# Patient Record
Sex: MALE | Race: WHITE | NOT HISPANIC OR LATINO | Employment: OTHER | URBAN - METROPOLITAN AREA
[De-identification: names, ages, dates, MRNs, and addresses within clinical notes are randomized per-mention and may not be internally consistent; named-entity substitution may affect disease eponyms.]

---

## 2017-01-02 ENCOUNTER — TELEPHONE (OUTPATIENT)
Dept: FAMILY MEDICINE | Facility: CLINIC | Age: 72
End: 2017-01-02

## 2017-01-02 ENCOUNTER — OFFICE VISIT (OUTPATIENT)
Dept: FAMILY MEDICINE | Facility: CLINIC | Age: 72
End: 2017-01-02
Payer: COMMERCIAL

## 2017-01-02 VITALS
HEIGHT: 73 IN | TEMPERATURE: 99.4 F | BODY MASS INDEX: 38.38 KG/M2 | DIASTOLIC BLOOD PRESSURE: 74 MMHG | HEART RATE: 54 BPM | WEIGHT: 289.6 LBS | SYSTOLIC BLOOD PRESSURE: 126 MMHG | RESPIRATION RATE: 18 BRPM | OXYGEN SATURATION: 97 %

## 2017-01-02 DIAGNOSIS — Z11.59 NEED FOR HEPATITIS C SCREENING TEST: ICD-10-CM

## 2017-01-02 DIAGNOSIS — D64.9 ANEMIA, UNSPECIFIED TYPE: Primary | ICD-10-CM

## 2017-01-02 DIAGNOSIS — Z23 NEED FOR PROPHYLACTIC VACCINATION AND INOCULATION AGAINST INFLUENZA: ICD-10-CM

## 2017-01-02 DIAGNOSIS — D64.9 ANEMIA, UNSPECIFIED TYPE: ICD-10-CM

## 2017-01-02 DIAGNOSIS — D64.9 ANEMIA, UNSPECIFIED: ICD-10-CM

## 2017-01-02 DIAGNOSIS — N18.30 CKD (CHRONIC KIDNEY DISEASE) STAGE 3, GFR 30-59 ML/MIN (H): Chronic | ICD-10-CM

## 2017-01-02 DIAGNOSIS — Z79.4 TYPE 2 DIABETES MELLITUS WITH DIABETIC NEUROPATHY, WITH LONG-TERM CURRENT USE OF INSULIN (H): Primary | ICD-10-CM

## 2017-01-02 DIAGNOSIS — M1A.39X0 CHRONIC GOUT DUE TO RENAL IMPAIRMENT OF MULTIPLE SITES WITHOUT TOPHUS: Chronic | ICD-10-CM

## 2017-01-02 DIAGNOSIS — R06.00 DYSPNEA, UNSPECIFIED TYPE: ICD-10-CM

## 2017-01-02 DIAGNOSIS — E11.40 TYPE 2 DIABETES MELLITUS WITH DIABETIC NEUROPATHY, WITH LONG-TERM CURRENT USE OF INSULIN (H): Primary | ICD-10-CM

## 2017-01-02 LAB
ERYTHROCYTE [DISTWIDTH] IN BLOOD BY AUTOMATED COUNT: 18.3 % (ref 10–15)
HCT VFR BLD AUTO: 31.7 % (ref 40–53)
HGB BLD-MCNC: 9.2 G/DL (ref 13.3–17.7)
MCH RBC QN AUTO: 24.5 PG (ref 26.5–33)
MCHC RBC AUTO-ENTMCNC: 29 G/DL (ref 31.5–36.5)
MCV RBC AUTO: 84 FL (ref 78–100)
PLATELET # BLD AUTO: 232 10E9/L (ref 150–450)
RBC # BLD AUTO: 3.76 10E12/L (ref 4.4–5.9)
WBC # BLD AUTO: 7.4 10E9/L (ref 4–11)

## 2017-01-02 PROCEDURE — 82247 BILIRUBIN TOTAL: CPT | Mod: 59 | Performed by: INTERNAL MEDICINE

## 2017-01-02 PROCEDURE — 82728 ASSAY OF FERRITIN: CPT | Performed by: INTERNAL MEDICINE

## 2017-01-02 PROCEDURE — 84550 ASSAY OF BLOOD/URIC ACID: CPT | Performed by: INTERNAL MEDICINE

## 2017-01-02 PROCEDURE — 85027 COMPLETE CBC AUTOMATED: CPT | Performed by: INTERNAL MEDICINE

## 2017-01-02 PROCEDURE — 99000 SPECIMEN HANDLING OFFICE-LAB: CPT | Performed by: INTERNAL MEDICINE

## 2017-01-02 PROCEDURE — 36415 COLL VENOUS BLD VENIPUNCTURE: CPT | Performed by: INTERNAL MEDICINE

## 2017-01-02 PROCEDURE — 86803 HEPATITIS C AB TEST: CPT | Mod: 90 | Performed by: INTERNAL MEDICINE

## 2017-01-02 PROCEDURE — 80053 COMPREHEN METABOLIC PANEL: CPT | Performed by: INTERNAL MEDICINE

## 2017-01-02 PROCEDURE — 90471 IMMUNIZATION ADMIN: CPT | Performed by: INTERNAL MEDICINE

## 2017-01-02 PROCEDURE — 83550 IRON BINDING TEST: CPT | Performed by: INTERNAL MEDICINE

## 2017-01-02 PROCEDURE — 83010 ASSAY OF HAPTOGLOBIN QUANT: CPT | Mod: 90 | Performed by: INTERNAL MEDICINE

## 2017-01-02 PROCEDURE — 99214 OFFICE O/P EST MOD 30 MIN: CPT | Mod: 25 | Performed by: INTERNAL MEDICINE

## 2017-01-02 PROCEDURE — 83540 ASSAY OF IRON: CPT | Performed by: INTERNAL MEDICINE

## 2017-01-02 PROCEDURE — 90662 IIV NO PRSV INCREASED AG IM: CPT | Performed by: INTERNAL MEDICINE

## 2017-01-02 PROCEDURE — 82607 VITAMIN B-12: CPT | Mod: 90 | Performed by: INTERNAL MEDICINE

## 2017-01-02 PROCEDURE — 83735 ASSAY OF MAGNESIUM: CPT | Performed by: INTERNAL MEDICINE

## 2017-01-02 PROCEDURE — 85045 AUTOMATED RETICULOCYTE COUNT: CPT | Mod: 90 | Performed by: INTERNAL MEDICINE

## 2017-01-02 PROCEDURE — 99207 C FOOT EXAM  NO CHARGE: CPT | Performed by: INTERNAL MEDICINE

## 2017-01-02 NOTE — Clinical Note
[place] [address] [address] [facility]  [address]  [place]  [address]  Tel: [phone]    January 6, 2017    Jt Nguyen Valentina  [facility]  [address]  [facility] [address]      Jamal,     Here is a copy of your labs for your records showing the drop in your hemoglobin with low iron (ferritin) counts.  I think it is extremely important to schedule the EGD (upper endoscopy) and colonoscopy if you haven't already done so to investigate this further and look for microscopic blood loss.  Your hemoglobin has dropped a lot (by 3 points) in just the past few months and this can definitely account for your dizziness that you sometimes now report. Your hemoglobin is at 9.2 and transfusion is usually suggested at a level about 7.  Your other labs are relatively stable.  Your hepatitis C screening test is normal.  Please continue with the plan of care as we discussed and let me know if you have any questions/concerns. Thanks!     Sincerely,    Sigrid David, DO    Results for orders placed or performed in visit on 01/02/17   Hepatitis C Screen Reflex to HCV RNA Quant and Genotype   Result Value Ref Range    Hepatitis C Antibody  NR     Nonreactive   Assay performance characteristics have not been established for newborns,   infants, and children     Vitamin B12   Result Value Ref Range    Vitamin B12 302 193 - 986 pg/mL   Magnesium   Result Value Ref Range    Magnesium 2.0 1.6 - 2.3 mg/dL   Ferritin   Result Value Ref Range    Ferritin 12 (L) 26 - 388 ng/mL   Iron and iron binding capacity   Result Value Ref Range    Iron 51 35 - 180 ug/dL    Iron Binding Cap 635 (H) 240 - 430 ug/dL    Iron Saturation Index 8 (L) 15 - 46 %   Reticulocyte count   Result Value Ref Range    % Retic 3.7 (H) 0.5 - 2.0 %    Absolute Retic 143.6 (H) 25 - 95 10e9/L   CBC with platelets   Result Value Ref Range    WBC 7.4 4.0 - 11.0 10e9/L    RBC Count 3.76 (L) 4.4 - 5.9 10e12/L    Hemoglobin 9.2 (L) 13.3 - 17.7 g/dL     Hematocrit 31.7 (L) 40.0 - 53.0 %    MCV 84 78 - 100 fl    MCH 24.5 (L) 26.5 - 33.0 pg    MCHC 29.0 (L) 31.5 - 36.5 g/dL    RDW 18.3 (H) 10.0 - 15.0 %    Platelet Count 232 150 - 450 10e9/L   Uric acid   Result Value Ref Range    Uric Acid 7.0 3.5 - 7.2 mg/dL   Comprehensive metabolic panel (BMP + Alb, Alk Phos, ALT, AST, Total. Bili, TP)   Result Value Ref Range    Sodium 135 133 - 144 mmol/L    Potassium 4.7 3.4 - 5.3 mmol/L    Chloride 102 94 - 109 mmol/L    Carbon Dioxide 24 20 - 32 mmol/L    Anion Gap 9 3 - 14 mmol/L    Glucose 162 (H) 70 - 99 mg/dL    Urea Nitrogen 40 (H) 7 - 30 mg/dL    Creatinine 1.61 (H) 0.66 - 1.25 mg/dL    GFR Estimate 43 (L) >60 mL/min/1.7m2    GFR Estimate If Black 52 (L) >60 mL/min/1.7m2    Calcium 9.1 8.5 - 10.1 mg/dL    Bilirubin Total 1.7 (H) 0.2 - 1.3 mg/dL    Albumin 3.8 3.4 - 5.0 g/dL    Protein Total 7.1 6.8 - 8.8 g/dL    Alkaline Phosphatase 52 40 - 150 U/L    ALT 44 0 - 70 U/L    AST 34 0 - 45 U/L

## 2017-01-02 NOTE — MR AVS SNAPSHOT
After Visit Summary   1/2/2017    Jt Montgomery    MRN: [id number]           Patient Information     Date Of Birth          1945        Visit Information        Provider Department      1/2/2017 3:30 PM Sigrid David,  [facility]        Today's Diagnoses     Type 2 diabetes mellitus with diabetic neuropathy, with long-term current use of insulin (H)    -  1     Chronic gout due to renal impairment of multiple sites without tophus         CKD (chronic kidney disease) stage 3, GFR 30-59 ml/min         Anemia, unspecified type         Dyspnea, unspecified type         Need for prophylactic vaccination and inoculation against influenza         Need for hepatitis C screening test           Care Instructions    Labs today.   Consult with Nathaly about vitamins and supplements.   For scheduling, make sure that you are getting your A1C checked every 6 months.   Schedule stress test.  Follow up at least annually.        Follow-ups after your visit        Your next 10 appointments already scheduled     Jan 12, 2017  4:00 PM   TELEMEDICINE with Nathaly Hughes [facility] ([facility])    [address] [place] [address] [phone]           Note: this is not an onsite visit; there is no need to come to the facility.            Jan 26, 2017  2:20 PM   Anticoagulation Visit with [facility]   [facility] [place] ([facility])    [address] [place] [address] [address]  [facility] [id number] [phone]              Who to contact     If you have questions or need follow up information about today's clinic visit or your schedule please contact [facility] directly at [phone].  Normal or non-critical lab and imaging results will be communicated to you by MyChart, letter or phone within 4 business days after the clinic has received the results. If you do not hear from us within  "7 days, please contact the clinic through [facility] or phone. If you have a critical or abnormal lab result, we will notify you by phone as soon as possible.  Submit refill requests through [facility] or call your pharmacy and they will forward the refill request to us. Please allow 3 business days for your refill to be completed.          Additional Information About Your Visit        [facility] Information     [facility] lets you send messages to your doctor, view your test results, renew your prescriptions, schedule appointments and more. To sign up, go to [email or web address] . Click on \"Log in\" on the left side of the screen, which will take you to the Welcome page. Then click on \"Sign up Now\" on the right side of the page.     You will be asked to enter the access code listed below, as well as some personal information. Please follow the directions to create your username and password.     Your access code is: [email or web address]  Expires: 2017 12:24 PM     Your access code will  in 90 days. If you need help or a new code, please call your [place] clinic or [phone].        Your Vitals Were     Pulse Temperature Respirations Height BMI (Body Mass Index) Pulse Oximetry    54 99.4  F (37.4  C) (Oral) 18 6' 1\" (1.854 m) 38.22 kg/m2 97%       Blood Pressure from Last 3 Encounters:   17 126/74   16 127/83   16 122/76    Weight from Last 3 Encounters:   17 289 lb 9.6 oz (131.362 kg)   16 289 lb (131.09 kg)   16 286 lb 8 oz (129.956 kg)              We Performed the Following     ADMIN INFLUENZA[] (For MEDICARE Patients ONLY)      CBC with platelets     Comprehensive metabolic panel (BMP + Alb, Alk Phos, ALT, AST, Total. Bili, TP)     Ferritin     FLU VACCINE, INCREASED ANTIGEN, PRESV FREE, AGE 65+ [57957]     FOOT EXAM     Hepatitis C Screen Reflex to HCV RNA Quant and Genotype     Iron and iron binding capacity     Magnesium     Reticulocyte count     Uric acid     Vitamin " B12          Today's Medication Changes          These changes are accurate as of: 1/2/17  4:37 PM.  If you have any questions, ask your nurse or doctor.               Stop taking these medicines if you haven't already. Please contact your care team if you have questions.     Krill Oil 1000 MG Caps   Stopped by:  Sigrid David,            STATIN NOT PRESCRIBED (INTENTIONAL)   Stopped by:  Sigrid David,                     Primary Care Provider Office Phone # Fax #    Sigrid David [phone]       [facility] [address]   [facility] [address]        Thank you!     Thank you for choosing [facility]  for your care. Our goal is always to provide you with excellent care. Hearing back from our patients is one way we can continue to improve our services. Please take a few minutes to complete the written survey that you may receive in the mail after your visit with us. Thank you!             Your Updated Medication List - Protect others around you: Learn how to safely use, store and throw away your medicines at www.disposemymeds.org.          This list is accurate as of: 1/2/17  4:37 PM.  Always use your most recent med list.                   Brand Name Dispense Instructions for use    alpha-lipoic acid 600 MG Caps      Take 600 mg by mouth 2 times daily       aspirin 81 MG EC tablet     90 tablet    Take 1 tablet by mouth daily.       ATORVASTATIN CALCIUM PO      Take 20 mg by mouth daily       blood glucose monitoring lancets     1 Box    Use to test blood sugar 5 times daily or as directed.       blood glucose monitoring meter device kit     1 kit    Use to test blood sugar 3 times daily or as directed.       blood glucose monitoring test strip    ACCU-CHEK LUIS PLUS    400 each    Use to test blood sugar 5 times daily or as directed.       CoQ10 100 MG Caps      Take 200 mg by mouth daily       digoxin 125 MCG tablet    LANOXIN    90 tablet    Take 1  tablet (125 mcg) by mouth daily       diltiazem 300 MG 24 hr capsule    CARTIA XT    90 capsule    Take 1 capsule (300 mg) by mouth daily       fish oil-omega-3 fatty acids 1000 MG capsule      Take 2 g by mouth every other day       furosemide 20 MG tablet    LASIX    90 tablet    Take 1 tablet (20 mg) by mouth daily       insulin aspart 100 UNIT/ML injection    NovoLOG FLEXPEN    45 mL    Inject 15 Units Subcutaneous 3 times daily (with meals)       insulin glargine 100 UNIT/ML injection    LANTUS SOLOSTAR    3 Month    Inject 55 Units Subcutaneous daily       insulin pen needle 32G X 4 MM    BD GERMÁN U/F    100 each    Use four times daily or as directed.       irbesartan 300 MG tablet    AVAPRO    90 tablet    Take 1 tablet (300 mg) by mouth At Bedtime       metoprolol 100 MG 24 hr tablet    TOPROL-XL    180 tablet    Take 1 tablet (100 mg) by mouth 2 times daily       Multi-vitamin Tabs tablet      Take 4 tablets by mouth daily       order for DME     1 each    Equipment being ordered: True Track Meter from [employer]       predniSONE 20 MG tablet    DELTASONE    10 tablet    Take 1 tablet (20 mg) by mouth daily as needed Gout flare       PROBIOTIC & ACIDOPHILUS EX ST PO      Take 1 capsule by mouth daily       Resveratrol 100 MG Caps      Take 1 capsule by mouth daily       spironolactone 25 MG tablet    ALDACTONE    90 tablet    Take 1 tablet (25 mg) by mouth daily       ULORIC 40 MG Tabs tablet   Generic drug:  febuxostat      Take 40 mg by mouth daily       UNABLE TO FIND      MEDICATION NAME: Super Beet Juice       warfarin 5 MG tablet    COUMADIN    135 tablet    Take 1 tab on Mondays and Thursdays and take 1 1/2 tabs on all other days or as directed by INR clinic

## 2017-01-02 NOTE — NURSING NOTE
"Chief Complaint   Patient presents with     Diabetes       Initial /74 mmHg  Pulse 54  Temp(Src) 99.4  F (37.4  C) (Oral)  Resp 18  Ht 6' 1\" (1.854 m)  Wt 289 lb 9.6 oz (131.362 kg)  BMI 38.22 kg/m2  SpO2 97% Estimated body mass index is 38.22 kg/(m^2) as calculated from the following:    Height as of this encounter: 6' 1\" (1.854 m).    Weight as of this encounter: 289 lb 9.6 oz (131.362 kg).  BP completed using cuff size: lior Goyal CMA January 2, 2017 3:46 PM      "

## 2017-01-02 NOTE — PATIENT INSTRUCTIONS
Labs today.   Consult with Nathaly about vitamins and supplements.   For scheduling, make sure that you are getting your A1C checked every 6 months.   Schedule stress test.  Follow up at least annually.

## 2017-01-02 NOTE — PROGRESS NOTES
SUBJECTIVE:                                                    Jt Montgomery is a 71 year old male who presents to clinic today for the following health issues:    Jamal presents to the clinic for a follow-up appointment.    Diabetes Follow-up    Patient is checking blood sugars: twice daily.          In the morning, in afternoon    Diabetic concerns: None     Symptoms of hypoglycemia (low blood sugar): none     Paresthesias (numbness or burning in feet) or sores: Yes neuropathy     Date of last diabetic eye exam: unsure     Hyperlipidemia Follow-Up      Rate your low fat/cholesterol diet?: good    Taking statin?  Yes, no muscle aches from statin - just recently started per cardiology eval - he had to think about this extensively     Other lipid medications/supplements?:  none     Hypertension Follow-up      Outpatient blood pressures are being checked at home.  Results are 120s/70-80s.    Low Salt Diet: uses himalayan salt       Vascular Disease Follow-up:  Coronary Artery Disease (CAD)      Chest pain or pressure, left side neck or arm pain: No    Shortness of breath/increased sweats/nausea with exertion: Yes with exertion - has discussed with cards - planned future stress test    Pain in calves walking 1-2 blocks: No    Worsened or new symptoms since last visit: Yes dizziness and weakness    Nitroglycerin use: no    Daily aspirin use: Yes     Chronic Kidney Disease Follow-up      Current NSAID use?  No      Amount of exercise or physical activity: 2-3 days/week for an average of 15-30 minutes    Problems taking medications regularly: No    Medication side effects: none    Diet: low salt, low fat/cholesterol and diabetic    Jamal notes that he will have intermittent SOB and dizziness; no specific pattern. He has a stress test scheduled.   Pt is now seeing cardiologist Dr. Gomez and appreciates her expertise. NOTE, recent echo actually showed improved EF to 50-55% with some WMA in setting of known  "CAD.  Last A1C was under 8. Feels that gout is under control with no recent flare ups.  Jamal expresses that he is experiencing neuropathy in his fingers and toes. He notices that he feels it the most when typing and denies being woken by the numbness. Neuropathy in LE does not extend above the ankles so likely the hand paresthesias are r/t some local impingement neuropathy such as CTS.  Jamal notes that sometimes he will have difficulty sleeping at night and will not always feel rested in the morning. Is using his BiPAP nightly.    Jamal would like to consult with somebody about a good supplement/vitamin regimen - suggested he check with Nathaly.   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   Pt is now taking 20 mg atorvastatin daily for the past 3 days after discussion with Dr. Gomez.      Problem list and histories reviewed & adjusted, as indicated.    ROS:  Detailed as above    This document serves as a record of the services and decisions personally performed and made by Sigrid David DO. It was created on his/her behalf by Etelvina Hood, a trained medical scribe. The creation of this document is based the provider's statements to the medical scribe.  Wyatt Hood 3:59 PM, January 2, 2017    OBJECTIVE:                                                    /74 mmHg  Pulse 54  Temp(Src) 99.4  F (37.4  C) (Oral)  Resp 18  Ht 6' 1\" (1.854 m)  Wt 289 lb 9.6 oz (131.362 kg)  BMI 38.22 kg/m2  SpO2 97%  Body mass index is 38.22 kg/(m^2).  GENERAL: healthy, alert and no distress  RESP: Lungs clear to auscultation-no wheezes, rails or rhonchi  CV: irregularly irregular, rate controlled, no carotid bruits  Diabetic foot exam: skin intact, loss of sensation bilaterally with monofilament exam at toe level  SKIN: Chronic bilateral " LE hyperpigmentation d/t mild venous stasis with trace edema  PSYCH: mood and affect pleasant, cheerful     ASSESSMENT/PLAN:                                                      1. Type 2 diabetes mellitus with diabetic neuropathy, with long-term current use of insulin (H)  Appreciate MTM - he has made a lot of nice improvements over the past year  - Vitamin B12  - Magnesium  - Comprehensive metabolic panel (BMP + Alb, Alk Phos, ALT, AST, Total. Bili, TP)  - FOOT EXAM    2. Chronic gout due to renal impairment of multiple sites without tophus  Uloric has been very helpful and is followed by nephrology   Rare use of prednisone  - Uric acid    3. CKD (chronic kidney disease) stage 3, GFR 30-59 ml/min  Follows with nephrology    4. Anemia, unspecified type  He denies blood loss, maybe anemia related to CKD  Will add on additional labs and recheck  - Ferritin  - Iron and iron binding capacity  - Reticulocyte count  - CBC with platelets    5. Dyspnea, unspecified type  Stress test had been advised by Dr. Gomez  Periodic dizziness may be related to low BP, anemia and/or need for stress test vs other    6. Need for prophylactic vaccination and inoculation against influenza  - FLU VACCINE, INCREASED ANTIGEN, PRESV FREE, AGE 65+ [85461]  - ADMIN INFLUENZA[] (For MEDICARE Patients ONLY)     7. Need for hepatitis C screening test  - Hepatitis C Screen Reflex to HCV RNA Quant and Genotype    Patient Instructions   Labs today.   Consult with Nathaly about vitamins and supplements.   For scheduling, make sure that you are getting your A1C checked every 6 months.   Schedule stress test.  Follow up at least annually.    The information in this document, created by the medical scribe for me, accurately reflects the services I personally performed and the decisions made by me. I have reviewed and approved this document for accuracy prior to leaving the patient care area.  Sigrid David, DO  4:00 PM, 01/02/2017    Sigrid Conley  Frankie, DO  [facility]    Injectable Influenza Immunization Documentation    1.  Is the person to be vaccinated sick today?  No    2. Does the person to be vaccinated have an allergy to eggs or to a component of the vaccine?  No    3. Has the person to be vaccinated today ever had a serious reaction to influenza vaccine in the past?  No    4. Has the person to be vaccinated ever had Guillain-[place] syndrome?  No     Form completed by patient. BETHANY Goyal CMA January 2, 2017 3:54 PM

## 2017-01-03 LAB
ALBUMIN SERPL-MCNC: 3.8 G/DL (ref 3.4–5)
ALP SERPL-CCNC: 52 U/L (ref 40–150)
ALT SERPL W P-5'-P-CCNC: 44 U/L (ref 0–70)
ANION GAP SERPL CALCULATED.3IONS-SCNC: 9 MMOL/L (ref 3–14)
AST SERPL W P-5'-P-CCNC: 34 U/L (ref 0–45)
BILIRUB SERPL-MCNC: 1.7 MG/DL (ref 0.2–1.3)
BUN SERPL-MCNC: 40 MG/DL (ref 7–30)
CALCIUM SERPL-MCNC: 9.1 MG/DL (ref 8.5–10.1)
CHLORIDE SERPL-SCNC: 102 MMOL/L (ref 94–109)
CO2 SERPL-SCNC: 24 MMOL/L (ref 20–32)
CREAT SERPL-MCNC: 1.61 MG/DL (ref 0.66–1.25)
FERRITIN SERPL-MCNC: 12 NG/ML (ref 26–388)
GFR SERPL CREATININE-BSD FRML MDRD: 43 ML/MIN/1.7M2
GLUCOSE SERPL-MCNC: 162 MG/DL (ref 70–99)
HCV AB SERPL QL IA: NORMAL
IRON SATN MFR SERPL: 8 % (ref 15–46)
IRON SERPL-MCNC: 51 UG/DL (ref 35–180)
MAGNESIUM SERPL-MCNC: 2 MG/DL (ref 1.6–2.3)
POTASSIUM SERPL-SCNC: 4.7 MMOL/L (ref 3.4–5.3)
PROT SERPL-MCNC: 7.1 G/DL (ref 6.8–8.8)
RETICS # AUTO: 143.6 10E9/L (ref 25–95)
RETICS/RBC NFR AUTO: 3.7 % (ref 0.5–2)
SODIUM SERPL-SCNC: 135 MMOL/L (ref 133–144)
TIBC SERPL-MCNC: 635 UG/DL (ref 240–430)
URATE SERPL-MCNC: 7 MG/DL (ref 3.5–7.2)

## 2017-01-03 NOTE — TELEPHONE ENCOUNTER
Call to patient.  Reviewed provider suggestion for next steps, he denies any blood in his stool or urine.  Patient continues to be hesitant to complete colonoscopy, more so to have both.  Did review with patient that provider feels this IS the next option for looking into his anemia.  Patient verbalizes understanding, is ok with provider placing referral to GI.  Forward to provider.  Elvira Rodas RN

## 2017-01-03 NOTE — TELEPHONE ENCOUNTER
Advised per below, pt will call [facility] INR clinic as he is managed through there for follow up.  Ivette Reddy RN

## 2017-01-03 NOTE — TELEPHONE ENCOUNTER
Triage, please call him about his new onset anemia  He had denied blood loss yesterday  I suggest GI eval (both upper and lower - EGD and colonoscopy). He has declined colonoscopy in the past.

## 2017-01-03 NOTE — TELEPHONE ENCOUNTER
GI referral placed  - he should get call from KAROLINE yanes  Please note, he is on Coumadin so will need to stop that prior to procedure   Please have him call when it gets scheduled so we can figure out bridging

## 2017-01-05 ENCOUNTER — TELEPHONE (OUTPATIENT)
Dept: FAMILY MEDICINE | Facility: CLINIC | Age: 72
End: 2017-01-05

## 2017-01-05 DIAGNOSIS — D64.9 ANEMIA, UNSPECIFIED: Primary | ICD-10-CM

## 2017-01-06 LAB
BILIRUB SERPL-MCNC: 1.6 MG/DL (ref 0.2–1.3)
HAPTOGLOB SERPL-MCNC: 198 MG/DL (ref 35–175)
VIT B12 SERPL-MCNC: 302 PG/ML (ref 193–986)

## 2017-01-06 NOTE — TELEPHONE ENCOUNTER
Left VM for Jamal to hopefully reinforce plan for GI eval  Labs with high retic and bilirubin and low Hgb - could be hemolysis or active blood loss  Will try to add on additional labs. LAB can any of the labs I just ordered in this encounter be added on to Monday's labs (even if not all of them, but possibly some of them)? Thanks!

## 2017-01-12 ENCOUNTER — ALLIED HEALTH/NURSE VISIT (OUTPATIENT)
Dept: PHARMACY | Facility: CLINIC | Age: 72
End: 2017-01-12
Payer: COMMERCIAL

## 2017-01-12 DIAGNOSIS — Z79.4 TYPE 2 DIABETES MELLITUS WITH DIABETIC NEUROPATHY, WITH LONG-TERM CURRENT USE OF INSULIN (H): ICD-10-CM

## 2017-01-12 DIAGNOSIS — E11.40 TYPE 2 DIABETES MELLITUS WITH DIABETIC NEUROPATHY, WITH LONG-TERM CURRENT USE OF INSULIN (H): ICD-10-CM

## 2017-01-12 DIAGNOSIS — D64.9 ANEMIA, UNSPECIFIED TYPE: Primary | ICD-10-CM

## 2017-01-12 PROCEDURE — 99606 MTMS BY PHARM EST 15 MIN: CPT | Performed by: PHARMACIST

## 2017-01-12 NOTE — PROGRESS NOTES
SUBJECTIVE/OBJECTIVE:                                                    Jt Montgomery is a 71 year old male called for a follow-up visit for Medication Therapy Management.  He was referred to me from Dr. David.     Chief Complaint: Follow up from our visit on 12/20/16.  Called to f/up on diabetes management.    Tobacco: History of tobacco dependence - quit 1972   Alcohol: history of alcohol dependence    Medication Adherence: no issues reported    Anemia:  He saw Dr. David on 1/2 and was diagnosed with anemia.  He was asked to have an EGD and a colonoscopy - he hasn't scheduled either yet.  He had some questions about these.      Diabetes:  Pt currently taking Lantus 55 units daily and Novolog 15 units TID with meals (he's been working hard on taking this right before or right after eating, he says it's not perfect but going much better).  Pt is experiencing the following side effects:  none  SMBG: Several times per week.   Ranges (from patient's glucose log):   AM (fasting): 158, 178, 168, 179, 176, 182, 181, 191 (had baked potato the previous evening), 163  Mid-day: 129  Symptoms of low blood sugar? none. Frequency of hypoglycemia? never.  Recent symptoms of high blood sugar? none  Eye exam: due  Foot exam: up to date  Microalbumin is < 30 mg/g. Pt is taking an ACEi/ARB.  Aspirin: Taking 81mg daily and denies side effects  Exercise:  He's been doing a little more exercise, but it's not daily yet (his goal)    Current labs include:  BP Readings from Last 3 Encounters:   01/02/17 126/74   12/05/16 127/83   11/02/16 122/76       A1C      7.6   12/5/2016    CHOL      175   4/29/2016  TRIG      487   4/29/2016  HDL       27   4/29/2016  LDL       99   4/29/2016    Liver Function Studies -   Recent Labs   Lab Test  01/02/17   1639   PROTTOTAL  7.1   ALBUMIN  3.8   BILITOTAL  1.7*   ALKPHOS  52   AST  34   ALT  44       Lab Results   Component Value Date    UCRR 148 04/29/2016    MICROL 19 04/29/2016    UMALCR  12.84 04/29/2016       Last Basic Metabolic Panel:  NA      135   1/2/2017   POTASSIUM      4.7   1/2/2017  CHLORIDE      102   1/2/2017  BUN       40   1/2/2017  CR     1.61   1/2/2017    GFR ESTIMATE   Date Value Ref Range Status   01/02/2017 43* >60 mL/min/1.7m2 Final     Comment:     Non  GFR Calc   08/08/2016 36* >60 mL/min/1.7m2 Final     Comment:     Non  GFR Calc   04/29/2016 49* >60 mL/min/1.7m2 Final     Comment:     Non  GFR Calc       TSH   Date Value Ref Range Status   04/29/2016 2.35 0.40 - 4.00 mU/L Final   ]    There were no vitals taken for this visit. - telephone visit    Most Recent Immunizations   Administered Date(s) Administered     Influenza (High Dose) 3 valent vaccine 01/02/2017     Pneumococcal 23 valent 09/12/2012     ASSESSMENT:                                                    Current medications were reviewed today.      Medication Adherence: no issues identified    Anemia: Needs to schedule EGD and colonoscopy.    Diabetes: Needs Improvement. Patient is meeting A1c goal of < 8%. Self monitoring of blood glucose is not at goal of fasting  mg/dL.  He'd prefer to continue working on diet and exercise, which I agree is reasonable.     PLAN:                                                      1.  Reviewed indication for EGD and colonoscopy and the importance of having these done.  Provided pt with phone # to call to schedule, he agrees to do so.  2.  Jamal set the goal of continuing to work on diet/exercise.    I spent 20 minutes with this patient today.  All changes were made via collaborative practice agreement with Sigrid David. A copy of the visit note was provided to the patient's primary care provider.     Will follow up in 3 weeks, phone visit scheduled.    The patient declined a summary of these recommendations as an after visit summary.    Nathaly Hughes, PharmD, BCACP  Medication Therapy Management Provider  Pager:  [phone]

## 2017-01-12 NOTE — Clinical Note
He hasn't scheduled his EGD and colonoscopy yet, but I think I convinced him to do so.  He has the phone # and agreed to call.  I'm going to call him again in 3 weeks so will double check again then. Nathaly Hughes, PharmD, [facility] Medication Therapy Management Provider Pager: [phone]

## 2017-01-13 DIAGNOSIS — I25.10 CAD (CORONARY ARTERY DISEASE): Primary | ICD-10-CM

## 2017-01-13 RX ORDER — METOPROLOL SUCCINATE 100 MG/1
100 TABLET, EXTENDED RELEASE ORAL
Qty: 180 TABLET | Refills: 3 | Status: SHIPPED | OUTPATIENT
Start: 2017-01-13 | End: 2018-02-09

## 2017-02-02 ENCOUNTER — ANTICOAGULATION THERAPY VISIT (OUTPATIENT)
Dept: CARDIOLOGY | Facility: CLINIC | Age: 72
End: 2017-02-02
Payer: COMMERCIAL

## 2017-02-02 DIAGNOSIS — Z79.01 LONG-TERM (CURRENT) USE OF ANTICOAGULANTS: Primary | ICD-10-CM

## 2017-02-02 DIAGNOSIS — I48.91 ATRIAL FIBRILLATION, UNSPECIFIED TYPE (H): ICD-10-CM

## 2017-02-02 LAB — INR POINT OF CARE: 2.3 (ref 0.86–1.14)

## 2017-02-02 PROCEDURE — 36416 COLLJ CAPILLARY BLOOD SPEC: CPT | Performed by: INTERNAL MEDICINE

## 2017-02-02 PROCEDURE — 85610 PROTHROMBIN TIME: CPT | Mod: QW | Performed by: INTERNAL MEDICINE

## 2017-02-02 NOTE — PROGRESS NOTES
ANTICOAGULATION FOLLOW-UP CLINIC VISIT    Patient Name:  Jt Montgomery  Date:  2/2/2017  Contact Type:  Face to Face    SUBJECTIVE:     Patient Findings     Positives No Problem Findings           OBJECTIVE    INR PROTIME   Date Value Ref Range Status   02/02/2017 2.3* 0.86 - 1.14 Final       ASSESSMENT / PLAN  INR assessment THER    Recheck INR In: 4 WEEKS    INR Location Clinic      Anticoagulation Summary as of 2/2/2017     INR goal 2.0-3.0   Selected INR 2.3 (2/2/2017)   Maintenance plan 5 mg (5 mg x 1) on Mon, Thu; 7.5 mg (5 mg x 1.5) all other days   Full instructions 5 mg on Mon, Thu; 7.5 mg all other days   Weekly total 47.5 mg   No change documented Paola Roberson RN   Plan last modified Paola Roberson RN (10/27/2016)   Next INR check 3/2/2017   Target end date Indefinite    Indications   Long-term (current) use of anticoagulants [Z79.01] [Z79.01]  Atrial fibrillation (AFIB) on Coumadin [I48.91]         Anticoagulation Episode Summary     INR check location     Preferred lab     Send INR reminders to [facility] HEART INR NURSE    Comments       Anticoagulation Care Providers     Provider Role Specialty Phone number    Lottie Gomez Minda,  [facility] [phone]            See the Encounter Report to view Anticoagulation Flowsheet and Dosing Calendar (Go to Encounters tab in chart review, and find the Anticoagulation Therapy Visit)    INR 2.3.  He said he is taking less fish oil recently.  He also just mentioned when he was leaving that on Monday he started a new pill that contains green tea extra.  He said he has always been adding a green tea liquid to water but the pill with the extract is new.  We will continue the same schedule and recheck in 4 weeks was scheduled.  Told patient to call us back with the ingredients and amounts of green tea extract so we can decide if INR should be checked sooner in 2 weeks.  Brian Roberson RN        2/3/2017  9:56 am Pt left a voicemail that the Green tea extract has 50% EGCG (phytochemicals). Per [employer], green tea extract has a moderate interaction with warfarin (lowers INR) so we should recheck INR in 2 weeks. Left message for pt to call back to reschedule his INR appt to mid February. Antoni

## 2017-02-02 NOTE — MR AVS SNAPSHOT
Jt Berrylinh Montgomery   2/2/2017 2:20 PM   Anticoagulation Therapy Visit    Description:  71 year old male   Provider:  [facility] ANTICOAGULATION   Department:  [facility]           INR as of 2/2/2017     Selected INR 2.3 (2/2/2017)      Anticoagulation Summary as of 2/2/2017     INR goal 2.0-3.0   Selected INR 2.3 (2/2/2017)   Full instructions 5 mg on Mon, Thu; 7.5 mg all other days   Next INR check 3/2/2017    Indications   Long-term (current) use of anticoagulants [Z79.01] [Z79.01]  Atrial fibrillation (AFIB) on Coumadin [I48.91]         Your next Anticoagulation Clinic appointment(s)     Mar 02, 2017  2:20 PM   Anticoagulation Visit with  ANTICOAGULATION   [facility] ([email or web address])    [address] [place] [id number] [phone]              Contact Numbers     Anticoagulant (INR) Clinic Number: [phone]          February 2017 Details    Sun Mon Tue Wed Thu Fri Sat        1               2      5 mg   See details      3      7.5 mg         4      7.5 mg           5      7.5 mg         6      5 mg         7      7.5 mg         8      7.5 mg         9      5 mg         10      7.5 mg         11      7.5 mg           12      7.5 mg         13      5 mg         14      7.5 mg         15      7.5 mg         16      5 mg         17      7.5 mg         18      7.5 mg           19      7.5 mg         20      5 mg         21      7.5 mg         22      7.5 mg         23      5 mg         24      7.5 mg         25      7.5 mg           26      7.5 mg         27      5 mg         28      7.5 mg              Date Details   02/02 This INR check               How to take your warfarin dose     To take:  5 mg Take 1 of the 5 mg tablets.    To take:  7.5 mg Take 1.5 of the 5 mg tablets.           March 2017 Details    Sun Mon Tue Wed Thu Fri Sat        1      7.5 mg         2            3               4                 5               6                7               8               9               10               11                 12               13               14               15               16               17               18                 19               20               21               22               23               24               25                 26               27               28               29               30               31                 Date Details   No additional details    Date of next INR:  3/2/2017         How to take your warfarin dose     To take:  5 mg Take 1 of the 5 mg tablets.    To take:  7.5 mg Take 1.5 of the 5 mg tablets.

## 2017-02-09 ENCOUNTER — ALLIED HEALTH/NURSE VISIT (OUTPATIENT)
Dept: PHARMACY | Facility: CLINIC | Age: 72
End: 2017-02-09
Payer: COMMERCIAL

## 2017-02-09 DIAGNOSIS — D64.9 ANEMIA, UNSPECIFIED TYPE: ICD-10-CM

## 2017-02-09 DIAGNOSIS — E11.40 TYPE 2 DIABETES MELLITUS WITH DIABETIC NEUROPATHY, WITH LONG-TERM CURRENT USE OF INSULIN (H): Primary | Chronic | ICD-10-CM

## 2017-02-09 DIAGNOSIS — Z79.4 TYPE 2 DIABETES MELLITUS WITH DIABETIC NEUROPATHY, WITH LONG-TERM CURRENT USE OF INSULIN (H): Primary | Chronic | ICD-10-CM

## 2017-02-09 PROCEDURE — 99606 MTMS BY PHARM EST 15 MIN: CPT | Performed by: PHARMACIST

## 2017-02-09 NOTE — Clinical Note
LINETTE he still hasn't scheduled EGD or colonoscopy, but says he will before we chat again in 2 weeks. Nathaly Hughes, PharmD, [facility] Medication Therapy Management Provider Pager: [phone]

## 2017-02-09 NOTE — PROGRESS NOTES
SUBJECTIVE/OBJECTIVE:                                                    Jt Montgomery is a 71 year old male called for a follow-up visit for Medication Therapy Management.  He was referred to me from Dr. David.     Chief Complaint: Follow up from our visit on 1/12/17.  Called to f/up on diabetes management.    Tobacco: History of tobacco dependence - quit 1972   Alcohol: history of alcohol dependence    Medication Adherence: no issues reported    Diabetes:  Pt currently taking Lantus 55 units daily and Novolog 15 units TID with meals.  Pt is experiencing the following side effects:  none  SMBG: Several times per week.   Ranges (from patient's glucose log):   AM (fasting): 181, 179, 179, 217 (had pizza the night before), 230 (had dessert the night before), 162, 206, 166  Symptoms of low blood sugar? none. Frequency of hypoglycemia? never.  Recent symptoms of high blood sugar? none  Eye exam: due  Foot exam: up to date  Microalbumin is < 30 mg/g. Pt is taking an ACEi/ARB.  Aspirin: Taking 81mg daily and denies side effects  Exercise:  He hasn't exercised at all since we last talked, which is disappointing to him.  He had planned to talk with a friend about exercise, and motivate each other to do some, but never did that.  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    Anemia:  He saw Dr. David on 1/2/17 and was diagnosed with anemia.  He has not yet scheduled his EGD or colonoscopy.     Current labs include:  BP Readings from Last 3 Encounters:   01/02/17 126/74   12/05/16 127/83   11/02/16 122/76       Today's Vitals: There were no vitals taken for this visit. - telephone visit    A1C      7.6   12/5/2016    CHOL      175   4/29/2016  TRIG      487   4/29/2016  HDL       27   4/29/2016  LDL       99   4/29/2016    Liver Function Studies -   Recent Labs   Lab Test  01/02/17   1639   PROTTOTAL  7.1   ALBUMIN  3.8    BILITOTAL  1.7*   ALKPHOS  52   AST  34   ALT  44       Lab Results   Component Value Date    UCRR 148 04/29/2016    MICROL 19 04/29/2016    UMALCR 12.84 04/29/2016       Last Basic Metabolic Panel:  NA      135   1/2/2017   POTASSIUM      4.7   1/2/2017  CHLORIDE      102   1/2/2017  BUN       40   1/2/2017  CR     1.61   1/2/2017    GFR ESTIMATE   Date Value Ref Range Status   01/02/2017 43* >60 mL/min/1.7m2 Final     Comment:     Non  GFR Calc   08/08/2016 36* >60 mL/min/1.7m2 Final     Comment:     Non  GFR Calc   04/29/2016 49* >60 mL/min/1.7m2 Final     Comment:     Non  GFR Calc       TSH   Date Value Ref Range Status   04/29/2016 2.35 0.40 - 4.00 mU/L Final   ]    Most Recent Immunizations   Administered Date(s) Administered     Influenza (High Dose) 3 valent vaccine 01/02/2017     Pneumococcal 23 valent 09/12/2012     ASSESSMENT:                                                    Current medications were reviewed today as discussed above.      Medication Adherence: no issues identified    Diabetes: Needs Improvement. Patient is meeting A1c goal of < 8%. Self monitoring of blood glucose is not at goal of fasting  mg/dL.  He again prefers to focus on diet/exercise, which I agree is reasonable.    Anemia: Needs to schedule colonoscopy and EGD.    PLAN:                                                      1.  He set the goal of using the trampoline 5 minutes each day - in the AM before breakfast with the exception of Thursday, which he'll do in the afternoon.  2.  He will call to schedule EGD and colonoscopy.    I spent 20 minutes with this patient today.  All changes were made via collaborative practice agreement with Sigird David. A copy of the visit note was provided to the patient's primary care provider.     Will follow up in 2 weeks, appt scheduled.    The patient declined a summary of these recommendations as an after visit summary.    Nathaly  Rogelio Hughes, [facility]  Medication Therapy Management Provider  Pager: [phone]

## 2017-02-15 DIAGNOSIS — Z79.4 TYPE 2 DIABETES MELLITUS WITH DIABETIC NEUROPATHY, WITH LONG-TERM CURRENT USE OF INSULIN (H): Chronic | ICD-10-CM

## 2017-02-15 DIAGNOSIS — E11.40 TYPE 2 DIABETES MELLITUS WITH DIABETIC NEUROPATHY, WITH LONG-TERM CURRENT USE OF INSULIN (H): Chronic | ICD-10-CM

## 2017-02-15 RX ORDER — INSULIN ASPART 100 [IU]/ML
INJECTION, SOLUTION INTRAVENOUS; SUBCUTANEOUS
Qty: 45 ML | Refills: 1 | Status: SHIPPED | OUTPATIENT
Start: 2017-02-15 | End: 2017-12-08

## 2017-02-15 NOTE — TELEPHONE ENCOUNTER
Prescription approved per [facility] Refill Protocol.  Sandra Osullivan RN

## 2017-02-15 NOTE — TELEPHONE ENCOUNTER
insulin aspart (NOVOLOG FLEXPEN) 100 UNIT/ML soln 45 mL 1 10/19/2016             Last Written Prescription Date: 10/19/2016  Last Fill Quantity: 45ml, # refills: 1  Last Office Visit with G, P or [facility] prescribing provider:  01/02/2017        BP Readings from Last 3 Encounters:   01/02/17 126/74   12/05/16 127/83   11/02/16 122/76     Lab Results   Component Value Date    MICROL 19 04/29/2016     No results found for: MICROALBUMIN  Creatinine   Date Value Ref Range Status   01/02/2017 1.61 (H) 0.66 - 1.25 mg/dL Final   ]  GFR Estimate   Date Value Ref Range Status   01/02/2017 43 (L) >60 mL/min/1.7m2 Final     Comment:     Non  GFR Calc   08/08/2016 36 (L) >60 mL/min/1.7m2 Final     Comment:     Non  GFR Calc   04/29/2016 49 (L) >60 mL/min/1.7m2 Final     Comment:     Non  GFR Calc     GFR Estimate If Black   Date Value Ref Range Status   01/02/2017 52 (L) >60 mL/min/1.7m2 Final     Comment:      GFR Calc   08/08/2016 43 (L) >60 mL/min/1.7m2 Final     Comment:      GFR Calc   04/29/2016 59 (L) >60 mL/min/1.7m2 Final     Comment:      GFR Calc     Lab Results   Component Value Date    CHOL 175 04/29/2016     Lab Results   Component Value Date    HDL 27 04/29/2016     Lab Results   Component Value Date    LDL  04/29/2016     Cannot estimate LDL when triglyceride exceeds 400 mg/dL    LDL 99 04/29/2016     Lab Results   Component Value Date    TRIG 487 04/29/2016     Lab Results   Component Value Date    CHOLHDLRATIO 4.7 04/16/2013     Lab Results   Component Value Date    AST 34 01/02/2017     Lab Results   Component Value Date    ALT 44 01/02/2017     Lab Results   Component Value Date    A1C 7.6 12/05/2016    A1C 7.5 04/29/2016    A1C 6.8 08/31/2015    A1C 11.6 12/04/2014    A1C 11.6 12/04/2014     Potassium   Date Value Ref Range Status   01/02/2017 4.7 3.4 - 5.3 mmol/L Final

## 2017-02-16 ENCOUNTER — ANTICOAGULATION THERAPY VISIT (OUTPATIENT)
Dept: CARDIOLOGY | Facility: CLINIC | Age: 72
End: 2017-02-16
Payer: COMMERCIAL

## 2017-02-16 DIAGNOSIS — I48.91 ATRIAL FIBRILLATION, UNSPECIFIED TYPE (H): ICD-10-CM

## 2017-02-16 DIAGNOSIS — Z79.01 LONG-TERM (CURRENT) USE OF ANTICOAGULANTS: ICD-10-CM

## 2017-02-16 LAB — INR POINT OF CARE: 3.2 (ref 0.86–1.14)

## 2017-02-16 PROCEDURE — 85610 PROTHROMBIN TIME: CPT | Mod: QW | Performed by: INTERNAL MEDICINE

## 2017-02-16 PROCEDURE — 36416 COLLJ CAPILLARY BLOOD SPEC: CPT | Performed by: INTERNAL MEDICINE

## 2017-02-16 NOTE — PROGRESS NOTES
ANTICOAGULATION FOLLOW-UP CLINIC VISIT    Patient Name:  Jt Montgomery  Date:  2/16/2017  Contact Type:  Face to Face    SUBJECTIVE:     Patient Findings     Positives Change in medications (started taking green tea extract 2x/day about 2 weeks ago, took Tylenol 3x recently for back pain), Change in diet/appetite (still eating a salad most days, switched MVI (he's not sure if the old one had vit K in it))           OBJECTIVE    INR Protime   Date Value Ref Range Status   02/16/2017 3.2 (A) 0.86 - 1.14 Final       ASSESSMENT / PLAN  INR assessment SUPRA    Recheck INR In: 2 WEEKS    INR Location Clinic      Anticoagulation Summary as of 2/16/2017     INR goal 2.0-3.0   Today's INR 3.2!   Maintenance plan 5 mg (5 mg x 1) on Mon, Thu; 7.5 mg (5 mg x 1.5) all other days   Full instructions 2/16: 2.5 mg; Otherwise 5 mg on Mon, Thu; 7.5 mg all other days   Weekly total 47.5 mg   Plan last modified Paola Roberson RN (10/27/2016)   Next INR check 3/2/2017   Target end date Indefinite    Indications   Long-term (current) use of anticoagulants [Z79.01] [Z79.01]  Atrial fibrillation (AFIB) on Coumadin [I48.91]         Anticoagulation Episode Summary     INR check location     Preferred lab     Send INR reminders to [facility] HEART INR NURSE    Comments       Anticoagulation Care Providers     Provider Role Specialty Phone number    Lottie Gomez DO Responsible Cardiology [phone]            See the Encounter Report to view Anticoagulation Flowsheet and Dosing Calendar (Go to Encounters tab in chart review, and find the Anticoagulation Therapy Visit)    INR 3.2. He started taking Green tea extract 2x/day about 2 weeks ago. This should lower the INR. He has taken Tylenol about 3x recently for back pain. He switched brands of MVI recently but doesn't know about the vit K content of either one. He will call back with that information. He is still eating salads most days. No abnormal bleeding or  bruising. He has been under increased stress this week because of a billing letter he received from his condo . He will see her today to discuss this issue. Will down dose today only to 2.5 mg then resume his usual dosing of 5 mg MTh and 7.5 mg all other days with recheck in 2 weeks. Dosage adjustment made based on physician directed care plan.    Marlena Hong RN

## 2017-02-16 NOTE — MR AVS SNAPSHOT
Jt Montgomery   2/16/2017 2:20 PM   Anticoagulation Therapy Visit    Description:  71 year old male   Provider:   ANTICOAGULATION   Department:  [facility]           INR as of 2/16/2017     Today's INR 3.2!      Anticoagulation Summary as of 2/16/2017     INR goal 2.0-3.0   Today's INR 3.2!   Full instructions 2/16: 2.5 mg; Otherwise 5 mg on Mon, Thu; 7.5 mg all other days   Next INR check 3/2/2017    Indications   Long-term (current) use of anticoagulants [Z79.01] [Z79.01]  Atrial fibrillation (AFIB) on Coumadin [I48.91]         Your next Anticoagulation Clinic appointment(s)     Feb 16, 2017  2:20 PM CST   Anticoagulation Visit with  ANTICOAGULATION   [facility] ([facility])    [address] [place] [id number]   [phone]            Mar 02, 2017  2:20 PM CST   Anticoagulation Visit with  ANTICOAGULATION   [facility] ([facility])    [address] [place] [id number]   [phone]              Contact Numbers     Anticoagulant (INR) Clinic Number: [phone]          February 2017 Details    Sun Mon Tue Wed Thu Fri Sat        1               2               3               4                 5               6               7               8               9               10               11                 12               13               14               15               16      2.5 mg   See details      17      7.5 mg         18      7.5 mg           19      7.5 mg         20      5 mg         21      7.5 mg         22      7.5 mg         23      5 mg         24      7.5 mg         25      7.5 mg           26      7.5 mg         27      5 mg         28      7.5 mg              Date Details   02/16 This INR check               How to take your warfarin dose     To take:  2.5 mg Take 0.5 of a 5 mg tablet.    To take:  5 mg Take 1 of the 5 mg  tablets.    To take:  7.5 mg Take 1.5 of the 5 mg tablets.           March 2017 Details    Sun Mon Tue Wed Thu Fri Sat        1      7.5 mg         2            3               4                 5               6               7               8               9               10               11                 12               13               14               15               16               17               18                 19               20               21               22               23               24               25                 26               27               28               29               30               31                 Date Details   No additional details    Date of next INR:  3/2/2017         How to take your warfarin dose     To take:  5 mg Take 1 of the 5 mg tablets.    To take:  7.5 mg Take 1.5 of the 5 mg tablets.

## 2017-02-20 ENCOUNTER — TELEPHONE (OUTPATIENT)
Dept: NURSING | Facility: CLINIC | Age: 72
End: 2017-02-20

## 2017-02-20 ENCOUNTER — TELEPHONE (OUTPATIENT)
Dept: FAMILY MEDICINE | Facility: CLINIC | Age: 72
End: 2017-02-20

## 2017-02-20 DIAGNOSIS — Z79.4 TYPE 2 DIABETES MELLITUS WITH DIABETIC NEUROPATHY, WITH LONG-TERM CURRENT USE OF INSULIN (H): Chronic | ICD-10-CM

## 2017-02-20 DIAGNOSIS — E11.40 TYPE 2 DIABETES MELLITUS WITH DIABETIC NEUROPATHY, WITH LONG-TERM CURRENT USE OF INSULIN (H): Chronic | ICD-10-CM

## 2017-02-20 NOTE — LETTER
[facility]  [address] [facility]  [address]  [place]  [address]  Tel: [phone]    February 27, 2017    Jt Nguyen Valentina  [address] [place] [address]  [address]  [facility] [id number]        Dear Mr. Montgomery,    We have been unable to reach you by phone.  Dr. David ask that we call you to ask if you have scheduled the EGD/Colonoscopy for eval of your iron deficiency anemia yet?    Please call us with what your plans are for scheduling (ie yes, not yet, or not planning to).        Thank you,    Marjorie LOVELL MA on behalf of  Sigrid David, DO

## 2017-02-21 NOTE — TELEPHONE ENCOUNTER
Left message for patient to return call to office. See message below by Dr David.    SHELIA Chavez

## 2017-02-21 NOTE — TELEPHONE ENCOUNTER
Call Type: Triage Call    Presenting Problem: Calling to leave a message to Dr. Sigrid David.  Jt states he needs a refill of Lantus. Request sent to PCP.  Triage Note:  Guideline Title: Medication Questions - Adult  Recommended Disposition: Provide Health Information  Original Inclination: Wanted to speak with a nurse  Override Disposition:  Intended Action: Follow advice given  Physician Contacted: No  Caller has medication question(s) that was answered with available resources ?  YES  Pregnant and has medication questions regarding prescribed and/or nonprescribed  medication(s) not covered by available resources ? NO  Breastfeeding and has medication questions regarding prescribed and/or  nonprescribed medication(s) not covered by available resources ? NO  Requested information on how to safely dispose of  or unused medications ?  NO  Sign(s) or symptom(s) associated with a diagnosed condition or with a new illness  ? NO  Prescription ordered today and not available at pharmacy putting patient at  clinical risk ? NO  Recurrence of a symptom(s) or illness post prescribed medication treatment AND  provider instructed patient to call if symptom(s) returned. ? NO  Unable to obtain prescribed medication related to available resources AND  situation poses immediate clinical risk ? NO  Pharmacy calling to clarify prescription order. ? NO  Requests refill of prescribed medication that does NOT have a valid refill; lack  of medication may cause clinical risk to patient if not available. ? NO  Has questions about prescribed and/or nonprescribed medications not covered by  available resources ? NO  Pharmacy calling with prescription question; answered per department policy. ? NO  Requests refill of prescribed medication without valid refills OR requests refill  of prescribed medication with valid refills but does not have prescription number  (no RX container); lack of medication does not put patient at clinical  risk ? NO  Physician Instructions:  Care Advice:

## 2017-02-21 NOTE — TELEPHONE ENCOUNTER
Jt called the nurse triage line 02/20/2017 @ 2015. He states that he needs a refill of Lantus insulin. He says there was a mixup, his Novolog was refilled which he did not need, and he actually needed the Lantus. Jt states he has enough left for two days. Please send to [facility] in [place]. Thank you.     Karla Bartlett RN FNA    Routed to: Dr. Sigrid David and MARCELLA austin

## 2017-02-24 DIAGNOSIS — I10 HTN (HYPERTENSION): ICD-10-CM

## 2017-02-24 DIAGNOSIS — I48.91 ATRIAL FIBRILLATION (H): ICD-10-CM

## 2017-02-24 RX ORDER — IRBESARTAN 300 MG/1
300 TABLET ORAL AT BEDTIME
Qty: 90 TABLET | Refills: 2 | Status: SHIPPED | OUTPATIENT
Start: 2017-02-24 | End: 2018-02-08

## 2017-02-24 RX ORDER — DIGOXIN 125 MCG
125 TABLET ORAL DAILY
Qty: 90 TABLET | Refills: 2 | Status: SHIPPED | OUTPATIENT
Start: 2017-02-24 | End: 2017-12-01

## 2017-02-24 NOTE — TELEPHONE ENCOUNTER
No ans, left message to call the clinic Monday to ask if had a chance to schedule a certain test yet.   Marjorie LOVELL Ma

## 2017-02-27 ENCOUNTER — TELEPHONE (OUTPATIENT)
Dept: CARDIOLOGY | Facility: CLINIC | Age: 72
End: 2017-02-27

## 2017-02-27 NOTE — TELEPHONE ENCOUNTER
Pt calling with information regarding his MVI (amount of vit K in previous MVI vs the new MVI). He used to take the men's MVI made by [employer] which had 50 mcg Vit K per daily dose but 5 weeks ago he switched to [employer] MVI with 80 mcg Vit K per daily dose. He hasn't consistently taken it this week. He has INR appt on 3/2/17 for recheck of INR to determine if any dosing changes need to be made because of change in MVI. Antoni

## 2017-03-02 ENCOUNTER — ALLIED HEALTH/NURSE VISIT (OUTPATIENT)
Dept: PHARMACY | Facility: CLINIC | Age: 72
End: 2017-03-02
Payer: COMMERCIAL

## 2017-03-02 DIAGNOSIS — Z79.4 TYPE 2 DIABETES MELLITUS WITH DIABETIC NEUROPATHY, WITH LONG-TERM CURRENT USE OF INSULIN (H): Primary | Chronic | ICD-10-CM

## 2017-03-02 DIAGNOSIS — D64.9 ANEMIA, UNSPECIFIED: ICD-10-CM

## 2017-03-02 DIAGNOSIS — E11.40 TYPE 2 DIABETES MELLITUS WITH DIABETIC NEUROPATHY, WITH LONG-TERM CURRENT USE OF INSULIN (H): Primary | Chronic | ICD-10-CM

## 2017-03-02 PROCEDURE — 99607 MTMS BY PHARM ADDL 15 MIN: CPT | Performed by: PHARMACIST

## 2017-03-02 PROCEDURE — 99606 MTMS BY PHARM EST 15 MIN: CPT | Performed by: PHARMACIST

## 2017-03-02 NOTE — MR AVS SNAPSHOT
After Visit Summary   3/2/2017    Jt Montgomery    MRN: [id number]           Patient Information     Date Of Birth          1945        Visit Information        Provider Department      3/2/2017 4:00 PM Nathaly Hughes, [facility] [place]        Today's Diagnoses     Type 2 diabetes mellitus with diabetic neuropathy, with long-term current use of insulin (H)    -  1    Anemia, unspecified           Follow-ups after your visit        Your next 10 appointments already scheduled     Mar 06, 2017 11:20 AM CST   Anticoagulation Visit with [facility] ANTICOAGULATION   [facility] [place] ([facility])    [address]  [facility] [id number] [phone]            Mar 06, 2017 11:30 AM CST   LAB with CS LAB   [facility]    [address] [id number] [phone]           Patient must bring picture ID.  Patient should be prepared to give a urine specimen  Please do not eat 10-12 hours before your appointment if you are coming in fasting for labs on lipids, cholesterol, or glucose (sugar).  Pregnant women should follow their Care Team instructions. Water with medications is okay. Do not drink coffee or other fluids.   If you have concerns about taking  your medications, please ask at office or if scheduling via eyeQ, send a message by clicking on Secure Messaging, Message Your Care Team.              Future tests that were ordered for you today     Open Future Orders        Priority Expected Expires Ordered    Hemoglobin A1c Routine  3/2/2018 3/2/2017            Who to contact     If you have questions or need follow up information about today's clinic visit or your schedule please contact [facility] directly at [phone].  Normal or non-critical lab and imaging results will be communicated to you by MyChart, letter or phone within 4 business days  "after the clinic has received the results. If you do not hear from us within 7 days, please contact the clinic through [employer] or phone. If you have a critical or abnormal lab result, we will notify you by phone as soon as possible.  Submit refill requests through [employer] or call your pharmacy and they will forward the refill request to us. Please allow 3 business days for your refill to be completed.          Additional Information About Your Visit        [employer] Information     [employer] lets you send messages to your doctor, view your test results, renew your prescriptions, schedule appointments and more. To sign up, go to [email or web address] . Click on \"Log in\" on the left side of the screen, which will take you to the Welcome page. Then click on \"Sign up Now\" on the right side of the page.     You will be asked to enter the access code listed below, as well as some personal information. Please follow the directions to create your username and password.     Your access code is: [email or web address]  Expires: 2017  4:30 PM     Your access code will  in 90 days. If you need help or a new code, please call your [place] clinic or [phone].        Care EveryWhere ID     This is your Care EveryWhere ID. This could be used by other organizations to access your [place] medical records  [id number]         Blood Pressure from Last 3 Encounters:   17 126/74   16 127/83   16 122/76    Weight from Last 3 Encounters:   17 289 lb 9.6 oz (131.4 kg)   16 289 lb (131.1 kg)   16 286 lb 8 oz (130 kg)               Primary Care Provider Office Phone # Fax #    Sigrid Conley DO Frankie [phone]       [facility] [address]   [facility] [address]        Thank you!     Thank you for choosing [facility]  for your care. Our goal is always to provide you with excellent care. Hearing back from our patients is one way we can continue to improve " our services. Please take a few minutes to complete the written survey that you may receive in the mail after your visit with us. Thank you!             Your Updated Medication List - Protect others around you: Learn how to safely use, store and throw away your medicines at www.disposemymeds.org.          This list is accurate as of: 3/2/17  4:30 PM.  Always use your most recent med list.                   Brand Name Dispense Instructions for use    alpha-lipoic acid 600 MG Caps      Take 600 mg by mouth 2 times daily       aspirin 81 MG EC tablet     90 tablet    Take 1 tablet by mouth daily.       ATORVASTATIN CALCIUM PO      Take 20 mg by mouth daily       blood glucose monitoring lancets     1 Box    Use to test blood sugar 5 times daily or as directed.       blood glucose monitoring meter device kit     1 kit    Use to test blood sugar 3 times daily or as directed.       blood glucose monitoring test strip    ACCU-CHEK LUIS PLUS    400 each    Use to test blood sugar 5 times daily or as directed.       CoQ10 100 MG Caps      Take 200 mg by mouth daily       digoxin 125 MCG tablet    LANOXIN    90 tablet    Take 1 tablet (125 mcg) by mouth daily       diltiazem 300 MG 24 hr capsule    CARTIA XT    90 capsule    Take 1 capsule (300 mg) by mouth daily       fish oil-omega-3 fatty acids 1000 MG capsule      Take 2 g by mouth every other day       furosemide 20 MG tablet    LASIX    90 tablet    Take 1 tablet (20 mg) by mouth daily       GREEN TEA EXTRACT PO      Take by mouth 2 times daily       insulin glargine 100 UNIT/ML injection    LANTUS SOLOSTAR    30 mL    Inject 55 Units Subcutaneous daily       insulin pen needle 32G X 4 MM    BD GERMÁN U/F    100 each    Use four times daily or as directed.       irbesartan 300 MG tablet    AVAPRO    90 tablet    Take 1 tablet (300 mg) by mouth At Bedtime       metoprolol 100 MG 24 hr tablet    TOPROL-XL    180 tablet    Take 1 tablet (100 mg) by mouth 2 times daily        Multi-vitamin Tabs tablet      Take 4 tablets by mouth daily       NovoLOG FLEXPEN 100 UNIT/ML injection   Generic drug:  insulin aspart     45 mL    ADMINISTER 15 UNITS UNDER THE SKIN THREE TIMES DAILY WITH MEALS       order for DME     1 each    Equipment being ordered: True Track Meter from [employer]       predniSONE 20 MG tablet    DELTASONE    10 tablet    Take 1 tablet (20 mg) by mouth daily as needed Gout flare       PROBIOTIC & ACIDOPHILUS EX ST PO      Take 1 capsule by mouth daily       Resveratrol 100 MG Caps      Take 1 capsule by mouth daily       spironolactone 25 MG tablet    ALDACTONE    90 tablet    Take 1 tablet (25 mg) by mouth daily       ULORIC 40 MG Tabs tablet   Generic drug:  febuxostat      Take 40 mg by mouth daily       UNABLE TO FIND      MEDICATION NAME: Super Beet Juice       warfarin 5 MG tablet    COUMADIN    135 tablet    Take 1 tab on Mondays and Thursdays and take 1 1/2 tabs on all other days or as directed by INR clinic

## 2017-03-02 NOTE — PROGRESS NOTES
"/SUBJECTIVE/OBJECTIVE:                                                    Jt Montgomery is a 71 year old male called for a follow-up visit for Medication Therapy Management.  He was referred to me from Dr. David.     Chief Complaint: Follow up from our visit on 2/9/17.  Called to f/up on diabetes management.    Tobacco: History of tobacco dependence - quit 1972   Alcohol: history of alcohol dependence    Medication Adherence: no issues reported    Diabetes:  Pt currently taking Lantus 55 units daily and Novolog 15 units TID with meals.  Pt is experiencing the following side effects:  none  SMBG: Several times per week.   Ranges (from patient's glucose log):   AM (fasting): 146, 174, 185, 198 (ate out night before), 186, 167, 186, 149, 178  Mid-day: 168  Symptoms of low blood sugar? none. Frequency of hypoglycemia? never.  Recent symptoms of high blood sugar? none  Eye exam: due  Foot exam: up to date  Microalbumin is < 30 mg/g. Pt is taking an ACEi/ARB.  Aspirin: Taking 81mg daily and denies side effects  Exercise:  He exercised about 5 times on the [place] since we last talked, he's pleased he has made some progress with this.  His goal is still to do this daily.    Anemia:  He saw Dr. David on 1/2/17 and was diagnosed with anemia.  He has not yet scheduled his EGD or colonoscopy - says \"I'm really resisting the whole thing and know I shouldn't be.\"      Current labs include:  BP Readings from Last 3 Encounters:   01/02/17 126/74   12/05/16 127/83   11/02/16 122/76     Today's Vitals: There were no vitals taken for this visit. - telephone visit    Lab Results   Component Value Date    A1C 7.6 12/05/2016   .  Lab Results   Component Value Date    CHOL 175 04/29/2016     Lab Results   Component Value Date    TRIG 487 04/29/2016     Lab Results   Component Value Date    HDL 27 04/29/2016     Lab Results   Component Value Date    LDL  04/29/2016     Cannot estimate LDL when triglyceride exceeds 400 mg/dL    " LDL 99 04/29/2016       Liver Function Studies -   Recent Labs   Lab Test  01/02/17   1639   PROTTOTAL  7.1   ALBUMIN  3.8   BILITOTAL  1.7*   ALKPHOS  52   AST  34   ALT  44       Lab Results   Component Value Date    UCRR 148 04/29/2016    MICROL 19 04/29/2016    UMALCR 12.84 04/29/2016       Last Basic Metabolic Panel:  Lab Results   Component Value Date     01/02/2017      Lab Results   Component Value Date    POTASSIUM 4.7 01/02/2017     Lab Results   Component Value Date    CHLORIDE 102 01/02/2017     Lab Results   Component Value Date    BUN 40 01/02/2017    BUN 24.0 06/07/2013     Lab Results   Component Value Date    CR 1.61 01/02/2017     GFR Estimate   Date Value Ref Range Status   01/02/2017 43 (L) >60 mL/min/1.7m2 Final     Comment:     Non  GFR Calc   08/08/2016 36 (L) >60 mL/min/1.7m2 Final     Comment:     Non  GFR Calc   04/29/2016 49 (L) >60 mL/min/1.7m2 Final     Comment:     Non  GFR Calc       TSH   Date Value Ref Range Status   04/29/2016 2.35 0.40 - 4.00 mU/L Final   ]    Most Recent Immunizations   Administered Date(s) Administered     Influenza (High Dose) 3 valent vaccine 01/02/2017     Pneumococcal 23 valent 09/12/2012     ASSESSMENT:                                                    Current medications were reviewed today as discussed above.      Medication Adherence: no issues identified    Diabetes: Needs Improvement. Patient is meeting A1c goal of < 8%. Self monitoring of blood glucose is not at goal of fasting  mg/dL.  Due for A1c re-check.     Anemia:  Again needs to schedule colonoscopy and EGD.  Dr. David ordered further labs to look at anemia - he can have these done at the same time as his A1c.     PLAN:                                                      1.  Order placed for A1c to be checked on Monday, along with anemia labs already ordered by PCP.  Lab appt scheduled.    I spent 20 minutes with this patient today.   All changes were made via collaborative practice agreement with Sigrid David. A copy of the visit note was provided to the patient's primary care provider.     Will follow up pending lab results.    The patient declined a summary of these recommendations as an after visit summary.    Nathaly Hughes, PharmD, [facility]  Medication Therapy Management Provider  Pager: [phone]

## 2017-03-06 ENCOUNTER — ANTICOAGULATION THERAPY VISIT (OUTPATIENT)
Dept: CARDIOLOGY | Facility: CLINIC | Age: 72
End: 2017-03-06
Payer: COMMERCIAL

## 2017-03-06 DIAGNOSIS — Z79.4 TYPE 2 DIABETES MELLITUS WITH DIABETIC NEUROPATHY, WITH LONG-TERM CURRENT USE OF INSULIN (H): Chronic | ICD-10-CM

## 2017-03-06 DIAGNOSIS — D64.9 ANEMIA, UNSPECIFIED: ICD-10-CM

## 2017-03-06 DIAGNOSIS — Z79.01 LONG-TERM (CURRENT) USE OF ANTICOAGULANTS: ICD-10-CM

## 2017-03-06 DIAGNOSIS — E11.40 TYPE 2 DIABETES MELLITUS WITH DIABETIC NEUROPATHY, WITH LONG-TERM CURRENT USE OF INSULIN (H): Chronic | ICD-10-CM

## 2017-03-06 DIAGNOSIS — I48.91 ATRIAL FIBRILLATION, UNSPECIFIED TYPE (H): ICD-10-CM

## 2017-03-06 LAB
ERYTHROCYTE [DISTWIDTH] IN BLOOD BY AUTOMATED COUNT: 18.5 % (ref 10–15)
HBA1C MFR BLD: 7.7 % (ref 4.3–6)
HCT VFR BLD AUTO: 34.2 % (ref 40–53)
HGB BLD-MCNC: 10.1 G/DL (ref 13.3–17.7)
INR POINT OF CARE: 2.6 (ref 0.86–1.14)
LDH SERPL L TO P-CCNC: 161 U/L (ref 85–227)
MCH RBC QN AUTO: 24.1 PG (ref 26.5–33)
MCHC RBC AUTO-ENTMCNC: 29.5 G/DL (ref 31.5–36.5)
MCV RBC AUTO: 82 FL (ref 78–100)
PLATELET # BLD AUTO: 265 10E9/L (ref 150–450)
RBC # BLD AUTO: 4.19 10E12/L (ref 4.4–5.9)
RETICS # AUTO: 108.3 10E9/L (ref 25–95)
RETICS/RBC NFR AUTO: 2.5 % (ref 0.5–2)
WBC # BLD AUTO: 6.5 10E9/L (ref 4–11)

## 2017-03-06 PROCEDURE — 36416 COLLJ CAPILLARY BLOOD SPEC: CPT | Performed by: INTERNAL MEDICINE

## 2017-03-06 PROCEDURE — 85045 AUTOMATED RETICULOCYTE COUNT: CPT | Performed by: INTERNAL MEDICINE

## 2017-03-06 PROCEDURE — 85610 PROTHROMBIN TIME: CPT | Mod: QW | Performed by: INTERNAL MEDICINE

## 2017-03-06 PROCEDURE — 85004 AUTOMATED DIFF WBC COUNT: CPT | Performed by: INTERNAL MEDICINE

## 2017-03-06 PROCEDURE — 83615 LACTATE (LD) (LDH) ENZYME: CPT | Performed by: INTERNAL MEDICINE

## 2017-03-06 PROCEDURE — 85060 BLOOD SMEAR INTERPRETATION: CPT | Performed by: INTERNAL MEDICINE

## 2017-03-06 PROCEDURE — 83036 HEMOGLOBIN GLYCOSYLATED A1C: CPT | Performed by: INTERNAL MEDICINE

## 2017-03-06 PROCEDURE — 85027 COMPLETE CBC AUTOMATED: CPT | Performed by: INTERNAL MEDICINE

## 2017-03-06 NOTE — LETTER
[address]  [address]  [place]  [address]  Tel: [phone]    March 17, 2017    Pal Escudero  [address] [place] [address]  [address]  [facility] [id number]        Dear Mr. Escudero,    Attached is a copy of your lab results as I alluded to on your voice mail.  The hemoglobin is stable but still low.  I do recommend the colonoscopy and upper endoscopy. I understand you are concerned about this so please feel free to reach out to us if we can help clarify things for you.  I'd hate to let time keep passing by if there was something serious such as an ulcer or a tumor causing this anemia.  Your diabetes is nice and stable so I am happy about that.    If you have any further questions or problems, please contact our office.      Sincerely,    Sigrid Smith DO/zabrina     Enclosure: Lab Results    Results for orders placed or performed in visit on 03/06/17   Lactate Dehydrogenase   Result Value Ref Range    Lactate Dehydrogenase 161 85 - 227 U/L   Reticulocyte count   Result Value Ref Range    % Retic 2.5 (H) 0.5 - 2.0 %    Absolute Retic 108.3 (H) 25 - 95 10e9/L   CBC with platelets   Result Value Ref Range    WBC 6.5 4.0 - 11.0 10e9/L    RBC Count 4.19 (L) 4.4 - 5.9 10e12/L    Hemoglobin 10.1 (L) 13.3 - 17.7 g/dL    Hematocrit 34.2 (L) 40.0 - 53.0 %    MCV 82 78 - 100 fl    MCH 24.1 (L) 26.5 - 33.0 pg    MCHC 29.5 (L) 31.5 - 36.5 g/dL    RDW 18.5 (H) 10.0 - 15.0 %    Platelet Count 265 150 - 450 10e9/L   Bld morphology pathology review   Result Value Ref Range    Copath Report       Patient Name: PAL ESCUDERO  MR#: [id number]  Specimen #: [id number]  Collected: 3/6/2017  Received: 3/7/2017  Reported: 3/7/2017 13:06  Ordering Phy(s): SIGRID SMITH    For improved result formatting, select 'View Enhanced Report Format'  under Linked Documents section.    TEST(S):  Peripheral Smear Morphology    FINAL DIAGNOSIS:  Peripheral blood demonstrating hypochromic anemia (see  comment)    COMMENT:  Causes for hypochromic anemias typically include iron deficiency,  hemoglobinopathy, chronic infection, and sideroblastic anemia.  Additional considerations are lead poisoning and severe protein  deficiency.  Clinical correlation is required.    Electronically signed out by:    Fco Ortega M.D.    PERIPHERAL BLOOD DATA:    PERIPHERAL BLOOD DATA  Patient Value (Reference Range >18 year old male)  6.52.......WBC (4.0-11.0 x 10*9/L)  4.19 .......RBC (4.4-5.9 x 10*12/L)  10.1 .......HGB (13.3-17.7 g/dL)  34.2 .......HCT (40.0-53.0 %)  81.6 .......MCV (78-100fL)  24.1 . ......MCH (26.5-33.0 pg)  29.5 .......MCHC (31.5-36.5 g/dL)  18.5 .......RDW (10.0-15.0 %)  265 .......PLT (150-450 x 10*9/L)  2.53 .......RETIC (2.0-6.0%)    PERIPHERAL BLOOD DIFFERENTIAL  (Reference ranges >18 year old)    Percent  67....Neutrophils, segmented and bands (40 - 75)  19.3....Lymphocytes (20 - 48)  10....Monocytes (0 - 12)  2.6....Eosinophils (0 - 6)  1.1....Basophils (0 - 2)    Absolute  4.37....Neutrophils,segmented and bands  (1.6 - 8.3 x 10*9/L)  1.26....Lymphocytes  (0.8 - 5.3 x 10*9/L)  0.65....Monocytes  (0 -1.3 x 10*9/L)  0.17....Eosinophils  (0 - 0.7 x 10*9/L)  0.07....Basophils  (0 - 0.2 x 10*9/L)    PERIPHERAL MORPHOLOGY:    ERYTHROCYTES: The red blood cells are reduced in number and are  normocytic but hypochromic by indices.  Anisopoikilocytosis is mild and  nonspecific.  Target cells are not prominent.  Polychromasia is not  increased.  Rouleaux is evident.    LEUKOCYTES: The white blood cells are normal in number with neutrophils  predominating.  The neutr ophils exhibit mild toxic granulation.  No  atypical or dysplastic forms are observed.    PLATELETS: The platelets appear normal in number and morphology.    CPT Codes:  A: 41700-LCBB    TESTING LAB LOCATION:  [address]  [id number] [phone]    COLLECTION SITE:  Client:  [facility]  Location:  [facility] (S)     Hemoglobin A1c   Result Value Ref Range    Hemoglobin A1C 7.7 (H) 4.3 - 6.0 %   Differential   Result Value Ref Range    % Neutrophils 67.0 %    % Lymphocytes 19.0 %    % Monocytes 10.0 %    % Eosinophils 3.0 %    % Basophils 1.0 %    Diff Method       Manual Differential   See Pathologist's Report   Slide reviewed by Pathologist

## 2017-03-06 NOTE — PROGRESS NOTES
ANTICOAGULATION FOLLOW-UP CLINIC VISIT    Patient Name:  Jt Montgomery  Date:  3/6/2017  Contact Type:  Face to Face    SUBJECTIVE:     Patient Findings     Positives OTC meds (switching MVI over the next week or two to Shaklee MVI (unknown Vit K content))           OBJECTIVE    INR Protime   Date Value Ref Range Status   03/06/2017 2.6 (A) 0.86 - 1.14 Final       ASSESSMENT / PLAN  INR assessment THER    Recheck INR In: 3 WEEKS    INR Location Clinic      Anticoagulation Summary as of 3/6/2017     INR goal 2.0-3.0   Today's INR 2.6   Maintenance plan 5 mg (5 mg x 1) on Mon, Thu; 7.5 mg (5 mg x 1.5) all other days   Full instructions 5 mg on Mon, Thu; 7.5 mg all other days   Weekly total 47.5 mg   No change documented Marlena Hong, RN   Plan last modified Paola Roberson RN (10/27/2016)   Next INR check 3/30/2017   Target end date Indefinite    Indications   Long-term (current) use of anticoagulants [Z79.01] [Z79.01]  Atrial fibrillation (AFIB) on Coumadin [I48.91]         Anticoagulation Episode Summary     INR check location     Preferred lab     Send INR reminders to [facility] HEART INR NURSE    Comments       Anticoagulation Care Providers     Provider Role Specialty Phone number    Patricia Lottie Perla DO [facility] Cardiology [phone]            See the Encounter Report to view Anticoagulation Flowsheet and Dosing Calendar (Go to Encounters tab in chart review, and find the Anticoagulation Therapy Visit)    INR 2.6 He switched MVI over 1 month ago (new one has 80 mcg Vit K compared to 50 mcg in the previous MVI) but will be switching to a Shaklee MVI within the next 1-2 weeks (unknown amount of Vit K in it). He is eating a lot of salad (lighter green lettuce). No abnormal bleeding or bruising. Will continue current dosing of 5 mg MTh and 7.5 mg all other days with recheck in 3 weeks (about 2 weeks after change in MVI). Dosage adjustment made based on physician directed care  plan.    Marlena Hong RN

## 2017-03-06 NOTE — MR AVS SNAPSHOT
Jt Berrylinh Montgomery   3/6/2017 11:20 AM   Anticoagulation Therapy Visit    Description:  71 year old male   Provider:  FREDERICK ANTICOAGULATION   Department:  [facility]           INR as of 3/6/2017     Today's INR 2.6      Anticoagulation Summary as of 3/6/2017     INR goal 2.0-3.0   Today's INR 2.6   Full instructions 5 mg on Mon, Thu; 7.5 mg all other days   Next INR check 3/30/2017    Indications   Long-term (current) use of anticoagulants [Z79.01] [Z79.01]  Atrial fibrillation (AFIB) on Coumadin [I48.91]         Your next Anticoagulation Clinic appointment(s)     Mar 30, 2017  2:20 PM CDT   Anticoagulation Visit with  ANTICOAGULATION   [facility] ([facility])    [address] [place] [id number]   [phone]              Contact Numbers     Anticoagulant (INR) Clinic Number: [phone]          March 2017 Details    Sun Mon Tue Wed Thu Fri Sat        1               2               3               4                 5               6      5 mg   See details      7      7.5 mg         8      7.5 mg         9      5 mg         10      7.5 mg         11      7.5 mg           12      7.5 mg         13      5 mg         14      7.5 mg         15      7.5 mg         16      5 mg         17      7.5 mg         18      7.5 mg           19      7.5 mg         20      5 mg         21      7.5 mg         22      7.5 mg         23      5 mg         24      7.5 mg         25      7.5 mg           26      7.5 mg         27      5 mg         28      7.5 mg         29      7.5 mg         30            31                 Date Details   03/06 This INR check       Date of next INR:  3/30/2017         How to take your warfarin dose     To take:  5 mg Take 1 of the 5 mg tablets.    To take:  7.5 mg Take 1.5 of the 5 mg tablets.

## 2017-03-07 LAB — COPATH REPORT: NORMAL

## 2017-03-08 LAB
BASOPHILS NFR BLD AUTO: 1 %
DIFFERENTIAL METHOD BLD: NORMAL
EOSINOPHIL NFR BLD AUTO: 3 %
LYMPHOCYTES NFR BLD AUTO: 19 %
MONOCYTES NFR BLD AUTO: 10 %
NEUTROPHILS NFR BLD AUTO: 67 %

## 2017-03-13 DIAGNOSIS — Z79.01 LONG TERM CURRENT USE OF ANTICOAGULANT THERAPY: ICD-10-CM

## 2017-03-13 RX ORDER — WARFARIN SODIUM 5 MG/1
TABLET ORAL
Qty: 135 TABLET | Refills: 1 | Status: SHIPPED | OUTPATIENT
Start: 2017-03-13 | End: 2017-10-20

## 2017-03-14 DIAGNOSIS — I48.91 ATRIAL FIBRILLATION (H): ICD-10-CM

## 2017-03-14 RX ORDER — DILTIAZEM HYDROCHLORIDE 300 MG/1
300 CAPSULE, COATED, EXTENDED RELEASE ORAL DAILY
Qty: 90 CAPSULE | Refills: 3 | Status: SHIPPED | OUTPATIENT
Start: 2017-03-14 | End: 2018-04-08

## 2017-03-15 NOTE — TELEPHONE ENCOUNTER
Accu Chek alesha plus strips      Last Written Prescription Date: 05/04/16  Last Fill Quantity: 400,  # refills: 1   Last Office Visit with G, [facility] or [facility] prescribing provider: 01/02/17    Marietta Blanton MA

## 2017-03-16 RX ORDER — BLOOD SUGAR DIAGNOSTIC
STRIP MISCELLANEOUS
Qty: 400 STRIP | Refills: 0 | Status: SHIPPED | OUTPATIENT
Start: 2017-03-16 | End: 2018-03-20

## 2017-03-16 NOTE — TELEPHONE ENCOUNTER
Routing refill request to provider for review/approval because:  Labs out of range:    Lab Results   Component Value Date    WBC 6.5 03/06/2017     Lab Results   Component Value Date    RBC 4.19 03/06/2017     Lab Results   Component Value Date    HGB 10.1 03/06/2017     Lab Results   Component Value Date    HCT 34.2 03/06/2017     No components found for: MCT  Lab Results   Component Value Date    MCV 82 03/06/2017     Lab Results   Component Value Date    MCH 24.1 03/06/2017     Lab Results   Component Value Date    MCHC 29.5 03/06/2017     Lab Results   Component Value Date    RDW 18.5 03/06/2017     Lab Results   Component Value Date     03/06/2017     Please review recent labs, would you like any f/u with patient?  Will refill test strips, please advise on recent labs from 3/6/17 if needed.  Elvira Rodas RN

## 2017-03-16 NOTE — TELEPHONE ENCOUNTER
ACCU-CHEK LUIS PLUS test strip           Last Written Prescription Date: 3/16/2017  Last Fill Quantity: 400, # refills: 0  Last Office Visit with G, P or [facility] prescribing provider:  1/2/2017        BP Readings from Last 3 Encounters:   01/02/17 126/74   12/05/16 127/83   11/02/16 122/76     Lab Results   Component Value Date    MICROL 19 04/29/2016     No results found for: MICROALBUMIN  Creatinine   Date Value Ref Range Status   01/02/2017 1.61 (H) 0.66 - 1.25 mg/dL Final   ]  GFR Estimate   Date Value Ref Range Status   01/02/2017 43 (L) >60 mL/min/1.7m2 Final     Comment:     Non  GFR Calc   08/08/2016 36 (L) >60 mL/min/1.7m2 Final     Comment:     Non  GFR Calc   04/29/2016 49 (L) >60 mL/min/1.7m2 Final     Comment:     Non  GFR Calc     GFR Estimate If Black   Date Value Ref Range Status   01/02/2017 52 (L) >60 mL/min/1.7m2 Final     Comment:      GFR Calc   08/08/2016 43 (L) >60 mL/min/1.7m2 Final     Comment:      GFR Calc   04/29/2016 59 (L) >60 mL/min/1.7m2 Final     Comment:      GFR Calc     Lab Results   Component Value Date    CHOL 175 04/29/2016     Lab Results   Component Value Date    HDL 27 04/29/2016     Lab Results   Component Value Date    LDL  04/29/2016     Cannot estimate LDL when triglyceride exceeds 400 mg/dL    LDL 99 04/29/2016     Lab Results   Component Value Date    TRIG 487 04/29/2016     Lab Results   Component Value Date    CHOLHDLRATIO 4.7 04/16/2013     Lab Results   Component Value Date    AST 34 01/02/2017     Lab Results   Component Value Date    ALT 44 01/02/2017     Lab Results   Component Value Date    A1C 7.7 03/06/2017    A1C 7.6 12/05/2016    A1C 7.5 04/29/2016    A1C 6.8 08/31/2015    A1C 11.6 12/04/2014    A1C 11.6 12/04/2014     Potassium   Date Value Ref Range Status   01/02/2017 4.7 3.4 - 5.3 mmol/L Final

## 2017-03-17 RX ORDER — BLOOD SUGAR DIAGNOSTIC
STRIP MISCELLANEOUS
Qty: 450 STRIP | Refills: 0 | OUTPATIENT
Start: 2017-03-17

## 2017-03-24 RX ORDER — PEN NEEDLE, DIABETIC 32GX 5/32"
NEEDLE, DISPOSABLE MISCELLANEOUS
Qty: 100 EACH | Refills: 3 | Status: SHIPPED | OUTPATIENT
Start: 2017-03-24 | End: 2017-07-07

## 2017-03-24 NOTE — TELEPHONE ENCOUNTER
insulin pen needle (BD GERMÁN U/F) 32G X 4  each 3 3/23/2016           Last Written Prescription Date: 03/23/2016  Last Fill Quantity: 100, # refills: 3  Last Office Visit with G, P or [facility] prescribing provider:  01/02/2017        BP Readings from Last 3 Encounters:   01/02/17 126/74   12/05/16 127/83   11/02/16 122/76     Lab Results   Component Value Date    MICROL 19 04/29/2016     No results found for: MICROALBUMIN  Creatinine   Date Value Ref Range Status   01/02/2017 1.61 (H) 0.66 - 1.25 mg/dL Final   ]  GFR Estimate   Date Value Ref Range Status   01/02/2017 43 (L) >60 mL/min/1.7m2 Final     Comment:     Non  GFR Calc   08/08/2016 36 (L) >60 mL/min/1.7m2 Final     Comment:     Non  GFR Calc   04/29/2016 49 (L) >60 mL/min/1.7m2 Final     Comment:     Non  GFR Calc     GFR Estimate If Black   Date Value Ref Range Status   01/02/2017 52 (L) >60 mL/min/1.7m2 Final     Comment:      GFR Calc   08/08/2016 43 (L) >60 mL/min/1.7m2 Final     Comment:      GFR Calc   04/29/2016 59 (L) >60 mL/min/1.7m2 Final     Comment:      GFR Calc     Lab Results   Component Value Date    CHOL 175 04/29/2016     Lab Results   Component Value Date    HDL 27 04/29/2016     Lab Results   Component Value Date    LDL  04/29/2016     Cannot estimate LDL when triglyceride exceeds 400 mg/dL    LDL 99 04/29/2016     Lab Results   Component Value Date    TRIG 487 04/29/2016     Lab Results   Component Value Date    CHOLHDLRATIO 4.7 04/16/2013     Lab Results   Component Value Date    AST 34 01/02/2017     Lab Results   Component Value Date    ALT 44 01/02/2017     Lab Results   Component Value Date    A1C 7.7 03/06/2017    A1C 7.6 12/05/2016    A1C 7.5 04/29/2016    A1C 6.8 08/31/2015    A1C 11.6 12/04/2014    A1C 11.6 12/04/2014     Potassium   Date Value Ref Range Status   01/02/2017 4.7 3.4 - 5.3 mmol/L Final

## 2017-03-30 ENCOUNTER — ANTICOAGULATION THERAPY VISIT (OUTPATIENT)
Dept: CARDIOLOGY | Facility: CLINIC | Age: 72
End: 2017-03-30
Payer: COMMERCIAL

## 2017-03-30 DIAGNOSIS — I48.91 ATRIAL FIBRILLATION, UNSPECIFIED TYPE (H): ICD-10-CM

## 2017-03-30 DIAGNOSIS — Z79.01 LONG-TERM (CURRENT) USE OF ANTICOAGULANTS: ICD-10-CM

## 2017-03-30 LAB — INR POINT OF CARE: 1.9 (ref 0.86–1.14)

## 2017-03-30 PROCEDURE — 36416 COLLJ CAPILLARY BLOOD SPEC: CPT | Performed by: INTERNAL MEDICINE

## 2017-03-30 PROCEDURE — 85610 PROTHROMBIN TIME: CPT | Mod: QW | Performed by: INTERNAL MEDICINE

## 2017-03-30 NOTE — MR AVS SNAPSHOT
Jt Berrylinh Montgomery   3/30/2017 2:20 PM   Anticoagulation Therapy Visit    Description:  71 year old male   Provider:  Y[facility]Y ANTICOAG[facility]ATION   Department:  [facility]           INR as of 3/30/2017     Today's INR 1.9!      Anticoagulation Summary as of 3/30/2017     INR goal 2.0-3.0   Today's INR 1.9!   Full instructions 5 mg on Mon; 7.5 mg all other days   Next INR check 4/13/2017    Indications   Long-term (current) use of anticoagulants [Z79.01] [Z79.01]  Atrial fibrillation (AFIB) on Coumadin [I48.91]         Your next Anticoagulation Clinic appointment(s)     Apr 13, 2017  2:20 PM CDT   Anticoagulation Visit with  ANTICOAGULATION   [facility] ([facility])    [address] [place] [id number]   [phone]              Contact Numbers     Anticoagulant (INR) Clinic Number: [phone]          March 2017 Details    Sun Mon Tue Wed Thu Fri Sat        1               2               3               4                 5               6               7               8               9               10               11                 12               13               14               15               16               17               18                 19               20               21               22               23               24               25                 26               27               28               29               30      7.5 mg   See details      31      7.5 mg           Date Details   03/30 This INR check               How to take your warfarin dose     To take:  7.5 mg Take 1.5 of the 5 mg tablets.           April 2017 Details    Sun Mon Tue Wed Thu Fri Sat           1      7.5 mg           2      7.5 mg         3      5 mg         4      7.5 mg         5      7.5 mg         6      7.5 mg         7      7.5 mg         8      7.5 mg           9      7.5 mg         10      5 mg         11      7.5 mg          12      7.5 mg         13            14               15                 16               17               18               19               20               21               22                 23               24               25               26               27               28               29                 30                      Date Details   No additional details    Date of next INR:  4/13/2017         How to take your warfarin dose     To take:  5 mg Take 1 of the 5 mg tablets.    To take:  7.5 mg Take 1.5 of the 5 mg tablets.

## 2017-03-30 NOTE — PROGRESS NOTES
ANTICOAGULATION FOLLOW-UP CLINIC VISIT    Patient Name:  Jt Montgomery  Date:  3/30/2017  Contact Type:  Face to Face    SUBJECTIVE:     Patient Findings     Positives Change in diet/appetite (eating more greens), OTC meds (MVI changed to Shaklee Liz meghan )           OBJECTIVE    INR Protime   Date Value Ref Range Status   03/30/2017 1.9 (A) 0.86 - 1.14 Final       ASSESSMENT / PLAN  INR assessment THER    Recheck INR In: 2 WEEKS    INR Location Clinic      Anticoagulation Summary as of 3/30/2017     INR goal 2.0-3.0   Today's INR 1.9!   Maintenance plan 5 mg (5 mg x 1) on Mon; 7.5 mg (5 mg x 1.5) all other days   Full instructions 5 mg on Mon; 7.5 mg all other days   Weekly total 50 mg   Plan last modified Marlena Hong, RN (3/30/2017)   Next INR check 4/13/2017   Target end date Indefinite    Indications   Long-term (current) use of anticoagulants [Z79.01] [Z79.01]  Atrial fibrillation (AFIB) on Coumadin [I48.91]         Anticoagulation Episode Summary     INR check location     Preferred lab     Send INR reminders to [facility] HEART INR NURSE    Comments       Anticoagulation Care Providers     Provider Role Specialty Phone number    Lottie Gomez Minda,  Responsible Cardiology [phone]            See the Encounter Report to view Anticoagulation Flowsheet and Dosing Calendar (Go to Encounters tab in chart review, and find the Anticoagulation Therapy Visit)    INR 1.9 He has been eating more salads (more than once a day) and plans to continue this amount. Several weeks ago, he switched MVI to a Shaklee Liz Meghan that may have more vit K in it (possibly 100 mcg per daily dose). No change in other meds. Will increase dosing by 2.5 mg/week (take 5 mg Mondays and 7.5 mg all other days) with recheck in 2 weeks to see if he needs further dosing adjustment. Dosage adjustment made based on physician directed care plan.    Marlena Hong, RN

## 2017-04-17 ENCOUNTER — ANTICOAGULATION THERAPY VISIT (OUTPATIENT)
Dept: CARDIOLOGY | Facility: CLINIC | Age: 72
End: 2017-04-17
Payer: COMMERCIAL

## 2017-04-17 DIAGNOSIS — Z79.01 LONG-TERM (CURRENT) USE OF ANTICOAGULANTS: ICD-10-CM

## 2017-04-17 DIAGNOSIS — I48.91 ATRIAL FIBRILLATION, UNSPECIFIED TYPE (H): ICD-10-CM

## 2017-04-17 LAB — INR POINT OF CARE: 2.2 (ref 0.86–1.14)

## 2017-04-17 PROCEDURE — 85610 PROTHROMBIN TIME: CPT | Mod: QW | Performed by: INTERNAL MEDICINE

## 2017-04-17 PROCEDURE — 36416 COLLJ CAPILLARY BLOOD SPEC: CPT | Performed by: INTERNAL MEDICINE

## 2017-04-17 NOTE — PROGRESS NOTES
ANTICOAGULATION FOLLOW-UP CLINIC VISIT    Patient Name:  Jt Montgomery  Date:  4/17/2017  Contact Type:  Face to Face    SUBJECTIVE:     Patient Findings     Positives No Problem Findings           OBJECTIVE    INR Protime   Date Value Ref Range Status   04/17/2017 2.2 (A) 0.86 - 1.14 Final       ASSESSMENT / PLAN  INR assessment THER    Recheck INR In: 3 WEEKS    INR Location Clinic      Anticoagulation Summary as of 4/17/2017     INR goal 2.0-3.0   Today's INR 2.2   Maintenance plan 5 mg (5 mg x 1) on Mon; 7.5 mg (5 mg x 1.5) all other days   Full instructions 5 mg on Mon; 7.5 mg all other days   Weekly total 50 mg   No change documented Shey James RN   Plan last modified Marlena Hong RN (3/30/2017)   Next INR check 5/9/2017   Target end date Indefinite    Indications   Long-term (current) use of anticoagulants [Z79.01] [Z79.01]  Atrial fibrillation (AFIB) on Coumadin [I48.91]         Anticoagulation Episode Summary     INR check location     Preferred lab     Send INR reminders to [facility] HEART INR NURSE    Comments       Anticoagulation Care Providers     Provider Role Specialty Phone number    Lottie Gomez  Responsible Cardiology [phone]            See the Encounter Report to view Anticoagulation Flowsheet and Dosing Calendar (Go to Encounters tab in chart review, and find the Anticoagulation Therapy Visit)    INR 2.2 No complications noted. He started a new diet a few weeks ago, consisting of greens (multiple servings/day) as well as beans, onions, mushroom and seeds (reta, pumpkin, etc). Also a new multi-vitamin with more Vit K so his dose was increased. No other changes. Continue same dosing 5mg Mon and 7.5mg ROW, recheck in 3 weeks.    Shey James RN

## 2017-04-17 NOTE — MR AVS SNAPSHOT
Jt Nguyen Valentina   4/17/2017 2:20 PM   Anticoagulation Therapy Visit    Description:  71 year old male   Provider:  YOLY ANTICOAGULATION   Department:  [facility]           INR as of 4/17/2017     Today's INR 2.2      Anticoagulation Summary as of 4/17/2017     INR goal 2.0-3.0   Today's INR 2.2   Full instructions 5 mg on Mon; 7.5 mg all other days   Next INR check 5/9/2017    Indications   Long-term (current) use of anticoagulants [Z79.01] [Z79.01]  Atrial fibrillation (AFIB) on Coumadin [I48.91]         Your next Anticoagulation Clinic appointment(s)     May 09, 2017  2:40 PM CDT   Anticoagulation Visit with  ANTICOAGULATION   [facility] ([facility])    [address] [place] [id number]   [phone]              Contact Numbers     Anticoagulant (INR) Clinic Number: [phone]          April 2017 Details    Sun Mon Tue Wed Thu Fri Sat           1                 2               3               4               5               6               7               8                 9               10               11               12               13               14               15                 16               17      5 mg   See details      18      7.5 mg         19      7.5 mg         20      7.5 mg         21      7.5 mg         22      7.5 mg           23      7.5 mg         24      5 mg         25      7.5 mg         26      7.5 mg         27      7.5 mg         28      7.5 mg         29      7.5 mg           30      7.5 mg                Date Details   04/17 This INR check               How to take your warfarin dose     To take:  5 mg Take 1 of the 5 mg tablets.    To take:  7.5 mg Take 1.5 of the 5 mg tablets.           May 2017 Details    Sun Mon Tue Wed Thu Fri Sat      1      5 mg         2      7.5 mg         3      7.5 mg         4      7.5 mg         5      7.5 mg         6      7.5 mg           7      7.5  mg         8      5 mg         9            10               11               12               13                 14               15               16               17               18               19               20                 21               22               23               24               25               26               27                 28               29               30               31                   Date Details   No additional details    Date of next INR:  5/9/2017         How to take your warfarin dose     To take:  5 mg Take 1 of the 5 mg tablets.    To take:  7.5 mg Take 1.5 of the 5 mg tablets.

## 2017-05-05 DIAGNOSIS — I42.0 DILATED CARDIOMYOPATHY (H): ICD-10-CM

## 2017-05-06 NOTE — TELEPHONE ENCOUNTER
Pending Prescriptions:                       Disp   Refills    furosemide (LASIX) 20 MG tablet [Pharmacy*90 tab*0            Sig: TAKE 1 TABLET(20 MG) BY MOUTH DAILY          Last Written Prescription Date: 5/17/16  Last Fill Quantity: 90, # refills: 0  Last Office Visit with [facility], [facility] or [facility] prescribing provider: 1/2/17 David       Potassium   Date Value Ref Range Status   01/02/2017 4.7 3.4 - 5.3 mmol/L Final     Creatinine   Date Value Ref Range Status   01/02/2017 1.61 (H) 0.66 - 1.25 mg/dL Final     BP Readings from Last 3 Encounters:   01/02/17 126/74   12/05/16 127/83   11/02/16 122/76     RT Kael(R)

## 2017-05-08 RX ORDER — FUROSEMIDE 20 MG
TABLET ORAL
Qty: 90 TABLET | Refills: 0 | Status: SHIPPED | OUTPATIENT
Start: 2017-05-08 | End: 2017-08-11

## 2017-05-08 NOTE — TELEPHONE ENCOUNTER
Per note in epic:    > AUDIE SAN   Mon Dec 5, 2016 10:54 AM (CST)  Taking 1-2x/week as needed, takes at night    Prescription approved per G Refill Protocol.  Lucrecia Montelongo RN, BSN

## 2017-05-11 ENCOUNTER — ANTICOAGULATION THERAPY VISIT (OUTPATIENT)
Dept: CARDIOLOGY | Facility: CLINIC | Age: 72
End: 2017-05-11
Payer: COMMERCIAL

## 2017-05-11 DIAGNOSIS — I48.91 ATRIAL FIBRILLATION, UNSPECIFIED TYPE (H): ICD-10-CM

## 2017-05-11 DIAGNOSIS — Z79.01 LONG-TERM (CURRENT) USE OF ANTICOAGULANTS: ICD-10-CM

## 2017-05-11 LAB — INR POINT OF CARE: 1.9 (ref 0.86–1.14)

## 2017-05-11 PROCEDURE — 85610 PROTHROMBIN TIME: CPT | Mod: QW | Performed by: INTERNAL MEDICINE

## 2017-05-11 PROCEDURE — 36416 COLLJ CAPILLARY BLOOD SPEC: CPT | Performed by: INTERNAL MEDICINE

## 2017-05-11 NOTE — PROGRESS NOTES
ANTICOAGULATION FOLLOW-UP CLINIC VISIT    Patient Name:  Jt Montgomery  Date:  5/11/2017  Contact Type:  Face to Face    SUBJECTIVE:     Patient Findings     Positives No Problem Findings           OBJECTIVE    INR Protime   Date Value Ref Range Status   05/11/2017 1.9 (A) 0.86 - 1.14 Final       ASSESSMENT / PLAN  INR assessment THER    Recheck INR In: 2 WEEKS    INR Location Clinic      Anticoagulation Summary as of 5/11/2017     INR goal 2.0-3.0   Today's INR 1.9!   Maintenance plan 7.5 mg (5 mg x 1.5) every day   Full instructions 7.5 mg every day   Weekly total 52.5 mg   Plan last modified Paola Roberson, RN (5/11/2017)   Next INR check 5/25/2017   Target end date Indefinite    Indications   Long-term (current) use of anticoagulants [Z79.01] [Z79.01]  Atrial fibrillation (AFIB) on Coumadin [I48.91]         Anticoagulation Episode Summary     INR check location     Preferred lab     Send INR reminders to [facility] HEART INR NURSE    Comments       Anticoagulation Care Providers     Provider Role Specialty Phone number    Lottie Gomez DO Minda Responsible Cardiology [phone]            See the Encounter Report to view Anticoagulation Flowsheet and Dosing Calendar (Go to Encounters tab in chart review, and find the Anticoagulation Therapy Visit)    INR 1.9.  Has been eating healthier recently.  No missed doses.  We will increase to 7.5mg/day and recheck in 2 weeks.  His INR has been on the lower side the last few times.  Brian Roberson, MARCELLA

## 2017-05-11 NOTE — MR AVS SNAPSHOT
Jt Montgomery   5/11/2017 2:20 PM   Anticoagulation Therapy Visit    Description:  71 year old male   Provider:  YOLY ANTICOAGULATION   Department:  [facility]           INR as of 5/11/2017     Today's INR 1.9!      Anticoagulation Summary as of 5/11/2017     INR goal 2.0-3.0   Today's INR 1.9!   Full instructions 7.5 mg every day   Next INR check 5/25/2017    Indications   Long-term (current) use of anticoagulants [Z79.01] [Z79.01]  Atrial fibrillation (AFIB) on Coumadin [I48.91]         Your next Anticoagulation Clinic appointment(s)     May 25, 2017  2:20 PM CDT   Anticoagulation Visit with  ANTICOAGULATION   [facility] ([facility])    [address] [place] [id number] [phone]              Contact Numbers     Anticoagulant (INR) Clinic Number: [phone]          May 2017 Details    Sun Mon Tue Wed Thu Fri Sat      1               2               3               4               5               6                 7               8               9               10               11      7.5 mg   See details      12      7.5 mg         13      7.5 mg           14      7.5 mg         15      7.5 mg         16      7.5 mg         17      7.5 mg         18      7.5 mg         19      7.5 mg         20      7.5 mg           21      7.5 mg         22      7.5 mg         23      7.5 mg         24      7.5 mg         25            26               27                 28               29               30               31                   Date Details   05/11 This INR check       Date of next INR:  5/25/2017         How to take your warfarin dose     To take:  7.5 mg Take 1.5 of the 5 mg tablets.

## 2017-05-25 ENCOUNTER — ANTICOAGULATION THERAPY VISIT (OUTPATIENT)
Dept: CARDIOLOGY | Facility: CLINIC | Age: 72
End: 2017-05-25
Payer: COMMERCIAL

## 2017-05-25 DIAGNOSIS — I48.91 ATRIAL FIBRILLATION, UNSPECIFIED TYPE (H): ICD-10-CM

## 2017-05-25 DIAGNOSIS — Z79.01 LONG-TERM (CURRENT) USE OF ANTICOAGULANTS: ICD-10-CM

## 2017-05-25 LAB — INR POINT OF CARE: 3 (ref 0.86–1.14)

## 2017-05-25 PROCEDURE — 36416 COLLJ CAPILLARY BLOOD SPEC: CPT | Performed by: INTERNAL MEDICINE

## 2017-05-25 PROCEDURE — 85610 PROTHROMBIN TIME: CPT | Mod: QW | Performed by: INTERNAL MEDICINE

## 2017-05-25 NOTE — PROGRESS NOTES
ANTICOAGULATION FOLLOW-UP CLINIC VISIT    Patient Name:  Jt Montgomery  Date:  5/25/2017  Contact Type:  Face to Face    SUBJECTIVE:     Patient Findings     Positives Missed doses (he now remembered that he missed one dose 2 days before the last INR)           OBJECTIVE    INR Protime   Date Value Ref Range Status   05/25/2017 3.0 (A) 0.86 - 1.14 Final       ASSESSMENT / PLAN  INR assessment THER    Recheck INR In: 3 WEEKS    INR Location Clinic      Anticoagulation Summary as of 5/25/2017     INR goal 2.0-3.0   Today's INR 3.0   Maintenance plan 5 mg (5 mg x 1) on Mon; 7.5 mg (5 mg x 1.5) all other days   Full instructions 5 mg on Mon; 7.5 mg all other days   Weekly total 50 mg   Plan last modified Marlena Hong RN (5/25/2017)   Next INR check 6/15/2017   Target end date Indefinite    Indications   Long-term (current) use of anticoagulants [Z79.01] [Z79.01]  Atrial fibrillation (AFIB) on Coumadin [I48.91]         Anticoagulation Episode Summary     INR check location     Preferred lab     Send INR reminders to [facility] HEART INR NURSE    Comments       Anticoagulation Care Providers     Provider Role Specialty Phone number    Lottie Gomez MindaDO [facility] Cardiology [phone]            See the Encounter Report to view Anticoagulation Flowsheet and Dosing Calendar (Go to Encounters tab in chart review, and find the Anticoagulation Therapy Visit)    INR 3.0 He found that he had missed a 7.5 mg dose before his last INR appt but he did not call to tell us. This would explain why his INR was slightly low 2 weeks ago. No change in diet. Will resume his previous dosing schedule of 5 mg Mondays and 7.5 mg all other days with recheck in 3 weeks. Dosage adjustment made based on physician directed care plan.    Marlena Hong, MARCELLA

## 2017-05-25 NOTE — MR AVS SNAPSHOT
Jt Berrylinh Montgomery   5/25/2017 2:20 PM   Anticoagulation Therapy Visit    Description:  71 year old male   Provider:  YOLY ANTICOAGULATION   Department:  [facility]           INR as of 5/25/2017     Today's INR 3.0      Anticoagulation Summary as of 5/25/2017     INR goal 2.0-3.0   Today's INR 3.0   Full instructions 5 mg on Mon; 7.5 mg all other days   Next INR check 6/15/2017    Indications   Long-term (current) use of anticoagulants [Z79.01] [Z79.01]  Atrial fibrillation (AFIB) on Coumadin [I48.91]         Your next Anticoagulation Clinic appointment(s)     Noel 15, 2017  2:20 PM CDT   Anticoagulation Visit with  ANTICOAGULATION   [facility] ([facility])    [address] [place] [phone]   [phone]              Contact Numbers     Anticoagulant (INR) Clinic Number: [phone]          May 2017 Details    Sun Mon Tue Wed Thu Fri Sat      1               2               3               4               5               6                 7               8               9               10               11               12               13                 14               15               16               17               18               19               20                 21               22               23               24               25      7.5 mg   See details      26      7.5 mg         27      7.5 mg           28      7.5 mg         29      5 mg         30      7.5 mg         31      7.5 mg             Date Details   05/25 This INR check               How to take your warfarin dose     To take:  5 mg Take 1 of the 5 mg tablets.    To take:  7.5 mg Take 1.5 of the 5 mg tablets.           June 2017 Details    Sun Mon Tue Wed Thu Fri Sat         1      7.5 mg         2      7.5 mg         3      7.5 mg           4      7.5 mg         5      5 mg         6      7.5 mg         7      7.5 mg         8      7.5 mg          9      7.5 mg         10      7.5 mg           11      7.5 mg         12      5 mg         13      7.5 mg         14      7.5 mg         15            16               17                 18               19               20               21               22               23               24                 25               26               27               28               29               30                 Date Details   No additional details    Date of next INR:  6/15/2017         How to take your warfarin dose     To take:  5 mg Take 1 of the 5 mg tablets.    To take:  7.5 mg Take 1.5 of the 5 mg tablets.

## 2017-05-26 DIAGNOSIS — E11.40 TYPE 2 DIABETES MELLITUS WITH DIABETIC NEUROPATHY, WITH LONG-TERM CURRENT USE OF INSULIN (H): Chronic | ICD-10-CM

## 2017-05-26 DIAGNOSIS — Z79.4 TYPE 2 DIABETES MELLITUS WITH DIABETIC NEUROPATHY, WITH LONG-TERM CURRENT USE OF INSULIN (H): Chronic | ICD-10-CM

## 2017-05-30 RX ORDER — INSULIN GLARGINE 100 [IU]/ML
INJECTION, SOLUTION SUBCUTANEOUS
Qty: 30 ML | Refills: 1 | Status: SHIPPED | OUTPATIENT
Start: 2017-05-30 | End: 2018-02-08

## 2017-05-30 NOTE — TELEPHONE ENCOUNTER
insulin glargine (LANTUS SOLOSTAR) 100 UNIT/ML injection 30 mL 1 2/20/2017  No   Sig: Inject 55 Units Subcutaneous daily              Last Written Prescription Date: 02/20/2017  Last Fill Quantity: 30, # refills: 1  Last Office Visit with [facility], [facility] or [facility] prescribing provider:  01/2017        BP Readings from Last 3 Encounters:   01/02/17 126/74   12/05/16 127/83   11/02/16 122/76     Lab Results   Component Value Date    MICROL 19 04/29/2016     Lab Results   Component Value Date    UMALCR 12.84 04/29/2016     Creatinine   Date Value Ref Range Status   01/02/2017 1.61 (H) 0.66 - 1.25 mg/dL Final   ]  GFR Estimate   Date Value Ref Range Status   01/02/2017 43 (L) >60 mL/min/1.7m2 Final     Comment:     Non  GFR Calc   08/08/2016 36 (L) >60 mL/min/1.7m2 Final     Comment:     Non  GFR Calc   04/29/2016 49 (L) >60 mL/min/1.7m2 Final     Comment:     Non  GFR Calc     GFR Estimate If Black   Date Value Ref Range Status   01/02/2017 52 (L) >60 mL/min/1.7m2 Final     Comment:      GFR Calc   08/08/2016 43 (L) >60 mL/min/1.7m2 Final     Comment:      GFR Calc   04/29/2016 59 (L) >60 mL/min/1.7m2 Final     Comment:      GFR Calc     Lab Results   Component Value Date    CHOL 175 04/29/2016     Lab Results   Component Value Date    HDL 27 04/29/2016     Lab Results   Component Value Date    LDL  04/29/2016     Cannot estimate LDL when triglyceride exceeds 400 mg/dL    LDL 99 04/29/2016     Lab Results   Component Value Date    TRIG 487 04/29/2016     Lab Results   Component Value Date    CHOLHDLRATIO 4.7 04/16/2013     Lab Results   Component Value Date    AST 34 01/02/2017     Lab Results   Component Value Date    ALT 44 01/02/2017     Lab Results   Component Value Date    A1C 7.7 03/06/2017    A1C 7.6 12/05/2016    A1C 7.5 04/29/2016    A1C 6.8 08/31/2015    A1C 11.6 12/04/2014    A1C 11.6 12/04/2014     Potassium   Date  Value Ref Range Status   01/02/2017 4.7 3.4 - 5.3 mmol/L Final

## 2017-06-15 ENCOUNTER — ANTICOAGULATION THERAPY VISIT (OUTPATIENT)
Dept: CARDIOLOGY | Facility: CLINIC | Age: 72
End: 2017-06-15
Payer: COMMERCIAL

## 2017-06-15 DIAGNOSIS — I48.91 ATRIAL FIBRILLATION, UNSPECIFIED TYPE (H): ICD-10-CM

## 2017-06-15 DIAGNOSIS — Z79.01 LONG-TERM (CURRENT) USE OF ANTICOAGULANTS: ICD-10-CM

## 2017-06-15 LAB — INR POINT OF CARE: 2.3 (ref 0.86–1.14)

## 2017-06-15 PROCEDURE — 36416 COLLJ CAPILLARY BLOOD SPEC: CPT | Performed by: INTERNAL MEDICINE

## 2017-06-15 PROCEDURE — 85610 PROTHROMBIN TIME: CPT | Mod: QW | Performed by: INTERNAL MEDICINE

## 2017-06-15 PROCEDURE — 99207 ZZC NO CHARGE NURSE ONLY: CPT | Performed by: INTERNAL MEDICINE

## 2017-06-15 NOTE — MR AVS SNAPSHOT
Jt Berrylinh Montgomery   6/15/2017 2:20 PM   Anticoagulation Therapy Visit    Description:  71 year old male   Provider:   ANTICOAGULATION   Department:  [facility]           INR as of 6/15/2017     Today's INR 2.3      Anticoagulation Summary as of 6/15/2017     INR goal 2.0-3.0   Today's INR 2.3   Full instructions 5 mg on Mon; 7.5 mg all other days   Next INR check 7/13/2017    Indications   Long-term (current) use of anticoagulants [Z79.01] [Z79.01]  Atrial fibrillation (AFIB) on Coumadin [I48.91]         Your next Anticoagulation Clinic appointment(s)     Noel 15, 2017  2:20 PM CDT   Anticoagulation Visit with  ANTICOAGULATION   [facility] ([facility])    [address] [place] [address]   [phone]            Jul 13, 2017  2:20 PM CDT   Anticoagulation Visit with  ANTICOAGULATION   [facility] ([facility])    [address] [place] [id number] [phone]              Contact Numbers     Anticoagulant (INR) Clinic Number: [phone]          June 2017 Details    Sun Mon Tue Wed Thu Fri Sat         1               2               3                 4               5               6               7               8               9               10                 11               12               13               14               15      7.5 mg   See details      16      7.5 mg         17      7.5 mg           18      7.5 mg         19      5 mg         20      7.5 mg         21      7.5 mg         22      7.5 mg         23      7.5 mg         24      7.5 mg           25      7.5 mg         26      5 mg         27      7.5 mg         28      7.5 mg         29      7.5 mg         30      7.5 mg           Date Details   06/15 This INR check               How to take your warfarin dose     To take:  5 mg Take 1 of the 5 mg tablets.    To take:  7.5 mg  Take 1.5 of the 5 mg tablets.           July 2017 Details    Sun Mon Tue Wed Thu Fri Sat           1      7.5 mg           2      7.5 mg         3      5 mg         4      7.5 mg         5      7.5 mg         6      7.5 mg         7      7.5 mg         8      7.5 mg           9      7.5 mg         10      5 mg         11      7.5 mg         12      7.5 mg         13            14               15                 16               17               18               19               20               21               22                 23               24               25               26               27               28               29                 30               31                     Date Details   No additional details    Date of next INR:  7/13/2017         How to take your warfarin dose     To take:  5 mg Take 1 of the 5 mg tablets.    To take:  7.5 mg Take 1.5 of the 5 mg tablets.

## 2017-06-15 NOTE — PROGRESS NOTES
ANTICOAGULATION FOLLOW-UP CLINIC VISIT    Patient Name:  Jt Montgomery  Date:  6/15/2017  Contact Type:  Face to Face    SUBJECTIVE:     Patient Findings     Positives No Problem Findings           OBJECTIVE    INR Protime   Date Value Ref Range Status   06/15/2017 2.3 (A) 0.86 - 1.14 Final       ASSESSMENT / PLAN  INR assessment THER    Recheck INR In: 4 WEEKS    INR Location Clinic      Anticoagulation Summary as of 6/15/2017     INR goal 2.0-3.0   Today's INR 2.3   Maintenance plan 5 mg (5 mg x 1) on Mon; 7.5 mg (5 mg x 1.5) all other days   Full instructions 5 mg on Mon; 7.5 mg all other days   Weekly total 50 mg   No change documented Paola Roberson RN   Plan last modified Marlena Hong RN (5/25/2017)   Next INR check 7/13/2017   Target end date Indefinite    Indications   Long-term (current) use of anticoagulants [Z79.01] [Z79.01]  Atrial fibrillation (AFIB) on Coumadin [I48.91]         Anticoagulation Episode Summary     INR check location     Preferred lab     Send INR reminders to [facility] HEART INR NURSE    Comments       Anticoagulation Care Providers     Provider Role Specialty Phone number    Lottie GomezDO Responsible Cardiology [phone]            See the Encounter Report to view Anticoagulation Flowsheet and Dosing Calendar (Go to Encounters tab in chart review, and find the Anticoagulation Therapy Visit)    INR 2.3.  No changes.  Continue same schedule and recheck in 4 weeks.  He is eating more salads.  Brian Roberson, MARCELLA

## 2017-07-07 RX ORDER — PEN NEEDLE, DIABETIC 32GX 5/32"
NEEDLE, DISPOSABLE MISCELLANEOUS
Qty: 100 EACH | Refills: 3 | Status: SHIPPED | OUTPATIENT
Start: 2017-07-07 | End: 2017-12-08

## 2017-07-13 ENCOUNTER — ANTICOAGULATION THERAPY VISIT (OUTPATIENT)
Dept: CARDIOLOGY | Facility: CLINIC | Age: 72
End: 2017-07-13
Payer: COMMERCIAL

## 2017-07-13 DIAGNOSIS — Z79.01 LONG-TERM (CURRENT) USE OF ANTICOAGULANTS: ICD-10-CM

## 2017-07-13 DIAGNOSIS — I48.91 ATRIAL FIBRILLATION, UNSPECIFIED TYPE (H): ICD-10-CM

## 2017-07-13 LAB — INR POINT OF CARE: 2.4 (ref 0.86–1.14)

## 2017-07-13 PROCEDURE — 85610 PROTHROMBIN TIME: CPT | Mod: QW | Performed by: INTERNAL MEDICINE

## 2017-07-13 PROCEDURE — 36416 COLLJ CAPILLARY BLOOD SPEC: CPT | Performed by: INTERNAL MEDICINE

## 2017-07-13 NOTE — MR AVS SNAPSHOT
tJ Nguyen Valentina   7/13/2017 2:20 PM   Anticoagulation Therapy Visit    Description:  71 year old male   Provider:  YOLY ANTICOAGULATION   Department:  [facility]           INR as of 7/13/2017     Today's INR 2.4      Anticoagulation Summary as of 7/13/2017     INR goal 2.0-3.0   Today's INR 2.4   Full instructions 5 mg on Mon; 7.5 mg all other days   Next INR check 8/10/2017    Indications   Long-term (current) use of anticoagulants [Z79.01] [Z79.01]  Atrial fibrillation (AFIB) on Coumadin [I48.91]         Your next Anticoagulation Clinic appointment(s)     Aug 10, 2017  2:40 PM CDT   Anticoagulation Visit with  ANTICOAGULATION   [facility] ([email or web address])    [address] [place] [address]   [phone]              Contact Numbers     Anticoagulant (INR) Clinic Number: [phone]          July 2017 Details    Sun Mon Tue Wed Thu Fri Sat           1                 2               3               4               5               6               7               8                 9               10               11               12               13      7.5 mg   See details      14      7.5 mg         15      7.5 mg           16      7.5 mg         17      5 mg         18      7.5 mg         19      7.5 mg         20      7.5 mg         21      7.5 mg         22      7.5 mg           23      7.5 mg         24      5 mg         25      7.5 mg         26      7.5 mg         27      7.5 mg         28      7.5 mg         29      7.5 mg           30      7.5 mg         31      5 mg               Date Details   07/13 This INR check               How to take your warfarin dose     To take:  5 mg Take 1 of the 5 mg tablets.    To take:  7.5 mg Take 1.5 of the 5 mg tablets.           August 2017 Details    Sun Mon Tue Wed Thu Fri Sat       1      7.5 mg         2      7.5 mg         3      7.5 mg         4      7.5 mg         5       7.5 mg           6      7.5 mg         7      5 mg         8      7.5 mg         9      7.5 mg         10            11               12                 13               14               15               16               17               18               19                 20               21               22               23               24               25               26                 27               28               29               30               31                  Date Details   No additional details    Date of next INR:  8/10/2017         How to take your warfarin dose     To take:  5 mg Take 1 of the 5 mg tablets.    To take:  7.5 mg Take 1.5 of the 5 mg tablets.

## 2017-07-13 NOTE — PROGRESS NOTES
ANTICOAGULATION FOLLOW-UP CLINIC VISIT    Patient Name:  Jt Montgomery  Date:  7/13/2017  Contact Type:  Face to Face    SUBJECTIVE:     Patient Findings     Positives No Problem Findings           OBJECTIVE    INR Protime   Date Value Ref Range Status   07/13/2017 2.4 (A) 0.86 - 1.14 Final       ASSESSMENT / PLAN  INR assessment THER    Recheck INR In: 4 WEEKS    INR Location Clinic      Anticoagulation Summary as of 7/13/2017     INR goal 2.0-3.0   Today's INR 2.4   Maintenance plan 5 mg (5 mg x 1) on Mon; 7.5 mg (5 mg x 1.5) all other days   Full instructions 5 mg on Mon; 7.5 mg all other days   Weekly total 50 mg   No change documented Paola Robersno RN   Plan last modified Marlena Hong RN (5/25/2017)   Next INR check 8/10/2017   Target end date Indefinite    Indications   Long-term (current) use of anticoagulants [Z79.01] [Z79.01]  Atrial fibrillation (AFIB) on Coumadin [I48.91]         Anticoagulation Episode Summary     INR check location     Preferred lab     Send INR reminders to [facility] HEART INR NURSE    Comments       Anticoagulation Care Providers     Provider Role Specialty Phone number    Lottie GomezDO Responsible Cardiology [phone]            See the Encounter Report to view Anticoagulation Flowsheet and Dosing Calendar (Go to Encounters tab in chart review, and find the Anticoagulation Therapy Visit)    INR 2.4.  No changes.  Continue same schedule and recheck in 4 weeks.  Brian Roberson RN

## 2017-08-07 ENCOUNTER — TELEPHONE (OUTPATIENT)
Dept: PHARMACY | Facility: CLINIC | Age: 72
End: 2017-08-07

## 2017-08-07 NOTE — TELEPHONE ENCOUNTER
Called and LVM with Jamal to schedule diabetes follow-up with MTM.    Daphney Iyer, Pharm.D., [facility]  Medication Therapy Management Pharmacist  Page/VM:  [phone]

## 2017-08-11 DIAGNOSIS — I42.0 DILATED CARDIOMYOPATHY (H): ICD-10-CM

## 2017-08-12 NOTE — TELEPHONE ENCOUNTER
Pending Prescriptions:                       Disp   Refills    furosemide (LASIX) 20 MG tablet [Pharmacy*90 tab*0            Sig: TAKE 1 TABLET(20 MG) BY MOUTH DAILY          Last Written Prescription Date: 5/8/17  Last Fill Quantity: 90, # refills: 0  Last Office Visit with [facility], [facility] or [facility] prescribing provider: 1/2/17 David       Potassium   Date Value Ref Range Status   01/02/2017 4.7 3.4 - 5.3 mmol/L Final     Creatinine   Date Value Ref Range Status   01/02/2017 1.61 (H) 0.66 - 1.25 mg/dL Final     BP Readings from Last 3 Encounters:   01/02/17 126/74   12/05/16 127/83   11/02/16 122/76     RT Kael(R)

## 2017-08-14 RX ORDER — FUROSEMIDE 20 MG
TABLET ORAL
Qty: 90 TABLET | Refills: 1 | Status: SHIPPED | OUTPATIENT
Start: 2017-08-14 | End: 2018-10-29

## 2017-08-14 NOTE — TELEPHONE ENCOUNTER
Routing refill request to provider for review/approval because:  Labs out of range:    Creatinine   Date Value Ref Range Status   01/02/2017 1.61 (H) 0.66 - 1.25 mg/dL Final   ]    Please advise refill.  Sharon Horowitz RN

## 2017-09-11 ENCOUNTER — TELEPHONE (OUTPATIENT)
Dept: PHARMACY | Facility: CLINIC | Age: 72
End: 2017-09-11

## 2017-10-04 ENCOUNTER — MYC MEDICAL ADVICE (OUTPATIENT)
Dept: CARDIOLOGY | Facility: CLINIC | Age: 72
End: 2017-10-04

## 2017-10-04 ENCOUNTER — TELEPHONE (OUTPATIENT)
Dept: CARDIOLOGY | Facility: CLINIC | Age: 72
End: 2017-10-04

## 2017-10-04 NOTE — TELEPHONE ENCOUNTER
Pt has not rescheduled the cancelled INR appt. Left another voicemail message for pt to return the call to schedule the overdue INR. Antoni

## 2017-10-18 ENCOUNTER — TELEPHONE (OUTPATIENT)
Dept: CARDIOLOGY | Facility: CLINIC | Age: 72
End: 2017-10-18

## 2017-10-20 ENCOUNTER — ANTICOAGULATION THERAPY VISIT (OUTPATIENT)
Dept: CARDIOLOGY | Facility: CLINIC | Age: 72
End: 2017-10-20

## 2017-10-20 DIAGNOSIS — Z79.01 LONG-TERM (CURRENT) USE OF ANTICOAGULANTS: ICD-10-CM

## 2017-10-20 DIAGNOSIS — I48.91 ATRIAL FIBRILLATION, UNSPECIFIED TYPE (H): ICD-10-CM

## 2017-10-20 DIAGNOSIS — Z79.01 LONG TERM CURRENT USE OF ANTICOAGULANT THERAPY: ICD-10-CM

## 2017-10-20 LAB — INR POINT OF CARE: 2.7 (ref 0.86–1.14)

## 2017-10-20 PROCEDURE — 85610 PROTHROMBIN TIME: CPT | Mod: QW | Performed by: INTERNAL MEDICINE

## 2017-10-20 PROCEDURE — 36416 COLLJ CAPILLARY BLOOD SPEC: CPT | Performed by: INTERNAL MEDICINE

## 2017-10-20 PROCEDURE — 99207 ZZC NO CHARGE NURSE ONLY: CPT | Performed by: INTERNAL MEDICINE

## 2017-10-20 RX ORDER — WARFARIN SODIUM 5 MG/1
TABLET ORAL
Qty: 135 TABLET | Refills: 1 | Status: SHIPPED | OUTPATIENT
Start: 2017-10-20 | End: 2018-05-01

## 2017-10-20 RX ORDER — WARFARIN SODIUM 5 MG/1
TABLET ORAL
Qty: 135 TABLET | Refills: 1 | Status: SHIPPED | OUTPATIENT
Start: 2017-10-20 | End: 2017-10-20
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
## 2017-10-20 NOTE — MR AVS SNAPSHOT
Jt Nguyen Valentina   10/20/2017 11:00 AM   Anticoagulation Therapy Visit    Description:  71 year old male   Provider:  YOLY ANTICOAGULATION   Department:  [facility]           INR as of 10/20/2017     Today's INR 2.7      Anticoagulation Summary as of 10/20/2017     INR goal 2.0-3.0   Today's INR 2.7   Full instructions 5 mg on Mon; 7.5 mg all other days   Next INR check 11/17/2017    Indications   Long-term (current) use of anticoagulants [Z79.01] [Z79.01]  Atrial fibrillation (AFIB) on Coumadin [I48.91]         Your next Anticoagulation Clinic appointment(s)     Nov 17, 2017 11:00 AM CST   Anticoagulation Visit with  ANTICOAGULATION   [facility] ([facility])    [address] [place] [id number]   [phone]              Contact Numbers     Anticoagulant (INR) Clinic Number: [phone]          October 2017 Details    Sun Mon Tue Wed Thu Fri Sat     1               2               3               4               5               6               7                 8               9               10               11               12               13               14                 15               16               17               18               19               20      7.5 mg   See details      21      7.5 mg           22      7.5 mg         23      5 mg         24      7.5 mg         25      7.5 mg         26      7.5 mg         27      7.5 mg         28      7.5 mg           29      7.5 mg         30      5 mg         31      7.5 mg              Date Details   10/20 This INR check               How to take your warfarin dose     To take:  5 mg Take 1 of the 5 mg tablets.    To take:  7.5 mg Take 1.5 of the 5 mg tablets.           November 2017 Details    Sun Mon Tue Wed Thu Fri Sat        1      7.5 mg         2      7.5 mg         3      7.5 mg         4      7.5 mg           5      7.5 mg         6      5 mg         7       7.5 mg         8      7.5 mg         9      7.5 mg         10      7.5 mg         11      7.5 mg           12      7.5 mg         13      5 mg         14      7.5 mg         15      7.5 mg         16      7.5 mg         17            18                 19               20               21               22               23               24               25                 26               27               28               29               30                  Date Details   No additional details    Date of next INR:  11/17/2017         How to take your warfarin dose     To take:  5 mg Take 1 of the 5 mg tablets.    To take:  7.5 mg Take 1.5 of the 5 mg tablets.

## 2017-10-27 ENCOUNTER — TELEPHONE (OUTPATIENT)
Dept: PHARMACY | Facility: CLINIC | Age: 72
End: 2017-10-27

## 2017-10-27 NOTE — TELEPHONE ENCOUNTER
We have been unable to reach this patient for MTM follow-up after several attempts. We will stop reaching out to the patient at this time. Please let us know if we can assist in this patient's care in the future.    Routing to PCP as LINETTE.    Dis-enrolled from SE[employer] program.    Nathaly Hughes PharmD, BCACP  Medication Therapy Management Provider  Pager: [phone]

## 2017-10-29 ENCOUNTER — HEALTH MAINTENANCE LETTER (OUTPATIENT)
Age: 72
End: 2017-10-29

## 2017-12-01 DIAGNOSIS — I48.91 ATRIAL FIBRILLATION (H): ICD-10-CM

## 2017-12-01 RX ORDER — DIGOXIN 125 MCG
125 TABLET ORAL DAILY
Qty: 90 TABLET | Refills: 0 | Status: SHIPPED | OUTPATIENT
Start: 2017-12-01 | End: 2018-03-01

## 2017-12-08 DIAGNOSIS — E11.40 TYPE 2 DIABETES MELLITUS WITH DIABETIC NEUROPATHY, WITH LONG-TERM CURRENT USE OF INSULIN (H): Chronic | ICD-10-CM

## 2017-12-08 DIAGNOSIS — Z79.4 TYPE 2 DIABETES MELLITUS WITH DIABETIC NEUROPATHY, WITH LONG-TERM CURRENT USE OF INSULIN (H): Chronic | ICD-10-CM

## 2017-12-12 RX ORDER — INSULIN ASPART 100 [IU]/ML
INJECTION, SOLUTION INTRAVENOUS; SUBCUTANEOUS
Qty: 15 ML | Refills: 0 | Status: SHIPPED | OUTPATIENT
Start: 2017-12-12 | End: 2018-02-08

## 2017-12-12 RX ORDER — PEN NEEDLE, DIABETIC 32GX 5/32"
NEEDLE, DISPOSABLE MISCELLANEOUS
Qty: 100 EACH | Refills: 0 | Status: SHIPPED | OUTPATIENT
Start: 2017-12-12 | End: 2018-01-24

## 2017-12-12 NOTE — TELEPHONE ENCOUNTER
Patient called back.  He just retired and is in process of switching insurance.  He is not going to have the new insurance until sometime in January.   I offered to schedule for January.  He wants to wait until he is sure he has his insurance.    He will schedule an OV and labs after his insurance goes into effect.    Patient is also requesting this refill ONLY for one box as he will be paying out of pocket.    Patient only has enough left to get through today.    Please call with concerns/questions.    [phone]  OK to leave VM

## 2017-12-12 NOTE — TELEPHONE ENCOUNTER
PCP see below   Rxs pended for 30 day supply with 0 refills   Please approve if appropriate    Franci RIOS RN

## 2017-12-12 NOTE — TELEPHONE ENCOUNTER
Left message for patient to call clinic back - due for OV/labs (last OV 1/2/17)    Franci RIOS, RN      Requested Prescriptions   Pending Prescriptions Disp Refills     NOVOLOG FLEXPEN 100 UNIT/ML soln [Pharmacy Med Name: NOVOLOG FLEXPEN INJ 5X3ML (ORANGE)] 45 mL 0     Sig: ADMINISTER 15 UNITS UNDER THE SKIN THREE TIMES DAILY WITH MEALS    Short Acting Insulin Protocol Failed    12/8/2017  7:22 PM       Failed - Blood pressure less than 140/90 in past 6 months    BP Readings from Last 3 Encounters:   01/02/17 126/74   12/05/16 127/83   11/02/16 122/76          Failed - LDL on file in past 12 months    Recent Labs   Lab Test  04/29/16   0803   LDL  Cannot estimate LDL when triglyceride exceeds 400 mg/dL  99            Failed - Microalbumin on file in past 12 months    Recent Labs   Lab Test  04/29/16   0804   MICROL  19   UMALCR  12.84            Failed - HgbA1C in past 3 or 6 months    Recent Labs   Lab Test  03/06/17   1150   A1C  7.7*            Failed - Recent visit with authorizing provider's specialty in past 6 months    Patient had office visit in the last 6 months or has a visit in the next 30 days with authorizing provider.  See chart review.            Passed - Serum creatinine on file in past 12 months    Recent Labs   Lab Test  01/02/17   1639   CR  1.61*            Passed - Patient is age 18 or older        BD GERMÁN U/F 32G X 4 MM insulin pen needle [Pharmacy Med Name: B-D PEN NDL GERMÁN [id number](5/32) GRN] 100 each 0     Sig: USE FOUR TIMES DAILY OR AS DIRECTED.    Diabetic Supplies Protocol Failed    12/8/2017  7:22 PM       Failed - Patient has had appt within past 6 mos    IV to PO - Antibiotics     None                   Passed - Patient is 18 years of age or older

## 2018-01-03 ENCOUNTER — TRANSFERRED RECORDS (OUTPATIENT)
Dept: HEALTH INFORMATION MANAGEMENT | Facility: CLINIC | Age: 73
End: 2018-01-03

## 2018-01-24 DIAGNOSIS — Z79.4 TYPE 2 DIABETES MELLITUS WITH DIABETIC NEUROPATHY, WITH LONG-TERM CURRENT USE OF INSULIN (H): Chronic | ICD-10-CM

## 2018-01-24 DIAGNOSIS — E11.40 TYPE 2 DIABETES MELLITUS WITH DIABETIC NEUROPATHY, WITH LONG-TERM CURRENT USE OF INSULIN (H): Chronic | ICD-10-CM

## 2018-01-24 NOTE — LETTER
[place] [address] [address] [facility] [address]  Phone: [phone]        January 25, 2018      Jt Nguyen Valentina                                                                                                 [facility] [facility]  [address]  [facility] [address]            Dear Mr. Montgomery,    We are concerned about your health care.  We recently provided you with a medication refill.  Many medications require routine follow-up with your Doctor.      At this time we ask that: You schedule an appointment for your annual physical.    Your prescription: Has been refilled for 1 month so you may have time for the above noted follow-up.      Thank you,      Sigrid David, DO/ cn

## 2018-01-25 RX ORDER — PEN NEEDLE, DIABETIC 32GX 5/32"
NEEDLE, DISPOSABLE MISCELLANEOUS
Qty: 100 EACH | Refills: 0 | Status: SHIPPED | OUTPATIENT
Start: 2018-01-25 | End: 2018-03-03

## 2018-01-25 NOTE — TELEPHONE ENCOUNTER
Requested Prescriptions   Pending Prescriptions Disp Refills     BD GERMÁN U/F 32G X 4 MM insulin pen needle [Pharmacy Med Name: B-D PEN NDL GERMÁN [id number](5/32) [facility]]  Needs office visit  Last Written Prescription Date:  12/12/17  Last Fill Quantity: 100 each,  # refills: 0   Last Office Visit with [facility] or [facility] prescribing provider:  1/2/17 Frankie   Future Office Visit:      100 each 0     Sig: USE FOUR TIMES DAILY OR AS DIRECTED    Diabetic Supplies Protocol Failed    1/24/2018  8:44 PM       Failed - Patient has had appt within past 6 mos    IV to PO - Antibiotics     None                   Passed - Patient is 18 years of age or older

## 2018-01-25 NOTE — TELEPHONE ENCOUNTER
Medication is being filled for 1 time refill only due to:  Patient needs to be seen because it has been more than one year since last visit.   Shazia Ruelas RN

## 2018-01-30 ENCOUNTER — TELEPHONE (OUTPATIENT)
Dept: CARDIOLOGY | Facility: CLINIC | Age: 73
End: 2018-01-30

## 2018-01-30 NOTE — TELEPHONE ENCOUNTER
Left another message for pt that he needs to schedule and keep an appt for an INR (last INR done in our clinic was 10/20/18). Antoni

## 2018-02-01 ENCOUNTER — ANTICOAGULATION THERAPY VISIT (OUTPATIENT)
Dept: CARDIOLOGY | Facility: CLINIC | Age: 73
End: 2018-02-01
Payer: COMMERCIAL

## 2018-02-01 DIAGNOSIS — I48.91 ATRIAL FIBRILLATION, UNSPECIFIED TYPE (H): ICD-10-CM

## 2018-02-01 DIAGNOSIS — Z79.01 LONG-TERM (CURRENT) USE OF ANTICOAGULANTS: ICD-10-CM

## 2018-02-01 LAB — INR POINT OF CARE: 2.3 (ref 0.86–1.14)

## 2018-02-01 PROCEDURE — 36416 COLLJ CAPILLARY BLOOD SPEC: CPT | Performed by: INTERNAL MEDICINE

## 2018-02-01 PROCEDURE — 85610 PROTHROMBIN TIME: CPT | Mod: QW | Performed by: INTERNAL MEDICINE

## 2018-02-01 NOTE — MR AVS SNAPSHOT
Jt Nguyen Valentina   2/1/2018 1:40 PM   Anticoagulation Therapy Visit    Description:  72 year old male   Provider:  YOLY ANTICOAGULATION   Department:  [facility]           INR as of 2/1/2018     Today's INR 2.3      Anticoagulation Summary as of 2/1/2018     INR goal 2.0-3.0   Today's INR 2.3   Full instructions 5 mg on Mon; 7.5 mg all other days   Next INR check 3/1/2018    Indications   Long-term (current) use of anticoagulants [Z79.01] [Z79.01]  Atrial fibrillation (AFIB) on Coumadin [I48.91]         Your next Anticoagulation Clinic appointment(s)     Mar 01, 2018  1:20 PM CST   Anticoagulation Visit with  ANTICOAGULATION   [facility] ([facility])    [address] [place] [address]   [phone]              Contact Numbers     Anticoagulant (INR) Clinic Number: [phone]          February 2018 Details    Sun Mon Tue Wed Thu Fri Sat         1      7.5 mg   See details      2      7.5 mg         3      7.5 mg           4      7.5 mg         5      5 mg         6      7.5 mg         7      7.5 mg         8      7.5 mg         9      7.5 mg         10      7.5 mg           11      7.5 mg         12      5 mg         13      7.5 mg         14      7.5 mg         15      7.5 mg         16      7.5 mg         17      7.5 mg           18      7.5 mg         19      5 mg         20      7.5 mg         21      7.5 mg         22      7.5 mg         23      7.5 mg         24      7.5 mg           25      7.5 mg         26      5 mg         27      7.5 mg         28      7.5 mg             Date Details   02/01 This INR check               How to take your warfarin dose     To take:  5 mg Take 1 of the 5 mg tablets.    To take:  7.5 mg Take 1.5 of the 5 mg tablets.           March 2018 Details    Sun Mon Tue Wed Thu Fri Sat         1            2               3                 4               5               6               7                8               9               10                 11               12               13               14               15               16               17                 18               19               20               21               22               23               24                 25               26               27               28               29               30               31                Date Details   No additional details    Date of next INR:  3/1/2018         How to take your warfarin dose     To take:  7.5 mg Take 1.5 of the 5 mg tablets.

## 2018-02-01 NOTE — MR AVS SNAPSHOT
"              After Visit Summary   2/1/2018    Jt Montgomery    MRN: [id number]           Patient Information     Date Of Birth          1945        Visit Information        Provider Department      2/1/2018 1:40 PM [facility] ANTICO[facility]        Today's Diagnoses     Long-term (current) use of anticoagulants        Atrial fibrillation, unspecified type (H)           Follow-ups after your visit        Your next 10 appointments already scheduled     Mar 01, 2018  1:20 PM CST   Anticoagulation Visit with [facility]ERICK ANTICOAGULATION   [facility] ([facility] [place])    [address] [place] [id number] [phone]              Who to contact     If you have questions or need follow up information about today's clinic visit or your schedule please contact [facility] directly at [phone].  Normal or non-critical lab and imaging results will be communicated to you by [employer], letter or phone within 4 business days after the clinic has received the results. If you do not hear from us within 7 days, please contact the clinic through [employer] or phone. If you have a critical or abnormal lab result, we will notify you by phone as soon as possible.  Submit refill requests through [employer] or call your pharmacy and they will forward the refill request to us. Please allow 3 business days for your refill to be completed.          Additional Information About Your Visit        MyChart Information     [employer] lets you send messages to your doctor, view your test results, renew your prescriptions, schedule appointments and more. To sign up, go to [email or web address] . Click on \"Log in\" on the left side of the screen, which will take you to the Welcome page. Then click on \"Sign up Now\" on the right side of the page.     You will be asked to enter the access code listed below, " as well as some personal information. Please follow the directions to create your username and password.     Your access code is: [email or web address]  Expires: 2018  1:40 PM     Your access code will  in 90 days. If you need help or a new code, please call your [facility] clinic or [phone].        Care EveryWhere ID     This is your Care EveryWhere ID. This could be used by other organizations to access your [facility] medical records  [id number]         Blood Pressure from Last 3 Encounters:   17 126/74   16 127/83   16 122/76    Weight from Last 3 Encounters:   17 131.4 kg (289 lb 9.6 oz)   16 131.1 kg (289 lb)   16 130 kg (286 lb 8 oz)              We Performed the Following     INR point of care        Primary Care Provider Office Phone # Fax #    Sigrid Conley Frankie,  [phone] [address] [address]        Equal Access to Services     [facility]: Hadii aad ku hadasho Soomaali, waaxda luqadaha, qaybta kaalmada adeelizabeth, quynh harris . So [facility] [phone].    ATENCIÓN: Si habla español, tiene a jones disposición servicios gratuitos de asistencia lingüística. Josue al [phone].    We comply with applicable federal civil rights laws and [place] laws. We do not discriminate on the basis of race, color, national origin, age, disability, sex, sexual orientation, or gender identity.            Thank you!     Thank you for choosing [facility]  for your care. Our goal is always to provide you with excellent care. Hearing back from our patients is one way we can continue to improve our services. Please take a few minutes to complete the written survey that you may receive in the mail after your visit with us. Thank you!             Your Updated Medication List - Protect others around you: Learn how to safely use, store and throw away your medicines at  www.disposemymeds.org.          This list is accurate as of 2/1/18  1:40 PM.  Always use your most recent med list.                   Brand Name Dispense Instructions for use Diagnosis    ACCU-CHEK LUIS PLUS test strip   Generic drug:  blood glucose monitoring     400 strip    USE TEST FIVE TIMES DAILY OR AS DIRECTED    Uncontrolled diabetes mellitus with complications (H)       alpha-lipoic acid 600 MG Caps      Take 600 mg by mouth 2 times daily        aspirin 81 MG EC tablet     90 tablet    Take 1 tablet by mouth daily.    Atrial fibrillation with RVR (H), Dilated cardiomyopathy (H)       ATORVASTATIN CALCIUM PO      Take 20 mg by mouth daily        blood glucose monitoring lancets     1 Box    Use to test blood sugar 5 times daily or as directed.    Uncontrolled diabetes mellitus with complications (H)       blood glucose monitoring meter device kit     1 kit    Use to test blood sugar 3 times daily or as directed.    Uncontrolled diabetes mellitus with complications (H)       CoQ10 100 MG Caps      Take 200 mg by mouth daily    Uncontrolled diabetes mellitus with complications (H)       digoxin 125 MCG tablet    LANOXIN    90 tablet    Take 1 tablet (125 mcg) by mouth daily    Atrial fibrillation (H)       diltiazem 300 MG 24 hr capsule    CARTIA XT    90 capsule    Take 1 capsule (300 mg) by mouth daily    Atrial fibrillation (H)       fish oil-omega-3 fatty acids 1000 MG capsule      Take 2 g by mouth every other day        furosemide 20 MG tablet    LASIX    90 tablet    TAKE 1 TABLET(20 MG) BY MOUTH DAILY    Dilated cardiomyopathy (H)       GREEN TEA EXTRACT PO      Take by mouth 2 times daily        * insulin pen needle 32G X 4 MM    BD GERMÁN U/F    100 each    Use four times daily or as directed.    Uncontrolled diabetes mellitus with complications (H)       * BD GERMÁN U/F 32G X 4 MM   Generic drug:  insulin pen needle     100 each    USE FOUR TIMES DAILY OR AS DIRECTED    Type 2 diabetes mellitus with  diabetic neuropathy, with long-term current use of insulin (H)       irbesartan 300 MG tablet    AVAPRO    90 tablet    Take 1 tablet (300 mg) by mouth At Bedtime    HTN (hypertension)       LANTUS SOLOSTAR 100 UNIT/ML injection   Generic drug:  insulin glargine     30 mL    ADMINISTER 55 UNITS UNDER THE SKIN DAILY    Type 2 diabetes mellitus with diabetic neuropathy, with long-term current use of insulin (H)       metoprolol succinate 100 MG 24 hr tablet    TOPROL-XL    180 tablet    Take 1 tablet (100 mg) by mouth 2 times daily    CAD (coronary artery disease)       Multi-vitamin Tabs tablet      Take 4 tablets by mouth daily        NovoLOG FLEXPEN 100 UNIT/ML injection   Generic drug:  insulin aspart     15 mL    ADMINISTER 15 UNITS UNDER THE SKIN THREE TIMES DAILY WITH MEALS    Type 2 diabetes mellitus with diabetic neuropathy, with long-term current use of insulin (H)       order for DME     1 each    Equipment being ordered: True Track Meter from [facility]    Uncontrolled diabetes mellitus with complications (H)       predniSONE 20 MG tablet    DELTASONE    10 tablet    Take 1 tablet (20 mg) by mouth daily as needed Gout flare    Chronic gout due to renal impairment of multiple sites without tophus       PROBIOTIC & ACIDOPHILUS EX ST PO      Take 1 capsule by mouth daily        Resveratrol 100 MG Caps      Take 1 capsule by mouth daily        spironolactone 25 MG tablet    ALDACTONE    90 tablet    Take 1 tablet (25 mg) by mouth daily    Atrial fibrillation (H)       ULORIC 40 MG Tabs tablet   Generic drug:  febuxostat      Take 40 mg by mouth daily        UNABLE TO FIND      MEDICATION NAME: Super Beet Juice        warfarin 5 MG tablet    COUMADIN    135 tablet    Take 1 tab on Mondays and take 1 1/2 tabs on all other days or as directed by INR clinic    Long term current use of anticoagulant therapy       * Notice:  This list has 2 medication(s) that are the same as other medications prescribed for you. Read  the directions carefully, and ask your doctor or other care provider to review them with you.

## 2018-02-01 NOTE — PROGRESS NOTES
ANTICOAGULATION FOLLOW-UP CLINIC VISIT    Patient Name:  Jt Montgomery  Date:  2/1/2018  Contact Type:  Face to Face    SUBJECTIVE:     Patient Findings     Positives Change in diet/appetite (has done veggie cleanse, lost 10 pounds), No Problem Findings           OBJECTIVE    INR Protime   Date Value Ref Range Status   02/01/2018 2.3 (A) 0.86 - 1.14 Final       ASSESSMENT / PLAN  INR assessment THER    Recheck INR In: 4 WEEKS    INR Location Clinic      Anticoagulation Summary as of 2/1/2018     INR goal 2.0-3.0   Today's INR 2.3   Maintenance plan 5 mg (5 mg x 1) on Mon; 7.5 mg (5 mg x 1.5) all other days   Full instructions 5 mg on Mon; 7.5 mg all other days   Weekly total 50 mg   No change documented Helga Luz RN   Plan last modified Marlena Hong RN (5/25/2017)   Next INR check 3/1/2018   Target end date Indefinite    Indications   Long-term (current) use of anticoagulants [Z79.01] [Z79.01]  Atrial fibrillation (AFIB) on Coumadin [I48.91]         Anticoagulation Episode Summary     INR check location     Preferred lab     Send INR reminders to [facility] HEART INR NURSE    Comments       Anticoagulation Care Providers     Provider Role Specialty Phone number    Giuseppevarsha Lottie Perla DO [facility] Cardiology [phone]            See the Encounter Report to view Anticoagulation Flowsheet and Dosing Calendar (Go to Encounters tab in chart review, and find the Anticoagulation Therapy Visit)    Patient had not been seen in INR clinic for 4 months due to insurance concerns. Now has coverage. Encourage to call office for insurance concerns in the future.     Helga Luz, MARCELLA

## 2018-02-09 ENCOUNTER — TELEPHONE (OUTPATIENT)
Dept: EDUCATION SERVICES | Facility: CLINIC | Age: 73
End: 2018-02-09

## 2018-02-09 ENCOUNTER — OFFICE VISIT (OUTPATIENT)
Dept: FAMILY MEDICINE | Facility: CLINIC | Age: 73
End: 2018-02-09
Payer: COMMERCIAL

## 2018-02-09 VITALS
HEIGHT: 73 IN | HEART RATE: 43 BPM | SYSTOLIC BLOOD PRESSURE: 128 MMHG | BODY MASS INDEX: 36.05 KG/M2 | DIASTOLIC BLOOD PRESSURE: 58 MMHG | OXYGEN SATURATION: 96 % | WEIGHT: 272 LBS | TEMPERATURE: 97.6 F

## 2018-02-09 DIAGNOSIS — E78.2 MIXED HYPERLIPIDEMIA DUE TO TYPE 2 DIABETES MELLITUS (H): ICD-10-CM

## 2018-02-09 DIAGNOSIS — Z79.4 TYPE 2 DIABETES MELLITUS WITH DIABETIC NEUROPATHY, WITH LONG-TERM CURRENT USE OF INSULIN (H): Primary | Chronic | ICD-10-CM

## 2018-02-09 DIAGNOSIS — E11.69 MIXED HYPERLIPIDEMIA DUE TO TYPE 2 DIABETES MELLITUS (H): ICD-10-CM

## 2018-02-09 DIAGNOSIS — E11.40 TYPE 2 DIABETES MELLITUS WITH DIABETIC NEUROPATHY, WITH LONG-TERM CURRENT USE OF INSULIN (H): Primary | Chronic | ICD-10-CM

## 2018-02-09 DIAGNOSIS — M1A.9XX0 CHRONIC GOUT WITHOUT TOPHUS, UNSPECIFIED CAUSE, UNSPECIFIED SITE: ICD-10-CM

## 2018-02-09 DIAGNOSIS — I48.91 ATRIAL FIBRILLATION (H): ICD-10-CM

## 2018-02-09 DIAGNOSIS — I25.10 CAD (CORONARY ARTERY DISEASE): ICD-10-CM

## 2018-02-09 DIAGNOSIS — I10 ESSENTIAL HYPERTENSION: Chronic | ICD-10-CM

## 2018-02-09 LAB — HBA1C MFR BLD: 6.3 % (ref 4.3–6)

## 2018-02-09 PROCEDURE — 99214 OFFICE O/P EST MOD 30 MIN: CPT | Performed by: INTERNAL MEDICINE

## 2018-02-09 PROCEDURE — 83036 HEMOGLOBIN GLYCOSYLATED A1C: CPT | Performed by: INTERNAL MEDICINE

## 2018-02-09 PROCEDURE — 36415 COLL VENOUS BLD VENIPUNCTURE: CPT | Performed by: INTERNAL MEDICINE

## 2018-02-09 RX ORDER — METOPROLOL SUCCINATE 100 MG/1
100 TABLET, EXTENDED RELEASE ORAL
Qty: 180 TABLET | Refills: 0 | Status: SHIPPED | OUTPATIENT
Start: 2018-02-09 | End: 2018-05-18

## 2018-02-09 RX ORDER — MULTIPLE VITAMINS W/ MINERALS TAB 9MG-400MCG
4-8 TAB ORAL DAILY
COMMUNITY
End: 2020-01-07

## 2018-02-09 NOTE — PROGRESS NOTES
SUBJECTIVE:   Jt Montgomery is a 72 year old male who presents to clinic today for the following health issues:      Diabetes Follow-up    Patient is checking blood sugars: three times daily.   Blood sugar testing frequency justification: Adjustment of medication(s)    Diabetic concerns: None     Symptoms of hypoglycemia (low blood sugar): none     Paresthesias (numbness or burning in feet) or sores: No    Hyperlipidemia Follow-Up      Rate your low fat/cholesterol diet?: good    Taking statin?  No    Other lipid medications/supplements?:  Fish oil/Omega 3, without side effects    Hypertension Follow-up      Outpatient blood pressures are not being checked.    Low Salt Diet: no added salt    BP Readings from Last 2 Encounters:   01/02/17 126/74   12/05/16 127/83     Hemoglobin A1C (%)   Date Value   03/06/2017 7.7 (H)   12/05/2016 7.6 (H)     LDL Cholesterol Calculated   Date Value   04/29/2016     Cannot estimate LDL when triglyceride exceeds 400 mg/dL mg/dL   04/16/2013 DELETED     LDL Cholesterol Direct (mg/dL)   Date Value   04/29/2016 99     Gout Follow-up      Patient is compliant with his Uloric gout prophylaxis medication.    No recent new gout flare up's.          Current Medications:     Current Outpatient Prescriptions   Medication Sig Dispense Refill     NOVOLOG FLEXPEN 100 UNIT/ML soln ADMINISTER 15 UNITS UNDER THE SKIN THREE TIMES DAILY WITH MEALS 15 mL 0     metoprolol succinate (TOPROL-XL) 100 MG 24 hr tablet Take 1 tablet (100 mg) by mouth 2 times daily 180 tablet 0     insulin glargine (LANTUS SOLOSTAR) 100 UNIT/ML injection ADMINISTER 35 UNITS UNDER THE SKIN DAILY 30 mL 11     multivitamin, therapeutic with minerals (MULTI-VITAMIN) TABS tablet Sh[employer]lee [employer]ion       OVER-THE-COUNTER Super Beet 1 a day       HERBALS Nerve by Plexis 2 a day,   Healthy Feet and Nerves  By [employer]  3 a day, Heart Food Caps  (cayenna pepper, garlic, hawthorn, onion & karla 1-2 a day)        irbesartan (AVAPRO) 300 MG tablet Take 1 tablet (300 mg) by mouth At Bedtime 90 tablet 2     BD GERMÁN U/F 32G X 4 MM insulin pen needle USE FOUR TIMES DAILY OR AS DIRECTED 100 each 0     predniSONE (DELTASONE) 20 MG tablet Take 1 tablet (20 mg) by mouth daily as needed Gout flare 10 tablet 0     digoxin (LANOXIN) 125 MCG tablet Take 1 tablet (125 mcg) by mouth daily 90 tablet 0     warfarin (COUMADIN) 5 MG tablet Take 1 tab on Mondays and take 1 1/2 tabs on all other days or as directed by INR clinic 135 tablet 1     furosemide (LASIX) 20 MG tablet TAKE 1 TABLET(20 MG) BY MOUTH DAILY (Patient taking differently: 1  tab three times a WEEK  prn) 90 tablet 1     ACCU-CHEK LUIS PLUS test strip USE TEST FIVE TIMES DAILY OR AS DIRECTED 400 strip 0     diltiazem (CARTIA XT) 300 MG 24 hr capsule Take 1 capsule (300 mg) by mouth daily 90 capsule 3     Green Tea, Camillia sinensis, (GREEN TEA EXTRACT PO) Take by mouth 2 times daily       spironolactone (ALDACTONE) 25 MG tablet Take 1 tablet (25 mg) by mouth daily 90 tablet 3     fish oil-omega-3 fatty acids 1000 MG capsule Take 2 g by mouth every other day       Resveratrol 100 MG CAPS Take 2 capsules by mouth daily Shaklee Vivix       Probiotic Product (PROBIOTIC & ACIDOPHILUS EX ST PO) Take 1 capsule by mouth daily       UNABLE TO FIND MEDICATION NAME: Super Beet Juice       insulin pen needle (BD GERMÁN U/F) 32G X 4 MM Use four times daily or as directed. 100 each 3     blood glucose monitoring (ACCU-CHEK LUIS PLUS) meter device kit Use to test blood sugar 3 times daily or as directed. 1 kit 0     blood glucose monitoring (SOFTCLIX) lancets Use to test blood sugar 5 times daily or as directed. (Patient taking differently: Use to test blood sugar 2 times daily or as directed.) 1 Box prn     febuxostat (ULORIC) 40 MG TABS Take 40 mg by mouth daily       Coenzyme Q10 (COQ10) 100 MG CAPS Take 200 mg by mouth daily        ORDER FOR DME Equipment being ordered: True Track Meter  from [facility] 1 each 0     aspirin EC 81 MG EC tablet Take 1 tablet by mouth daily. 90 tablet 1     [DISCONTINUED] LANTUS SOLOSTAR 100 UNIT/ML soln ADMINISTER 55 UNITS UNDER THE SKIN DAILY 30 mL 0     [DISCONTINUED] metoprolol (TOPROL-XL) 100 MG 24 hr tablet Take 1 tablet (100 mg) by mouth 2 times daily 180 tablet 3         Allergies:      Allergies   Allergen Reactions     Corn Dextrin      All corn products - gets tired an ornery            Past Medical History:     Past Medical History:   Diagnosis Date     Atrial fibrillation (H)      CAD (coronary artery disease)     MI with RONEL to dRCA April 2011     Central Sleep Apnea with Pat Villanueva breathing 9/14/2010    Also TOMMY with CPAP     CKD (chronic kidney disease) stage 3, GFR 30-59 ml/min      Dilated cardiomyopathy (H)     nonischemic (possibly related to h/o a.fib with RVR) EF 30-40% March 2013     DM2 (diabetes mellitus, type 2) (H)     Goal HgbA1c < 7%     Gout     uric acid level correlates; April 2014 h/o +knee aspirate     Hypertension     Goal <140/90     Pulmonary hypertension     mild per echo 3/2013         Past Surgical History:     Past Surgical History:   Procedure Laterality Date     CORONARY ANGIOGRAPHY ADULT ORDER  2011    distal circ-RONEL     HERNIA REPAIR  11/20/10    incarcinated     SUSPEND HYOID, GENIOGLOSSAL ADVANCEMENT           Family Medical History:     Family History   Problem Relation Age of Onset     Hypertension Mother      Coronary Artery Disease Mother      Coronary Artery Disease Father      Hypertension Father      DIABETES Sister      CEREBROVASCULAR DISEASE Sister          Social History:     Social History     Social History     Marital status:      Spouse name: N/A     Number of children: N/A     Years of education: N/A     Occupational History     Not on file.     Social History Main Topics     Smoking status: Former Smoker     Packs/day: 1.50     Years: 7.00     Types: Cigarettes     Quit date: 1/1/1972      "Smokeless tobacco: Never Used      Comment: quit in 1971      Started at around age 18-19     Alcohol use No      Comment: 11/20/73   recovering     Drug use: No     Sexual activity: Not Currently     Other Topics Concern     Caffeine Concern No     Sleep Concern Yes     sleep apnea, wears bi-pap     Special Diet No     low carb diet     Exercise No     walking     Seat Belt Yes     Social History Narrative           Review of System:     Constitutional: Negative for fever or chills  Skin: Negative for rashes  Ears/Nose/Throat: Negative for nasal congestion, sore throat  Respiratory: No shortness of breath, dyspnea on exertion, cough, or hemoptysis  Cardiovascular: Negative for chest pain  Gastrointestinal: Negative for nausea, vomiting  Genitourinary: Negative for dysuria, hematuria  Musculoskeletal: Negative for myalgias  Neurologic: Negative for headaches  Psychiatric: Negative for depression, anxiety  Hematologic/Lymphatic/Immunologic: Negative  Endocrine: Negative  Behavioral: Negative for tobacco use       Physical Exam:   /58 (Cuff Size: Adult Large)  Pulse (!) 43  Temp 97.6  F (36.4  C) (Tympanic)  Ht 6' 1\" (1.854 m)  Wt 272 lb (123.4 kg)  SpO2 96%  BMI 35.89 kg/m2    GENERAL: alert and no distress  EYES: eyes grossly normal to inspection, and conjunctivae and sclerae normal  HENT: Normocephalic atraumatic. Nose and mouth without ulcers or lesions  NECK: supple  RESP: lungs clear to auscultation   CV: regular rate and rhythm, normal S1 S2  LYMPH: no peripheral edema   ABDOMEN: nondistended  MS: no gross musculoskeletal defects noted  SKIN: no suspicious lesions or rashes  NEURO: Alert & Oriented x 3.   PSYCH: mentation appears normal, affect normal        Diagnostic Test Results:     Diagnostic Test Results:  Results for orders placed or performed in visit on 02/09/18 (from the past 24 hour(s))   Hemoglobin A1c   Result Value Ref Range    Hemoglobin A1C 6.3 (H) 4.3 - 6.0 %       ASSESSMENT/PLAN: "       (E11.40,  Z79.4) Type 2 diabetes mellitus with diabetic neuropathy, with long-term current use of insulin (H)  (primary encounter diagnosis)  Comment: chronic Type 2 Diabetes with recent significant improvement in diabetes control. Hgb A1c has improved also.  Plan: I have ordered Hemoglobin A1c, which has improved from prior baseline. I have reduced the patient's insulin glargine (LANTUS SOLOSTAR) 100 UNIT/ML injection medication dose from 55 to 35 units SQ daily given the improvement in his diabetes control and reduction in insulin dosage requirements. I have also refilled the patient's Novolog meal time insulin medication today.      (I10) Essential hypertension  Comment: chronic HTN, currently well controlled  Plan: Continue current BP medication regimen.      (E66.01,  Z68.35) Class 2 obesity due to excess calories with serious comorbidity and body mass index (BMI) of 35.0 to 35.9 in adult  Comment: Patient is currently on a weight loss program through diet and exercise and has already lost several lbs of weight.  Plan: Continue current diet and exercise program going forward.      (E11.69,  E78.2) Mixed hyperlipidemia due to type 2 diabetes mellitus (H)  Comment: Patient is compliant with his omega 3 fish oil medication  Plan: Continue omega 3 fish oil medication for hyperlipidemia treatment going forward.      (M1A.9XX0) Chronic gout without tophus, unspecified cause, unspecified site  Comment: no recent gout flare up's.  Plan: Continue Uloric medication for gout prophylaxis.        Follow Up Plan:     Patient is instructed to return to Internal Medicine clinic for follow-up visit in 3 months.        Digna Jones MD  Internal Medicine  [facility]

## 2018-02-09 NOTE — LETTER
[place] [address] [facility]  [address]  [place]  [address]  Tel: [phone]    February 14, 2018    Jt Patricklinh Montgomery  [address] [place] [address]  [address]  [facility] [id number]        Dear Mr. Montgomery,    Your Hgb A1c lab has improved.    If you have any further questions or problems, please contact our office.      Sincerely,    Amanuel Jones MD/Jada Crews CMA  Results for orders placed or performed in visit on 02/09/18   Hemoglobin A1c   Result Value Ref Range    Hemoglobin A1C 6.3 (H) 4.3 - 6.0 %               Enclosure: Lab Results

## 2018-02-09 NOTE — TELEPHONE ENCOUNTER
Jamal mentioned that someone arranged for him to have diabetic supplies covered at little or no cost and he signed a paper last year.  He wonders if he needs to do that again.     He said he thinks is might have been Nathaly or an associate.     Cell [phone]    Marjorie LOVELL MA     (Jamal has changed PCP to Dr. Jones after today's appt.  He liked his high energy & his [place] background, and since Jamal sways towards holistic he thinks Dr. Jones might be a good match for him.)

## 2018-02-09 NOTE — MR AVS SNAPSHOT
After Visit Summary   2/9/2018    Jt Montgomery    MRN: [id number]           Patient Information     Date Of Birth          1945        Visit Information        Provider Department      2/9/2018 3:00 PM Digna Jones MD [facility]        Today's Diagnoses     Type 2 diabetes mellitus with diabetic neuropathy, with long-term current use of insulin (H)    -  1    Essential hypertension        Class 2 obesity due to excess calories with serious comorbidity and body mass index (BMI) of 35.0 to 35.9 in adult        Mixed hyperlipidemia due to type 2 diabetes mellitus (H)        Chronic gout without tophus, unspecified cause, unspecified site           Follow-ups after your visit        Your next 10 appointments already scheduled     Mar 01, 2018  1:20 PM CST   Anticoagulation Visit with [facility] ANTICOAGULATION   [facility] ([facility])    [address] [place] [id number] [phone]              Who to contact     If you have questions or need follow up information about today's clinic visit or your schedule please contact [facility] directly at [phone].  Normal or non-critical lab and imaging results will be communicated to you by MyChart, letter or phone within 4 business days after the clinic has received the results. If you do not hear from us within 7 days, please contact the clinic through [employer] or phone. If you have a critical or abnormal lab result, we will notify you by phone as soon as possible.  Submit refill requests through [employer] or call your pharmacy and they will forward the refill request to us. Please allow 3 business days for your refill to be completed.          Additional Information About Your Visit        MyChart Information     [employer] lets you send messages to your doctor, view your test results, renew your prescriptions, schedule appointments and more. To sign up,  "go to [email or web address] . Click on \"Log in\" on the left side of the screen, which will take you to the Welcome page. Then click on \"Sign up Now\" on the right side of the page.     You will be asked to enter the access code listed below, as well as some personal information. Please follow the directions to create your username and password.     Your access code is: [email or web address]  Expires: 2018  1:40 PM     Your access code will  in 90 days. If you need help or a new code, please call your [place] clinic or [phone].        Care EveryWhere ID     This is your Care EveryWhere ID. This could be used by other organizations to access your [facility] medical records  [id number]        Your Vitals Were     Pulse Temperature Height Pulse Oximetry BMI (Body Mass Index)       43 97.6  F (36.4  C) (Tympanic) 6' 1\" (1.854 m) 96% 35.89 kg/m2        Blood Pressure from Last 3 Encounters:   18 128/58   17 126/74   16 127/83    Weight from Last 3 Encounters:   18 272 lb (123.4 kg)   17 289 lb 9.6 oz (131.4 kg)   16 289 lb (131.1 kg)              We Performed the Following     Hemoglobin A1c          Today's Medication Changes          These changes are accurate as of 18  5:10 PM.  If you have any questions, ask your nurse or doctor.               These medicines have changed or have updated prescriptions.        Dose/Directions    blood glucose monitoring lancets   This may have changed:  additional instructions   Used for:  Uncontrolled diabetes mellitus with complications (H)        Use to test blood sugar 5 times daily or as directed.   Quantity:  1 Box   Refills:  prn       furosemide 20 MG tablet   Commonly known as:  LASIX   This may have changed:  See the new instructions.   Used for:  Dilated cardiomyopathy (H)        TAKE 1 TABLET(20 MG) BY MOUTH DAILY   Quantity:  90 tablet   Refills:  1       Multi-vitamin Tabs tablet   This may have changed:  Another medication " with the same name was removed. Continue taking this medication, and follow the directions you see here.   Changed by:  Digna Jones MD        [facility]   Refills:  0         Stop taking these medicines if you haven't already. Please contact your care team if you have questions.     alpha-lipoic acid 600 MG Caps   Stopped by:  Digna Jones MD                Where to get your medicines      These medications were sent to [facility] [address] [place], [place] [address] AT [facility] [place]  [address], [facility] [address]     Phone:  [phone]     metoprolol succinate 100 MG 24 hr tablet                Primary Care Provider Office Phone # Fax #    Digna Jones [phone] [address] [facility] [address]  [facility] [address]        Equal Access to Services     FAZAL [facility] Hospital[facility] AH: Hadii nitin ku hadasho Soomaali, waaxda luqadaha, qaybta kaalmada adeegyada, quynh turnerin hayjonesn pipe harris . So [facility] [phone].    ATENCIÓN: Si habla español, tiene a jones disposición servicios gratuitos de asistencia lingüística. Llame al [phone].    We comply with applicable federal civil rights laws and [place] laws. We do not discriminate on the basis of race, color, national origin, age, disability, sex, sexual orientation, or gender identity.            Thank you!     Thank you for choosing [facility]  for your care. Our goal is always to provide you with excellent care. Hearing back from our patients is one way we can continue to improve our services. Please take a few minutes to complete the written survey that you may receive in the mail after your visit with us. Thank you!             Your Updated Medication List - Protect others around you: Learn how to safely use, store and throw away your medicines at www.disposemymeds.org.          This list is accurate as of 2/9/18  5:10 PM.  Always use your most recent  med list.                   Brand Name Dispense Instructions for use Diagnosis    ACCU-CHEK LUIS PLUS test strip   Generic drug:  blood glucose monitoring     400 strip    USE TEST FIVE TIMES DAILY OR AS DIRECTED    Uncontrolled diabetes mellitus with complications (H)       aspirin 81 MG EC tablet     90 tablet    Take 1 tablet by mouth daily.    Atrial fibrillation with RVR (H), Dilated cardiomyopathy (H)       blood glucose monitoring lancets     1 Box    Use to test blood sugar 5 times daily or as directed.    Uncontrolled diabetes mellitus with complications (H)       blood glucose monitoring meter device kit     1 kit    Use to test blood sugar 3 times daily or as directed.    Uncontrolled diabetes mellitus with complications (H)       CoQ10 100 MG Caps      Take 200 mg by mouth daily    Uncontrolled diabetes mellitus with complications (H)       digoxin 125 MCG tablet    LANOXIN    90 tablet    Take 1 tablet (125 mcg) by mouth daily    Atrial fibrillation (H)       diltiazem 300 MG 24 hr capsule    CARTIA XT    90 capsule    Take 1 capsule (300 mg) by mouth daily    Atrial fibrillation (H)       fish oil-omega-3 fatty acids 1000 MG capsule      Take 2 g by mouth every other day        furosemide 20 MG tablet    LASIX    90 tablet    TAKE 1 TABLET(20 MG) BY MOUTH DAILY    Dilated cardiomyopathy (H)       GREEN TEA EXTRACT PO      Take by mouth 2 times daily        HERBALS      Nerve by Plexis 2 a day,   Healthy Feet and Nerves  By [employer]  3 a day, Heart Food Caps  (cayenna pepper, garlic, hawthorn, onion & karla 1-2 a day)        * insulin pen needle 32G X 4 MM    BD GERMÁN U/F    100 each    Use four times daily or as directed.    Uncontrolled diabetes mellitus with complications (H)       * BD GERMÁN U/F 32G X 4 MM   Generic drug:  insulin pen needle     100 each    USE FOUR TIMES DAILY OR AS DIRECTED    Type 2 diabetes mellitus with diabetic neuropathy, with long-term current use of insulin (H)        irbesartan 300 MG tablet    AVAPRO    90 tablet    Take 1 tablet (300 mg) by mouth At Bedtime    HTN (hypertension)       LANTUS SOLOSTAR 100 UNIT/ML injection   Generic drug:  insulin glargine     30 mL    ADMINISTER 35 UNITS UNDER THE SKIN DAILY    Type 2 diabetes mellitus with diabetic neuropathy, with long-term current use of insulin (H)       metoprolol succinate 100 MG 24 hr tablet    TOPROL-XL    180 tablet    Take 1 tablet (100 mg) by mouth 2 times daily    CAD (coronary artery disease)       Multi-vitamin Tabs tablet      [employer] Extenstion        NovoLOG FLEXPEN 100 UNIT/ML injection   Generic drug:  insulin aspart     15 mL    ADMINISTER 15 UNITS UNDER THE SKIN THREE TIMES DAILY WITH MEALS    Type 2 diabetes mellitus with diabetic neuropathy, with long-term current use of insulin (H)       order for DME     1 each    Equipment being ordered: True Track Meter from [facility]    Uncontrolled diabetes mellitus with complications (H)       OVER-THE-COUNTER      Super Beet 1 a day        predniSONE 20 MG tablet    DELTASONE    10 tablet    Take 1 tablet (20 mg) by mouth daily as needed Gout flare    Chronic gout due to renal impairment of multiple sites without tophus       PROBIOTIC & ACIDOPHILUS EX ST PO      Take 1 capsule by mouth daily        Resveratrol 100 MG Caps      Take 2 capsules by mouth daily Shaklee Vivix        spironolactone 25 MG tablet    ALDACTONE    90 tablet    Take 1 tablet (25 mg) by mouth daily    Atrial fibrillation (H)       ULORIC 40 MG Tabs tablet   Generic drug:  febuxostat      Take 40 mg by mouth daily        UNABLE TO FIND      MEDICATION NAME: Super Beet Juice        warfarin 5 MG tablet    COUMADIN    135 tablet    Take 1 tab on Mondays and take 1 1/2 tabs on all other days or as directed by INR clinic    Long term current use of anticoagulant therapy       * Notice:  This list has 2 medication(s) that are the same as other medications prescribed for you. Read the  directions carefully, and ask your doctor or other care provider to review them with you.

## 2018-02-11 NOTE — TELEPHONE ENCOUNTER
Routing to [facility] coordinators.  He failed to f/up so I had removed him from the copay waivers.  It looks like he changed insurance plans so this will no longer be an option.  But please encourage f/up appt.    Mikaela StroudD, [facility]  Medication Therapy Management Provider  Pager: [phone]

## 2018-02-12 ENCOUNTER — TELEPHONE (OUTPATIENT)
Dept: PHARMACY | Facility: OTHER | Age: 73
End: 2018-02-12

## 2018-02-12 NOTE — TELEPHONE ENCOUNTER
MTM referral from: Follow up apt request    MTM referral outreach attempt #1 on February 12, 2018 at 9:49 AM      Outcome: Left Message    Patti Quinones, MTM Coordinator

## 2018-02-13 RX ORDER — SPIRONOLACTONE 25 MG/1
25 TABLET ORAL DAILY
Qty: 90 TABLET | Refills: 0 | Status: SHIPPED | OUTPATIENT
Start: 2018-02-13 | End: 2018-05-18

## 2018-02-15 NOTE — TELEPHONE ENCOUNTER
MTM referral from: Follow up apt request    MTM referral outreach attempt #2 on February 15, 2018 at 9:56 AM      Outcome: Spoke with patient he is going to call me back sometime this week when he wasn't driving    Patti Quinones, MTM Coordinator

## 2018-02-21 ENCOUNTER — OFFICE VISIT (OUTPATIENT)
Dept: PHARMACY | Facility: CLINIC | Age: 73
End: 2018-02-21
Payer: COMMERCIAL

## 2018-02-21 VITALS — BODY MASS INDEX: 35.89 KG/M2 | DIASTOLIC BLOOD PRESSURE: 74 MMHG | SYSTOLIC BLOOD PRESSURE: 124 MMHG | WEIGHT: 272 LBS

## 2018-02-21 DIAGNOSIS — E78.2 MIXED HYPERLIPIDEMIA DUE TO TYPE 2 DIABETES MELLITUS (H): ICD-10-CM

## 2018-02-21 DIAGNOSIS — E11.69 MIXED HYPERLIPIDEMIA DUE TO TYPE 2 DIABETES MELLITUS (H): ICD-10-CM

## 2018-02-21 DIAGNOSIS — E11.40 TYPE 2 DIABETES MELLITUS WITH DIABETIC NEUROPATHY, WITH LONG-TERM CURRENT USE OF INSULIN (H): Chronic | ICD-10-CM

## 2018-02-21 DIAGNOSIS — Z79.4 TYPE 2 DIABETES MELLITUS WITH DIABETIC NEUROPATHY, WITH LONG-TERM CURRENT USE OF INSULIN (H): Chronic | ICD-10-CM

## 2018-02-21 DIAGNOSIS — M1A.39X0 CHRONIC GOUT DUE TO RENAL IMPAIRMENT OF MULTIPLE SITES WITHOUT TOPHUS: Chronic | ICD-10-CM

## 2018-02-21 DIAGNOSIS — Z23 NEED FOR VACCINATION: ICD-10-CM

## 2018-02-21 DIAGNOSIS — I10 ESSENTIAL HYPERTENSION: ICD-10-CM

## 2018-02-21 DIAGNOSIS — I48.91 ATRIAL FIBRILLATION, UNSPECIFIED TYPE (H): ICD-10-CM

## 2018-02-21 DIAGNOSIS — E63.9 NUTRITIONAL DISORDER: ICD-10-CM

## 2018-02-21 DIAGNOSIS — D64.9 ANEMIA, UNSPECIFIED TYPE: ICD-10-CM
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
## 2018-02-21 DIAGNOSIS — I42.0 DILATED CARDIOMYOPATHY (H): Chronic | ICD-10-CM

## 2018-02-21 DIAGNOSIS — E11.49 OTHER DIABETIC NEUROLOGICAL COMPLICATION ASSOCIATED WITH TYPE 2 DIABETES MELLITUS (H): ICD-10-CM

## 2018-02-21 DIAGNOSIS — Z95.820 S/P ANGIOPLASTY WITH STENT: ICD-10-CM

## 2018-02-21 LAB
DIGOXIN SERPL-MCNC: 0.6 UG/L (ref 0.5–2)
ERYTHROCYTE [DISTWIDTH] IN BLOOD BY AUTOMATED COUNT: 16.4 % (ref 10–15)
HCT VFR BLD AUTO: 42.1 % (ref 40–53)
HGB BLD-MCNC: 13.6 G/DL (ref 13.3–17.7)
MCH RBC QN AUTO: 29.8 PG (ref 26.5–33)
MCHC RBC AUTO-ENTMCNC: 32.3 G/DL (ref 31.5–36.5)
MCV RBC AUTO: 92 FL (ref 78–100)
PLATELET # BLD AUTO: 232 10E9/L (ref 150–450)
RBC # BLD AUTO: 4.56 10E12/L (ref 4.4–5.9)
WBC # BLD AUTO: 5.7 10E9/L (ref 4–11)

## 2018-02-21 PROCEDURE — 99607 MTMS BY PHARM ADDL 15 MIN: CPT | Performed by: PHARMACIST

## 2018-02-21 PROCEDURE — 80053 COMPREHEN METABOLIC PANEL: CPT | Performed by: INTERNAL MEDICINE

## 2018-02-21 PROCEDURE — 85027 COMPLETE CBC AUTOMATED: CPT | Performed by: INTERNAL MEDICINE

## 2018-02-21 PROCEDURE — 80162 ASSAY OF DIGOXIN TOTAL: CPT | Performed by: INTERNAL MEDICINE

## 2018-02-21 PROCEDURE — 82043 UR ALBUMIN QUANTITATIVE: CPT | Performed by: INTERNAL MEDICINE

## 2018-02-21 PROCEDURE — 84550 ASSAY OF BLOOD/URIC ACID: CPT | Performed by: INTERNAL MEDICINE

## 2018-02-21 PROCEDURE — 99605 MTMS BY PHARM NP 15 MIN: CPT | Performed by: PHARMACIST

## 2018-02-21 PROCEDURE — 80061 LIPID PANEL: CPT | Performed by: INTERNAL MEDICINE

## 2018-02-21 PROCEDURE — 36415 COLL VENOUS BLD VENIPUNCTURE: CPT | Performed by: INTERNAL MEDICINE
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
## 2018-02-21 NOTE — PATIENT INSTRUCTIONS
Recommendations from today's MTM visit:                                                    MTM (medication therapy management) is a service provided by a clinical pharmacist designed to help you get the most of out of your medicines.   Today we reviewed what your medicines are for, how to know if they are working, that your medicines are safe and how to make your medicine regimen as easy as possible.     1.  You'll receive notification from [facility] that our visit today is not covered.  You can disregard this - you will not receive a bill for our visit.    2.  It would be best to take Novolog with your meals (immediately before eating), not after.  Taking Novolog significantly after your meals puts you at risk of low blood sugars.    3.  You're due for a bunch of labs today - complete blood count, cholesterol, protein in the urine, electrolytes/kidney function, digoxin, liver function, uric acid    4.  Schedule annual eye exam    5.  It looks like you're due for pneumonia vaccines and tetanus vaccine.  Please talk with your pharmacy about these, your insurance prefers that you get these done at the pharmacy.    Next MTM visit:  Pending lab results    To schedule another MTM appointment, please call the clinic directly or you may call the MTM scheduling line at [phone] or toll-free at [phone].     My Clinical Pharmacist's contact information:                                                      It was a pleasure seeing you today!  Please feel free to contact me with any questions or concerns you have.      Kerri Massey, PharmD  Pharmaceutical Care Resident  Pager: [phone]    Nathaly Hughes PharmD, [facility]  Medication Therapy Management Provider  Pager: [phone]     You may receive a survey about the MTM services you received.  I would appreciate your feedback to help me serve you better in the future. Please fill it out and return it when you can. Your comments will be anonymous.

## 2018-02-21 NOTE — MR AVS SNAPSHOT
After Visit Summary   2/21/2018    Jt Montgomery    MRN: [id number]           Patient Information     Date Of Birth          1945        Visit Information        Provider Department      2/21/2018 11:00 AM Nathaly Hughes, [facility] MTM        Today's Diagnoses     Dilated cardiomyopathy, nonischemic EF 30-40% in March 2013    -  1    Chronic gout due to renal impairment of multiple sites without tophus        CKD (chronic kidney disease) stage 3, GFR 30-59 ml/min        Mixed hyperlipidemia due to type 2 diabetes mellitus (H)        Anemia, unspecified type        Type 2 diabetes mellitus with diabetic neuropathy, with long-term current use of insulin (H)          Care Instructions    Recommendations from today's MTM visit:                                                    MTM (medication therapy management) is a service provided by a clinical pharmacist designed to help you get the most of out of your medicines.   Today we reviewed what your medicines are for, how to know if they are working, that your medicines are safe and how to make your medicine regimen as easy as possible.     1.  You'll receive notification from [facility] that our visit today is not covered.  You can disregard this - you will not receive a bill for our visit.    2.  It would be best to take Novolog with your meals (immediately before eating), not after.  Taking Novolog significantly after your meals puts you at risk of low blood sugars.    3.  You're due for a bunch of labs today - complete blood count, cholesterol, protein in the urine, electrolytes/kidney function, digoxin, liver function, uric acid    4.  Schedule annual eye exam    5.  It looks like you're due for pneumonia vaccines and tetanus vaccine.  Please talk with your pharmacy about these, your insurance prefers that you get these done at the pharmacy.    Next MTM visit:  Pending lab results    To schedule another MTM appointment,  please call the clinic directly or you may call the MT scheduling line at [phone] or toll-free at [phone].     My Clinical Pharmacist's contact information:                                                      It was a pleasure seeing you today!  Please feel free to contact me with any questions or concerns you have.      Kerri Massey, PharmD  Pharmaceutical Care Resident  Pager: [phone]    Nathaly Hughes PharmD, BCACP  Medication Therapy Management Provider  Pager: [phone]     You may receive a survey about the [facility] services you received.  I would appreciate your feedback to help me serve you better in the future. Please fill it out and return it when you can. Your comments will be anonymous.                   Follow-ups after your visit        Your next 10 appointments already scheduled     Feb 21, 2018 11:45 AM CST   LAB with CS LAB   [facility] ([facility]    [address] [id number] [phone]           Please do not eat 10-12 hours before your appointment if you are coming in fasting for labs on lipids, cholesterol, or glucose (sugar). This does not apply to pregnant women. Water, hot tea and black coffee (with nothing added) are okay. Do not drink other fluids, diet soda or chew gum.            Mar 01, 2018  1:20 PM CST   Anticoagulation Visit with [facility]   [facility]    [address]  [place] [id number] [phone]              Future tests that were ordered for you today     Open Future Orders        Priority Expected Expires Ordered    CBC with platelets Routine  2/21/2019 2/21/2018    Lipid panel reflex to direct LDL Non-fasting Routine  2/21/2019 2/21/2018    Albumin Random Urine Quantitative with Creat Ratio Routine  2/21/2019 2/21/2018    Comprehensive metabolic panel Routine  2/21/2019 2/21/2018    Digoxin level Routine  2/21/2019  "2018    Uric acid Routine  2019            Who to contact     If you have questions or need follow up information about today's clinic visit or your schedule please contact [facility] directly at [phone].  Normal or non-critical lab and imaging results will be communicated to you by MyChart, letter or phone within 4 business days after the clinic has received the results. If you do not hear from us within 7 days, please contact the clinic through MyChart or phone. If you have a critical or abnormal lab result, we will notify you by phone as soon as possible.  Submit refill requests through [employer] or call your pharmacy and they will forward the refill request to us. Please allow 3 business days for your refill to be completed.          Additional Information About Your Visit        [facility] Information     [facility] lets you send messages to your doctor, view your test results, renew your prescriptions, schedule appointments and more. To sign up, go to [email or web address] . Click on \"Log in\" on the left side of the screen, which will take you to the Welcome page. Then click on \"Sign up Now\" on the right side of the page.     You will be asked to enter the access code listed below, as well as some personal information. Please follow the directions to create your username and password.     Your access code is: [email or web address]  Expires: 2018  1:40 PM     Your access code will  in 90 days. If you need help or a new code, please call your [facility] clinic or [phone].        Care EveryWhere ID     This is your Care EveryWhere ID. This could be used by other organizations to access your [facility] medical records  [id number]        Your Vitals Were     BMI (Body Mass Index)                   35.89 kg/m2            Blood Pressure from Last 3 Encounters:   18 124/74   18 128/58   17 126/74    Weight from Last 3 Encounters:   18 272 lb (123.4 kg) "   02/09/18 272 lb (123.4 kg)   01/02/17 289 lb 9.6 oz (131.4 kg)                 Today's Medication Changes          These changes are accurate as of 2/21/18 11:38 AM.  If you have any questions, ask your nurse or doctor.               These medicines have changed or have updated prescriptions.        Dose/Directions    blood glucose monitoring lancets   This may have changed:  additional instructions   Used for:  Uncontrolled diabetes mellitus with complications (H)        Use to test blood sugar 5 times daily or as directed.   Quantity:  1 Box   Refills:  prn       furosemide 20 MG tablet   Commonly known as:  LASIX   This may have changed:  See the new instructions.   Used for:  Dilated cardiomyopathy (H)        TAKE 1 TABLET(20 MG) BY MOUTH DAILY   Quantity:  90 tablet   Refills:  1                Primary Care Provider Office Phone # Fax #    Digna Amanuel Jones [phone] [address] [facility] [address]  [place] [address]        Equal Access to Services     [facility] : Hadii nitin arandao Somichael, waaxda luqrubén, qaybta kaalmada adejunioryasukhjinder, quynh harris . So [facility] [phone].    ATENCIÓN: Si habla español, tiene a jones disposición servicios gratuitos de asistencia lingüística. Llame al [phone].    We comply with applicable federal civil rights laws and [place] laws. We do not discriminate on the basis of race, color, national origin, age, disability, sex, sexual orientation, or gender identity.            Thank you!     Thank you for choosing [facility]  for your care. Our goal is always to provide you with excellent care. Hearing back from our patients is one way we can continue to improve our services. Please take a few minutes to complete the written survey that you may receive in the mail after your visit with us. Thank you!             Your Updated Medication List - Protect others around you: Learn how to safely use, store and throw away  your medicines at www.disposemymeds.org.          This list is accurate as of 2/21/18 11:38 AM.  Always use your most recent med list.                   Brand Name Dispense Instructions for use Diagnosis    ACCU-CHEK LUIS PLUS test strip   Generic drug:  blood glucose monitoring     400 strip    USE TEST FIVE TIMES DAILY OR AS DIRECTED    Uncontrolled diabetes mellitus with complications (H)       aspirin 81 MG EC tablet     90 tablet    Take 1 tablet by mouth daily.    Atrial fibrillation with RVR (H), Dilated cardiomyopathy (H)       blood glucose monitoring lancets     1 Box    Use to test blood sugar 5 times daily or as directed.    Uncontrolled diabetes mellitus with complications (H)       blood glucose monitoring meter device kit     1 kit    Use to test blood sugar 3 times daily or as directed.    Uncontrolled diabetes mellitus with complications (H)       CoQ10 100 MG Caps      Take 200 mg by mouth daily    Uncontrolled diabetes mellitus with complications (H)       digoxin 125 MCG tablet    LANOXIN    90 tablet    Take 1 tablet (125 mcg) by mouth daily    Atrial fibrillation (H)       diltiazem 300 MG 24 hr capsule    CARTIA XT    90 capsule    Take 1 capsule (300 mg) by mouth daily    Atrial fibrillation (H)       fish oil-omega-3 fatty acids 1000 MG capsule      Take 2 g by mouth every other day        furosemide 20 MG tablet    LASIX    90 tablet    TAKE 1 TABLET(20 MG) BY MOUTH DAILY    Dilated cardiomyopathy (H)       GREEN TEA EXTRACT PO      Take by mouth 2 times daily Doesn't always take regularly        HERBALS      Nerve by [employer] 2 a day,   Healthy Feet and Nerves  By [employer]  3 a day, Heart Food Caps  (cayenna pepper, garlic, hawthorn, onion & karla 1-2 a day)        * insulin pen needle 32G X 4 MM    BD GERMÁN U/F    100 each    Use four times daily or as directed.    Uncontrolled diabetes mellitus with complications (H)       * BD GERMÁN U/F 32G X 4 MM   Generic drug:  insulin pen needle      100 each    USE FOUR TIMES DAILY OR AS DIRECTED    Type 2 diabetes mellitus with diabetic neuropathy, with long-term current use of insulin (H)       irbesartan 300 MG tablet    AVAPRO    90 tablet    Take 1 tablet (300 mg) by mouth At Bedtime    HTN (hypertension)       LANTUS SOLOSTAR 100 UNIT/ML injection   Generic drug:  insulin glargine     30 mL    ADMINISTER 35 UNITS UNDER THE SKIN DAILY    Type 2 diabetes mellitus with diabetic neuropathy, with long-term current use of insulin (H)       melatonin 1 MG Tabs tablet      Take 0.5 mg by mouth nightly as needed for sleep        metoprolol succinate 100 MG 24 hr tablet    TOPROL-XL    180 tablet    Take 1 tablet (100 mg) by mouth 2 times daily    CAD (coronary artery disease)       Multi-vitamin Tabs tablet      Shaklee & Life Extenstion        NovoLOG FLEXPEN 100 UNIT/ML injection   Generic drug:  insulin aspart     15 mL    ADMINISTER 15 UNITS UNDER THE SKIN THREE TIMES DAILY WITH MEALS    Type 2 diabetes mellitus with diabetic neuropathy, with long-term current use of insulin (H)       order for DME     1 each    Equipment being ordered: True Track Meter from [facility]    Uncontrolled diabetes mellitus with complications (H)       predniSONE 20 MG tablet    DELTASONE    10 tablet    Take 1 tablet (20 mg) by mouth daily as needed Gout flare    Chronic gout due to renal impairment of multiple sites without tophus       PROBIOTIC & ACIDOPHILUS EX ST PO      Take 1 capsule by mouth daily        Resveratrol 100 MG Caps      Take 2 capsules by mouth daily Shaklee Vivix        spironolactone 25 MG tablet    ALDACTONE    90 tablet    Take 1 tablet (25 mg) by mouth daily    Atrial fibrillation (H)       ULORIC 40 MG Tabs tablet   Generic drug:  febuxostat      Take 40 mg by mouth daily        UNABLE TO FIND      MEDICATION NAME: Super Beet Juice        warfarin 5 MG tablet    COUMADIN    135 tablet    Take 1 tab on Mondays and take 1 1/2 tabs on all other days or as  directed by INR clinic    Long term current use of anticoagulant therapy       * Notice:  This list has 2 medication(s) that are the same as other medications prescribed for you. Read the directions carefully, and ask your doctor or other care provider to review them with you.

## 2018-02-22 LAB
ALBUMIN SERPL-MCNC: 3.7 G/DL (ref 3.4–5)
ALP SERPL-CCNC: 54 U/L (ref 40–150)
ALT SERPL W P-5'-P-CCNC: 27 U/L (ref 0–70)
ANION GAP SERPL CALCULATED.3IONS-SCNC: 10 MMOL/L (ref 3–14)
AST SERPL W P-5'-P-CCNC: 24 U/L (ref 0–45)
BILIRUB SERPL-MCNC: 1.4 MG/DL (ref 0.2–1.3)
BUN SERPL-MCNC: 33 MG/DL (ref 7–30)
CALCIUM SERPL-MCNC: 9.2 MG/DL (ref 8.5–10.1)
CHLORIDE SERPL-SCNC: 106 MMOL/L (ref 94–109)
CHOLEST SERPL-MCNC: 172 MG/DL
CO2 SERPL-SCNC: 23 MMOL/L (ref 20–32)
CREAT SERPL-MCNC: 1.29 MG/DL (ref 0.66–1.25)
CREAT UR-MCNC: 77 MG/DL
GFR SERPL CREATININE-BSD FRML MDRD: 55 ML/MIN/1.7M2
GLUCOSE SERPL-MCNC: 99 MG/DL (ref 70–99)
HDLC SERPL-MCNC: 32 MG/DL
LDLC SERPL CALC-MCNC: 75 MG/DL
MICROALBUMIN UR-MCNC: 65 MG/L
MICROALBUMIN/CREAT UR: 84.38 MG/G CR (ref 0–17)
NONHDLC SERPL-MCNC: 140 MG/DL
POTASSIUM SERPL-SCNC: 4.4 MMOL/L (ref 3.4–5.3)
PROT SERPL-MCNC: 7.3 G/DL (ref 6.8–8.8)
SODIUM SERPL-SCNC: 139 MMOL/L (ref 133–144)
TRIGL SERPL-MCNC: 323 MG/DL
URATE SERPL-MCNC: 7.4 MG/DL (ref 3.5–7.2)

## 2018-03-01 ENCOUNTER — ANTICOAGULATION THERAPY VISIT (OUTPATIENT)
Dept: CARDIOLOGY | Facility: CLINIC | Age: 73
End: 2018-03-01
Payer: COMMERCIAL

## 2018-03-01 DIAGNOSIS — Z79.01 LONG-TERM (CURRENT) USE OF ANTICOAGULANTS: ICD-10-CM

## 2018-03-01 DIAGNOSIS — I48.91 ATRIAL FIBRILLATION, UNSPECIFIED TYPE (H): ICD-10-CM

## 2018-03-01 LAB — INR POINT OF CARE: 3.2 (ref 0.86–1.14)

## 2018-03-01 PROCEDURE — 36416 COLLJ CAPILLARY BLOOD SPEC: CPT | Performed by: INTERNAL MEDICINE

## 2018-03-01 PROCEDURE — 85610 PROTHROMBIN TIME: CPT | Mod: QW | Performed by: INTERNAL MEDICINE

## 2018-03-01 RX ORDER — DIGOXIN 125 MCG
125 TABLET ORAL DAILY
Qty: 90 TABLET | Refills: 0 | Status: SHIPPED | OUTPATIENT
Start: 2018-03-01 | End: 2018-05-29

## 2018-03-01 NOTE — PROGRESS NOTES
ANTICOAGULATION FOLLOW-UP CLINIC VISIT    Patient Name:  Jt Montgomery  Date:  3/1/2018  Contact Type:  Face to Face    SUBJECTIVE:     Patient Findings     Positives No Problem Findings           OBJECTIVE    INR Protime   Date Value Ref Range Status   03/01/2018 3.2 (A) 0.86 - 1.14 Final       ASSESSMENT / PLAN  INR assessment SUPRA    Recheck INR In: 2 WEEKS    INR Location Clinic      Anticoagulation Summary as of 3/1/2018     INR goal 2.0-3.0   Today's INR 3.2!   Maintenance plan 5 mg (5 mg x 1) on Mon; 7.5 mg (5 mg x 1.5) all other days   Full instructions 5 mg on Mon; 7.5 mg all other days   Weekly total 50 mg   No change documented Paola Roberson RN   Plan last modified Marlena Hong RN (5/25/2017)   Next INR check 3/15/2018   Target end date Indefinite    Indications   Long-term (current) use of anticoagulants [Z79.01] [Z79.01]  Atrial fibrillation (AFIB) on Coumadin [I48.91]         Anticoagulation Episode Summary     INR check location     Preferred lab     Send INR reminders to [facility] HEART INR NURSE    Comments       Anticoagulation Care Providers     Provider Role Specialty Phone number    Lottie Gomez DO [facility] Cardiology [phone]            See the Encounter Report to view Anticoagulation Flowsheet and Dosing Calendar (Go to Encounters tab in chart review, and find the Anticoagulation Therapy Visit)    INR 3.2.  No bleeding.  He ate broccoli last night.  No other changes.  He will eat another large serving of broccoli today and continue same schedule and recheck  In 2 weeks then see if dose needs to be changed at that time.  Brian Roberson, RN

## 2018-03-01 NOTE — MR AVS SNAPSHOT
Jt Berrylinh Montgomery   3/1/2018 1:20 PM   Anticoagulation Therapy Visit    Description:  72 year old male   Provider:  [facility]   Department:  [facility]           INR as of 3/1/2018     Today's INR 3.2!      Anticoagulation Summary as of 3/1/2018     INR goal 2.0-3.0   Today's INR 3.2!   Full instructions 5 mg on Mon; 7.5 mg all other days   Next INR check 3/15/2018    Indications   Long-term (current) use of anticoagulants [Z79.01] [Z79.01]  Atrial fibrillation (AFIB) on Coumadin [I48.91]         Your next Anticoagulation Clinic appointment(s)     Mar 15, 2018 11:20 AM CDT   Anticoagulation Visit with  ANTICOAGULATION   [facility] ([facility])    [address] [place] [id number]   [phone]              Contact Numbers     Anticoagulant (INR) Clinic Number: [phone]          March 2018 Details    Sun Mon Tue Wed Thu Fri Sat         1      7.5 mg   See details      2      7.5 mg         3      7.5 mg           4      7.5 mg         5      5 mg         6      7.5 mg         7      7.5 mg         8      7.5 mg         9      7.5 mg         10      7.5 mg           11      7.5 mg         12      5 mg         13      7.5 mg         14      7.5 mg         15            16               17                 18               19               20               21               22               23               24                 25               26               27               28               29               30               31                Date Details   03/01 This INR check       Date of next INR:  3/15/2018         How to take your warfarin dose     To take:  5 mg Take 1 of the 5 mg tablets.    To take:  7.5 mg Take 1.5 of the 5 mg tablets.

## 2018-03-02 ENCOUNTER — TELEPHONE (OUTPATIENT)
Dept: SLEEP MEDICINE | Facility: CLINIC | Age: 73
End: 2018-03-02

## 2018-03-02 ENCOUNTER — OFFICE VISIT (OUTPATIENT)
Dept: SLEEP MEDICINE | Facility: CLINIC | Age: 73
End: 2018-03-02
Payer: COMMERCIAL

## 2018-03-02 VITALS
DIASTOLIC BLOOD PRESSURE: 67 MMHG | BODY MASS INDEX: 36.71 KG/M2 | RESPIRATION RATE: 16 BRPM | WEIGHT: 277 LBS | OXYGEN SATURATION: 97 % | HEIGHT: 73 IN | SYSTOLIC BLOOD PRESSURE: 132 MMHG | HEART RATE: 57 BPM

## 2018-03-02 DIAGNOSIS — G47.33 OSA (OBSTRUCTIVE SLEEP APNEA): Chronic | ICD-10-CM

## 2018-03-02 DIAGNOSIS — I27.20 PULMONARY HYPERTENSION (H): ICD-10-CM

## 2018-03-02 DIAGNOSIS — I48.91 ATRIAL FIBRILLATION, UNSPECIFIED TYPE (H): Chronic | ICD-10-CM

## 2018-03-02 DIAGNOSIS — G47.31 CENTRAL SLEEP APNEA: Primary | Chronic | ICD-10-CM

## 2018-03-02 DIAGNOSIS — I42.0 DILATED CARDIOMYOPATHY (H): Chronic | ICD-10-CM

## 2018-03-02 PROCEDURE — 99204 OFFICE O/P NEW MOD 45 MIN: CPT | Performed by: INTERNAL MEDICINE

## 2018-03-02 RX ORDER — ZOLPIDEM TARTRATE 5 MG/1
TABLET ORAL
Qty: 1 TABLET | Refills: 0 | Status: SHIPPED | OUTPATIENT
Start: 2018-03-02 | End: 2018-06-14

## 2018-03-02 NOTE — PROGRESS NOTES
Sleep Consultation:    Date on this visit: 3/2/2018    Primary Physician: Digna Jones Patrick Montgomery is a 72 year old male presenting with complaints of broken PAP device.    History of DM2, morbid obesity, alcohol dependence in remission, HTN, Pulmonary HTN, CAD, atrial fibrillation, and dilated cardiomyopathy with recovered EF (around 50-55% although with 1 - 2+ MR).  8/27/2010 Polysomnography at [facility] - AHI 64.5/hr; RDI 65.6/hr; PHUC 34/hr with Cheyne-Villanueva.    2/27/2011 Polysomnography at [facility] - Titration for Adaptive Servo-Ventilation - Ineffective.    EF taty was around 25% in 2011 but is now around 50 - 55%    Sleep Disordered Breathing -   Adaptive Servo-Ventilation - EPAP 11 - 15, PS 0 - 14, Auto, Pmax 25 with FFM.  7-day   Therapy hours - 13:12 per day  > 4 hours - 7/7  Large Leak - 1%  Residual AHI - 1.7/hr  Periodic Breathing - 4%  90% EPAP - 11.5 cmH2O  Avg PS - 0.7 cmH2O    Allergies:    Allergies   Allergen Reactions     Corn Dextrin      All corn products - gets tired an ornery       Medications:    Current Outpatient Prescriptions   Medication Sig Dispense Refill     digoxin (LANOXIN) 125 MCG tablet Take 1 tablet (125 mcg) by mouth daily 90 tablet 0     melatonin 1 MG TABS tablet Take 0.5 mg by mouth nightly as needed for sleep       spironolactone (ALDACTONE) 25 MG tablet Take 1 tablet (25 mg) by mouth daily 90 tablet 0     NOVOLOG FLEXPEN 100 UNIT/ML soln ADMINISTER 15 UNITS UNDER THE SKIN THREE TIMES DAILY WITH MEALS 15 mL 0     metoprolol succinate (TOPROL-XL) 100 MG 24 hr tablet Take 1 tablet (100 mg) by mouth 2 times daily 180 tablet 0     insulin glargine (LANTUS SOLOSTAR) 100 UNIT/ML injection ADMINISTER 35 UNITS UNDER THE SKIN DAILY 30 mL 11     multivitamin, therapeutic with minerals (MULTI-VITAMIN) TABS tablet Shaklee & Life Extenstion       HERBALS Nerve by Plexis 2 a day,   Healthy Feet and Nerves  By [employer]  3 a day, Heart Food Caps  (cayenna pepper, garlic,  hawthorn, onion & ginger 1-2 a day)       irbesartan (AVAPRO) 300 MG tablet Take 1 tablet (300 mg) by mouth At Bedtime 90 tablet 2     BD GERMÁN U/F 32G X 4 MM insulin pen needle USE FOUR TIMES DAILY OR AS DIRECTED 100 each 0     predniSONE (DELTASONE) 20 MG tablet Take 1 tablet (20 mg) by mouth daily as needed Gout flare 10 tablet 0     warfarin (COUMADIN) 5 MG tablet Take 1 tab on Mondays and take 1 1/2 tabs on all other days or as directed by INR clinic 135 tablet 1     furosemide (LASIX) 20 MG tablet TAKE 1 TABLET(20 MG) BY MOUTH DAILY (Patient taking differently: 1  tab three times a WEEK  prn) 90 tablet 1     ACCU-CHEK LUIS PLUS test strip USE TEST FIVE TIMES DAILY OR AS DIRECTED 400 strip 0     diltiazem (CARTIA XT) 300 MG 24 hr capsule Take 1 capsule (300 mg) by mouth daily 90 capsule 3     Green Tea, Camillia sinensis, (GREEN TEA EXTRACT PO) Take by mouth 2 times daily Doesn't always take regularly       fish oil-omega-3 fatty acids 1000 MG capsule Take 2 g by mouth every other day       Resveratrol 100 MG CAPS Take 2 capsules by mouth daily Shaklee Vivix       Probiotic Product (PROBIOTIC & ACIDOPHILUS EX ST PO) Take 1 capsule by mouth daily       UNABLE TO FIND MEDICATION NAME: Super Beet Juice       insulin pen needle (BD GERMÁN U/F) 32G X 4 MM Use four times daily or as directed. 100 each 3     blood glucose monitoring (ACCU-CHEK LUIS PLUS) meter device kit Use to test blood sugar 3 times daily or as directed. 1 kit 0     blood glucose monitoring (SOFTCLIX) lancets Use to test blood sugar 5 times daily or as directed. (Patient taking differently: Use to test blood sugar 2 times daily or as directed.) 1 Box prn     febuxostat (ULORIC) 40 MG TABS Take 40 mg by mouth daily       Coenzyme Q10 (COQ10) 100 MG CAPS Take 200 mg by mouth daily        ORDER FOR DME Equipment being ordered: True Track Meter from [employer] 1 each 0     aspirin EC 81 MG EC tablet Take 1 tablet by mouth daily. 90 tablet 1       Problem  List:  Patient Active Problem List    Diagnosis Date Noted     Anemia, unspecified type 01/12/2017     Priority: Medium     Long-term (current) use of anticoagulants [Z79.01] 06/14/2016     Priority: Medium     Mixed hyperlipidemia due to type 2 diabetes mellitus (H) 12/07/2015     Priority: Medium     Pulmonary hypertension      Priority: Medium     mild per echo 3/2013       Morbid obesity (H) 10/25/2015     Priority: Medium     CKD (chronic kidney disease) stage 3, GFR 30-59 ml/min      Priority: Medium     Atrial fibrillation (AFIB) on Coumadin 09/21/2014     Priority: Medium     CAD (coronary artery disease)      Priority: Medium     04/15/2011: RONEL to CFX       Gout 08/19/2014     Priority: Medium     Problem list name updated by automated process. Provider to review       Type 2 diabetes mellitus with diabetic neuropathy; goal HgbA1c < 7% 08/19/2014     Priority: Medium     Advanced directives, counseling/discussion 09/10/2012     Priority: Medium     Pt given [employer] forms w explanation.  Pt indicates intent to review and complete at home.  9-10-12.       S/p angioplasty with stent w/ RONEL in distal RCA 05/07/2011     Priority: Medium     Dilated cardiomyopathy, nonischemic EF 30-40% in March 2013      Priority: Medium     TOMMY (obstructive sleep apnea) 09/14/2010     Priority: Medium     Central Sleep Apnea with Pat Villanueva breathing 09/14/2010     Priority: Medium     HTN (hypertension); goal <140/90 08/05/2010     Priority: Medium     Alcohol dependence in remission (H) 08/05/2010     Priority: Medium     (Problem list name updated by automated process. Provider to review and confirm.)          Past Medical/Surgical History:  Past Medical History:   Diagnosis Date     Atrial fibrillation (H)      CAD (coronary artery disease)     MI with RONEL to dRCA April 2011     Central Sleep Apnea with Pat Villanueva breathing 9/14/2010    Also TOMMY with CPAP     CKD (chronic kidney disease) stage 3, GFR  "30-59 ml/min      Dilated cardiomyopathy (H)     nonischemic (possibly related to h/o a.fib with RVR) EF 30-40% March 2013     DM2 (diabetes mellitus, type 2) (H)     Goal HgbA1c < 7%     Gout     uric acid level correlates; April 2014 h/o +knee aspirate     Hypertension     Goal <140/90     Pulmonary hypertension     mild per echo 3/2013     Past Surgical History:   Procedure Laterality Date     CORONARY ANGIOGRAPHY ADULT ORDER  2011    distal circ-RONEL     HERNIA REPAIR  11/20/10    incarcinated     SUSPEND HYOID, GENIOGLOSSAL ADVANCEMENT         Social History:  Social History     Social History     Marital status:      Spouse name: N/A     Number of children: N/A     Years of education: N/A     Occupational History     Not on file.     Social History Main Topics     Smoking status: Former Smoker     Packs/day: 1.50     Years: 7.00     Types: Cigarettes     Quit date: 1/1/1972     Smokeless tobacco: Never Used      Comment: quit in 1971      Started at around age 18-19     Alcohol use No      Comment: 11/20/73   recovering     Drug use: No     Sexual activity: Not Currently     Other Topics Concern     Caffeine Concern No     Sleep Concern Yes     sleep apnea, wears bi-pap     Special Diet No     low carb diet     Exercise No     walking     Seat Belt Yes     Social History Narrative       Family History:  Family History   Problem Relation Age of Onset     Hypertension Mother      Coronary Artery Disease Mother      Coronary Artery Disease Father      Hypertension Father      DIABETES Sister      CEREBROVASCULAR DISEASE Sister        Review of Systems:  A complete review of systems reviewed by me is negative with the exeption of what has been mentioned in the history of present illness.    Physical Examination:  Vitals: /67  Pulse 57  Resp 16  Ht 1.854 m (6' 1\")  Wt 125.6 kg (277 lb)  SpO2 97%  BMI 36.55 kg/m2  BMI= Body mass index is 36.55 kg/(m^2).    Neck Cir (cm): 45 cm    [place] Total " Score 3/2/2018   Total score - [place] 12     Impression/Plan:    ICD-10-CM    1. Central Sleep Apnea with Pat Villanueva breathing G47.31 Comprehensive Sleep Study     zolpidem (AMBIEN) 5 MG tablet   2. Dilated cardiomyopathy, nonischemic EF 30-40% in March 2013 I42.0    3. Atrial fibrillation, unspecified type (H) I48.91    4. TOMMY (obstructive sleep apnea) G47.33    5. Pulmonary hypertension I27.20    PAtient with a history of CSPB with Obstructive Sleep Apnea on Adaptive Servo-Ventilation. Over the last 6 years, heart function has improved and he no-longer has reduced ejection fraction (now around 50 - 55%).  At this time, and based also on Adaptive Servo-Ventilation data download with minimal pressure support, I suspect CSPB has resolved and he now only has Obstructive Sleep Apnea.    If CSPB is gone, he might only need a CPAP (which is what his Adaptive Servo-Ventilation is doing now anyway).   We will set him up for a split sleep study.  Diagnostic portion will be looking for CSPB.  After 2 hours of sleep, as long as he has some sleep apnea, will start on CPAP at 10 cmH2O and titrate up.  Specific instructions to avoid Bilevel or Adaptive Servo-Ventilation during this study.    If still has CSPB will reorder Adaptive Servo-Ventilation at prior settings, otherwise will switch to CPAP.    Literature provided regarding sleep apnea.      He will follow up with me in approximately two weeks after his sleep study has been competed to review the results and discuss plan of care.       Polysomnography reviewed.  Obstructive sleep apnea reviewed.  Complications of untreated sleep apnea were reviewed.    Total time of care was 45 minutes, with > 50% of time spent on counseling and coordination of care.      Edvin Alanis MD, Sleep Physician  Mar 2, 2018     CC: No ref. provider found

## 2018-03-02 NOTE — MR AVS SNAPSHOT
After Visit Summary   3/2/2018    Jt Montgomery    MRN: [id number]           Patient Information     Date Of Birth          1945        Visit Information        Provider Department      3/2/2018 1:30 PM Edvin Alanis MD [facility]        Today's Diagnoses     Central Sleep Apnea with Pat Villanueva breathing    -  1    Dilated cardiomyopathy, nonischemic EF 30-40% in March 2013        Atrial fibrillation, unspecified type (H)        TOMMY (obstructive sleep apnea)        Pulmonary hypertension          Care Instructions      Your BMI is Body mass index is 36.55 kg/(m^2).  Weight management is a personal decision.  If you are interested in exploring weight loss strategies, the following discussion covers the approaches that may be successful. Body mass index (BMI) is one way to tell whether you are at a healthy weight, overweight, or obese. It measures your weight in relation to your height.  A BMI of 18.5 to 24.9 is in the healthy range. A person with a BMI of 25 to 29.9 is considered overweight, and someone with a BMI of 30 or greater is considered obese. More than two-thirds of American adults are considered overweight or obese.  Being overweight or obese increases the risk for further weight gain. Excess weight may lead to heart disease and diabetes.  Creating and following plans for healthy eating and physical activity may help you improve your health.  Weight control is part of healthy lifestyle and includes exercise, emotional health, and healthy eating habits. Careful eating habits lifelong are the mainstay of weight control. Though there are significant health benefits from weight loss, long-term weight loss with diet alone may be very difficult to achieve- studies show long-term success with dietary management in less than 10% of people. Attaining a healthy weight may be especially difficult to achieve in those with severe obesity. In some cases,  medications, devices and surgical management might be considered.  What can you do?  If you are overweight or obese and are interested in methods for weight loss, you should discuss this with your provider.     Consider reducing daily calorie intake by 500 calories.     Keep a food journal.     Avoiding skipping meals, consider cutting portions instead.    Diet combined with exercise helps maintain muscle while optimizing fat loss. Strength training is particularly important for building and maintaining muscle mass. Exercise helps reduce stress, increase energy, and improves fitness. Increasing exercise without diet control, however, may not burn enough calories to loose weight.       Start walking three days a week 10-20 minutes at a time    Work towards walking thirty minutes five days a week     Eventually, increase the speed of your walking for 1-2 minutes at time    In addition, we recommend that you review healthy lifestyles and methods for weight loss available through the National Institutes of Health patient information sites:  http://win.niddk.nih.gov/publications/index.htm    And look into health and wellness programs that may be available through your health insurance provider, employer, local community center, or otilia club.    Weight management plan: Patient was referred to their PCP to discuss a diet and exercise plan.            Follow-ups after your visit        Your next 10 appointments already scheduled     Mar 15, 2018 11:20 AM CDT   Anticoagulation Visit with [facility] ANTICOAGULATION   [facility] ([facility])    [address]  [place] [address] [phone]            Mar 23, 2018  8:00 PM CDT   PSG Split with SLEEP STUDY  2   [facility] [place], Sleep Study ([facility])    [address] [place] [id number] [phone]            Apr 06, 2018  1:00 PM CDT   Return Sleep Patient  "with Edvin Alanis MD   [facility] ([facility])    [address] [address] [phone]              Future tests that were ordered for you today     Open Future Orders        Priority Expected Expires Ordered    Comprehensive Sleep Study Routine  2018 3/2/2018            Who to contact     If you have questions or need follow up information about today's clinic visit or your schedule please contact [facility] directly at [phone].  Normal or non-critical lab and imaging results will be communicated to you by [employer], letter or phone within 4 business days after the clinic has received the results. If you do not hear from us within 7 days, please contact the clinic through [employer] or phone. If you have a critical or abnormal lab result, we will notify you by phone as soon as possible.  Submit refill requests through [employer] or call your pharmacy and they will forward the refill request to us. Please allow 3 business days for your refill to be completed.          Additional Information About Your Visit        [employer] Information     [employer] lets you send messages to your doctor, view your test results, renew your prescriptions, schedule appointments and more. To sign up, go to [email or web address] . Click on \"Log in\" on the left side of the screen, which will take you to the Welcome page. Then click on \"Sign up Now\" on the right side of the page.     You will be asked to enter the access code listed below, as well as some personal information. Please follow the directions to create your username and password.     Your access code is: [email or web address]  Expires: 2018  1:40 PM     Your access code will  in 90 days. If you need help or a new code, please call your [place] clinic or [phone].        Care EveryWhere ID     This is your Care EveryWhere ID. This could be used by other organizations to " "access your [place] medical records  [id number]        Your Vitals Were     Pulse Respirations Height Pulse Oximetry BMI (Body Mass Index)       57 16 1.854 m (6' 1\") 97% 36.55 kg/m2        Blood Pressure from Last 3 Encounters:   03/02/18 132/67   02/21/18 124/74   02/09/18 128/58    Weight from Last 3 Encounters:   03/02/18 125.6 kg (277 lb)   02/21/18 123.4 kg (272 lb)   02/09/18 123.4 kg (272 lb)                 Today's Medication Changes          These changes are accurate as of 3/2/18  2:59 PM.  If you have any questions, ask your nurse or doctor.               Start taking these medicines.        Dose/Directions    zolpidem 5 MG tablet   Commonly known as:  AMBIEN   Used for:  Central sleep apnea        Take tablet by mouth 15 minutes prior to sleep, for Sleep Study   Quantity:  1 tablet   Refills:  0         These medicines have changed or have updated prescriptions.        Dose/Directions    blood glucose monitoring lancets   This may have changed:  additional instructions   Used for:  Uncontrolled diabetes mellitus with complications (H)        Use to test blood sugar 5 times daily or as directed.   Quantity:  1 Box   Refills:  prn       furosemide 20 MG tablet   Commonly known as:  LASIX   This may have changed:  See the new instructions.   Used for:  Dilated cardiomyopathy (H)        TAKE 1 TABLET(20 MG) BY MOUTH DAILY   Quantity:  90 tablet   Refills:  1            Where to get your medicines      Some of these will need a paper prescription and others can be bought over the counter.  Ask your nurse if you have questions.     Bring a paper prescription for each of these medications     zolpidem 5 MG tablet                Primary Care Provider Office Phone # Fax #    Digna Amanuel Jones [phone] [address] [address]        Equal Access to Services     LYUDMILA AMOS AH: Maykel Ordonez, edna macias, nicholas saunders, quynh ferguson " pipe jordannirmalakingsley sandoval'aabobbi ah. So [facility] [phone].    ATENCIÓN: Si habla damien, tiene a jones disposición servicios gratuitos de asistencia lingüística. Josue al [phone].    We comply with applicable federal civil rights laws and [place] laws. We do not discriminate on the basis of race, color, national origin, age, disability, sex, sexual orientation, or gender identity.            Thank you!     Thank you for choosing [facility]  for your care. Our goal is always to provide you with excellent care. Hearing back from our patients is one way we can continue to improve our services. Please take a few minutes to complete the written survey that you may receive in the mail after your visit with us. Thank you!             Your Updated Medication List - Protect others around you: Learn how to safely use, store and throw away your medicines at www.disposemymeds.org.          This list is accurate as of 3/2/18  2:59 PM.  Always use your most recent med list.                   Brand Name Dispense Instructions for use Diagnosis    ACCU-CHEK LUIS PLUS test strip   Generic drug:  blood glucose monitoring     400 strip    USE TEST FIVE TIMES DAILY OR AS DIRECTED    Uncontrolled diabetes mellitus with complications (H)       aspirin 81 MG EC tablet     90 tablet    Take 1 tablet by mouth daily.    Atrial fibrillation with RVR (H), Dilated cardiomyopathy (H)       blood glucose monitoring lancets     1 Box    Use to test blood sugar 5 times daily or as directed.    Uncontrolled diabetes mellitus with complications (H)       blood glucose monitoring meter device kit     1 kit    Use to test blood sugar 3 times daily or as directed.    Uncontrolled diabetes mellitus with complications (H)       CoQ10 100 MG Caps      Take 200 mg by mouth daily    Uncontrolled diabetes mellitus with complications (H)       digoxin 125 MCG tablet    LANOXIN    90 tablet    Take 1 tablet (125 mcg) by mouth daily    Atrial  fibrillation (H)       diltiazem 300 MG 24 hr capsule    CARTIA XT    90 capsule    Take 1 capsule (300 mg) by mouth daily    Atrial fibrillation (H)       fish oil-omega-3 fatty acids 1000 MG capsule      Take 2 g by mouth every other day        furosemide 20 MG tablet    LASIX    90 tablet    TAKE 1 TABLET(20 MG) BY MOUTH DAILY    Dilated cardiomyopathy (H)       GREEN TEA EXTRACT PO      Take by mouth 2 times daily Doesn't always take regularly        HERBALS      Nerve by Plexis 2 a day,   Healthy Feet and Nerves  By [employer]  3 a day, Heart Food Caps  (cayenna pepper, garlic, hawthorn, onion & ginger 1-2 a day)        * insulin pen needle 32G X 4 MM    BD GERMÁN U/F    100 each    Use four times daily or as directed.    Uncontrolled diabetes mellitus with complications (H)       * BD GERMÁN U/F 32G X 4 MM   Generic drug:  insulin pen needle     100 each    USE FOUR TIMES DAILY OR AS DIRECTED    Type 2 diabetes mellitus with diabetic neuropathy, with long-term current use of insulin (H)       irbesartan 300 MG tablet    AVAPRO    90 tablet    Take 1 tablet (300 mg) by mouth At Bedtime    HTN (hypertension)       LANTUS SOLOSTAR 100 UNIT/ML injection   Generic drug:  insulin glargine     30 mL    ADMINISTER 35 UNITS UNDER THE SKIN DAILY    Type 2 diabetes mellitus with diabetic neuropathy, with long-term current use of insulin (H)       melatonin 1 MG Tabs tablet      Take 0.5 mg by mouth nightly as needed for sleep        metoprolol succinate 100 MG 24 hr tablet    TOPROL-XL    180 tablet    Take 1 tablet (100 mg) by mouth 2 times daily    CAD (coronary artery disease)       Multi-vitamin Tabs tablet      Shaklee & Life Extenstion        NovoLOG FLEXPEN 100 UNIT/ML injection   Generic drug:  insulin aspart     15 mL    ADMINISTER 15 UNITS UNDER THE SKIN THREE TIMES DAILY WITH MEALS    Type 2 diabetes mellitus with diabetic neuropathy, with long-term current use of insulin (H)       order for DME     1 each     Equipment being ordered: True Track Meter from [facility]    Uncontrolled diabetes mellitus with complications (H)       predniSONE 20 MG tablet    DELTASONE    10 tablet    Take 1 tablet (20 mg) by mouth daily as needed Gout flare    Chronic gout due to renal impairment of multiple sites without tophus       PROBIOTIC & ACIDOPHILUS EX ST PO      Take 1 capsule by mouth daily        Resveratrol 100 MG Caps      Take 2 capsules by mouth daily Shaklee Vivix        spironolactone 25 MG tablet    ALDACTONE    90 tablet    Take 1 tablet (25 mg) by mouth daily    Atrial fibrillation (H)       ULORIC 40 MG Tabs tablet   Generic drug:  febuxostat      Take 40 mg by mouth daily        UNABLE TO FIND      MEDICATION NAME: Super Beet Juice        warfarin 5 MG tablet    COUMADIN    135 tablet    Take 1 tab on Mondays and take 1 1/2 tabs on all other days or as directed by INR clinic    Long term current use of anticoagulant therapy       zolpidem 5 MG tablet    AMBIEN    1 tablet    Take tablet by mouth 15 minutes prior to sleep, for Sleep Study    Central sleep apnea       * Notice:  This list has 2 medication(s) that are the same as other medications prescribed for you. Read the directions carefully, and ask your doctor or other care provider to review them with you.

## 2018-03-02 NOTE — NURSING NOTE
"Chief Complaint   Patient presents with     Sleep Problem     need new bipap machine       Initial /67  Pulse 57  Resp 16  Ht 1.854 m (6' 1\")  Wt 125.6 kg (277 lb)  SpO2 97%  BMI 36.55 kg/m2 Estimated body mass index is 36.55 kg/(m^2) as calculated from the following:    Height as of this encounter: 1.854 m (6' 1\").    Weight as of this encounter: 125.6 kg (277 lb).  Medication Reconciliation: complete     ESS 12  Neck 45cm  Sigrid Minor        "

## 2018-03-02 NOTE — TELEPHONE ENCOUNTER
I scheduled a sleep study at [facility] for this patient and faxed over the order today. It's scheduled for 03/23/18 PSG SPLIT at [facility].    Liz Prasad

## 2018-03-02 NOTE — PATIENT INSTRUCTIONS

## 2018-03-19 ENCOUNTER — ANTICOAGULATION THERAPY VISIT (OUTPATIENT)
Dept: CARDIOLOGY | Facility: CLINIC | Age: 73
End: 2018-03-19
Payer: COMMERCIAL

## 2018-03-19 DIAGNOSIS — I48.91 ATRIAL FIBRILLATION, UNSPECIFIED TYPE (H): ICD-10-CM

## 2018-03-19 DIAGNOSIS — Z79.01 LONG-TERM (CURRENT) USE OF ANTICOAGULANTS: ICD-10-CM

## 2018-03-19 LAB — INR POINT OF CARE: 3.1 (ref 0.86–1.14)

## 2018-03-19 PROCEDURE — 85610 PROTHROMBIN TIME: CPT | Mod: QW | Performed by: INTERNAL MEDICINE

## 2018-03-19 PROCEDURE — 36416 COLLJ CAPILLARY BLOOD SPEC: CPT | Performed by: INTERNAL MEDICINE

## 2018-03-19 NOTE — MR AVS SNAPSHOT
Jt Berrylinh Montgomery   3/19/2018 11:00 AM   Anticoagulation Therapy Visit    Description:  72 year old male   Provider:  YOLY ANTICOAGULATION   Department:  [facility] H[facility] Cardio C[facility]           INR as of 3/19/2018     Today's INR 3.1!      Anticoagulation Summary as of 3/19/2018     INR goal 2.0-3.0   Today's INR 3.1!   Full instructions 5 mg on Mon, Thu; 7.5 mg all other days   Next INR check 4/2/2018    Indications   Long-term (current) use of anticoagulants [Z79.01] [Z79.01]  Atrial fibrillation (AFIB) on Coumadin [I48.91]         Your next Anticoagulation Clinic appointment(s)     Apr 02, 2018 11:00 AM CDT   Anticoagulation Visit with  ANTICOAGULATION   [facility] ([facility])    [address] [place] [id number]   [phone]              Contact Numbers     Anticoagulant (INR) Clinic Number: [phone]          March 2018 Details    Sun Mon Tue Wed Thu Fri Sat         1               2               3                 4               5               6               7               8               9               10                 11               12               13               14               15               16               17                 18               19      5 mg   See details      20      7.5 mg         21      7.5 mg         22      5 mg         23      7.5 mg         24      7.5 mg           25      7.5 mg         26      5 mg         27      7.5 mg         28      7.5 mg         29      5 mg         30      7.5 mg         31      7.5 mg          Date Details   03/19 This INR check               How to take your warfarin dose     To take:  5 mg Take 1 of the 5 mg tablets.    To take:  7.5 mg Take 1.5 of the 5 mg tablets.           April 2018 Details    Sun Mon Tue Wed Thu Fri Sat     1      7.5 mg         2            3               4               5               6               7                 8               9                10               11               12               13               14                 15               16               17               18               19               20               21                 22               23               24               25               26               27               28                 29               30                     Date Details   No additional details    Date of next INR:  4/2/2018         How to take your warfarin dose     To take:  5 mg Take 1 of the 5 mg tablets.    To take:  7.5 mg Take 1.5 of the 5 mg tablets.

## 2018-03-19 NOTE — PROGRESS NOTES
ANTICOAGULATION FOLLOW-UP CLINIC VISIT    Patient Name:  Jt Montgomery  Date:  3/19/2018  Contact Type:  Face to Face    SUBJECTIVE:     Patient Findings     Positives No Problem Findings           OBJECTIVE    INR Protime   Date Value Ref Range Status   03/19/2018 3.1 (A) 0.86 - 1.14 Final       ASSESSMENT / PLAN  INR assessment THER    Recheck INR In: 2 WEEKS    INR Location Clinic      Anticoagulation Summary as of 3/19/2018     INR goal 2.0-3.0   Today's INR 3.1!   Maintenance plan 5 mg (5 mg x 1) on Mon, Thu; 7.5 mg (5 mg x 1.5) all other days   Full instructions 5 mg on Mon, Thu; 7.5 mg all other days   Weekly total 47.5 mg   Plan last modified Marlena Hong RN (3/19/2018)   Next INR check 4/2/2018   Target end date Indefinite    Indications   Long-term (current) use of anticoagulants [Z79.01] [Z79.01]  Atrial fibrillation (AFIB) on Coumadin [I48.91]         Anticoagulation Episode Summary     INR check location     Preferred lab     Send INR reminders to [facility] HEART INR NURSE    Comments       Anticoagulation Care Providers     Provider Role Specialty Phone number    Lottie GomezDO Responsible Cardiology [phone]            See the Encounter Report to view Anticoagulation Flowsheet and Dosing Calendar (Go to Encounters tab in chart review, and find the Anticoagulation Therapy Visit)    INR 3.1 INR remains slightly elevated. No change in meds. He is eating greens/veggies frequently and consistently. No abnormal bleeding or bruising. Denies infection, diarrhea and no ETOH. Because INR remains slightly elevated and he is eating a lot of veggies now, will decrease dosing by 2.5 mg /wk - take 5 mg MTh and 7.5 mg all other days with recheck in 2 weeks. Dosage adjustment made based on physician directed care plan.    Marlena Hong, MARCELLA

## 2018-03-20 DIAGNOSIS — E11.40 TYPE 2 DIABETES MELLITUS WITH DIABETIC NEUROPATHY, WITH LONG-TERM CURRENT USE OF INSULIN (H): Chronic | ICD-10-CM

## 2018-03-20 DIAGNOSIS — Z79.4 TYPE 2 DIABETES MELLITUS WITH DIABETIC NEUROPATHY, WITH LONG-TERM CURRENT USE OF INSULIN (H): Chronic | ICD-10-CM

## 2018-03-23 ENCOUNTER — THERAPY VISIT (OUTPATIENT)
Dept: SLEEP MEDICINE | Facility: CLINIC | Age: 73
End: 2018-03-23

## 2018-03-23 DIAGNOSIS — G47.33 OSA (OBSTRUCTIVE SLEEP APNEA): ICD-10-CM

## 2018-03-23 DIAGNOSIS — G47.31 CENTRAL SLEEP APNEA: Chronic | ICD-10-CM

## 2018-03-23 PROCEDURE — 95810 POLYSOM 6/> YRS 4/> PARAM: CPT | Performed by: INTERNAL MEDICINE

## 2018-03-23 NOTE — MR AVS SNAPSHOT
After Visit Summary   3/23/2018    Jt Montgomery    MRN: [id number]           Patient Information     Date Of Birth          1945        Visit Information        Provider Department      3/23/2018 8:00 PM SLEEP STUDY RM [facility]        Today's Diagnoses     Central Sleep Apnea with Pat Villanueva breathing        TOMMY (obstructive sleep apnea)          Care Instructions    [facility]    1. Your sleep study will be reviewed by a sleep physician within the next few days.     2. Please follow up in the sleep clinic as scheduled, or, make an appointment with your sleep provider to be seen within two weeks to discuss the results of the sleep study.    3. If you have any questions or problems with your treatment plan, please contact your sleep clinic provider at [phone] to further manage your condition.    4. Please review your attached medication list, and, at your follow-up appointment advise your sleep clinic provider about any changes.    5. Go to [email or web address] for more information about your sleep problems.    JOSE F Rico  March 23, 2018                Follow-ups after your visit        Your next 10 appointments already scheduled     Apr 02, 2018 11:00 AM CDT   Anticoagulation Visit with FREDERICK ANTICOAGULATION   [facility] ([facility])    [address]  [facility] [id number] [phone] OPT 2            Apr 06, 2018  1:00 PM CDT   Return Sleep Patient with Edvin Alanis MD   [facility] ([facility])    [address]  [facility] [id number] [phone]              Who to contact     If you have questions or need follow up information about today's clinic visit or your schedule please contact [facility] directly at [phone].  Normal or non-critical lab and  "imaging results will be communicated to you by MyChart, letter or phone within 4 business days after the clinic has received the results. If you do not hear from us within 7 days, please contact the clinic through Mobile [employer] or phone. If you have a critical or abnormal lab result, we will notify you by phone as soon as possible.  Submit refill requests through Meniga or call your pharmacy and they will forward the refill request to us. Please allow 3 business days for your refill to be completed.          Additional Information About Your Visit        [employer] Information     Meniga lets you send messages to your doctor, view your test results, renew your prescriptions, schedule appointments and more. To sign up, go to [email or web address] . Click on \"Log in\" on the left side of the screen, which will take you to the Welcome page. Then click on \"Sign up Now\" on the right side of the page.     You will be asked to enter the access code listed below, as well as some personal information. Please follow the directions to create your username and password.     Your access code is: [email or web address]  Expires: 2018  2:40 PM     Your access code will  in 90 days. If you need help or a new code, please call your [facility] clinic or [phone].        Care EveryWhere ID     This is your Care EveryWhere ID. This could be used by other organizations to access your [facility] medical records  [id number]         Blood Pressure from Last 3 Encounters:   18 132/67   18 124/74   18 128/58    Weight from Last 3 Encounters:   18 125.6 kg (277 lb)   18 123.4 kg (272 lb)   18 123.4 kg (272 lb)              We Performed the Following     Comprehensive Sleep Study          Today's Medication Changes          These changes are accurate as of 3/23/18 11:59 PM.  If you have any questions, ask your nurse or doctor.               These medicines have changed or have updated prescriptions.        " Dose/Directions    blood glucose monitoring lancets   This may have changed:  additional instructions   Used for:  Uncontrolled diabetes mellitus with complications (H)        Use to test blood sugar 5 times daily or as directed.   Quantity:  1 Box   Refills:  prn       furosemide 20 MG tablet   Commonly known as:  LASIX   This may have changed:  See the new instructions.   Used for:  Dilated cardiomyopathy (H)        TAKE 1 TABLET(20 MG) BY MOUTH DAILY   Quantity:  90 tablet   Refills:  1                Primary Care Provider Office Phone # Fax #    Digna Amanuel Jones [phone] [address]  [place] [address]        Equal Access to Services     [facility]: Hadii aad ku hadasho Soomaali, waaxda luqadaha, qaybta kaalmada adejunioryada, quynh ferguson adejunior harris . So [facility] [phone].    ATENCIÓN: Si habla español, tiene a jones disposición servicios gratuitos de asistencia lingüística. [facility] [phone].    We comply with applicable federal civil rights laws and [place] laws. We do not discriminate on the basis of race, color, national origin, age, disability, sex, sexual orientation, or gender identity.            Thank you!     Thank you for choosing [facility], SLEEP STUDY  for your care. Our goal is always to provide you with excellent care. Hearing back from our patients is one way we can continue to improve our services. Please take a few minutes to complete the written survey that you may receive in the mail after your visit with us. Thank you!             Your Updated Medication List - Protect others around you: Learn how to safely use, store and throw away your medicines at www.disposemymeds.org.          This list is accurate as of 3/23/18 11:59 PM.  Always use your most recent med list.                   Brand Name Dispense Instructions for use Diagnosis    aspirin 81 MG EC tablet     90 tablet    Take 1 tablet by mouth daily.    Atrial fibrillation  with RVR (H), Dilated cardiomyopathy (H)       blood glucose monitoring lancets     1 Box    Use to test blood sugar 5 times daily or as directed.    Uncontrolled diabetes mellitus with complications (H)       blood glucose monitoring meter device kit     1 kit    Use to test blood sugar 3 times daily or as directed.    Uncontrolled diabetes mellitus with complications (H)       blood glucose monitoring test strip    ACCU-CHEK LUIS PLUS    400 strip    Use to test blood sugar 5 times daily or as directed.    Uncontrolled type 2 diabetes mellitus with complication, with long-term current use of insulin (H)       CoQ10 100 MG Caps      Take 200 mg by mouth daily    Uncontrolled diabetes mellitus with complications (H)       digoxin 125 MCG tablet    LANOXIN    90 tablet    Take 1 tablet (125 mcg) by mouth daily    Atrial fibrillation (H)       diltiazem 300 MG 24 hr capsule    CARTIA XT    90 capsule    Take 1 capsule (300 mg) by mouth daily    Atrial fibrillation (H)       fish oil-omega-3 fatty acids 1000 MG capsule      Take 2 g by mouth every other day        furosemide 20 MG tablet    LASIX    90 tablet    TAKE 1 TABLET(20 MG) BY MOUTH DAILY    Dilated cardiomyopathy (H)       GREEN TEA EXTRACT PO      Take by mouth 2 times daily Doesn't always take regularly        HERBALS      Nerve by [employer] 2 a day,   Healthy Feet and Nerves  By [employer]  3 a day, Heart Food Caps  (cayenna pepper, garlic, hawthorn, onion & karla 1-2 a day)        insulin aspart 100 UNIT/ML injection    NovoLOG FLEXPEN    45 mL    Inject 15 Units Subcutaneous 3 times daily (with meals)    Type 2 diabetes mellitus with diabetic neuropathy, with long-term current use of insulin (H)       * insulin pen needle 32G X 4 MM    BD GERMÁN U/F    100 each    Use four times daily or as directed.    Uncontrolled diabetes mellitus with complications (H)       * BD GERMÁN U/F 32G X 4 MM   Generic drug:  insulin pen needle     100 each    USE TO TEST 4 TIMES  DAILY OR AS DIRECTED    Type 2 diabetes mellitus with diabetic neuropathy, with long-term current use of insulin (H)       irbesartan 300 MG tablet    AVAPRO    90 tablet    Take 1 tablet (300 mg) by mouth At Bedtime    HTN (hypertension)       LANTUS SOLOSTAR 100 UNIT/ML injection   Generic drug:  insulin glargine     30 mL    ADMINISTER 35 UNITS UNDER THE SKIN DAILY    Type 2 diabetes mellitus with diabetic neuropathy, with long-term current use of insulin (H)       melatonin 1 MG Tabs tablet      Take 0.5 mg by mouth nightly as needed for sleep        metoprolol succinate 100 MG 24 hr tablet    TOPROL-XL    180 tablet    Take 1 tablet (100 mg) by mouth 2 times daily    CAD (coronary artery disease)       Multi-vitamin Tabs tablet      Shaklee & Life Extenstion        order for DME     1 each    Equipment being ordered: True Track Meter from [facility]    Uncontrolled diabetes mellitus with complications (H)       predniSONE 20 MG tablet    DELTASONE    10 tablet    Take 1 tablet (20 mg) by mouth daily as needed Gout flare    Chronic gout due to renal impairment of multiple sites without tophus       PROBIOTIC & ACIDOPHILUS EX ST PO      Take 1 capsule by mouth daily        Resveratrol 100 MG Caps      Take 2 capsules by mouth daily Shaklee Vivix        spironolactone 25 MG tablet    ALDACTONE    90 tablet    Take 1 tablet (25 mg) by mouth daily    Atrial fibrillation (H)       ULORIC 40 MG Tabs tablet   Generic drug:  febuxostat      Take 40 mg by mouth daily        UNABLE TO FIND      MEDICATION NAME: Super Beet Juice        warfarin 5 MG tablet    COUMADIN    135 tablet    Take 1 tab on Mondays and take 1 1/2 tabs on all other days or as directed by INR clinic    Long term current use of anticoagulant therapy       zolpidem 5 MG tablet    AMBIEN    1 tablet    Take tablet by mouth 15 minutes prior to sleep, for Sleep Study    Central sleep apnea       * Notice:  This list has 2 medication(s) that are the same as  other medications prescribed for you. Read the directions carefully, and ask your doctor or other care provider to review them with you.

## 2018-03-24 NOTE — PATIENT INSTRUCTIONS
[place] [facility]    1. Your sleep study will be reviewed by a sleep physician within the next few days.     2. Please follow up in the sleep clinic as scheduled, or, make an appointment with your sleep provider to be seen within two weeks to discuss the results of the sleep study.    3. If you have any questions or problems with your treatment plan, please contact your sleep clinic provider at [phone] to further manage your condition.    4. Please review your attached medication list, and, at your follow-up appointment advise your sleep clinic provider about any changes.    5. Go to [email or web address] for more information about your sleep problems.    Master Bernardo, DEMARIOGT  March 23, 2018

## 2018-03-27 NOTE — PROCEDURES
" SLEEP STUDY INTERPRETATION  DIAGNOSTIC POLYSOMNOGRAPHY REPORT      Patient: PAL ESCUDERO  YOB: 1945  Study Date: 3/23/2018  MRN: [id number]  Referring Provider: Digna Jones  Ordering Provider: LORENA    Indications for Polysomnography: The patient is a 72 y old Male who is 6' 1\" and weighs 277.0 lbs. His BMI is 36.7, [place] sleepiness scale 12.0 and neck circumference is 45.0 cm. Relevant medical history includes DM2, morbid obesity, alcohol dependence in remission, HTN, Pulmonary HTN, CAD, atrial fibrillation, and dilated cardiomyopathy with recovered EF (around 50-55% although with 1 - 2+ MR). A diagnostic polysomnogram was performed to evaluate for TOMMY and CSA.    Polysomnogram Data: A full night polysomnogram recorded the standard physiologic parameters including EEG, EOG, EMG, ECG, nasal and oral airflow. Respiratory parameters of chest and abdominal movements were recorded with respiratory inductance plethysmography. Oxygen saturation was recorded by pulse oximetry. Hypopnea scoring rule used: 1B 4%.    Sleep Architecture: Increased sleep latency despite Zolpidem 5 mg, increased arousal index, and massively decreased sleep efficiency.  Minimal NREM stage 2 sleep and no REM or NREM stage 3 recorded.  Sleep issues likely due to lack of PAP therapy during testing.  The total recording time of the polysomnogram was 431.0 minutes. The total sleep time was 143.5 minutes. Sleep latency was increased at 52.7 minutes with the use of a sleep aid. REM latency was - minutes. Arousal index was increased at 78.2 arousals per hour. Sleep efficiency was decreased at 33.3%. Wake after sleep onset was 234.0 minutes. The patient spent 90.9% of total sleep time in Stage N1, 9.1% in Stage N2, 0.0% in Stage N3, and 0.0% in REM. Time in REM supine was - minutes.    Respiration: Severe obstructive sleep apnea (with associated oscillatory hypoxia) with mild comorbid central sleep " apnea.    Events ? The polysomnogram revealed a presence of 34 obstructive, 28 central, and 2 mixed apneas resulting in an apnea index of 26.8 events per hour. There were 129 obstructive hypopneas and - central hypopneas resulting in an obstructive hypopnea index of 53.9 and central hypopnea index of - events per hour. The combined apnea/hypopnea index was 80.7 events per hour (central apnea/hypopnea index was 11.7 events per hour). The REM AHI was - events per hour. The supine AHI was 92.2 events per hour. The RERA index was 3.3 events per hour.  The RDI was 84.0 events per hour.    Snoring - was reported as sporadic.    Respiratory rate and pattern - was normal.    Sustained Sleep Associated Hypoventilation - Transcutaneous carbon dioxide monitoring was not used; however significant hypoventilation was not suggested by oximetry.    Sleep Associated Hypoxemia - (Greater than 5 minutes O2 sat at or below 88%) was present. Baseline oxygen saturation was 91.5%. Lowest oxygen saturation was 83.4%. Time spent less than or equal to 88% was 15.0 minutes. Time spent less than or equal to 89% was 31.8 minutes.    Movement Activity: Few PLMs of unlikely clinical significance.    Periodic Limb Activity - There were 25 PLMs during the entire study. The PLM index was 10.5 movements per hour. The PLM Arousal Index was 0.8 per hour.    REM EMG Activity - Excessive muscle activity was not present.    Nocturnal Behavior - Abnormal sleep related behaviors were not noted.    Bruxism - None apparent.    Cardiac Summary: Atrial fibrillation with frequent PVCs.  The average pulse rate was 63.7 bpm. The minimum pulse rate was 28.7 bpm while the maximum pulse rate was 117.5 bpm.  Arrhythmias were noted.    Assessment:     Increased sleep latency despite Zolpidem 5 mg, increased arousal index, and massively decreased sleep efficiency.  Minimal N2 and no REM or N3 recorded.  Likely due to lack of PAP therapy during testing.    Severe  obstructive sleep apnea (with associated oscillatory hypoxia) with mild comorbid central sleep apnea.    Few PLMs of unlikely clinical significance.    Atrial fibrillation with frequent PVCs.    Recommendations:    Recommend repeat polysomnography with full night titration of positive airway pressure therapy for the control of sleep disordered breathing.    Based on known treatment efficacy, could consider ASV with current settings (Adaptive Servo-Ventilation - EPAP 11 - 15, PS 0 - 14, Auto, Pmax 25 with FFM).  Given return of EF to normal range, ASV is an acceptable therapy.    Advice regarding the risks of drowsy driving.    Suggest optimizing sleep schedule and avoiding sleep deprivation.    Weight management (if BMI > 30).    Pharmacologic therapy should be used for management of restless legs syndrome only if present and clinically indicated and not based on the presence of periodic limb movements alone.    Diagnostic Codes:   Obstructive Sleep Apnea G47.33  Primary Central Sleep Apnea G47.31  Sleep Hypoxemia/Hypoventilation G47.36    _____________________________________   Electronically Signed By: Edvin Alanis MD 3/27/2018

## 2018-03-28 LAB — SLPCOMP: NORMAL

## 2018-04-02 ENCOUNTER — ANTICOAGULATION THERAPY VISIT (OUTPATIENT)
Dept: CARDIOLOGY | Facility: CLINIC | Age: 73
End: 2018-04-02
Payer: COMMERCIAL

## 2018-04-02 DIAGNOSIS — I48.91 ATRIAL FIBRILLATION, UNSPECIFIED TYPE (H): ICD-10-CM

## 2018-04-02 DIAGNOSIS — Z79.01 LONG-TERM (CURRENT) USE OF ANTICOAGULANTS: ICD-10-CM

## 2018-04-02 LAB — INR POINT OF CARE: 2.5 (ref 0.86–1.14)

## 2018-04-02 PROCEDURE — 85610 PROTHROMBIN TIME: CPT | Mod: QW | Performed by: INTERNAL MEDICINE

## 2018-04-02 PROCEDURE — 36416 COLLJ CAPILLARY BLOOD SPEC: CPT | Performed by: INTERNAL MEDICINE

## 2018-04-02 NOTE — MR AVS SNAPSHOT
Jt Montgomery   4/2/2018 11:00 AM   Anticoagulation Therapy Visit    Description:  72 year old male   Provider:  FREDERICK ANTICOAGULATION   Department:  [facility] Card[facility]           INR as of 4/2/2018     Today's INR 2.5      Anticoagulation Summary as of 4/2/2018     INR goal 2.0-3.0   Today's INR 2.5   Full instructions 5 mg on Mon, Thu; 7.5 mg all other days   Next INR check 4/23/2018    Indications   Long-term (current) use of anticoagulants [Z79.01] [Z79.01]  Atrial fibrillation (AFIB) on Coumadin [I48.91]         Your next Anticoagulation Clinic appointment(s)     Apr 02, 2018 11:00 AM CDT   Anticoagulation Visit with  ANTICOAGULATION   [facility] ([facility])    [address] [place] [id number]   [phone] OPT 2            Apr 23, 2018 11:00 AM CDT   Anticoagulation Visit with  ANTICOAGULATION   [facility] ([facility])    [address] [place], [place] [id number]  [facility] [id number] [phone] OPT 2              Contact Numbers     Anticoagulant (INR) Clinic Number: [phone]          April 2018 Details    Sun Mon Tue Wed Thu Fri Sat     1               2      5 mg   See details      3      7.5 mg         4      7.5 mg         5      5 mg         6      7.5 mg         7      7.5 mg           8      7.5 mg         9      5 mg         10      7.5 mg         11      7.5 mg         12      5 mg         13      7.5 mg         14      7.5 mg           15      7.5 mg         16      5 mg         17      7.5 mg         18      7.5 mg         19      5 mg         20      7.5 mg         21      7.5 mg           22      7.5 mg         23            24               25               26               27               28                 29               30                     Date Details   04/02 This INR check       Date of next INR:  4/23/2018         How to take your warfarin dose     To  take:  5 mg Take 1 of the 5 mg tablets.    To take:  7.5 mg Take 1.5 of the 5 mg tablets.

## 2018-04-02 NOTE — PROGRESS NOTES
ANTICOAGULATION FOLLOW-UP CLINIC VISIT    Patient Name:  Jt Montgomery  Date:  4/2/2018  Contact Type:  Face to Face    SUBJECTIVE:     Patient Findings     Positives No Problem Findings           OBJECTIVE    INR Protime   Date Value Ref Range Status   04/02/2018 2.5 (A) 0.86 - 1.14 Final       ASSESSMENT / PLAN  INR assessment THER    Recheck INR In: 3 WEEKS    INR Location Clinic      Anticoagulation Summary as of 4/2/2018     INR goal 2.0-3.0   Today's INR 2.5   Maintenance plan 5 mg (5 mg x 1) on Mon, Thu; 7.5 mg (5 mg x 1.5) all other days   Full instructions 5 mg on Mon, Thu; 7.5 mg all other days   Weekly total 47.5 mg   No change documented Marlena Hong, MARCELLA   Plan last modified Marlena Hong RN (3/19/2018)   Next INR check 4/23/2018   Target end date Indefinite    Indications   Long-term (current) use of anticoagulants [Z79.01] [Z79.01]  Atrial fibrillation (AFIB) on Coumadin [I48.91]         Anticoagulation Episode Summary     INR check location     Preferred lab     Send INR reminders to [facility] HEART INR NURSE    Comments       Anticoagulation Care Providers     Provider Role Specialty Phone number    Dangelomik Lottie Perla DO [facility] Cardiology [phone]            See the Encounter Report to view Anticoagulation Flowsheet and Dosing Calendar (Go to Encounters tab in chart review, and find the Anticoagulation Therapy Visit)    INR 2.5 INR in range on decreased dosing schedule. No change in meds. Still eats greens/veggies regularly. No abnormal bleeding or bruising. Will continue current dosing of 5 mg MTh and 7.5 mg all other days with recheck in 3 weeks. Also scheduled an INR appt before OV with Dr Gomez 5/14.     Marlena Hong, MARCELLA

## 2018-04-06 ENCOUNTER — OFFICE VISIT (OUTPATIENT)
Dept: SLEEP MEDICINE | Facility: CLINIC | Age: 73
End: 2018-04-06
Payer: COMMERCIAL

## 2018-04-06 VITALS
WEIGHT: 272 LBS | HEIGHT: 73 IN | BODY MASS INDEX: 36.05 KG/M2 | HEART RATE: 50 BPM | OXYGEN SATURATION: 95 % | RESPIRATION RATE: 18 BRPM | SYSTOLIC BLOOD PRESSURE: 130 MMHG | DIASTOLIC BLOOD PRESSURE: 67 MMHG

## 2018-04-06 DIAGNOSIS — G47.33 OSA (OBSTRUCTIVE SLEEP APNEA): ICD-10-CM

## 2018-04-06 DIAGNOSIS — G47.31 CENTRAL SLEEP APNEA: ICD-10-CM

## 2018-04-06 PROCEDURE — 99213 OFFICE O/P EST LOW 20 MIN: CPT | Performed by: INTERNAL MEDICINE

## 2018-04-06 NOTE — MR AVS SNAPSHOT
After Visit Summary   4/6/2018    Jt Montgomery    MRN: [id number]           Patient Information     Date Of Birth          1945        Visit Information        Provider Department      4/6/2018 1:00 PM Edvin Alanis MD [facility]        Today's Diagnoses     TOMMY (obstructive sleep apnea)        Central sleep apnea          Care Instructions      Your BMI is Body mass index is 35.89 kg/(m^2).  Weight management is a personal decision.  If you are interested in exploring weight loss strategies, the following discussion covers the approaches that may be successful. Body mass index (BMI) is one way to tell whether you are at a healthy weight, overweight, or obese. It measures your weight in relation to your height.  A BMI of 18.5 to 24.9 is in the healthy range. A person with a BMI of 25 to 29.9 is considered overweight, and someone with a BMI of 30 or greater is considered obese. More than two-thirds of American adults are considered overweight or obese.  Being overweight or obese increases the risk for further weight gain. Excess weight may lead to heart disease and diabetes.  Creating and following plans for healthy eating and physical activity may help you improve your health.  Weight control is part of healthy lifestyle and includes exercise, emotional health, and healthy eating habits. Careful eating habits lifelong are the mainstay of weight control. Though there are significant health benefits from weight loss, long-term weight loss with diet alone may be very difficult to achieve- studies show long-term success with dietary management in less than 10% of people. Attaining a healthy weight may be especially difficult to achieve in those with severe obesity. In some cases, medications, devices and surgical management might be considered.  What can you do?  If you are overweight or obese and are interested in methods for weight loss, you should discuss  this with your provider.     Consider reducing daily calorie intake by 500 calories.     Keep a food journal.     Avoiding skipping meals, consider cutting portions instead.    Diet combined with exercise helps maintain muscle while optimizing fat loss. Strength training is particularly important for building and maintaining muscle mass. Exercise helps reduce stress, increase energy, and improves fitness. Increasing exercise without diet control, however, may not burn enough calories to loose weight.       Start walking three days a week 10-20 minutes at a time    Work towards walking thirty minutes five days a week     Eventually, increase the speed of your walking for 1-2 minutes at time    In addition, we recommend that you review healthy lifestyles and methods for weight loss available through the National Institutes of Health patient information sites:  http://win.niddk.nih.gov/publications/index.htm    And look into health and wellness programs that may be available through your health insurance provider, employer, local community center, or otilia club.    Weight management plan: Patient was referred to their PCP to discuss a diet and exercise plan.              Follow-ups after your visit        Your next 10 appointments already scheduled     Apr 23, 2018 11:00 AM CDT   Anticoagulation Visit with FR[facility]ERICK ANTICOAGULATION   [facility] ([facility])    [address] [place] [id number] [phone] OPT 2            May 14, 2018 11:20 AM CDT   Anticoagulation Visit with FREDERICK ANTICOAGULATION   [facility] ([facility])    [address] [id number] [phone] OPT 2            May 14, 2018 11:45 AM CDT   Return Visit with Lottie Gomez,    [facility] ([facility])    [address] [place] [id number]  "  [phone] OPT 2              Who to contact     If you have questions or need follow up information about today's clinic visit or your schedule please contact [facility] directly at [phone].  Normal or non-critical lab and imaging results will be communicated to you by MyChart, letter or phone within 4 business days after the clinic has received the results. If you do not hear from us within 7 days, please contact the clinic through MyChart or phone. If you have a critical or abnormal lab result, we will notify you by phone as soon as possible.  Submit refill requests through AdX[employer] or call your pharmacy and they will forward the refill request to us. Please allow 3 business days for your refill to be completed.          Additional Information About Your Visit        UnveilharGameSkinny Information     AdXpose lets you send messages to your doctor, view your test results, renew your prescriptions, schedule appointments and more. To sign up, go to [email or web address] . Click on \"Log in\" on the left side of the screen, which will take you to the Welcome page. Then click on \"Sign up Now\" on the right side of the page.     You will be asked to enter the access code listed below, as well as some personal information. Please follow the directions to create your username and password.     Your access code is: [email or web address]  Expires: 2018  2:40 PM     Your access code will  in 90 days. If you need help or a new code, please call your [facility] clinic or [phone].        Care EveryWhere ID     This is your Care EveryWhere ID. This could be used by other organizations to access your [facility] medical records  [id number]        Your Vitals Were     Pulse Respirations Height Pulse Oximetry BMI (Body Mass Index)       50 18 1.854 m (6' 1\") 95% 35.89 kg/m2        Blood Pressure from Last 3 Encounters:   18 130/67   18 132/67   18 124/74    Weight from Last 3 Encounters: "   04/06/18 123.4 kg (272 lb)   03/02/18 125.6 kg (277 lb)   02/21/18 123.4 kg (272 lb)              We Performed the Following     Sleep Comprehensive DME          Today's Medication Changes          These changes are accurate as of 4/6/18  1:46 PM.  If you have any questions, ask your nurse or doctor.               These medicines have changed or have updated prescriptions.        Dose/Directions    blood glucose monitoring lancets   This may have changed:  additional instructions   Used for:  Uncontrolled diabetes mellitus with complications (H)        Use to test blood sugar 5 times daily or as directed.   Quantity:  1 Box   Refills:  prn       furosemide 20 MG tablet   Commonly known as:  LASIX   This may have changed:  See the new instructions.   Used for:  Dilated cardiomyopathy (H)        TAKE 1 TABLET(20 MG) BY MOUTH DAILY   Quantity:  90 tablet   Refills:  1                Primary Care Provider Office Phone # Fax #    Digna Amanuel Jones [phone] [address] [facility] [address]  [facility] [address]        Equal Access to Services     FAZAL [facility]ON : Hadii nitin ku hadasho Soomaali, waaxda luqadaha, qaybta kaalmada adeegyada, quynh harris . So [facility] [phone].    ATENCIÓN: Si habla español, tiene a jones disposición servicios gratuitos de asistencia lingüística. Ll[facility] [phone].    We comply with applicable federal civil rights laws and [place] laws. We do not discriminate on the basis of race, color, national origin, age, disability, sex, sexual orientation, or gender identity.            Thank you!     Thank you for choosing [facility]  for your care. Our goal is always to provide you with excellent care. Hearing back from our patients is one way we can continue to improve our services. Please take a few minutes to complete the written survey that you may receive in the mail after your visit with us. Thank you!             Your Updated  Medication List - Protect others around you: Learn how to safely use, store and throw away your medicines at www.disposemymeds.org.          This list is accurate as of 4/6/18  1:46 PM.  Always use your most recent med list.                   Brand Name Dispense Instructions for use Diagnosis    aspirin 81 MG EC tablet     90 tablet    Take 1 tablet by mouth daily.    Atrial fibrillation with RVR (H), Dilated cardiomyopathy (H)       blood glucose monitoring lancets     1 Box    Use to test blood sugar 5 times daily or as directed.    Uncontrolled diabetes mellitus with complications (H)       blood glucose monitoring meter device kit     1 kit    Use to test blood sugar 3 times daily or as directed.    Uncontrolled diabetes mellitus with complications (H)       blood glucose monitoring test strip    ACCU-CHEK LUIS PLUS    400 strip    Use to test blood sugar 5 times daily or as directed.    Uncontrolled type 2 diabetes mellitus with complication, with long-term current use of insulin (H)       CoQ10 100 MG Caps      Take 200 mg by mouth daily    Uncontrolled diabetes mellitus with complications (H)       digoxin 125 MCG tablet    LANOXIN    90 tablet    Take 1 tablet (125 mcg) by mouth daily    Atrial fibrillation (H)       diltiazem 300 MG 24 hr capsule    CARTIA XT    90 capsule    Take 1 capsule (300 mg) by mouth daily    Atrial fibrillation (H)       fish oil-omega-3 fatty acids 1000 MG capsule      Take 2 g by mouth every other day        furosemide 20 MG tablet    LASIX    90 tablet    TAKE 1 TABLET(20 MG) BY MOUTH DAILY    Dilated cardiomyopathy (H)       GREEN TEA EXTRACT PO      Take by mouth 2 times daily Doesn't always take regularly        HERBALS      Nerve by Plexis 2 a day,   Healthy Feet and Nerves  By [employer]  3 a day, Heart Food Caps  (cayenna pepper, garlic, hawthorn, onion & karla 1-2 a day)        insulin aspart 100 UNIT/ML injection    NovoLOG FLEXPEN    45 mL    Inject 15 Units  Subcutaneous 3 times daily (with meals)    Type 2 diabetes mellitus with diabetic neuropathy, with long-term current use of insulin (H)       * insulin pen needle 32G X 4 MM    BD GERMÁN U/F    100 each    Use four times daily or as directed.    Uncontrolled diabetes mellitus with complications (H)       * BD GERMÁN U/F 32G X 4 MM   Generic drug:  insulin pen needle     100 each    USE TO TEST 4 TIMES DAILY OR AS DIRECTED    Type 2 diabetes mellitus with diabetic neuropathy, with long-term current use of insulin (H)       irbesartan 300 MG tablet    AVAPRO    90 tablet    Take 1 tablet (300 mg) by mouth At Bedtime    HTN (hypertension)       LANTUS SOLOSTAR 100 UNIT/ML injection   Generic drug:  insulin glargine     30 mL    ADMINISTER 35 UNITS UNDER THE SKIN DAILY    Type 2 diabetes mellitus with diabetic neuropathy, with long-term current use of insulin (H)       melatonin 1 MG Tabs tablet      Take 0.5 mg by mouth nightly as needed for sleep        metoprolol succinate 100 MG 24 hr tablet    TOPROL-XL    180 tablet    Take 1 tablet (100 mg) by mouth 2 times daily    CAD (coronary artery disease)       Multi-vitamin Tabs tablet      Shaklee & Life Extenstion        order for DME     1 each    Equipment being ordered: True Track Meter from [facility]    Uncontrolled diabetes mellitus with complications (H)       predniSONE 20 MG tablet    DELTASONE    10 tablet    Take 1 tablet (20 mg) by mouth daily as needed Gout flare    Chronic gout due to renal impairment of multiple sites without tophus       PROBIOTIC & ACIDOPHILUS EX ST PO      Take 1 capsule by mouth daily        Resveratrol 100 MG Caps      Take 2 capsules by mouth daily Shaklee Vivix        spironolactone 25 MG tablet    ALDACTONE    90 tablet    Take 1 tablet (25 mg) by mouth daily    Atrial fibrillation (H)       ULORIC 40 MG Tabs tablet   Generic drug:  febuxostat      Take 40 mg by mouth daily        UNABLE TO FIND      MEDICATION NAME: Super Beet Juice         warfarin 5 MG tablet    COUMADIN    135 tablet    Take 1 tab on Mondays and take 1 1/2 tabs on all other days or as directed by INR clinic    Long term current use of anticoagulant therapy       zolpidem 5 MG tablet    AMBIEN    1 tablet    Take tablet by mouth 15 minutes prior to sleep, for Sleep Study    Central sleep apnea       * Notice:  This list has 2 medication(s) that are the same as other medications prescribed for you. Read the directions carefully, and ask your doctor or other care provider to review them with you.

## 2018-04-06 NOTE — PROGRESS NOTES
"    Chief Complaint   Patient presents with     Study Results       Jt Montgomery is a 72 year old male who returns to [facility] for review of sleep testing results. He presented with symptoms suggestive of obstructive sleep apnea.    Estimated body mass index is 35.89 kg/(m^2) as calculated from the following:    Height as of this encounter: 1.854 m (6' 1\").    Weight as of this encounter: 123.4 kg (272 lb).  Total score - [place]: 5 (4/6/2018 12:54 PM)    Polysomnography - Test date 3/23/2018    Sleep latency 143.5 minutes with Zolpidem.  REM not achieved.  Sleep efficiency 33.3%. Total sleep time 143.5 minutes.    Sleep architecture:  Stage 1, 90.9% (5%), stage 2, 9.1% (45-55%), stage 3, 0% (15-20%), stage REM, 0% (20-25%).  AHI was 80.7, with desaturations. RDI 84.  Periodic Limb Movement Index 10.5/hour.       Jt Montgomery reports that he slept Good .     Results were reviewed in detail today with Jt and a copy given to him for his records.    Reviewed by team: Allergies  Meds       Reviewed by provider:          Problem List:  Patient Active Problem List    Diagnosis Date Noted     Anemia, unspecified type 01/12/2017     Priority: Medium     Long-term (current) use of anticoagulants [Z79.01] 06/14/2016     Priority: Medium     Mixed hyperlipidemia due to type 2 diabetes mellitus (H) 12/07/2015     Priority: Medium     Pulmonary hypertension      Priority: Medium     mild per echo 3/2013       Morbid obesity (H) 10/25/2015     Priority: Medium     CKD (chronic kidney disease) stage 3, GFR 30-59 ml/min      Priority: Medium     Atrial fibrillation (AFIB) on Coumadin 09/21/2014     Priority: Medium     CAD (coronary artery disease)      Priority: Medium     04/15/2011: RONEL to CFX       Gout 08/19/2014     Priority: Medium     Problem list name updated by automated process. Provider to review       Type 2 diabetes mellitus with diabetic neuropathy; goal HgbA1c < 7% " "08/19/2014     Priority: Medium     Advanced directives, counseling/discussion 09/10/2012     Priority: Medium     Pt given Honoring Choices forms w explanation.  Pt indicates intent to review and complete at home.  9-10-12.       S/p angioplasty with stent w/ RONEL in distal RCA 05/07/2011     Priority: Medium     Dilated cardiomyopathy, nonischemic EF 30-40% in March 2013      Priority: Medium     TOMMY (obstructive sleep apnea) 09/14/2010     Priority: Medium     Central Sleep Apnea with Cheyne Villanueva breathing 09/14/2010     Priority: Medium     Recovery of EF to 50-55%  3/23/2018 [facility] Sleep Study (277.0 lbs) - AHI 80.7, RDI 84.0, Supine AHI 92.2, REM AHI -, Low O2 83.4%, Time Spent ?88% 15.0 minutes  Testing does not show Cheyne-Villanueva anymore.       HTN (hypertension); goal <140/90 08/05/2010     Priority: Medium     Alcohol dependence in remission (H) 08/05/2010     Priority: Medium     (Problem list name updated by automated process. Provider to review and confirm.)          /67  Pulse 50  Resp 18  Ht 1.854 m (6' 1\")  Wt 123.4 kg (272 lb)  SpO2 95%  BMI 35.89 kg/m2    Impression/Plan:  1. TOMMY (obstructive sleep apnea)  2. Central sleep apnea  Polysomnography showed significant sleep fragmentation and confirmed presence of Obstructive Sleep Apnea.    Discussed switching to CPAP, replacing Adaptive Servo-Ventilation, or considering titration Polysomnography.   Severe Obstructive Sleep Apnea.   Sleep associated hypoxemia was present.     Elected treatment with auto-CPAP at 11-15 cmH2O.    He will follow up with me in about 2 month(s).     Fifteen minutes spent with patient, all of which were spent face-to-face counseling, consulting, coordinating plan of care.      Edvin Alanis MD    CC:  Digna Jones, (only for reference, does not automatically route).  "

## 2018-04-06 NOTE — PATIENT INSTRUCTIONS

## 2018-04-06 NOTE — NURSING NOTE
"Chief Complaint   Patient presents with     Study Results       Initial /67  Pulse 50  Resp 18  Ht 1.854 m (6' 1\")  Wt 123.4 kg (272 lb)  SpO2 95%  BMI 35.89 kg/m2 Estimated body mass index is 35.89 kg/(m^2) as calculated from the following:    Height as of this encounter: 1.854 m (6' 1\").    Weight as of this encounter: 123.4 kg (272 lb).  Medication Reconciliation: complete     ESS 5    [facility] [place]  [facility] - Specialist      "

## 2018-04-08 DIAGNOSIS — I48.91 ATRIAL FIBRILLATION (H): ICD-10-CM

## 2018-04-08 RX ORDER — DILTIAZEM HYDROCHLORIDE 300 MG/1
300 CAPSULE, COATED, EXTENDED RELEASE ORAL DAILY
Qty: 90 CAPSULE | Refills: 0 | Status: SHIPPED | OUTPATIENT
Start: 2018-04-08 | End: 2018-07-02

## 2018-04-23 ENCOUNTER — TELEPHONE (OUTPATIENT)
Dept: SLEEP MEDICINE | Facility: CLINIC | Age: 73
End: 2018-04-23

## 2018-04-23 ENCOUNTER — ANTICOAGULATION THERAPY VISIT (OUTPATIENT)
Dept: CARDIOLOGY | Facility: CLINIC | Age: 73
End: 2018-04-23
Payer: COMMERCIAL

## 2018-04-23 DIAGNOSIS — Z79.01 LONG-TERM (CURRENT) USE OF ANTICOAGULANTS: ICD-10-CM

## 2018-04-23 DIAGNOSIS — I48.91 ATRIAL FIBRILLATION, UNSPECIFIED TYPE (H): ICD-10-CM

## 2018-04-23 DIAGNOSIS — G47.33 OSA (OBSTRUCTIVE SLEEP APNEA): ICD-10-CM

## 2018-04-23 DIAGNOSIS — G47.31 CENTRAL SLEEP APNEA: ICD-10-CM

## 2018-04-23 LAB — INR POINT OF CARE: 2.6 (ref 0.86–1.14)

## 2018-04-23 PROCEDURE — 36416 COLLJ CAPILLARY BLOOD SPEC: CPT | Performed by: INTERNAL MEDICINE

## 2018-04-23 PROCEDURE — 85610 PROTHROMBIN TIME: CPT | Mod: QW | Performed by: INTERNAL MEDICINE

## 2018-04-23 NOTE — TELEPHONE ENCOUNTER
Patient called into [facility] to schedule appointment to get setup on PAP. Scheduled for tomorrow at 3 pm at our [place] [facility] location with Gonzalez. Patient stated that he wanted a simple machine, I suggested Resmed as there are not many menus to scroll through.

## 2018-04-23 NOTE — MR AVS SNAPSHOT
Jt Patrick Montgomery   4/23/2018 11:00 AM   Anticoagulation Therapy Visit    Description:  72 year old male   Provider:  [facility]ATION   Department:  [facility]           INR as of 4/23/2018     Today's INR 2.6      Anticoagulation Summary as of 4/23/2018     INR goal 2.0-3.0   Today's INR 2.6   Full instructions 5 mg on Mon, Thu; 7.5 mg all other days   Next INR check 5/14/2018    Indications   Long-term (current) use of anticoagulants [Z79.01] [Z79.01]  Atrial fibrillation (AFIB) on Coumadin [I48.91]         Your next Anticoagulation Clinic appointment(s)     May 14, 2018 11:20 AM CDT   Anticoagulation Visit with  ANTICOAGULATION   [facility] ([facility])    [address] [place] [phone]   [phone] OPT 2              Contact Numbers     Anticoagulant (INR) Clinic Number: [phone]          April 2018 Details    Sun Mon Tue Wed Thu Fri Sat     1               2               3               4               5               6               7                 8               9               10               11               12               13               14                 15               16               17               18               19               20               21                 22               23      5 mg   See details      24      7.5 mg         25      7.5 mg         26      5 mg         27      7.5 mg         28      7.5 mg           29      7.5 mg         30      5 mg               Date Details   04/23 This INR check               How to take your warfarin dose     To take:  5 mg Take 1 of the 5 mg tablets.    To take:  7.5 mg Take 1.5 of the 5 mg tablets.           May 2018 Details    Sun Mon Tue Wed Thu Fri Sat       1      7.5 mg         2      7.5 mg         3      5 mg         4      7.5 mg         5      7.5 mg           6      7.5 mg         7      5 mg         8      7.5 mg         9       7.5 mg         10      5 mg         11      7.5 mg         12      7.5 mg           13      7.5 mg         14            15               16               17               18               19                 20               21               22               23               24               25               26                 27               28               29               30               31                  Date Details   No additional details    Date of next INR:  5/14/2018         How to take your warfarin dose     To take:  5 mg Take 1 of the 5 mg tablets.    To take:  7.5 mg Take 1.5 of the 5 mg tablets.

## 2018-04-23 NOTE — PROGRESS NOTES
ANTICOAGULATION FOLLOW-UP CLINIC VISIT    Patient Name:  Jt Montgomery  Date:  4/23/2018  Contact Type:  Face to Face    SUBJECTIVE:     Patient Findings     Positives No Problem Findings           OBJECTIVE    INR Protime   Date Value Ref Range Status   04/23/2018 2.6 (A) 0.86 - 1.14 Final       ASSESSMENT / PLAN  INR assessment THER    Recheck INR In: 3 WEEKS    INR Location Clinic      Anticoagulation Summary as of 4/23/2018     INR goal 2.0-3.0   Today's INR 2.6   Maintenance plan 5 mg (5 mg x 1) on Mon, Thu; 7.5 mg (5 mg x 1.5) all other days   Full instructions 5 mg on Mon, Thu; 7.5 mg all other days   Weekly total 47.5 mg   No change documented Marlena Hong RN   Plan last modified Marlena Hong RN (3/19/2018)   Next INR check 5/14/2018   Target end date Indefinite    Indications   Long-term (current) use of anticoagulants [Z79.01] [Z79.01]  Atrial fibrillation (AFIB) on Coumadin [I48.91]         Anticoagulation Episode Summary     INR check location     Preferred lab     Send INR reminders to [facility] HEART INR NURSE    Comments       Anticoagulation Care Providers     Provider Role Specialty Phone number    Dangelomik Lottie Perla DO [facility] Cardiology [phone]            See the Encounter Report to view Anticoagulation Flowsheet and Dosing Calendar (Go to Encounters tab in chart review, and find the Anticoagulation Therapy Visit)    INR 2.6 No change in meds or diet. No abnormal bleeding or bruising. Will continue current dosing of 5 mg MTh and 7.5 mg all other days with recheck in 3 weeks before his OV with Dr Gomez.    Marlena Hong, RN

## 2018-04-26 ENCOUNTER — DOCUMENTATION ONLY (OUTPATIENT)
Dept: SLEEP MEDICINE | Facility: CLINIC | Age: 73
End: 2018-04-26

## 2018-04-26 NOTE — PROGRESS NOTES
Patient was offered choice of [facility] and [facility]  Patient Jt Montgomery was set up at [place] on April 26, 2018. Patient received a Resmed AirSense 10 Auto. Pressures were set at 11-15 cm H2O.   Patient s ramp is 7 cm H2O for Off and FLEX/EPR is 2.  Patient received a [employer] Mask name: Simplus  Full Face mask Size Medium, heated tubing and heated humidifier.  Patient is enrolled in the [facility] Program and does need to meet compliance. Patient has a follow up on 6/14/18 with Dr. Alanis.    Amanuel Camacho

## 2018-04-30 ENCOUNTER — DOCUMENTATION ONLY (OUTPATIENT)
Dept: SLEEP MEDICINE | Facility: CLINIC | Age: 73
End: 2018-04-30

## 2018-04-30 DIAGNOSIS — G47.33 OSA (OBSTRUCTIVE SLEEP APNEA): ICD-10-CM

## 2018-04-30 DIAGNOSIS — G47.31 CENTRAL SLEEP APNEA: ICD-10-CM

## 2018-04-30 NOTE — PROGRESS NOTES
3 DAY STM VISIT    Diagnostic AHI: 80.7       Patient contacted for 3 day STM visit  Data only recheck.  Voicemail box is full.      Device type: Auto-CPAP  PAP settings from order::  CPAP min 11 cm  H20       CPAP max 15 cm  H20         Device settings from machine      Min CPAP 11.0            Max CPAP 15.0            Assessment: one night of usage reporting short usage time.   Action plan: Pt to have f/u 14 day STM visit.

## 2018-05-01 DIAGNOSIS — Z79.01 LONG TERM CURRENT USE OF ANTICOAGULANT THERAPY: ICD-10-CM

## 2018-05-01 DIAGNOSIS — I48.91 ATRIAL FIBRILLATION (H): Primary | ICD-10-CM

## 2018-05-01 RX ORDER — WARFARIN SODIUM 5 MG/1
TABLET ORAL
Qty: 145 TABLET | Refills: 1 | Status: SHIPPED | OUTPATIENT
Start: 2018-05-01 | End: 2018-11-26

## 2018-05-02 DIAGNOSIS — Z79.4 TYPE 2 DIABETES MELLITUS WITH DIABETIC NEUROPATHY, WITH LONG-TERM CURRENT USE OF INSULIN (H): Chronic | ICD-10-CM

## 2018-05-02 DIAGNOSIS — E11.40 TYPE 2 DIABETES MELLITUS WITH DIABETIC NEUROPATHY, WITH LONG-TERM CURRENT USE OF INSULIN (H): Chronic | ICD-10-CM

## 2018-05-02 NOTE — TELEPHONE ENCOUNTER
Per OV 2/9/18 Dr Jones:  (E11.40,  Z79.4) Type 2 diabetes mellitus with diabetic neuropathy, with long-term current use of insulin (H)  (primary encounter diagnosis)  Comment: chronic Type 2 Diabetes with recent significant improvement in diabetes control. Hgb A1c has improved also.  Plan: I have ordered Hemoglobin A1c, which has improved from prior baseline. I have reduced the patient's insulin glargine (LANTUS SOLOSTAR) 100 UNIT/ML injection medication dose from 55 to 35 units SQ daily given the improvement in his diabetes control and reduction in insulin dosage requirements. I have also refilled the patient's Novolog meal time insulin medication today.      Medication dose was changed in chart, however was left historical, not sent to pharmacy.  Patient now needing refill of Lantus Solostar.  Pended.    Pending Prescriptions:                       Disp   Refills    insulin glargine (LANTUS SOLOSTAR) 100 UN*30 mL  11           Sig: ADMINISTER 35 UNITS UNDER THE SKIN DAILY        Last Written Prescription Date:  -  Last Fill Quantity: -,   # refills: -  Last Office Visit: 2-9-18 Karen  Future Office visit:    Next 5 appointments (look out 90 days)     May 14, 2018 11:45 AM CDT   Return Visit with Lottie Gomez DO   [facility] ([facility])    [address] [place] [id number] [phone] OPT 2                   Routing refill request to provider for review/approval because:  Medication is reported/historical        RT Radha (R)

## 2018-05-08 ENCOUNTER — PRE VISIT (OUTPATIENT)
Dept: CARDIOLOGY | Facility: CLINIC | Age: 73
End: 2018-05-08

## 2018-05-08 DIAGNOSIS — R06.02 SOB (SHORTNESS OF BREATH): Primary | ICD-10-CM

## 2018-05-08 DIAGNOSIS — I25.10 CAD (CORONARY ARTERY DISEASE): ICD-10-CM

## 2018-05-08 NOTE — PROGRESS NOTES
Chart prep. Patient was to have scheduled nuc exercise stress test after OV with Dr. Gomez 2016. This was never scheduled. Order is , entered new order and messaged scheduling to contact pt to get setup prior to upcoming OV on 18.

## 2018-05-11 ENCOUNTER — DOCUMENTATION ONLY (OUTPATIENT)
Dept: SLEEP MEDICINE | Facility: CLINIC | Age: 73
End: 2018-05-11

## 2018-05-11 DIAGNOSIS — G47.33 OSA (OBSTRUCTIVE SLEEP APNEA): ICD-10-CM

## 2018-05-11 DIAGNOSIS — G47.31 CENTRAL SLEEP APNEA: ICD-10-CM

## 2018-05-11 NOTE — PROGRESS NOTES
14 DAY STM VISIT    Diagnostic AHI: 80.7     Unable to leave message patient to return call due to voicemail being full    Assessment: Pt not meeting objective benchmarks for AHI    Action plan: pt to have 30 day STM visit.    Device type: Auto-CPAP  PAP settings:  CPAP min 11 cm  H20      CPAP max 15 cm  H20     95th% pressure 14.4 cm   Objective measures: 14 day rolling measures      Compliance  85 %      Leak  11.31 lpm  last  upload      AHI 10.43   last  Upload      Average number of minutes 615     Average hours of usage 10.3      Objective measure goal  Compliance   Goal >70%  Leak   Goal < 24 lpm  AHI  Goal < 5  Usage  Goal >240

## 2018-05-14 ENCOUNTER — OFFICE VISIT (OUTPATIENT)
Dept: CARDIOLOGY | Facility: CLINIC | Age: 73
End: 2018-05-14

## 2018-05-14 ENCOUNTER — ANTICOAGULATION THERAPY VISIT (OUTPATIENT)
Dept: CARDIOLOGY | Facility: CLINIC | Age: 73
End: 2018-05-14
Payer: COMMERCIAL

## 2018-05-14 VITALS
HEART RATE: 72 BPM | HEIGHT: 73 IN | SYSTOLIC BLOOD PRESSURE: 138 MMHG | BODY MASS INDEX: 36.26 KG/M2 | DIASTOLIC BLOOD PRESSURE: 72 MMHG | WEIGHT: 273.6 LBS

## 2018-05-14 DIAGNOSIS — I48.91 ATRIAL FIBRILLATION, UNSPECIFIED TYPE (H): ICD-10-CM

## 2018-05-14 DIAGNOSIS — I48.91 ATRIAL FIBRILLATION, UNSPECIFIED TYPE (H): Chronic | ICD-10-CM

## 2018-05-14 DIAGNOSIS — E78.2 MIXED HYPERLIPIDEMIA DUE TO TYPE 2 DIABETES MELLITUS (H): ICD-10-CM

## 2018-05-14 DIAGNOSIS — I25.10 CORONARY ARTERY DISEASE INVOLVING NATIVE CORONARY ARTERY OF NATIVE HEART WITHOUT ANGINA PECTORIS: Chronic | ICD-10-CM

## 2018-05-14 DIAGNOSIS — I10 ESSENTIAL HYPERTENSION: Chronic | ICD-10-CM

## 2018-05-14 DIAGNOSIS — Z79.01 LONG-TERM (CURRENT) USE OF ANTICOAGULANTS: ICD-10-CM

## 2018-05-14 DIAGNOSIS — I27.20 PULMONARY HYPERTENSION (H): ICD-10-CM

## 2018-05-14 DIAGNOSIS — I42.0 DILATED CARDIOMYOPATHY (H): Primary | Chronic | ICD-10-CM

## 2018-05-14 DIAGNOSIS — E11.69 MIXED HYPERLIPIDEMIA DUE TO TYPE 2 DIABETES MELLITUS (H): ICD-10-CM

## 2018-05-14 LAB — INR POINT OF CARE: 2.4 (ref 0.86–1.14)

## 2018-05-14 PROCEDURE — 85610 PROTHROMBIN TIME: CPT | Mod: QW | Performed by: INTERNAL MEDICINE

## 2018-05-14 PROCEDURE — 36416 COLLJ CAPILLARY BLOOD SPEC: CPT | Performed by: INTERNAL MEDICINE

## 2018-05-14 PROCEDURE — 99214 OFFICE O/P EST MOD 30 MIN: CPT | Performed by: INTERNAL MEDICINE

## 2018-05-14 NOTE — MR AVS SNAPSHOT
After Visit Summary   5/14/2018    Jt Montgomery    MRN: [id number]           Patient Information     Date Of Birth          1945        Visit Information        Provider Department      5/14/2018 11:45 AM Lottie Gomez DO [facility]        Today's Diagnoses     Dilated cardiomyopathy, nonischemic EF 30-40% in March 2013    -  1    Essential hypertension        Coronary artery disease involving native coronary artery of native heart without angina pectoris        Atrial fibrillation, unspecified type (H)        Mixed hyperlipidemia due to type 2 diabetes mellitus (H)        Pulmonary hypertension           Follow-ups after your visit        Your next 10 appointments already scheduled     May 29, 2018 10:20 AM CDT   Anticoagulation Visit with [facility] ANTICOAGULATION   [facility] ([facility])    [address]  [place] [id number] [phone] OPT 2            Jun 14, 2018  3:00 PM CDT   Return Sleep Patient with Edvin Alnais MD   [facility] ([facility])    [address]  [place] [id number] [phone]              Who to contact     If you have questions or need follow up information about today's clinic visit or your schedule please contact [facility] directly at [phone].  Normal or non-critical lab and imaging results will be communicated to you by MyChart, letter or phone within 4 business days after the clinic has received the results. If you do not hear from us within 7 days, please contact the clinic through MyChart or phone. If you have a critical or abnormal lab result, we will notify you by phone as soon as possible.  Submit refill requests through [employer] or call your pharmacy and they will forward the refill request to us. Please allow 3 business  "days for your refill to be completed.          Additional Information About Your Visit        [employer] Information     Cover lets you send messages to your doctor, view your test results, renew your prescriptions, schedule appointments and more. To sign up, go to [email or web address] . Click on \"Log in\" on the left side of the screen, which will take you to the Welcome page. Then click on \"Sign up Now\" on the right side of the page.     You will be asked to enter the access code listed below, as well as some personal information. Please follow the directions to create your username and password.     Your access code is: [email or web address]  Expires: 2018 12:04 PM     Your access code will  in 90 days. If you need help or a new code, please call your [facility] clinic or [phone].        Care EveryWhere ID     This is your Care EveryWhere ID. This could be used by other organizations to access your [facility] medical records  [id number]        Your Vitals Were     Pulse Height BMI (Body Mass Index)             72 1.854 m (6' 1\") 36.1 kg/m2          Blood Pressure from Last 3 Encounters:   18 138/72   18 130/67   18 132/67    Weight from Last 3 Encounters:   18 124.1 kg (273 lb 9.6 oz)   18 123.4 kg (272 lb)   18 125.6 kg (277 lb)              Today, you had the following     No orders found for display         Today's Medication Changes          These changes are accurate as of 18 12:04 PM.  If you have any questions, ask your nurse or doctor.               These medicines have changed or have updated prescriptions.        Dose/Directions    blood glucose monitoring lancets   This may have changed:  additional instructions   Used for:  Uncontrolled diabetes mellitus with complications (H)        Use to test blood sugar 5 times daily or as directed.   Quantity:  1 Box   Refills:  prn       furosemide 20 MG tablet   Commonly known as:  LASIX   This may have changed:  " See the new instructions.   Used for:  Dilated cardiomyopathy (H)        TAKE 1 TABLET(20 MG) BY MOUTH DAILY   Quantity:  90 tablet   Refills:  1                Primary Care Provider Office Phone # Fax #    Digna Amanuel Jones [phone] [address] [facility]  [facility] [address]        Equal Access to Services     [facility] Center[facility] : Hadii aad ku hadasho Soomaali, waaxda luqadaha, qaybta kaalmada adeegyada, waxay idiin hayaan adeeg kharash la'aan . So [facility] [phone].    ATENCIÓN: Si habla español, tiene a jones disposición servicios gratuitos de asistencia lingüística. Llame al [phone].    We comply with applicable federal civil rights laws and [place] laws. We do not discriminate on the basis of race, color, national origin, age, disability, sex, sexual orientation, or gender identity.            Thank you!     Thank you for choosing [facility]  for your care. Our goal is always to provide you with excellent care. Hearing back from our patients is one way we can continue to improve our services. Please take a few minutes to complete the written survey that you may receive in the mail after your visit with us. Thank you!             Your Updated Medication List - Protect others around you: Learn how to safely use, store and throw away your medicines at www.disposemymeds.org.          This list is accurate as of 5/14/18 12:04 PM.  Always use your most recent med list.                   Brand Name Dispense Instructions for use Diagnosis    aspirin 81 MG EC tablet     90 tablet    Take 1 tablet by mouth daily.    Atrial fibrillation with RVR (H), Dilated cardiomyopathy (H)       blood glucose monitoring lancets     1 Box    Use to test blood sugar 5 times daily or as directed.    Uncontrolled diabetes mellitus with complications (H)       blood glucose monitoring meter device kit     1 kit    Use to test blood sugar 3 times daily or as directed.     Uncontrolled diabetes mellitus with complications (H)       blood glucose monitoring test strip    ACCU-CHEK LUIS PLUS    400 strip    Use to test blood sugar 5 times daily or as directed.    Uncontrolled type 2 diabetes mellitus with complication, with long-term current use of insulin (H)       CoQ10 100 MG Caps      Take 200 mg by mouth daily    Uncontrolled diabetes mellitus with complications (H)       digoxin 125 MCG tablet    LANOXIN    90 tablet    Take 1 tablet (125 mcg) by mouth daily    Atrial fibrillation (H)       diltiazem 300 MG 24 hr capsule    CARTIA XT    90 capsule    Take 1 capsule (300 mg) by mouth daily    Atrial fibrillation (H)       fish oil-omega-3 fatty acids 1000 MG capsule      Take 2 g by mouth every other day        furosemide 20 MG tablet    LASIX    90 tablet    TAKE 1 TABLET(20 MG) BY MOUTH DAILY    Dilated cardiomyopathy (H)       GREEN TEA EXTRACT PO      Take by mouth 2 times daily Doesn't always take regularly        HERBALS      Nerve by Plexis 2 a day,   Healthy Feet and Nerves  By [employer]  3 a day, Heart Food Caps  (cayenna pepper, garlic, hawthorn, onion & karla 1-2 a day)        insulin aspart 100 UNIT/ML injection    NovoLOG FLEXPEN    45 mL    Inject 15 Units Subcutaneous 3 times daily (with meals)    Type 2 diabetes mellitus with diabetic neuropathy, with long-term current use of insulin (H)       insulin glargine 100 UNIT/ML injection    LANTUS SOLOSTAR    30 mL    ADMINISTER 35 UNITS UNDER THE SKIN DAILY    Type 2 diabetes mellitus with diabetic neuropathy, with long-term current use of insulin (H)       * insulin pen needle 32G X 4 MM    BD GERMÁN U/F    100 each    Use four times daily or as directed.    Uncontrolled diabetes mellitus with complications (H)       * BD GERMÁN U/F 32G X 4 MM   Generic drug:  insulin pen needle     100 each    USE TO TEST 4 TIMES DAILY OR AS DIRECTED    Type 2 diabetes mellitus with diabetic neuropathy, with long-term current use of  insulin (H)       irbesartan 300 MG tablet    AVAPRO    90 tablet    Take 1 tablet (300 mg) by mouth At Bedtime    HTN (hypertension)       Magnesium 125 MG Caps      Take by mouth daily        melatonin 1 MG Tabs tablet      Take 0.5 mg by mouth nightly as needed for sleep        metoprolol succinate 100 MG 24 hr tablet    TOPROL-XL    180 tablet    Take 1 tablet (100 mg) by mouth 2 times daily    CAD (coronary artery disease)       Multi-vitamin Tabs tablet      Shaklee & Life Extenstion        order for DME     1 each    Equipment being ordered: True Track Meter from [facility]    Uncontrolled diabetes mellitus with complications (H)       predniSONE 20 MG tablet    DELTASONE    10 tablet    Take 1 tablet (20 mg) by mouth daily as needed Gout flare    Chronic gout due to renal impairment of multiple sites without tophus       PROBIOTIC & ACIDOPHILUS EX ST PO      Take 1 capsule by mouth daily        Resveratrol 100 MG Caps      Take 2 capsules by mouth daily Shaklee Vivix        spironolactone 25 MG tablet    ALDACTONE    90 tablet    Take 1 tablet (25 mg) by mouth daily    Atrial fibrillation (H)       ULORIC 40 MG Tabs tablet   Generic drug:  febuxostat      Take 40 mg by mouth daily        UNABLE TO FIND      MEDICATION NAME: Super Beet Juice        warfarin 5 MG tablet    COUMADIN    145 tablet    Take 1 tab on Mondays and Thursdays and take 1 1/2 tabs on all other days or as directed by INR clinic    Long term current use of anticoagulant therapy, Atrial fibrillation (H)       zolpidem 5 MG tablet    AMBIEN    1 tablet    Take tablet by mouth 15 minutes prior to sleep, for Sleep Study    Central sleep apnea       * Notice:  This list has 2 medication(s) that are the same as other medications prescribed for you. Read the directions carefully, and ask your doctor or other care provider to review them with you.

## 2018-05-14 NOTE — PROGRESS NOTES
HPI and Plan:   See dictation    No orders of the defined types were placed in this encounter.      Orders Placed This Encounter   Medications     Magnesium 125 MG CAPS     Sig: Take by mouth daily       There are no discontinued medications.      Encounter Diagnoses   Name Primary?     Dilated cardiomyopathy, nonischemic EF 30-40% in March 2013 Yes     Essential hypertension      Coronary artery disease involving native coronary artery of native heart without angina pectoris      Atrial fibrillation, unspecified type (H)      Mixed hyperlipidemia due to type 2 diabetes mellitus (H)      Pulmonary hypertension        CURRENT MEDICATIONS:  Current Outpatient Prescriptions   Medication Sig Dispense Refill     aspirin EC 81 MG EC tablet Take 1 tablet by mouth daily. 90 tablet 1     BD GERMÁN U/F 32G X 4 MM insulin pen needle USE TO TEST 4 TIMES DAILY OR AS DIRECTED 100 each 3     blood glucose monitoring (ACCU-CHEK LUIS PLUS) meter device kit Use to test blood sugar 3 times daily or as directed. 1 kit 0     blood glucose monitoring (ACCU-CHEK LUIS PLUS) test strip Use to test blood sugar 5 times daily or as directed. 400 strip 1     blood glucose monitoring (SOFTCLIX) lancets Use to test blood sugar 5 times daily or as directed. (Patient taking differently: Use to test blood sugar 2 times daily or as directed.) 1 Box prn     Coenzyme Q10 (COQ10) 100 MG CAPS Take 200 mg by mouth daily        diltiazem (CARTIA XT) 300 MG 24 hr capsule Take 1 capsule (300 mg) by mouth daily 90 capsule 0     febuxostat (ULORIC) 40 MG TABS Take 40 mg by mouth daily       fish oil-omega-3 fatty acids 1000 MG capsule Take 2 g by mouth every other day       furosemide (LASIX) 20 MG tablet TAKE 1 TABLET(20 MG) BY MOUTH DAILY (Patient taking differently: 1  tab three times a WEEK  prn) 90 tablet 1     Green Tea, Camillia sinensis, (GREEN TEA EXTRACT PO) Take by mouth 2 times daily Doesn't always take regularly       HERBALS Nerve by Plexis 2 a  day,   Healthy Feet and Nerves  By [employer]  3 a day, Heart Food Caps  (cayenna pepper, garlic, hawthorn, onion & ginger 1-2 a day)       insulin aspart (NOVOLOG FLEXPEN) 100 UNIT/ML injection Inject 15 Units Subcutaneous 3 times daily (with meals) 45 mL 1     insulin glargine (LANTUS SOLOSTAR) 100 UNIT/ML pen ADMINISTER 35 UNITS UNDER THE SKIN DAILY 30 mL 11     insulin pen needle (BD GERMÁN U/F) 32G X 4 MM Use four times daily or as directed. 100 each 3     irbesartan (AVAPRO) 300 MG tablet Take 1 tablet (300 mg) by mouth At Bedtime 90 tablet 2     Magnesium 125 MG CAPS Take by mouth daily       melatonin 1 MG TABS tablet Take 0.5 mg by mouth nightly as needed for sleep       metoprolol succinate (TOPROL-XL) 100 MG 24 hr tablet Take 1 tablet (100 mg) by mouth 2 times daily 180 tablet 0     multivitamin, therapeutic with minerals (MULTI-VITAMIN) TABS tablet Shaklee & Life Extenstion       ORDER FOR DME Equipment being ordered: True Track Meter from [employer] 1 each 0     Probiotic Product (PROBIOTIC & ACIDOPHILUS EX ST PO) Take 1 capsule by mouth daily       Resveratrol 100 MG CAPS Take 2 capsules by mouth daily Shaklee Vivix       spironolactone (ALDACTONE) 25 MG tablet Take 1 tablet (25 mg) by mouth daily 90 tablet 0     UNABLE TO FIND MEDICATION NAME: Super Beet Juice       warfarin (COUMADIN) 5 MG tablet Take 1 tab on Mondays and Thursdays and take 1 1/2 tabs on all other days or as directed by INR clinic 145 tablet 1     digoxin (LANOXIN) 125 MCG tablet Take 1 tablet (125 mcg) by mouth daily 90 tablet 0     predniSONE (DELTASONE) 20 MG tablet Take 1 tablet (20 mg) by mouth daily as needed Gout flare (Patient not taking: Reported on 5/14/2018) 10 tablet 0     zolpidem (AMBIEN) 5 MG tablet Take tablet by mouth 15 minutes prior to sleep, for Sleep Study (Patient not taking: Reported on 5/14/2018) 1 tablet 0       ALLERGIES     Allergies   Allergen Reactions     Corn Dextrin      All corn products - gets tired an  grace       PAST MEDICAL HISTORY:  Past Medical History:   Diagnosis Date     Atrial fibrillation (H)      CAD (coronary artery disease)     MI with RONEL to dRCA April 2011     Central Sleep Apnea with Pat Villanueva breathing 9/14/2010    Also TOMMY with CPAP     CKD (chronic kidney disease) stage 3, GFR 30-59 ml/min      Dilated cardiomyopathy (H)     nonischemic (possibly related to h/o a.fib with RVR) EF 30-40% March 2013     DM2 (diabetes mellitus, type 2) (H)     Goal HgbA1c < 7%     Gout     uric acid level correlates; April 2014 h/o +knee aspirate     Hypertension     Goal <140/90     Pulmonary hypertension     mild per echo 3/2013       PAST SURGICAL HISTORY:  Past Surgical History:   Procedure Laterality Date     CORONARY ANGIOGRAPHY ADULT ORDER  2011    distal circ-RONEL     HERNIA REPAIR  11/20/10    incarcinated     SUSPEND HYOID, GENIOGLOSSAL ADVANCEMENT         FAMILY HISTORY:  Family History   Problem Relation Age of Onset     Hypertension Mother      Coronary Artery Disease Mother      Coronary Artery Disease Father      Hypertension Father      DIABETES Sister      CEREBROVASCULAR DISEASE Sister        SOCIAL HISTORY:  Social History     Social History     Marital status:      Spouse name: N/A     Number of children: N/A     Years of education: N/A     Social History Main Topics     Smoking status: Former Smoker     Packs/day: 1.50     Years: 7.00     Types: Cigarettes     Quit date: 1/1/1972     Smokeless tobacco: Never Used      Comment: quit in 1971      Started at around age 18-19     Alcohol use No      Comment: 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   recovering     Drug use: No     Sexual activity: Not Currently     Other Topics Concern     Caffeine Concern No     Sleep Concern Yes     sleep apnea, wears bi-pap     Special Diet No     low carb diet     Exercise No     walking     Seat Belt Yes     Social History Narrative       Review of Systems:  Skin:  Negative       Eyes:  Positive for glasses    ENT:  Negative    "   Respiratory:  Positive for dyspnea on exertion;sleep apnea;CPAP     Cardiovascular:    dizziness;Positive for;edema occ  Gastroenterology: Negative      Genitourinary:  Negative      Musculoskeletal:  Positive for joint stiffness    Neurologic:  Positive for numbness or tingling of feet    Psychiatric:  Negative      Heme/Lymph/Imm:  Negative      Endocrine:  Positive for diabetes      Physical Exam:  Vitals: /72  Pulse 72  Ht 1.854 m (6' 1\")  Wt 124.1 kg (273 lb 9.6 oz)  BMI 36.1 kg/m2    Constitutional:  cooperative, alert and oriented, well developed, well nourished, in no acute distress obese      Skin:  warm and dry to the touch, no apparent skin lesions or masses noted          Head:  normocephalic, no masses or lesions        Eyes:  pupils equal and round        Lymph:      ENT:  no pallor or cyanosis, dentition good        Neck:  carotid pulses are full and equal bilaterally JVP 8-10      Respiratory:  clear to auscultation;normal symmetry         Cardiac: normal S1 and S2;no S3 or S4;apical impulse not displaced irregularly irregular rhythm   no presence of murmur          pulses full and equal                                        GI:  abdomen soft;no bruits obese      Extremities and Muscular Skeletal:  no deformities, clubbing, cyanosis, erythema observed   bilateral LE edema;pitting;1+          Neurological:  no gross motor deficits        Psych:  Alert and Oriented x 3          CC  Digna Jones MD  [address]  [place] [address]                  "

## 2018-05-14 NOTE — PROGRESS NOTES
ANTICOAGULATION FOLLOW-UP CLINIC VISIT    Patient Name:  Jt Montgomery  Date:  5/14/2018  Contact Type:  Face to Face    SUBJECTIVE:     Patient Findings     Positives Change in diet/appetite    Comments Started adding greens powder to his daily protein shake.           OBJECTIVE    INR Protime   Date Value Ref Range Status   05/14/2018 2.4 (A) 0.86 - 1.14 Final       ASSESSMENT / PLAN  INR assessment THER    Recheck INR In: 2 WEEKS    INR Location Clinic      Anticoagulation Summary as of 5/14/2018     INR goal 2.0-3.0   Today's INR 2.4   Maintenance plan 5 mg (5 mg x 1) on Mon, Thu; 7.5 mg (5 mg x 1.5) all other days   Full instructions 5 mg on Mon, Thu; 7.5 mg all other days   Weekly total 47.5 mg   No change documented Shey James, RN   Plan last modified Marlena Hong RN (3/19/2018)   Next INR check 5/29/2018   Target end date Indefinite    Indications   Long-term (current) use of anticoagulants [Z79.01] [Z79.01]  Atrial fibrillation (AFIB) on Coumadin [I48.91]         Anticoagulation Episode Summary     INR check location     Preferred lab     Send INR reminders to [facility] HEART INR NURSE    Comments       Anticoagulation Care Providers     Provider Role Specialty Phone number    Lottie Gomez Minda,  [facility] Cardiology [phone]            See the Encounter Report to view Anticoagulation Flowsheet and Dosing Calendar (Go to Encounters tab in chart review, and find the Anticoagulation Therapy Visit)    INR 2.4 No complications noted, no sign/sx bleeding or clots. Denies med changes. States he started adding a greens powder to his daily protein shake, about a week ago. He plans to have it daily but he was out of town this weekend and didn't have it for 2 or 3 days, so he thinks he had the protein shake and greens powder 4x so far. It is called Green Vibrance and although he didn't bring it with I found it online. Ingredients include spirulina, parsley, broccoli plus many others  with known higher Vit K content. Also contains karla which can increase effects of warfarin. INR today is in therapeutic range but he plans to start taking this daily. Will continue same confirmed dose and recheck in two weeks to make sure INR doesn't drop further. If in range at next visit we can extend out to 4 weeks for the next recheck.    Shey James RN

## 2018-05-14 NOTE — PROGRESS NOTES
Service Date: 05/14/2018      REFERRING PHYSICIAN:  Digna Jones MD.      HISTORY OF PRESENT ILLNESS:  Mr. Montgomery is a pleasant 72-year-old gentleman with a history of  nonischemic cardiomyopathy, one vessel coronary disease with previous circumflex stenting, hypertension, chronic kidney disease, obesity and sleep apnea.  He also has persistent atrial fibrillation and is on Coumadin therapy for CVA prophylaxis.  I have not seen him since 11/2016.  He is here he states for a routine followup visit.  In general, he has done well over the past year to a year and a half.  He is experiencing dyspnea on exertion which has gradually gotten worse over time.  He has some mild lower extremity edema, but otherwise he seems to be doing well.  He denies any symptoms of difficulty breathing at night.  Denies any chest pain symptoms, palpitations or lightheadedness.  His EF has improved upon our last assessment.  His echocardiogram in 11/2016 showed improvement and is now borderline normal with an EF around 50-55%.  He has seen his kidney doctor this January.  His kidney function looks stable.  He continues to take his medications and be compliant with them.  He had several questions today in regards to his heart disease.  Also, we have been trying to get him to do ischemic testing and followup since 2015.  He has been very nervous about doing this.  We did talk about the fact that he may not experience typical symptoms given that he is an insulin-dependent diabetic and his symptoms alerting him to ongoing ischemic heart disease may be vague such as dyspnea on exertion which he is currently experiencing.  He seems more amenable to undergoing a stress test at this time.      PHYSICAL EXAMINATION:   VITAL SIGNS:  On exam today, his blood pressure is 138/72, pulse of 72, weight 273 and a body mass index of 36.   NECK:  Carotid upstrokes are slightly diminished.  I do not hear bruits.   CARDIOVASCULAR:  Tones are irregular,  consistent with persistent atrial fibrillation.  I do not hear a gallop or rub.   LUNGS:  Clear posteriorly.   EXTREMITIES:  He has 1-2+ pitting edema with obvious varicose veins noted in his lower extremities.        LABORATORY STUDIES:  I did review his recent blood work done this past February.  His cholesterol looks a little bit better than previous.  We were able to measure his LDL because his triglycerides have come down to 323.  His LDL is now 75.  HDL is up a bit to 32.  Total cholesterol 172.  His electrolytes look within normal range.  His creatinine was last measured at 1.29.      ASSESSMENT AND PLAN:  This is a 72-year-old male with history of nonischemic cardiomyopathy, one vessel coronary disease with stenting to his circumflex remotely, hypertension, chronic kidney disease, obesity and sleep apnea.  He seems to have mild symptoms of dyspnea on exertion and mild peripheral edema.  Otherwise, he is stable from a cardiac perspective.  In talking with him, he has significantly improved upon his diet, but he still needs to work on consistent aerobic exercise.  Because he is experiencing dyspnea on exertion with his underlying history of coronary disease and risk factors, I will once again recommend ischemic testing.  He seems more agreeable to this now.  Hopefully, we will get this scheduled in the near future.  We reviewed his medication list.  He wondered if there is any medications that he needed to discontinue or if he needs to continue with all of them.  The only one that I have real concern about is ongoing use of digoxin with his underlying renal insufficiency.  He likely probably does not need this for heart rate control and I do not know how much it is benefitting him from a heart failure perspective.  I would certainly not be opposed to discontinuing this medication at all, especially if he demonstrates any evidence of worsening renal dysfunction.  We will follow up with the results of his stress  test once complete.        Please feel free to contact me with any questions you have in regards to his care.      cc:   [place] [address]         BRE NARAYAN DO             D: 2018   T: 2018   MT: SERJIO      Name:     PAL ESCUDERO   MRN:      [id number]        Account:      [id number]   :      1945           Service Date: 2018      Document: [id number]

## 2018-05-14 NOTE — MR AVS SNAPSHOT
Jt Nguyen Valentina   5/14/2018 11:20 AM   Anticoagulation Therapy Visit    Description:  72 year old male   Provider:  [facility]   Department:  [facility]           INR as of 5/14/2018     Today's INR 2.4      Anticoagulation Summary as of 5/14/2018     INR goal 2.0-3.0   Today's INR 2.4   Full instructions 5 mg on Mon, Thu; 7.5 mg all other days   Next INR check 5/29/2018    Indications   Long-term (current) use of anticoagulants [Z79.01] [Z79.01]  Atrial fibrillation (AFIB) on Coumadin [I48.91]         Your next Anticoagulation Clinic appointment(s)     May 29, 2018 10:20 AM CDT   Anticoagulation Visit with  ANTICOAGULATION   [facility] ([facility])    [address] [place] [id number]   [phone] OPT 2              Contact Numbers     Anticoagulant (INR) Clinic Number: [id number]          May 2018 Details    Sun Mon Tue Wed Thu Fri Sat       1               2               3               4               5                 6               7               8               9               10               11               12                 13               14      5 mg   See details      15      7.5 mg         16      7.5 mg         17      5 mg         18      7.5 mg         19      7.5 mg           20      7.5 mg         21      5 mg         22      7.5 mg         23      7.5 mg         24      5 mg         25      7.5 mg         26      7.5 mg           27      7.5 mg         28      5 mg         29            30               31                  Date Details   05/14 This INR check       Date of next INR:  5/29/2018         How to take your warfarin dose     To take:  5 mg Take 1 of the 5 mg tablets.    To take:  7.5 mg Take 1.5 of the 5 mg tablets.

## 2018-05-14 NOTE — LETTER
5/14/2018    Digna Jones MD  [address] [facility] [address]  [facility] [address]    RE: Jt Montgomery       Dear Colleague,    I had the pleasure of seeing Jt Montgomery in the [facility].    HPI and Plan:   See dictation    No orders of the defined types were placed in this encounter.      Orders Placed This Encounter   Medications     Magnesium 125 MG CAPS     Sig: Take by mouth daily       There are no discontinued medications.      Encounter Diagnoses   Name Primary?     Dilated cardiomyopathy, nonischemic EF 30-40% in March 2013 Yes     Essential hypertension      Coronary artery disease involving native coronary artery of native heart without angina pectoris      Atrial fibrillation, unspecified type (H)      Mixed hyperlipidemia due to type 2 diabetes mellitus (H)      Pulmonary hypertension        CURRENT MEDICATIONS:  Current Outpatient Prescriptions   Medication Sig Dispense Refill     aspirin EC 81 MG EC tablet Take 1 tablet by mouth daily. 90 tablet 1     BD GERMÁN U/F 32G X 4 MM insulin pen needle USE TO TEST 4 TIMES DAILY OR AS DIRECTED 100 each 3     blood glucose monitoring (ACCU-CHEK LUIS PLUS) meter device kit Use to test blood sugar 3 times daily or as directed. 1 kit 0     blood glucose monitoring (ACCU-CHEK LUIS PLUS) test strip Use to test blood sugar 5 times daily or as directed. 400 strip 1     blood glucose monitoring (SOFTCLIX) lancets Use to test blood sugar 5 times daily or as directed. (Patient taking differently: Use to test blood sugar 2 times daily or as directed.) 1 Box prn     Coenzyme Q10 (COQ10) 100 MG CAPS Take 200 mg by mouth daily        diltiazem (CARTIA XT) 300 MG 24 hr capsule Take 1 capsule (300 mg) by mouth daily 90 capsule 0     febuxostat (ULORIC) 40 MG TABS Take 40 mg by mouth daily       fish oil-omega-3 fatty acids 1000 MG capsule Take 2 g by mouth every other day       furosemide (LASIX) 20 MG tablet TAKE 1 TABLET(20 MG) BY  MOUTH DAILY (Patient taking differently: 1  tab three times a WEEK  prn) 90 tablet 1     Green Tea, Camillia sinensis, (GREEN TEA EXTRACT PO) Take by mouth 2 times daily Doesn't always take regularly       HERBALS Nerve by [employer] 2 a day,   Healthy Feet and Nerves  By [employer]  3 a day, Heart Food Caps  (cayenna pepper, garlic, hawthorn, onion & ginger 1-2 a day)       insulin aspart (NOVOLOG FLEXPEN) 100 UNIT/ML injection Inject 15 Units Subcutaneous 3 times daily (with meals) 45 mL 1     insulin glargine (LANTUS SOLOSTAR) 100 UNIT/ML pen ADMINISTER 35 UNITS UNDER THE SKIN DAILY 30 mL 11     insulin pen needle (BD GERMÁN U/F) 32G X 4 MM Use four times daily or as directed. 100 each 3     irbesartan (AVAPRO) 300 MG tablet Take 1 tablet (300 mg) by mouth At Bedtime 90 tablet 2     Magnesium 125 MG CAPS Take by mouth daily       melatonin 1 MG TABS tablet Take 0.5 mg by mouth nightly as needed for sleep       metoprolol succinate (TOPROL-XL) 100 MG 24 hr tablet Take 1 tablet (100 mg) by mouth 2 times daily 180 tablet 0     multivitamin, therapeutic with minerals (MULTI-VITAMIN) TABS tablet [employer]ion       ORDER FOR DME Equipment being ordered: True Track Meter from [employer] 1 each 0     Probiotic Product (PROBIOTIC & ACIDOPHILUS EX ST PO) Take 1 capsule by mouth daily       Resveratrol 100 MG CAPS Take 2 capsules by mouth daily Shaklee Vivix       spironolactone (ALDACTONE) 25 MG tablet Take 1 tablet (25 mg) by mouth daily 90 tablet 0     UNABLE TO FIND MEDICATION NAME: Super Beet Juice       warfarin (COUMADIN) 5 MG tablet Take 1 tab on Mondays and Thursdays and take 1 1/2 tabs on all other days or as directed by INR clinic 145 tablet 1     digoxin (LANOXIN) 125 MCG tablet Take 1 tablet (125 mcg) by mouth daily 90 tablet 0     predniSONE (DELTASONE) 20 MG tablet Take 1 tablet (20 mg) by mouth daily as needed Gout flare (Patient not taking: Reported on 5/14/2018) 10 tablet 0     zolpidem (AMBIEN) 5 MG  tablet Take tablet by mouth 15 minutes prior to sleep, for Sleep Study (Patient not taking: Reported on 5/14/2018) 1 tablet 0       ALLERGIES     Allergies   Allergen Reactions     Corn Dextrin      All corn products - gets tired an ornery       PAST MEDICAL HISTORY:  Past Medical History:   Diagnosis Date     Atrial fibrillation (H)      CAD (coronary artery disease)     MI with RONEL to dRCA April 2011     Central Sleep Apnea with Pat Villanueva breathing 9/14/2010    Also TOMMY with CPAP     CKD (chronic kidney disease) stage 3, GFR 30-59 ml/min      Dilated cardiomyopathy (H)     nonischemic (possibly related to h/o a.fib with RVR) EF 30-40% March 2013     DM2 (diabetes mellitus, type 2) (H)     Goal HgbA1c < 7%     Gout     uric acid level correlates; April 2014 h/o +knee aspirate     Hypertension     Goal <140/90     Pulmonary hypertension     mild per echo 3/2013       PAST SURGICAL HISTORY:  Past Surgical History:   Procedure Laterality Date     CORONARY ANGIOGRAPHY ADULT ORDER  2011    distal circ-RONEL     HERNIA REPAIR  11/20/10    incarcinated     SUSPEND HYOID, GENIOGLOSSAL ADVANCEMENT         FAMILY HISTORY:  Family History   Problem Relation Age of Onset     Hypertension Mother      Coronary Artery Disease Mother      Coronary Artery Disease Father      Hypertension Father      DIABETES Sister      CEREBROVASCULAR DISEASE Sister        SOCIAL HISTORY:  Social History     Social History     Marital status:      Spouse name: N/A     Number of children: N/A     Years of education: N/A     Social History Main Topics     Smoking status: Former Smoker     Packs/day: 1.50     Years: 7.00     Types: Cigarettes     Quit date: 1/1/1972     Smokeless tobacco: Never Used      Comment: quit in 1971      Started at around age 18-19     Alcohol use No      Comment: 11/20/73   recovering     Drug use: No     Sexual activity: Not Currently     Other Topics Concern     Caffeine Concern No     Sleep Concern Yes      "sleep apnea, wears bi-pap     Special Diet No     low carb diet     Exercise No     walking     Seat Belt Yes     Social History Narrative       Review of Systems:  Skin:  Negative       Eyes:  Positive for glasses    ENT:  Negative      Respiratory:  Positive for dyspnea on exertion;sleep apnea;CPAP     Cardiovascular:    dizziness;Positive for;edema occ  Gastroenterology: Negative      Genitourinary:  Negative      Musculoskeletal:  Positive for joint stiffness    Neurologic:  Positive for numbness or tingling of feet    Psychiatric:  Negative      Heme/Lymph/Imm:  Negative      Endocrine:  Positive for diabetes      Physical Exam:  Vitals: /72  Pulse 72  Ht 1.854 m (6' 1\")  Wt 124.1 kg (273 lb 9.6 oz)  BMI 36.1 kg/m2    Constitutional:  cooperative, alert and oriented, well developed, well nourished, in no acute distress obese      Skin:  warm and dry to the touch, no apparent skin lesions or masses noted          Head:  normocephalic, no masses or lesions        Eyes:  pupils equal and round        Lymph:      ENT:  no pallor or cyanosis, dentition good        Neck:  carotid pulses are full and equal bilaterally JVP 8-10      Respiratory:  clear to auscultation;normal symmetry         Cardiac: normal S1 and S2;no S3 or S4;apical impulse not displaced irregularly irregular rhythm   no presence of murmur          pulses full and equal                                        GI:  abdomen soft;no bruits obese      Extremities and Muscular Skeletal:  no deformities, clubbing, cyanosis, erythema observed   bilateral LE edema;pitting;1+          Neurological:  no gross motor deficits        Psych:  Alert and Oriented x 3          CC  Digna Jones MD  [address] [address]                    Thank you for allowing me to participate in the care of your patient.      Sincerely,     Lottie Gomez,      [facility]    cc:   Digna Jones, " MD  [address]  [place] [address]

## 2018-05-18 DIAGNOSIS — I25.119 CORONARY ARTERY DISEASE INVOLVING NATIVE CORONARY ARTERY OF NATIVE HEART WITH ANGINA PECTORIS (H): ICD-10-CM

## 2018-05-18 DIAGNOSIS — I48.91 ATRIAL FIBRILLATION, UNSPECIFIED TYPE (H): ICD-10-CM

## 2018-05-18 RX ORDER — SPIRONOLACTONE 25 MG/1
25 TABLET ORAL DAILY
Qty: 90 TABLET | Refills: 3 | Status: SHIPPED | OUTPATIENT
Start: 2018-05-18 | End: 2019-05-10

## 2018-05-18 RX ORDER — METOPROLOL SUCCINATE 100 MG/1
100 TABLET, EXTENDED RELEASE ORAL
Qty: 180 TABLET | Refills: 3 | Status: SHIPPED | OUTPATIENT
Start: 2018-05-18 | End: 2019-06-17

## 2018-05-22 ENCOUNTER — HOSPITAL ENCOUNTER (OUTPATIENT)
Dept: CARDIOLOGY | Facility: CLINIC | Age: 73
Discharge: HOME | End: 2018-05-22
Attending: INTERNAL MEDICINE | Admitting: INTERNAL MEDICINE
Payer: COMMERCIAL

## 2018-05-22 VITALS — DIASTOLIC BLOOD PRESSURE: 62 MMHG | SYSTOLIC BLOOD PRESSURE: 128 MMHG | HEART RATE: 86 BPM

## 2018-05-22 DIAGNOSIS — R06.02 SOB (SHORTNESS OF BREATH): ICD-10-CM

## 2018-05-22 DIAGNOSIS — I25.10 CAD (CORONARY ARTERY DISEASE): ICD-10-CM

## 2018-05-22 PROCEDURE — 34300033 ZZH RX 343: Performed by: INTERNAL MEDICINE

## 2018-05-22 PROCEDURE — 78452 HT MUSCLE IMAGE SPECT MULT: CPT

## 2018-05-22 PROCEDURE — 25000128 H RX IP 250 OP 636: Performed by: INTERNAL MEDICINE

## 2018-05-22 PROCEDURE — A9502 TC99M TETROFOSMIN: HCPCS | Performed by: INTERNAL MEDICINE

## 2018-05-22 PROCEDURE — 93016 CV STRESS TEST SUPVJ ONLY: CPT | Performed by: INTERNAL MEDICINE

## 2018-05-22 PROCEDURE — 78452 HT MUSCLE IMAGE SPECT MULT: CPT | Mod: 26 | Performed by: INTERNAL MEDICINE

## 2018-05-22 PROCEDURE — 93018 CV STRESS TEST I&R ONLY: CPT | Performed by: INTERNAL MEDICINE

## 2018-05-22 RX ORDER — REGADENOSON 0.08 MG/ML
0.4 INJECTION, SOLUTION INTRAVENOUS ONCE
Status: COMPLETED | OUTPATIENT
Start: 2018-05-22 | End: 2018-05-22

## 2018-05-22 RX ORDER — ACYCLOVIR 200 MG/1
0-1 CAPSULE ORAL
Status: DISCONTINUED | OUTPATIENT
Start: 2018-05-22 | End: 2018-05-23 | Stop reason: HOSPADM

## 2018-05-22 RX ORDER — ALBUTEROL SULFATE 90 UG/1
2 AEROSOL, METERED RESPIRATORY (INHALATION) EVERY 5 MIN PRN
Status: DISCONTINUED | OUTPATIENT
Start: 2018-05-22 | End: 2018-05-23 | Stop reason: HOSPADM

## 2018-05-22 RX ORDER — AMINOPHYLLINE 25 MG/ML
50-100 INJECTION, SOLUTION INTRAVENOUS
Status: DISCONTINUED | OUTPATIENT
Start: 2018-05-22 | End: 2018-05-23 | Stop reason: HOSPADM

## 2018-05-22 RX ADMIN — TETROFOSMIN 27.5 MCI.: 1.38 INJECTION, POWDER, LYOPHILIZED, FOR SOLUTION INTRAVENOUS at 13:31

## 2018-05-22 RX ADMIN — TETROFOSMIN 9.15 MCI.: 1.38 INJECTION, POWDER, LYOPHILIZED, FOR SOLUTION INTRAVENOUS at 11:18

## 2018-05-22 RX ADMIN — REGADENOSON 0.4 MG: 0.08 INJECTION, SOLUTION INTRAVENOUS at 13:26

## 2018-05-23 ENCOUNTER — TELEPHONE (OUTPATIENT)
Dept: CARDIOLOGY | Facility: CLINIC | Age: 73
End: 2018-05-23

## 2018-05-23 DIAGNOSIS — I42.8 NON-ISCHEMIC CARDIOMYOPATHY (H): Primary | ICD-10-CM

## 2018-05-24 NOTE — TELEPHONE ENCOUNTER
Reviewed lexiscan results with Dr. Gomez and she ordered to have pt follow up in 1 year with an ANJELICA.     2nd attempt to call pt with lexiscan results. Pt's voicemail is full and cannot receive voicemails. Will place order for an ANJELICA follow up next year per Dr. Gomez.

## 2018-05-25 ENCOUNTER — TRANSFERRED RECORDS (OUTPATIENT)
Dept: HEALTH INFORMATION MANAGEMENT | Facility: CLINIC | Age: 73
End: 2018-05-25

## 2018-05-29 ENCOUNTER — DOCUMENTATION ONLY (OUTPATIENT)
Dept: SLEEP MEDICINE | Facility: CLINIC | Age: 73
End: 2018-05-29

## 2018-05-29 DIAGNOSIS — G47.33 OSA (OBSTRUCTIVE SLEEP APNEA): ICD-10-CM

## 2018-05-29 DIAGNOSIS — I48.91 ATRIAL FIBRILLATION (H): ICD-10-CM

## 2018-05-29 DIAGNOSIS — G47.31 CENTRAL SLEEP APNEA: ICD-10-CM

## 2018-05-29 RX ORDER — DIGOXIN 125 MCG
125 TABLET ORAL DAILY
Qty: 90 TABLET | Refills: 3 | Status: SHIPPED | OUTPATIENT
Start: 2018-05-29 | End: 2019-07-25

## 2018-05-29 NOTE — PROGRESS NOTES
30 DAY STM VISIT    Diagnostic AHI: 80.7     Message left for patient to return call     Assessment: Pt meeting objective benchmarks.     Action plan: waiting for patient to return call.  and pt to have 6 month STM visit  Patient has a follow up visit with Dr. Alanis on 6/15/18.   Device type: Auto-CPAP  PAP settings: CPAP min 11 cm  H20     CPAP max 15 cm  H20    95th% pressure 14 cm  H20   Mask type:  Full Face Mask  Objective measures: 14 day rolling measures      Compliance  92 %      Leak  13.2 lpm  last  upload      AHI 4.73   last  upload      Average number of minutes 732      Objective measure goal  Compliance   Goal >70%  Leak   Goal < 24 lpm  AHI  Goal < 5  Usage  Goal >240

## 2018-06-04 ENCOUNTER — ANTICOAGULATION THERAPY VISIT (OUTPATIENT)
Dept: CARDIOLOGY | Facility: CLINIC | Age: 73
End: 2018-06-04
Payer: COMMERCIAL

## 2018-06-04 DIAGNOSIS — Z79.01 LONG-TERM (CURRENT) USE OF ANTICOAGULANTS: ICD-10-CM

## 2018-06-04 DIAGNOSIS — I48.91 ATRIAL FIBRILLATION, UNSPECIFIED TYPE (H): ICD-10-CM

## 2018-06-04 LAB — INR POINT OF CARE: 2.6 (ref 0.86–1.14)

## 2018-06-04 PROCEDURE — 36416 COLLJ CAPILLARY BLOOD SPEC: CPT | Performed by: INTERNAL MEDICINE

## 2018-06-04 PROCEDURE — 85610 PROTHROMBIN TIME: CPT | Mod: QW | Performed by: INTERNAL MEDICINE

## 2018-06-04 NOTE — PROGRESS NOTES
ANTICOAGULATION FOLLOW-UP CLINIC VISIT    Patient Name:  Jt Montgomery  Date:  6/4/2018  Contact Type:  Face to Face    SUBJECTIVE:     Patient Findings     Positives No Problem Findings           OBJECTIVE    INR Protime   Date Value Ref Range Status   06/04/2018 2.6 (A) 0.86 - 1.14 Final       ASSESSMENT / PLAN  INR assessment THER    Recheck INR In: 4 WEEKS    INR Location Clinic      Anticoagulation Summary as of 6/4/2018     INR goal 2.0-3.0   Today's INR 2.6   Warfarin maintenance plan 5 mg (5 mg x 1) on Mon, Thu; 7.5 mg (5 mg x 1.5) all other days   Full warfarin instructions 5 mg on Mon, Thu; 7.5 mg all other days   Weekly warfarin total 47.5 mg   No change documented Marlena Hong RN   Plan last modified Marlena Hong RN (3/19/2018)   Next INR check 6/28/2018   Target end date Indefinite    Indications   Long-term (current) use of anticoagulants [Z79.01] [Z79.01]  Atrial fibrillation (AFIB) on Coumadin [I48.91]         Anticoagulation Episode Summary     INR check location     Preferred lab     Send INR reminders to [facility] INR NURSE    Comments       Anticoagulation Care Providers     Provider Role Specialty Phone number    Lottie Gomez DO Minda [facility] [phone]            See the Encounter Report to view Anticoagulation Flowsheet and Dosing Calendar (Go to Encounters tab in chart review, and find the Anticoagulation Therapy Visit)    INR 2.6 He stopped drinking the green smoothie. Advised that if he restarts it in the future that he should call us because we would want to recheck his INR within 2 weeks of starting the protein shake. No change in meds. No abnormal bleeding or bruising. Will continue current dosing of 5 mg MTh and 7.5 mg all other days with recheck in 4 weeks.     Marlena Hong RN

## 2018-06-04 NOTE — MR AVS SNAPSHOT
Jt Berrylinh Montgomery   6/4/2018 10:20 AM   Anticoagulation Therapy Visit    Description:  72 year old male   Provider:  [facility]   Department:  [facility]           INR as of 6/4/2018     Today's INR 2.6      Anticoagulation Summary as of 6/4/2018     INR goal 2.0-3.0   Today's INR 2.6   Full warfarin instructions 5 mg on Mon, Thu; 7.5 mg all other days   Next INR check 6/28/2018    Indications   Long-term (current) use of anticoagulants [Z79.01] [Z79.01]  Atrial fibrillation (AFIB) on Coumadin [I48.91]         Your next Anticoagulation Clinic appointment(s)     Jun 28, 2018 10:40 AM CDT   Anticoagulation Visit with  ANTICOAGULATION   [facility] ([facility])    [address] [place] [id number]   [phone] OPT 2              Contact Numbers     Anticoagulant (INR) Clinic Number: [phone]          June 2018 Details    Sun Mon Tue Wed Thu Fri Sat          1               2                 3               4      5 mg   See details      5      7.5 mg         6      7.5 mg         7      5 mg         8      7.5 mg         9      7.5 mg           10      7.5 mg         11      5 mg         12      7.5 mg         13      7.5 mg         14      5 mg         15      7.5 mg         16      7.5 mg           17      7.5 mg         18      5 mg         19      7.5 mg         20      7.5 mg         21      5 mg         22      7.5 mg         23      7.5 mg           24      7.5 mg         25      5 mg         26      7.5 mg         27      7.5 mg         28            29               30                Date Details   06/04 This INR check       Date of next INR:  6/28/2018         How to take your warfarin dose     To take:  5 mg Take 1 of the 5 mg tablets.    To take:  7.5 mg Take 1.5 of the 5 mg tablets.

## 2018-06-14 ENCOUNTER — OFFICE VISIT (OUTPATIENT)
Dept: SLEEP MEDICINE | Facility: CLINIC | Age: 73
End: 2018-06-14
Payer: COMMERCIAL

## 2018-06-14 VITALS
RESPIRATION RATE: 16 BRPM | HEART RATE: 50 BPM | DIASTOLIC BLOOD PRESSURE: 64 MMHG | SYSTOLIC BLOOD PRESSURE: 119 MMHG | HEIGHT: 73 IN | OXYGEN SATURATION: 96 % | BODY MASS INDEX: 36.05 KG/M2 | WEIGHT: 272 LBS

## 2018-06-14 DIAGNOSIS — G47.31 CENTRAL SLEEP APNEA: ICD-10-CM

## 2018-06-14 DIAGNOSIS — G47.33 OSA (OBSTRUCTIVE SLEEP APNEA): ICD-10-CM

## 2018-06-14 PROCEDURE — 99213 OFFICE O/P EST LOW 20 MIN: CPT | Performed by: INTERNAL MEDICINE

## 2018-06-14 NOTE — PATIENT INSTRUCTIONS

## 2018-06-14 NOTE — MR AVS SNAPSHOT
After Visit Summary   6/14/2018    Jt Montgomery    MRN: [id number]           Patient Information     Date Of Birth          1945        Visit Information        Provider Department      6/14/2018 3:00 PM Edvin Alanis MD [facility]        Today's Diagnoses     TOMMY (obstructive sleep apnea)        Central sleep apnea          Care Instructions      Your BMI is Body mass index is 35.89 kg/(m^2).  Weight management is a personal decision.  If you are interested in exploring weight loss strategies, the following discussion covers the approaches that may be successful. Body mass index (BMI) is one way to tell whether you are at a healthy weight, overweight, or obese. It measures your weight in relation to your height.  A BMI of 18.5 to 24.9 is in the healthy range. A person with a BMI of 25 to 29.9 is considered overweight, and someone with a BMI of 30 or greater is considered obese. More than two-thirds of American adults are considered overweight or obese.  Being overweight or obese increases the risk for further weight gain. Excess weight may lead to heart disease and diabetes.  Creating and following plans for healthy eating and physical activity may help you improve your health.  Weight control is part of healthy lifestyle and includes exercise, emotional health, and healthy eating habits. Careful eating habits lifelong are the mainstay of weight control. Though there are significant health benefits from weight loss, long-term weight loss with diet alone may be very difficult to achieve- studies show long-term success with dietary management in less than 10% of people. Attaining a healthy weight may be especially difficult to achieve in those with severe obesity. In some cases, medications, devices and surgical management might be considered.  What can you do?  If you are overweight or obese and are interested in methods for weight loss, you should discuss  this with your provider.     Consider reducing daily calorie intake by 500 calories.     Keep a food journal.     Avoiding skipping meals, consider cutting portions instead.    Diet combined with exercise helps maintain muscle while optimizing fat loss. Strength training is particularly important for building and maintaining muscle mass. Exercise helps reduce stress, increase energy, and improves fitness. Increasing exercise without diet control, however, may not burn enough calories to loose weight.       Start walking three days a week 10-20 minutes at a time    Work towards walking thirty minutes five days a week     Eventually, increase the speed of your walking for 1-2 minutes at time    In addition, we recommend that you review healthy lifestyles and methods for weight loss available through the National Institutes of Health patient information sites:  http://win.niddk.nih.gov/publications/index.htm    And look into health and wellness programs that may be available through your health insurance provider, employer, local community center, or otilia club.    Weight management plan: Patient was referred to their PCP to discuss a diet and exercise plan.            Follow-ups after your visit        Follow-up notes from your care team     Return in 1 year (on 6/14/2019) for PAP follow up.      Your next 10 appointments already scheduled     Jun 28, 2018 10:40 AM CDT   Anticoagulation Visit with [facility] ANTICOAGULATION   [facility] ([facility])    [address]  [place] [address] [phone] OPT 2            Jun 13, 2019 11:30 AM CDT   Return Sleep Patient with Edvin Alanis MD   [facility] ([facility])    [address]  [place] [address] [phone]              Who to contact     If you have questions or need follow up information about today's clinic visit or your schedule  "please contact [facility] directly at [phone].  Normal or non-critical lab and imaging results will be communicated to you by MyChart, letter or phone within 4 business days after the clinic has received the results. If you do not hear from us within 7 days, please contact the clinic through MyChart or phone. If you have a critical or abnormal lab result, we will notify you by phone as soon as possible.  Submit refill requests through [email or web address] or call your pharmacy and they will forward the refill request to us. Please allow 3 business days for your refill to be completed.          Additional Information About Your Visit        Tour EngineharTuTanda Information     [email or web address] lets you send messages to your doctor, view your test results, renew your prescriptions, schedule appointments and more. To sign up, go to [email or web address] . Click on \"Log in\" on the left side of the screen, which will take you to the Welcome page. Then click on \"Sign up Now\" on the right side of the page.     You will be asked to enter the access code listed below, as well as some personal information. Please follow the directions to create your username and password.     Your access code is: [email or web address]  Expires: 2018 12:04 PM     Your access code will  in 90 days. If you need help or a new code, please call your [facility] clinic or [phone].        Care EveryWhere ID     This is your Care EveryWhere ID. This could be used by other organizations to access your [facility] medical records  [id number]        Your Vitals Were     Pulse Respirations Height Pulse Oximetry BMI (Body Mass Index)       50 16 1.854 m (6' 1\") 96% 35.89 kg/m2        Blood Pressure from Last 3 Encounters:   18 119/64   18 128/62   18 138/72    Weight from Last 3 Encounters:   18 123.4 kg (272 lb)   18 124.1 kg (273 lb 9.6 oz)   18 123.4 kg (272 lb)              Today, you had the following     No orders found " for display         Today's Medication Changes          These changes are accurate as of 6/14/18  3:37 PM.  If you have any questions, ask your nurse or doctor.               These medicines have changed or have updated prescriptions.        Dose/Directions    blood glucose monitoring lancets   This may have changed:  additional instructions   Used for:  Uncontrolled diabetes mellitus with complications (H)        Use to test blood sugar 5 times daily or as directed.   Quantity:  1 Box   Refills:  prn       furosemide 20 MG tablet   Commonly known as:  LASIX   This may have changed:  See the new instructions.   Used for:  Dilated cardiomyopathy (H)        TAKE 1 TABLET(20 MG) BY MOUTH DAILY   Quantity:  90 tablet   Refills:  1                Primary Care Provider Office Phone # Fax #    Digna Amanuel Jones [phone] [address] [address]        Equal Access to Services     [facility]IL[facility] : Hadii nitin arandao Somichael, waaxda luqadaha, qaybta kaalmada chip, quynh harris . So [facility] [phone].    ATENCIÓN: Si habla español, tiene a jones disposición servicios gratuitos de asistencia lingüística. Josue al [phone].    We comply with applicable federal civil rights laws and [place] laws. We do not discriminate on the basis of race, color, national origin, age, disability, sex, sexual orientation, or gender identity.            Thank you!     Thank you for choosing [facility]  for your care. Our goal is always to provide you with excellent care. Hearing back from our patients is one way we can continue to improve our services. Please take a few minutes to complete the written survey that you may receive in the mail after your visit with us. Thank you!             Your Updated Medication List - Protect others around you: Learn how to safely use, store and throw away your medicines at www.disposemymeds.org.          This list  is accurate as of 6/14/18  3:37 PM.  Always use your most recent med list.                   Brand Name Dispense Instructions for use Diagnosis    aspirin 81 MG EC tablet     90 tablet    Take 1 tablet by mouth daily.    Atrial fibrillation with RVR (H), Dilated cardiomyopathy (H)       blood glucose monitoring lancets     1 Box    Use to test blood sugar 5 times daily or as directed.    Uncontrolled diabetes mellitus with complications (H)       blood glucose monitoring meter device kit     1 kit    Use to test blood sugar 3 times daily or as directed.    Uncontrolled diabetes mellitus with complications (H)       blood glucose monitoring test strip    ACCU-CHEK LUIS PLUS    400 strip    Use to test blood sugar 5 times daily or as directed.    Uncontrolled type 2 diabetes mellitus with complication, with long-term current use of insulin (H)       CoQ10 100 MG Caps      Take 200 mg by mouth daily    Uncontrolled diabetes mellitus with complications (H)       digoxin 125 MCG tablet    LANOXIN    90 tablet    Take 1 tablet (125 mcg) by mouth daily    Atrial fibrillation (H)       diltiazem 300 MG 24 hr capsule    CARTIA XT    90 capsule    Take 1 capsule (300 mg) by mouth daily    Atrial fibrillation (H)       fish oil-omega-3 fatty acids 1000 MG capsule      Take 2 g by mouth every other day        furosemide 20 MG tablet    LASIX    90 tablet    TAKE 1 TABLET(20 MG) BY MOUTH DAILY    Dilated cardiomyopathy (H)       GREEN TEA EXTRACT PO      Take by mouth 2 times daily Doesn't always take regularly        HERBALS      Nerve by Plexis 2 a day,   Healthy Feet and Nerves  By [employer]  3 a day, Heart Food Caps  (cayenna pepper, garlic, hawthorn, onion & karla 1-2 a day)        insulin aspart 100 UNIT/ML injection    NovoLOG FLEXPEN    45 mL    Inject 15 Units Subcutaneous 3 times daily (with meals)    Type 2 diabetes mellitus with diabetic neuropathy, with long-term current use of insulin (H)       insulin glargine  100 UNIT/ML injection    LANTUS SOLOSTAR    30 mL    ADMINISTER 35 UNITS UNDER THE SKIN DAILY    Type 2 diabetes mellitus with diabetic neuropathy, with long-term current use of insulin (H)       * insulin pen needle 32G X 4 MM    BD GERMÁN U/F    100 each    Use four times daily or as directed.    Uncontrolled diabetes mellitus with complications (H)       * BD GERMÁN U/F 32G X 4 MM   Generic drug:  insulin pen needle     100 each    USE TO TEST 4 TIMES DAILY OR AS DIRECTED    Type 2 diabetes mellitus with diabetic neuropathy, with long-term current use of insulin (H)       irbesartan 300 MG tablet    AVAPRO    90 tablet    Take 1 tablet (300 mg) by mouth At Bedtime    HTN (hypertension)       Magnesium 125 MG Caps      Take by mouth daily        melatonin 1 MG Tabs tablet      Take 0.5 mg by mouth nightly as needed for sleep        metoprolol succinate 100 MG 24 hr tablet    TOPROL-XL    180 tablet    Take 1 tablet (100 mg) by mouth 2 times daily    Coronary artery disease involving native coronary artery of native heart with angina pectoris (H)       Multi-vitamin Tabs tablet      Shaklee & Life Extenstion        order for DME     1 each    Equipment being ordered: True Track Meter from [facility]    Uncontrolled diabetes mellitus with complications (H)       predniSONE 20 MG tablet    DELTASONE    10 tablet    Take 1 tablet (20 mg) by mouth daily as needed Gout flare    Chronic gout due to renal impairment of multiple sites without tophus       PROBIOTIC & ACIDOPHILUS EX ST PO      Take 1 capsule by mouth daily        Resveratrol 100 MG Caps      Take 2 capsules by mouth daily Shaklee Vivix        spironolactone 25 MG tablet    ALDACTONE    90 tablet    Take 1 tablet (25 mg) by mouth daily    Atrial fibrillation, unspecified type (H)       ULORIC 40 MG Tabs tablet   Generic drug:  febuxostat      Take 40 mg by mouth daily        UNABLE TO FIND      MEDICATION NAME: Super Beet Juice        warfarin 5 MG tablet     COUMADIN    145 tablet    Take 1 tab on Mondays and Thursdays and take 1 1/2 tabs on all other days or as directed by INR clinic    Long term current use of anticoagulant therapy, Atrial fibrillation (H)       * Notice:  This list has 2 medication(s) that are the same as other medications prescribed for you. Read the directions carefully, and ask your doctor or other care provider to review them with you.

## 2018-06-14 NOTE — NURSING NOTE
"Chief Complaint   Patient presents with     CPAP Follow Up       Initial /64  Pulse 50  Resp 16  Ht 1.854 m (6' 1\")  Wt 123.4 kg (272 lb)  SpO2 96%  BMI 35.89 kg/m2 Estimated body mass index is 35.89 kg/(m^2) as calculated from the following:    Height as of this encounter: 1.854 m (6' 1\").    Weight as of this encounter: 123.4 kg (272 lb).    Medication Reconciliation: complete     ESS 3  Sigrid Minor          "

## 2018-06-14 NOTE — PROGRESS NOTES
Obstructive Sleep Apnea - PAP Follow-Up Visit:    Chief Complaint   Patient presents with     CPAP Follow Up       Jt Montgomery comes in today for follow-up of their severe sleep apnea, managed with CPAP.     Overall, he rates the experience with PAP as 9 (0 poor, 10 great). The mask is comfortable.    The mask is not leaking .  He is not snoring with the mask on. He is not having gasp arousals.  He is not having significant oral/nasal dryness. The pressure is comfortable.     His PAP interface is Full Face Mask.    Bedtime is typically 2330. Usually it takes about 60 min minutes to fall asleep with the mask on. Wake time is typically 0630.  Patient is using PAP therapy 12.8 hours per night. The patient is usually getting 7 hours of sleep per night.    He does feel rested in the morning.    Total score - [place]: 3 (6/14/2018  3:13 PM)    ResMed   Auto-PAP 11.0 - 15.0 cmH2O 30 day usage data:    100% of days with > 4 hours of use. 0/30 days with no use. Average use 768 minutes per day.   95%ile Leak 15.64 L/min.   CPAP 95% pressure 14 cm.   AHI 3.18 events per hour.     Reviewed by team: Allergies       Reviewed by provider:        Problem List:  Patient Active Problem List    Diagnosis Date Noted     Anemia, unspecified type 01/12/2017     Priority: Medium     Long-term (current) use of anticoagulants [Z79.01] 06/14/2016     Priority: Medium     Mixed hyperlipidemia due to type 2 diabetes mellitus (H) 12/07/2015     Priority: Medium     Pulmonary hypertension      Priority: Medium     mild per echo 3/2013       Morbid obesity (H) 10/25/2015     Priority: Medium     CKD (chronic kidney disease) stage 3, GFR 30-59 ml/min      Priority: Medium     Atrial fibrillation (AFIB) on Coumadin 09/21/2014     Priority: Medium     CAD (coronary artery disease)      Priority: Medium     04/15/2011: RONEL to CFX       Gout 08/19/2014     Priority: Medium     Problem list name updated by automated process. Provider to  "review       Type 2 diabetes mellitus with diabetic neuropathy; goal HgbA1c < 7% 08/19/2014     Priority: Medium     Advanced directives, counseling/discussion 09/10/2012     Priority: Medium     Pt given [employer] forms w explanation.  Pt indicates intent to review and complete at home.  9-10-12.       S/p angioplasty with stent w/ RONEL in distal RCA 05/07/2011     Priority: Medium     Dilated cardiomyopathy, nonischemic EF 30-40% in March 2013      Priority: Medium     TOMMY (obstructive sleep apnea) 09/14/2010     Priority: Medium     Central Sleep Apnea with Cheyne Villanueva breathing 09/14/2010     Priority: Medium     Recovery of EF to 50-55%  3/23/2018 [facility] (277.0 lbs) - AHI 80.7, RDI 84.0, Supine AHI 92.2, REM AHI -, Low O2 83.4%, Time Spent ?88% 15.0 minutes  Testing does not show Cheyne-Villanueva anymore.       HTN (hypertension); goal <140/90 08/05/2010     Priority: Medium     Alcohol dependence in remission (H) 08/05/2010     Priority: Medium     (Problem list name updated by automated process. Provider to review and confirm.)            /64  Pulse 50  Resp 16  Ht 1.854 m (6' 1\")  Wt 123.4 kg (272 lb)  SpO2 96%  BMI 35.89 kg/m2    Impression/Plan:  1. TOMMY (obstructive sleep apnea)  2. Central sleep apnea  Severe sleep apnea. Tolerating PAP well. Daytime symptoms are improved.    Jt Montgomery will follow up in about 1 year(s).     Fifteen minutes spent with patient, all of which were spent face-to-face counseling, consulting, coordinating plan of care.      Edvin Alanis MD    CC:  Digna Jones,   "

## 2018-06-28 ENCOUNTER — ANTICOAGULATION THERAPY VISIT (OUTPATIENT)
Dept: CARDIOLOGY | Facility: CLINIC | Age: 73
End: 2018-06-28
Payer: COMMERCIAL

## 2018-06-28 DIAGNOSIS — I48.91 ATRIAL FIBRILLATION, UNSPECIFIED TYPE (H): ICD-10-CM

## 2018-06-28 DIAGNOSIS — Z79.01 LONG-TERM (CURRENT) USE OF ANTICOAGULANTS: ICD-10-CM

## 2018-06-28 LAB — INR POINT OF CARE: 2.8 (ref 0.86–1.14)

## 2018-06-28 PROCEDURE — 85610 PROTHROMBIN TIME: CPT | Mod: QW | Performed by: INTERNAL MEDICINE

## 2018-06-28 PROCEDURE — 36416 COLLJ CAPILLARY BLOOD SPEC: CPT | Performed by: INTERNAL MEDICINE

## 2018-06-28 NOTE — PROGRESS NOTES
ANTICOAGULATION FOLLOW-UP CLINIC VISIT    Patient Name:  Jt Montgomery  Date:  6/28/2018  Contact Type:  Face to Face    SUBJECTIVE:     Patient Findings     Positives No Problem Findings           OBJECTIVE    INR Protime   Date Value Ref Range Status   06/28/2018 2.8 (A) 0.86 - 1.14 Final       ASSESSMENT / PLAN  INR assessment THER    Recheck INR In: 4 WEEKS    INR Location Clinic      Anticoagulation Summary as of 6/28/2018     INR goal 2.0-3.0   Today's INR 2.8   Warfarin maintenance plan 5 mg (5 mg x 1) on Mon, Thu; 7.5 mg (5 mg x 1.5) all other days   Full warfarin instructions 5 mg on Mon, Thu; 7.5 mg all other days   Weekly warfarin total 47.5 mg   No change documented Marlena Hong RN   Plan last modified Marlena Hong RN (3/19/2018)   Next INR check 7/26/2018   Target end date Indefinite    Indications   Long-term (current) use of anticoagulants [Z79.01] [Z79.01]  Atrial fibrillation (AFIB) on Coumadin [I48.91]         Anticoagulation Episode Summary     INR check location     Preferred lab     Send INR reminders to [facility] INR NURSE    Comments       Anticoagulation Care Providers     Provider Role Specialty Phone number    Lottie Gomez DO [facility] [phone]            See the Encounter Report to view Anticoagulation Flowsheet and Dosing Calendar (Go to Encounters tab in chart review, and find the Anticoagulation Therapy Visit)    INR 2.8 No change in meds. Possibly eating fewer greens. No abnormal bleeding or bruising. Will continue current dosing of 5 mg MTh and 7.5 mg all other days with recheck in 4 weeks.    Marlena Hong RN

## 2018-06-28 NOTE — MR AVS SNAPSHOT
Jt Berrylinh Montgomery   6/28/2018 10:40 AM   Anticoagulation Therapy Visit    Description:  72 year old male   Provider:   ANTICOAGULATION   Department:  [facility]           INR as of 6/28/2018     Today's INR 2.8      Anticoagulation Summary as of 6/28/2018     INR goal 2.0-3.0   Today's INR 2.8   Full warfarin instructions 5 mg on Mon, Thu; 7.5 mg all other days   Next INR check 7/26/2018    Indications   Long-term (current) use of anticoagulants [Z79.01] [Z79.01]  Atrial fibrillation (AFIB) on Coumadin [I48.91]         Your next Anticoagulation Clinic appointment(s)     Jun 28, 2018 10:40 AM CDT   Anticoagulation Visit with  ANTICOAGULATION   [facility] ([facility])    [address] [place] [address]   [phone] OPT 2            Jul 26, 2018 10:40 AM CDT   Anticoagulation Visit with  ANTICOAGULATION   [facility] ([facility])    [address] [place] [address]   [phone] OPT 2              Contact Numbers     Anticoagulant (INR) Clinic Number: [id number]          June 2018 Details    Sun Mon Tue Wed Thu Fri Sat          1               2                 3               4               5               6               7               8               9                 10               11               12               13               14               15               16                 17               18               19               20               21               22               23                 24               25               26               27               28      5 mg   See details      29      7.5 mg         30      7.5 mg          Date Details   06/28 This INR check               How to take your warfarin dose     To take:  5 mg Take 1 of the 5 mg tablets.    To take:  7.5 mg Take 1.5 of the 5 mg tablets.           July 2018  Details    Sun Mon Tue Wed Thu Fri Sat     1      7.5 mg         2      5 mg         3      7.5 mg         4      7.5 mg         5      5 mg         6      7.5 mg         7      7.5 mg           8      7.5 mg         9      5 mg         10      7.5 mg         11      7.5 mg         12      5 mg         13      7.5 mg         14      7.5 mg           15      7.5 mg         16      5 mg         17      7.5 mg         18      7.5 mg         19      5 mg         20      7.5 mg         21      7.5 mg           22      7.5 mg         23      5 mg         24      7.5 mg         25      7.5 mg         26            27               28                 29               30               31                    Date Details   No additional details    Date of next INR:  7/26/2018         How to take your warfarin dose     To take:  5 mg Take 1 of the 5 mg tablets.    To take:  7.5 mg Take 1.5 of the 5 mg tablets.

## 2018-07-02 DIAGNOSIS — I48.91 ATRIAL FIBRILLATION (H): ICD-10-CM

## 2018-07-02 RX ORDER — DILTIAZEM HYDROCHLORIDE 300 MG/1
300 CAPSULE, COATED, EXTENDED RELEASE ORAL DAILY
Qty: 90 CAPSULE | Refills: 3 | Status: SHIPPED | OUTPATIENT
Start: 2018-07-02 | End: 2019-07-25

## 2018-07-29 DIAGNOSIS — E11.40 TYPE 2 DIABETES MELLITUS WITH DIABETIC NEUROPATHY, WITH LONG-TERM CURRENT USE OF INSULIN (H): Chronic | ICD-10-CM

## 2018-07-29 DIAGNOSIS — Z79.4 TYPE 2 DIABETES MELLITUS WITH DIABETIC NEUROPATHY, WITH LONG-TERM CURRENT USE OF INSULIN (H): Chronic | ICD-10-CM

## 2018-07-30 NOTE — TELEPHONE ENCOUNTER
"Last Written Prescription Date:  3/5/18  Last Fill Quantity: 100,  # refills: 3   Last office visit: 2/9/2018 with prescribing provider:     Future Office Visit:    Requested Prescriptions   Pending Prescriptions Disp Refills     BD GERMÁN U/F 32G X 4 MM insulin pen needle [Pharmacy Med Name: B-D PEN NDL GERMÁN [id number](5/32) GRN] 100 each 0     Sig: USE TO TEST FOUR TIMES DAILY OR AS DIRECTED    Diabetic Supplies Protocol Passed    7/29/2018  4:06 PM       Passed - Patient is 18 years of age or older       Passed - Recent (6 mo) or future (30 days) visit within the authorizing provider's specialty    Patient had office visit in the last 6 months or has a visit in the next 30 days with authorizing provider.  See \"Patient Info\" tab in inbasket, or \"Choose Columns\" in Meds & Orders section of the refill encounter.              "

## 2018-07-31 RX ORDER — PEN NEEDLE, DIABETIC 32GX 5/32"
NEEDLE, DISPOSABLE MISCELLANEOUS
Qty: 100 EACH | Refills: 0 | Status: SHIPPED | OUTPATIENT
Start: 2018-07-31 | End: 2018-09-06

## 2018-07-31 NOTE — TELEPHONE ENCOUNTER
Medication is being filled for 1 time refill only due to:  Patient needs to be seen because due for diabetes f/u.   Marj MADISON RN

## 2018-08-06 ENCOUNTER — ANTICOAGULATION THERAPY VISIT (OUTPATIENT)
Dept: CARDIOLOGY | Facility: CLINIC | Age: 73
End: 2018-08-06
Payer: COMMERCIAL

## 2018-08-06 ENCOUNTER — TRANSFERRED RECORDS (OUTPATIENT)
Dept: HEALTH INFORMATION MANAGEMENT | Facility: CLINIC | Age: 73
End: 2018-08-06

## 2018-08-06 DIAGNOSIS — Z79.01 LONG-TERM (CURRENT) USE OF ANTICOAGULANTS: ICD-10-CM

## 2018-08-06 DIAGNOSIS — I48.91 ATRIAL FIBRILLATION, UNSPECIFIED TYPE (H): ICD-10-CM

## 2018-08-06 LAB — INR POINT OF CARE: 2.7 (ref 0.86–1.14)

## 2018-08-06 PROCEDURE — 85610 PROTHROMBIN TIME: CPT | Mod: QW | Performed by: INTERNAL MEDICINE

## 2018-08-06 PROCEDURE — 36416 COLLJ CAPILLARY BLOOD SPEC: CPT | Performed by: INTERNAL MEDICINE

## 2018-08-06 NOTE — MR AVS SNAPSHOT
Jt Nguyen Valentina   8/6/2018 10:40 AM   Anticoagulation Therapy Visit    Description:  72 year old male   Provider:  YOLY ANTICOAGULATION   Department:  [facility]           INR as of 8/6/2018     Today's INR 2.7      Anticoagulation Summary as of 8/6/2018     INR goal 2.0-3.0   Today's INR 2.7   Full warfarin instructions 5 mg on Mon, Thu; 7.5 mg all other days   Next INR check 9/6/2018    Indications   Long-term (current) use of anticoagulants [Z79.01] [Z79.01]  Atrial fibrillation (AFIB) on Coumadin [I48.91]         Your next Anticoagulation Clinic appointment(s)     Sep 06, 2018 10:40 AM CDT   Anticoagulation Visit with  ANTICOAGULATION   [facility] ([facility])    [address] [place] [id number]   [phone] OPT 2              Contact Numbers     Anticoagulant (INR) Clinic Number: [phone]          August 2018 Details    Sun Mon Tue Wed Thu Fri Sat        1               2               3               4                 5               6      5 mg   See details      7      7.5 mg         8      7.5 mg         9      5 mg         10      7.5 mg         11      7.5 mg           12      7.5 mg         13      5 mg         14      7.5 mg         15      7.5 mg         16      5 mg         17      7.5 mg         18      7.5 mg           19      7.5 mg         20      5 mg         21      7.5 mg         22      7.5 mg         23      5 mg         24      7.5 mg         25      7.5 mg           26      7.5 mg         27      5 mg         28      7.5 mg         29      7.5 mg         30      5 mg         31      7.5 mg           Date Details   08/06 This INR check               How to take your warfarin dose     To take:  5 mg Take 1 of the 5 mg tablets.    To take:  7.5 mg Take 1.5 of the 5 mg tablets.           September 2018 Details    Sun Mon Tue Wed Thu Fri Sat           1      7.5 mg           2      7.5 mg         3       5 mg         4      7.5 mg         5      7.5 mg         6            7               8                 9               10               11               12               13               14               15                 16               17               18               19               20               21               22                 23               24               25               26               27               28               29                 30                      Date Details   No additional details    Date of next INR:  9/6/2018         How to take your warfarin dose     To take:  5 mg Take 1 of the 5 mg tablets.    To take:  7.5 mg Take 1.5 of the 5 mg tablets.

## 2018-08-06 NOTE — PROGRESS NOTES
ANTICOAGULATION FOLLOW-UP CLINIC VISIT    Patient Name:  Jt Montgomery  Date:  8/6/2018  Contact Type:  Face to Face    SUBJECTIVE:        OBJECTIVE    INR Protime   Date Value Ref Range Status   08/06/2018 2.7 (A) 0.86 - 1.14 Final       ASSESSMENT / PLAN  INR assessment THER    Recheck INR In: 4 WEEKS    INR Location Clinic      Anticoagulation Summary as of 8/6/2018     INR goal 2.0-3.0   Today's INR 2.7   Warfarin maintenance plan 5 mg (5 mg x 1) on Mon, Thu; 7.5 mg (5 mg x 1.5) all other days   Full warfarin instructions 5 mg on Mon, Thu; 7.5 mg all other days   Weekly warfarin total 47.5 mg   No change documented Marlena Hong RN   Plan last modified Marlena Hong RN (3/19/2018)   Next INR check 9/6/2018   Target end date Indefinite    Indications   Long-term (current) use of anticoagulants [Z79.01] [Z79.01]  Atrial fibrillation (AFIB) on Coumadin [I48.91]         Anticoagulation Episode Summary     INR check location     Preferred lab     Send INR reminders to [facility] HEART INR NURSE    Comments       Anticoagulation Care Providers     Provider Role Specialty Phone number    Lottie Gomez DO Minda [facility] [phone]            See the Encounter Report to view Anticoagulation Flowsheet and Dosing Calendar (Go to Encounters tab in chart review, and find the Anticoagulation Therapy Visit)    INR 2.7 No change in diet. Started taking a vitamin supplement called Green Vibrance 2 weeks ago (probiotic and herbal supplement with 39% of vit K requirement per their website). INR still in range with new vitamin supplement. No abnormal bleeding or bruising. Will continue current dosing of 5 mg MTh and 7.5 mg all other days with recheck in 4 weeks.     Marlena Hong, MARCELLA

## 2018-08-13 DIAGNOSIS — M1A.39X0 CHRONIC GOUT DUE TO RENAL IMPAIRMENT OF MULTIPLE SITES WITHOUT TOPHUS: Chronic | ICD-10-CM

## 2018-08-13 NOTE — TELEPHONE ENCOUNTER
Last Written Prescription Date:  1/24/2018  Last Fill Quantity: 10,  # refills: 1   Last office visit: 2/9/2018 with prescribing provider:     Future Office Visit:    Requested Prescriptions   Pending Prescriptions Disp Refills     predniSONE (DELTASONE) 20 MG tablet [Pharmacy Med Name: PREDNISONE 20MG TABLETS] 10 tablet 0     Sig: TAKE 1 TABLET(20 MG) BY MOUTH DAILY AS NEEDED FOR GOUT FLARE    There is no refill protocol information for this order        Micki Clarke MA 2:38 PM August 13, 2018

## 2018-08-15 RX ORDER — PREDNISONE 20 MG/1
TABLET ORAL
Qty: 10 TABLET | Refills: 0 | Status: SHIPPED | OUTPATIENT
Start: 2018-08-15 | End: 2019-02-25

## 2018-08-15 NOTE — TELEPHONE ENCOUNTER
Dr. Jones,  Routing refill request to provider for review/approval because:  Drug not on the [facility] [place] [facility] ref[facility]   Last Written Prescription Date:  1/24/2018  Last Fill Quantity: 10,  # refills: 1   Last office visit: 2/9/2018 with prescribing provider:     Future Office Visit:           Requested Prescriptions   Pending Prescriptions Disp Refills     predniSONE (DELTASONE) 20 MG tablet [Pharmacy Med Name: PREDNISONE 20MG TABLETS] 10 tablet 0       Sig: TAKE 1 TABLET(20 MG) BY MOUTH DAILY AS NEEDED FOR GOUT FLARE     There is no refill protocol information for this order          Micki Clarke MA 2:38 PM August 13, 2018     Thanks, Monica Mercedes RN

## 2018-08-15 NOTE — TELEPHONE ENCOUNTER
Reason for call:  Medication   If this is a refill request, has the caller requested the refill from the pharmacy already? Yes  Will the patient be using a [facility]? No  Name of the pharmacy and phone number for the current request: [facility] [id number] [place] [address] [address] AT [facility]    Name of the medication requested: predisone    Other request: Patient only has 2 pills left. He is also having a flare up and breaking his pills in half. He doesn't want to run out when he's still having a flare up. Please call to confirm once this has been sent to the pharmacy.    Phone number to reach patient:  Home number on file [phone] (home)    Best Time:  Any time    Can we leave a detailed message on this number?  YES     Franci STRINGER  Central Scheduler

## 2018-09-06 ENCOUNTER — OFFICE VISIT (OUTPATIENT)
Dept: FAMILY MEDICINE | Facility: CLINIC | Age: 73
End: 2018-09-06
Payer: COMMERCIAL

## 2018-09-06 VITALS
WEIGHT: 273 LBS | HEART RATE: 41 BPM | OXYGEN SATURATION: 95 % | HEIGHT: 73 IN | BODY MASS INDEX: 36.18 KG/M2 | SYSTOLIC BLOOD PRESSURE: 120 MMHG | TEMPERATURE: 98.4 F | DIASTOLIC BLOOD PRESSURE: 65 MMHG

## 2018-09-06 DIAGNOSIS — Z79.4 TYPE 2 DIABETES MELLITUS WITH DIABETIC NEUROPATHY, WITH LONG-TERM CURRENT USE OF INSULIN (H): Chronic | ICD-10-CM

## 2018-09-06 DIAGNOSIS — D22.39 MELANOCYTIC NEVUS OF FACE, OTHER LOCATION: Primary | ICD-10-CM

## 2018-09-06 DIAGNOSIS — E11.40 TYPE 2 DIABETES MELLITUS WITH DIABETIC NEUROPATHY, WITH LONG-TERM CURRENT USE OF INSULIN (H): Chronic | ICD-10-CM

## 2018-09-06 LAB — HBA1C MFR BLD: 6.1 % (ref 0–5.6)

## 2018-09-06 PROCEDURE — 36415 COLL VENOUS BLD VENIPUNCTURE: CPT | Performed by: INTERNAL MEDICINE

## 2018-09-06 PROCEDURE — 99214 OFFICE O/P EST MOD 30 MIN: CPT | Performed by: INTERNAL MEDICINE

## 2018-09-06 PROCEDURE — 83036 HEMOGLOBIN GLYCOSYLATED A1C: CPT | Performed by: INTERNAL MEDICINE

## 2018-09-06 NOTE — LETTER
[place] [address] [address]  [address]  [place]  [address]  Tel: [phone]    September 7, 2018    Jt Nguyen Valentina  [address] [place] [address]  [address]  [facility] [id number]        Dear Mr. Montgomery,    Hgb a1c lab result has improved. No change in meds     If you have any further questions or problems, please contact our office.      Sincerely,    Amanuel Jones MD/ Jada Crews CMA  Results for orders placed or performed in visit on 09/06/18   HEMOGLOBIN A1C   Result Value Ref Range    Hemoglobin A1C 6.1 (H) 0 - 5.6 %               Enclosure: Lab Results

## 2018-09-06 NOTE — PROGRESS NOTES
SUBJECTIVE:   Jt Montgomery is a 72 year old male who presents to clinic today for the following health issues:      Diabetes Follow-up    Patient is checking blood sugars: twice daily.    Blood sugar testing frequency justification: Uncontrolled diabetes  Results are as follows:         am - 148, 150         bedtime - 146, 139    Diabetic concerns: None     Symptoms of hypoglycemia (low blood sugar): none     Paresthesias (numbness or burning in feet) or sores: Yes      Date of last diabetic eye exam: Unknown    Diabetes Management Resources    Hyperlipidemia Follow-Up      Rate your low fat/cholesterol diet?: good    Taking statin?  Yes, no muscle aches from statin    Other lipid medications/supplements?:  Fish oil/Omega 3-5 g, dose  without side effects    Hypertension Follow-up      Outpatient blood pressures are not being checked.    Low Salt Diet: low salt    BP Readings from Last 2 Encounters:   06/14/18 119/64   05/22/18 128/62     Hemoglobin A1C (%)   Date Value   02/09/2018 6.3 (H)   03/06/2017 7.7 (H)     LDL Cholesterol Calculated (mg/dL)   Date Value   02/21/2018 75   04/29/2016     Cannot estimate LDL when triglyceride exceeds 400 mg/dL     LDL Cholesterol Direct (mg/dL)   Date Value   04/29/2016 99     Current Medications:     Current Outpatient Prescriptions   Medication Sig Dispense Refill     aspirin EC 81 MG EC tablet Take 1 tablet by mouth daily. 90 tablet 1     blood glucose monitoring (ACCU-CHEK LUIS PLUS) meter device kit Use to test blood sugar 3 times daily or as directed. 1 kit 0     blood glucose monitoring (ACCU-CHEK LUIS PLUS) test strip Use to test blood sugar 5 times daily or as directed. 400 strip 1     blood glucose monitoring (SOFTCLIX) lancets Use to test blood sugar 5 times daily or as directed. (Patient taking differently: Use to test blood sugar 2 times daily or as directed.) 1 Box prn     Coenzyme Q10 (COQ10) 100 MG CAPS Take 300 mg by mouth daily        digoxin  (LANOXIN) 125 MCG tablet Take 1 tablet (125 mcg) by mouth daily 90 tablet 3     diltiazem (CARTIA XT) 300 MG 24 hr capsule Take 1 capsule (300 mg) by mouth daily 90 capsule 3     febuxostat (ULORIC) 40 MG TABS Take 40 mg by mouth daily       fish oil-omega-3 fatty acids 1000 MG capsule Take 3-5 g by mouth every other day        furosemide (LASIX) 20 MG tablet TAKE 1 TABLET(20 MG) BY MOUTH DAILY (Patient taking differently: One tablet daily PRN) 90 tablet 1     Green Tea, Camillia sinensis, (GREEN TEA EXTRACT PO) Take by mouth 2 times daily Doesn't always take regularly       HERBALS Nerve by Plexis 2 a day,   Healthy Feet and Nerves  By [employer]  3 a day, Heart Food Caps  (cayenna pepper, garlic, hawthorn, onion & ginger 1-2 a day)       insulin aspart (NOVOLOG FLEXPEN) 100 UNIT/ML injection Inject 15 Units Subcutaneous 3 times daily (with meals) 45 mL 1     insulin glargine (LANTUS SOLOSTAR) 100 UNIT/ML pen ADMINISTER 35 UNITS UNDER THE SKIN DAILY 30 mL 11     insulin pen needle (BD GERMÁN U/F) 32G X 4 MM USE TO TEST FOUR TIMES DAILY OR AS DIRECTED 200 each 11     insulin pen needle (BD GERMÁN U/F) 32G X 4 MM Use four times daily or as directed. 100 each 3     irbesartan (AVAPRO) 300 MG tablet Take 1 tablet (300 mg) by mouth At Bedtime 90 tablet 2     Magnesium 125 MG CAPS Take by mouth daily       MELATONIN PO Take 0.5 mg by mouth nightly as needed       metoprolol succinate (TOPROL-XL) 100 MG 24 hr tablet Take 1 tablet (100 mg) by mouth 2 times daily 180 tablet 3     multivitamin, therapeutic with minerals (MULTI-VITAMIN) TABS tablet [employer]       ORDER FOR DME Equipment being ordered: True Track Meter from [email or web address] 1 each 0     predniSONE (DELTASONE) 20 MG tablet TAKE 1 TABLET(20 MG) BY MOUTH DAILY AS NEEDED FOR GOUT FLARE 10 tablet 0     Probiotic Product (PROBIOTIC & ACIDOPHILUS EX ST PO) Take 1 capsule by mouth daily       Resveratrol 100 MG CAPS Take 2 capsules by mouth daily Shaklee Vivix        spironolactone (ALDACTONE) 25 MG tablet Take 1 tablet (25 mg) by mouth daily 90 tablet 3     UNABLE TO FIND MEDICATION NAME: Super Beet Juice       warfarin (COUMADIN) 5 MG tablet Take 1 tab on Mondays and Thursdays and take 1 1/2 tabs on all other days or as directed by INR clinic 145 tablet 1         Allergies:      Allergies   Allergen Reactions     Corn Dextrin      All corn products - gets tired an ornery            Past Medical History:     Past Medical History:   Diagnosis Date     Atrial fibrillation (H)      CAD (coronary artery disease)     MI with RONEL to dRCA April 2011     Central Sleep Apnea with Pat Villanueva breathing 9/14/2010    Also TOMMY with CPAP     CKD (chronic kidney disease) stage 3, GFR 30-59 ml/min      Dilated cardiomyopathy (H)     nonischemic (possibly related to h/o a.fib with RVR) EF 30-40% March 2013     DM2 (diabetes mellitus, type 2) (H)     Goal HgbA1c < 7%     Gout     uric acid level correlates; April 2014 h/o +knee aspirate     Hypertension     Goal <140/90     Pulmonary hypertension     mild per echo 3/2013         Past Surgical History:     Past Surgical History:   Procedure Laterality Date     CORONARY ANGIOGRAPHY ADULT ORDER  2011    distal circ-RONEL     HERNIA REPAIR  11/20/10    incarcinated     SUSPEND HYOID, GENIOGLOSSAL ADVANCEMENT           Family Medical History:     Family History   Problem Relation Age of Onset     Hypertension Mother      Coronary Artery Disease Mother      Coronary Artery Disease Father      Hypertension Father      Diabetes Sister      Cerebrovascular Disease Sister          Social History:     Social History     Social History     Marital status:      Spouse name: N/A     Number of children: N/A     Years of education: N/A     Occupational History     Not on file.     Social History Main Topics     Smoking status: Former Smoker     Packs/day: 1.50     Years: 7.00     Types: Cigarettes     Quit date: 1/1/1972     Smokeless tobacco:  "Never Used      Comment: quit in 1971      Started at around age 18-19     Alcohol use No      Comment: 11/20/73   recovering     Drug use: No     Sexual activity: Not Currently     Partners: Female     Other Topics Concern     Caffeine Concern No     Sleep Concern Yes     sleep apnea, wears bi-pap     Special Diet No     low carb diet     Exercise No     walking     Seat Belt Yes     Social History Narrative           Review of System:     Constitutional: Negative for fever or chills  Skin: Positive for chronic left face skin lesion  Ears/Nose/Throat: Negative for nasal congestion, sore throat  Respiratory: No shortness of breath, dyspnea on exertion, cough, or hemoptysis  Cardiovascular: Negative for chest pain  Gastrointestinal: Negative for nausea, vomiting  Genitourinary: Negative for dysuria, hematuria  Musculoskeletal: Negative for myalgias  Neurologic: Negative for headaches  Psychiatric: Negative for depression, anxiety  Hematologic/Lymphatic/Immunologic: Negative  Endocrine: Negative for hypoglycemia events  Behavioral: Negative for tobacco use       Physical Exam:   /65 (BP Location: Left arm, Cuff Size: Adult Large)  Pulse (!) 41  Temp 98.4  F (36.9  C) (Oral)  Ht 6' 1\" (1.854 m)  Wt 273 lb (123.8 kg)  SpO2 95%  BMI 36.02 kg/m2    GENERAL: alert and no distress  EYES: eyes grossly normal to inspection, and conjunctivae and sclerae normal  HENT: Normocephalic atraumatic. Nose and mouth without ulcers or lesions  NECK: supple  RESP: lungs clear to auscultation   CV: regular rate and rhythm, normal S1 S2  LYMPH: no peripheral edema   ABDOMEN: nondistended  MS: no gross musculoskeletal defects noted  SKIN: chronic appearing skin lesion of the left face present  NEURO: Alert & Oriented x 3.   PSYCH: mentation appears normal, affect normal        Diagnostic Test Results:     Diagnostic Test Results:  Results for orders placed or performed in visit on 08/06/18   INR point of care   Result Value Ref " Range    INR Protime 2.7 (A) 0.86 - 1.14       ASSESSMENT/PLAN:       (D22.39) Melanocytic nevus of face, other location  (primary encounter diagnosis)  Comment: chronic skin lesion of the left face of unclear etiology  Plan: I have ordered SKIN CARE REFERRAL with the [facility] going forward.           (E11.40,  Z79.4) Type 2 diabetes mellitus with diabetic neuropathy, with long-term current use of insulin (H)  Comment: no recent hypoglycemia events. Patient is due for a repeat Hgb A1c lab and refill of insulin pen needles.  Plan: I have ordered HEMOGLOBIN A1C lab to monitor Type 2 Diabetes control and refill of insulin pen needle (BD GERMÁN U/F) 32G X 4 MM.      Follow Up Plan:     Patient is instructed to return to Internal Medicine clinic for follow-up visit in 6 months.        Digna Jones MD  Internal Medicine  [facility]

## 2018-09-06 NOTE — MR AVS SNAPSHOT
After Visit Summary   9/6/2018    Jt Montgomery    MRN: [id number]           Patient Information     Date Of Birth          1945        Visit Information        Provider Department      9/6/2018 2:00 PM Digna Jones MD [facility]        Today's Diagnoses     Melanocytic nevus of face, other location    -  1    Type 2 diabetes mellitus with diabetic neuropathy, with long-term current use of insulin (H)           Follow-ups after your visit        Additional Services     SKIN CARE REFERRAL       Your provider has referred you to: FMG: [facility] [phone] (Dr. Isai Sun Jr.)   [email or web address]  FMG: [facility] [place] [phone]  [email or web address]     Please be aware that coverage of these services is subject to the terms and limitations of your health insurance plan.  Please check with your insurance prior to the appointment to ensure appropriate coverage for any services considered cosmetic in nature or not medically necessary.    Please bring the following with you to your appointment:    (1) Any X-Rays, CTs or MRIs which have been performed.  Contact the facility where they were done to arrange for  prior to your scheduled appointment.  Any new CT, MRI or other procedures ordered by your specialist must be performed at a [facility] facility or coordinated by your clinic's referral office.  (2) List of current medications  (3) This referral request   (4) Any documents/labs given to you for this referral                  Your next 10 appointments already scheduled     Sep 11, 2018 10:40 AM CDT   Anticoagulation Visit with [facility] ANTICOAGULATION   [facility] ([facility])    [facility] [address]   [phone] OPT 2            Mar 06, 2019  9:20 AM CST   Office  "Visit with Digna Jones MD   [facility] ([facility])    [address] [facility] [address] [phone]           Bring a current list of meds and any records pertaining to this visit. For Physicals, please bring immunization records and any forms needing to be filled out. Please arrive 10 minutes early to complete paperwork.            Jun 13, 2019 11:30 AM CDT   Return Sleep Patient with Edvin Alanis MD   [facility] ([facility])    [address] [address] [phone]              Who to contact     If you have questions or need follow up information about today's clinic visit or your schedule please contact [facility] directly at [phone].  Normal or non-critical lab and imaging results will be communicated to you by MyChart, letter or phone within 4 business days after the clinic has received the results. If you do not hear from us within 7 days, please contact the clinic through MyChart or phone. If you have a critical or abnormal lab result, we will notify you by phone as soon as possible.  Submit refill requests through [employer] or call your pharmacy and they will forward the refill request to us. Please allow 3 business days for your refill to be completed.          Additional Information About Your Visit        Care EveryWhere ID     This is your Care EveryWhere ID. This could be used by other organizations to access your [facility] medical records  [id number]        Your Vitals Were     Pulse Temperature Height Pulse Oximetry BMI (Body Mass Index)       41 98.4  F (36.9  C) (Oral) 6' 1\" (1.854 m) 95% 36.02 kg/m2        Blood Pressure from Last 3 Encounters:   09/06/18 120/65   06/14/18 119/64   05/22/18 128/62    Weight from Last 3 Encounters:   09/06/18 273 lb (123.8 kg)   06/14/18 272 lb (123.4 kg)   05/14/18 273 lb 9.6 oz (124.1 kg)              We Performed the " Following     HEMOGLOBIN A1C     SKIN CARE REFERRAL          Today's Medication Changes          These changes are accurate as of 9/6/18  2:48 PM.  If you have any questions, ask your nurse or doctor.               These medicines have changed or have updated prescriptions.        Dose/Directions    blood glucose monitoring lancets   This may have changed:  additional instructions   Used for:  Uncontrolled diabetes mellitus with complications (H)        Use to test blood sugar 5 times daily or as directed.   Quantity:  1 Box   Refills:  prn       furosemide 20 MG tablet   Commonly known as:  LASIX   This may have changed:  See the new instructions.   Used for:  Dilated cardiomyopathy (H)        TAKE 1 TABLET(20 MG) BY MOUTH DAILY   Quantity:  90 tablet   Refills:  1         Stop taking these medicines if you haven't already. Please contact your care team if you have questions.     melatonin 1 MG Tabs tablet   Stopped by:  Digna Jones MD                Where to get your medicines      These medications were sent to [facility] [address] [place] [address] AT [facility] & [address], [facility] [address]     Phone:  [phone]     insulin pen needle 32G X 4 MM                Primary Care Provider Office Phone # Fax #    Digna Jones [phone] [address] [facility] [address]  [place] [address]        Equal Access to Services     FAZAL [facility] Center[facility]ON AH: Hadii aad ku hadasho Soomaali, waaxda luqadaha, qaybta kaalmada adeegyada, waxay renee haywes harris . So [facility] [phone].    ATENCIÓN: Si habla español, tiene a jones disposición servicios gratuitos de asistencia lingüística. Llame al [phone].    We comply with applicable federal civil rights laws and [place] laws. We do not discriminate on the basis of race, color, national origin, age, disability, sex, sexual orientation, or gender identity.            Thank you!      Thank you for choosing [facility]  for your care. Our goal is always to provide you with excellent care. Hearing back from our patients is one way we can continue to improve our services. Please take a few minutes to complete the written survey that you may receive in the mail after your visit with us. Thank you!             Your Updated Medication List - Protect others around you: Learn how to safely use, store and throw away your medicines at www.disposemymeds.org.          This list is accurate as of 9/6/18  2:48 PM.  Always use your most recent med list.                   Brand Name Dispense Instructions for use Diagnosis    aspirin 81 MG EC tablet     90 tablet    Take 1 tablet by mouth daily.    Atrial fibrillation with RVR (H), Dilated cardiomyopathy (H)       blood glucose monitoring lancets     1 Box    Use to test blood sugar 5 times daily or as directed.    Uncontrolled diabetes mellitus with complications (H)       blood glucose monitoring meter device kit     1 kit    Use to test blood sugar 3 times daily or as directed.    Uncontrolled diabetes mellitus with complications (H)       blood glucose monitoring test strip    ACCU-CHEK LUIS PLUS    400 strip    Use to test blood sugar 5 times daily or as directed.    Uncontrolled type 2 diabetes mellitus with complication, with long-term current use of insulin (H)       CoQ10 100 MG Caps      Take 300 mg by mouth daily    Uncontrolled diabetes mellitus with complications (H)       digoxin 125 MCG tablet    LANOXIN    90 tablet    Take 1 tablet (125 mcg) by mouth daily    Atrial fibrillation (H)       diltiazem 300 MG 24 hr capsule    CARTIA XT    90 capsule    Take 1 capsule (300 mg) by mouth daily    Atrial fibrillation (H)       fish oil-omega-3 fatty acids 1000 MG capsule      Take 3-5 g by mouth every other day        furosemide 20 MG tablet    LASIX    90 tablet    TAKE 1 TABLET(20 MG) BY MOUTH DAILY    Dilated cardiomyopathy (H)        GREEN TEA EXTRACT PO      Take by mouth 2 times daily Doesn't always take regularly        HERBALS      Nerve by Plexis 2 a day,   Healthy Feet and Nerves  By [employer]  3 a day, Heart Food Caps  (cayenna pepper, garlic, hawthorn, onion & ginger 1-2 a day)        insulin aspart 100 UNIT/ML injection    NovoLOG FLEXPEN    45 mL    Inject 15 Units Subcutaneous 3 times daily (with meals)    Type 2 diabetes mellitus with diabetic neuropathy, with long-term current use of insulin (H)       insulin glargine 100 UNIT/ML injection    LANTUS SOLOSTAR    30 mL    ADMINISTER 35 UNITS UNDER THE SKIN DAILY    Type 2 diabetes mellitus with diabetic neuropathy, with long-term current use of insulin (H)       * insulin pen needle 32G X 4 MM    BD GERMÁN U/F    100 each    Use four times daily or as directed.    Uncontrolled diabetes mellitus with complications (H)       * insulin pen needle 32G X 4 MM    BD GERMÁN U/F    200 each    USE TO TEST FOUR TIMES DAILY OR AS DIRECTED    Type 2 diabetes mellitus with diabetic neuropathy, with long-term current use of insulin (H)       irbesartan 300 MG tablet    AVAPRO    90 tablet    Take 1 tablet (300 mg) by mouth At Bedtime    HTN (hypertension)       Magnesium 125 MG Caps      Take by mouth daily        MELATONIN PO      Take 0.5 mg by mouth nightly as needed        metoprolol succinate 100 MG 24 hr tablet    TOPROL-XL    180 tablet    Take 1 tablet (100 mg) by mouth 2 times daily    Coronary artery disease involving native coronary artery of native heart with angina pectoris (H)       Multi-vitamin Tabs tablet      Shaklee & Life Extenstion        order for DME     1 each    Equipment being ordered: True Track Meter from [facility]    Uncontrolled diabetes mellitus with complications (H)       predniSONE 20 MG tablet    DELTASONE    10 tablet    TAKE 1 TABLET(20 MG) BY MOUTH DAILY AS NEEDED FOR GOUT FLARE    Chronic gout due to renal impairment of multiple sites without tophus        PROBIOTIC & ACIDOPHILUS EX ST PO      Take 1 capsule by mouth daily        Resveratrol 100 MG Caps      Take 2 capsules by mouth daily Shatomasa Segal        spironolactone 25 MG tablet    ALDACTONE    90 tablet    Take 1 tablet (25 mg) by mouth daily    Atrial fibrillation, unspecified type (H)       ULORIC 40 MG Tabs tablet   Generic drug:  febuxostat      Take 40 mg by mouth daily        UNABLE TO FIND      MEDICATION NAME: Super Beet Juice        warfarin 5 MG tablet    COUMADIN    145 tablet    Take 1 tab on Mondays and Thursdays and take 1 1/2 tabs on all other days or as directed by INR clinic    Long term current use of anticoagulant therapy, Atrial fibrillation (H)       * Notice:  This list has 2 medication(s) that are the same as other medications prescribed for you. Read the directions carefully, and ask your doctor or other care provider to review them with you.

## 2018-09-07 NOTE — TELEPHONE ENCOUNTER
"Duplicate   Rx sent yesterday  Marj MADISON RN    Requested Prescriptions   Pending Prescriptions Disp Refills     insulin pen needle (BD GERMÁN U/F) 32G X 4  each 0     Sig: Use  pen needles daily or as directed.    Diabetic Supplies Protocol Passed    9/6/2018  2:31 PM       Passed - Patient is 18 years of age or older       Passed - Recent (6 mo) or future (30 days) visit within the authorizing provider's specialty    Patient had office visit in the last 6 months or has a visit in the next 30 days with authorizing provider.  See \"Patient Info\" tab in inbasket, or \"Choose Columns\" in Meds & Orders section of the refill encounter.                "

## 2018-09-11 ENCOUNTER — ANTICOAGULATION THERAPY VISIT (OUTPATIENT)
Dept: CARDIOLOGY | Facility: CLINIC | Age: 73
End: 2018-09-11
Payer: COMMERCIAL

## 2018-09-11 DIAGNOSIS — Z79.01 LONG-TERM (CURRENT) USE OF ANTICOAGULANTS: ICD-10-CM

## 2018-09-11 DIAGNOSIS — I48.91 ATRIAL FIBRILLATION, UNSPECIFIED TYPE (H): ICD-10-CM

## 2018-09-11 LAB — INR POINT OF CARE: 2.8 (ref 0.86–1.14)

## 2018-09-11 PROCEDURE — 36416 COLLJ CAPILLARY BLOOD SPEC: CPT | Performed by: INTERNAL MEDICINE

## 2018-09-11 PROCEDURE — 85610 PROTHROMBIN TIME: CPT | Mod: QW | Performed by: INTERNAL MEDICINE

## 2018-09-11 NOTE — PROGRESS NOTES
ANTICOAGULATION FOLLOW-UP CLINIC VISIT    Patient Name:  Jt Montgomery  Date:  9/11/2018  Contact Type:  Face to Face    SUBJECTIVE:     Patient Findings     Positives OTC meds (stopped taking the green supplement 1 1/2 weeks ago and started taking fish oil 5 gm daily)           OBJECTIVE    INR Protime   Date Value Ref Range Status   09/11/2018 2.8 (A) 0.86 - 1.14 Final       ASSESSMENT / PLAN  INR assessment THER    Recheck INR In: 2 WEEKS    INR Location Clinic      Anticoagulation Summary as of 9/11/2018     INR goal 2.0-3.0   Today's INR 2.8   Warfarin maintenance plan 5 mg (5 mg x 1) on Mon, Thu; 7.5 mg (5 mg x 1.5) all other days   Full warfarin instructions 5 mg on Mon, Thu; 7.5 mg all other days   Weekly warfarin total 47.5 mg   No change documented Marlena Hong, RN   Plan last modified Marlena Hong, RN (3/19/2018)   Next INR check 9/25/2018   Target end date Indefinite    Indications   Long-term (current) use of anticoagulants [Z79.01] [Z79.01]  Atrial fibrillation (AFIB) on Coumadin [I48.91]         Anticoagulation Episode Summary     INR check location     Preferred lab     Send INR reminders to [facility] HEART INR NURSE    Comments       Anticoagulation Care Providers     Provider Role Specialty Phone number    Patricia Lottie Perla DO [facility] Cardiology [phone]            See the Encounter Report to view Anticoagulation Flowsheet and Dosing Calendar (Go to Encounters tab in chart review, and find the Anticoagulation Therapy Visit)    INR 2.8 He stopped taking the green supplement 1 1/2 weeks ago per advice from his chiropractor. He had also increased his dose of fish oil to 5 gm daily within the last month. Advised that high dose of fish oil along with warfarin would increase his bleeding risk. He denies abnormal bleeding or bruising. No change in diet. Will continue current dosing of 5 mg MTh and 7.5 mg all other days with recheck in 2 weeks because of increase dose of fish  oil.     Marlena Hong RN

## 2018-09-11 NOTE — MR AVS SNAPSHOT
Jt Berrylinh Montgomery   9/11/2018 10:40 AM   Anticoagulation Therapy Visit    Description:  72 year old male   Provider:  YOLY ANTICOAGULATION   Department:  [facility]           INR as of 9/11/2018     Today's INR 2.8      Anticoagulation Summary as of 9/11/2018     INR goal 2.0-3.0   Today's INR 2.8   Full warfarin instructions 5 mg on Mon, Thu; 7.5 mg all other days   Next INR check 9/25/2018    Indications   Long-term (current) use of anticoagulants [Z79.01] [Z79.01]  Atrial fibrillation (AFIB) on Coumadin [I48.91]         Your next Anticoagulation Clinic appointment(s)     Sep 25, 2018 10:40 AM CDT   Anticoagulation Visit with  ANTICOAGULATION   [facility] ([facility])    [address] [place] [address]   [phone] OPT 2              Contact Numbers     Anticoagulant (INR) Clinic Number: [phone]          September 2018 Details    Sun Mon Tue Wed Thu Fri Sat           1                 2               3               4               5               6               7               8                 9               10               11      7.5 mg   See details      12      7.5 mg         13      5 mg         14      7.5 mg         15      7.5 mg           16      7.5 mg         17      5 mg         18      7.5 mg         19      7.5 mg         20      5 mg         21      7.5 mg         22      7.5 mg           23      7.5 mg         24      5 mg         25            26               27               28               29                 30                      Date Details   09/11 This INR check       Date of next INR:  9/25/2018         How to take your warfarin dose     To take:  5 mg Take 1 of the 5 mg tablets.    To take:  7.5 mg Take 1.5 of the 5 mg tablets.

## 2018-09-28 ENCOUNTER — ANTICOAGULATION THERAPY VISIT (OUTPATIENT)
Dept: CARDIOLOGY | Facility: CLINIC | Age: 73
End: 2018-09-28
Payer: COMMERCIAL

## 2018-09-28 DIAGNOSIS — I48.91 ATRIAL FIBRILLATION, UNSPECIFIED TYPE (H): ICD-10-CM

## 2018-09-28 DIAGNOSIS — Z79.01 LONG-TERM (CURRENT) USE OF ANTICOAGULANTS: ICD-10-CM

## 2018-09-28 LAB — INR POINT OF CARE: 3.1 (ref 0.86–1.14)

## 2018-09-28 PROCEDURE — 85610 PROTHROMBIN TIME: CPT | Mod: QW | Performed by: INTERNAL MEDICINE

## 2018-09-28 PROCEDURE — 36416 COLLJ CAPILLARY BLOOD SPEC: CPT | Performed by: INTERNAL MEDICINE

## 2018-09-28 PROCEDURE — 99207 ZZC NO CHARGE NURSE ONLY: CPT | Performed by: INTERNAL MEDICINE

## 2018-09-28 NOTE — PROGRESS NOTES
ANTICOAGULATION FOLLOW-UP CLINIC VISIT    Patient Name:  Jt Montgomery  Date:  9/28/2018  Contact Type:  Face to Face    SUBJECTIVE:     Patient Findings     Positives Herbal/Supplements (Started supplements a couple of days ago)           OBJECTIVE    INR Protime   Date Value Ref Range Status   09/28/2018 3.1 (A) 0.86 - 1.14 Final       ASSESSMENT / PLAN  INR assessment SUPRA    Recheck INR In: 1 WEEK    INR Location Clinic      Anticoagulation Summary as of 9/28/2018     INR goal 2.0-3.0   Today's INR 3.1!   Warfarin maintenance plan 5 mg (5 mg x 1) on Mon, Thu; 7.5 mg (5 mg x 1.5) all other days   Full warfarin instructions 9/28: 5 mg; Otherwise 5 mg on Mon, Thu; 7.5 mg all other days   Weekly warfarin total 47.5 mg   Plan last modified Marlena Hong RN (3/19/2018)   Next INR check 10/5/2018   Target end date Indefinite    Indications   Long-term (current) use of anticoagulants [Z79.01] [Z79.01]  Atrial fibrillation (AFIB) on Coumadin [I48.91]         Anticoagulation Episode Summary     INR check location     Preferred lab     Send INR reminders to [facility] HEART INR NURSE    Comments       Anticoagulation Care Providers     Provider Role Specialty Phone number    Lottie Gomez Minda,  [facility] [phone]            See the Encounter Report to view Anticoagulation Flowsheet and Dosing Calendar (Go to Encounters tab in chart review, and find the Anticoagulation Therapy Visit)    INR 3.1 Decreased today's dose to 5 mg then resume usual dosing. Pt states he started seeing a Kinesiologist and was started on 5-6 supplements. Continues Fish oil. Pt did not bring them in. Denies unusual bleeding or bruising. Eating greens daily per usual. Next appt in 1 week-pt will bring in the supplements to review contents and determine interaction with warfarin.  Dosage adjustment made based on physician directed care plan.    Helga Luz, RN

## 2018-09-28 NOTE — MR AVS SNAPSHOT
Jt Berrylinh Montgomery   9/28/2018 11:00 AM   Anticoagulation Therapy Visit    Description:  72 year old male   Provider:  YOLY ANTICOAGULATION   Department:  [facility]           INR as of 9/28/2018     Today's INR 3.1!      Anticoagulation Summary as of 9/28/2018     INR goal 2.0-3.0   Today's INR 3.1!   Full warfarin instructions 9/28: 5 mg; Otherwise 5 mg on Mon, Thu; 7.5 mg all other days   Next INR check 10/5/2018    Indications   Long-term (current) use of anticoagulants [Z79.01] [Z79.01]  Atrial fibrillation (AFIB) on Coumadin [I48.91]         Your next Anticoagulation Clinic appointment(s)     Oct 05, 2018  9:40 AM CDT   Anticoagulation Visit with  ANTICOAGULATION   [facility] ([facility])    [address] [place] [address]   [phone] OPT 2              Contact Numbers     Anticoagulant (INR) Clinic Number: [phone]          September 2018 Details    Sun Mon Tue Wed Thu Fri Sat           1                 2               3               4               5               6               7               8                 9               10               11               12               13               14               15                 16               17               18               19               20               21               22                 23               24               25               26               27               28      5 mg   See details      29      7.5 mg           30      7.5 mg                Date Details   09/28 This INR check               How to take your warfarin dose     To take:  5 mg Take 1 of the 5 mg tablets.    To take:  7.5 mg Take 1.5 of the 5 mg tablets.           October 2018 Details    Sun Mon Tue Wed Thu Fri Sat      1      5 mg         2      7.5 mg         3      7.5 mg         4      5 mg         5            6                 7               8               9                10               11               12               13                 14               15               16               17               18               19               20                 21               22               23               24               25               26               27                 28               29               30               31                   Date Details   No additional details    Date of next INR:  10/5/2018         How to take your warfarin dose     To take:  5 mg Take 1 of the 5 mg tablets.    To take:  7.5 mg Take 1.5 of the 5 mg tablets.

## 2018-10-05 ENCOUNTER — ANTICOAGULATION THERAPY VISIT (OUTPATIENT)
Dept: CARDIOLOGY | Facility: CLINIC | Age: 73
End: 2018-10-05
Payer: COMMERCIAL

## 2018-10-05 DIAGNOSIS — I48.91 ATRIAL FIBRILLATION, UNSPECIFIED TYPE (H): ICD-10-CM

## 2018-10-05 DIAGNOSIS — Z79.01 LONG TERM CURRENT USE OF ANTICOAGULANTS WITH INR GOAL OF 2.0-3.0: ICD-10-CM

## 2018-10-05 LAB — INR POINT OF CARE: 3.5 (ref 0.86–1.14)

## 2018-10-05 PROCEDURE — 36416 COLLJ CAPILLARY BLOOD SPEC: CPT | Performed by: INTERNAL MEDICINE

## 2018-10-05 PROCEDURE — 99207 ZZC NO CHARGE NURSE ONLY: CPT | Performed by: INTERNAL MEDICINE

## 2018-10-05 PROCEDURE — 85610 PROTHROMBIN TIME: CPT | Mod: QW | Performed by: INTERNAL MEDICINE

## 2018-10-05 NOTE — MR AVS SNAPSHOT
Jt Berrylinh Montgomery   10/5/2018 9:40 AM   Anticoagulation Therapy Visit    Description:  72 year old male   Provider:  YOLY ANTICOAGULATION   Department:  [facility]           INR as of 10/5/2018     Today's INR 3.5!      Anticoagulation Summary as of 10/5/2018     INR goal 2.0-3.0   Today's INR 3.5!   Full warfarin instructions 7.5 mg on Sun, Tue, Thu; 5 mg all other days   Next INR check 10/18/2018    Indications   Atrial fibrillation (AFIB) on Coumadin [I48.91]  Long term current use of anticoagulants with INR goal of 2.0-3.0 [Z79.01]         Your next Anticoagulation Clinic appointment(s)     Oct 18, 2018 11:20 AM CDT   Anticoagulation Visit with  ANTICOAGULATION   [facility] ([facility])    [address] [place] [address]   [phone] OPT 2              Contact Numbers     Anticoagulant (INR) Clinic Number: [phone]          October 2018 Details    Sun Mon Tue Wed Thu Fri Sat      1               2               3               4               5      5 mg   See details      6      5 mg           7      7.5 mg         8      5 mg         9      7.5 mg         10      5 mg         11      7.5 mg         12      5 mg         13      5 mg           14      7.5 mg         15      5 mg         16      7.5 mg         17      5 mg         18            19               20                 21               22               23               24               25               26               27                 28               29               30               31                   Date Details   10/05 This INR check       Date of next INR:  10/18/2018         How to take your warfarin dose     To take:  5 mg Take 1 of the 5 mg tablets.    To take:  7.5 mg Take 1.5 of the 5 mg tablets.

## 2018-10-05 NOTE — PROGRESS NOTES
ANTICOAGULATION FOLLOW-UP CLINIC VISIT    Patient Name:  Jt Montgomery  Date:  10/5/2018  Contact Type:  Face to Face    SUBJECTIVE:     Patient Findings     Positives OTC meds (has been taking less OTC vitamin products (stopped Shaklee products and now taking Vit B, Vti D, pyridoxal phosphate, pancreatic enzymes))           OBJECTIVE    INR Protime   Date Value Ref Range Status   10/05/2018 3.5 (A) 0.86 - 1.14 Final       ASSESSMENT / PLAN  INR assessment SUPRA    Recheck INR In: 2 WEEKS    INR Location Clinic      Anticoagulation Summary as of 10/5/2018     INR goal 2.0-3.0   Today's INR 3.5!   Warfarin maintenance plan 7.5 mg (5 mg x 1.5) on Sun, Tue, Thu; 5 mg (5 mg x 1) all other days   Full warfarin instructions 7.5 mg on Sun, Tue, Thu; 5 mg all other days   Weekly warfarin total 42.5 mg   Plan last modified Marlena Hong RN (10/5/2018)   Next INR check 10/18/2018   Target end date Indefinite    Indications   Atrial fibrillation (AFIB) on Coumadin [I48.91]  Long term current use of anticoagulants with INR goal of 2.0-3.0 [Z79.01]         Anticoagulation Episode Summary     INR check location     Preferred lab     Send INR reminders to [facility] HEART INR NURSE    Comments       Anticoagulation Care Providers     Provider Role Specialty Phone number    Lottie GomezDO [facility] Cardiology [phone]            See the Encounter Report to view Anticoagulation Flowsheet and Dosing Calendar (Go to Encounters tab in chart review, and find the Anticoagulation Therapy Visit)    INR 3.5 INR darrel even with decrease in dosing last week. He is still taking large doses of fish oil, vit B, Vit D, pancreatic enzymes, pyridoxal phosphate but stopped some other vitamin supplements that he did not bring in (shaklee supplements). No change in diet. Will decrease dosing further to 7.5 mg SuTuTh and 5 mg all other days with recheck in 2 weeks. He will be seeing a new kinesthiologist next week and will  bring in any new meds to that appt. Dosage adjustment made based on physician directed care plan.    Marlena Hong RN

## 2018-10-18 ENCOUNTER — ANTICOAGULATION THERAPY VISIT (OUTPATIENT)
Dept: CARDIOLOGY | Facility: CLINIC | Age: 73
End: 2018-10-18
Payer: COMMERCIAL

## 2018-10-18 DIAGNOSIS — I48.91 ATRIAL FIBRILLATION, UNSPECIFIED TYPE (H): ICD-10-CM

## 2018-10-18 DIAGNOSIS — Z79.01 LONG TERM CURRENT USE OF ANTICOAGULANTS WITH INR GOAL OF 2.0-3.0: ICD-10-CM

## 2018-10-18 LAB — INR POINT OF CARE: 2.4 (ref 0.86–1.14)

## 2018-10-18 PROCEDURE — 36416 COLLJ CAPILLARY BLOOD SPEC: CPT | Performed by: INTERNAL MEDICINE

## 2018-10-18 PROCEDURE — 99207 ZZC NO CHARGE NURSE ONLY: CPT | Performed by: INTERNAL MEDICINE

## 2018-10-18 PROCEDURE — 85610 PROTHROMBIN TIME: CPT | Mod: QW | Performed by: INTERNAL MEDICINE

## 2018-10-18 NOTE — MR AVS SNAPSHOT
Jt Nguyen Valentina   10/18/2018 11:20 AM   Anticoagulation Therapy Visit    Description:  72 year old male   Provider:  YOLY ANTICOAGULATION   Department:  [facility]           INR as of 10/18/2018     Today's INR 2.4      Anticoagulation Summary as of 10/18/2018     INR goal 2.0-3.0   Today's INR 2.4   Full warfarin instructions 7.5 mg on Sun, Tue, Thu; 5 mg all other days   Next INR check 11/1/2018    Indications   Atrial fibrillation (AFIB) on Coumadin [I48.91]  Long term current use of anticoagulants with INR goal of 2.0-3.0 [Z79.01]         Your next Anticoagulation Clinic appointment(s)     Nov 01, 2018 11:20 AM CDT   Anticoagulation Visit with  ANTICOAGULATION   [facility] ([facility])    [address] [place] [address]   [phone] OPT 2              Contact Numbers     Anticoagulant (INR) Clinic Number: [phone]          October 2018 Details    Sun Mon Tue Wed Thu Fri Sat      1               2               3               4               5               6                 7               8               9               10               11               12               13                 14               15               16               17               18      7.5 mg   See details      19      5 mg         20      5 mg           21      7.5 mg         22      5 mg         23      7.5 mg         24      5 mg         25      7.5 mg         26      5 mg         27      5 mg           28      7.5 mg         29      5 mg         30      7.5 mg         31      5 mg             Date Details   10/18 This INR check               How to take your warfarin dose     To take:  5 mg Take 1 of the 5 mg tablets.    To take:  7.5 mg Take 1.5 of the 5 mg tablets.           November 2018 Details    Sun Mon Tue Wed Thu Fri Sat         1            2               3                 4               5               6               7                8               9               10                 11               12               13               14               15               16               17                 18               19               20               21               22               23               24                 25               26               27               28               29               30                 Date Details   No additional details    Date of next INR:  11/1/2018         How to take your warfarin dose     To take:  7.5 mg Take 1.5 of the 5 mg tablets.

## 2018-10-23 ENCOUNTER — TRANSFERRED RECORDS (OUTPATIENT)
Dept: HEALTH INFORMATION MANAGEMENT | Facility: CLINIC | Age: 73
End: 2018-10-23

## 2018-10-24 ENCOUNTER — DOCUMENTATION ONLY (OUTPATIENT)
Dept: SLEEP MEDICINE | Facility: CLINIC | Age: 73
End: 2018-10-24

## 2018-10-24 DIAGNOSIS — G47.33 OSA (OBSTRUCTIVE SLEEP APNEA): ICD-10-CM

## 2018-10-24 DIAGNOSIS — G47.31 CENTRAL SLEEP APNEA: ICD-10-CM

## 2018-10-24 NOTE — PROGRESS NOTES
6 month [facility] Recheck Visit     Diagnostic AHI: 80.7  PSG    Data only recheck     Assessment: Pt meeting objective benchmarks.     Action plan: pt to follow up per provider request (1-2 yrs)    Device type: Auto-CPAP  PAP settings: CPAP min 11.0 cm  H20     CPAP max 15.0 cm  H20    95th% pressure 14.4 cm  H20   Objective measures: 14 day rolling measures      Compliance  92 %      Leak  11.6 lpm  last  upload      AHI 6.18   last  upload      Average number of minutes 752      Objective measure goal  Compliance   Goal >70%  Leak   Goal < 24 lpm  AHI  Goal < 5  Usage  Goal >240

## 2018-10-29 DIAGNOSIS — I42.0 DILATED CARDIOMYOPATHY (H): ICD-10-CM

## 2018-10-30 NOTE — TELEPHONE ENCOUNTER
Routing refill request to provider for review/approval because:  Labs out of range:  MIREYA RegaladoN, RN  [employer] Triage

## 2018-10-30 NOTE — TELEPHONE ENCOUNTER
"Requested Prescriptions   Pending Prescriptions Disp Refills     furosemide (LASIX) 20 MG tablet [Pharmacy Med Name: FUROSEMIDE 20MG TABLETS]  Last Written Prescription Date:  08/14/2017  Last Fill Quantity: 90,  # refills: 1   Last office visit: 9/6/2018 with prescribing provider:  MARKO   Future Office Visit:     90 tablet 0     Sig: TAKE 1 TABLET(20 MG) BY MOUTH DAILY    Diuretics (Including Combos) Protocol Failed    10/29/2018 10:39 PM       Failed - Normal serum creatinine on file in past 12 months    Recent Labs   Lab Test  02/21/18   1202   CR  1.29*             Passed - Blood pressure under 140/90 in past 12 months    BP Readings from Last 3 Encounters:   09/06/18 120/65   06/14/18 119/64   05/22/18 128/62                Passed - Recent (12 mo) or future (30 days) visit within the authorizing provider's specialty    Patient had office visit in the last 12 months or has a visit in the next 30 days with authorizing provider or within the authorizing provider's specialty.  See \"Patient Info\" tab in inbasket, or \"Choose Columns\" in Meds & Orders section of the refill encounter.             Passed - Patient is age 18 or older       Passed - Normal serum potassium on file in past 12 months    Recent Labs   Lab Test  02/21/18   1202   POTASSIUM  4.4                   Passed - Normal serum sodium on file in past 12 months    Recent Labs   Lab Test  02/21/18   1202   NA  139                "

## 2018-10-31 ENCOUNTER — TELEPHONE (OUTPATIENT)
Dept: PHARMACY | Facility: CLINIC | Age: 73
End: 2018-10-31

## 2018-10-31 RX ORDER — FUROSEMIDE 20 MG
TABLET ORAL
Qty: 90 TABLET | Refills: 1 | Status: SHIPPED | OUTPATIENT
Start: 2018-10-31 | End: 2018-11-28

## 2018-10-31 NOTE — TELEPHONE ENCOUNTER
This patient is due for MTM follow-up. Called the patient and LM for him to call the clinic back to schedule an appointment with the pharmacist.    Genevieve Wen, PharmD  Medication Therapy Management Resident, [facility]  Pager: [phone]

## 2018-11-05 DIAGNOSIS — I10 HTN (HYPERTENSION): ICD-10-CM

## 2018-11-05 RX ORDER — IRBESARTAN 300 MG/1
300 TABLET ORAL AT BEDTIME
Qty: 90 TABLET | Refills: 2 | Status: SHIPPED | OUTPATIENT
Start: 2018-11-05 | End: 2019-08-06

## 2018-11-10 ENCOUNTER — NURSE TRIAGE (OUTPATIENT)
Dept: NURSING | Facility: CLINIC | Age: 73
End: 2018-11-10

## 2018-11-15 ENCOUNTER — ANTICOAGULATION THERAPY VISIT (OUTPATIENT)
Dept: CARDIOLOGY | Facility: CLINIC | Age: 73
End: 2018-11-15
Payer: COMMERCIAL

## 2018-11-15 DIAGNOSIS — Z79.01 LONG TERM CURRENT USE OF ANTICOAGULANTS WITH INR GOAL OF 2.0-3.0: ICD-10-CM

## 2018-11-15 DIAGNOSIS — I48.91 ATRIAL FIBRILLATION, UNSPECIFIED TYPE (H): ICD-10-CM

## 2018-11-15 LAB — INR POINT OF CARE: 2.1 (ref 0.86–1.14)

## 2018-11-15 PROCEDURE — 85610 PROTHROMBIN TIME: CPT | Mod: QW | Performed by: INTERNAL MEDICINE

## 2018-11-15 PROCEDURE — 36416 COLLJ CAPILLARY BLOOD SPEC: CPT | Performed by: INTERNAL MEDICINE

## 2018-11-15 NOTE — PROGRESS NOTES
ANTICOAGULATION FOLLOW-UP CLINIC VISIT    Patient Name:  Jt Montgomery  Date:  11/15/2018  Contact Type:  Face to Face    SUBJECTIVE:     Patient Findings     Positives No Problem Findings           OBJECTIVE    INR Protime   Date Value Ref Range Status   11/15/2018 2.1 (A) 0.86 - 1.14 Final       ASSESSMENT / PLAN  INR assessment THER    Recheck INR In: 2 WEEKS    INR Location Clinic      Anticoagulation Summary as of 11/15/2018     INR goal 2.0-3.0   Today's INR 2.1   Warfarin maintenance plan 7.5 mg (5 mg x 1.5) on Sun, Tue, Thu; 5 mg (5 mg x 1) all other days   Full warfarin instructions 7.5 mg on Sun, Tue, Thu; 5 mg all other days   Weekly warfarin total 42.5 mg   No change documented Lola Mendieta RN   Plan last modified Marlena Hong RN (10/5/2018)   Next INR check 11/28/2018   Target end date Indefinite    Indications   Atrial fibrillation (AFIB) on Coumadin [I48.91]  Long term current use of anticoagulants with INR goal of 2.0-3.0 [Z79.01]         Anticoagulation Episode Summary     INR check location     Preferred lab     Send INR reminders to [facility] HEART INR NURSE    Comments       Anticoagulation Care Providers     Provider Role Specialty Phone number    Patricia Lottie Perla,  [facility] Cardiology [phone]            See the Encounter Report to view Anticoagulation Flowsheet and Dosing Calendar (Go to Encounters tab in chart review, and find the Anticoagulation Therapy Visit)    INR 2.1 today. States has decreased of his BID dose of fish oil to 1-1.5 GM's daily from 5 GM's-one week ago. Also added Taurine 500 mg BID (no interaction with Warfarin per Micromedex). Eats greens daily. Denies other medication or dietary changes. Is considering trying a vegan diet. Denies recent illness. Will continue with current dosage of 7.5 mg T,Th and Sun, and 5 mg remaining days of the week,  for one more week and will then review at that time if changes in maintenance dose need to be made.  Next INR scheduled in 2 weeks.    Lola Mendieta RN

## 2018-11-15 NOTE — MR AVS SNAPSHOT
Jt Nguyen Valentina   11/15/2018 1:00 PM   Anticoagulation Therapy Visit    Description:  73 year old male   Provider:  FREDERICK ANTICOAGULATION   Department:  [facility]           INR as of 11/15/2018     Today's INR 2.1      Anticoagulation Summary as of 11/15/2018     INR goal 2.0-3.0   Today's INR 2.1   Full warfarin instructions 7.5 mg on Sun, Tue, Thu; 5 mg all other days   Next INR check 11/28/2018    Indications   Atrial fibrillation (AFIB) on Coumadin [I48.91]  Long term current use of anticoagulants with INR goal of 2.0-3.0 [Z79.01]         Your next Anticoagulation Clinic appointment(s)     Nov 28, 2018 10:20 AM CST   Anticoagulation Visit with  ANTICOAGULATION   [facility] ([facility])    [address] [place] [address]   [phone] OPT 2              Contact Numbers     Anticoagulant (INR) Clinic Number: [phone]          November 2018 Details    Sun Mon Tue Wed Thu Fri Sat         1               2               3                 4               5               6               7               8               9               10                 11               12               13               14               15      7.5 mg   See details      16      5 mg         17      5 mg           18      7.5 mg         19      5 mg         20      7.5 mg         21      5 mg         22      7.5 mg         23      5 mg         24      5 mg           25      7.5 mg         26      5 mg         27      7.5 mg         28            29               30                 Date Details   11/15 This INR check       Date of next INR:  11/28/2018         How to take your warfarin dose     To take:  5 mg Take 1 of the 5 mg tablets.    To take:  7.5 mg Take 1.5 of the 5 mg tablets.

## 2018-11-20 ENCOUNTER — TELEPHONE (OUTPATIENT)
Dept: FAMILY MEDICINE | Facility: CLINIC | Age: 73
End: 2018-11-20

## 2018-11-20 DIAGNOSIS — R60.0 EDEMA OF BOTH LEGS: Primary | ICD-10-CM

## 2018-11-26 DIAGNOSIS — Z79.01 LONG TERM CURRENT USE OF ANTICOAGULANT THERAPY: ICD-10-CM

## 2018-11-26 DIAGNOSIS — I48.91 ATRIAL FIBRILLATION, UNSPECIFIED TYPE (H): ICD-10-CM

## 2018-11-26 RX ORDER — WARFARIN SODIUM 5 MG/1
TABLET ORAL
Qty: 110 TABLET | Refills: 0 | Status: SHIPPED | OUTPATIENT
Start: 2018-11-26 | End: 2019-03-20

## 2018-11-28 ENCOUNTER — ANTICOAGULATION THERAPY VISIT (OUTPATIENT)
Dept: CARDIOLOGY | Facility: CLINIC | Age: 73
End: 2018-11-28
Payer: COMMERCIAL

## 2018-11-28 ENCOUNTER — OFFICE VISIT (OUTPATIENT)
Dept: PHARMACY | Facility: CLINIC | Age: 73
End: 2018-11-28
Payer: COMMERCIAL

## 2018-11-28 VITALS — BODY MASS INDEX: 36.81 KG/M2 | DIASTOLIC BLOOD PRESSURE: 68 MMHG | WEIGHT: 279 LBS | SYSTOLIC BLOOD PRESSURE: 122 MMHG

## 2018-11-28 DIAGNOSIS — E55.9 VITAMIN D DEFICIENCY: ICD-10-CM

## 2018-11-28 DIAGNOSIS — E78.2 MIXED HYPERLIPIDEMIA DUE TO TYPE 2 DIABETES MELLITUS (H): ICD-10-CM

## 2018-11-28 DIAGNOSIS — Z79.4 TYPE 2 DIABETES MELLITUS WITH DIABETIC NEUROPATHY, WITH LONG-TERM CURRENT USE OF INSULIN (H): Primary | Chronic | ICD-10-CM

## 2018-11-28 DIAGNOSIS — Z23 ENCOUNTER FOR IMMUNIZATION: ICD-10-CM

## 2018-11-28 DIAGNOSIS — E11.40 TYPE 2 DIABETES MELLITUS WITH DIABETIC NEUROPATHY, WITH LONG-TERM CURRENT USE OF INSULIN (H): Primary | Chronic | ICD-10-CM

## 2018-11-28 DIAGNOSIS — E11.69 MIXED HYPERLIPIDEMIA DUE TO TYPE 2 DIABETES MELLITUS (H): ICD-10-CM

## 2018-11-28 DIAGNOSIS — I10 ESSENTIAL HYPERTENSION: ICD-10-CM

## 2018-11-28 DIAGNOSIS — N18.30 CKD (CHRONIC KIDNEY DISEASE) STAGE 3, GFR 30-59 ML/MIN (H): Chronic | ICD-10-CM

## 2018-11-28 DIAGNOSIS — I48.91 ATRIAL FIBRILLATION, UNSPECIFIED TYPE (H): ICD-10-CM

## 2018-11-28 DIAGNOSIS — Z79.01 LONG TERM CURRENT USE OF ANTICOAGULANTS WITH INR GOAL OF 2.0-3.0: ICD-10-CM

## 2018-11-28 DIAGNOSIS — M1A.39X0 CHRONIC GOUT DUE TO RENAL IMPAIRMENT OF MULTIPLE SITES WITHOUT TOPHUS: ICD-10-CM

## 2018-11-28 DIAGNOSIS — E11.49 OTHER DIABETIC NEUROLOGICAL COMPLICATION ASSOCIATED WITH TYPE 2 DIABETES MELLITUS (H): ICD-10-CM

## 2018-11-28 DIAGNOSIS — I42.0 DILATED CARDIOMYOPATHY (H): ICD-10-CM

## 2018-11-28 LAB — INR POINT OF CARE: 2.7 (ref 0.86–1.14)

## 2018-11-28 PROCEDURE — 80061 LIPID PANEL: CPT | Performed by: INTERNAL MEDICINE

## 2018-11-28 PROCEDURE — 80048 BASIC METABOLIC PNL TOTAL CA: CPT | Performed by: INTERNAL MEDICINE

## 2018-11-28 PROCEDURE — 83721 ASSAY OF BLOOD LIPOPROTEIN: CPT | Mod: 59 | Performed by: INTERNAL MEDICINE

## 2018-11-28 PROCEDURE — 99606 MTMS BY PHARM EST 15 MIN: CPT | Performed by: PHARMACIST

## 2018-11-28 PROCEDURE — 36416 COLLJ CAPILLARY BLOOD SPEC: CPT | Performed by: INTERNAL MEDICINE

## 2018-11-28 PROCEDURE — 85610 PROTHROMBIN TIME: CPT | Mod: QW | Performed by: INTERNAL MEDICINE

## 2018-11-28 PROCEDURE — 99607 MTMS BY PHARM ADDL 15 MIN: CPT | Performed by: PHARMACIST

## 2018-11-28 PROCEDURE — 82306 VITAMIN D 25 HYDROXY: CPT | Performed by: INTERNAL MEDICINE

## 2018-11-28 RX ORDER — TAURINE 500 MG
1 CAPSULE ORAL PRN
COMMUNITY
End: 2020-01-09

## 2018-11-28 RX ORDER — FLASH GLUCOSE SCANNING READER
1 EACH MISCELLANEOUS ONCE
Qty: 1 DEVICE | Refills: 0 | Status: SHIPPED | OUTPATIENT
Start: 2018-11-28 | End: 2019-03-06

## 2018-11-28 RX ORDER — FLASH GLUCOSE SENSOR
1 KIT MISCELLANEOUS
Qty: 2 EACH | Refills: 11 | Status: SHIPPED | OUTPATIENT
Start: 2018-11-28 | End: 2019-12-27

## 2018-11-28 RX ORDER — FUROSEMIDE 20 MG
20 TABLET ORAL DAILY PRN
Qty: 90 TABLET | Refills: 1 | COMMUNITY
Start: 2018-11-28 | End: 2018-12-07

## 2018-11-28 RX ORDER — PERPHENAZINE 16 MG
1 TABLET ORAL 2 TIMES DAILY
COMMUNITY
End: 2019-10-08

## 2018-11-28 RX ORDER — CHOLECALCIFEROL (VITAMIN D3) 50 MCG
2 TABLET ORAL DAILY
COMMUNITY
End: 2020-08-17

## 2018-11-28 NOTE — MR AVS SNAPSHOT
After Visit Summary   11/28/2018    Jt Montgomery    MRN: [id number]           Patient Information     Date Of Birth          1945        Visit Information        Provider Department      11/28/2018 11:00 AM Nathaly Hughes, [facility] MTM        Today's Diagnoses     Vitamin D deficiency    -  1    Dilated cardiomyopathy, nonischemic EF 25%        Mixed hyperlipidemia due to type 2 diabetes mellitus (H)        CKD (chronic kidney disease) stage 3, GFR 30-59 ml/min (H)        Type 2 diabetes mellitus with diabetic neuropathy, with long-term current use of insulin (H)          Care Instructions    Recommendations from today's MTM visit:                                                        1. Try walking for at least 30 minutes every day.    2. Try injecting your Novolog insulin right before your first bite of a meal.    3. Look into https://www.Sharethroughre.us/support/overview.html. Sent this into your pharmacy.    4. Go to your pharmacy to get your tetanus and pneumonia shot.     5. Vitamin D level, lipid level, and metabolic panel (kidney function and electrolytes) today. Will give you a call on these results.    Next MTM visit: Wednesday, February 27th at 1:30pm    To schedule another MTM appointment, please call the clinic directly or you may call the MTM scheduling line at [phone] or toll-free at [phone].     My Clinical Pharmacist's contact information:                                                      It was a pleasure talking with you today!  Please feel free to contact me with any questions or concerns you have.      Genevieve Wen PharmD  Medication Therapy Management Resident, [facility]  Pager: [phone]    Nathaly Hughes PharmD, [facility]  Medication Therapy Management Provider  Pager: [phone]     You may receive a survey about the MTM services you received.  I would appreciate your feedback to help me serve you  better in the future. Please fill it out and return it when you can. Your comments will be anonymous.            Follow-ups after your visit        Your next 10 appointments already scheduled     Nov 28, 2018  1:00 PM CST   LAB with CS LAB   [facility] ([facility])    [address] [id number] [phone]           Please do not eat 10-12 hours before your appointment if you are coming in fasting for labs on lipids, cholesterol, or glucose (sugar). This does not apply to pregnant women. Water, hot tea and black coffee (with nothing added) are okay. Do not drink other fluids, diet soda or chew gum.            Dec 19, 2018 11:20 AM CST   Anticoagulation Visit with FREDERICK ANTICOAGULATION   [facility] ([facility])    [address]  [place] [address]   [phone] OPT 2            Feb 27, 2019  1:30 PM CST   Office Visit with Nathaly Hughes RPH   [facility] ([facility])    [address]  [place] [address] [phone]           Bring a current list of meds and any records pertaining to this visit. For Physicals, please bring immunization records and any forms needing to be filled out. Please arrive 10 minutes early to complete paperwork.            Mar 06, 2019  9:20 AM CST   Office Visit with Digna Jones MD   [facility] ([facility])    [address] [id number] [phone]           Bring a current list of meds and any records pertaining to this visit. For Physicals, please bring immunization records and any forms needing to be filled out. Please arrive 10 minutes early to complete paperwork.            Jun 13, 2019 11:30 AM CDT   Return Sleep Patient with Edvin Alanis MD   [facility] ([facility])    [address]  [place]  [id number] [phone]              Future tests that were ordered for you today     Open Future Orders        Priority Expected Expires Ordered    Vitamin D Deficiency Routine  11/28/2019 11/28/2018    Lipid panel reflex to direct LDL Non-fasting Routine  11/28/2019 11/28/2018    Basic metabolic panel Routine  11/28/2019 11/28/2018            Who to contact     If you have questions or need follow up information about today's clinic visit or your schedule please contact [facility] directly at [phone].  Normal or non-critical lab and imaging results will be communicated to you by MyChart, letter or phone within 4 business days after the clinic has received the results. If you do not hear from us within 7 days, please contact the clinic through MyChart or phone. If you have a critical or abnormal lab result, we will notify you by phone as soon as possible.  Submit refill requests through [employer] or call your pharmacy and they will forward the refill request to us. Please allow 3 business days for your refill to be completed.          Additional Information About Your Visit        Care EveryWhere ID     This is your Care EveryWhere ID. This could be used by other organizations to access your [place] medical records  [id number]        Your Vitals Were     BMI (Body Mass Index)                   36.81 kg/m2            Blood Pressure from Last 3 Encounters:   11/28/18 122/68   09/06/18 120/65   06/14/18 119/64    Weight from Last 3 Encounters:   11/28/18 279 lb (126.6 kg)   09/06/18 273 lb (123.8 kg)   06/14/18 272 lb (123.4 kg)                 Today's Medication Changes          These changes are accurate as of 11/28/18 12:08 PM.  If you have any questions, ask your nurse or doctor.               Start taking these medicines.        Dose/Directions    CarbonFlow RINKU 14 DAY READER [place]   Used for:  Type 2 diabetes mellitus with diabetic neuropathy, with long-term current use of insulin (H)    Started by:  Nathaly Hughes RPH        Dose:  1 Device   1 Device once for 1 dose   Quantity:  1 Device   Refills:  0       FREESTYLE RINKU 14 DAY [facility]ENSOR [facility]   Used for:  Type 2 diabetes mellitus with diabetic neuropathy, with long-term current use of insulin (H)   Started by:  Nathaly Hughes RPH        Dose:  1 Device   1 Device every 14 days   Quantity:  2 each   Refills:  11         These medicines have changed or have updated prescriptions.        Dose/Directions    blood glucose monitoring lancets   This may have changed:  additional instructions   Used for:  Uncontrolled diabetes mellitus with complications (H)        Use to test blood sugar 5 times daily or as directed.   Quantity:  1 Box   Refills:  prn       furosemide 20 MG tablet   Commonly known as:  LASIX   This may have changed:  See the new instructions.   Used for:  Dilated cardiomyopathy (H)   Changed by:  Nathaly Hughes RPH        Dose:  20 mg   Take 1 tablet (20 mg) by mouth daily as needed   Quantity:  90 tablet   Refills:  1            Where to get your medicines      These medications were sent to [facility] [address] [place] [address] S AT [facility] [address]  [address], [place] [address]     Phone:  [phone]     FREESTYLE RINKU 14 DAY READER Idalia    FREESTYLE RINKU 14 DAY SENSOR [facility]                Primary Care Provider Office Phone # Fax #    Digna Amanuel Jones [phone] [address]  [place] [address]        Equal Access to Services     LYUDMILA AMOS AH: Hadii aad ku hadasho Sohaydenali, waaxda luqadaha, qaybta kaalmada adeegyada, quynh devine. So [facility] [phone].    ATENCIÓN: Si habla español, tiene a jones disposición servicios gratuitos de asistencia lingüística. Llame al [phone].    We comply with applicable federal civil rights laws and [place] laws. We do not discriminate on the basis of race, color,  national origin, age, disability, sex, sexual orientation, or gender identity.            Thank you!     Thank you for choosing [facility]  for your care. Our goal is always to provide you with excellent care. Hearing back from our patients is one way we can continue to improve our services. Please take a few minutes to complete the written survey that you may receive in the mail after your visit with us. Thank you!             Your Updated Medication List - Protect others around you: Learn how to safely use, store and throw away your medicines at www.disposemymeds.org.          This list is accurate as of 11/28/18 12:08 PM.  Always use your most recent med list.                   Brand Name Dispense Instructions for use Diagnosis    alpha-lipoic acid 600 MG Caps      Take 1 capsule by mouth 2 times daily        aspirin 81 MG EC tablet    ASA    90 tablet    Take 1 tablet by mouth daily.    Atrial fibrillation with RVR (H), Dilated cardiomyopathy (H)       blood glucose monitoring lancets     1 Box    Use to test blood sugar 5 times daily or as directed.    Uncontrolled diabetes mellitus with complications (H)       blood glucose monitoring meter device kit     1 kit    Use to test blood sugar 3 times daily or as directed.    Uncontrolled diabetes mellitus with complications (H)       blood glucose monitoring test strip    ACCU-CHEK LUIS PLUS    400 strip    Use to test blood sugar 5 times daily or as directed.    Uncontrolled type 2 diabetes mellitus with complication, with long-term current use of insulin (H)       CoQ10 100 MG Caps      Take 300 mg by mouth daily    Uncontrolled diabetes mellitus with complications (H)       digoxin 125 MCG tablet    LANOXIN    90 tablet    Take 1 tablet (125 mcg) by mouth daily    Atrial fibrillation (H)       diltiazem 300 MG 24 hr capsule    CARTIA XT    90 capsule    Take 1 capsule (300 mg) by mouth daily    Atrial fibrillation (H)       fish oil-omega-3  fatty acids 1000 MG capsule      Take 3-5 g by mouth daily        FREESTYLE RINKU 14 DAY READER Idalia     1 Device    1 Device once for 1 dose    Type 2 diabetes mellitus with diabetic neuropathy, with long-term current use of insulin (H)       FREESTYLE RINKU 14 DAY SENSOR Misc     2 each    1 Device every 14 days    Type 2 diabetes mellitus with diabetic neuropathy, with long-term current use of insulin (H)       furosemide 20 MG tablet    LASIX    90 tablet    Take 1 tablet (20 mg) by mouth daily as needed    Dilated cardiomyopathy (H)       GREEN TEA EXTRACT PO      Take by mouth 2 times daily Doesn't always take regularly        HERBALS      Nerve by Plexis 2 a day,   Healthy Feet and Nerves  By [employer]  3 a day, Heart Food Caps  (cayenna pepper, garlic, hawthorn, onion & ginger 1-2 a day)        insulin aspart 100 UNIT/ML pen    NovoLOG FLEXPEN    45 mL    Inject 15 Units Subcutaneous 3 times daily (with meals)    Type 2 diabetes mellitus with diabetic neuropathy, with long-term current use of insulin (H)       insulin glargine 100 UNIT/ML pen    LANTUS SOLOSTAR    30 mL    ADMINISTER 35 UNITS UNDER THE SKIN DAILY    Type 2 diabetes mellitus with diabetic neuropathy, with long-term current use of insulin (H)       * insulin pen needle 32G X 4 MM miscellaneous    BD GERMÁN U/F    100 each    Use four times daily or as directed.    Uncontrolled diabetes mellitus with complications (H)       * insulin pen needle 32G X 4 MM miscellaneous    BD GERMÁN U/F    200 each    USE TO TEST FOUR TIMES DAILY OR AS DIRECTED    Type 2 diabetes mellitus with diabetic neuropathy, with long-term current use of insulin (H)       irbesartan 300 MG tablet    AVAPRO    90 tablet    Take 1 tablet (300 mg) by mouth At Bedtime    HTN (hypertension)       Magnesium 125 MG Caps      Take by mouth daily        MELATONIN PO      Take 0.5 mg by mouth nightly as needed        metoprolol succinate 100 MG 24 hr tablet    TOPROL-XL    180 tablet     Take 1 tablet (100 mg) by mouth 2 times daily    Coronary artery disease involving native coronary artery of native heart with angina pectoris (H)       Multi-vitamin tablet      Shaklee [employer] Life Exten[employer]ion        order for DME     1 each    Equipment being ordered: Compression stockings    Edema of both legs       predniSONE 20 MG tablet    DELTASONE    10 tablet    TAKE 1 TABLET(20 MG) BY MOUTH DAILY AS NEEDED FOR GOUT FLARE    Chronic gout due to renal impairment of multiple sites without tophus       PROBIOTIC & ACIDOPHILUS EX ST PO      Take 1 capsule by mouth daily        Resveratrol 100 MG Caps      Take 2 capsules by mouth daily Shaklee Vivix        spironolactone 25 MG tablet    ALDACTONE    90 tablet    Take 1 tablet (25 mg) by mouth daily    Atrial fibrillation, unspecified type (H)       Taurine 500 MG Caps      Take 1 capsule by mouth 2 times daily        ULORIC 40 MG Tabs tablet   Generic drug:  febuxostat      Take 40 mg by mouth daily        UNABLE TO FIND      MEDICATION NAME: Super Beet Juice        vitamin D3 2000 units tablet    CHOLECALCIFEROL     Take 1-2 tablets by mouth daily        warfarin 5 MG tablet    COUMADIN    110 tablet    7.5mg Sunday, Tuesday, Thursday and 5mg the rest of the week per INR clinic    Long term current use of anticoagulant therapy, Atrial fibrillation, unspecified type (H)       * Notice:  This list has 2 medication(s) that are the same as other medications prescribed for you. Read the directions carefully, and ask your doctor or other care provider to review them with you.

## 2018-11-28 NOTE — MR AVS SNAPSHOT
Jt Nguyen Valentina   11/28/2018 10:20 AM   Anticoagulation Therapy Visit    Description:  73 year old male   Provider:  FREDERICK ANTICOAGULATION   Department:  [facility]           INR as of 11/28/2018     Today's INR 2.7      Anticoagulation Summary as of 11/28/2018     INR goal 2.0-3.0   Today's INR 2.7   Full warfarin instructions 7.5 mg on Sun, Tue, Thu; 5 mg all other days   Next INR check 12/19/2018    Indications   Atrial fibrillation (AFIB) on Coumadin [I48.91]  Long term current use of anticoagulants with INR goal of 2.0-3.0 [Z79.01]         Your next Anticoagulation Clinic appointment(s)     Dec 19, 2018 11:20 AM CST   Anticoagulation Visit with  ANTICOAGULATION   [facility] ([facility])    [address] [place] [id number]   [phone] OPT 2              Contact Numbers     Anticoagulant (INR) Clinic Number: [phone]          November 2018 Details    Sun Mon Tue Wed Thu Fri Sat         1               2               3                 4               5               6               7               8               9               10                 11               12               13               14               15               16               17                 18               19               20               21               22               23               24                 25               26               27               28      5 mg   See details      29      7.5 mg         30      5 mg           Date Details   11/28 This INR check               How to take your warfarin dose     To take:  5 mg Take 1 of the 5 mg tablets.    To take:  7.5 mg Take 1.5 of the 5 mg tablets.           December 2018 Details    Sun Mon Tue Wed Thu Fri Sat           1      5 mg           2      7.5 mg         3      5 mg         4      7.5 mg         5      5 mg         6      7.5 mg         7      5 mg         8      5 mg            9      7.5 mg         10      5 mg         11      7.5 mg         12      5 mg         13      7.5 mg         14      5 mg         15      5 mg           16      7.5 mg         17      5 mg         18      7.5 mg         19            20               21               22                 23               24               25               26               27               28               29                 30               31                     Date Details   No additional details    Date of next INR:  12/19/2018         How to take your warfarin dose     To take:  5 mg Take 1 of the 5 mg tablets.    To take:  7.5 mg Take 1.5 of the 5 mg tablets.

## 2018-11-28 NOTE — PROGRESS NOTES
"SUBJECTIVE/OBJECTIVE:                Jt Montgomery is a 73 year old male coming in for a follow-up visit for Medication Therapy Management.  He was referred to me from Dr. David, now established care with Dr. Jones.     Chief Complaint: Follow up from our visit on .    Tobacco: History of tobacco dependence - quit   Alcohol: history of alcohol dependence - sober since     Medication Adherence/Access:  Misses lunchtime Novolog dose some days, especially if he is eating out    Diabetes:  Pt currently taking Lantus 35 units daily and Novolog 15 units TID with meals (oftentimes takes after meals). Pt also started taking taurine 500mg BID (which he states is an amino acid and is helping his sugars). Pt is not experiencing side effects.  He's not on metformin due to renal dysfunction. Pt notes he knows he needs to starting walking more to help lower his sugars and being more strict about his diet.  SMB-2 times daily.   Ranges (patient reported):   AM (fasting): 116, 125, 143, 145, 149, 139, 145  HS: 196 (had pizza), 199 (had spaghetti), 183 (hoagie and pizza), 143, 175, 153, 157  Symptoms of low blood sugar? none. Frequency of hypoglycemia? Never, lowest number he's had this month is 95  Recent symptoms of high blood sugar? none  Eye exam: up to date  Foot exam: due - he doesn't want one right now  Microalbumin  > 30 mg/g. Pt is taking an ACEi/ARB.  Aspirin: Taking 81mg daily and denies side effects  Diet/Exercise: He's been trying to still do a low carb diet, but is difficult for him. He knows it affects his blood sugars and feels like he's \"sinned\" whenever he eats carbs. He's looked into Ketosis diet but this is too radical for him and also looked into [employer] [place] diet (heavy vegetable based)- pt is trying to do more [employer] [place] diet with more vegetables and less meat but difficult for him. Pt has fallen off of exercising and walking. He feels he needs to walk 30 minutes every day.  Lab Results " "  Component Value Date    A1C 6.1 09/06/2018    A1C 6.3 02/09/2018    A1C 7.7 03/06/2017    A1C 7.6 12/05/2016    A1C 7.5 04/29/2016     Hypertension/CAD/A. Fib/HFrEF: Current medications include ASA 81mg daily, digoxin 125mcg daily, diltiazem ER 300mg daily, furosemide 20mg daily PRN (he takes a few times a week), irbesartan 300mg daily, metoprolol ER 100mg BID, spironolactone 25mg daily and warfarin as directed.  Patient does not self-monitor BP.  Patient reports no current medication side effects.  He does weigh himself regularly and denies edema.  He wears compression stockings.  BP Readings from Last 3 Encounters:   11/28/18 122/68   09/06/18 120/65   06/14/18 119/64     Hyperlipidemia: Current therapy includes fish oil 3-5g daily which he has started taking more regularly within the last few months and increased the dose - he remains hesitant about statin therapy. He wonders if he can get an updated lipid panel to see if the fish oil has helped lower his triglyceride levels.  The 10-year ASCVD risk score (Fortino RICH Jr, et al., 2013) is: 40.8%    Values used to calculate the score:      Age: 73 years      Sex: Male      Is Non- : No      Diabetic: Yes      Tobacco smoker: No      Systolic Blood Pressure: 120 mmHg      Is BP treated: Yes      HDL Cholesterol: 32 mg/dL      Total Cholesterol: 172 mg/dL    Gout: Currently taking Uloric 40mg daily. Pt reports no current pain concerns. Pt is experiencing the following medication side effects: none.  He also has prednisone available to use if needed for gout flares, which he hasn't needed for a couple of months. He reports that foods like beef and turkey causes flares so he avoids these foods. Gout flares for him are \"very incidental\" and he is satisfied with his current regimen. He does not want to increase the dose of Uloric.  Calculated CrCl with adjusted BW and most recent SCr 1.29 on 2/21/18 - 71 mL/min    Uric Acid   Date Value Ref Range " "Status   02/21/2018 7.4 (H) 3.5 - 7.2 mg/dL Final     Neuropathy: Pt currently taking alpha-lipoic acid 600mg BID which he thinks is somewhat helpful. He doesn't want to take any additional medications for neuropathy at this time.  His massage therapist focuses on his feet, which seems to be helpful.  He feels sx are manageable at this time but has been more noticeable last few months. He is looking into trying some sort of service/treatment program for neuropathy with chiropractic services that he read about but couldn't remember the name.     Supplements:  Jamal does a lot of reading about wellness and likes to look into many different types of therapies and supplements. He is taking a number of different supplements which weren't all discussed d/t time but is taking taurine as discussed above for diabetes, alpha-lipoic acid 600mg BID for neuropathy, CoQ10 300mg daily which he states \"helps a lot\" and fish oil listed above. He feels better taking his supplements and prefers to continue. Pt also started taking vitamin D 1676-3595 units daily and wonders if his vitamin D level is okay. He denies side effects of therapy.  Lab Results   Component Value Date    VITDT 38 10/01/2014     Immunizations: Pt does not believe that the flu vaccine works and never obtains it. He does not think he ever got his pneumonia or tetanus shots at the pharmacy as discussed at last OV visit.  Most Recent Immunizations   Administered Date(s) Administered     Influenza (High Dose) 3 valent vaccine 01/02/2017     Pneumococcal 23 valent 09/12/2012     Today's Vitals: /68  Wt 279 lb (126.6 kg)  BMI 36.81 kg/m2    ASSESSMENT:              Current medications were reviewed today as discussed above.      Medication Adherence: no issues identified    Diabetes: Needs Improvement. While A1c is at goal of <7%, blood glucose levels have been slightly elevated. Pt would benefit from increased exercise and limiting carb intake. Discussed " Freestyle Umair device with pt which he may benefit from to help him better understand what his BG are throughout the day for him to make his dietary changes.    Pt would also benefit from education on Novolog dose timing with meals as he is increasing his risk of hypoglycemia currently by injecting after meals.    Hypertension/CAD/A. Fib/HFrEF: Pt due for 6 month BMP.    Hyperlipidemia: Needs Improvement. Pt denies statin therapy but would benefit from repeat lipid level with new fish oil therapy.    Gout: Although uric acid level is >6, pt is satisfied with current level of symptoms and does not want any medication changes.    Neuropathy: Stable    Supplements: Needs improvement, pt would benefit from vitamin D lab.    Immunizations: Pt is due for Prevar and tetanus vaccines.     PLAN:                  1. Pt to try increasing walking to at least 30 minutes daily.  2. Pt to inject Novolog insulin immediately before first bite of a meal.  3. Educated pt on Freestyle Umair and ordered to pharmacy.  4. Pt to get tetanus and Prevar vaccines at pharmacy.  5. Vitamin D, lipids, and BMP labs today.      Addendum 11/29:  1. Freestyle Umair $0 copay for pt at pharmacy.  2. Called pt and informed him of labs: vit D low at 34 and high triglycerides but non-fasting lipids. Pt will increase vitamin D to 4000 units daily now. Pt also reports he got an infection from his CPAP machine and will be going to urgent care this weekend.    I spent 60 minutes with this patient today. All changes were made via verbal approval with Digna Jones. A copy of the visit note was provided to the patient's primary care provider.     Will follow up in 3 months, appt scheduled.    The patient was given a summary of these recommendations as an after visit summary.    Genevieve Wen, PharmD  Medication Therapy Management Resident, [facility]  Pager: [phone]    The patient was seen in clinic by Dr. Wen.  I reviewed  the history and assessment and the plan was jointly made.  Nathaly Hughes, MikaelaD, [facility]  Medication Therapy Management Provider  Pager: [phone]

## 2018-11-28 NOTE — PATIENT INSTRUCTIONS
Recommendations from today's MTM visit:                                                        1. Try walking for at least 30 minutes every day.    2. Try injecting your Novolog insulin right before your first bite of a meal.    3. Look into https://www.freestylelibre.us/support/overview.html. Sent this into your pharmacy.    4. Go to your pharmacy to get your tetanus and pneumonia shot.     5. Vitamin D level, lipid level, and metabolic panel (kidney function and electrolytes) today. Will give you a call on these results.    Next MTM visit: Wednesday, February 27th at 1:30pm    To schedule another MTM appointment, please call the clinic directly or you may call the MTM scheduling line at [phone] or toll-free at [phone].     My Clinical Pharmacist's contact information:                                                      It was a pleasure talking with you today!  Please feel free to contact me with any questions or concerns you have.      Genevieve Wen PharmD  Medication Therapy Management Resident, [facility]  Pager: [phone]    Nathaly Hughes PharmD, [facility]  Medication Therapy Management Provider  Pager: [phone]     You may receive a survey about the MTM services you received.  I would appreciate your feedback to help me serve you better in the future. Please fill it out and return it when you can. Your comments will be anonymous.

## 2018-11-28 NOTE — PROGRESS NOTES
ANTICOAGULATION FOLLOW-UP CLINIC VISIT    Patient Name:  Jt Montgomery  Date:  11/28/2018  Contact Type:  Face to Face    SUBJECTIVE:     Patient Findings     Positives Change in diet/appetite (has eaten less salads recently due to lack of availablilty of Edgardo lettuce), OTC meds (still takes fish oil 3 Gm daily)           OBJECTIVE    INR Protime   Date Value Ref Range Status   11/28/2018 2.7 (A) 0.86 - 1.14 Final       ASSESSMENT / PLAN  INR assessment THER    Recheck INR In: 3 WEEKS    INR Location Clinic      Anticoagulation Summary as of 11/28/2018     INR goal 2.0-3.0   Today's INR 2.7   Warfarin maintenance plan 7.5 mg (5 mg x 1.5) on Sun, Tue, Thu; 5 mg (5 mg x 1) all other days   Full warfarin instructions 7.5 mg on Sun, Tue, Thu; 5 mg all other days   Weekly warfarin total 42.5 mg   No change documented Marlena Hong RN   Plan last modified Marlena Hong RN (10/5/2018)   Next INR check 12/19/2018   Target end date Indefinite    Indications   Atrial fibrillation (AFIB) on Coumadin [I48.91]  Long term current use of anticoagulants with INR goal of 2.0-3.0 [Z79.01]         Anticoagulation Episode Summary     INR check location     Preferred lab     Send INR reminders to [facility] HEART INR NURSE    Comments       Anticoagulation Care Providers     Provider Role Specialty Phone number    Lottie Gomez MindaDO Responsible Cardiology [phone]            See the Encounter Report to view Anticoagulation Flowsheet and Dosing Calendar (Go to Encounters tab in chart review, and find the Anticoagulation Therapy Visit)    INR 2.7 He has eaten less salads recently due to lack of availablilty of Edgardo lettuce. Still taking 3 gm of Fish oil daily. No change in other meds. No abnormal bleeding or bruising. Will continue current dosing of 7.5 mg SuTuTh and 5 mg all other days with recheck in 3 weeks.     Marlena Hong, MARCELLA

## 2018-11-29 LAB
ANION GAP SERPL CALCULATED.3IONS-SCNC: 11 MMOL/L (ref 3–14)
BUN SERPL-MCNC: 27 MG/DL (ref 7–30)
CALCIUM SERPL-MCNC: 8.9 MG/DL (ref 8.5–10.1)
CHLORIDE SERPL-SCNC: 104 MMOL/L (ref 94–109)
CHOLEST SERPL-MCNC: 161 MG/DL
CO2 SERPL-SCNC: 22 MMOL/L (ref 20–32)
CREAT SERPL-MCNC: 1.17 MG/DL (ref 0.66–1.25)
DEPRECATED CALCIDIOL+CALCIFEROL SERPL-MC: 34 UG/L (ref 20–75)
GFR SERPL CREATININE-BSD FRML MDRD: 61 ML/MIN/1.7M2
GLUCOSE SERPL-MCNC: 161 MG/DL (ref 70–99)
HDLC SERPL-MCNC: 26 MG/DL
LDLC SERPL CALC-MCNC: ABNORMAL MG/DL
LDLC SERPL DIRECT ASSAY-MCNC: 76 MG/DL
NONHDLC SERPL-MCNC: 135 MG/DL
POTASSIUM SERPL-SCNC: 4.6 MMOL/L (ref 3.4–5.3)
SODIUM SERPL-SCNC: 137 MMOL/L (ref 133–144)
TRIGL SERPL-MCNC: 645 MG/DL

## 2018-12-01 ENCOUNTER — HOSPITAL ENCOUNTER (EMERGENCY)
Facility: CLINIC | Age: 73
Discharge: HOME | DRG: 603 | End: 2018-12-01
Attending: EMERGENCY MEDICINE | Admitting: EMERGENCY MEDICINE
Payer: COMMERCIAL

## 2018-12-01 ENCOUNTER — OFFICE VISIT (OUTPATIENT)
Dept: URGENT CARE | Facility: URGENT CARE | Age: 73
End: 2018-12-01
Payer: COMMERCIAL

## 2018-12-01 VITALS
SYSTOLIC BLOOD PRESSURE: 132 MMHG | RESPIRATION RATE: 16 BRPM | DIASTOLIC BLOOD PRESSURE: 66 MMHG | HEART RATE: 67 BPM | OXYGEN SATURATION: 95 % | HEIGHT: 73 IN | TEMPERATURE: 99.4 F | BODY MASS INDEX: 36.84 KG/M2 | WEIGHT: 278 LBS

## 2018-12-01 VITALS
HEART RATE: 60 BPM | OXYGEN SATURATION: 94 % | TEMPERATURE: 100.7 F | DIASTOLIC BLOOD PRESSURE: 65 MMHG | SYSTOLIC BLOOD PRESSURE: 114 MMHG

## 2018-12-01 DIAGNOSIS — L03.211 FACIAL CELLULITIS: ICD-10-CM

## 2018-12-01 DIAGNOSIS — L03.213 CELLULITIS OF PERIORBITAL REGION OF BOTH EYES: ICD-10-CM

## 2018-12-01 DIAGNOSIS — H57.89 EYE SWELLING, BILATERAL: Primary | ICD-10-CM

## 2018-12-01 LAB
ANION GAP SERPL CALCULATED.3IONS-SCNC: 8 MMOL/L (ref 3–14)
BASOPHILS # BLD AUTO: 0 10E9/L (ref 0–0.2)
BASOPHILS NFR BLD AUTO: 0.2 %
BUN SERPL-MCNC: 45 MG/DL (ref 7–30)
CALCIUM SERPL-MCNC: 8.3 MG/DL (ref 8.5–10.1)
CHLORIDE SERPL-SCNC: 98 MMOL/L (ref 94–109)
CO2 SERPL-SCNC: 25 MMOL/L (ref 20–32)
CREAT SERPL-MCNC: 1.52 MG/DL (ref 0.66–1.25)
DIFFERENTIAL METHOD BLD: ABNORMAL
EOSINOPHIL # BLD AUTO: 0 10E9/L (ref 0–0.7)
EOSINOPHIL NFR BLD AUTO: 0.1 %
ERYTHROCYTE [DISTWIDTH] IN BLOOD BY AUTOMATED COUNT: 13.1 % (ref 10–15)
GFR SERPL CREATININE-BSD FRML MDRD: 45 ML/MIN/1.7M2
GLUCOSE SERPL-MCNC: 161 MG/DL (ref 70–99)
HCT VFR BLD AUTO: 42 % (ref 40–53)
HGB BLD-MCNC: 14.6 G/DL (ref 13.3–17.7)
IMM GRANULOCYTES # BLD: 0.1 10E9/L (ref 0–0.4)
IMM GRANULOCYTES NFR BLD: 0.3 %
INR PPP: 1.47 (ref 0.86–1.14)
LACTATE BLD-SCNC: 1.7 MMOL/L (ref 0.7–2)
LYMPHOCYTES # BLD AUTO: 1.2 10E9/L (ref 0.8–5.3)
LYMPHOCYTES NFR BLD AUTO: 6.6 %
MCH RBC QN AUTO: 35.1 PG (ref 26.5–33)
MCHC RBC AUTO-ENTMCNC: 34.8 G/DL (ref 31.5–36.5)
MCV RBC AUTO: 101 FL (ref 78–100)
MONOCYTES # BLD AUTO: 1 10E9/L (ref 0–1.3)
MONOCYTES NFR BLD AUTO: 5.5 %
NEUTROPHILS # BLD AUTO: 15.7 10E9/L (ref 1.6–8.3)
NEUTROPHILS NFR BLD AUTO: 87.3 %
NRBC # BLD AUTO: 0 10*3/UL
NRBC BLD AUTO-RTO: 0 /100
PLATELET # BLD AUTO: 155 10E9/L (ref 150–450)
POTASSIUM SERPL-SCNC: 4 MMOL/L (ref 3.4–5.3)
RBC # BLD AUTO: 4.16 10E12/L (ref 4.4–5.9)
SODIUM SERPL-SCNC: 131 MMOL/L (ref 133–144)
WBC # BLD AUTO: 18 10E9/L (ref 4–11)

## 2018-12-01 PROCEDURE — 80048 BASIC METABOLIC PNL TOTAL CA: CPT | Performed by: EMERGENCY MEDICINE

## 2018-12-01 PROCEDURE — 25000128 H RX IP 250 OP 636: Performed by: EMERGENCY MEDICINE

## 2018-12-01 PROCEDURE — 96365 THER/PROPH/DIAG IV INF INIT: CPT

## 2018-12-01 PROCEDURE — 83605 ASSAY OF LACTIC ACID: CPT | Performed by: EMERGENCY MEDICINE

## 2018-12-01 PROCEDURE — 99284 EMERGENCY DEPT VISIT MOD MDM: CPT | Mod: 25

## 2018-12-01 PROCEDURE — 85025 COMPLETE CBC W/AUTO DIFF WBC: CPT | Performed by: EMERGENCY MEDICINE

## 2018-12-01 PROCEDURE — 85610 PROTHROMBIN TIME: CPT | Performed by: EMERGENCY MEDICINE

## 2018-12-01 PROCEDURE — 87040 BLOOD CULTURE FOR BACTERIA: CPT | Performed by: EMERGENCY MEDICINE

## 2018-12-01 PROCEDURE — 99214 OFFICE O/P EST MOD 30 MIN: CPT | Performed by: PHYSICIAN ASSISTANT

## 2018-12-01 RX ORDER — CIPROFLOXACIN HYDROCHLORIDE 3.5 MG/ML
SOLUTION/ DROPS TOPICAL
Qty: 1 BOTTLE | Refills: 0 | Status: SHIPPED | OUTPATIENT
Start: 2018-12-01 | End: 2019-02-27

## 2018-12-01 RX ORDER — DIPHENHYDRAMINE HYDROCHLORIDE 50 MG/ML
25 INJECTION INTRAMUSCULAR; INTRAVENOUS ONCE
Status: DISCONTINUED | OUTPATIENT
Start: 2018-12-01 | End: 2018-12-01

## 2018-12-01 RX ORDER — PREDNISONE 20 MG/1
40 TABLET ORAL ONCE
Status: DISCONTINUED | OUTPATIENT
Start: 2018-12-01 | End: 2018-12-01

## 2018-12-01 RX ORDER — CEFAZOLIN SODIUM 1 G/3ML
1 INJECTION, POWDER, FOR SOLUTION INTRAMUSCULAR; INTRAVENOUS ONCE
Status: COMPLETED | OUTPATIENT
Start: 2018-12-01 | End: 2018-12-01

## 2018-12-01 RX ADMIN — CEFAZOLIN SODIUM 1 G: 1 INJECTION, POWDER, FOR SOLUTION INTRAMUSCULAR; INTRAVENOUS at 09:26

## 2018-12-01 ASSESSMENT — ENCOUNTER SYMPTOMS
FACIAL SWELLING: 1
COLOR CHANGE: 1

## 2018-12-01 NOTE — ED AVS SNAPSHOT
Emergency Department    [address] [facility] [address]    Phone:  [phone]    Fax:  [phone]                                       Jt Montgomery   MRN: [id number]    Department:   Emergency Department   Date of Visit:  12/1/2018           After Visit Summary Signature Page     I have received my discharge instructions, and my questions have been answered. I have discussed any challenges I see with this plan with the nurse or doctor.    ..........................................................................................................................................  Patient/Patient Representative Signature      ..........................................................................................................................................  Patient Representative Print Name and Relationship to Patient    ..................................................               ................................................  Date                                   Time    ..........................................................................................................................................  Reviewed by Signature/Title    ...................................................              ..............................................  Date                                               Time          22EPIC Rev 08/18

## 2018-12-01 NOTE — ED AVS SNAPSHOT
Emergency Department    [address] [facility] [address]    Phone:  [phone]    Fax:  [phone]                                       Jt Montgomery   MRN: [id number]    Department:   Emergency Department   Date of Visit:  12/1/2018           Patient Information     Date Of Birth          1945        Your diagnoses for this visit were:     Facial cellulitis     Cellulitis of periorbital region of both eyes        You were seen by Emely Weston MD.      Follow-up Information     Follow up with Digna Jones MD.    Specialty:  Internal Medicine    Contact information:    [address] [facility]  [facility] [address]  [phone]          Follow up with  Emergency Department.    Specialty:  EMERGENCY MEDICINE    Why:  If symptoms worsen.  Return tomorrow    Contact information:    [address] [facility] [id number] [phone]        Discharge Instructions         Cellulitis  Cellulitis is an infection of the deep layers of skin. A break in the skin, such as a cut or scratch, can let bacteria under the skin. If the bacteria get to deep layers of the skin, it can be serious. If not treated, cellulitis can get into the bloodstream and lymph nodes. The infection can then spread throughout the body. This causes serious illness.  Cellulitis causes the affected skin to become red, swollen, warm, and sore. The reddened areas have a visible border. An open sore may leak fluid (pus). You may have a fever, chills, and pain.  Cellulitis is treated with antibiotics taken for 7 to 10 days. An open sore may be cleaned and covered with cool wet gauze. Symptoms should get better 1 to 2 days after treatment is started. Make sure to take all the antibiotics for the full number of days until they are gone. Keep taking the medicine even if your symptoms go away.  Home care  Follow these tips:    Limit the use of the part of your body with cellulitis.     If the infection  is on your leg, keep your leg raised while sitting. This will help to reduce swelling.    Take all of the antibiotic medicine exactly as directed until it is gone. Do not miss any doses, especially during the first 7 days. Don t stop taking the medicine when your symptoms get better.    Keep the affected area clean and dry.    Wash your hands with soap and warm water before and after touching your skin. Anyone else who touches your skin should also wash his or her hands. Don't share towels.  Follow-up care  Follow up with your healthcare provider, or as advised. If your infection does not go away on the first antibiotic, your healthcare provider will prescribe a different one.  When to seek medical advice  Call your healthcare provider right away if any of these occur:    Red areas that spread    Swelling or pain that gets worse    Fluid leaking from the skin (pus)    Fever higher of 100.4  F (38.0  C) or higher after 2 days on antibiotics  Date Last Reviewed: 9/1/2016 2000-2018 [employer]. [address] [place] [address]. All rights reserved. This information is not intended as a substitute for professional medical care. Always follow your healthcare professional's instructions.          Periorbital Cellulitis  Periorbital cellulitis is an infection of the tissues around the eye. It is most often caused by an infected scratch or insect bite. Sometimes a sinus infection can cause this problem.  Home care  The following are general care guidelines:  1. Take your antibiotic medicine exactly as directed, until it is finished.  2. You may use over-the-counter medicine as directed based on age and weight to help with pain and fever, unless another pain medicine was given. If you have liver disease or ever had a stomach ulcer, talk with your healthcare provider before using these medicines. Do not use ibuprofen in children under 6 months of age. Aspirin should never be used in anyone under 18  years of age who is ill with a fever. It may cause severe illness or death.  Follow-up care  Follow up with your healthcare provider, or as advised.  When to seek medical advice  Call your healthcare provider right away if any of these occur:    Increasing swelling or pain around the eye    Increasing redness    Changes in vision    Fever of 100.4 (38  C) oral or 101.5 (38.6  C) rectal for more than 2 days on antibiotics  Date Last Reviewed: 6/1/2016 2000-2018 [employer]. [address] [place] [address]. All rights reserved. This information is not intended as a substitute for professional medical care. Always follow your healthcare professional's instructions.          Your next 10 appointments already scheduled     Dec 19, 2018 11:20 AM CST   Anticoagulation Visit with [facility]   [facility] ([facility])    [address] [place] [address] [address]  [facility] [address]   [phone] OPT 2            Feb 27, 2019  1:30 PM CST   [facility]    [address] [place] [address] [address]  [facility] [id number] [phone]           Bring a current list of meds and any records pertaining to this visit. For Physicals, please bring immunization records and any forms needing to be filled out. Please arrive 10 minutes early to complete paperwork.            Mar 06, 2019  9:20 AM CST   Office Visit with Digna Jones MD   [facility] ([facility])    [address] [place] [id number] [phone]           Bring a current list of meds and any records pertaining to this visit. For Physicals, please bring immunization records and any forms needing to be filled out. Please arrive 10 minutes early to complete paperwork.            Jun 13, 2019 11:30 AM CDT   Return Sleep Patient with Edvin Alanis MD   [facility] ([facility][1])    [address] [address] [phone]              24 Hour Appointment Hotline       To make an appointment at any [facility] clinic, call [phone] ([phone]). If you don't have a family doctor or clinic, we will help you find one. [facility] clinics are conveniently located to serve the needs of you and your family.             Review of your medicines      START taking        Dose / Directions Last dose taken    amoxicillin-clavulanate 875-125 MG tablet   Commonly known as:  AUGMENTIN   Dose:  1 tablet   Quantity:  14 tablet        Take 1 tablet by mouth 2 times daily for 7 days   Refills:  0        ciprofloxacin 0.3 % ophthalmic solution   Commonly known as:  CILOXAN   Quantity:  1 Bottle        Instill 1-2 drops in the affected eye(s) every 2 hours while awake for 2 days then 1-2 drops every 4 hours while awake for the next 5 days.   Refills:  0          CONTINUE these medicines which may have CHANGED, or have new prescriptions. If we are uncertain of the size of tablets/capsules you have at home, strength may be listed as something that might have changed.        Dose / Directions Last dose taken    blood glucose monitoring lancets   What changed:  additional instructions   Quantity:  1 Box        Use to test blood sugar 5 times daily or as directed.   Refills:  prn          Our records show that you are taking the medicines listed below. If these are incorrect, please call your family doctor or clinic.        Dose / Directions Last dose taken    alpha-lipoic acid 600 MG Caps   Dose:  1 capsule        Take 1 capsule by mouth 2 times daily   Refills:  0        aspirin 81 MG EC tablet   Commonly known as:  ASA   Dose:  81 mg   Quantity:  90 tablet        Take 1 tablet by mouth daily.   Refills:  1        blood glucose monitoring meter device kit   Quantity:  1 kit        Use to test blood sugar 3 times daily or as directed.    Refills:  0        blood glucose monitoring test strip   Commonly known as:  ACCU-CHEK LUIS PLUS   Quantity:  400 strip        Use to test blood sugar 5 times daily or as directed.   Refills:  1        CoQ10 100 MG Caps   Dose:  300 mg        Take 300 mg by mouth daily   Refills:  0        digoxin 125 MCG tablet   Commonly known as:  LANOXIN   Dose:  125 mcg   Quantity:  90 tablet        Take 1 tablet (125 mcg) by mouth daily   Refills:  3        diltiazem ER COATED BEADS 300 MG 24 hr capsule   Commonly known as:  CARTIA XT   Dose:  300 mg   Quantity:  90 capsule        Take 1 capsule (300 mg) by mouth daily   Refills:  3        fish oil-omega-3 fatty acids 1000 MG capsule   Dose:  3-5 g        Take 3-5 g by mouth daily   Refills:  0        FREESTYLE RINKU 14 DAY SENSOR Misc   Dose:  1 Device   Quantity:  2 each        1 Device every 14 days   Refills:  11        furosemide 20 MG tablet   Commonly known as:  LASIX   Dose:  20 mg   Quantity:  90 tablet        Take 1 tablet (20 mg) by mouth daily as needed   Refills:  1        GREEN TEA EXTRACT PO        Take by mouth 2 times daily Doesn't always take regularly   Refills:  0        HERBALS        Nerve by [employer] 2 a day,   Healthy Feet and Nerves  By [employer]  3 a day, Heart Food Caps  (cayenna pepper, garlic, hawthorn, onion & ginger 1-2 a day)   Refills:  0        insulin aspart 100 UNIT/ML pen   Commonly known as:  NovoLOG FLEXPEN   Dose:  15 Units   Quantity:  45 mL        Inject 15 Units Subcutaneous 3 times daily (with meals)   Refills:  1        insulin glargine 100 UNIT/ML pen   Commonly known as:  LANTUS SOLOSTAR   Quantity:  30 mL        ADMINISTER 35 UNITS UNDER THE SKIN DAILY   Refills:  11        * insulin pen needle 32G X 4 MM miscellaneous   Commonly known as:  BD GERMÁN U/F   Quantity:  100 each        Use four times daily or as directed.   Refills:  3        * insulin pen needle 32G X 4 MM miscellaneous   Commonly known as:  BD GERMÁN U/F   Quantity:   200 each        USE TO TEST FOUR TIMES DAILY OR AS DIRECTED   Refills:  11        irbesartan 300 MG tablet   Commonly known as:  AVAPRO   Dose:  300 mg   Quantity:  90 tablet        Take 1 tablet (300 mg) by mouth At Bedtime   Refills:  2        Magnesium 125 MG Caps        Take by mouth daily   Refills:  0        MELATONIN PO   Dose:  0.5 mg        Take 0.5 mg by mouth nightly as needed   Refills:  0        metoprolol succinate  MG 24 hr tablet   Commonly known as:  TOPROL-XL   Dose:  100 mg   Quantity:  180 tablet        Take 1 tablet (100 mg) by mouth 2 times daily   Refills:  3        Multi-vitamin tablet        [employer]   Refills:  0        order for DME   Quantity:  1 each        Equipment being ordered: Compression stockings   Refills:  3        predniSONE 20 MG tablet   Commonly known as:  DELTASONE   Quantity:  10 tablet        TAKE 1 TABLET(20 MG) BY MOUTH DAILY AS NEEDED FOR GOUT FLARE   Refills:  0        PROBIOTIC & ACIDOPHILUS EX ST PO   Dose:  1 capsule        Take 1 capsule by mouth daily   Refills:  0        Resveratrol 100 MG Caps   Dose:  2 capsule        Take 2 capsules by mouth daily Sh[employer]lee [employer]ix   Refills:  0        spironolactone 25 MG tablet   Commonly known as:  ALDACTONE   Dose:  25 mg   Quantity:  90 tablet        Take 1 tablet (25 mg) by mouth daily   Refills:  3        Taurine 500 MG Caps   Dose:  1 capsule        Take 1 capsule by mouth 2 times daily   Refills:  0        ULORIC 40 MG Tabs tablet   Dose:  40 mg   Generic drug:  febuxostat        Take 40 mg by mouth daily   Refills:  0        UNABLE TO FIND        MEDICATION NAME: Super Beet Juice   Refills:  0        vitamin D3 2000 units tablet   Commonly known as:  CHOLECALCIFEROL   Dose:  1-2 tablet        Take 1-2 tablets by mouth daily   Refills:  0        warfarin 5 MG tablet   Commonly known as:  COUMADIN   Quantity:  110 tablet        7.5mg Sunday, Tuesday, Thursday and 5mg the rest of the week per INR  clinic   Refills:  0        * Notice:  This list has 2 medication(s) that are the same as other medications prescribed for you. Read the directions carefully, and ask your doctor or other care provider to review them with you.            Prescriptions were sent or printed at these locations (2 Prescriptions)                   Other Prescriptions                Printed at Department/Unit printer (2 of 2)         amoxicillin-clavulanate (AUGMENTIN) 875-125 MG tablet               ciprofloxacin (CILOXAN) 0.3 % ophthalmic solution                Procedures and tests performed during your visit     Procedure/Test Number of Times Performed    Basic metabolic panel 1    Blood culture 2    CBC with platelets differential 1    INR 1    Lactic acid whole blood 1      Orders Needing Specimen Collection     None      Pending Results     Date and Time Order Name Status Description    12/1/2018 0858 Blood culture In process     12/1/2018 0858 Blood culture In process             Pending Culture Results     Date and Time Order Name Status Description    12/1/2018 0858 Blood culture In process     12/1/2018 0858 Blood culture In process             Pending Results Instructions     If you had any lab results that were not finalized at the time of your Discharge, you can call the ED Lab Result RN at [phone]. You will be contacted by this team for any positive Lab results or changes in treatment. The nurses are available 7 days a week from 10A to 6:30P.  You can leave a message 24 hours per day and they will return your call.        Test Results From Your Hospital Stay        12/1/2018  9:15 AM      Component Results     Component Value Ref Range & Units Status    WBC 18.0 (H) 4.0 - 11.0 10e9/L Final    RBC Count 4.16 (L) 4.4 - 5.9 10e12/L Final    Hemoglobin 14.6 13.3 - 17.7 g/dL Final    Hematocrit 42.0 40.0 - 53.0 % Final     (H) 78 - 100 fl Final    MCH 35.1 (H) 26.5 - 33.0 pg Final    MCHC 34.8 31.5 - 36.5 g/dL Final     RDW 13.1 10.0 - 15.0 % Final    Platelet Count 155 150 - 450 10e9/L Final    Diff Method Automated Method  Final    % Neutrophils 87.3 % Final    % Lymphocytes 6.6 % Final    % Monocytes 5.5 % Final    % Eosinophils 0.1 % Final    % Basophils 0.2 % Final    % Immature Granulocytes 0.3 % Final    Nucleated RBCs 0 0 /100 Final    Absolute Neutrophil 15.7 (H) 1.6 - 8.3 10e9/L Final    Absolute Lymphocytes 1.2 0.8 - 5.3 10e9/L Final    Absolute Monocytes 1.0 0.0 - 1.3 10e9/L Final    Absolute Eosinophils 0.0 0.0 - 0.7 10e9/L Final    Absolute Basophils 0.0 0.0 - 0.2 10e9/L Final    Abs Immature Granulocytes 0.1 0 - 0.4 10e9/L Final    Absolute Nucleated RBC 0.0  Final         12/1/2018  9:30 AM      Component Results     Component Value Ref Range & Units Status    INR 1.47 (H) 0.86 - 1.14 Final         12/1/2018  9:30 AM      Component Results     Component Value Ref Range & Units Status    Sodium 131 (L) 133 - 144 mmol/L Final    Potassium 4.0 3.4 - 5.3 mmol/L Final    Chloride 98 94 - 109 mmol/L Final    Carbon Dioxide 25 20 - 32 mmol/L Final    Anion Gap 8 3 - 14 mmol/L Final    Glucose 161 (H) 70 - 99 mg/dL Final    Urea Nitrogen 45 (H) 7 - 30 mg/dL Final    Creatinine 1.52 (H) 0.66 - 1.25 mg/dL Final    GFR Estimate 45 (L) >60 mL/min/1.7m2 Final    Non  GFR Calc    GFR Estimate If Black 55 (L) >60 mL/min/1.7m2 Final    African American GFR Calc    Calcium 8.3 (L) 8.5 - 10.1 mg/dL Final         12/1/2018  9:17 AM      Component Results     Component Value Ref Range & Units Status    Lactic Acid 1.7 0.7 - 2.0 mmol/L Final         12/1/2018  9:13 AM         12/1/2018  9:31 AM                Clinical Quality Measure: Blood Pressure Screening     Your blood pressure was checked while you were in the emergency department today. The last reading we obtained was  BP: 132/66 . Please read the guidelines below about what these numbers mean and what you should do about them.  If your systolic blood pressure  (the top number) is less than 120 and your diastolic blood pressure (the bottom number) is less than 80, then your blood pressure is normal. There is nothing more that you need to do about it.  If your systolic blood pressure (the top number) is 120-139 or your diastolic blood pressure (the bottom number) is 80-89, your blood pressure may be higher than it should be. You should have your blood pressure rechecked within a year by a primary care provider.  If your systolic blood pressure (the top number) is 140 or greater or your diastolic blood pressure (the bottom number) is 90 or greater, you may have high blood pressure. High blood pressure is treatable, but if left untreated over time it can put you at risk for heart attack, stroke, or kidney failure. You should have your blood pressure rechecked by a primary care provider within the next 4 weeks.  If your provider in the emergency department today gave you specific instructions to follow-up with your doctor or provider even sooner than that, you should follow that instruction and not wait for up to 4 weeks for your follow-up visit.        Thank you for choosing [facility]       Thank you for choosing [facility] for your care. Our goal is always to provide you with excellent care. Hearing back from our patients is one way we can continue to improve our services. Please take a few minutes to complete the written survey that you may receive in the mail after you visit with us. Thank you!        Care EveryWhere ID     This is your Care EveryWhere ID. This could be used by other organizations to access your [facility] medical records  [id number]        Equal Access to Services     LYUDMILA AMOS : Hadii nitin Ordonez, waaxda luqadaha, qaybta kaalmada chip, quynh harris . So [facility] [phone].    ATENCIÓN: Si habla español, tiene a jnoes disposición servicios gratuitos de asistencia lingüística. Llame al [phone].    We comply  with applicable federal civil rights laws and [place] laws. We do not discriminate on the basis of race, color, national origin, age, disability, sex, sexual orientation, or gender identity.            After Visit Summary       This is your record. Keep this with you and show to your community pharmacist(s) and doctor(s) at your next visit.

## 2018-12-01 NOTE — MR AVS SNAPSHOT
After Visit Summary   12/1/2018    Jt Montgomery    MRN: [id number]           Patient Information     Date Of Birth          1945        Visit Information        Provider Department      12/1/2018 8:05 AM Sigrid Lazo PA-C [facility]        Today's Diagnoses     Eye swelling, bilateral    -  1       Follow-ups after your visit        Your next 10 appointments already scheduled     Dec 19, 2018 11:20 AM CST   Anticoagulation Visit with FREDERICK ANTICOAGULATION   [facility] ([facility])    [address]  [place] [address]   [phone] OPT 2            Feb 27, 2019  1:30 PM CST   Office Visit with Nathaly [facility] ([facility])    [address]  [place] [id number] [phone]           Bring a current list of meds and any records pertaining to this visit. For Physicals, please bring immunization records and any forms needing to be filled out. Please arrive 10 minutes early to complete paperwork.            Mar 06, 2019  9:20 AM CST   Office Visit with Digna Jones MD   [facility] ([facility])    [address] [id number] [phone]           Bring a current list of meds and any records pertaining to this visit. For Physicals, please bring immunization records and any forms needing to be filled out. Please arrive 10 minutes early to complete paperwork.            Jun 13, 2019 11:30 AM CDT   Return Sleep Patient with Edvin Alanis MD   [facility] ([facility])    [address]  [place] [id number] [phone]              Who to contact     If you have questions or need follow up information about today's clinic visit or your schedule please contact [facility] directly at  [phone].  Normal or non-critical lab and imaging results will be communicated to you by MyChart, letter or phone within 4 business days after the clinic has received the results. If you do not hear from us within 7 days, please contact the clinic through MyChart or phone. If you have a critical or abnormal lab result, we will notify you by phone as soon as possible.  Submit refill requests through [email or web address] or call your pharmacy and they will forward the refill request to us. Please allow 3 business days for your refill to be completed.          Additional Information About Your Visit        Care EveryWhere ID     This is your Care EveryWhere ID. This could be used by other organizations to access your [place] medical records  [id number]        Your Vitals Were     Pulse Temperature Pulse Oximetry             60 100.7  F (38.2  C) (Oral) 94%          Blood Pressure from Last 3 Encounters:   12/01/18 132/66   12/01/18 114/65   11/28/18 122/68    Weight from Last 3 Encounters:   12/01/18 278 lb (126.1 kg)   11/28/18 279 lb (126.6 kg)   09/06/18 273 lb (123.8 kg)              Today, you had the following     No orders found for display         Today's Medication Changes          These changes are accurate as of 12/1/18 10:06 AM.  If you have any questions, ask your nurse or doctor.               These medicines have changed or have updated prescriptions.        Dose/Directions    blood glucose monitoring lancets   This may have changed:  additional instructions   Used for:  Uncontrolled diabetes mellitus with complications (H)        Use to test blood sugar 5 times daily or as directed.   Quantity:  1 Box   Refills:  prn                Primary Care Provider Office Phone # Fax #    Digna Amanuel Jones [phone]       [address] [facility] [address]  [place] [address]        Equal Access to Services     LYUDMILA AMOS AH: edna Doran, quynh barrios  phyllis juanjunior nikkikim harris ah. So [facility] [place] [phone].    ATENCIÓN: Si rickyla damien, tiene a jones disposición servicios gratuitos de asistencia lingüística. Josue al [phone].    We comply with applicable federal civil rights laws and [place] laws. We do not discriminate on the basis of race, color, national origin, age, disability, sex, sexual orientation, or gender identity.            Thank you!     Thank you for choosing [facility]  for your care. Our goal is always to provide you with excellent care. Hearing back from our patients is one way we can continue to improve our services. Please take a few minutes to complete the written survey that you may receive in the mail after your visit with us. Thank you!             Your Updated Medication List - Protect others around you: Learn how to safely use, store and throw away your medicines at www.disposemymeds.org.          This list is accurate as of 12/1/18 10:06 AM.  Always use your most recent med list.                   Brand Name Dispense Instructions for use Diagnosis    alpha-lipoic acid 600 MG Caps      Take 1 capsule by mouth 2 times daily        amoxicillin-clavulanate 875-125 MG tablet    AUGMENTIN    14 tablet    Take 1 tablet by mouth 2 times daily for 7 days        aspirin 81 MG EC tablet    ASA    90 tablet    Take 1 tablet by mouth daily.    Atrial fibrillation with RVR (H), Dilated cardiomyopathy (H)       blood glucose monitoring lancets     1 Box    Use to test blood sugar 5 times daily or as directed.    Uncontrolled diabetes mellitus with complications (H)       blood glucose monitoring meter device kit     1 kit    Use to test blood sugar 3 times daily or as directed.    Uncontrolled diabetes mellitus with complications (H)       blood glucose monitoring test strip    ACCU-CHEK LUIS PLUS    400 strip    Use to test blood sugar 5 times daily or as directed.    Uncontrolled type 2 diabetes mellitus with complication,  with long-term current use of insulin (H)       ciprofloxacin 0.3 % ophthalmic solution    CILOXAN    1 Bottle    Instill 1-2 drops in the affected eye(s) every 2 hours while awake for 2 days then 1-2 drops every 4 hours while awake for the next 5 days.        CoQ10 100 MG Caps      Take 300 mg by mouth daily    Uncontrolled diabetes mellitus with complications (H)       digoxin 125 MCG tablet    LANOXIN    90 tablet    Take 1 tablet (125 mcg) by mouth daily    Atrial fibrillation (H)       diltiazem ER COATED BEADS 300 MG 24 hr capsule    CARTIA XT    90 capsule    Take 1 capsule (300 mg) by mouth daily    Atrial fibrillation (H)       fish oil-omega-3 fatty acids 1000 MG capsule      Take 3-5 g by mouth daily        FREESTYLE RINKU 14 DAY SENSOR Misc     2 each    1 Device every 14 days    Type 2 diabetes mellitus with diabetic neuropathy, with long-term current use of insulin (H)       furosemide 20 MG tablet    LASIX    90 tablet    Take 1 tablet (20 mg) by mouth daily as needed    Dilated cardiomyopathy (H)       GREEN TEA EXTRACT PO      Take by mouth 2 times daily Doesn't always take regularly        HERBALS      Nerve by [employer] 2 a day,   Healthy Feet and Nerves  By [employer]  3 a day, Heart Food Caps  (cayenna pepper, garlic, hawthorn, onion & ginger 1-2 a day)        insulin aspart 100 UNIT/ML pen    NovoLOG FLEXPEN    45 mL    Inject 15 Units Subcutaneous 3 times daily (with meals)    Type 2 diabetes mellitus with diabetic neuropathy, with long-term current use of insulin (H)       insulin glargine 100 UNIT/ML pen    LANTUS SOLOSTAR    30 mL    ADMINISTER 35 UNITS UNDER THE SKIN DAILY    Type 2 diabetes mellitus with diabetic neuropathy, with long-term current use of insulin (H)       * insulin pen needle 32G X 4 MM miscellaneous    BD GERMÁN U/F    100 each    Use four times daily or as directed.    Uncontrolled diabetes mellitus with complications (H)       * insulin pen needle 32G X 4 MM miscellaneous     BD GERMÁN U/F    200 each    USE TO TEST FOUR TIMES DAILY OR AS DIRECTED    Type 2 diabetes mellitus with diabetic neuropathy, with long-term current use of insulin (H)       irbesartan 300 MG tablet    AVAPRO    90 tablet    Take 1 tablet (300 mg) by mouth At Bedtime    HTN (hypertension)       Magnesium 125 MG Caps      Take by mouth daily        MELATONIN PO      Take 0.5 mg by mouth nightly as needed        metoprolol succinate  MG 24 hr tablet    TOPROL-XL    180 tablet    Take 1 tablet (100 mg) by mouth 2 times daily    Coronary artery disease involving native coronary artery of native heart with angina pectoris (H)       Multi-vitamin tablet      Shaklee & Life Extenstion        order for DME     1 each    Equipment being ordered: Compression stockings    Edema of both legs       predniSONE 20 MG tablet    DELTASONE    10 tablet    TAKE 1 TABLET(20 MG) BY MOUTH DAILY AS NEEDED FOR GOUT FLARE    Chronic gout due to renal impairment of multiple sites without tophus       PROBIOTIC & ACIDOPHILUS EX ST PO      Take 1 capsule by mouth daily        Resveratrol 100 MG Caps      Take 2 capsules by mouth daily Shaklee Vivix        spironolactone 25 MG tablet    ALDACTONE    90 tablet    Take 1 tablet (25 mg) by mouth daily    Atrial fibrillation, unspecified type (H)       Taurine 500 MG Caps      Take 1 capsule by mouth 2 times daily        ULORIC 40 MG Tabs tablet   Generic drug:  febuxostat      Take 40 mg by mouth daily        UNABLE TO FIND      MEDICATION NAME: Super Beet Juice        vitamin D3 2000 units tablet    CHOLECALCIFEROL     Take 1-2 tablets by mouth daily        warfarin 5 MG tablet    COUMADIN    110 tablet    7.5mg Sunday, Tuesday, Thursday and 5mg the rest of the week per INR clinic    Long term current use of anticoagulant therapy, Atrial fibrillation, unspecified type (H)       * Notice:  This list has 2 medication(s) that are the same as other medications prescribed for you. Read the  directions carefully, and ask your doctor or other care provider to review them with you.

## 2018-12-01 NOTE — ED PROVIDER NOTES
History     Chief Complaint:  Facial swelling    HPI   Jt Montgomery is a 73 year old male who presents with a history of gout, CHF, Diabetes, cardiomyopathy, among others who presents for facial swelling which he has had since Wednesday night. He suspects that he put water in his CPAP reservoir which he has never done before. He reports that he rinsed it but did not scrub it. This is the only thing he suspects could have caused this. He denies a headache currently. He reports that he feels some swelling in his eyes and cheeks as well. The patient reports that he has since changed the mask for his CPAP.  The patient reports that he took his gout medication when these symptoms presented but he does not know whether this helped. The patient is an insulin dependant diabetic.     Allergies:  Corn dextrin     Medications:    Alpha-Lipoic Acid 600 Mg Caps  Aspirin Ec 81 Mg   Coq10  Lanoxin  Cartia Xt  Uloric  Lasix  Novolog Flexpen  Lantus Solostar  Insulin Pen Needle  Avapro  Melatonin Po  Toprol-Xl  Deltasone  Resveratrol 100 Mg Caps  Aldactone  Taurine 500 Mg Caps  Vitamin D3   Warfarin      Past Medical History:    Atrial fibrillation (H)   CAD (coronary artery disease)   Central Sleep Apnea with Pat Villanueva breathing   CKD (chronic kidney disease) stage 3, GFR 30-59 ml/min   Dilated cardiomyopathy (H)   DM2 (diabetes mellitus, type 2) (H)   Gout   Hypertension   Pulmonary hypertension      Past Surgical History:    Suspend hyoid, genioglossal  S/p angioplasty with stent w/ RONEL in distal RCA   Incarcerated hernia    Family History:    hypertension  CAD  Diabetes    Social History:  The patient  reports that he quit smoking about 46 years ago. His smoking use included Cigarettes. He has a 10.50 pack-year smoking history. He has never used smokeless tobacco. He reports that he does not drink alcohol or use illicit drugs.   Marital Status:   [4]     Review of Systems   HENT: Positive for facial  "swelling.    Skin: Positive for color change.   All other systems reviewed and are negative.  10 point review of systems performed and is negative except as above and in HPI.   Physical Exam     Vital signs  Patient Vitals for the past 24 hrs:   BP Temp Temp src Pulse Resp SpO2 Height Weight   12/01/18 0901 - 99.4  F (37.4  C) Oral - - - - -   12/01/18 0849 132/66 99.4  F (37.4  C) Oral 67 16 95 % 1.854 m (6' 1\") 126.1 kg (278 lb)                Physical Exam  General: Patient is alert and normal appearing.  HEENT: Head atraumatic.  Bilateral left greater than right periorbital erythema and swelling with thick discharge noted from left eye and erythema and swelling to proximal cheeks.   Eyes: pupils equal and reactive. Conjunctiva clear.  Extraocular movements intact and no pain with extraocular movements.  Goopy discharge noted at left thigh but no conjunctival injection.   Nares: patent   Oropharynx: no lesions, uvula midline, no palatal draping, normal voice, no trismus  Neck: Supple without lymphadenopathy, no meningismus  Chest: Heart regular rate and rhythm.   Lungs: Equal clear to auscultation with no wheeze or rales  Abdomen: Soft, non tender, nondistended, normal bowel sounds  Back: No costovertebral angle tenderness, no midline C, T or L spine tenderness  Neuro: Grossly nonfocal, normal speech, strength equal bilaterally, CN 2-12 intact  Extremities: No deformities, equal radial and DP pulses. No clubbing, cyanosis.  No edema  Skin: Warm and dry with no rash. See picture for facial swelling and erythema      Emergency Department Course   Laboratory:  CBC: WBC 18.0, RBC 4.16, , MCH 35.1, Absolute Neutrophil 15.7, otherwise within normal limits   INR 1.47  BMP: , Glucose 161, BUN 45, Creatinine 1.52, GFR Estimate 45, Ca 8.3, otherwise within normal limits   Lactate 1.7    Blood Culture: pending x 2     Interventions:  0926: Ancef 1g vial    Emergency Department Course:  Past medical records, " nursing notes, and vitals reviewed.  0848: I performed an exam of the patient and obtained history, as documented above.       The patient went to Urgent Care where he had a fever of 101.     I rechecked the patient. He informs me that he   Impression & Plan             Medical Decision Making:  Patient presents the emergency department with 4 days of increasing swelling and erythema around his eyes bilaterally left greater than right.  His left eye is nearly swollen shut.  On opening it he has normal vision he feels.  He denies pain with extraocular movements.  He was noted to have a fever of 101 at urgent care and is 99 oral here in the emergency department.  There is some goopy discharge from his left eye.  Lactic acid is within normal limits.  Blood cultures are pending at this time.  He was concerned that this may have been somehow related to his CPAP, however I feel that it is likely not related to that and simply a periorbital cellulitis that has spread to be facial cellulitis.  He was given a dose of Ancef here in the emergency department and discharged with Augmentin as well as moxifloxacin ophthalmic drops.  (The pharmacy called his they were unable to fill the Cipro ophthalmic).  Patient appears hemodynamically stable at this time.  I have given him strict return precautions the emergency department and asked him if he is failing to improve or any worsening between now and tomorrow that he come back for reevaluation.  At this time I do not feel that this is consistent with orbital cellulitis but if he has any progression of symptoms would likely need CT scan.  Certainly if he develops signs or symptoms to suggest worsening concern would be for development of sepsis as well.  Patient was agreeable with this plan and all his questions and concerns were addressed.  Trial of outpatient antibiotics is warranted, however I asked him to have a low threshold to return if worsening or fails to improve.  There is  no airway involvement or oropharynx involvement.  Do not feel this is likely consistent with allergic reaction and no steroids were started given his history of diabetes.  Patient was agreeable with the plan and all questions and concerns addressed.    Diagnosis:    ICD-10-CM    1. Facial cellulitis L03.211    2. Cellulitis of periorbital region of both eyes L03.213        Disposition:  discharged to home    Discharge Medications:  Discharge Medication List as of 12/1/2018 10:20 AM      START taking these medications    Details   amoxicillin-clavulanate (AUGMENTIN) 875-125 MG tablet Take 1 tablet by mouth 2 times daily for 7 days, Disp-14 tablet, R-0, Local Print      ciprofloxacin (CILOXAN) 0.3 % ophthalmic solution Instill 1-2 drops in the affected eye(s) every 2 hours while awake for 2 days then 1-2 drops every 4 hours while awake for the next 5 days., Disp-1 Bottle, R-0, Local Print           Yuan MOONEY am serving as a scribe at 8:48 AM on 12/1/2018 to document services personally performed by Emely Weston MD based on my observations and the provider's statements to me.     EMERGENCY DEPARTMENT       Emely Weston MD  12/01/18 0505

## 2018-12-01 NOTE — DISCHARGE INSTRUCTIONS
Cellulitis  Cellulitis is an infection of the deep layers of skin. A break in the skin, such as a cut or scratch, can let bacteria under the skin. If the bacteria get to deep layers of the skin, it can be serious. If not treated, cellulitis can get into the bloodstream and lymph nodes. The infection can then spread throughout the body. This causes serious illness.  Cellulitis causes the affected skin to become red, swollen, warm, and sore. The reddened areas have a visible border. An open sore may leak fluid (pus). You may have a fever, chills, and pain.  Cellulitis is treated with antibiotics taken for 7 to 10 days. An open sore may be cleaned and covered with cool wet gauze. Symptoms should get better 1 to 2 days after treatment is started. Make sure to take all the antibiotics for the full number of days until they are gone. Keep taking the medicine even if your symptoms go away.  Home care  Follow these tips:    Limit the use of the part of your body with cellulitis.     If the infection is on your leg, keep your leg raised while sitting. This will help to reduce swelling.    Take all of the antibiotic medicine exactly as directed until it is gone. Do not miss any doses, especially during the first 7 days. Don t stop taking the medicine when your symptoms get better.    Keep the affected area clean and dry.    Wash your hands with soap and warm water before and after touching your skin. Anyone else who touches your skin should also wash his or her hands. Don't share towels.  Follow-up care  Follow up with your healthcare provider, or as advised. If your infection does not go away on the first antibiotic, your healthcare provider will prescribe a different one.  When to seek medical advice  Call your healthcare provider right away if any of these occur:    Red areas that spread    Swelling or pain that gets worse    Fluid leaking from the skin (pus)    Fever higher of 100.4  F (38.0  C) or higher after 2 days  on antibiotics  Date Last Reviewed: 9/1/2016 2000-2018 [employer]. [address] [place] [address]. All rights reserved. This information is not intended as a substitute for professional medical care. Always follow your healthcare professional's instructions.          Periorbital Cellulitis  Periorbital cellulitis is an infection of the tissues around the eye. It is most often caused by an infected scratch or insect bite. Sometimes a sinus infection can cause this problem.  Home care  The following are general care guidelines:  1. Take your antibiotic medicine exactly as directed, until it is finished.  2. You may use over-the-counter medicine as directed based on age and weight to help with pain and fever, unless another pain medicine was given. If you have liver disease or ever had a stomach ulcer, talk with your healthcare provider before using these medicines. Do not use ibuprofen in children under 6 months of age. Aspirin should never be used in anyone under 18 years of age who is ill with a fever. It may cause severe illness or death.  Follow-up care  Follow up with your healthcare provider, or as advised.  When to seek medical advice  Call your healthcare provider right away if any of these occur:    Increasing swelling or pain around the eye    Increasing redness    Changes in vision    Fever of 100.4 (38  C) oral or 101.5 (38.6  C) rectal for more than 2 days on antibiotics  Date Last Reviewed: 6/1/2016 2000-2018 [employer]. [address] [place] [address]. All rights reserved. This information is not intended as a substitute for professional medical care. Always follow your healthcare professional's instructions.

## 2018-12-01 NOTE — PROGRESS NOTES
CHIEF COMPLAINT:   Chief Complaint   Patient presents with     Urgent Care     Eye Problem      left eye swollen shut,right eye swelling and red. head feels pressure and pain.       HPI: Jt Montgomery is a 73 year old male with a history of CHF, DM, cardiomyopathy who presents to clinic today for evaluation of eye issue. On Wednesday, he went to his appointment and shortly after that he began to feel irritation in his eye. His symptoms worsened until today when his left eye is swollen shut and his right eye is now swelling as well. He feels pressure and pain on the top of his head. He admits to chills. He has discomfort with moving his eyes and discharge has been draining from his eyes. He denies CP, SOB, syncope or weakness.     Past Medical History:   Diagnosis Date     Atrial fibrillation (H)      CAD (coronary artery disease)     MI with RONEL to dRCA April 2011     Central Sleep Apnea with Pat Villanueva breathing 9/14/2010    Also TOMMY with CPAP     CKD (chronic kidney disease) stage 3, GFR 30-59 ml/min (H)      Dilated cardiomyopathy (H)     nonischemic (possibly related to h/o a.fib with RVR) EF 30-40% March 2013     DM2 (diabetes mellitus, type 2) (H)     Goal HgbA1c < 7%     Gout     uric acid level correlates; April 2014 h/o +knee aspirate     Hypertension     Goal <140/90     Pulmonary hypertension (H)     mild per echo 3/2013     Past Surgical History:   Procedure Laterality Date     CORONARY ANGIOGRAPHY ADULT ORDER  2011    distal circ-RONEL     HERNIA REPAIR  11/20/10    incarcinated     SUSPEND HYOID, GENIOGLOSSAL ADVANCEMENT       Social History   Substance Use Topics     Smoking status: Former Smoker     Packs/day: 1.50     Years: 7.00     Types: Cigarettes     Quit date: 1/1/1972     Smokeless tobacco: Never Used      Comment: quit in 1971      Started at around age 18-19     Alcohol use No      Comment: 11/20/73   recovering     Current Outpatient Prescriptions   Medication     alpha-lipoic  acid 600 MG CAPS     aspirin EC 81 MG EC tablet     blood glucose monitoring (ACCU-CHEK LUIS PLUS) meter device kit     blood glucose monitoring (ACCU-CHEK LUIS PLUS) test strip     blood glucose monitoring (SOFTCLIX) lancets     Coenzyme Q10 (COQ10) 100 MG CAPS     Continuous Blood Gluc Sensor (FREESTYLE RINKU 14 DAY SENSOR) MISC     digoxin (LANOXIN) 125 MCG tablet     diltiazem (CARTIA XT) 300 MG 24 hr capsule     febuxostat (ULORIC) 40 MG TABS     fish oil-omega-3 fatty acids 1000 MG capsule     furosemide (LASIX) 20 MG tablet     Green Tea, Camillia sinensis, (GREEN TEA EXTRACT PO)     HERBALS     insulin aspart (NOVOLOG FLEXPEN) 100 UNIT/ML injection     insulin glargine (LANTUS SOLOSTAR) 100 UNIT/ML pen     insulin pen needle (BD GERMÁN U/F) 32G X 4 MM     insulin pen needle (BD GERMÁN U/F) 32G X 4 MM     irbesartan (AVAPRO) 300 MG tablet     Magnesium 125 MG CAPS     MELATONIN PO     metoprolol succinate (TOPROL-XL) 100 MG 24 hr tablet     multivitamin, therapeutic with minerals (MULTI-VITAMIN) TABS tablet     order for DME     predniSONE (DELTASONE) 20 MG tablet     Probiotic Product (PROBIOTIC & ACIDOPHILUS EX ST PO)     Resveratrol 100 MG CAPS     spironolactone (ALDACTONE) 25 MG tablet     Taurine 500 MG CAPS     UNABLE TO FIND     vitamin D3 (CHOLECALCIFEROL) 2000 units tablet     warfarin (COUMADIN) 5 MG tablet     No current facility-administered medications for this visit.      Allergies   Allergen Reactions     Corn Dextrin      All corn products - gets tired an ornery       10 point ROS of systems including Constitutional, Eyes, Respiratory, Cardiovascular, Gastroenterology, Genitourinary, Integumentary, Muscularskeletal, Psychiatric were all negative except for pertinent positives noted in my HPI.        Exam:  /65  Pulse 60  Temp 100.7  F (38.2  C) (Oral)  SpO2 94%  Physical Exam   Constitutional: He is well-developed, well-nourished, and in no distress. No distress.   HENT:   Right Ear:  Tympanic membrane and ear canal normal.   Left Ear: Tympanic membrane and ear canal normal.   Nose:       Mouth/Throat: Uvula is midline.   TTP over frontal sinuses   Eyes: Left eye exhibits discharge. Left conjunctiva is injected.   Significant upper and lower lid edema   L>R   Skin: He is not diaphoretic.         Results for orders placed or performed in visit on 11/28/18   Vitamin D Deficiency   Result Value Ref Range    Vitamin D Deficiency screening 34 20 - 75 ug/L   Lipid panel reflex to direct LDL Non-fasting   Result Value Ref Range    Cholesterol 161 <200 mg/dL    Triglycerides 645 (H) <150 mg/dL    HDL Cholesterol 26 (L) >39 mg/dL    LDL Cholesterol Calculated  <100 mg/dL     Cannot estimate LDL when triglyceride exceeds 400 mg/dL    Non HDL Cholesterol 135 (H) <130 mg/dL   Basic metabolic panel   Result Value Ref Range    Sodium 137 133 - 144 mmol/L    Potassium 4.6 3.4 - 5.3 mmol/L    Chloride 104 94 - 109 mmol/L    Carbon Dioxide 22 20 - 32 mmol/L    Anion Gap 11 3 - 14 mmol/L    Glucose 161 (H) 70 - 99 mg/dL    Urea Nitrogen 27 7 - 30 mg/dL    Creatinine 1.17 0.66 - 1.25 mg/dL    GFR Estimate 61 >60 mL/min/1.7m2    GFR Estimate If Black 74 >60 mL/min/1.7m2    Calcium 8.9 8.5 - 10.1 mg/dL   LDL cholesterol direct   Result Value Ref Range    LDL Cholesterol Direct 76 <100 mg/dL         ASSESSMENT/PLAN:  1. Eye swelling, bilateral  73 year old male with a 3 day history of worsening eye swelling. He is febrile on exam and significant swelling is appreciated. I am escorting him over to the ED for further evaluation and treatment. Differential diagnosis includes preseptal cellulitis, orbital cellulitis, facial cellulitis and other eye pathology.     Sigrid Lazo PA-C

## 2018-12-02 ENCOUNTER — HOSPITAL ENCOUNTER (INPATIENT)
Facility: CLINIC | Age: 73
LOS: 2 days | Discharge: LEFT BEFORE DISCHARGE | DRG: 603 | End: 2018-12-04
Attending: EMERGENCY MEDICINE | Admitting: INTERNAL MEDICINE
Payer: COMMERCIAL

## 2018-12-02 DIAGNOSIS — L03.211 FACIAL CELLULITIS: ICD-10-CM

## 2018-12-02 LAB
ALBUMIN SERPL-MCNC: 2.9 G/DL (ref 3.4–5)
ALP SERPL-CCNC: 71 U/L (ref 40–150)
ALT SERPL W P-5'-P-CCNC: 40 U/L (ref 0–70)
ANION GAP SERPL CALCULATED.3IONS-SCNC: 9 MMOL/L (ref 3–14)
AST SERPL W P-5'-P-CCNC: 40 U/L (ref 0–45)
BASOPHILS # BLD AUTO: 0 10E9/L (ref 0–0.2)
BASOPHILS NFR BLD AUTO: 0.1 %
BILIRUB SERPL-MCNC: 2.9 MG/DL (ref 0.2–1.3)
BUN SERPL-MCNC: 59 MG/DL (ref 7–30)
CALCIUM SERPL-MCNC: 8.3 MG/DL (ref 8.5–10.1)
CHLORIDE SERPL-SCNC: 102 MMOL/L (ref 94–109)
CO2 SERPL-SCNC: 24 MMOL/L (ref 20–32)
CREAT SERPL-MCNC: 1.65 MG/DL (ref 0.66–1.25)
DIFFERENTIAL METHOD BLD: ABNORMAL
EOSINOPHIL # BLD AUTO: 0.1 10E9/L (ref 0–0.7)
EOSINOPHIL NFR BLD AUTO: 0.3 %
ERYTHROCYTE [DISTWIDTH] IN BLOOD BY AUTOMATED COUNT: 13.1 % (ref 10–15)
GFR SERPL CREATININE-BSD FRML MDRD: 41 ML/MIN/1.7M2
GLUCOSE BLDC GLUCOMTR-MCNC: 176 MG/DL (ref 70–99)
GLUCOSE BLDC GLUCOMTR-MCNC: 223 MG/DL (ref 70–99)
GLUCOSE SERPL-MCNC: 169 MG/DL (ref 70–99)
HCT VFR BLD AUTO: 40.9 % (ref 40–53)
HGB BLD-MCNC: 14.4 G/DL (ref 13.3–17.7)
IMM GRANULOCYTES # BLD: 0 10E9/L (ref 0–0.4)
IMM GRANULOCYTES NFR BLD: 0.3 %
INR PPP: 1.6 (ref 0.86–1.14)
LACTATE BLD-SCNC: 1.9 MMOL/L (ref 0.7–2)
LACTATE BLD-SCNC: 2.1 MMOL/L (ref 0.7–2)
LYMPHOCYTES # BLD AUTO: 0.6 10E9/L (ref 0.8–5.3)
LYMPHOCYTES NFR BLD AUTO: 4.3 %
MCH RBC QN AUTO: 35.2 PG (ref 26.5–33)
MCHC RBC AUTO-ENTMCNC: 35.2 G/DL (ref 31.5–36.5)
MCV RBC AUTO: 100 FL (ref 78–100)
MONOCYTES # BLD AUTO: 1.1 10E9/L (ref 0–1.3)
MONOCYTES NFR BLD AUTO: 7.1 %
NEUTROPHILS # BLD AUTO: 13.1 10E9/L (ref 1.6–8.3)
NEUTROPHILS NFR BLD AUTO: 87.9 %
NRBC # BLD AUTO: 0 10*3/UL
NRBC BLD AUTO-RTO: 0 /100
PLATELET # BLD AUTO: 175 10E9/L (ref 150–450)
POTASSIUM SERPL-SCNC: 4.1 MMOL/L (ref 3.4–5.3)
PROT SERPL-MCNC: 7.1 G/DL (ref 6.8–8.8)
RBC # BLD AUTO: 4.09 10E12/L (ref 4.4–5.9)
SODIUM SERPL-SCNC: 135 MMOL/L (ref 133–144)
WBC # BLD AUTO: 14.9 10E9/L (ref 4–11)

## 2018-12-02 PROCEDURE — 36415 COLL VENOUS BLD VENIPUNCTURE: CPT | Performed by: INTERNAL MEDICINE

## 2018-12-02 PROCEDURE — 99223 1ST HOSP IP/OBS HIGH 75: CPT | Mod: AI | Performed by: INTERNAL MEDICINE

## 2018-12-02 PROCEDURE — 25000132 ZZH RX MED GY IP 250 OP 250 PS 637

## 2018-12-02 PROCEDURE — 96361 HYDRATE IV INFUSION ADD-ON: CPT

## 2018-12-02 PROCEDURE — 96365 THER/PROPH/DIAG IV INF INIT: CPT

## 2018-12-02 PROCEDURE — 83605 ASSAY OF LACTIC ACID: CPT | Performed by: EMERGENCY MEDICINE

## 2018-12-02 PROCEDURE — 85610 PROTHROMBIN TIME: CPT | Performed by: EMERGENCY MEDICINE

## 2018-12-02 PROCEDURE — 85025 COMPLETE CBC W/AUTO DIFF WBC: CPT | Performed by: EMERGENCY MEDICINE

## 2018-12-02 PROCEDURE — 25000131 ZZH RX MED GY IP 250 OP 636 PS 637: Performed by: INTERNAL MEDICINE

## 2018-12-02 PROCEDURE — 40000275 ZZH STATISTIC RCP TIME EA 10 MIN

## 2018-12-02 PROCEDURE — 25000125 ZZHC RX 250: Performed by: INTERNAL MEDICINE

## 2018-12-02 PROCEDURE — 12000000 ZZH R&B MED SURG/OB

## 2018-12-02 PROCEDURE — 25000132 ZZH RX MED GY IP 250 OP 250 PS 637: Performed by: INTERNAL MEDICINE

## 2018-12-02 PROCEDURE — 99285 EMERGENCY DEPT VISIT HI MDM: CPT | Mod: 25

## 2018-12-02 PROCEDURE — 25000128 H RX IP 250 OP 636: Performed by: EMERGENCY MEDICINE

## 2018-12-02 PROCEDURE — 80053 COMPREHEN METABOLIC PANEL: CPT | Performed by: EMERGENCY MEDICINE

## 2018-12-02 PROCEDURE — 83605 ASSAY OF LACTIC ACID: CPT | Performed by: INTERNAL MEDICINE

## 2018-12-02 PROCEDURE — 00000146 ZZHCL STATISTIC GLUCOSE BY METER IP

## 2018-12-02 RX ORDER — DIGOXIN 125 MCG
125 TABLET ORAL DAILY
Status: DISCONTINUED | OUTPATIENT
Start: 2018-12-03 | End: 2018-12-04 | Stop reason: HOSPADM

## 2018-12-02 RX ORDER — SPIRONOLACTONE 25 MG/1
25 TABLET ORAL EVERY EVENING
Status: DISCONTINUED | OUTPATIENT
Start: 2018-12-02 | End: 2018-12-04 | Stop reason: HOSPADM

## 2018-12-02 RX ORDER — NICOTINE POLACRILEX 4 MG
15-30 LOZENGE BUCCAL
Status: DISCONTINUED | OUTPATIENT
Start: 2018-12-02 | End: 2018-12-04 | Stop reason: HOSPADM

## 2018-12-02 RX ORDER — PROCHLORPERAZINE 25 MG
12.5 SUPPOSITORY, RECTAL RECTAL EVERY 12 HOURS PRN
Status: DISCONTINUED | OUTPATIENT
Start: 2018-12-02 | End: 2018-12-04 | Stop reason: HOSPADM

## 2018-12-02 RX ORDER — ONDANSETRON 4 MG/1
4 TABLET, ORALLY DISINTEGRATING ORAL EVERY 6 HOURS PRN
Status: DISCONTINUED | OUTPATIENT
Start: 2018-12-02 | End: 2018-12-04 | Stop reason: HOSPADM

## 2018-12-02 RX ORDER — CLINDAMYCIN PHOSPHATE 900 MG/50ML
900 INJECTION, SOLUTION INTRAVENOUS EVERY 8 HOURS
Status: DISCONTINUED | OUTPATIENT
Start: 2018-12-02 | End: 2018-12-04 | Stop reason: HOSPADM

## 2018-12-02 RX ORDER — SODIUM CHLORIDE 9 MG/ML
1000 INJECTION, SOLUTION INTRAVENOUS CONTINUOUS
Status: DISCONTINUED | OUTPATIENT
Start: 2018-12-02 | End: 2018-12-02

## 2018-12-02 RX ORDER — IRBESARTAN 150 MG/1
300 TABLET ORAL AT BEDTIME
Status: DISCONTINUED | OUTPATIENT
Start: 2018-12-02 | End: 2018-12-04 | Stop reason: HOSPADM

## 2018-12-02 RX ORDER — CEFAZOLIN SODIUM 1 G/3ML
1 INJECTION, POWDER, FOR SOLUTION INTRAMUSCULAR; INTRAVENOUS EVERY 8 HOURS
Status: DISCONTINUED | OUTPATIENT
Start: 2018-12-03 | End: 2018-12-04 | Stop reason: HOSPADM

## 2018-12-02 RX ORDER — DILTIAZEM HYDROCHLORIDE 300 MG/1
300 CAPSULE, COATED, EXTENDED RELEASE ORAL DAILY
Status: DISCONTINUED | OUTPATIENT
Start: 2018-12-02 | End: 2018-12-02

## 2018-12-02 RX ORDER — ASPIRIN 81 MG/1
81 TABLET ORAL DAILY
Status: DISCONTINUED | OUTPATIENT
Start: 2018-12-03 | End: 2018-12-04 | Stop reason: HOSPADM

## 2018-12-02 RX ORDER — DEXTROSE MONOHYDRATE 25 G/50ML
25-50 INJECTION, SOLUTION INTRAVENOUS
Status: DISCONTINUED | OUTPATIENT
Start: 2018-12-02 | End: 2018-12-04 | Stop reason: HOSPADM

## 2018-12-02 RX ORDER — AMOXICILLIN 250 MG
2 CAPSULE ORAL 2 TIMES DAILY PRN
Status: DISCONTINUED | OUTPATIENT
Start: 2018-12-02 | End: 2018-12-04 | Stop reason: HOSPADM

## 2018-12-02 RX ORDER — FEBUXOSTAT 40 MG/1
40 TABLET, FILM COATED ORAL DAILY
Status: DISCONTINUED | OUTPATIENT
Start: 2018-12-03 | End: 2018-12-04 | Stop reason: HOSPADM

## 2018-12-02 RX ORDER — POLYETHYLENE GLYCOL 3350 17 G/17G
17 POWDER, FOR SOLUTION ORAL DAILY PRN
Status: DISCONTINUED | OUTPATIENT
Start: 2018-12-02 | End: 2018-12-04 | Stop reason: HOSPADM

## 2018-12-02 RX ORDER — ACETAMINOPHEN 325 MG/1
650 TABLET ORAL EVERY 4 HOURS PRN
Status: DISCONTINUED | OUTPATIENT
Start: 2018-12-02 | End: 2018-12-04 | Stop reason: HOSPADM

## 2018-12-02 RX ORDER — METOPROLOL SUCCINATE 100 MG/1
100 TABLET, EXTENDED RELEASE ORAL
Status: DISCONTINUED | OUTPATIENT
Start: 2018-12-02 | End: 2018-12-04 | Stop reason: HOSPADM

## 2018-12-02 RX ORDER — LIDOCAINE 40 MG/G
CREAM TOPICAL
Status: DISCONTINUED | OUTPATIENT
Start: 2018-12-02 | End: 2018-12-04 | Stop reason: HOSPADM

## 2018-12-02 RX ORDER — ONDANSETRON 2 MG/ML
4 INJECTION INTRAMUSCULAR; INTRAVENOUS EVERY 6 HOURS PRN
Status: DISCONTINUED | OUTPATIENT
Start: 2018-12-02 | End: 2018-12-04 | Stop reason: HOSPADM

## 2018-12-02 RX ORDER — NALOXONE HYDROCHLORIDE 0.4 MG/ML
.1-.4 INJECTION, SOLUTION INTRAMUSCULAR; INTRAVENOUS; SUBCUTANEOUS
Status: DISCONTINUED | OUTPATIENT
Start: 2018-12-02 | End: 2018-12-04 | Stop reason: HOSPADM

## 2018-12-02 RX ORDER — CEFAZOLIN SODIUM 1 G/3ML
1 INJECTION, POWDER, FOR SOLUTION INTRAMUSCULAR; INTRAVENOUS ONCE
Status: COMPLETED | OUTPATIENT
Start: 2018-12-02 | End: 2018-12-02

## 2018-12-02 RX ORDER — AMOXICILLIN 250 MG
1 CAPSULE ORAL 2 TIMES DAILY PRN
Status: DISCONTINUED | OUTPATIENT
Start: 2018-12-02 | End: 2018-12-04 | Stop reason: HOSPADM

## 2018-12-02 RX ORDER — WARFARIN SODIUM 7.5 MG/1
7.5 TABLET ORAL ONCE
Status: COMPLETED | OUTPATIENT
Start: 2018-12-02 | End: 2018-12-02

## 2018-12-02 RX ORDER — PROCHLORPERAZINE MALEATE 5 MG
5 TABLET ORAL EVERY 6 HOURS PRN
Status: DISCONTINUED | OUTPATIENT
Start: 2018-12-02 | End: 2018-12-04 | Stop reason: HOSPADM

## 2018-12-02 RX ADMIN — CEFAZOLIN SODIUM 1 G: 1 INJECTION, POWDER, FOR SOLUTION INTRAMUSCULAR; INTRAVENOUS at 17:23

## 2018-12-02 RX ADMIN — DILTIAZEM HYDROCHLORIDE 300 MG: 180 CAPSULE, EXTENDED RELEASE ORAL at 21:34

## 2018-12-02 RX ADMIN — INSULIN GLARGINE 35 UNITS: 100 INJECTION, SOLUTION SUBCUTANEOUS at 22:07

## 2018-12-02 RX ADMIN — CLINDAMYCIN PHOSPHATE 900 MG: 900 INJECTION, SOLUTION INTRAVENOUS at 21:34

## 2018-12-02 RX ADMIN — SPIRONOLACTONE 25 MG: 25 TABLET ORAL at 21:32

## 2018-12-02 RX ADMIN — METOPROLOL SUCCINATE 100 MG: 100 TABLET, EXTENDED RELEASE ORAL at 21:34

## 2018-12-02 RX ADMIN — WARFARIN SODIUM 7.5 MG: 7.5 TABLET ORAL at 21:32

## 2018-12-02 RX ADMIN — Medication 0.5 MG: at 22:09

## 2018-12-02 RX ADMIN — SODIUM CHLORIDE 1000 ML: 9 INJECTION, SOLUTION INTRAVENOUS at 17:23

## 2018-12-02 RX ADMIN — IRBESARTAN 300 MG: 150 TABLET ORAL at 21:32

## 2018-12-02 ASSESSMENT — ACTIVITIES OF DAILY LIVING (ADL)
FALL_HISTORY_WITHIN_LAST_SIX_MONTHS: NO
TOILETING: 0-->INDEPENDENT
RETIRED_COMMUNICATION: 0-->UNDERSTANDS/COMMUNICATES WITHOUT DIFFICULTY
TRANSFERRING: 0-->INDEPENDENT
DRESS: 0-->INDEPENDENT
SWALLOWING: 0-->SWALLOWS FOODS/LIQUIDS WITHOUT DIFFICULTY
COGNITION: 0 - NO COGNITION ISSUES REPORTED
AMBULATION: 0-->INDEPENDENT
BATHING: 0-->INDEPENDENT
RETIRED_EATING: 0-->INDEPENDENT
ADLS_ACUITY_SCORE: 9

## 2018-12-02 ASSESSMENT — ENCOUNTER SYMPTOMS
MYALGIAS: 1
ARTHRALGIAS: 0
NECK PAIN: 0
FACIAL SWELLING: 1
COLOR CHANGE: 1
HEADACHES: 0

## 2018-12-02 NOTE — ED NOTES
"[facility]  ED Nurse Handoff Report    ED Chief complaint: Facial Swelling (Pt. upper facial edema including bilateral eyes and ears. Pt. states no difficulty breathing. Abx started yesterday due to edema, but is worse today)      ED Diagnosis:   Final diagnoses:   None       Code Status: Hospital MD at bedside    Allergies:   Allergies   Allergen Reactions     Corn Dextrin      All corn products - gets tired an ornery       Activity level - Baseline/Home:  Independent    Activity Level - Current:   Independent     Needed?: No    Isolation: No  Infection: Not Applicable  Bariatric?: No    Vital Signs:   Vitals:    12/02/18 1643 12/02/18 1700 12/02/18 1707 12/02/18 1717   BP: 117/63 109/66     Resp: 23 17 12    Temp:    99.6  F (37.6  C)   TempSrc:    Temporal   SpO2: 97% 93% 95%    Weight:       Height:           Cardiac Rhythm: ,        Pain level: 0-10 Pain Scale: 5    Is this patient confused?: No   Does this patient have a guardian?  No         If yes, is there guardianship documents in the Epic \"Code/ACP\" activity?  N/A         Guardian Notified?  N/A  [place] - Suicide Severity Rating Scale Completed?  Yes  If yes, what color did the patient score?  White    Patient Report: Initial Complaint: Facial cellulitis   Focused Assessment: Pt presents with significant facial cellulitis - pt reports symptoms started on Tuesday with some swelling under L eyelid and fever/chills. Since then, swelling and redness has worsened significantly. Pt seen in ED yesterday, given IV antibiotics and oral and discharged. Today, worsening which prompted ED visit. Large amount of redness and edema present to bilateral periorbital area, worse on L. Facial redness and swelling present to bilateral ears and neck. No oral or airway involvement. Pt temp 99.6 here. Pt given 1 liter NS and iv ancef today in ED. VSS  Tests Performed: Blood work  Abnormal Results: WBC 14, lactic 2.1  Treatments provided: 1 liter " NS, 1g ancef    Family Comments: none present    OBS brochure/video discussed/provided to patient/family: N/A              Name of person given brochure if not patient: n/a              Relationship to patient: n/a    ED Medications:   Medications   0.9% sodium chloride BOLUS (1,000 mLs Intravenous New Bag 12/2/18 1723)     Followed by   sodium chloride 0.9% infusion (not administered)   ceFAZolin (ANCEF) 1 g vial to attach to  ml bag for ADULT or 50 ml bag for PEDS (1 g Intravenous New Bag 12/2/18 1723)       Drips infusing?:  Yes    For the majority of the shift this patient was Green.   Interventions performed were meds. Repo, updates.    Severe Sepsis OR Septic Shock Diagnosis Present: No    To be done/followed up on inpatient unit:  inpt orders    ED NURSE PHONE NUMBER: *[phone]

## 2018-12-02 NOTE — ED PROVIDER NOTES
History     Chief Complaint:  Facial Swelling    HPI   Jt Montgomery is a 73 year old male with a history of diabetes mellitus and ED visit yesterday for facial cellulltis who returns with facial swelling. The patient was seen yesterday in urgent care with the same complaint where he was shown to have a temperature of 101F;He was referred to the ED with  HPI, exam, and medical decision making below. He was treated with Ancef, discharged with Augmentin  and was told to return if his symptoms did not get better.   Today, the patient is experiencing upper facial edema including bilateral eyes and ears similar to what he was experiencing yesterday. He states that he does not have any shortness of breath or chills, although states that his head is sore. Specifically, he has pain while using his CPAP machine that he uses every night when he sleeps. Of note, he has been using the reservoir on his CPAP recently which is different than normal. He does not think that this could be a causation. The patient denies experiencing any other pain across his body or any other symptoms here in the emergency department.   ED visit yesterday:   HPI - 12/1/2018   Jt Montgomery is a 73 year old male who presents with a history of gout, CHF, Diabetes, cardiomyopathy, among others who presents for facial swelling which he has had since Wednesday night. He suspects that he put water in his CPAP reservoir which he has never done before. He reports that he rinsed it but did not scrub it. This is the only thing he suspects could have caused this. He denies a headache currently. He reports that he feels some swelling in his eyes and cheeks as well. The patient reports that he has since changed the mask for his CPAP.  The patient reports that he took his gout medication when these symptoms presented but he does not know whether this helped. The patient is an insulin dependant diabetic.     Exam -12/1/2018  Physical  Exam  General: Patient is alert and normal appearing.  HEENT: Head atraumatic.  Bilateral left greater than right periorbital erythema and swelling with thick discharge noted from left eye and erythema and swelling to proximal cheeks.   Eyes: pupils equal and reactive. Conjunctiva clear.  Extraocular movements intact and no pain with extraocular movements.  Goopy discharge noted at left thigh but no conjunctival injection.   Nares: patent   Oropharynx: no lesions, uvula midline, no palatal draping, normal voice, no trismus  Neck: Supple without lymphadenopathy, no meningismus  Chest: Heart regular rate and rhythm.   Lungs: Equal clear to auscultation with no wheeze or rales  Abdomen: Soft, non tender, nondistended, normal bowel sounds  Back: No costovertebral angle tenderness, no midline C, T or L spine tenderness  Neuro: Grossly nonfocal, normal speech, strength equal bilaterally, CN 2-12 intact  Extremities: No deformities, equal radial and DP pulses. No clubbing, cyanosis.  No edema  Skin: Warm and dry with no rash. See picture for facial swelling and erythema      Medical Decision Makin2018  Patient presents the emergency department with 4 days of increasing swelling and erythema around his eyes bilaterally left greater than right.  His left eye is nearly swollen shut.  On opening it he has normal vision he feels.  He denies pain with extraocular movements.  He was noted to have a fever of 101 at urgent care and is 99 oral here in the emergency department.  There is some goopy discharge from his left eye.  Lactic acid is within normal limits.  Blood cultures are pending at this time.  He was concerned that this may have been somehow related to his CPAP, however I feel that it is likely not related to that and simply a periorbital cellulitis that has spread to be facial cellulitis.  He was given a dose of Ancef here in the emergency department and discharged with Augmentin as well as moxifloxacin  ophthalmic drops.  (The pharmacy called his they were unable to fill the Cipro ophthalmic).  Patient appears hemodynamically stable at this time.  I have given him strict return precautions the emergency department and asked him if he is failing to improve or any worsening between now and tomorrow that he come back for reevaluation.  At this time I do not feel that this is consistent with orbital cellulitis but if he has any progression of symptoms would likely need CT scan.  Certainly if he develops signs or symptoms to suggest worsening concern would be for development of sepsis as well.  Patient was agreeable with this plan and all his questions and concerns were addressed.  Trial of outpatient antibiotics is warranted, however I asked him to have a low threshold to return if worsening or fails to improve.  There is no airway involvement or oropharynx involvement.  Do not feel this is likely consistent with allergic reaction and no steroids were started given his history of diabetes.  Patient was agreeable with the plan and all questions and concerns addressed.     Allergies:  No Known Drug Allergies     Medications:    Alpha-lipoic acid   Augmentin   Aspirin  Coenzyme Q10  Digoxin  Diltiazem  Uloric   Lasix  Insulin aspart  insuling glargine  Avapro  Magnesium   melatonin   Metoprolol succinate  Deltasone  Resveratrol  Spironolactone   Taurine   Warfarin     Past Medical History:    Atrial fibrillation   Coronary artery disease  central sleep apnea with nereyda collins breathing   Chronic kidney disease stage three  Dilated cardiomyopathy   Diabetes mellitus  Gout   Hypertension   Pulmonary hypertension   Anemia     Past Surgical History:    Coronary angiography adult order  Hernia repair   Suspend hyoid, genioglossal advancement     Family History:    Hypertension   Coronary artery disease  Diabetes  Cerebrovascular disease    Social History:  The patient was alone in the emergency department.  Smoking Status:  "former smoker 1.50 PPD cigarettes   Smokeless Tobacco: Never used   Alcohol Use: No   Marital Status:  Divorce  He lives alone other than his cat, Vernon.      Review of Systems   HENT: Positive for facial swelling.    Musculoskeletal: Positive for myalgias. Negative for arthralgias and neck pain.        Head pain     Skin: Positive for color change and rash.   Neurological: Negative for headaches.   All other systems reviewed and are negative.    Physical Exam   First Vitals:  Patient Vitals for the past 24 hrs:   BP Temp Temp src Heart Rate Resp SpO2 Height Weight   12/02/18 1922 129/77 98.3  F (36.8  C) Oral 71 18 96 % 1.854 m (6' 1\") 125 kg (275 lb 9.2 oz)   12/02/18 1800 121/56 - - 84 - 94 % - -   12/02/18 1745 - - - 92 10 94 % - -   12/02/18 1730 119/56 - - 94 - 95 % - -   12/02/18 1717 - 99.6  F (37.6  C) Temporal - - - - -   12/02/18 1707 - - - 90 12 95 % - -   12/02/18 1700 109/66 - - 89 17 93 % - -   12/02/18 1643 117/63 - - 94 23 97 % - -   12/02/18 1608 124/58 97.6  F (36.4  C) Oral (!) 48 16 96 % 1.854 m (6' 1\") 126.6 kg (279 lb)       Physical Exam  Constitutional:  Oriented to person, place, and time. Obese. Face is warm and red. See picture below.      Appears well-developed and well-nourished.   HENT:    No intraoral swelling.   Head:    Normocephalic and atraumatic.   Right Ear:   Tympanic membrane and external ear normal. What appear to be small pustules present behind right ear.  Bilateral erythema to ears. Erythema extends behind ears to back of head.   Left Ear:   Tympanic membrane and external ear normal.   Mouth/Throat:   Oropharynx is clear and moist.      Mucous membranes are normal.   Eyes:    Conjunctivae white with intact EOM. No pain.      Pupils are equal, round, and reactive to light.   Neck:    Normal range of motion. Neck supple.   Cardiovascular:  Irregular heart rhythm. S1 normal and S2 normal.      No gallop and no friction rub. No murmur heard.  Pulmonary/Chest:  Breath sounds " normal. No respiratory distress.      No wheezes. No rhonchi. No rales.   Abdominal:   Soft. No hepatosplenomegaly. No tenderness.      No rebound and no CVA tenderness.   Musculoskeletal:  Normal range of motion.   Neurological:   Alert and oriented to person, place, and time. Normal strength.      GCS eye subscore is 4. GCS verbal subscore is 5.      GCS motor subscore is 6.   Skin:    Skin is warm and dry.   Psychiatric:   Normal mood and affect.      Speech is normal and behavior is normal.      Judgment and thought content normal.      Cognition and memory are normal.         Emergency Department Course   Laboratory:  Laboratory findings were communicated with the patient who voiced understanding of the findings.    CBC: WBC 14.9 (H), HGB 14.4,      CMP: Glucose 169 (H), BUN 59 (H), Creatinine 1.65 (H), GFR estimate 41 (L), Calcium 8.3 (L), Bilirubin total 2.9 (H), Albumin 2.9 (L) o/wWNL    Lactic Acid: 2.1 (H)    Interventions:  1723: NS 1L IV Bolus   .1802: Ancef 1g IV Infusion      Emergency Department Course:  Nursing notes and vitals reviewed.  1710: I performed an exam of the patient as documented above.     1720: The tech rechecked temperature which was 99.6F    1823: Patient rechecked and updated. The patient was communicated with concerning his laboratory findings and will be admitted to the hospital    Findings and plan explained to the Patient who consents to admission. Discussed the patient with Dr. Bermudez, who will admit the patient to a medical bed for further monitoring, evaluation, and treatment.    I personally reviewed the laboratory results with the Patient and answered all related questions prior to admission.  Impression & Plan    CMS Diagnoses: The Lactic acid level is elevated due to facial cellulitis, at this time there is no sign of severe sepsis or septic shock.    Medical Decision Making:  The patient is a 73-year-old male with diabetes seen yesterday by Dr. Weston for facial  swelling treated with Ancef and discharged home on Augmentin who returns for similar symptoms.  He thinks he actually may feel a little better but is concerned due to the persistent swelling and redness.  He still has significant erythema of his face and swelling around his eyes; see picture above.  His lactate was slightly elevated.  His white count is better but still elevated.  I am concerned in light of his diabetes and persistent symptoms that he should be admitted for IV antibiotics.  He appears nontoxic and systemically feels well so I did not do blood cultures.  The patient will be admitted by Dr. Bermudez.  It is unclear why he got this facial cellulitis other than he does use a CPAP and there may been some incursion of bacteria with that.  He will be admitted inpatient.    Diagnosis:    ICD-10-CM    1. Facial cellulitis L03.211    2. Diabetes    Disposition:  Admitted to medical bed.     Cezar Art  12/2/2018    EMERGENCY DEPARTMENT  Scribe Disclosure:  I, Cezar Art, am serving as a scribe at 5:18 PM on 12/2/2018 to document services personally performed by Argelia Mendez MD based on my observations and the provider's statements to me.        Argelia Mendez MD  12/03/18 0019

## 2018-12-02 NOTE — ED NOTES
Bed: ED27  Expected date: 12/2/18  Expected time:   Means of arrival:   Comments:  Triage facial edema

## 2018-12-03 ENCOUNTER — TELEPHONE (OUTPATIENT)
Dept: SLEEP MEDICINE | Facility: CLINIC | Age: 73
End: 2018-12-03

## 2018-12-03 LAB
ANION GAP SERPL CALCULATED.3IONS-SCNC: 6 MMOL/L (ref 3–14)
BUN SERPL-MCNC: 47 MG/DL (ref 7–30)
C DIFF TOX B STL QL: NEGATIVE
CALCIUM SERPL-MCNC: 8.2 MG/DL (ref 8.5–10.1)
CHLORIDE SERPL-SCNC: 108 MMOL/L (ref 94–109)
CO2 SERPL-SCNC: 23 MMOL/L (ref 20–32)
CREAT SERPL-MCNC: 1.21 MG/DL (ref 0.66–1.25)
ERYTHROCYTE [DISTWIDTH] IN BLOOD BY AUTOMATED COUNT: 13.1 % (ref 10–15)
GFR SERPL CREATININE-BSD FRML MDRD: 59 ML/MIN/1.7M2
GLUCOSE BLDC GLUCOMTR-MCNC: 130 MG/DL (ref 70–99)
GLUCOSE BLDC GLUCOMTR-MCNC: 134 MG/DL (ref 70–99)
GLUCOSE BLDC GLUCOMTR-MCNC: 144 MG/DL (ref 70–99)
GLUCOSE BLDC GLUCOMTR-MCNC: 145 MG/DL (ref 70–99)
GLUCOSE BLDC GLUCOMTR-MCNC: 153 MG/DL (ref 70–99)
GLUCOSE SERPL-MCNC: 165 MG/DL (ref 70–99)
HCT VFR BLD AUTO: 38.7 % (ref 40–53)
HGB BLD-MCNC: 13.5 G/DL (ref 13.3–17.7)
INR PPP: 1.67 (ref 0.86–1.14)
MCH RBC QN AUTO: 34.9 PG (ref 26.5–33)
MCHC RBC AUTO-ENTMCNC: 34.9 G/DL (ref 31.5–36.5)
MCV RBC AUTO: 100 FL (ref 78–100)
PLATELET # BLD AUTO: 175 10E9/L (ref 150–450)
POTASSIUM SERPL-SCNC: 4.2 MMOL/L (ref 3.4–5.3)
RBC # BLD AUTO: 3.87 10E12/L (ref 4.4–5.9)
SODIUM SERPL-SCNC: 137 MMOL/L (ref 133–144)
SPECIMEN SOURCE: NORMAL
WBC # BLD AUTO: 11.9 10E9/L (ref 4–11)

## 2018-12-03 PROCEDURE — 36415 COLL VENOUS BLD VENIPUNCTURE: CPT | Performed by: INTERNAL MEDICINE

## 2018-12-03 PROCEDURE — 85610 PROTHROMBIN TIME: CPT | Performed by: INTERNAL MEDICINE

## 2018-12-03 PROCEDURE — 00000146 ZZHCL STATISTIC GLUCOSE BY METER IP

## 2018-12-03 PROCEDURE — 25000132 ZZH RX MED GY IP 250 OP 250 PS 637: Performed by: INTERNAL MEDICINE

## 2018-12-03 PROCEDURE — 99233 SBSQ HOSP IP/OBS HIGH 50: CPT | Performed by: INTERNAL MEDICINE

## 2018-12-03 PROCEDURE — 25000131 ZZH RX MED GY IP 250 OP 636 PS 637: Performed by: INTERNAL MEDICINE

## 2018-12-03 PROCEDURE — 85027 COMPLETE CBC AUTOMATED: CPT | Performed by: INTERNAL MEDICINE

## 2018-12-03 PROCEDURE — 87493 C DIFF AMPLIFIED PROBE: CPT | Performed by: INTERNAL MEDICINE

## 2018-12-03 PROCEDURE — 80048 BASIC METABOLIC PNL TOTAL CA: CPT | Performed by: INTERNAL MEDICINE

## 2018-12-03 PROCEDURE — 12000000 ZZH R&B MED SURG/OB

## 2018-12-03 PROCEDURE — 25000125 ZZHC RX 250: Performed by: INTERNAL MEDICINE

## 2018-12-03 PROCEDURE — 25000128 H RX IP 250 OP 636: Performed by: INTERNAL MEDICINE

## 2018-12-03 RX ORDER — WARFARIN SODIUM 10 MG/1
10 TABLET ORAL
Status: COMPLETED | OUTPATIENT
Start: 2018-12-03 | End: 2018-12-03

## 2018-12-03 RX ADMIN — CEFAZOLIN SODIUM 1 G: 1 INJECTION, POWDER, FOR SOLUTION INTRAMUSCULAR; INTRAVENOUS at 01:06

## 2018-12-03 RX ADMIN — WARFARIN SODIUM 10 MG: 10 TABLET ORAL at 19:46

## 2018-12-03 RX ADMIN — INSULIN GLARGINE 35 UNITS: 100 INJECTION, SOLUTION SUBCUTANEOUS at 21:38

## 2018-12-03 RX ADMIN — IRBESARTAN 300 MG: 150 TABLET ORAL at 21:38

## 2018-12-03 RX ADMIN — CLINDAMYCIN PHOSPHATE 900 MG: 900 INJECTION, SOLUTION INTRAVENOUS at 13:26

## 2018-12-03 RX ADMIN — DIGOXIN 125 MCG: 125 TABLET ORAL at 07:52

## 2018-12-03 RX ADMIN — CLINDAMYCIN PHOSPHATE 900 MG: 900 INJECTION, SOLUTION INTRAVENOUS at 21:42

## 2018-12-03 RX ADMIN — SPIRONOLACTONE 25 MG: 25 TABLET ORAL at 19:47

## 2018-12-03 RX ADMIN — METOPROLOL SUCCINATE 100 MG: 100 TABLET, EXTENDED RELEASE ORAL at 07:48

## 2018-12-03 RX ADMIN — ASPIRIN 81 MG: 81 TABLET, COATED ORAL at 07:52

## 2018-12-03 RX ADMIN — FEBUXOSTAT 40 MG: 40 TABLET ORAL at 07:52

## 2018-12-03 RX ADMIN — DILTIAZEM HYDROCHLORIDE 300 MG: 180 CAPSULE, EXTENDED RELEASE ORAL at 19:46

## 2018-12-03 RX ADMIN — CEFAZOLIN SODIUM 1 G: 1 INJECTION, POWDER, FOR SOLUTION INTRAMUSCULAR; INTRAVENOUS at 17:42

## 2018-12-03 RX ADMIN — ACETAMINOPHEN 650 MG: 325 TABLET, FILM COATED ORAL at 21:38

## 2018-12-03 RX ADMIN — METOPROLOL SUCCINATE 100 MG: 100 TABLET, EXTENDED RELEASE ORAL at 17:42

## 2018-12-03 RX ADMIN — INSULIN ASPART 1 UNITS: 100 INJECTION, SOLUTION INTRAVENOUS; SUBCUTANEOUS at 19:47

## 2018-12-03 RX ADMIN — CEFAZOLIN SODIUM 1 G: 1 INJECTION, POWDER, FOR SOLUTION INTRAMUSCULAR; INTRAVENOUS at 09:07

## 2018-12-03 RX ADMIN — CLINDAMYCIN PHOSPHATE 900 MG: 900 INJECTION, SOLUTION INTRAVENOUS at 04:52

## 2018-12-03 ASSESSMENT — ACTIVITIES OF DAILY LIVING (ADL)
ADLS_ACUITY_SCORE: 9

## 2018-12-03 NOTE — PROGRESS NOTES
RECEIVING UNIT ED HANDOFF REVIEW    ED Nurse Handoff Report was reviewed by: Toney Martinez on December 2, 2018 at 7:06 PM

## 2018-12-03 NOTE — TELEPHONE ENCOUNTER
PT MONTY AT [facility]. SPOKE WITH PT. HE IS IN THE HOSIPTAL WITH AN INFECTION. HE WAS CURIOUS IF HE MAY HAVE CONTAMINATED HIS WATER CHAMBER FROM CLEAINING IT. HE STATED HE USED DISH DETERGENT. I WENT OVER THE CARE INSTRUCTIONS FOR ALL OF THE CPAP SUPPLIES. I TOLD HIM IT SEEMS UNLIKELY THAT HIS CURRENT ISSUE IS FROM HIS CPAP. BUT IT IS HARD TO SAY. I SUGGESTED HE PICKS UP A NEW WATER CHAMBER AND TUBING, AS HE IS ELIGIBLE THROUGH INSURANCE. WE DISCUSSED [employer] AND HE IS AWARE OF HOW IT WORKS AND THE COST.

## 2018-12-03 NOTE — H&P
[facility]    History and Physical  Hospitalist       Date of Admission:  12/2/2018    Assessment & Plan   Jt Montgomery is a 73 year old male with  past medical history of hypertension, diabetes type 2, atrial fibrillation, obstructive sleep apnea/central sleep apnea on CPAP who presented due to worsening swelling of his face. Being admitted for management of facial cellulitis did not yet improve with oral antibiotics.     Facial cellulitis:   His erythema involves his forehead, cheeks, ears and goes around his neck in line with where his CPAP strap goes. He has periorbital edema, but no eye pain, conjuctival injection or visual disturbance. Contact dermatitis is also a possibility however felt less likely given his fever, leukocytosis and the fact he has been using the same machine/strap for the last 3 months or so.   - will place him on IV Cefazolin and Clindamycin  - follow blood culture results  - monitor WBC and fever   - recheck lactic acid (2.1 in the ED,) he is s/p IVF.   - elevate head of the bed to help with swelling    CKD-III  Cr ranged from 1.3-1.8 over the last couple years. Cr today is at 1.65 which seems to be around his baseline  - will monitor  - avoid nephrotoxin agents    Chronic atrial fibrillation, rate controlled  - Continue with prior to admission digoxin, diltiazem daily  - resume warfarin per pharmacy dosing  - INR 1.47 on 12/1, recheck today    History of hypertension  History of nonischemic cardiomyopathy, most recent EF 50-55% (11/2016)  History of coronary artery disease status post Stent to distal RCA in 2011  - Resume prior to admission aspirin 81 mg daily, Metoprolol  mg times daily, Aldactone 25 mg daily, Avapro 300 mg at bedtime  - Takes Lasix on p.r.n. Basis.  Hold off on Lasix at this time as he appears euvolemic  - Check weight daily, strict I&O's    Type II DM  - A1c 6.1% in 9/2018. Uses Lantus 35 untis at bedtime and Aspart 15 units tid with  meals  - resume with PTA Lantus at 35 units at bedtime.   - will use Aspart 1 units/carb unit tid w/meals and sliding scale insulin   - adjust insulin based on BG levels. Monitor for hypoglycemia    History of gout  - continue PTA Uloric    TOMMY  Central sleep apnea  - given his cellulitis, recommend holding off for a day or two until his cellulitis improves  - monitor overnight    DVT Prophylaxis: Warfarin  Code Status: Full Code  Expected discharge: 2 - 3 days, recommended to prior living arrangement once cellulitis improves.    Ethan Bermudez MD    Primary Care Physician   Digna Jones    Chief Complaint   Facial swelling    History is obtained from the patient, Discussing with the ED MD,and chart review    History of Present Illness   Jt Montgomery is a 73 year old male who presents with Facial swelling.  Has past medical history of hypertension, diabetes type 2, atrial fibrillation, obstructive sleep apnea/central sleep apnea and uses CPAP. He was seen at urgent care yesterday for facial swelling.  He was noted to be febrile to 101, pulse result was sent to the ED for further evaluation. He was diagnosed with facial cellulitis in the ED and was discharged home with Augmentin.  Patient now returns due to ongoing facial swelling and redness.    Patient reports redness and swelling started about four days ago on Thursday. He reports Prior to onset of his symptoms, he cleaned his CPAP reservoir and put water in it on Wednesday. After that he noted, his eyes started to swell and redness on his face.  He endorses that his face being sensitive. He then decided to change the strap/mask for his CPAP since then and has not noticed a difference. He endorses chills. States that he has not had fever since yesterday.  He denies eye pain or visual disturbance. Patient otherwise denies chest pain, shortness of breath, nausea, vomiting or abdominal pain or urinary symptoms.     In the ED, he is evaluated by   Yusuf.  Laboratory showed cr of 1.65.  Lactic acid of 2.1 and WBC of 14.9.  He was given IV cefazolin and admission requested for IV antibiotics. (there is image of the face scanned in the ED MD note.)    Past Medical History    I have reviewed this patient's medical history and updated it with pertinent information if needed.     Atrial fibrillation on chronic anticoagulation with warfarin  CAD s/p RONEL to dRCA in   CKD-III  Type II DM  Hx of non-ischemic CM, recovered EF to 50-55% last echo in 2016  HTN  Gout  TOMMY  Central sleep apnea    Past Surgical History   I have reviewed this patient's surgical history and updated it with pertinent information if needed.  Past Surgical History:   Procedure Laterality Date     CORONARY ANGIOGRAPHY ADULT ORDER      distal circ-RONEL     HERNIA REPAIR  11/20/10    incarcinated     SUSPEND HYOID, GENIOGLOSSAL ADVANCEMENT         Prior to Admission Medications   Prior to Admission Medications   Prescriptions Last Dose Informant Patient Reported? Taking?   Coenzyme Q10 (COQ10) 100 MG CAPS   Yes No   Sig: Take 300 mg by mouth daily    Continuous Blood Gluc Sensor (FREESTYLE RINKU 14 DAY SENSOR) MISC   No No   Si Device every 14 days   Green Tea, Camillia sinensis, (GREEN TEA EXTRACT PO)   Yes No   Sig: Take by mouth 2 times daily Doesn't always take regularly   HERBALS   Yes No   Sig: Nerve by Plexis 2 a day,   Healthy Feet and Nerves  By [employer]  3 a day, Heart Food Caps  (cayenna pepper, garlic, hawthorn, onion & ginger 1-2 a day)   MELATONIN PO   Yes No   Sig: Take 0.5 mg by mouth nightly as needed   Magnesium 125 MG CAPS   Yes No   Sig: Take by mouth daily   Probiotic Product (PROBIOTIC & ACIDOPHILUS EX ST PO)   Yes No   Sig: Take 1 capsule by mouth daily   Resveratrol 100 MG CAPS   Yes No   Sig: Take 2 capsules by mouth daily Shaklee Vivix   Taurine 500 MG CAPS   Yes No   Sig: Take 1 capsule by mouth 2 times daily   UNABLE TO FIND   Yes No   Sig:  MEDICATION NAME: Super Beet Juice   alpha-lipoic acid 600 MG CAPS   Yes No   Sig: Take 1 capsule by mouth 2 times daily   amoxicillin-clavulanate (AUGMENTIN) 875-125 MG tablet   No No   Sig: Take 1 tablet by mouth 2 times daily for 7 days   aspirin EC 81 MG EC tablet  Self No No   Sig: Take 1 tablet by mouth daily.   blood glucose monitoring (ACCU-CHEK LUIS PLUS) meter device kit   No No   Sig: Use to test blood sugar 3 times daily or as directed.   blood glucose monitoring (ACCU-CHEK LUIS PLUS) test strip   No No   Sig: Use to test blood sugar 5 times daily or as directed.   blood glucose monitoring (SOFTCLIX) lancets   No No   Sig: Use to test blood sugar 5 times daily or as directed.   Patient taking differently: Use to test blood sugar 2 times daily or as directed.   ciprofloxacin (CILOXAN) 0.3 % ophthalmic solution   No No   Sig: Instill 1-2 drops in the affected eye(s) every 2 hours while awake for 2 days then 1-2 drops every 4 hours while awake for the next 5 days.   digoxin (LANOXIN) 125 MCG tablet   No No   Sig: Take 1 tablet (125 mcg) by mouth daily   diltiazem (CARTIA XT) 300 MG 24 hr capsule   No No   Sig: Take 1 capsule (300 mg) by mouth daily   febuxostat (ULORIC) 40 MG TABS   Yes No   Sig: Take 40 mg by mouth daily   fish oil-omega-3 fatty acids 1000 MG capsule   Yes No   Sig: Take 3-5 g by mouth daily   furosemide (LASIX) 20 MG tablet   Yes No   Sig: Take 1 tablet (20 mg) by mouth daily as needed   insulin aspart (NOVOLOG FLEXPEN) 100 UNIT/ML injection   No No   Sig: Inject 15 Units Subcutaneous 3 times daily (with meals)   insulin glargine (LANTUS SOLOSTAR) 100 UNIT/ML pen   No No   Sig: ADMINISTER 35 UNITS UNDER THE SKIN DAILY   insulin pen needle (BD GERMÁN U/F) 32G X 4 MM   No No   Sig: Use four times daily or as directed.   insulin pen needle (BD GERMÁN U/F) 32G X 4 MM   No No   Sig: USE TO TEST FOUR TIMES DAILY OR AS DIRECTED   irbesartan (AVAPRO) 300 MG tablet   No No   Sig: Take 1 tablet (300  mg) by mouth At Bedtime   metoprolol succinate (TOPROL-XL) 100 MG 24 hr tablet   No No   Sig: Take 1 tablet (100 mg) by mouth 2 times daily   multivitamin, therapeutic with minerals (MULTI-VITAMIN) TABS tablet   Yes No   Sig: [employer]   order for DME   No No   Sig: Equipment being ordered: Compression stockings   predniSONE (DELTASONE) 20 MG tablet   No No   Sig: TAKE 1 TABLET(20 MG) BY MOUTH DAILY AS NEEDED FOR GOUT FLARE   spironolactone (ALDACTONE) 25 MG tablet   No No   Sig: Take 1 tablet (25 mg) by mouth daily   vitamin D3 (CHOLECALCIFEROL) 2000 units tablet   Yes No   Sig: Take 1-2 tablets by mouth daily   warfarin (COUMADIN) 5 MG tablet   No No   Si.5mg , Tuesday, Thursday and 5mg the rest of the week per INR clinic      Facility-Administered Medications: None     Allergies   Allergies   Allergen Reactions     Corn Dextrin      All corn products - gets tired an ornery       Social History   I have reviewed this patient's social history and updated it with pertinent information if needed. Jt Nguyen Valentina  reports that he quit smoking about 46 years ago. His smoking use included Cigarettes. He has a 10.50 pack-year smoking history. He has never used smokeless tobacco. He reports that he does not drink alcohol or use illicit drugs.    Family History   I have reviewed this patient's family history and updated it with pertinent information if needed.   Family History   Problem Relation Age of Onset     Hypertension Mother      Coronary Artery Disease Mother      Coronary Artery Disease Father      Hypertension Father      Diabetes Sister      Cerebrovascular Disease Sister        Review of Systems   The 10 point Review of Systems is negative other than noted in the HPI    Physical Exam   Temp: 99.6  F (37.6  C) Temp src: Temporal BP: 109/66   Heart Rate: 90 Resp: 12 SpO2: 95 % O2 Device: None (Room air)    Vital Signs with Ranges  Temp:  [97.6  F (36.4  C)-99.6  F (37.6  C)] 99.6   F (37.6  C)  Heart Rate:  [48-94] 90  Resp:  [12-23] 12  BP: (109-124)/(58-66) 109/66  SpO2:  [93 %-97 %] 95 %  279 lbs 0 oz    Constitutional: alert, awake and no apparent distress  HEENT: oral mucosa is moist, no pharyngeal erythema or exudates  Face: erythema on forehead and cheeks, bilateral periorbital edema, but fully able to open eyes. Erythema and edema of bilateral earlobes, erythema extends to the hairline in the back of the neck which appears in the line where his CPAP strap goes  (Refer to the picture imported from the ED M.D. Note.  Eyes: EOMI, no conjunctival injection or discharge  Respiratory: Clear to auscultation bilaterally, no wheezing  Cardiovascular: irregularly irregular, no murmurs  GI:soft and nontender, nondistended  Skin: Facial exam is above.  Warm and dry otherwise  Musculoskeletal: Normal muscle tone, no lower extremity edema  Neurologic: Alert and oriented. Strength appears symmetrical bilateral upper and lower extremities  Psychiatric: mood and affect are normal    Data   Data reviewed today:  I personally reviewed no images or EKG's today.    Recent Labs  Lab 12/02/18  1637 12/01/18  0905 11/28/18  1226 11/28/18   WBC 14.9* 18.0*  --   --    HGB 14.4 14.6  --   --     101*  --   --     155  --   --    INR  --  1.47*  --  2.7*    131* 137  --    POTASSIUM 4.1 4.0 4.6  --    CHLORIDE 102 98 104  --    CO2 24 25 22  --    BUN 59* 45* 27  --    CR 1.65* 1.52* 1.17  --    ANIONGAP 9 8 11  --    HARSHA 8.3* 8.3* 8.9  --    * 161* 161*  --    ALBUMIN 2.9*  --   --   --    PROTTOTAL 7.1  --   --   --    BILITOTAL 2.9*  --   --   --    ALKPHOS 71  --   --   --    ALT 40  --   --   --    AST 40  --   --   --        No results found for this or any previous visit (from the past 24 hour(s)).

## 2018-12-03 NOTE — PHARMACY-ANTICOAGULATION SERVICE
Clinical Pharmacy - Warfarin Dosing Consult     Pharmacy has been consulted to manage this patient s warfarin therapy.  Indication: Atrial Fibrillation  Therapy Goal: INR 2-3  Warfarin Prior to Admission: Yes  Warfarin PTA Regimen: 7.5mg Tu/Th/Sun; 5mg all other days  Significant drug interactions: aspirin 81mg    INR   Date Value Ref Range Status   12/02/2018 1.60 (H) 0.86 - 1.14 Final   12/01/2018 1.47 (H) 0.86 - 1.14 Final       Recommend warfarin 7.5 mg today.  Pharmacy will monitor Jt Montgomery daily and order warfarin doses to achieve specified goal.      Please contact pharmacy as soon as possible if the warfarin needs to be held for a procedure or if the warfarin goals change.

## 2018-12-03 NOTE — PLAN OF CARE
Problem: Patient Care Overview  Goal: Plan of Care/Patient Progress Review  Outcome: No Change  Admission    Patient arrives to room 636-2 via cart from ED.  Care plan note: Pt is A&Ox4, up independently, VSS on RA afebrile, denies pain. Swelling and redness to periorbital/cheeks/ears from cellulitis. IV abx initiated. . INR 1.60. WBC 14.9. Discharge pending progress.     Inpatient nursing criteria listed below were met:    PCD's Documented: Yes  Skin issues/needs documented :Yes  Isolation education started/completed NA  Patient allergies verified with patient: Yes  Verified completion of [place] Risk Assessment Tool:  Yes  Verified completion of Guardianship screening tool: Yes  Fall Prevention: Care plan updated, Education given and documented NA  Care Plan initiated: Yes  Home medications documented in belongings flowsheet: NA  Patient belongings documented in belongings flowsheet: Yes  Reminder note (belongings/ medications) placed in discharge instructions:NA  Admission profile/ required documentation complete: Yes

## 2018-12-03 NOTE — PROGRESS NOTES
[facility]    Hospitalist Progress Note    Date of Service (when I saw the patient): 12/03/2018    Assessment & Plan   Jt Montgomery is a 73 year old male with  past medical history of hypertension, diabetes type 2, atrial fibrillation, obstructive sleep apnea/central sleep apnea on CPAP who presented due to worsening swelling of his face. Being admitted for management of facial cellulitis did not yet improve with oral antibiotics.      Facial cellulitis:   Erythema involves his forehead, cheeks, ears and goes around his neck in line with where his CPAP strap goes. He has periorbital edema, but no eye pain, conjuctival injection or visual disturbance. Contact dermatitis is also a possibility; however, felt less likely given his fever, leukocytosis and the fact he has been using the same machine/strap for the last 3 months or so. Given IV abx x 1 in urgent care and started on augmentin. Failed to improve and admitted 12/2  - On IV Cefazolin, Clindamycin since admission  - blood cx NGTD  - fever/ WBC improving 12/3  - rechecked lactate improved after resuscitation  - elevate head of the bed to help with swelling  - cpap as tolerated  - appears more allergic but improving with IV abx and with fever/ WBC that are improving arguing for infection. Likely one more evening with IV abx then change to oral 12/4, ? discharge with return if worsens    CKD-III  Cr ranged from 1.3-1.8 over the last couple years. Cr admission is at 1.65 which seems to be around his baseline  - will monitor  - avoid nephrotoxin agents  - improved creatinine at 1.2 on 12/3     Chronic atrial fibrillation, rate controlled  - Continue with prior to admission digoxin, diltiazem daily  - resume warfarin per pharmacy dosing  - INR 1.67 on 12/3     History of hypertension  History of nonischemic cardiomyopathy, most recent EF 50-55% (11/2016)  History of coronary artery disease status post Stent to distal RCA in 2011  - Resumed  prior to admission aspirin 81 mg daily, Metoprolol  mg times daily, Aldactone 25 mg daily, Avapro 300 mg at bedtime; renal function stable/ improved  - Takes Lasix on p.r.n. Basis.  Hold off on Lasix at this time as he appears euvolemic  - weight daily, strict I&O's     Type II DM  - A1c 6.1% in 9/2018. Uses Lantus 35 untis at bedtime and Aspart 15 units tid with meals  - resume with PTA Lantus at 35 units at bedtime.   - will use Aspart 1 units/carb unit tid w/meals and sliding scale insulin   - adjust insulin based on BG levels. Monitor for hypoglycemia     History of gout  - continue PTA Uloric     TOMMY  Central sleep apnea  - given his cellulitis, recommend holding off for a day or two until his cellulitis improves  - monitor for now    # Pain Assessment:  Current Pain Score 12/3/2018   Patient currently in pain? denies   Pain score (0-10) -   Pain location -   Pain descriptors -   Jt hyde pain level was assessed and he currently denies pain.      FEN (fluids, electrolytes and nutrition): regular diet  Discussed with nursing.  DVT Prophylaxis: Ambulate every shift  Code Status: Full Code    Disposition: Expected discharge in 1-2 days pending progress of rash    Vikash Jeff MD  [phone] (P)  Text Page    Interval History   Doing well. States improving. No vision or hearing changes. No throat swelling. Denies cp/sob.    -Data reviewed today: I reviewed all new labs and imaging results over the last 24 hours. I personally reviewed no images or EKG's today.    Physical Exam   Temp: 98  F (36.7  C) Temp src: Oral BP: 134/76   Heart Rate: 69 Resp: 20 SpO2: 97 % O2 Device: None (Room air)    Vitals:    12/02/18 1608 12/02/18 1922   Weight: 126.6 kg (279 lb) 125 kg (275 lb 9.2 oz)     Vital Signs with Ranges  Temp:  [97.6  F (36.4  C)-99.6  F (37.6  C)] 98  F (36.7  C)  Heart Rate:  [48-94] 69  Resp:  [10-23] 20  BP: (109-134)/(56-77) 134/76  SpO2:  [93 %-97 %] 97 %  I/O last 3 completed shifts:  In:  640 [P.O.:540; I.V.:100]  Out: -     Constitutional: Alert, oriented, no acute distress  Respiratory: Lungs clear to auscultation bilaterally, no wheezes, no crackles  Cardiovascular: Regular rate and rhythm, no murmurs  GI: Soft, non-tender, non-disteneded, good bowel sounds  Skin/Integumen: No erythema, cyanosis or edema  Other:      Medications     - MEDICATION INSTRUCTIONS -       Warfarin Therapy Reminder         aspirin  81 mg Oral Daily     ceFAZolin  1 g Intravenous Q8H     clindamycin  900 mg Intravenous Q8H     digoxin  125 mcg Oral Daily     diltiazem ER  300 mg Oral QPM     febuxostat  40 mg Oral Daily     influenza Vac Split High-Dose  0.5 mL Intramuscular Prior to discharge     insulin aspart  1-7 Units Subcutaneous TID AC     insulin aspart  1-5 Units Subcutaneous At Bedtime     insulin aspart   Subcutaneous TID w/meals     insulin glargine  35 Units Subcutaneous At Bedtime     irbesartan  300 mg Oral At Bedtime     melatonin  0.5 mg Oral At Bedtime     metoprolol succinate ER  100 mg Oral BID     sodium chloride (PF)  3 mL Intracatheter Q8H     spironolactone  25 mg Oral QPM     warfarin  10 mg Oral ONCE at 18:00       Data     Recent Labs  Lab 12/03/18  0918 12/02/18  1637 12/01/18  0905   WBC 11.9* 14.9* 18.0*   HGB 13.5 14.4 14.6    100 101*    175 155   INR 1.67* 1.60* 1.47*    135 131*   POTASSIUM 4.2 4.1 4.0   CHLORIDE 108 102 98   CO2 23 24 25   BUN 47* 59* 45*   CR 1.21 1.65* 1.52*   ANIONGAP 6 9 8   HARSHA 8.2* 8.3* 8.3*   * 169* 161*   ALBUMIN  --  2.9*  --    PROTTOTAL  --  7.1  --    BILITOTAL  --  2.9*  --    ALKPHOS  --  71  --    ALT  --  40  --    AST  --  40  --        No results found for this or any previous visit (from the past 24 hour(s)).

## 2018-12-03 NOTE — PLAN OF CARE
Problem: Patient Care Overview  Goal: Plan of Care/Patient Progress Review  Outcome: Improving  Patient is alert and oriented x4, VSS on RA sating in upper 90's. Afebrile. Pt. Refused Bipap. Pt. Facial swelling and redness. On Abx. . Aleena pain. Continue to monitor.

## 2018-12-03 NOTE — PHARMACY-ADMISSION MEDICATION HISTORY
Admission medication history interview status for the 12/2/2018  admission is complete. See EPIC admission navigator for prior to admission medications     Medication history source reliability:Good    Actions taken by pharmacist (provider contacted, etc): Interviewed patient     Additional medication history information not noted on PTA med list :None    Medication reconciliation/reorder completed by provider prior to medication history? No    Time spent in this activity: 25 minutes    Prior to Admission medications    Medication Sig Last Dose Taking? Auth Provider   alpha-lipoic acid 600 MG CAPS Take 1 capsule by mouth 2 times daily 12/1/2018 at PM Yes Reported, Patient   amoxicillin-clavulanate (AUGMENTIN) 875-125 MG tablet Take 1 tablet by mouth 2 times daily for 7 days 12/2/2018 at AM Yes Emely Weston MD   aspirin EC 81 MG EC tablet Take 1 tablet by mouth daily. 12/2/2018 at AM Yes Ravi Saldana DO   ciprofloxacin (CILOXAN) 0.3 % ophthalmic solution Instill 1-2 drops in the affected eye(s) every 2 hours while awake for 2 days then 1-2 drops every 4 hours while awake for the next 5 days. 12/2/2018 at AM Yes Emely Weston MD   Coenzyme Q10 (COQ10) 100 MG CAPS Take 300 mg by mouth daily  12/2/2018 at AM Yes Reported, Patient   digoxin (LANOXIN) 125 MCG tablet Take 1 tablet (125 mcg) by mouth daily 12/2/2018 at AM Yes Lottie Gomez DO   diltiazem (CARTIA XT) 300 MG 24 hr capsule Take 1 capsule (300 mg) by mouth daily 12/1/2018 at PM Yes Lottie Gomez DO   febuxostat (ULORIC) 40 MG TABS Take 40 mg by mouth daily 12/2/2018 at AM Yes Reported, Patient   fish oil-omega-3 fatty acids 1000 MG capsule Take 3-5 g by mouth daily 12/1/2018 at PM Yes Reported, Patient   HERBALS Nerve by Plexis 2 a day,   Healthy Feet and Nerves  By [employer]  3 a day, Heart Food Caps  (cayenna pepper, garlic, hawthorn, onion & karla 1-2 a day) 12/1/2018 at PM Yes Reported, Patient   insulin  aspart (NOVOLOG FLEXPEN) 100 UNIT/ML injection Inject 15 Units Subcutaneous 3 times daily (with meals) 12/2/2018 at AM Yes Digna Jones MD   insulin glargine (LANTUS SOLOSTAR) 100 UNIT/ML pen ADMINISTER 35 UNITS UNDER THE SKIN DAILY 12/1/2018 at PM Yes Digna Jones MD   irbesartan (AVAPRO) 300 MG tablet Take 1 tablet (300 mg) by mouth At Bedtime 12/1/2018 at PM Yes Lottie Gomez DO   Magnesium 125 MG CAPS Take by mouth daily 12/1/2018 at PM Yes Reported, Patient   MELATONIN PO Take 0.5 mg by mouth At Bedtime  12/1/2018 at PM Yes Reported, Patient   metoprolol succinate (TOPROL-XL) 100 MG 24 hr tablet Take 1 tablet (100 mg) by mouth 2 times daily 12/2/2018 at AM Yes Lottie Gomez DO   multivitamin, therapeutic with minerals (MULTI-VITAMIN) TABS tablet Shaklee [employer] Life Extenstion 12/1/2018 at PM Yes Reported, Patient   predniSONE (DELTASONE) 20 MG tablet TAKE 1 TABLET(20 MG) BY MOUTH DAILY AS NEEDED FOR GOUT FLARE 12/2/2018 at 1400 Yes Digna Jones MD   Probiotic Product (PROBIOTIC & ACIDOPHILUS EX ST PO) Take 1 capsule by mouth daily 12/1/2018 at PM Yes Reported, Patient   Resveratrol 100 MG CAPS Take 2 capsules by mouth daily Shaklee Vivix 12/2/2018 at AM Yes Reported, Patient   spironolactone (ALDACTONE) 25 MG tablet Take 1 tablet (25 mg) by mouth daily 12/1/2018 at PM Yes Lottie Gomez DO   Taurine 500 MG CAPS Take 1 capsule by mouth 2 times daily 12/1/2018 at PM Yes Reported, Patient   UNABLE TO FIND MEDICATION NAME: Super Beet Juice Past Week at Unknown time Yes Reported, Patient   vitamin D3 (CHOLECALCIFEROL) 2000 units tablet Take 1-2 tablets by mouth daily 12/1/2018 at PM Yes Reported, Patient   warfarin (COUMADIN) 5 MG tablet 7.5mg Sunday, Tuesday, Thursday and 5mg the rest of the week per INR clinic 12/1/2018 at PM Yes Dankle, Lottie Minda, DO   blood glucose monitoring (ACCU-CHEK LUIS PLUS) meter device kit Use to test blood sugar 3 times  daily or as directed.   Sigrid David,    blood glucose monitoring (ACCU-CHEK LUIS PLUS) test strip Use to test blood sugar 5 times daily or as directed.   Digna Jones MD   blood glucose monitoring (SOFTCLIX) lancets Use to test blood sugar 5 times daily or as directed.  Patient taking differently: Use to test blood sugar 2 times daily or as directed.   Sigrid David,    Continuous Blood Gluc Sensor (FREESTYLE RINKU 14 DAY SENSOR) MISC 1 Device every 14 days   Digna Jones MD   furosemide (LASIX) 20 MG tablet Take 1 tablet (20 mg) by mouth daily as needed prn  Digna Jones MD   Green Tea, Camillia sinensis, (GREEN TEA EXTRACT PO) Take by mouth 2 times daily Doesn't always take regularly prn  Reported, Patient   insulin pen needle (BD GERMÁN U/F) 32G X 4 MM USE TO TEST FOUR TIMES DAILY OR AS DIRECTED   Digna Jones MD   insulin pen needle (BD GERMÁN U/F) 32G X 4 MM Use four times daily or as directed.   Sigrid David,    order for DME Equipment being ordered: Compression stockings   Digna Jones MD

## 2018-12-03 NOTE — PLAN OF CARE
Problem: Patient Care Overview  Goal: Plan of Care/Patient Progress Review  Outcome: No Change  Redness and flaky skin to scalp, face and ears.  Swelling around eyes and ears.  Denies pain. Up ad yogesh.  Continues on IV antibiotics.  BG's 130, 134.  Reports some loose stools, he states from not adhering to his diet, requests imodium.

## 2018-12-04 ENCOUNTER — PATIENT OUTREACH (OUTPATIENT)
Dept: CARE COORDINATION | Facility: CLINIC | Age: 73
End: 2018-12-04

## 2018-12-04 VITALS
OXYGEN SATURATION: 97 % | WEIGHT: 275.57 LBS | BODY MASS INDEX: 36.52 KG/M2 | DIASTOLIC BLOOD PRESSURE: 80 MMHG | HEIGHT: 73 IN | SYSTOLIC BLOOD PRESSURE: 132 MMHG | TEMPERATURE: 97.9 F | RESPIRATION RATE: 16 BRPM

## 2018-12-04 DIAGNOSIS — E11.40 TYPE 2 DIABETES MELLITUS WITH DIABETIC NEUROPATHY, WITH LONG-TERM CURRENT USE OF INSULIN (H): Chronic | ICD-10-CM

## 2018-12-04 DIAGNOSIS — Z79.4 TYPE 2 DIABETES MELLITUS WITH DIABETIC NEUROPATHY, WITH LONG-TERM CURRENT USE OF INSULIN (H): Chronic | ICD-10-CM

## 2018-12-04 LAB — GLUCOSE BLDC GLUCOMTR-MCNC: 130 MG/DL (ref 70–99)

## 2018-12-04 PROCEDURE — 25000132 ZZH RX MED GY IP 250 OP 250 PS 637: Performed by: INTERNAL MEDICINE

## 2018-12-04 PROCEDURE — 99238 HOSP IP/OBS DSCHRG MGMT 30/<: CPT | Performed by: INTERNAL MEDICINE

## 2018-12-04 PROCEDURE — 25000128 H RX IP 250 OP 636: Performed by: INTERNAL MEDICINE

## 2018-12-04 PROCEDURE — 00000146 ZZHCL STATISTIC GLUCOSE BY METER IP

## 2018-12-04 RX ADMIN — CEFAZOLIN SODIUM 1 G: 1 INJECTION, POWDER, FOR SOLUTION INTRAMUSCULAR; INTRAVENOUS at 00:49

## 2018-12-04 RX ADMIN — Medication 0.5 MG: at 00:42

## 2018-12-04 ASSESSMENT — ACTIVITIES OF DAILY LIVING (ADL)
ADLS_ACUITY_SCORE: 9
ADLS_ACUITY_SCORE: 9

## 2018-12-04 NOTE — PLAN OF CARE
Problem: Patient Care Overview  Goal: Plan of Care/Patient Progress Review  Pt A&Ox4. VSS on RA. Independent in room. Redness and flaky skin to scalp, face, and ears. Eucerin cream applied, with some improvement in flaky appearance. Swelling in bilateral periorbital areas. C/o pain in lower back; received PRN tylenol. Denies nausea, sob. IV antibiotics. C/o diarrhea; stool tested negative for c.diff. , 144. Tolerating mod carb diet. CPAP at [facility]. Plans to take scheduled melatonin right before going to bed. Nursing will continue to monitor.

## 2018-12-04 NOTE — PROGRESS NOTES
A&O x4, anxious. VSS. On BiPAP until about 0300. Ind in room. Redness, flaky scalp, face, and ears. Bilateral periorbital swelling decreased, per pt. IV Cleocin and Ancef. . Eucerin cream applied. Denied pain, SOB. Anxious about process of discharge. Plan to discharge on PO abx.  Pt left AMA. MD Moise aware. AMA papers signed. Pt has all belongings, including from security. Pt did not receive last dose of IV abx.

## 2018-12-05 ENCOUNTER — OFFICE VISIT (OUTPATIENT)
Dept: FAMILY MEDICINE | Facility: CLINIC | Age: 73
End: 2018-12-05
Payer: COMMERCIAL

## 2018-12-05 VITALS
DIASTOLIC BLOOD PRESSURE: 82 MMHG | BODY MASS INDEX: 35.65 KG/M2 | SYSTOLIC BLOOD PRESSURE: 144 MMHG | HEART RATE: 94 BPM | HEIGHT: 73 IN | WEIGHT: 269 LBS | OXYGEN SATURATION: 97 % | TEMPERATURE: 98 F

## 2018-12-05 DIAGNOSIS — L03.211 FACIAL CELLULITIS: Primary | ICD-10-CM

## 2018-12-05 DIAGNOSIS — S00.81XS: ICD-10-CM
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
## 2018-12-05 DIAGNOSIS — I48.91 ATRIAL FIBRILLATION, UNSPECIFIED TYPE (H): Chronic | ICD-10-CM

## 2018-12-05 DIAGNOSIS — Z79.4 TYPE 2 DIABETES MELLITUS WITH DIABETIC NEUROPATHY, WITH LONG-TERM CURRENT USE OF INSULIN (H): Chronic | ICD-10-CM

## 2018-12-05 DIAGNOSIS — I10 ESSENTIAL HYPERTENSION: Chronic | ICD-10-CM

## 2018-12-05 DIAGNOSIS — E11.40 TYPE 2 DIABETES MELLITUS WITH DIABETIC NEUROPATHY, WITH LONG-TERM CURRENT USE OF INSULIN (H): Chronic | ICD-10-CM

## 2018-12-05 PROCEDURE — 99495 TRANSJ CARE MGMT MOD F2F 14D: CPT | Performed by: INTERNAL MEDICINE

## 2018-12-05 ASSESSMENT — ACTIVITIES OF DAILY LIVING (ADL): DEPENDENT_IADLS:: INDEPENDENT

## 2018-12-05 NOTE — PROGRESS NOTES
SUBJECTIVE:   Jt Montgomery is a 73 year old male who presents to clinic today for the following health issues:          Hospital Follow-up Visit:    Hospital/Nursing Home/IP Rehab Facility: [facility]  Date of Admission: 12/02/2018  Date of Discharge: 12/04/2018  Reason(s) for Admission: Facial Cellulitis            Problems taking medications regularly:  None       Medication changes since discharge: None       Problems adhering to non-medication therapy:  None    Summary of hospitalization:  [facility] discharge summary reviewed  Diagnostic Tests/Treatments reviewed.  Follow up needed: none  Other Healthcare Providers Involved in Patient s Care:         None  Update since discharge: improved.     Post Discharge Medication Reconciliation: discharge medications reconciled and changed, per note/orders (see AVS).  Plan of care communicated with patient     Coding guidelines for this visit:  Type of Medical   Decision Making Face-to-Face Visit       within 7 Days of discharge Face-to-Face Visit        within 14 days of discharge   Moderate Complexity [id number]   High Complexity 10997 09814            HPI:   Patient Jt Montgomery is a very pleasant 73 year old male with history of Type 2 Diabetes, hypertension, hyperlipidemia, chronic atrial fibrillation on warfarin therapy who presents to Internal Medicine clinic today for post hospital discharge follow up of recent hospitalization for facial cellulitis possibly due to a cat scratch, cellulitis symptoms have already significantly improved from IV antibiotics during the hospitalization. He is currently on outpatient oral antibiotics with Augmentin 2xDay. He denies any chest pain, headaches, fever or chills symptoms at this time. Regarding the patient's Type 2 Diabetes, the patient denies any recent hypoglycemia events. He is compliant with his diabetes medications including insulin therapy at this time. Regarding the  patient's hypertension, the patient is compliant with his BP medication regimen. Regarding his chronic atrial fibrillation, his heart rate is well controlled and he is compliant with his warfarin long term anticoagulation therapy.           Current Medications:     Current Outpatient Prescriptions   Medication Sig Dispense Refill     alpha-lipoic acid 600 MG CAPS Take 1 capsule by mouth 2 times daily       amoxicillin-clavulanate (AUGMENTIN) 875-125 MG tablet Take 1 tablet by mouth 2 times daily for 7 days 14 tablet 0     aspirin EC 81 MG EC tablet Take 1 tablet by mouth daily. 90 tablet 1     blood glucose monitoring (ACCU-CHEK LUIS PLUS) meter device kit Use to test blood sugar 3 times daily or as directed. 1 kit 0     blood glucose monitoring (ACCU-CHEK LUIS PLUS) test strip Use to test blood sugar 5 times daily or as directed. 400 strip 1     blood glucose monitoring (SOFTCLIX) lancets Use to test blood sugar 5 times daily or as directed. (Patient taking differently: Use to test blood sugar 2 times daily or as directed.) 1 Box prn     ciprofloxacin (CILOXAN) 0.3 % ophthalmic solution Instill 1-2 drops in the affected eye(s) every 2 hours while awake for 2 days then 1-2 drops every 4 hours while awake for the next 5 days. 1 Bottle 0     Coenzyme Q10 (COQ10) 100 MG CAPS Take 300 mg by mouth daily        Continuous Blood Gluc Sensor (FREESTYLE RINKU 14 DAY SENSOR) MISC 1 Device every 14 days 2 each 11     digoxin (LANOXIN) 125 MCG tablet Take 1 tablet (125 mcg) by mouth daily 90 tablet 3     diltiazem (CARTIA XT) 300 MG 24 hr capsule Take 1 capsule (300 mg) by mouth daily 90 capsule 3     febuxostat (ULORIC) 40 MG TABS Take 40 mg by mouth daily       fish oil-omega-3 fatty acids 1000 MG capsule Take 3-5 g by mouth daily       furosemide (LASIX) 20 MG tablet Take 1 tablet (20 mg) by mouth daily as needed 90 tablet 1     Green Tea, Camillia sinensis, (GREEN TEA EXTRACT PO) Take by mouth 2 times daily Doesn't always  take regularly       HERBALS Nerve by Plexis 2 a day,   Healthy Feet and Nerves  By [employer]  3 a day, Heart Food Caps  (cayenna pepper, garlic, hawthorn, onion & ginger 1-2 a day)       insulin aspart (NOVOLOG FLEXPEN) 100 UNIT/ML injection Inject 15 Units Subcutaneous 3 times daily (with meals) 45 mL 1     insulin glargine (LANTUS SOLOSTAR) 100 UNIT/ML pen ADMINISTER 35 UNITS UNDER THE SKIN DAILY 30 mL 11     insulin pen needle (BD GERMÁN U/F) 32G X 4 MM USE TO TEST FOUR TIMES DAILY OR AS DIRECTED 200 each 11     insulin pen needle (BD GERMÁN U/F) 32G X 4 MM Use four times daily or as directed. 100 each 3     irbesartan (AVAPRO) 300 MG tablet Take 1 tablet (300 mg) by mouth At Bedtime 90 tablet 2     Magnesium 125 MG CAPS Take by mouth daily       MELATONIN PO Take 0.5 mg by mouth At Bedtime        metoprolol succinate (TOPROL-XL) 100 MG 24 hr tablet Take 1 tablet (100 mg) by mouth 2 times daily 180 tablet 3     multivitamin, therapeutic with minerals (MULTI-VITAMIN) TABS tablet Shaklee & Life Extenstion       order for DME Equipment being ordered: Compression stockings 1 each 3     predniSONE (DELTASONE) 20 MG tablet TAKE 1 TABLET(20 MG) BY MOUTH DAILY AS NEEDED FOR GOUT FLARE 10 tablet 0     Probiotic Product (PROBIOTIC & ACIDOPHILUS EX ST PO) Take 1 capsule by mouth daily       Resveratrol 100 MG CAPS Take 2 capsules by mouth daily Shaklee Vivix       spironolactone (ALDACTONE) 25 MG tablet Take 1 tablet (25 mg) by mouth daily 90 tablet 3     Taurine 500 MG CAPS Take 1 capsule by mouth 2 times daily       UNABLE TO FIND MEDICATION NAME: Super Beet Juice       vitamin D3 (CHOLECALCIFEROL) 2000 units tablet Take 1-2 tablets by mouth daily       warfarin (COUMADIN) 5 MG tablet 7.5mg Sunday, Tuesday, Thursday and 5mg the rest of the week per INR clinic 110 tablet 0         Allergies:      Allergies   Allergen Reactions     Corn Dextrin      All corn products - gets tired an ornery            Past Medical History:      Past Medical History:   Diagnosis Date     Atrial fibrillation (H)      CAD (coronary artery disease)     MI with RONEL to dRCA April 2011     Central Sleep Apnea with Pat Villanueva breathing 9/14/2010    Also TOMMY with CPAP     CKD (chronic kidney disease) stage 3, GFR 30-59 ml/min (H)      Dilated cardiomyopathy (H)     nonischemic (possibly related to h/o a.fib with RVR) EF 30-40% March 2013     DM2 (diabetes mellitus, type 2) (H)     Goal HgbA1c < 7%     Gout     uric acid level correlates; April 2014 h/o +knee aspirate     Hypertension     Goal <140/90     Pulmonary hypertension (H)     mild per echo 3/2013         Past Surgical History:     Past Surgical History:   Procedure Laterality Date     CORONARY ANGIOGRAPHY ADULT ORDER  2011    distal circ-RONEL     HERNIA REPAIR  11/20/10    incarcinated     SUSPEND HYOID, GENIOGLOSSAL ADVANCEMENT           Family Medical History:     Family History   Problem Relation Age of Onset     Hypertension Mother      Coronary Artery Disease Mother      Coronary Artery Disease Father      Hypertension Father      Diabetes Sister      Cerebrovascular Disease Sister          Social History:     Social History     Social History     Marital status:      Spouse name: N/A     Number of children: N/A     Years of education: N/A     Occupational History     Not on file.     Social History Main Topics     Smoking status: Former Smoker     Packs/day: 1.50     Years: 7.00     Types: Cigarettes     Quit date: 1/1/1972     Smokeless tobacco: Never Used      Comment: quit in 1971      Started at around age 18-19     Alcohol use No      Comment: 11/20/73   recovering     Drug use: No     Sexual activity: Not Currently     Partners: Female     Other Topics Concern     Caffeine Concern No     Sleep Concern Yes     sleep apnea, wears bi-pap     Special Diet No     low carb diet     Exercise No     walking     Seat Belt Yes     Social History Narrative           Review of System:  "    Constitutional: Negative for fever or chills  Skin: Positive for facial cellulitis  Ears/Nose/Throat: Negative for nasal congestion, sore throat  Respiratory: No shortness of breath, dyspnea on exertion, cough, or hemoptysis  Cardiovascular: Negative for chest pain  Gastrointestinal: Negative for nausea, vomiting  Genitourinary: Negative for dysuria, hematuria  Musculoskeletal: Negative for myalgias  Neurologic: Negative for headaches  Psychiatric: Negative for depression, anxiety  Hematologic/Lymphatic/Immunologic: Negative  Endocrine: Negative  Behavioral: Negative for tobacco use       Physical Exam:   /82 (BP Location: Left arm, Cuff Size: Adult Large)  Pulse 94  Temp 98  F (36.7  C) (Oral)  Ht 6' 1\" (1.854 m)  Wt 269 lb (122 kg)  SpO2 97%  BMI 35.49 kg/m2    GENERAL: alert and no distress  EYES: eyes grossly normal to inspection, and conjunctivae and sclerae normal  HENT: Normocephalic atraumatic. Nose and mouth without ulcers or lesions  NECK: supple  RESP: lungs clear to auscultation   CV: irregularly irregular  LYMPH: no peripheral edema   ABDOMEN: nondistended  MS: no gross musculoskeletal defects noted  SKIN: mild residual diffuse facial cellulitis symptoms present  NEURO: Alert & Oriented x 3.   PSYCH: mentation appears normal, affect normal        Diagnostic Test Results:     Diagnostic Test Results:  Results for orders placed or performed during the hospital encounter of 12/02/18   CBC with platelets differential   Result Value Ref Range    WBC 14.9 (H) 4.0 - 11.0 10e9/L    RBC Count 4.09 (L) 4.4 - 5.9 10e12/L    Hemoglobin 14.4 13.3 - 17.7 g/dL    Hematocrit 40.9 40.0 - 53.0 %     78 - 100 fl    MCH 35.2 (H) 26.5 - 33.0 pg    MCHC 35.2 31.5 - 36.5 g/dL    RDW 13.1 10.0 - 15.0 %    Platelet Count 175 150 - 450 10e9/L    Diff Method Automated Method     % Neutrophils 87.9 %    % Lymphocytes 4.3 %    % Monocytes 7.1 %    % Eosinophils 0.3 %    % Basophils 0.1 %    % Immature " Granulocytes 0.3 %    Nucleated RBCs 0 0 /100    Absolute Neutrophil 13.1 (H) 1.6 - 8.3 10e9/L    Absolute Lymphocytes 0.6 (L) 0.8 - 5.3 10e9/L    Absolute Monocytes 1.1 0.0 - 1.3 10e9/L    Absolute Eosinophils 0.1 0.0 - 0.7 10e9/L    Absolute Basophils 0.0 0.0 - 0.2 10e9/L    Abs Immature Granulocytes 0.0 0 - 0.4 10e9/L    Absolute Nucleated RBC 0.0    Comprehensive metabolic panel   Result Value Ref Range    Sodium 135 133 - 144 mmol/L    Potassium 4.1 3.4 - 5.3 mmol/L    Chloride 102 94 - 109 mmol/L    Carbon Dioxide 24 20 - 32 mmol/L    Anion Gap 9 3 - 14 mmol/L    Glucose 169 (H) 70 - 99 mg/dL    Urea Nitrogen 59 (H) 7 - 30 mg/dL    Creatinine 1.65 (H) 0.66 - 1.25 mg/dL    GFR Estimate 41 (L) >60 mL/min/1.7m2    GFR Estimate If Black 50 (L) >60 mL/min/1.7m2    Calcium 8.3 (L) 8.5 - 10.1 mg/dL    Bilirubin Total 2.9 (H) 0.2 - 1.3 mg/dL    Albumin 2.9 (L) 3.4 - 5.0 g/dL    Protein Total 7.1 6.8 - 8.8 g/dL    Alkaline Phosphatase 71 40 - 150 U/L    ALT 40 0 - 70 U/L    AST 40 0 - 45 U/L   Lactic acid whole blood   Result Value Ref Range    Lactic Acid 2.1 (H) 0.7 - 2.0 mmol/L   Lactic acid whole blood   Result Value Ref Range    Lactic Acid 1.9 0.7 - 2.0 mmol/L   INR   Result Value Ref Range    INR 1.60 (H) 0.86 - 1.14   Glucose by meter   Result Value Ref Range    Glucose 176 (H) 70 - 99 mg/dL   Glucose by meter   Result Value Ref Range    Glucose 223 (H) 70 - 99 mg/dL   INR   Result Value Ref Range    INR 1.67 (H) 0.86 - 1.14   Basic metabolic panel   Result Value Ref Range    Sodium 137 133 - 144 mmol/L    Potassium 4.2 3.4 - 5.3 mmol/L    Chloride 108 94 - 109 mmol/L    Carbon Dioxide 23 20 - 32 mmol/L    Anion Gap 6 3 - 14 mmol/L    Glucose 165 (H) 70 - 99 mg/dL    Urea Nitrogen 47 (H) 7 - 30 mg/dL    Creatinine 1.21 0.66 - 1.25 mg/dL    GFR Estimate 59 (L) >60 mL/min/1.7m2    GFR Estimate If Black 71 >60 mL/min/1.7m2    Calcium 8.2 (L) 8.5 - 10.1 mg/dL   CBC with platelets   Result Value Ref Range    WBC  11.9 (H) 4.0 - 11.0 10e9/L    RBC Count 3.87 (L) 4.4 - 5.9 10e12/L    Hemoglobin 13.5 13.3 - 17.7 g/dL    Hematocrit 38.7 (L) 40.0 - 53.0 %     78 - 100 fl    MCH 34.9 (H) 26.5 - 33.0 pg    MCHC 34.9 31.5 - 36.5 g/dL    RDW 13.1 10.0 - 15.0 %    Platelet Count 175 150 - 450 10e9/L   Glucose by meter   Result Value Ref Range    Glucose 153 (H) 70 - 99 mg/dL   Glucose by meter   Result Value Ref Range    Glucose 130 (H) 70 - 99 mg/dL   Glucose by meter   Result Value Ref Range    Glucose 134 (H) 70 - 99 mg/dL   Glucose by meter   Result Value Ref Range    Glucose 145 (H) 70 - 99 mg/dL   Glucose by meter   Result Value Ref Range    Glucose 144 (H) 70 - 99 mg/dL   Glucose by meter   Result Value Ref Range    Glucose 130 (H) 70 - 99 mg/dL   Clostridium difficile toxin B PCR   Result Value Ref Range    Specimen Description Feces     C Diff Toxin B PCR Negative NEG^Negative       ASSESSMENT/PLAN:     (S00.81XS,  W55.03XS) Cat scratch of face, sequela  (L03.211) Facial cellulitis  (primary encounter diagnosis)  Comment: post hospital discharge follow up of recent hospitalization for facial cellulitis possibly due to a cat scratch, cellulitis symptoms have already significantly improved from IV antibiotics during the hospitalization. He is currently on outpatient oral antibiotics with Augmentin 2xDay.  Plan: I have refilled and extended his current amoxicillin-clavulanate (AUGMENTIN) 875-125 MG tablet 2xDay for 1 more week.      (E11.40,  Z79.4) Type 2 diabetes mellitus with diabetic neuropathy, with long-term current use of insulin (H)  Comment: no recent hypoglycemia events. Patient is compliant with his diabetes medication regimen including insulin  Plan: Continue current diabetes medication regimen including insulin going forward.      (I10) Essential hypertension  Comment: BP is well controlled  Plan: Continue current BP medication regimen going forward      (I48.91) Atrial fibrillation, unspecified type  (H)  Comment: heart rate is well controlled  Plan: Continue warfarin and routine outpatient anticoagulation clinic follow up of INR monitoring going forward        Follow Up Plan:     Patient is instructed to return to Internal Medicine clinic for follow-up visit in 1 week.        Digna Jones MD  Internal Medicine  [facility]

## 2018-12-05 NOTE — TELEPHONE ENCOUNTER
"Prescription approved per [facility] Refill Protocol.  Marj MADISON, RN    Requested Prescriptions   Pending Prescriptions Disp Refills     insulin aspart (NOVOLOG FLEXPEN) 100 UNIT/ML pen 45 mL 1     Sig: Inject 15 Units Subcutaneous 3 times daily (with meals)    Short Acting Insulin Protocol Passed    12/4/2018  3:57 PM       Passed - Blood pressure less than 140/90 in past 6 months    BP Readings from Last 3 Encounters:   12/05/18 144/82   12/04/18 132/80   12/01/18 132/66                Passed - LDL on file in past 12 months    Recent Labs   Lab Test  11/28/18   1226   LDL  Cannot estimate LDL when triglyceride exceeds 400 mg/dL  76            Passed - Microalbumin on file in past 12 months    Recent Labs   Lab Test  02/21/18   1202   MICROL  65   UMALCR  84.38*            Passed - Serum creatinine on file in past 12 months    Recent Labs   Lab Test  12/03/18   0918   CR  1.21            Passed - HgbA1C in past 3 or 6 months    If HgbA1C is 8 or greater, it needs to be on file within the past 3 months.  If less than 8, must be on file within the past 6 months.     Recent Labs   Lab Test  09/06/18   1450   A1C  6.1*            Passed - Patient is age 18 or older       Passed - Recent (6 mo) or future (30 days) visit within the authorizing provider's specialty    Patient had office visit in the last 6 months or has a visit in the next 30 days with authorizing provider or within the authorizing provider's specialty.  See \"Patient Info\" tab in inbasket, or \"Choose Columns\" in Meds & Orders section of the refill encounter.            Next 5 appointments (look out 90 days)     Dec 10, 2018 11:00 AM CST   Office Visit with Digna Jones MD   [facility] ([facility]    [address] [place] [id number]   [phone]            Feb 27, 2019  1:30 PM CST   Office Visit with Nathaly Hughes [facility] ([facility])    [address] [place] [address] " [address]  [place] [id number] [phone]

## 2018-12-05 NOTE — MR AVS SNAPSHOT
After Visit Summary   12/5/2018    Jt Montgomery    MRN: [id number]           Patient Information     Date Of Birth          1945        Visit Information        Provider Department      12/5/2018 11:20 AM Digna Jones MD [facility]        Today's Diagnoses     Facial cellulitis    -  1    Type 2 diabetes mellitus with diabetic neuropathy, with long-term current use of insulin (H)        Essential hypertension        Atrial fibrillation, unspecified type (H)           Follow-ups after your visit        Follow-up notes from your care team     Return in about 1 week (around 12/12/2018).      Your next 10 appointments already scheduled     Dec 10, 2018 11:00 AM CST   Office Visit with Digna Jones MD   [facility] ([facility]    [address] [id number] [phone]           Bring a current list of meds and any records pertaining to this visit. For Physicals, please bring immunization records and any forms needing to be filled out. Please arrive 10 minutes early to complete paperwork.            Dec 19, 2018 11:20 AM CST   Anticoagulation Visit with [facility]   [facility]    [address]  [place] [address] [phone] OPT 2            Feb 27, 2019  1:30 PM CST   Office Visit with Nathaly Hughes [facility] ([facility])    [address]  [place] [id number] [phone]           Bring a current list of meds and any records pertaining to this visit. For Physicals, please bring immunization records and any forms needing to be filled out. Please arrive 10 minutes early to complete paperwork.            Mar 06, 2019  9:20 AM CST   Office Visit with Digna Jones MD   [facility] ([facility])    [address] [address]  "  [phone]           Bring a current list of meds and any records pertaining to this visit. For Physicals, please bring immunization records and any forms needing to be filled out. Please arrive 10 minutes early to complete paperwork.            Jun 13, 2019 11:30 AM CDT   Return Sleep Patient with Edvin Alanis MD   [facility] ([facility])    [address] [place] [address] [phone]              Who to contact     If you have questions or need follow up information about today's clinic visit or your schedule please contact [facility] directly at [phone].  Normal or non-critical lab and imaging results will be communicated to you by MyChart, letter or phone within 4 business days after the clinic has received the results. If you do not hear from us within 7 days, please contact the clinic through MyChart or phone. If you have a critical or abnormal lab result, we will notify you by phone as soon as possible.  Submit refill requests through [employer] or call your pharmacy and they will forward the refill request to us. Please allow 3 business days for your refill to be completed.          Additional Information About Your Visit        Care EveryWhere ID     This is your Care EveryWhere ID. This could be used by other organizations to access your [place] medical records  [id number]        Your Vitals Were     Pulse Temperature Height Pulse Oximetry BMI (Body Mass Index)       94 98  F (36.7  C) (Oral) 6' 1\" (1.854 m) 97% 35.49 kg/m2        Blood Pressure from Last 3 Encounters:   12/05/18 144/82   12/04/18 132/80   12/01/18 132/66    Weight from Last 3 Encounters:   12/05/18 269 lb (122 kg)   12/02/18 275 lb 9.2 oz (125 kg)   12/01/18 278 lb (126.1 kg)              Today, you had the following     No orders found for display         Today's Medication Changes          These changes are accurate as of 12/5/18 11:40 " AM.  If you have any questions, ask your nurse or doctor.               These medicines have changed or have updated prescriptions.        Dose/Directions    blood glucose monitoring lancets   This may have changed:  additional instructions   Used for:  Uncontrolled diabetes mellitus with complications (H)        Use to test blood sugar 5 times daily or as directed.   Quantity:  1 Box   Refills:  prn            Where to get your medicines      Some of these will need a paper prescription and others can be bought over the counter.  Ask your nurse if you have questions.     Bring a paper prescription for each of these medications     amoxicillin-clavulanate 875-125 MG tablet                Primary Care Provider Office Phone # Fax #    Digna Amanuel Jones [phone] [address]  [facility] [address]        Equal Access to Services     FAZAL AMOS : Hadii nitin arandao Sohaydenali, waaxda luqadaha, qaybta kaalmada adeegyada, quynh harris . So [facility] [phone].    ATENCIÓN: Si habla español, tiene a jones disposición servicios gratuitos de asistencia lingüística. [facility] [phone].    We comply with applicable federal civil rights laws and [place] laws. We do not discriminate on the basis of race, color, national origin, age, disability, sex, sexual orientation, or gender identity.            Thank you!     Thank you for choosing [facility]  for your care. Our goal is always to provide you with excellent care. Hearing back from our patients is one way we can continue to improve our services. Please take a few minutes to complete the written survey that you may receive in the mail after your visit with us. Thank you!             Your Updated Medication List - Protect others around you: Learn how to safely use, store and throw away your medicines at www.disposemymeds.org.          This list is accurate as of 12/5/18 11:40 AM.  Always use your most  recent med list.                   Brand Name Dispense Instructions for use Diagnosis    alpha-lipoic acid 600 MG Caps      Take 1 capsule by mouth 2 times daily        amoxicillin-clavulanate 875-125 MG tablet    AUGMENTIN    14 tablet    Take 1 tablet by mouth 2 times daily    Facial cellulitis       aspirin 81 MG EC tablet    ASA    90 tablet    Take 1 tablet by mouth daily.    Atrial fibrillation with RVR (H), Dilated cardiomyopathy (H)       blood glucose monitoring lancets     1 Box    Use to test blood sugar 5 times daily or as directed.    Uncontrolled diabetes mellitus with complications (H)       blood glucose monitoring meter device kit     1 kit    Use to test blood sugar 3 times daily or as directed.    Uncontrolled diabetes mellitus with complications (H)       blood glucose monitoring test strip    ACCU-CHEK LUIS PLUS    400 strip    Use to test blood sugar 5 times daily or as directed.    Uncontrolled type 2 diabetes mellitus with complication, with long-term current use of insulin (H)       ciprofloxacin 0.3 % ophthalmic solution    CILOXAN    1 Bottle    Instill 1-2 drops in the affected eye(s) every 2 hours while awake for 2 days then 1-2 drops every 4 hours while awake for the next 5 days.        CoQ10 100 MG Caps      Take 300 mg by mouth daily    Uncontrolled diabetes mellitus with complications (H)       digoxin 125 MCG tablet    LANOXIN    90 tablet    Take 1 tablet (125 mcg) by mouth daily    Atrial fibrillation (H)       diltiazem ER COATED BEADS 300 MG 24 hr capsule    CARTIA XT    90 capsule    Take 1 capsule (300 mg) by mouth daily    Atrial fibrillation (H)       fish oil-omega-3 fatty acids 1000 MG capsule      Take 3-5 g by mouth daily        FREESTYLE RINKU 14 DAY SENSOR Misc     2 each    1 Device every 14 days    Type 2 diabetes mellitus with diabetic neuropathy, with long-term current use of insulin (H)       furosemide 20 MG tablet    LASIX    90 tablet    Take 1 tablet (20 mg)  by mouth daily as needed    Dilated cardiomyopathy (H)       GREEN TEA EXTRACT PO      Take by mouth 2 times daily Doesn't always take regularly        HERBALS      Nerve by [employer] 2 a day,   Healthy Feet and Nerves  By [employer]  3 a day, Heart Food Caps  (cayenna pepper, garlic, hawthorn, onion & ginger 1-2 a day)        insulin aspart 100 UNIT/ML pen    NovoLOG FLEXPEN    45 mL    Inject 15 Units Subcutaneous 3 times daily (with meals)    Type 2 diabetes mellitus with diabetic neuropathy, with long-term current use of insulin (H)       insulin glargine 100 UNIT/ML pen    LANTUS SOLOSTAR    30 mL    ADMINISTER 35 UNITS UNDER THE SKIN DAILY    Type 2 diabetes mellitus with diabetic neuropathy, with long-term current use of insulin (H)       * insulin pen needle 32G X 4 MM miscellaneous    BD GERMÁN U/F    100 each    Use four times daily or as directed.    Uncontrolled diabetes mellitus with complications (H)       * insulin pen needle 32G X 4 MM miscellaneous    BD GERMÁN U/F    200 each    USE TO TEST FOUR TIMES DAILY OR AS DIRECTED    Type 2 diabetes mellitus with diabetic neuropathy, with long-term current use of insulin (H)       irbesartan 300 MG tablet    AVAPRO    90 tablet    Take 1 tablet (300 mg) by mouth At Bedtime    HTN (hypertension)       Magnesium 125 MG Caps      Take by mouth daily        MELATONIN PO      Take 0.5 mg by mouth At Bedtime        metoprolol succinate  MG 24 hr tablet    TOPROL-XL    180 tablet    Take 1 tablet (100 mg) by mouth 2 times daily    Coronary artery disease involving native coronary artery of native heart with angina pectoris (H)       Multi-vitamin tablet      Shaklee [employer] Life Extenstion        order for DME     1 each    Equipment being ordered: Compression stockings    Edema of both legs       predniSONE 20 MG tablet    DELTASONE    10 tablet    TAKE 1 TABLET(20 MG) BY MOUTH DAILY AS NEEDED FOR GOUT FLARE    Chronic gout due to 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       PROBIOTIC & ACIDOPHILUS EX ST PO      Take 1 capsule by mouth daily        Resveratrol 100 MG Caps      Take 2 capsules by mouth daily Shaklee Vivix        spironolactone 25 MG tablet    ALDACTONE    90 tablet    Take 1 tablet (25 mg) by mouth daily    Atrial fibrillation, unspecified type (H)       Taurine 500 MG Caps      Take 1 capsule by mouth 2 times daily        ULORIC 40 MG Tabs tablet   Generic drug:  febuxostat      Take 40 mg by mouth daily        UNABLE TO FIND      MEDICATION NAME: Super Beet Juice        vitamin D3 2000 units tablet    CHOLECALCIFEROL     Take 1-2 tablets by mouth daily        warfarin 5 MG tablet    COUMADIN    110 tablet    7.5mg Sunday, Tuesday, Thursday and 5mg the rest of the week per INR clinic    Long term current use of anticoagulant therapy, Atrial fibrillation, unspecified type (H)       * Notice:  This list has 2 medication(s) that are the same as other medications prescribed for you. Read the directions carefully, and ask your doctor or other care provider to review them with you.
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
## 2018-12-07 ENCOUNTER — OFFICE VISIT (OUTPATIENT)
Dept: FAMILY MEDICINE | Facility: CLINIC | Age: 73
End: 2018-12-07
Payer: COMMERCIAL

## 2018-12-07 VITALS
SYSTOLIC BLOOD PRESSURE: 118 MMHG | TEMPERATURE: 98.3 F | WEIGHT: 271 LBS | HEART RATE: 57 BPM | HEIGHT: 73 IN | DIASTOLIC BLOOD PRESSURE: 66 MMHG | BODY MASS INDEX: 35.92 KG/M2 | OXYGEN SATURATION: 95 %

## 2018-12-07 DIAGNOSIS — I42.0 DILATED CARDIOMYOPATHY (H): ICD-10-CM

## 2018-12-07 DIAGNOSIS — S00.81XS: ICD-10-CM

## 2018-12-07 DIAGNOSIS — L03.211 CELLULITIS OF FACE: Primary | ICD-10-CM
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
## 2018-12-07 LAB
BACTERIA SPEC CULT: NO GROWTH
BACTERIA SPEC CULT: NO GROWTH
Lab: NORMAL
Lab: NORMAL
SPECIMEN SOURCE: NORMAL
SPECIMEN SOURCE: NORMAL

## 2018-12-07 PROCEDURE — 99214 OFFICE O/P EST MOD 30 MIN: CPT | Performed by: INTERNAL MEDICINE

## 2018-12-07 RX ORDER — FUROSEMIDE 20 MG
20 TABLET ORAL DAILY PRN
Qty: 90 TABLET | Refills: 3 | Status: SHIPPED | OUTPATIENT
Start: 2018-12-07 | End: 2019-12-23

## 2018-12-07 NOTE — PROGRESS NOTES
SUBJECTIVE:   Jt Montgomery is a 73 year old male who presents to clinic today for the following health issues:    Follow up on multiple concerns including CHF, recent facial cellulitis due to cat scratch      HPI:   Patient Jt Montgomery is a very pleasant 73 year old male with history of CHF who presents to Internal Medicine clinic today for follow up on multiple concerns including CHF, recent facial cellulitis due to cat scratch. Regarding the patient's CHF, the patient complains of some mild edema symptoms. He has not been taking his lasix diuretic medications for some time. He also reports continuing improvement in his recent facial cellulitis symptoms due to cat scratch. He has been compliant with his Augmentin antibiotics therapy since last clinic visit. He denies any fever or chills at this time.      Current Medications:     Current Outpatient Prescriptions   Medication Sig Dispense Refill     alpha-lipoic acid 600 MG CAPS Take 1 capsule by mouth 2 times daily       amoxicillin-clavulanate (AUGMENTIN) 875-125 MG tablet Take 1 tablet by mouth 2 times daily 14 tablet 0     aspirin EC 81 MG EC tablet Take 1 tablet by mouth daily. 90 tablet 1     blood glucose monitoring (ACCU-CHEK LUIS PLUS) meter device kit Use to test blood sugar 3 times daily or as directed. 1 kit 0     blood glucose monitoring (ACCU-CHEK LUIS PLUS) test strip Use to test blood sugar 5 times daily or as directed. 400 strip 1     blood glucose monitoring (SOFTCLIX) lancets Use to test blood sugar 5 times daily or as directed. (Patient taking differently: Use to test blood sugar 2 times daily or as directed.) 1 Box prn     ciprofloxacin (CILOXAN) 0.3 % ophthalmic solution Instill 1-2 drops in the affected eye(s) every 2 hours while awake for 2 days then 1-2 drops every 4 hours while awake for the next 5 days. 1 Bottle 0     Coenzyme Q10 (COQ10) 100 MG CAPS Take 300 mg by mouth daily        Continuous Blood Gluc Sensor  (FREESTYLE RINKU 14 DAY SENSOR) MIS 1 Device every 14 days 2 each 11     digoxin (LANOXIN) 125 MCG tablet Take 1 tablet (125 mcg) by mouth daily 90 tablet 3     diltiazem (CARTIA XT) 300 MG 24 hr capsule Take 1 capsule (300 mg) by mouth daily 90 capsule 3     febuxostat (ULORIC) 40 MG TABS Take 40 mg by mouth daily       fish oil-omega-3 fatty acids 1000 MG capsule Take 3-5 g by mouth daily       furosemide (LASIX) 20 MG tablet Take 1 tablet (20 mg) by mouth daily as needed (edema) 90 tablet 3     Green Tea, Camillia sinensis, (GREEN TEA EXTRACT PO) Take by mouth 2 times daily Doesn't always take regularly       HERBALS Nerve by [employer] 2 a day,   Healthy Feet and Nerves  By [employer]  3 a day, Heart Food Caps  (cayenna pepper, garlic, hawthorn, onion & ginger 1-2 a day)       insulin aspart (NOVOLOG FLEXPEN) 100 UNIT/ML pen Inject 15 Units Subcutaneous 3 times daily (with meals) 45 mL 0     insulin glargine (LANTUS SOLOSTAR) 100 UNIT/ML pen ADMINISTER 35 UNITS UNDER THE SKIN DAILY 30 mL 11     insulin pen needle (BD GERMÁN U/F) 32G X 4 MM USE TO TEST FOUR TIMES DAILY OR AS DIRECTED 200 each 11     insulin pen needle (BD GERMÁN U/F) 32G X 4 MM Use four times daily or as directed. 100 each 3     irbesartan (AVAPRO) 300 MG tablet Take 1 tablet (300 mg) by mouth At Bedtime 90 tablet 2     Magnesium 125 MG CAPS Take by mouth daily       MELATONIN PO Take 0.5 mg by mouth At Bedtime        metoprolol succinate (TOPROL-XL) 100 MG 24 hr tablet Take 1 tablet (100 mg) by mouth 2 times daily 180 tablet 3     multivitamin, therapeutic with minerals (MULTI-VITAMIN) TABS tablet [employer]       order for DME Equipment being ordered: Compression stockings 1 each 3     predniSONE (DELTASONE) 20 MG tablet TAKE 1 TABLET(20 MG) BY MOUTH DAILY AS NEEDED FOR GOUT FLARE 10 tablet 0     Probiotic Product (PROBIOTIC & ACIDOPHILUS EX ST PO) Take 1 capsule by mouth daily       Resveratrol 100 MG CAPS Take 2 capsules by mouth  daily Sh[employer] Vivix       spironolactone (ALDACTONE) 25 MG tablet Take 1 tablet (25 mg) by mouth daily 90 tablet 3     Taurine 500 MG CAPS Take 1 capsule by mouth 2 times daily       UNABLE TO FIND MEDICATION NAME: Super Beet Juice       vitamin D3 (CHOLECALCIFEROL) 2000 units tablet Take 1-2 tablets by mouth daily       warfarin (COUMADIN) 5 MG tablet 7.5mg Sunday, Tuesday, Thursday and 5mg the rest of the week per INR clinic 110 tablet 0     [DISCONTINUED] furosemide (LASIX) 20 MG tablet Take 1 tablet (20 mg) by mouth daily as needed 90 tablet 1         Allergies:      Allergies   Allergen Reactions     Corn Dextrin      All corn products - gets tired an ornery            Past Medical History:     Past Medical History:   Diagnosis Date     Atrial fibrillation (H)      CAD (coronary artery disease)     MI with RONEL to dRCA April 2011     Central Sleep Apnea with Pat Villanueva breathing 9/14/2010    Also TOMMY with CPAP     CKD (chronic kidney disease) stage 3, GFR 30-59 ml/min (H)      Dilated cardiomyopathy (H)     nonischemic (possibly related to h/o a.fib with RVR) EF 30-40% March 2013     DM2 (diabetes mellitus, type 2) (H)     Goal HgbA1c < 7%     Gout     uric acid level correlates; April 2014 h/o +knee aspirate     Hypertension     Goal <140/90     Pulmonary hypertension (H)     mild per echo 3/2013         Past Surgical History:     Past Surgical History:   Procedure Laterality Date     CORONARY ANGIOGRAPHY ADULT ORDER  2011    distal circ-RONEL     HERNIA REPAIR  11/20/10    incarcinated     SUSPEND HYOID, GENIOGLOSSAL ADVANCEMENT           Family Medical History:     Family History   Problem Relation Age of Onset     Hypertension Mother      Coronary Artery Disease Mother      Coronary Artery Disease Father      Hypertension Father      Diabetes Sister      Cerebrovascular Disease Sister          Social History:     Social History     Social History     Marital status:      Spouse name: N/A      "Number of children: N/A     Years of education: N/A     Occupational History     Not on file.     Social History Main Topics     Smoking status: Former Smoker     Packs/day: 1.50     Years: 7.00     Types: Cigarettes     Quit date: 1/1/1972     Smokeless tobacco: Never Used      Comment: quit in 1971      Started at around age 18-19     Alcohol use No      Comment: 11/20/73   recovering     Drug use: No     Sexual activity: Not Currently     Partners: Female     Other Topics Concern     Caffeine Concern No     Sleep Concern Yes     sleep apnea, wears bi-pap     Special Diet No     low carb diet     Exercise No     walking     Seat Belt Yes     Social History Narrative           Review of System:     Constitutional: Negative for fever or chills  Skin: positive for recent facial cellulitis symptoms  Ears/Nose/Throat: Negative for nasal congestion, sore throat  Respiratory: No shortness of breath, dyspnea on exertion, cough, or hemoptysis  Cardiovascular: Negative for chest pain, positive for CHF  Gastrointestinal: Negative for nausea, vomiting  Genitourinary: Negative for dysuria, hematuria  Musculoskeletal: Negative for myalgias  Neurologic: Negative for headaches  Psychiatric: Negative for depression, anxiety  Hematologic/Lymphatic/Immunologic: Positive for edema  Endocrine: Negative  Behavioral: Negative for tobacco use       Physical Exam:   /66 (BP Location: Left arm, Patient Position: Sitting, Cuff Size: Adult Large)  Pulse 57  Temp 98.3  F (36.8  C) (Oral)  Ht 6' 1\" (1.854 m)  Wt 271 lb (122.9 kg)  SpO2 95%  BMI 35.75 kg/m2    GENERAL: alert and no distress  EYES: eyes grossly normal to inspection, and conjunctivae and sclerae normal  HENT: Normocephalic atraumatic. Nose and mouth without ulcers or lesions  NECK: supple  RESP: lungs clear to auscultation   CV: regular rate and rhythm, normal S1 S2  LYMPH: 1+ peripheral edema present  ABDOMEN: nondistended  MS: no gross musculoskeletal defects " noted  SKIN: recent facial cellulitis symptoms continue to improve since last clinic visit  NEURO: Alert & Oriented x 3.   PSYCH: mentation appears normal, affect normal        Diagnostic Test Results:     Diagnostic Test Results:  Results for orders placed or performed during the hospital encounter of 12/02/18   CBC with platelets differential   Result Value Ref Range    WBC 14.9 (H) 4.0 - 11.0 10e9/L    RBC Count 4.09 (L) 4.4 - 5.9 10e12/L    Hemoglobin 14.4 13.3 - 17.7 g/dL    Hematocrit 40.9 40.0 - 53.0 %     78 - 100 fl    MCH 35.2 (H) 26.5 - 33.0 pg    MCHC 35.2 31.5 - 36.5 g/dL    RDW 13.1 10.0 - 15.0 %    Platelet Count 175 150 - 450 10e9/L    Diff Method Automated Method     % Neutrophils 87.9 %    % Lymphocytes 4.3 %    % Monocytes 7.1 %    % Eosinophils 0.3 %    % Basophils 0.1 %    % Immature Granulocytes 0.3 %    Nucleated RBCs 0 0 /100    Absolute Neutrophil 13.1 (H) 1.6 - 8.3 10e9/L    Absolute Lymphocytes 0.6 (L) 0.8 - 5.3 10e9/L    Absolute Monocytes 1.1 0.0 - 1.3 10e9/L    Absolute Eosinophils 0.1 0.0 - 0.7 10e9/L    Absolute Basophils 0.0 0.0 - 0.2 10e9/L    Abs Immature Granulocytes 0.0 0 - 0.4 10e9/L    Absolute Nucleated RBC 0.0    Comprehensive metabolic panel   Result Value Ref Range    Sodium 135 133 - 144 mmol/L    Potassium 4.1 3.4 - 5.3 mmol/L    Chloride 102 94 - 109 mmol/L    Carbon Dioxide 24 20 - 32 mmol/L    Anion Gap 9 3 - 14 mmol/L    Glucose 169 (H) 70 - 99 mg/dL    Urea Nitrogen 59 (H) 7 - 30 mg/dL    Creatinine 1.65 (H) 0.66 - 1.25 mg/dL    GFR Estimate 41 (L) >60 mL/min/1.7m2    GFR Estimate If Black 50 (L) >60 mL/min/1.7m2    Calcium 8.3 (L) 8.5 - 10.1 mg/dL    Bilirubin Total 2.9 (H) 0.2 - 1.3 mg/dL    Albumin 2.9 (L) 3.4 - 5.0 g/dL    Protein Total 7.1 6.8 - 8.8 g/dL    Alkaline Phosphatase 71 40 - 150 U/L    ALT 40 0 - 70 U/L    AST 40 0 - 45 U/L   Lactic acid whole blood   Result Value Ref Range    Lactic Acid 2.1 (H) 0.7 - 2.0 mmol/L   Lactic acid whole blood    Result Value Ref Range    Lactic Acid 1.9 0.7 - 2.0 mmol/L   INR   Result Value Ref Range    INR 1.60 (H) 0.86 - 1.14   Glucose by meter   Result Value Ref Range    Glucose 176 (H) 70 - 99 mg/dL   Glucose by meter   Result Value Ref Range    Glucose 223 (H) 70 - 99 mg/dL   INR   Result Value Ref Range    INR 1.67 (H) 0.86 - 1.14   Basic metabolic panel   Result Value Ref Range    Sodium 137 133 - 144 mmol/L    Potassium 4.2 3.4 - 5.3 mmol/L    Chloride 108 94 - 109 mmol/L    Carbon Dioxide 23 20 - 32 mmol/L    Anion Gap 6 3 - 14 mmol/L    Glucose 165 (H) 70 - 99 mg/dL    Urea Nitrogen 47 (H) 7 - 30 mg/dL    Creatinine 1.21 0.66 - 1.25 mg/dL    GFR Estimate 59 (L) >60 mL/min/1.7m2    GFR Estimate If Black 71 >60 mL/min/1.7m2    Calcium 8.2 (L) 8.5 - 10.1 mg/dL   CBC with platelets   Result Value Ref Range    WBC 11.9 (H) 4.0 - 11.0 10e9/L    RBC Count 3.87 (L) 4.4 - 5.9 10e12/L    Hemoglobin 13.5 13.3 - 17.7 g/dL    Hematocrit 38.7 (L) 40.0 - 53.0 %     78 - 100 fl    MCH 34.9 (H) 26.5 - 33.0 pg    MCHC 34.9 31.5 - 36.5 g/dL    RDW 13.1 10.0 - 15.0 %    Platelet Count 175 150 - 450 10e9/L   Glucose by meter   Result Value Ref Range    Glucose 153 (H) 70 - 99 mg/dL   Glucose by meter   Result Value Ref Range    Glucose 130 (H) 70 - 99 mg/dL   Glucose by meter   Result Value Ref Range    Glucose 134 (H) 70 - 99 mg/dL   Glucose by meter   Result Value Ref Range    Glucose 145 (H) 70 - 99 mg/dL   Glucose by meter   Result Value Ref Range    Glucose 144 (H) 70 - 99 mg/dL   Glucose by meter   Result Value Ref Range    Glucose 130 (H) 70 - 99 mg/dL   Clostridium difficile toxin B PCR   Result Value Ref Range    Specimen Description Feces     C Diff Toxin B PCR Negative NEG^Negative       ASSESSMENT/PLAN:     (S00.81XS,  W55.03XS) Cat scratch of face, sequela  (L03.211) Cellulitis of face  (primary encounter diagnosis)  Comment: recent facial cellulitis symptoms due to cat scratch have continued to improve  Plan:  Continue current Augmentin antibiotics therapy going forward.    (I42.0) Dilated cardiomyopathy, nonischemic EF 25%  Comment: symptoms not well controlled, he has some mild edema symptoms. He has not been taking his lasix diuretic medication recently.  Plan: I have restarted the patient on his previous furosemide (LASIX) 20 MG tablet once daily diuretic medication today.          Follow Up Plan:     Patient is instructed to return to Internal Medicine clinic for follow-up visit in 1 week.        Digna Jones MD  Internal Medicine  [facility]

## 2018-12-07 NOTE — PROGRESS NOTES
SUBJECTIVE:   Jt Montgomery is a 73 year old male who presents to clinic today for the following health issues:      Chief Complaint   Patient presents with     Follow Up For     Cellulitis of face          HPI:   Patient Jt Montgomery is a very pleasant 73 year old male with history of CHF who presents to Internal Medicine clinic today for follow up on recent facial cellulitis due to cat scratch. He reports continuing improvement in his recent facial cellulitis symptoms due to cat scratch. He has been compliant with his Augmentin antibiotics therapy since last clinic visit. He denies any fever or chills at this time.      Current Medications:     Current Outpatient Medications   Medication Sig Dispense Refill     alpha-lipoic acid 600 MG CAPS Take 1 capsule by mouth 2 times daily       amoxicillin-clavulanate (AUGMENTIN) 875-125 MG tablet Take 1 tablet by mouth 2 times daily 14 tablet 0     aspirin EC 81 MG EC tablet Take 1 tablet by mouth daily. 90 tablet 1     blood glucose monitoring (ACCU-CHEK LUIS PLUS) meter device kit Use to test blood sugar 3 times daily or as directed. 1 kit 0     blood glucose monitoring (ACCU-CHEK LUIS PLUS) test strip Use to test blood sugar 5 times daily or as directed. 400 strip 1     blood glucose monitoring (SOFTCLIX) lancets Use to test blood sugar 5 times daily or as directed. (Patient taking differently: Use to test blood sugar 2 times daily or as directed.) 1 Box prn     ciprofloxacin (CILOXAN) 0.3 % ophthalmic solution Instill 1-2 drops in the affected eye(s) every 2 hours while awake for 2 days then 1-2 drops every 4 hours while awake for the next 5 days. 1 Bottle 0     Coenzyme Q10 (COQ10) 100 MG CAPS Take 300 mg by mouth daily        Continuous Blood Gluc Sensor (FREESTYLE RINKU 14 DAY SENSOR) MISC 1 Device every 14 days 2 each 11     digoxin (LANOXIN) 125 MCG tablet Take 1 tablet (125 mcg) by mouth daily 90 tablet 3     diltiazem (CARTIA XT) 300 MG 24  hr capsule Take 1 capsule (300 mg) by mouth daily 90 capsule 3     febuxostat (ULORIC) 40 MG TABS Take 40 mg by mouth daily       fish oil-omega-3 fatty acids 1000 MG capsule Take 3-5 g by mouth daily       furosemide (LASIX) 20 MG tablet Take 1 tablet (20 mg) by mouth daily as needed (edema) 90 tablet 3     Green Tea, Camillia sinensis, (GREEN TEA EXTRACT PO) Take by mouth 2 times daily Doesn't always take regularly       HERBALS Nerve by Plexis 2 a day,   Healthy Feet and Nerves  By [employer]  3 a day, Heart Food Caps  (cayenna pepper, garlic, hawthorn, onion & ginger 1-2 a day)       insulin aspart (NOVOLOG FLEXPEN) 100 UNIT/ML pen Inject 15 Units Subcutaneous 3 times daily (with meals) 45 mL 0     insulin glargine (LANTUS SOLOSTAR) 100 UNIT/ML pen ADMINISTER 35 UNITS UNDER THE SKIN DAILY 30 mL 11     insulin pen needle (BD GERMÁN U/F) 32G X 4 MM USE TO TEST FOUR TIMES DAILY OR AS DIRECTED 200 each 11     insulin pen needle (BD GERMÁN U/F) 32G X 4 MM Use four times daily or as directed. 100 each 3     irbesartan (AVAPRO) 300 MG tablet Take 1 tablet (300 mg) by mouth At Bedtime 90 tablet 2     Magnesium 125 MG CAPS Take by mouth daily       MELATONIN PO Take 0.5 mg by mouth At Bedtime        metoprolol succinate (TOPROL-XL) 100 MG 24 hr tablet Take 1 tablet (100 mg) by mouth 2 times daily 180 tablet 3     multivitamin, therapeutic with minerals (MULTI-VITAMIN) TABS tablet Shaklee & Life Extenstion       order for DME Equipment being ordered: Compression stockings 1 each 3     predniSONE (DELTASONE) 20 MG tablet TAKE 1 TABLET(20 MG) BY MOUTH DAILY AS NEEDED FOR GOUT FLARE 10 tablet 0     Probiotic Product (PROBIOTIC & ACIDOPHILUS EX ST PO) Take 1 capsule by mouth daily       Resveratrol 100 MG CAPS Take 2 capsules by mouth daily Shaklee Vivix       spironolactone (ALDACTONE) 25 MG tablet Take 1 tablet (25 mg) by mouth daily 90 tablet 3     Taurine 500 MG CAPS Take 1 capsule by mouth 2 times daily       UNABLE TO FIND  MEDICATION NAME: Super Beet Juice       vitamin D3 (CHOLECALCIFEROL) 2000 units tablet Take 1-2 tablets by mouth daily       warfarin (COUMADIN) 5 MG tablet 7.5mg Sunday, Tuesday, Thursday and 5mg the rest of the week per INR clinic 110 tablet 0         Allergies:      Allergies   Allergen Reactions     Corn Dextrin      All corn products - gets tired an ornery            Past Medical History:     Past Medical History:   Diagnosis Date     Atrial fibrillation (H)      CAD (coronary artery disease)     MI with RONEL to dRCA April 2011     Central Sleep Apnea with Pat Villanueva breathing 9/14/2010    Also TOMMY with CPAP     CKD (chronic kidney disease) stage 3, GFR 30-59 ml/min (H)      Dilated cardiomyopathy (H)     nonischemic (possibly related to h/o a.fib with RVR) EF 30-40% March 2013     DM2 (diabetes mellitus, type 2) (H)     Goal HgbA1c < 7%     Gout     uric acid level correlates; April 2014 h/o +knee aspirate     Hypertension     Goal <140/90     Pulmonary hypertension (H)     mild per echo 3/2013         Past Surgical History:     Past Surgical History:   Procedure Laterality Date     CORONARY ANGIOGRAPHY ADULT ORDER  2011    distal circ-RONEL     HERNIA REPAIR  11/20/10    incarcinated     SUSPEND HYOID, GENIOGLOSSAL ADVANCEMENT           Family Medical History:     Family History   Problem Relation Age of Onset     Hypertension Mother      Coronary Artery Disease Mother      Coronary Artery Disease Father      Hypertension Father      Diabetes Sister      Cerebrovascular Disease Sister          Social History:     Social History     Socioeconomic History     Marital status:      Spouse name: Not on file     Number of children: Not on file     Years of education: Not on file     Highest education level: Not on file   Social Needs     Financial resource strain: Not on file     Food insecurity - worry: Not on file     Food insecurity - inability: Not on file     Transportation needs - medical: Not on  file     Transportation needs - non-medical: Not on file   Occupational History     Not on file   Tobacco Use     Smoking status: Former Smoker     Packs/day: 1.50     Years: 7.00     Pack years: 10.50     Types: Cigarettes     Last attempt to quit: 1972     Years since quittin.9     Smokeless tobacco: Never Used     Tobacco comment: quit in       Started at around age 18-19   Substance and Sexual Activity     Alcohol use: No     Alcohol/week: 0.0 oz     Comment: 74   recovering     Drug use: No     Sexual activity: Not Currently     Partners: Female   Other Topics Concern      Service Not Asked     Blood Transfusions Not Asked     Caffeine Concern No     Occupational Exposure Not Asked     Hobby Hazards Not Asked     Sleep Concern Yes     Comment: sleep apnea, wears bi-pap     Stress Concern Not Asked     Weight Concern Not Asked     Special Diet No     Comment: low carb diet     Back Care Not Asked     Exercise No     Comment: walking     Bike Helmet Not Asked     Seat Belt Yes     Self-Exams Not Asked     Parent/sibling w/ CABG, MI or angioplasty before 65F 55M? Not Asked   Social History Narrative     Not on file           Review of System:     Constitutional: Negative for fever or chills  Skin: positive for recent facial cellulitis symptoms  Ears/Nose/Throat: Negative for nasal congestion, sore throat  Respiratory: No shortness of breath, dyspnea on exertion, cough, or hemoptysis  Cardiovascular: Negative for chest pain, positive for CHF  Gastrointestinal: Negative for nausea, vomiting  Genitourinary: Negative for dysuria, hematuria  Musculoskeletal: Negative for myalgias  Neurologic: Negative for headaches  Psychiatric: Negative for depression, anxiety  Hematologic/Lymphatic/Immunologic: Positive for edema  Endocrine: Negative  Behavioral: Negative for tobacco use       Physical Exam:   /64 (BP Location: Left arm, Cuff Size: Adult Large)   Pulse 60   Temp 96.8  F (36  C)  "(Oral)   Ht 1.854 m (6' 1\")   Wt 122.9 kg (271 lb)   SpO2 95%   BMI 35.75 kg/m      GENERAL: alert and no distress  EYES: eyes grossly normal to inspection, and conjunctivae and sclerae normal  HENT: Normocephalic atraumatic. Nose and mouth without ulcers or lesions  NECK: supple  RESP: lungs clear to auscultation   CV: regular rate and rhythm, normal S1 S2  LYMPH: chronic peripheral edema present, improved since last clinic visit  ABDOMEN: nondistended  MS: no gross musculoskeletal defects noted  SKIN: recent facial cellulitis symptoms continue to improve since last clinic visit  NEURO: Alert & Oriented x 3.   PSYCH: mentation appears normal, affect normal        Diagnostic Test Results:     Diagnostic Test Results:  Results for orders placed or performed during the hospital encounter of 12/02/18   CBC with platelets differential   Result Value Ref Range    WBC 14.9 (H) 4.0 - 11.0 10e9/L    RBC Count 4.09 (L) 4.4 - 5.9 10e12/L    Hemoglobin 14.4 13.3 - 17.7 g/dL    Hematocrit 40.9 40.0 - 53.0 %     78 - 100 fl    MCH 35.2 (H) 26.5 - 33.0 pg    MCHC 35.2 31.5 - 36.5 g/dL    RDW 13.1 10.0 - 15.0 %    Platelet Count 175 150 - 450 10e9/L    Diff Method Automated Method     % Neutrophils 87.9 %    % Lymphocytes 4.3 %    % Monocytes 7.1 %    % Eosinophils 0.3 %    % Basophils 0.1 %    % Immature Granulocytes 0.3 %    Nucleated RBCs 0 0 /100    Absolute Neutrophil 13.1 (H) 1.6 - 8.3 10e9/L    Absolute Lymphocytes 0.6 (L) 0.8 - 5.3 10e9/L    Absolute Monocytes 1.1 0.0 - 1.3 10e9/L    Absolute Eosinophils 0.1 0.0 - 0.7 10e9/L    Absolute Basophils 0.0 0.0 - 0.2 10e9/L    Abs Immature Granulocytes 0.0 0 - 0.4 10e9/L    Absolute Nucleated RBC 0.0    Comprehensive metabolic panel   Result Value Ref Range    Sodium 135 133 - 144 mmol/L    Potassium 4.1 3.4 - 5.3 mmol/L    Chloride 102 94 - 109 mmol/L    Carbon Dioxide 24 20 - 32 mmol/L    Anion Gap 9 3 - 14 mmol/L    Glucose 169 (H) 70 - 99 mg/dL    Urea Nitrogen 59 " (H) 7 - 30 mg/dL    Creatinine 1.65 (H) 0.66 - 1.25 mg/dL    GFR Estimate 41 (L) >60 mL/min/1.7m2    GFR Estimate If Black 50 (L) >60 mL/min/1.7m2    Calcium 8.3 (L) 8.5 - 10.1 mg/dL    Bilirubin Total 2.9 (H) 0.2 - 1.3 mg/dL    Albumin 2.9 (L) 3.4 - 5.0 g/dL    Protein Total 7.1 6.8 - 8.8 g/dL    Alkaline Phosphatase 71 40 - 150 U/L    ALT 40 0 - 70 U/L    AST 40 0 - 45 U/L   Lactic acid whole blood   Result Value Ref Range    Lactic Acid 2.1 (H) 0.7 - 2.0 mmol/L   Lactic acid whole blood   Result Value Ref Range    Lactic Acid 1.9 0.7 - 2.0 mmol/L   INR   Result Value Ref Range    INR 1.60 (H) 0.86 - 1.14   Glucose by meter   Result Value Ref Range    Glucose 176 (H) 70 - 99 mg/dL   Glucose by meter   Result Value Ref Range    Glucose 223 (H) 70 - 99 mg/dL   INR   Result Value Ref Range    INR 1.67 (H) 0.86 - 1.14   Basic metabolic panel   Result Value Ref Range    Sodium 137 133 - 144 mmol/L    Potassium 4.2 3.4 - 5.3 mmol/L    Chloride 108 94 - 109 mmol/L    Carbon Dioxide 23 20 - 32 mmol/L    Anion Gap 6 3 - 14 mmol/L    Glucose 165 (H) 70 - 99 mg/dL    Urea Nitrogen 47 (H) 7 - 30 mg/dL    Creatinine 1.21 0.66 - 1.25 mg/dL    GFR Estimate 59 (L) >60 mL/min/1.7m2    GFR Estimate If Black 71 >60 mL/min/1.7m2    Calcium 8.2 (L) 8.5 - 10.1 mg/dL   CBC with platelets   Result Value Ref Range    WBC 11.9 (H) 4.0 - 11.0 10e9/L    RBC Count 3.87 (L) 4.4 - 5.9 10e12/L    Hemoglobin 13.5 13.3 - 17.7 g/dL    Hematocrit 38.7 (L) 40.0 - 53.0 %     78 - 100 fl    MCH 34.9 (H) 26.5 - 33.0 pg    MCHC 34.9 31.5 - 36.5 g/dL    RDW 13.1 10.0 - 15.0 %    Platelet Count 175 150 - 450 10e9/L   Glucose by meter   Result Value Ref Range    Glucose 153 (H) 70 - 99 mg/dL   Glucose by meter   Result Value Ref Range    Glucose 130 (H) 70 - 99 mg/dL   Glucose by meter   Result Value Ref Range    Glucose 134 (H) 70 - 99 mg/dL   Glucose by meter   Result Value Ref Range    Glucose 145 (H) 70 - 99 mg/dL   Glucose by meter   Result  Value Ref Range    Glucose 144 (H) 70 - 99 mg/dL   Glucose by meter   Result Value Ref Range    Glucose 130 (H) 70 - 99 mg/dL   Clostridium difficile toxin B PCR   Result Value Ref Range    Specimen Description Feces     C Diff Toxin B PCR Negative NEG^Negative       ASSESSMENT/PLAN:     (S00.81XS,  W55.03XS) Cat scratch of face, sequela  (L03.211) Cellulitis of face  (primary encounter diagnosis)  Comment: recent facial cellulitis symptoms due to cat scratch have continued to improve since last clinic visit last week  Plan: Continue current Augmentin antibiotics therapy going forward.      Follow Up Plan:     Patient is instructed to return to Internal Medicine clinic for follow-up visit in 1 week.        Digna Jones MD  Internal Medicine  [facility]

## 2018-12-07 NOTE — MR AVS SNAPSHOT
After Visit Summary   12/7/2018    Jt Montgomery    MRN: [id number]           Patient Information     Date Of Birth          1945        Visit Information        Provider Department      12/7/2018 2:20 PM Digna Jones MD [facility]        Today's Diagnoses     Cellulitis of face    -  1    Dilated cardiomyopathy, nonischemic EF 25%        Cat scratch of face, sequela           Follow-ups after your visit        Follow-up notes from your care team     Return in about 1 week (around 12/14/2018).      Your next 10 appointments already scheduled     Dec 10, 2018 11:00 AM CST   [facility] Visit with Digna Jones MD   [facility]    [address] [id number] [phone]           Bring a current list of meds and any records pertaining to this visit. For Physicals, please bring immunization records and any forms needing to be filled out. Please arrive 10 minutes early to complete paperwork.            Dec 19, 2018 11:20 AM CST   Anticoagulation Visit with [facility]UL[facility]   [facility] ([facility])    [address]  [place] [facility] [place] [address]   [phone] OPT 2            Feb 27, 2019  1:30 PM CST   Office Visit with [facility] ([facility])    [address]  [place] [id number] [phone]           Bring a current list of meds and any records pertaining to this visit. For Physicals, please bring immunization records and any forms needing to be filled out. Please arrive 10 minutes early to complete paperwork.            Mar 06, 2019  9:20 AM CST   Office Visit with Digna Jones MD   [facility]    [address] [id number] [phone]           Bring a current list of meds and any records pertaining to this visit. For  "Physicals, please bring immunization records and any forms needing to be filled out. Please arrive 10 minutes early to complete paperwork.            Jun 13, 2019 11:30 AM CDT   Return Sleep Patient with Edvin Alanis MD   [facility] ([facility])    [address] [id number] [phone]              Who to contact     If you have questions or need follow up information about today's clinic visit or your schedule please contact [facility] directly at [phone].  Normal or non-critical lab and imaging results will be communicated to you by MyChart, letter or phone within 4 business days after the clinic has received the results. If you do not hear from us within 7 days, please contact the clinic through MyChart or phone. If you have a critical or abnormal lab result, we will notify you by phone as soon as possible.  Submit refill requests through [employer] or call your pharmacy and they will forward the refill request to us. Please allow 3 business days for your refill to be completed.          Additional Information About Your Visit        Care EveryWhere ID     This is your Care EveryWhere ID. This could be used by other organizations to access your [place] medical records  [id number]        Your Vitals Were     Pulse Temperature Height Pulse Oximetry BMI (Body Mass Index)       57 98.3  F (36.8  C) (Oral) 6' 1\" (1.854 m) 95% 35.75 kg/m2        Blood Pressure from Last 3 Encounters:   12/07/18 118/66   12/05/18 144/82   12/04/18 132/80    Weight from Last 3 Encounters:   12/07/18 271 lb (122.9 kg)   12/05/18 269 lb (122 kg)   12/02/18 275 lb 9.2 oz (125 kg)              Today, you had the following     No orders found for display         Today's Medication Changes          These changes are accurate as of 12/7/18  3:28 PM.  If you have any questions, ask your nurse or doctor.               These medicines have changed " or have updated prescriptions.        Dose/Directions    blood glucose monitoring lancets   This may have changed:  additional instructions   Used for:  Uncontrolled diabetes mellitus with complications (H)        Use to test blood sugar 5 times daily or as directed.   Quantity:  1 Box   Refills:  prn       furosemide 20 MG tablet   Commonly known as:  LASIX   This may have changed:  reasons to take this   Used for:  Dilated cardiomyopathy (H)   Changed by:  Digna Jones MD        Dose:  20 mg   Take 1 tablet (20 mg) by mouth daily as needed (edema)   Quantity:  90 tablet   Refills:  3            Where to get your medicines      Some of these will need a paper prescription and others can be bought over the counter.  Ask your nurse if you have questions.     Bring a paper prescription for each of these medications     furosemide 20 MG tablet                Primary Care Provider Office Phone # Fax #    Digna Jones [phone] [address]  [place] [address]        Equal Access to Services     [facility] : Hadii nitin guerra hadasho Somichael, waaxda luqadaha, qaybta kaalmada adeegyada, quynh harris . So [facility] [place] [phone].    ATENCIÓN: Si habla español, tiene a jones disposición servicios gratuitos de asistencia lingüística. Llame al [phone].    We comply with applicable federal civil rights laws and [place] laws. We do not discriminate on the basis of race, color, national origin, age, disability, sex, sexual orientation, or gender identity.            Thank you!     Thank you for choosing [facility]  for your care. Our goal is always to provide you with excellent care. Hearing back from our patients is one way we can continue to improve our services. Please take a few minutes to complete the written survey that you may receive in the mail after your visit with us. Thank you!             Your Updated Medication List - Protect others  around you: Learn how to safely use, store and throw away your medicines at www.disposemymeds.org.          This list is accurate as of 12/7/18  3:28 PM.  Always use your most recent med list.                   Brand Name Dispense Instructions for use Diagnosis    alpha-lipoic acid 600 MG Caps      Take 1 capsule by mouth 2 times daily        amoxicillin-clavulanate 875-125 MG tablet    AUGMENTIN    14 tablet    Take 1 tablet by mouth 2 times daily    Facial cellulitis       aspirin 81 MG EC tablet    ASA    90 tablet    Take 1 tablet by mouth daily.    Atrial fibrillation with RVR (H), Dilated cardiomyopathy (H)       blood glucose monitoring lancets     1 Box    Use to test blood sugar 5 times daily or as directed.    Uncontrolled diabetes mellitus with complications (H)       blood glucose monitoring meter device kit     1 kit    Use to test blood sugar 3 times daily or as directed.    Uncontrolled diabetes mellitus with complications (H)       blood glucose monitoring test strip    ACCU-CHEK LUIS PLUS    400 strip    Use to test blood sugar 5 times daily or as directed.    Uncontrolled type 2 diabetes mellitus with complication, with long-term current use of insulin (H)       ciprofloxacin 0.3 % ophthalmic solution    CILOXAN    1 Bottle    Instill 1-2 drops in the affected eye(s) every 2 hours while awake for 2 days then 1-2 drops every 4 hours while awake for the next 5 days.        CoQ10 100 MG Caps      Take 300 mg by mouth daily    Uncontrolled diabetes mellitus with complications (H)       digoxin 125 MCG tablet    LANOXIN    90 tablet    Take 1 tablet (125 mcg) by mouth daily    Atrial fibrillation (H)       diltiazem ER COATED BEADS 300 MG 24 hr capsule    CARTIA XT    90 capsule    Take 1 capsule (300 mg) by mouth daily    Atrial fibrillation (H)       fish oil-omega-3 fatty acids 1000 MG capsule      Take 3-5 g by mouth daily        FREESTYLE RINKU 14 DAY SENSOR Misc     2 each    1 Device every 14  days    Type 2 diabetes mellitus with diabetic neuropathy, with long-term current use of insulin (H)       furosemide 20 MG tablet    LASIX    90 tablet    Take 1 tablet (20 mg) by mouth daily as needed (edema)    Dilated cardiomyopathy (H)       GREEN TEA EXTRACT PO      Take by mouth 2 times daily Doesn't always take regularly        HERBALS      Nerve by Plexis 2 a day,   Healthy Feet and Nerves  By [employer]  3 a day, Heart Food Caps  (cayenna pepper, garlic, hawthorn, onion & ginger 1-2 a day)        insulin aspart 100 UNIT/ML pen    NovoLOG FLEXPEN    45 mL    Inject 15 Units Subcutaneous 3 times daily (with meals)    Type 2 diabetes mellitus with diabetic neuropathy, with long-term current use of insulin (H)       insulin glargine 100 UNIT/ML pen    LANTUS SOLOSTAR    30 mL    ADMINISTER 35 UNITS UNDER THE SKIN DAILY    Type 2 diabetes mellitus with diabetic neuropathy, with long-term current use of insulin (H)       * insulin pen needle 32G X 4 MM miscellaneous    BD GERÁMN U/F    100 each    Use four times daily or as directed.    Uncontrolled diabetes mellitus with complications (H)       * insulin pen needle 32G X 4 MM miscellaneous    BD GERMÁN U/F    200 each    USE TO TEST FOUR TIMES DAILY OR AS DIRECTED    Type 2 diabetes mellitus with diabetic neuropathy, with long-term current use of insulin (H)       irbesartan 300 MG tablet    AVAPRO    90 tablet    Take 1 tablet (300 mg) by mouth At Bedtime    HTN (hypertension)       Magnesium 125 MG Caps      Take by mouth daily        MELATONIN PO      Take 0.5 mg by mouth At Bedtime        metoprolol succinate  MG 24 hr tablet    TOPROL-XL    180 tablet    Take 1 tablet (100 mg) by mouth 2 times daily    Coronary artery disease involving native coronary artery of native heart with angina pectoris (H)       Multi-vitamin tablet      Shaklee & Life Extenstion        order for DME     1 each    Equipment being ordered: Compression stockings    Edema of both  legs       predniSONE 20 MG tablet    DELTASONE    10 tablet    TAKE 1 TABLET(20 MG) BY MOUTH DAILY AS NEEDED FOR GOUT FLARE    Chronic gout due to renal impairment of multiple sites without tophus       PROBIOTIC & ACIDOPHILUS EX ST PO      Take 1 capsule by mouth daily        Resveratrol 100 MG Caps      Take 2 capsules by mouth daily Shaklee Vivix        spironolactone 25 MG tablet    ALDACTONE    90 tablet    Take 1 tablet (25 mg) by mouth daily    Atrial fibrillation, unspecified type (H)       Taurine 500 MG Caps      Take 1 capsule by mouth 2 times daily        ULORIC 40 MG Tabs tablet   Generic drug:  febuxostat      Take 40 mg by mouth daily        UNABLE TO FIND      MEDICATION NAME: Super Beet Juice        vitamin D3 2000 units tablet    CHOLECALCIFEROL     Take 1-2 tablets by mouth daily        warfarin 5 MG tablet    COUMADIN    110 tablet    7.5mg Sunday, Tuesday, Thursday and 5mg the rest of the week per INR clinic    Long term current use of anticoagulant therapy, Atrial fibrillation, unspecified type (H)       * Notice:  This list has 2 medication(s) that are the same as other medications prescribed for you. Read the directions carefully, and ask your doctor or other care provider to review them with you.

## 2018-12-10 ENCOUNTER — OFFICE VISIT (OUTPATIENT)
Dept: FAMILY MEDICINE | Facility: CLINIC | Age: 73
End: 2018-12-10
Payer: COMMERCIAL

## 2018-12-10 VITALS
HEIGHT: 73 IN | OXYGEN SATURATION: 95 % | DIASTOLIC BLOOD PRESSURE: 64 MMHG | SYSTOLIC BLOOD PRESSURE: 120 MMHG | TEMPERATURE: 96.8 F | HEART RATE: 60 BPM | BODY MASS INDEX: 35.92 KG/M2 | WEIGHT: 271 LBS
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
## 2018-12-10 DIAGNOSIS — S00.81XD CAT SCRATCH OF FACE, SUBSEQUENT ENCOUNTER: ICD-10-CM

## 2018-12-10 DIAGNOSIS — L03.211 CELLULITIS OF FACE: Primary | ICD-10-CM

## 2018-12-10 PROCEDURE — 99213 OFFICE O/P EST LOW 20 MIN: CPT | Performed by: INTERNAL MEDICINE

## 2018-12-10 ASSESSMENT — MIFFLIN-ST. JEOR: SCORE: 2033.13

## 2018-12-18 NOTE — DISCHARGE SUMMARY
[facility]    Discharge Summary  Hospitalist    Date of Admission:  12/2/2018  Date of Discharge:  12/4/2018  5:25 AM  Discharging Provider: Vikash Jeff MD  Date of Service (when I saw the patient): 12/3/2018    Discharge Diagnoses   Facial cellulitis  CKD baseline creatinine 1.3-1.8  Atrial fibrillation  History of hypertension  History of nonischemic cardiomyopathy, most recent EF 50-55% (11/2016)  History of coronary artery disease status post Stent to distal RCA in 2011  Type II DM  History of gout  TOMMY  Central sleep apnea      History of Present Illness   Jt Montgomery is a 73 year old male with  past medical history of hypertension, diabetes type 2, atrial fibrillation, obstructive sleep apnea/central sleep apnea on CPAP who presented due to worsening swelling of his face. Being admitted for management of facial cellulitis did not yet improve with oral antibiotics.       Hospital Course   Jt Montgomery was admitted on 12/2/2018.  The following problems were addressed during his hospitalization:    Hospitalized 2/2 facial cellulitis. Was responding to IV antibiotics. Pt left the hospital without antibiotics AMA. Please see note below with respect to his final day prior to leaving AMA    Facial cellulitis:   Erythema involves his forehead, cheeks, ears and goes around his neck in line with where his CPAP strap goes. He has periorbital edema, but no eye pain, conjuctival injection or visual disturbance. Contact dermatitis is also a possibility; however, felt less likely given his fever, leukocytosis and the fact he has been using the same machine/strap for the last 3 months or so. Given IV abx x 1 in urgent care and started on augmentin. Failed to improve and admitted 12/2  - On IV Cefazolin, Clindamycin since admission  - blood cx NGTD  - fever/ WBC improving 12/3  - rechecked lactate improved after resuscitation  - elevate head of the bed to help with swelling  - cpap  as tolerated  - appears more allergic but improving with IV abx and with fever/ WBC that are improving arguing for infection. Likely one more evening with IV abx then change to oral 12/4, ? discharge with return if worsens     CKD-III  Cr ranged from 1.3-1.8 over the last couple years. Cr admission is at 1.65 which seems to be around his baseline  - will monitor  - avoid nephrotoxin agents  - improved creatinine at 1.2 on 12/3     Chronic atrial fibrillation, rate controlled  - Continue with prior to admission digoxin, diltiazem daily  - resume warfarin per pharmacy dosing  - INR 1.67 on 12/3     History of hypertension  History of nonischemic cardiomyopathy, most recent EF 50-55% (11/2016)  History of coronary artery disease status post Stent to distal RCA in 2011  - Resumed prior to admission aspirin 81 mg daily, Metoprolol  mg times daily, Aldactone 25 mg daily, Avapro 300 mg at bedtime; renal function stable/ improved  - Takes Lasix on p.r.n. Basis.  Hold off on Lasix at this time as he appears euvolemic  - weight daily, strict I&O's     Type II DM  - A1c 6.1% in 9/2018. Uses Lantus 35 untis at bedtime and Aspart 15 units tid with meals  - resume with PTA Lantus at 35 units at bedtime.   - will use Aspart 1 units/carb unit tid w/meals and sliding scale insulin   - adjust insulin based on BG levels. Monitor for hypoglycemia     History of gout  - continue PTA Uloric     TOMMY  Central sleep apnea  - given his cellulitis, recommend holding off for a day or two until his cellulitis improves  - monitor for now     # Discharge Pain Plan:   - Patient currently has NO PAIN and is not being prescribed pain medications on discharge.    Vikash Jeff M.D.  Hospitalist  Pager [phone]    Significant Results and Procedures   None    Pending Results   None    Code Status   Full Code       Primary Care Physician   Digna Jones    Physical Exam                      Vitals:    12/02/18 1608 12/02/18 1922   Weight:  126.6 kg (279 lb) 125 kg (275 lb 9.2 oz)     Vital Signs with Ranges     No intake/output data recorded.    Exam not performed as left hospital AMA    Discharge Disposition   Left against medical advice    Consultations This Hospital Stay   PHARMACY TO DOSE WARFARIN    Time Spent on this Encounter   I, Vikash Jeff, personally saw the patient today and spent less than or equal to 30 minutes discharging this patient.    Discharge Orders   No discharge procedures on file.  Discharge Medications   Discharge Medication List as of 12/4/2018  6:56 AM      CONTINUE these medications which have NOT CHANGED    Details   alpha-lipoic acid 600 MG CAPS Take 1 capsule by mouth 2 times daily, Historical      aspirin EC 81 MG EC tablet Take 1 tablet by mouth daily., Disp-90 tablet, R-1, E-Prescribe      blood glucose monitoring (ACCU-CHEK LUIS PLUS) meter device kit Use to test blood sugar 3 times daily or as directed.Disp-1 kit, [id number]      blood glucose monitoring (ACCU-CHEK LUIS PLUS) test strip Use to test blood sugar 5 times daily or as directed., Disp-400 strip, R-1, E-Prescribe      blood glucose monitoring (SOFTCLIX) lancets Use to test blood sugar 5 times daily or as directed., Disp-1 Box, R-prn, E-Prescribe      ciprofloxacin (CILOXAN) 0.3 % ophthalmic solution Instill 1-2 drops in the affected eye(s) every 2 hours while awake for 2 days then 1-2 drops every 4 hours while awake for the next 5 days., Disp-1 Bottle, R-0, Local Print      Coenzyme Q10 (COQ10) 100 MG CAPS Take 300 mg by mouth daily , Historical      Continuous Blood Gluc Sensor (FREESTYLE RINKU 14 DAY SENSOR) MISC 1 Device every 14 days, Disp-2 each, R-11, E-Prescribe      digoxin (LANOXIN) 125 MCG tablet Take 1 tablet (125 mcg) by mouth daily, Disp-90 tablet, R-3, E-Prescribe      diltiazem (CARTIA XT) 300 MG 24 hr capsule Take 1 capsule (300 mg) by mouth daily, Disp-90 capsule, R-3, E-Prescribe      febuxostat (ULORIC) 40 MG TABS Take 40 mg  by mouth daily, Historical      fish oil-omega-3 fatty acids 1000 MG capsule Take 3-5 g by mouth daily, Historical      Green Tea, Camillia sinensis, (GREEN TEA EXTRACT PO) Take by mouth 2 times daily Doesn't always take regularly, Historical      HERBALS Nerve by Plexis 2 a day,   Healthy Feet and Nerves  By [employer]  3 a day, Heart Food Caps  (cayenna pepper, garlic, hawthorn, onion & ginger 1-2 a day), Historical      insulin glargine (LANTUS SOLOSTAR) 100 UNIT/ML pen ADMINISTER 35 UNITS UNDER THE SKIN DAILY, Disp-30 mL, R-11, E-PrescribeDose changed 2/9/18      !! insulin pen needle (BD GERMÁN U/F) 32G X 4 MM USE TO TEST FOUR TIMES DAILY OR AS DIRECTEDDisp-200 each, [id number]      !! insulin pen needle (BD GERMÁN U/F) 32G X 4 MM Use four times daily or as directed.Disp-100 each, [id number]      irbesartan (AVAPRO) 300 MG tablet Take 1 tablet (300 mg) by mouth At Bedtime, Disp-90 tablet, R-2, E-Prescribe      Magnesium 125 MG CAPS Take by mouth daily, Historical      MELATONIN PO Take 0.5 mg by mouth At Bedtime , Historical      metoprolol succinate (TOPROL-XL) 100 MG 24 hr tablet Take 1 tablet (100 mg) by mouth 2 times daily, Disp-180 tablet, R-3, E-Prescribe      multivitamin, therapeutic with minerals (MULTI-VITAMIN) TABS tablet [facility], Historical      order for DME Equipment being ordered: Compression stockingsDisp-1 each, R-3, Local Print      predniSONE (DELTASONE) 20 MG tablet TAKE 1 TABLET(20 MG) BY MOUTH DAILY AS NEEDED FOR GOUT FLARE, Disp-10 tablet, R-0, E-Prescribe      Probiotic Product (PROBIOTIC & ACIDOPHILUS EX ST PO) Take 1 capsule by mouth daily, Historical      Resveratrol 100 MG CAPS Take 2 capsules by mouth daily Nancy Segal, Historical      spironolactone (ALDACTONE) 25 MG tablet Take 1 tablet (25 mg) by mouth daily, Disp-90 tablet, R-3, E-Prescribe      Taurine 500 MG CAPS Take 1 capsule by mouth 2 times daily, Historical      UNABLE TO FIND MEDICATION NAME:  Super Beet Juice, Historical      vitamin D3 (CHOLECALCIFEROL) 2000 units tablet Take 1-2 tablets by mouth daily, Historical      warfarin (COUMADIN) 5 MG tablet 7.5mg Sunday, Tuesday, Thursday and 5mg the rest of the week per INR clinic, Disp-110 tablet, R-0, E-Prescribe      amoxicillin-clavulanate (AUGMENTIN) 875-125 MG tablet Take 1 tablet by mouth 2 times daily for 7 days, Disp-14 tablet, R-0, Local Print      furosemide (LASIX) 20 MG tablet Take 1 tablet (20 mg) by mouth daily as needed, Disp-90 tablet, R-1, Historical      insulin aspart (NOVOLOG FLEXPEN) 100 UNIT/ML injection Inject 15 Units Subcutaneous 3 times daily (with meals), Disp-45 mL, R-1, E-Prescribe       !! - Potential duplicate medications found. Please discuss with provider.        Allergies   Allergies   Allergen Reactions     Corn Dextrin      All corn products - gets tired an ornery     Data   Most Recent 3 CBC's:  Recent Labs   Lab Test 12/03/18  0918 12/02/18  1637 12/01/18  0905   WBC 11.9* 14.9* 18.0*   HGB 13.5 14.4 14.6    100 101*    175 155      Most Recent 3 BMP's:  Recent Labs   Lab Test 12/03/18  0918 12/02/18  1637 12/01/18  0905    135 131*   POTASSIUM 4.2 4.1 4.0   CHLORIDE 108 102 98   CO2 23 24 25   BUN 47* 59* 45*   CR 1.21 1.65* 1.52*   ANIONGAP 6 9 8   HARSHA 8.2* 8.3* 8.3*   * 169* 161*     Most Recent 2 LFT's:  Recent Labs   Lab Test 12/02/18  1637 02/21/18  1202   AST 40 24   ALT 40 27   ALKPHOS 71 54   BILITOTAL 2.9* 1.4*     Most Recent INR's and Anticoagulation Dosing History:  Anticoagulation Dose History     Recent Dosing and Labs Latest Ref Rng & Units 10/5/2018 10/18/2018 11/15/2018 11/28/2018 12/1/2018 12/2/2018 12/3/2018    Warfarin 7.5 mg - - - - - - 7.5 mg -    Warfarin 10 mg - - - - - - - 10 mg    INR 0.86 - 1.14 - - - - 1.47(H) 1.60(H) 1.67(H)    INR 0.86 - 1.14 3.5(A) 2.4(A) 2.1(A) 2.7(A) - - -        Most Recent 3 Troponin's:  Recent Labs   Lab Test 08/08/16  1159 09/10/12  4850  09/10/12  0625   TROPI <0.015  The 99th percentile for upper reference range is 0.045 ug/L.  Troponin values in   the range of 0.045 - 0.120 ug/L may be associated with risks of adverse   clinical events.   0.037* 0.043*     Most Recent Cholesterol Panel:  Recent Labs   Lab Test 11/28/18  1226   CHOL 161   LDL Cannot estimate LDL when triglyceride exceeds 400 mg/dL  76   HDL 26*   TRIG 645*     Most Recent 6 Bacteria Isolates From Any Culture (See EPIC Reports for Culture Details):  Recent Labs   Lab Test 12/01/18  0926 12/01/18  0905 04/13/14  1200 04/12/14  2120 04/12/14  2115 04/12/14  1527   CULT No growth No growth No growth  Duplicate request No growth No growth No growth     Most Recent TSH, T4 and A1c Labs:  Recent Labs   Lab Test 09/06/18  1450  04/29/16  0803   TSH  --   --  2.35   A1C 6.1*   < > 7.5*    < > = values in this interval not displayed.     Results for orders placed or performed during the hospital encounter of 05/22/18   NM MPI w Lexiscan    Narrative    GATED MYOCARDIAL PERFUSION SCINTIGRAPHY WITH INTRAVENOUS PHARMACOLOGIC  VASODILATATION LEXISCAN -ONE DAY STUDY     5/22/2018 1:33 PM  PAL HERBERT KOTA  72 years  Male  1945.    Indication/Clinical History: CAD, shortness of breath    Impression  1.  Myocardial perfusion imaging using single isotope technique  demonstrated fixed basal inferior wall defect most likely consistent  with attenuation artifact. No ischemia appreciated in present study.   2. Gated images not performed.  3. No prior study for comparison.    Procedure  Pharmacologic stress testing was performed with Lexiscan at a rate of  0.08 mg/ml rapid bolus injection, for 15 seconds, 0.4 mg/5ml  intravenously. Initially exercise stress test was attempted but  switched to Lexiscan after 3 minutes as target heart rate could not be  achieved. Low-level exercise was performed along with the vasodilator  infusion.  The heart rate was 86 at baseline and darrel to 85 beats  per  minute during the Lexiscan infusion. The rest blood pressure was  128/62 mmHg and was 148/70 mm Hg during Lexiscan infusion. The patient  experienced shortness of breath  during the test.    Myocardial perfusion imaging was performed at rest, approximately 45  minutes after the injection intravenously of 9.15 mCi of Tc-99m  Myoview. At peak pharmacologic effect, 10-20 seconds after Lexiscan,   the patient was injected intravenously with 27.5 mCi of  Tc-99m  Myoview. The post-stress tomographic imaging was performed  approximately 60 minutes after stress.    EKG Findings  The resting EKG demonstrated atrial fibrillation with PVCs,  nonspecific ST-T changes. The stress EKG demonstrated worsening of  resting ST-T changes however stress EKG nondiagnostic due to resting  ST-T abnormalities.    Tomographic Findings  Overall, the study quality is adequate . On both stress and rest  images there is a mild intensity basal inferior and inferolateral wall  photopenic defect that improves with prone imaging. There is  essentially diffuse homogenous tracer uptake in rest of the myocardium  at rest and stress. Gated images not performed.    VERENICE EARLY MD

## 2018-12-20 ENCOUNTER — ANTICOAGULATION THERAPY VISIT (OUTPATIENT)
Dept: CARDIOLOGY | Facility: CLINIC | Age: 73
End: 2018-12-20
Payer: COMMERCIAL

## 2018-12-20 ENCOUNTER — OFFICE VISIT (OUTPATIENT)
Dept: FAMILY MEDICINE | Facility: CLINIC | Age: 73
End: 2018-12-20
Payer: COMMERCIAL

## 2018-12-20 VITALS
SYSTOLIC BLOOD PRESSURE: 113 MMHG | TEMPERATURE: 98.3 F | HEIGHT: 73 IN | OXYGEN SATURATION: 96 % | HEART RATE: 89 BPM | DIASTOLIC BLOOD PRESSURE: 67 MMHG | BODY MASS INDEX: 35.65 KG/M2 | WEIGHT: 269 LBS

## 2018-12-20 DIAGNOSIS — Z79.01 LONG TERM CURRENT USE OF ANTICOAGULANTS WITH INR GOAL OF 2.0-3.0: ICD-10-CM

## 2018-12-20 DIAGNOSIS — S00.81XS: ICD-10-CM

## 2018-12-20 DIAGNOSIS — I48.91 ATRIAL FIBRILLATION, UNSPECIFIED TYPE (H): ICD-10-CM
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
## 2018-12-20 DIAGNOSIS — L03.211 CELLULITIS, FACE: Primary | ICD-10-CM

## 2018-12-20 LAB — INR POINT OF CARE: 4.4 (ref 0.86–1.14)

## 2018-12-20 PROCEDURE — 99213 OFFICE O/P EST LOW 20 MIN: CPT | Performed by: INTERNAL MEDICINE

## 2018-12-20 PROCEDURE — 36416 COLLJ CAPILLARY BLOOD SPEC: CPT | Performed by: INTERNAL MEDICINE

## 2018-12-20 PROCEDURE — 85610 PROTHROMBIN TIME: CPT | Mod: QW | Performed by: INTERNAL MEDICINE

## 2018-12-20 PROCEDURE — 99207 ZZC NO CHARGE NURSE ONLY: CPT | Performed by: INTERNAL MEDICINE

## 2018-12-20 ASSESSMENT — MIFFLIN-ST. JEOR: SCORE: 2024.06
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
## 2018-12-20 NOTE — PROGRESS NOTES
SUBJECTIVE:   Jt Montgomery is a 73 year old male who presents to clinic today for the following health issues:    Follow Up Cellulitis of the face due to cat scratch.      Feeling much better. Patient reports that his recent facial cellulitis symptoms due to cat scratch have resolved with Augmentin antibiotics therapy since last clinic visit      HPI:   Patient Jt Montgomery is a very pleasant 73 year old male with history of CHF who presents to Internal Medicine clinic today for follow up on recent facial cellulitis due to cat scratch. He reports continuing improvement in his recent facial cellulitis symptoms due to cat scratch. He has been compliant with his Augmentin antibiotics therapy since last clinic visit. He denies any fever or chills at this time.      Current Medications:     Current Outpatient Medications   Medication Sig Dispense Refill     alpha-lipoic acid 600 MG CAPS Take 1 capsule by mouth 2 times daily       aspirin EC 81 MG EC tablet Take 1 tablet by mouth daily. 90 tablet 1     blood glucose monitoring (ACCU-CHEK LUIS PLUS) meter device kit Use to test blood sugar 3 times daily or as directed. 1 kit 0     blood glucose monitoring (ACCU-CHEK LUIS PLUS) test strip Use to test blood sugar 5 times daily or as directed. 400 strip 1     blood glucose monitoring (SOFTCLIX) lancets Use to test blood sugar 5 times daily or as directed. (Patient taking differently: Use to test blood sugar 2 times daily or as directed.) 1 Box prn     Coenzyme Q10 (COQ10) 100 MG CAPS Take 300 mg by mouth daily        Continuous Blood Gluc Sensor (FREESTYLE RINKU 14 DAY SENSOR) MISC 1 Device every 14 days 2 each 11     digoxin (LANOXIN) 125 MCG tablet Take 1 tablet (125 mcg) by mouth daily 90 tablet 3     diltiazem (CARTIA XT) 300 MG 24 hr capsule Take 1 capsule (300 mg) by mouth daily 90 capsule 3     febuxostat (ULORIC) 40 MG TABS Take 40 mg by mouth daily       fish oil-omega-3 fatty acids 1000 MG  capsule Take 2-3 g by mouth daily        furosemide (LASIX) 20 MG tablet Take 1 tablet (20 mg) by mouth daily as needed (edema) 90 tablet 3     Green Tea, Camillia sinensis, (GREEN TEA EXTRACT PO) Take by mouth 2 times daily Doesn't always take regularly       HERBALS Nerve by Plexis 2 a day,   Healthy Feet and Nerves  By [employer]  3 a day, Heart Food Caps  (cayenna pepper, garlic, hawthorn, onion & ginger 1-2 a day)       insulin aspart (NOVOLOG FLEXPEN) 100 UNIT/ML pen Inject 15 Units Subcutaneous 3 times daily (with meals) 45 mL 0     insulin glargine (LANTUS SOLOSTAR) 100 UNIT/ML pen ADMINISTER 35 UNITS UNDER THE SKIN DAILY 30 mL 11     insulin pen needle (BD GERMÁN U/F) 32G X 4 MM USE TO TEST FOUR TIMES DAILY OR AS DIRECTED 200 each 11     insulin pen needle (BD GERMÁN U/F) 32G X 4 MM Use four times daily or as directed. 100 each 3     irbesartan (AVAPRO) 300 MG tablet Take 1 tablet (300 mg) by mouth At Bedtime 90 tablet 2     Magnesium 125 MG CAPS Take by mouth daily       MELATONIN PO Take 0.5 mg by mouth At Bedtime        metoprolol succinate (TOPROL-XL) 100 MG 24 hr tablet Take 1 tablet (100 mg) by mouth 2 times daily 180 tablet 3     multivitamin, therapeutic with minerals (MULTI-VITAMIN) TABS tablet [employer]       order for DME Equipment being ordered: Compression stockings 1 each 3     Probiotic Product (PROBIOTIC & ACIDOPHILUS EX ST PO) Take 1 capsule by mouth daily       Resveratrol 100 MG CAPS Take 2 capsules by mouth daily Shaklee Vivix       spironolactone (ALDACTONE) 25 MG tablet Take 1 tablet (25 mg) by mouth daily 90 tablet 3     Taurine 500 MG CAPS Take 1 capsule by mouth 2 times daily       UNABLE TO FIND MEDICATION NAME: Super Beet Juice       vitamin D3 (CHOLECALCIFEROL) 2000 units tablet Take 1-2 tablets by mouth daily       warfarin (COUMADIN) 5 MG tablet 7.5mg Sunday, Tuesday, Thursday and 5mg the rest of the week per INR clinic 110 tablet 0     amoxicillin-clavulanate  (AUGMENTIN) 875-125 MG tablet Take 1 tablet by mouth 2 times daily (Patient not taking: Reported on 12/20/2018) 14 tablet 0     ciprofloxacin (CILOXAN) 0.3 % ophthalmic solution Instill 1-2 drops in the affected eye(s) every 2 hours while awake for 2 days then 1-2 drops every 4 hours while awake for the next 5 days. (Patient not taking: Reported on 12/20/2018) 1 Bottle 0     predniSONE (DELTASONE) 20 MG tablet TAKE 1 TABLET(20 MG) BY MOUTH DAILY AS NEEDED FOR GOUT FLARE (Patient not taking: Reported on 12/20/2018) 10 tablet 0         Allergies:      Allergies   Allergen Reactions     Corn Dextrin      All corn products - gets tired an ornery            Past Medical History:     Past Medical History:   Diagnosis Date     Atrial fibrillation (H)      CAD (coronary artery disease)     MI with RONEL to dRCA April 2011     Central Sleep Apnea with Pat Villanueva breathing 9/14/2010    Also TOMMY with CPAP     CKD (chronic kidney disease) stage 3, GFR 30-59 ml/min (H)      Dilated cardiomyopathy (H)     nonischemic (possibly related to h/o a.fib with RVR) EF 30-40% March 2013     DM2 (diabetes mellitus, type 2) (H)     Goal HgbA1c < 7%     Gout     uric acid level correlates; April 2014 h/o +knee aspirate     Hypertension     Goal <140/90     Pulmonary hypertension (H)     mild per echo 3/2013         Past Surgical History:     Past Surgical History:   Procedure Laterality Date     CORONARY ANGIOGRAPHY ADULT ORDER  2011    distal circ-RONEL     HERNIA REPAIR  11/20/10    incarcinated     SUSPEND HYOID, GENIOGLOSSAL ADVANCEMENT           Family Medical History:     Family History   Problem Relation Age of Onset     Hypertension Mother      Coronary Artery Disease Mother      Coronary Artery Disease Father      Hypertension Father      Diabetes Sister      Cerebrovascular Disease Sister          Social History:     Social History     Socioeconomic History     Marital status:      Spouse name: Not on file     Number of  children: Not on file     Years of education: Not on file     Highest education level: Not on file   Social Needs     Financial resource strain: Not on file     Food insecurity - worry: Not on file     Food insecurity - inability: Not on file     Transportation needs - medical: Not on file     Transportation needs - non-medical: Not on file   Occupational History     Not on file   Tobacco Use     Smoking status: Former Smoker     Packs/day: 1.50     Years: 7.00     Pack years: 10.50     Types: Cigarettes     Last attempt to quit: 1972     Years since quittin.0     Smokeless tobacco: Never Used     Tobacco comment: quit in       Started at around age 18-19   Substance and Sexual Activity     Alcohol use: No     Alcohol/week: 0.0 oz     Comment: 74   recovering     Drug use: No     Sexual activity: Not Currently     Partners: Female   Other Topics Concern      Service Not Asked     Blood Transfusions Not Asked     Caffeine Concern No     Occupational Exposure Not Asked     Hobby Hazards Not Asked     Sleep Concern Yes     Comment: sleep apnea, wears bi-pap     Stress Concern Not Asked     Weight Concern Not Asked     Special Diet No     Comment: low carb diet     Back Care Not Asked     Exercise No     Comment: walking     Bike Helmet Not Asked     Seat Belt Yes     Self-Exams Not Asked     Parent/sibling w/ CABG, MI or angioplasty before 65F 55M? Not Asked   Social History Narrative     Not on file           Review of System:     Constitutional: Negative for fever or chills  Skin: positive for resolution of recent facial cellulitis symptoms  Ears/Nose/Throat: Negative for nasal congestion, sore throat  Respiratory: No shortness of breath, dyspnea on exertion, cough, or hemoptysis  Cardiovascular: Negative for chest pain, positive for CHF  Gastrointestinal: Negative for nausea, vomiting  Genitourinary: Negative for dysuria, hematuria  Musculoskeletal: Negative for myalgias  Neurologic:  "Negative for headaches  Psychiatric: Negative for depression, anxiety  Hematologic/Lymphatic/Immunologic: Positive for edema  Endocrine: Negative  Behavioral: Negative for tobacco use       Physical Exam:   /67 (BP Location: Left arm, Cuff Size: Adult Large)   Pulse 89   Temp 98.3  F (36.8  C) (Tympanic)   Ht 1.854 m (6' 1\")   Wt 122 kg (269 lb)   SpO2 96%   BMI 35.49 kg/m      GENERAL: alert and no distress  EYES: eyes grossly normal to inspection, and conjunctivae and sclerae normal  HENT: Normocephalic atraumatic. Nose and mouth without ulcers or lesions  NECK: supple  RESP: lungs clear to auscultation   CV: regular rate and rhythm, normal S1 S2  LYMPH: chronic peripheral edema present, improved since last clinic visit  ABDOMEN: nondistended  MS: no gross musculoskeletal defects noted  SKIN: recent facial cellulitis symptoms due to cat scratch have resolved with Augmentin antibiotics therapy since last clinic visit  NEURO: Alert & Oriented x 3.   PSYCH: mentation appears normal, affect normal        Diagnostic Test Results:     Diagnostic Test Results:  Results for orders placed or performed during the hospital encounter of 12/02/18   CBC with platelets differential   Result Value Ref Range    WBC 14.9 (H) 4.0 - 11.0 10e9/L    RBC Count 4.09 (L) 4.4 - 5.9 10e12/L    Hemoglobin 14.4 13.3 - 17.7 g/dL    Hematocrit 40.9 40.0 - 53.0 %     78 - 100 fl    MCH 35.2 (H) 26.5 - 33.0 pg    MCHC 35.2 31.5 - 36.5 g/dL    RDW 13.1 10.0 - 15.0 %    Platelet Count 175 150 - 450 10e9/L    Diff Method Automated Method     % Neutrophils 87.9 %    % Lymphocytes 4.3 %    % Monocytes 7.1 %    % Eosinophils 0.3 %    % Basophils 0.1 %    % Immature Granulocytes 0.3 %    Nucleated RBCs 0 0 /100    Absolute Neutrophil 13.1 (H) 1.6 - 8.3 10e9/L    Absolute Lymphocytes 0.6 (L) 0.8 - 5.3 10e9/L    Absolute Monocytes 1.1 0.0 - 1.3 10e9/L    Absolute Eosinophils 0.1 0.0 - 0.7 10e9/L    Absolute Basophils 0.0 0.0 - 0.2 " 10e9/L    Abs Immature Granulocytes 0.0 0 - 0.4 10e9/L    Absolute Nucleated RBC 0.0    Comprehensive metabolic panel   Result Value Ref Range    Sodium 135 133 - 144 mmol/L    Potassium 4.1 3.4 - 5.3 mmol/L    Chloride 102 94 - 109 mmol/L    Carbon Dioxide 24 20 - 32 mmol/L    Anion Gap 9 3 - 14 mmol/L    Glucose 169 (H) 70 - 99 mg/dL    Urea Nitrogen 59 (H) 7 - 30 mg/dL    Creatinine 1.65 (H) 0.66 - 1.25 mg/dL    GFR Estimate 41 (L) >60 mL/min/1.7m2    GFR Estimate If Black 50 (L) >60 mL/min/1.7m2    Calcium 8.3 (L) 8.5 - 10.1 mg/dL    Bilirubin Total 2.9 (H) 0.2 - 1.3 mg/dL    Albumin 2.9 (L) 3.4 - 5.0 g/dL    Protein Total 7.1 6.8 - 8.8 g/dL    Alkaline Phosphatase 71 40 - 150 U/L    ALT 40 0 - 70 U/L    AST 40 0 - 45 U/L   Lactic acid whole blood   Result Value Ref Range    Lactic Acid 2.1 (H) 0.7 - 2.0 mmol/L   Lactic acid whole blood   Result Value Ref Range    Lactic Acid 1.9 0.7 - 2.0 mmol/L   INR   Result Value Ref Range    INR 1.60 (H) 0.86 - 1.14   Glucose by meter   Result Value Ref Range    Glucose 176 (H) 70 - 99 mg/dL   Glucose by meter   Result Value Ref Range    Glucose 223 (H) 70 - 99 mg/dL   INR   Result Value Ref Range    INR 1.67 (H) 0.86 - 1.14   Basic metabolic panel   Result Value Ref Range    Sodium 137 133 - 144 mmol/L    Potassium 4.2 3.4 - 5.3 mmol/L    Chloride 108 94 - 109 mmol/L    Carbon Dioxide 23 20 - 32 mmol/L    Anion Gap 6 3 - 14 mmol/L    Glucose 165 (H) 70 - 99 mg/dL    Urea Nitrogen 47 (H) 7 - 30 mg/dL    Creatinine 1.21 0.66 - 1.25 mg/dL    GFR Estimate 59 (L) >60 mL/min/1.7m2    GFR Estimate If Black 71 >60 mL/min/1.7m2    Calcium 8.2 (L) 8.5 - 10.1 mg/dL   CBC with platelets   Result Value Ref Range    WBC 11.9 (H) 4.0 - 11.0 10e9/L    RBC Count 3.87 (L) 4.4 - 5.9 10e12/L    Hemoglobin 13.5 13.3 - 17.7 g/dL    Hematocrit 38.7 (L) 40.0 - 53.0 %     78 - 100 fl    MCH 34.9 (H) 26.5 - 33.0 pg    MCHC 34.9 31.5 - 36.5 g/dL    RDW 13.1 10.0 - 15.0 %    Platelet Count 175  150 - 450 10e9/L   Glucose by meter   Result Value Ref Range    Glucose 153 (H) 70 - 99 mg/dL   Glucose by meter   Result Value Ref Range    Glucose 130 (H) 70 - 99 mg/dL   Glucose by meter   Result Value Ref Range    Glucose 134 (H) 70 - 99 mg/dL   Glucose by meter   Result Value Ref Range    Glucose 145 (H) 70 - 99 mg/dL   Glucose by meter   Result Value Ref Range    Glucose 144 (H) 70 - 99 mg/dL   Glucose by meter   Result Value Ref Range    Glucose 130 (H) 70 - 99 mg/dL   Clostridium difficile toxin B PCR   Result Value Ref Range    Specimen Description Feces     C Diff Toxin B PCR Negative NEG^Negative       ASSESSMENT/PLAN:     (S00.81XS,  W55.03XS) Cat scratch of face, sequela  (L03.211) Cellulitis of face  (primary encounter diagnosis)  Comment: recent facial cellulitis symptoms due to cat scratch have resolved with Augmentin antibiotics therapy since last clinic visit  Plan: No further antibiotics therapy is necessary at this time.      Follow Up Plan:     Patient is instructed to return to Internal Medicine clinic for follow-up visit in 3 months.        Digna Jones MD  Internal Medicine  [facility]

## 2018-12-26 ENCOUNTER — ANTICOAGULATION THERAPY VISIT (OUTPATIENT)
Dept: CARDIOLOGY | Facility: CLINIC | Age: 73
End: 2018-12-26
Payer: COMMERCIAL

## 2018-12-26 DIAGNOSIS — I48.91 ATRIAL FIBRILLATION, UNSPECIFIED TYPE (H): ICD-10-CM

## 2018-12-26 DIAGNOSIS — Z79.01 LONG TERM CURRENT USE OF ANTICOAGULANTS WITH INR GOAL OF 2.0-3.0: ICD-10-CM

## 2018-12-26 LAB — INR POINT OF CARE: 2.6 (ref 0.86–1.14)

## 2018-12-26 PROCEDURE — 36416 COLLJ CAPILLARY BLOOD SPEC: CPT | Performed by: INTERNAL MEDICINE

## 2018-12-26 PROCEDURE — 85610 PROTHROMBIN TIME: CPT | Mod: QW | Performed by: INTERNAL MEDICINE
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
## 2019-01-11 ENCOUNTER — ANTICOAGULATION THERAPY VISIT (OUTPATIENT)
Dept: CARDIOLOGY | Facility: CLINIC | Age: 74
End: 2019-01-11
Payer: COMMERCIAL

## 2019-01-11 DIAGNOSIS — I48.91 ATRIAL FIBRILLATION, UNSPECIFIED TYPE (H): ICD-10-CM

## 2019-01-11 DIAGNOSIS — Z79.01 LONG TERM CURRENT USE OF ANTICOAGULANTS WITH INR GOAL OF 2.0-3.0: ICD-10-CM

## 2019-01-11 LAB — INR POINT OF CARE: 2 (ref 0.86–1.14)

## 2019-01-11 PROCEDURE — 85610 PROTHROMBIN TIME: CPT | Mod: QW | Performed by: INTERNAL MEDICINE

## 2019-01-11 PROCEDURE — 36416 COLLJ CAPILLARY BLOOD SPEC: CPT | Performed by: INTERNAL MEDICINE

## 2019-01-11 NOTE — PROGRESS NOTES
ANTICOAGULATION FOLLOW-UP CLINIC VISIT    Patient Name:  Jt Montgomery  Date:  2019  Contact Type:  Face to Face    SUBJECTIVE:     Patient Findings     Positives:   Change in diet/appetite (Pt is changing diet to mainly vegan-avoiding most meat and dairy)           OBJECTIVE    INR Protime   Date Value Ref Range Status   2019 2.0 (A) 0.86 - 1.14 Final       ASSESSMENT / PLAN  INR assessment THER    Recheck INR In: 2 WEEKS    INR Location Clinic      Anticoagulation Summary  As of 2019    INR goal:   2.0-3.0   TTR:   84.3 % (2.5 y)   INR used for dosin.0 (2019)   Warfarin maintenance plan:   7.5 mg (5 mg x 1.5) every Sun, Tue, Thu; 5 mg (5 mg x 1) all other days   Full warfarin instructions:   7.5 mg every Sun, Tue, Thu; 5 mg all other days   Weekly warfarin total:   42.5 mg   Plan last modified:   Lola Mendieta RN (2019)   Next INR check:   2019   Target end date:   Indefinite    Indications    Atrial fibrillation (AFIB) on Coumadin [I48.91]  Long term current use of anticoagulants with INR goal of 2.0-3.0 [Z79.01]             Anticoagulation Episode Summary     INR check location:       Preferred lab:       Send INR reminders to:   [facility] HEART INR NURSE    Comments:         Anticoagulation Care Providers     Provider Role Specialty Phone number    Lottie GomezDO [facility] Cardiology [phone]            See the Encounter Report to view Anticoagulation Flowsheet and Dosing Calendar (Go to Encounters tab in chart review, and find the Anticoagulation Therapy Visit)    INR 2.0 today, which is low normal range. Pt has started a mainly vegan diet for one week, and plans to continue. Adjusted Warfarin dose accordingly as below. Denies any other illness, abnormal bleeding and bruising. Denies medication changes. States eats greens daily. Instructed to avoid spinach and kale today.  Increased weekly maintenance dose by 6.3% to 7.5 mg S, T, Th and 5 mg  remaining days of the week. INR recheck in 2 weeks. Pt verbalized understanding of all instructions. Dosage adjustment made based on physician directed care plan.    Lola Mendieta RN

## 2019-01-17 ENCOUNTER — TELEPHONE (OUTPATIENT)
Dept: SLEEP MEDICINE | Facility: CLINIC | Age: 74
End: 2019-01-17

## 2019-01-17 NOTE — TELEPHONE ENCOUNTER
Left message for patient to contact sleep clinic to sleep ctr to mariely appt. Dr. MADISON is at  sleep center [facility].

## 2019-01-25 ENCOUNTER — ANTICOAGULATION THERAPY VISIT (OUTPATIENT)
Dept: CARDIOLOGY | Facility: CLINIC | Age: 74
End: 2019-01-25
Payer: COMMERCIAL

## 2019-01-25 DIAGNOSIS — I48.91 ATRIAL FIBRILLATION, UNSPECIFIED TYPE (H): ICD-10-CM

## 2019-01-25 DIAGNOSIS — Z79.01 LONG TERM CURRENT USE OF ANTICOAGULANTS WITH INR GOAL OF 2.0-3.0: ICD-10-CM

## 2019-01-25 LAB — INR POINT OF CARE: 2.2 (ref 0.86–1.14)

## 2019-01-25 PROCEDURE — 36416 COLLJ CAPILLARY BLOOD SPEC: CPT | Performed by: INTERNAL MEDICINE

## 2019-01-25 PROCEDURE — 85610 PROTHROMBIN TIME: CPT | Mod: QW | Performed by: INTERNAL MEDICINE

## 2019-01-25 NOTE — PROGRESS NOTES
ANTICOAGULATION FOLLOW-UP CLINIC VISIT    Patient Name:  Jt Montgomery  Date:  2019  Contact Type:  Face to Face    SUBJECTIVE:     Patient Findings     Positives:   No Problem Findings           OBJECTIVE    INR Protime   Date Value Ref Range Status   2019 2.2 (A) 0.86 - 1.14 Final       ASSESSMENT / PLAN  INR assessment THER    Recheck INR In: 3 WEEKS    INR Location Clinic      Anticoagulation Summary  As of 2019    INR goal:   2.0-3.0   TTR:   84.5 % (2.5 y)   INR used for dosin.2 (2019)   Warfarin maintenance plan:   7.5 mg (5 mg x 1.5) every Sun, Tue, Thu; 5 mg (5 mg x 1) all other days   Full warfarin instructions:   7.5 mg every Sun, Tue, Thu; 5 mg all other days   Weekly warfarin total:   42.5 mg   No change documented:   Lola Mendieta RN   Plan last modified:   Lola Mendieta RN (2019)   Next INR check:   2/15/2019   Target end date:   Indefinite    Indications    Atrial fibrillation (AFIB) on Coumadin [I48.91]  Long term current use of anticoagulants with INR goal of 2.0-3.0 [Z79.01]             Anticoagulation Episode Summary     INR check location:       Preferred lab:       Send INR reminders to:   [facility] HEART INR NURSE    Comments:         Anticoagulation Care Providers     Provider Role Specialty Phone number    Lottie GomezDO [facility] Cardiology [phone]            See the Encounter Report to view Anticoagulation Flowsheet and Dosing Calendar (Go to Encounters tab in chart review, and find the Anticoagulation Therapy Visit)    INR 2.2 today. No recent  dietary changes (continues with mainly vegan diet) or medication changes. Denies abnormal bleeding or bruising. Some chest congestion today and is taking more PRN Tylenol daily PRN for comfort. Did increase weekly maintenance dose last INR check for persistent low normal readings. Will continue current dose of 7.5 mg Sun,T, Th and 5 mg all other days of the week. Next INR recheck in 3  weeks. Pt verbalized understanding.    Lola Mendieta RN

## 2019-01-31 ENCOUNTER — TELEPHONE (OUTPATIENT)
Dept: FAMILY MEDICINE | Facility: CLINIC | Age: 74
End: 2019-01-31

## 2019-01-31 NOTE — TELEPHONE ENCOUNTER
Reason for call:  Patient reporting a symptom    Symptom or request:  Throat is not sore but has hoarse voice.  Sneezing and coughing.  No chills or aches.  No fever. No headache. He thinks he just has a cold but has questions regarding the flu, patient was prompted by a friend to call the clinic.     Duration (how long have symptoms been present): 4 days    Have you been treated for this before? No    Additional comments:     Phone Number patient can be reached at:  Home number on file [phone] (home)    Best Time:  any    Can we leave a detailed message on this number:  YES    Call taken on 1/31/2019 at 3:08 PM by Nguyen Rome

## 2019-01-31 NOTE — TELEPHONE ENCOUNTER
For 4 days  Hoarse voice  Dry cough at first, now mild clear phlegm   Sometimes sneezes  Eyes are kind of itchy  Denies chills or aches, fever, or headache    Pt Gargling salt water  Up Vit D3 and Vit C  Advised DayQuil/ Mucinex and to go to urgent care if pt develops fever over the weekend    Allen CORTEZ RN

## 2019-02-25 DIAGNOSIS — M1A.39X0 CHRONIC GOUT DUE TO RENAL IMPAIRMENT OF MULTIPLE SITES WITHOUT TOPHUS: Chronic | ICD-10-CM

## 2019-02-25 NOTE — TELEPHONE ENCOUNTER
Reason for Call:  Medication or medication refill:    Do you use a [facility]?  Name of the pharmacy and phone number for the current request:    [facility] [address] [place] [address] [address] AT [facility]      Name of the medication requested: Prednisone    Other request: none    Can we leave a detailed message on this number? YES    Phone number patient can be reached at: Home number on file [phone] (home)    Best Time: anytime    Call taken on 2/25/2019 at 10:14 AM by Yury Wynne

## 2019-02-26 NOTE — TELEPHONE ENCOUNTER
predniSONE (DELTASONE) 20 MG tablet    Last Written Prescription Date:  08/15/2018  Last Fill Quantity: 10,  # refills: 0   Last office visit: 12/20/2018 with prescribing provider:  Karen   Future Office Visit:   Next 5 appointments (look out 90 days)    Feb 27, 2019  1:30 PM CST  Office Visit with [facility] [address] [place] [id number] [phone]   Mar 06, 2019  9:20 AM CST  [facility] [address] [place] [id number] [phone]         Requested Prescriptions   Pending Prescriptions Disp Refills     predniSONE (DELTASONE) 20 MG tablet 10 tablet 0     Sig: TAKE 1 TABLET(20 MG) BY MOUTH DAILY AS NEEDED FOR GOUT FLARE.    There is no refill protocol information for this order

## 2019-02-27 ENCOUNTER — OFFICE VISIT (OUTPATIENT)
Dept: PHARMACY | Facility: CLINIC | Age: 74
End: 2019-02-27
Payer: COMMERCIAL

## 2019-02-27 VITALS — DIASTOLIC BLOOD PRESSURE: 60 MMHG | BODY MASS INDEX: 36.2 KG/M2 | SYSTOLIC BLOOD PRESSURE: 120 MMHG | WEIGHT: 274.4 LBS

## 2019-02-27 DIAGNOSIS — G47.00 INSOMNIA, UNSPECIFIED TYPE: ICD-10-CM

## 2019-02-27 DIAGNOSIS — G62.9 NEUROPATHY: ICD-10-CM

## 2019-02-27 DIAGNOSIS — E11.40 TYPE 2 DIABETES MELLITUS WITH DIABETIC NEUROPATHY, WITH LONG-TERM CURRENT USE OF INSULIN (H): Primary | ICD-10-CM

## 2019-02-27 DIAGNOSIS — I42.0 DILATED CARDIOMYOPATHY (H): ICD-10-CM

## 2019-02-27 DIAGNOSIS — I25.10 CORONARY ARTERY DISEASE INVOLVING NATIVE CORONARY ARTERY OF NATIVE HEART WITHOUT ANGINA PECTORIS: ICD-10-CM

## 2019-02-27 DIAGNOSIS — M1A.39X0 CHRONIC GOUT DUE TO RENAL IMPAIRMENT OF MULTIPLE SITES WITHOUT TOPHUS: ICD-10-CM

## 2019-02-27 DIAGNOSIS — Z71.89 ENCOUNTER FOR HERB AND VITAMIN SUPPLEMENT MANAGEMENT: ICD-10-CM

## 2019-02-27 DIAGNOSIS — Z79.4 TYPE 2 DIABETES MELLITUS WITH DIABETIC NEUROPATHY, WITH LONG-TERM CURRENT USE OF INSULIN (H): Primary | ICD-10-CM

## 2019-02-27 DIAGNOSIS — I48.91 ATRIAL FIBRILLATION, UNSPECIFIED TYPE (H): ICD-10-CM

## 2019-02-27 DIAGNOSIS — I10 ESSENTIAL HYPERTENSION: ICD-10-CM

## 2019-02-27 DIAGNOSIS — E78.2 MIXED HYPERLIPIDEMIA DUE TO TYPE 2 DIABETES MELLITUS (H): ICD-10-CM

## 2019-02-27 DIAGNOSIS — E11.69 MIXED HYPERLIPIDEMIA DUE TO TYPE 2 DIABETES MELLITUS (H): ICD-10-CM

## 2019-02-27 PROCEDURE — 99207 ZZC NO CHARGE LOS: CPT | Performed by: PHARMACIST

## 2019-02-27 RX ORDER — PREDNISONE 20 MG/1
TABLET ORAL
Qty: 10 TABLET | Refills: 0 | Status: SHIPPED | OUTPATIENT
Start: 2019-02-27 | End: 2019-03-06

## 2019-02-27 NOTE — PATIENT INSTRUCTIONS
Recommendations from today's MTM visit                                                      1. Remember to take vitamin D 4000 units daily.    2. Videos to watch: [email or web address].us/support/overview.html    3. Remember to scan at least 4 times a day, first thing when you wake up and last thing you do before bed.    4. Look up: Januvia    To schedule another MTM appointment, please call the clinic directly or you may call the MTM scheduling line at [phone] or toll-free at [phone].     My MTM Pharmacist's contact information:                                                      It was a pleasure seeing you today!  Please feel free to contact me with any questions or concerns you have.     Genevieve Wen, PharmD  Medication Therapy Management Resident, [facility]  Pager: [phone]
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
## 2019-02-27 NOTE — TELEPHONE ENCOUNTER
"Spoke with patient   Requesting Prednisone refill for his systemic gout   Can \"pop up\" at any time   Right wrist, above right hip, knees   Took 1.5 pills recently from left over Rx - starting to feel better   His Condo flooded and \"I lost my supply\"   Usually takes 10mg (1/2 tablet) or if really painful will take another 1/2 or full tablet if severe - the last Rx lasted a while   On Uloric too 40mg daily   Hardly ever eats beef - that causes a flare-up. But recently did eat some in [facility]     Routing refill request to provider for review/approval because:  Drug not on the [facility] refill protocol     Franci RIOS RN    " [facility], Emergency Dept  [address]  [place] [address] [phone]  In 1 day  If symptoms worsen      OUTPATIENT MEDICATIONS:  New Prescriptions    No medications on file       Parent was given return precautions and verbalizes understanding. Parent will return with patient for new or worsening symptoms.     FINAL IMPRESSION  1. Generalized abdominal pain

## 2019-03-06 ENCOUNTER — OFFICE VISIT (OUTPATIENT)
Dept: FAMILY MEDICINE | Facility: CLINIC | Age: 74
End: 2019-03-06
Payer: COMMERCIAL

## 2019-03-06 ENCOUNTER — ANTICOAGULATION THERAPY VISIT (OUTPATIENT)
Dept: CARDIOLOGY | Facility: CLINIC | Age: 74
End: 2019-03-06
Payer: COMMERCIAL

## 2019-03-06 VITALS
DIASTOLIC BLOOD PRESSURE: 85 MMHG | SYSTOLIC BLOOD PRESSURE: 138 MMHG | BODY MASS INDEX: 35.78 KG/M2 | OXYGEN SATURATION: 96 % | WEIGHT: 270 LBS | HEART RATE: 68 BPM | HEIGHT: 73 IN | TEMPERATURE: 98.7 F

## 2019-03-06 DIAGNOSIS — I48.91 ATRIAL FIBRILLATION, UNSPECIFIED TYPE (H): ICD-10-CM

## 2019-03-06 DIAGNOSIS — E78.5 HYPERLIPIDEMIA LDL GOAL <100: ICD-10-CM

## 2019-03-06 DIAGNOSIS — Z79.4 TYPE 2 DIABETES MELLITUS WITH DIABETIC NEUROPATHY, WITH LONG-TERM CURRENT USE OF INSULIN (H): Primary | Chronic | ICD-10-CM

## 2019-03-06 DIAGNOSIS — E11.40 TYPE 2 DIABETES MELLITUS WITH DIABETIC NEUROPATHY, WITH LONG-TERM CURRENT USE OF INSULIN (H): Primary | Chronic | ICD-10-CM

## 2019-03-06 DIAGNOSIS — E66.01 MORBID OBESITY (H): ICD-10-CM

## 2019-03-06 DIAGNOSIS — Z79.01 LONG TERM CURRENT USE OF ANTICOAGULANTS WITH INR GOAL OF 2.0-3.0: ICD-10-CM

## 2019-03-06 DIAGNOSIS — I10 ESSENTIAL HYPERTENSION: Chronic | ICD-10-CM

## 2019-03-06 LAB
CHOLEST SERPL-MCNC: 162 MG/DL
HBA1C MFR BLD: 6.5 % (ref 0–5.6)
HDLC SERPL-MCNC: 26 MG/DL
INR POINT OF CARE: 2 (ref 0.86–1.14)
LDLC SERPL CALC-MCNC: ABNORMAL MG/DL
LDLC SERPL DIRECT ASSAY-MCNC: 86 MG/DL
NONHDLC SERPL-MCNC: 136 MG/DL
TRIGL SERPL-MCNC: 523 MG/DL

## 2019-03-06 PROCEDURE — 85610 PROTHROMBIN TIME: CPT | Mod: QW | Performed by: INTERNAL MEDICINE

## 2019-03-06 PROCEDURE — 80061 LIPID PANEL: CPT | Performed by: INTERNAL MEDICINE

## 2019-03-06 PROCEDURE — 83036 HEMOGLOBIN GLYCOSYLATED A1C: CPT | Performed by: INTERNAL MEDICINE

## 2019-03-06 PROCEDURE — 99207 ZZC NO CHARGE NURSE ONLY: CPT | Performed by: INTERNAL MEDICINE

## 2019-03-06 PROCEDURE — 83721 ASSAY OF BLOOD LIPOPROTEIN: CPT | Mod: 59 | Performed by: INTERNAL MEDICINE

## 2019-03-06 PROCEDURE — 99214 OFFICE O/P EST MOD 30 MIN: CPT | Performed by: INTERNAL MEDICINE

## 2019-03-06 PROCEDURE — 36416 COLLJ CAPILLARY BLOOD SPEC: CPT | Performed by: INTERNAL MEDICINE

## 2019-03-06 ASSESSMENT — MIFFLIN-ST. JEOR: SCORE: 2028.59

## 2019-03-06 NOTE — PROGRESS NOTES
ANTICOAGULATION FOLLOW-UP CLINIC VISIT    Patient Name:  Jt Montgomery  Date:  3/6/2019  Contact Type:  Face to Face    SUBJECTIVE:     Patient Findings     Positives:   No Problem Findings           OBJECTIVE    INR Protime   Date Value Ref Range Status   2019 2.0 (A) 0.86 - 1.14 Final       ASSESSMENT / PLAN  INR assessment THER    Recheck INR In: 4 WEEKS    INR Location Clinic      Anticoagulation Summary  As of 3/6/2019    INR goal:   2.0-3.0   TTR:   85.2 % (2.6 y)   INR used for dosin.0 (3/6/2019)   Warfarin maintenance plan:   7.5 mg (5 mg x 1.5) every Sun, Tue, Thu; 5 mg (5 mg x 1) all other days   Full warfarin instructions:   7.5 mg every Sun, Tue, Thu; 5 mg all other days   Weekly warfarin total:   42.5 mg   No change documented:   Marlena Hong, RN   Plan last modified:   Lola Mendieta RN (2019)   Next INR check:   4/3/2019   Target end date:   Indefinite    Indications    Atrial fibrillation (AFIB) on Coumadin [I48.91]  Long term current use of anticoagulants with INR goal of 2.0-3.0 [Z79.01]             Anticoagulation Episode Summary     INR check location:       Preferred lab:       Send INR reminders to:   [facility] HEART INR NURSE    Comments:         Anticoagulation Care Providers     Provider Role Specialty Phone number    Lottie GomezDO [facility] [phone]            See the Encounter Report to view Anticoagulation Flowsheet and Dosing Calendar (Go to Encounters tab in chart review, and find the Anticoagulation Therapy Visit)    INR 2.0 Still eating daily veggies (probably more broccoli recently). No change in meds yet but he states that his insulin might be changing soon depending on his Hgb A1c. No abnormal bleeding or bruising. Will decrease intake of broccoli by about 1/2 a serving a week. Continue current dosing of 7.5 mg SuTuTh and 5 mg all other days with recheck in 4 weeks.    Marlena Hong, MARCELLA

## 2019-03-06 NOTE — PROGRESS NOTES
SUBJECTIVE:   Jt Montgomery is a 73 year old male who presents to clinic today for the following health issues:      Diabetes Follow-up    Patient is checking blood sugars: four times daily.    Blood sugar testing frequency justification: Uncontrolled diabetes  Results are as follows:         am - 160         lunchtime - 160         suppertime - 200         bedtime - 160    Diabetic concerns: None     Symptoms of hypoglycemia (low blood sugar): none     Paresthesias (numbness or burning in feet) or sores: Yes      Date of last diabetic eye exam: 10/23/2018    BP Readings from Last 2 Encounters:   02/27/19 120/60   12/20/18 113/67     Hemoglobin A1C (%)   Date Value   09/06/2018 6.1 (H)   02/09/2018 6.3 (H)     LDL Cholesterol Calculated (mg/dL)   Date Value   11/28/2018     Cannot estimate LDL when triglyceride exceeds 400 mg/dL   02/21/2018 75     LDL Cholesterol Direct (mg/dL)   Date Value   11/28/2018 76       Diabetes Management Resources        Current Medications:     Current Outpatient Medications   Medication Sig Dispense Refill     alpha-lipoic acid 600 MG CAPS Take 1 capsule by mouth 2 times daily       aspirin EC 81 MG EC tablet Take 1 tablet by mouth daily. 90 tablet 1     blood glucose monitoring (ACCU-CHEK LUIS PLUS) meter device kit Use to test blood sugar 3 times daily or as directed. 1 kit 0     blood glucose monitoring (ACCU-CHEK LUIS PLUS) test strip Use to test blood sugar 5 times daily or as directed. 400 strip 1     blood glucose monitoring (SOFTCLIX) lancets Use to test blood sugar 5 times daily or as directed. (Patient taking differently: Use to test blood sugar 2 times daily or as directed.) 1 Box prn     Coenzyme Q10 (COQ10) 100 MG CAPS Take 200 mg by mouth daily        Continuous Blood Gluc Sensor (FREESTYLE RINKU 14 DAY SENSOR) MISC 1 Device every 14 days 2 each 11     digoxin (LANOXIN) 125 MCG tablet Take 1 tablet (125 mcg) by mouth daily 90 tablet 3     diltiazem (CARTIA  XT) 300 MG 24 hr capsule Take 1 capsule (300 mg) by mouth daily 90 capsule 3     febuxostat (ULORIC) 40 MG TABS Take 40 mg by mouth daily       fish oil-omega-3 fatty acids 1000 MG capsule Take 2-3 g by mouth daily        furosemide (LASIX) 20 MG tablet Take 1 tablet (20 mg) by mouth daily as needed (edema) 90 tablet 3     Green Tea, Camillia sinensis, (GREEN TEA EXTRACT PO) Take by mouth 2 times daily Doesn't always take regularly       HERBALS Nerve by Plexis 2 a day,   Healthy Feet and Nerves  By [employer]  3 a day, Heart Food Caps  (cayenna pepper, garlic, hawthorn, onion & ginger 1-2 a day), Super Red Drink Powder daily, garlic daily       insulin aspart (NOVOLOG FLEXPEN) 100 UNIT/ML pen Inject 15 Units Subcutaneous 3 times daily (with meals) 45 mL 0     insulin glargine (LANTUS SOLOSTAR PEN) 100 UNIT/ML pen ADMINISTER 35 UNITS UNDER THE SKIN DAILY 30 mL 11     insulin pen needle (BD GERMÁN U/F) 32G X 4 MM USE TO TEST FOUR TIMES DAILY OR AS DIRECTED 200 each 11     irbesartan (AVAPRO) 300 MG tablet Take 1 tablet (300 mg) by mouth At Bedtime 90 tablet 2     Magnesium 125 MG CAPS Take by mouth daily       MELATONIN PO Take 0.5 mg by mouth At Bedtime        metoprolol succinate (TOPROL-XL) 100 MG 24 hr tablet Take 1 tablet (100 mg) by mouth 2 times daily 180 tablet 3     multivitamin, therapeutic with minerals (MULTI-VITAMIN) TABS tablet Shaklee [employer]ion       order for DME Equipment being ordered: Compression stockings 1 each 3     Probiotic Product (PROBIOTIC & ACIDOPHILUS EX ST PO) Take 1 capsule by mouth daily       Resveratrol 100 MG CAPS Take 2 capsules by mouth daily Shaklee Vivix       spironolactone (ALDACTONE) 25 MG tablet Take 1 tablet (25 mg) by mouth daily 90 tablet 3     Taurine 500 MG CAPS Take 1 capsule by mouth 2 times daily       UNABLE TO FIND MEDICATION NAME: Super Beet Juice       vitamin D3 (CHOLECALCIFEROL) 2000 units tablet Take 2 tablets by mouth daily        warfarin (COUMADIN) 5  MG tablet 7.5mg Sunday, Tuesday, Thursday and 5mg the rest of the week per INR clinic 110 tablet 0         Allergies:      Allergies   Allergen Reactions     Corn Dextrin      All corn products - gets tired an ornery            Past Medical History:     Past Medical History:   Diagnosis Date     Atrial fibrillation (H)      CAD (coronary artery disease)     MI with RONEL to dRCA April 2011     Central Sleep Apnea with Pat Villanueva breathing 9/14/2010    Also TOMMY with CPAP     CKD (chronic kidney disease) stage 3, GFR 30-59 ml/min (H)      Dilated cardiomyopathy (H)     nonischemic (possibly related to h/o a.fib with RVR) EF 30-40% March 2013     DM2 (diabetes mellitus, type 2) (H)     Goal HgbA1c < 7%     Gout     uric acid level correlates; April 2014 h/o +knee aspirate     Hypertension     Goal <140/90     Pulmonary hypertension (H)     mild per echo 3/2013         Past Surgical History:     Past Surgical History:   Procedure Laterality Date     CORONARY ANGIOGRAPHY ADULT ORDER  2011    distal circ-RONEL     HERNIA REPAIR  11/20/10    incarcinated     SUSPEND HYOID, GENIOGLOSSAL ADVANCEMENT           Family Medical History:     Family History   Problem Relation Age of Onset     Hypertension Mother      Coronary Artery Disease Mother      Coronary Artery Disease Father      Hypertension Father      Diabetes Sister      Cerebrovascular Disease Sister          Social History:     Social History     Socioeconomic History     Marital status:      Spouse name: Not on file     Number of children: Not on file     Years of education: Not on file     Highest education level: Not on file   Occupational History     Not on file   Social Needs     Financial resource strain: Not on file     Food insecurity:     Worry: Not on file     Inability: Not on file     Transportation needs:     Medical: Not on file     Non-medical: Not on file   Tobacco Use     Smoking status: Former Smoker     Packs/day: 1.50     Years: 7.00      Pack years: 10.50     Types: Cigarettes     Last attempt to quit: 1972     Years since quittin.2     Smokeless tobacco: Never Used     Tobacco comment: quit in       Started at around age 18-19   Substance and Sexual Activity     Alcohol use: No     Alcohol/week: 0.0 oz     Comment: 74   recovering     Drug use: No     Sexual activity: Not Currently     Partners: Female   Lifestyle     Physical activity:     Days per week: Not on file     Minutes per session: Not on file     Stress: Not on file   Relationships     Social connections:     Talks on phone: Not on file     Gets together: Not on file     Attends Confucianism service: Not on file     Active member of club or organization: Not on file     Attends meetings of clubs or organizations: Not on file     Relationship status: Not on file     Intimate partner violence:     Fear of current or ex partner: Not on file     Emotionally abused: Not on file     Physically abused: Not on file     Forced sexual activity: Not on file   Other Topics Concern      Service Not Asked     Blood Transfusions Not Asked     Caffeine Concern No     Occupational Exposure Not Asked     Hobby Hazards Not Asked     Sleep Concern Yes     Comment: sleep apnea, wears bi-pap     Stress Concern Not Asked     Weight Concern Not Asked     Special Diet No     Comment: low carb diet     Back Care Not Asked     Exercise No     Comment: walking     Bike Helmet Not Asked     Seat Belt Yes     Self-Exams Not Asked     Parent/sibling w/ CABG, MI or angioplasty before 65F 55M? Not Asked   Social History Narrative     Not on file           Review of System:     Constitutional: Negative for fever or chills, positive for chronic morbid obesity  Skin: Negative for rashes  Ears/Nose/Throat: Negative for nasal congestion, sore throat  Respiratory: No shortness of breath, dyspnea on exertion, cough, or hemoptysis  Cardiovascular: Negative for chest pain  Gastrointestinal: Negative for  "nausea, vomiting  Genitourinary: Negative for dysuria, hematuria  Musculoskeletal: Negative for myalgias  Neurologic: Negative for headaches  Psychiatric: Negative for depression, anxiety  Hematologic/Lymphatic/Immunologic: Negative  Endocrine: Negative for hypoglycemia events  Behavioral: Negative for tobacco use       Physical Exam:   /85 (BP Location: Right arm, Patient Position: Sitting, Cuff Size: Adult Large)   Pulse 68   Temp 98.7  F (37.1  C) (Oral)   Ht 1.854 m (6' 1\")   Wt 122.5 kg (270 lb)   SpO2 96%   BMI 35.62 kg/m      GENERAL: alert and no distress  EYES: eyes grossly normal to inspection, and conjunctivae and sclerae normal  HENT: Normocephalic atraumatic. Nose and mouth without ulcers or lesions  NECK: supple  RESP: lungs clear to auscultation   CV: regular rate and rhythm, normal S1 S2  LYMPH: no peripheral edema   ABDOMEN: obese  MS: no gross musculoskeletal defects noted  SKIN: no suspicious lesions or rashes  NEURO: Alert & Oriented x 3.   PSYCH: mentation appears normal, affect normal        Diagnostic Test Results:     Diagnostic Test Results:  Results for orders placed or performed in visit on 03/06/19 (from the past 24 hour(s))   Hemoglobin A1c   Result Value Ref Range    Hemoglobin A1C 6.5 (H) 0 - 5.6 %       ASSESSMENT/PLAN:       (E11.40,  Z79.4) Type 2 diabetes mellitus with diabetic neuropathy, with long-term current use of insulin (H)  (primary encounter diagnosis)  Comment: no recent hypoglycemia events  Plan: Hemoglobin A1c, insulin glargine (LANTUS         SOLOSTAR PEN) 100 UNIT/ML pen      (E78.5) Hyperlipidemia LDL goal <100  Comment: patient is due for a repeat Lipid panel lab  Plan: Lipid panel reflex to direct LDL Fasting      (E66.01) Morbid obesity (H)  Comment: chronic morbid obesity  Plan: I have advised the patient to start a new weight loss program through diet and exercise going forward.    (I10) Essential hypertension  Comment: BP is well controlled  Plan: " continue current BP medication regimen.        Follow Up Plan:     Patient is instructed to return to Internal Medicine clinic for follow-up visit in 3 months.        Digna Jones MD  Internal Medicine  [facility]

## 2019-03-07 ENCOUNTER — TELEPHONE (OUTPATIENT)
Dept: FAMILY MEDICINE | Facility: CLINIC | Age: 74
End: 2019-03-07

## 2019-03-08 NOTE — TELEPHONE ENCOUNTER
Reason for Call:  Request for results:    Name of test or procedure: A1C, cholesterol     Date of test of procedure: 3/6/19    Location of the test or procedure: [facility]    OK to leave the result message on voice mail or with a family member? YES    Phone number Patient can be reached at:  Home number on file [phone] (home)    Additional comments:     Call taken on 3/7/2019 at 6:42 PM by Breana Austin

## 2019-03-14 DIAGNOSIS — E11.40 TYPE 2 DIABETES MELLITUS WITH DIABETIC NEUROPATHY, WITH LONG-TERM CURRENT USE OF INSULIN (H): Chronic | ICD-10-CM

## 2019-03-14 DIAGNOSIS — Z79.4 TYPE 2 DIABETES MELLITUS WITH DIABETIC NEUROPATHY, WITH LONG-TERM CURRENT USE OF INSULIN (H): Chronic | ICD-10-CM

## 2019-03-14 NOTE — TELEPHONE ENCOUNTER
"Last Written Prescription Date:  12/05/18  Last Fill Quantity: 45 mL,  # refills: 0   Last office visit: 3/6/2019 with prescribing provider:  Karen   Future Office Visit:      Requested Prescriptions   Pending Prescriptions Disp Refills     insulin aspart (NOVOLOG FLEXPEN) 100 UNIT/ML pen 45 mL 0     Sig: Inject 15 Units Subcutaneous 3 times daily (with meals)    Short Acting Insulin Protocol Passed - 3/14/2019  3:43 PM       Passed - Blood pressure less than 140/90 in past 6 months    BP Readings from Last 3 Encounters:   03/06/19 138/85   02/27/19 120/60   12/20/18 113/67                Passed - LDL on file in past 12 months    Recent Labs   Lab Test 03/06/19  0951   LDL Cannot estimate LDL when triglyceride exceeds 400 mg/dL  86            Passed - Microalbumin on file in past 12 months    Recent Labs   Lab Test 02/21/18  1202   MICROL 65   UMALCR 84.38*            Passed - Serum creatinine on file in past 12 months    Recent Labs   Lab Test 12/03/18  0918   CR 1.21            Passed - HgbA1C in past 3 or 6 months    If HgbA1C is 8 or greater, it needs to be on file within the past 3 months.  If less than 8, must be on file within the past 6 months.     Recent Labs   Lab Test 03/06/19  0951   A1C 6.5*            Passed - Medication is active on med list       Passed - Patient is age 18 or older       Passed - Recent (6 mo) or future (30 days) visit within the authorizing provider's specialty    Patient had office visit in the last 6 months or has a visit in the next 30 days with authorizing provider or within the authorizing provider's specialty.  See \"Patient Info\" tab in inbasket, or \"Choose Columns\" in Meds & Orders section of the refill encounter.              "

## 2019-03-20 DIAGNOSIS — I48.91 ATRIAL FIBRILLATION, UNSPECIFIED TYPE (H): ICD-10-CM

## 2019-03-20 DIAGNOSIS — Z79.01 LONG TERM CURRENT USE OF ANTICOAGULANT THERAPY: ICD-10-CM

## 2019-03-20 RX ORDER — WARFARIN SODIUM 5 MG/1
TABLET ORAL
Qty: 110 TABLET | Refills: 0 | Status: SHIPPED | OUTPATIENT
Start: 2019-03-20 | End: 2019-06-20

## 2019-04-03 ENCOUNTER — ANTICOAGULATION THERAPY VISIT (OUTPATIENT)
Dept: CARDIOLOGY | Facility: CLINIC | Age: 74
End: 2019-04-03
Payer: COMMERCIAL

## 2019-04-03 DIAGNOSIS — I48.91 ATRIAL FIBRILLATION, UNSPECIFIED TYPE (H): ICD-10-CM

## 2019-04-03 DIAGNOSIS — Z79.01 LONG TERM CURRENT USE OF ANTICOAGULANTS WITH INR GOAL OF 2.0-3.0: ICD-10-CM

## 2019-04-03 LAB — INR POINT OF CARE: 1.9 (ref 0.86–1.14)

## 2019-04-03 PROCEDURE — 36416 COLLJ CAPILLARY BLOOD SPEC: CPT | Performed by: INTERNAL MEDICINE

## 2019-04-03 PROCEDURE — 85610 PROTHROMBIN TIME: CPT | Mod: QW | Performed by: INTERNAL MEDICINE

## 2019-04-03 NOTE — PROGRESS NOTES
ANTICOAGULATION FOLLOW-UP CLINIC VISIT    Patient Name:  Jt Montgomery  Date:  4/3/2019  Contact Type:  Face to Face    SUBJECTIVE:     Patient Findings     Positives:   Missed doses (missed a dose earlier this week)           OBJECTIVE    INR Protime   Date Value Ref Range Status   2019 1.9 (A) 0.86 - 1.14 Final       ASSESSMENT / PLAN  INR assessment THER    Recheck INR In: 3 WEEKS    INR Location Clinic      Anticoagulation Summary  As of 4/3/2019    INR goal:   2.0-3.0   TTR:   82.8 % (2.7 y)   INR used for dosin.9! (4/3/2019)   Warfarin maintenance plan:   7.5 mg (5 mg x 1.5) every Sun, Tue, Thu; 5 mg (5 mg x 1) all other days   Full warfarin instructions:   4/3: 10 mg; Otherwise 7.5 mg every Sun, Tue, Thu; 5 mg all other days   Weekly warfarin total:   42.5 mg   Plan last modified:   Lola Mendieta RN (2019)   Next INR check:   2019   Target end date:   Indefinite    Indications    Atrial fibrillation (AFIB) on Coumadin [I48.91]  Long term current use of anticoagulants with INR goal of 2.0-3.0 [Z79.01]             Anticoagulation Episode Summary     INR check location:       Preferred lab:       Send INR reminders to:   [facility] HEART INR NURSE    Comments:         Anticoagulation Care Providers     Provider Role Specialty Phone number    Lottie Gomez MindaDO [facility] Cardiology [phone]            See the Encounter Report to view Anticoagulation Flowsheet and Dosing Calendar (Go to Encounters tab in chart review, and find the Anticoagulation Therapy Visit)    INR 1.9 He missed a dose earlier this week and did not make up the missed dose. Reviewed with him that if he misses a dose, he should take the missed dose in the morning then take his usual dose in the evening. Eating broccoli only once a week. Taking Turmeric. Will boost today's dose to 10 mg then resume usual dosing of 7.5 mg SuTuTh and 5 mg all other days with recheck in 3 weeks. Dosage adjustment made  based on physician directed care plan.    Marlena Hong RN

## 2019-04-24 ENCOUNTER — ANTICOAGULATION THERAPY VISIT (OUTPATIENT)
Dept: CARDIOLOGY | Facility: CLINIC | Age: 74
End: 2019-04-24
Payer: COMMERCIAL

## 2019-04-24 DIAGNOSIS — I48.91 ATRIAL FIBRILLATION, UNSPECIFIED TYPE (H): ICD-10-CM

## 2019-04-24 DIAGNOSIS — Z79.01 LONG TERM CURRENT USE OF ANTICOAGULANTS WITH INR GOAL OF 2.0-3.0: ICD-10-CM

## 2019-04-24 LAB — INR POINT OF CARE: 1.9 (ref 0.86–1.14)

## 2019-04-24 PROCEDURE — 36416 COLLJ CAPILLARY BLOOD SPEC: CPT | Performed by: INTERNAL MEDICINE

## 2019-04-24 PROCEDURE — 85610 PROTHROMBIN TIME: CPT | Mod: QW | Performed by: INTERNAL MEDICINE

## 2019-04-24 NOTE — PROGRESS NOTES
ANTICOAGULATION FOLLOW-UP CLINIC VISIT    Patient Name:  Jt Montgomery  Date:  2019  Contact Type:  Face to Face    SUBJECTIVE:     Patient Findings            OBJECTIVE    INR Protime   Date Value Ref Range Status   2019 1.9 (A) 0.86 - 1.14 Final       ASSESSMENT / PLAN  INR assessment SUB    Recheck INR In: 2 WEEKS    INR Location Clinic      Anticoagulation Summary  As of 2019    INR goal:   2.0-3.0   TTR:   81.1 % (2.8 y)   INR used for dosin.9! (2019)   Warfarin maintenance plan:   5 mg (5 mg x 1) every Mon, Wed, Fri; 7.5 mg (5 mg x 1.5) all other days   Full warfarin instructions:   5 mg every Mon, Wed, Fri; 7.5 mg all other days   Weekly warfarin total:   45 mg   Plan last modified:   Lola Mendieta RN (2019)   Next INR check:   2019   Target end date:   Indefinite    Indications    Atrial fibrillation (AFIB) on Coumadin [I48.91]  Long term current use of anticoagulants with INR goal of 2.0-3.0 [Z79.01]             Anticoagulation Episode Summary     INR check location:       Preferred lab:       Send INR reminders to:   [facility] HEART INR NURSE    Comments:         Anticoagulation Care Providers     Provider Role Specialty Phone number    Lottie Gomez Minda,  [facility] Cardiology [phone]            See the Encounter Report to view Anticoagulation Flowsheet and Dosing Calendar (Go to Encounters tab in chart review, and find the Anticoagulation Therapy Visit)    INR 1.9 today and has consistently been 1.9-2.0 since 19. Denies recent illness, medication changes or dietary changes. Eats a serving of greens daily, but has decreased number of salads eaten weekly. Denies abnormal bleeding or bruising. Will increase weekly maintenance dose by 5.9%. Pt will take 7.5 mg T, TH, Sat and Sun and 5 mg all other days of the week. No greens tonight. Recheck INR in 2 weeks. Verbalized understanding.Dosage adjustment made based on physician directed care  plan.    Lola Mendieta RN

## 2019-05-10 ENCOUNTER — ANTICOAGULATION THERAPY VISIT (OUTPATIENT)
Dept: CARDIOLOGY | Facility: CLINIC | Age: 74
End: 2019-05-10
Payer: COMMERCIAL

## 2019-05-10 DIAGNOSIS — I48.91 ATRIAL FIBRILLATION, UNSPECIFIED TYPE (H): ICD-10-CM

## 2019-05-10 DIAGNOSIS — Z79.01 LONG TERM CURRENT USE OF ANTICOAGULANTS WITH INR GOAL OF 2.0-3.0: ICD-10-CM

## 2019-05-10 LAB — INR POINT OF CARE: 3.2 (ref 0.86–1.14)

## 2019-05-10 PROCEDURE — 36416 COLLJ CAPILLARY BLOOD SPEC: CPT | Performed by: INTERNAL MEDICINE

## 2019-05-10 PROCEDURE — 85610 PROTHROMBIN TIME: CPT | Mod: QW | Performed by: INTERNAL MEDICINE

## 2019-05-10 RX ORDER — SPIRONOLACTONE 25 MG/1
25 TABLET ORAL DAILY
Qty: 90 TABLET | Refills: 0 | Status: SHIPPED | OUTPATIENT
Start: 2019-05-10 | End: 2019-08-06

## 2019-05-10 NOTE — PROGRESS NOTES
ANTICOAGULATION FOLLOW-UP CLINIC VISIT    Patient Name:  Jt Montgomery  Date:  5/10/2019  Contact Type:  Face to Face    SUBJECTIVE:  Patient Findings     Positives:   Change in diet/appetite (eating less broccoli )             OBJECTIVE    INR Protime   Date Value Ref Range Status   05/10/2019 3.2 (A) 0.86 - 1.14 Final       ASSESSMENT / PLAN  INR assessment SUPRA    Recheck INR In: 2 WEEKS    INR Location Clinic      Anticoagulation Summary  As of 5/10/2019    INR goal:   2.0-3.0   TTR:   81.0 % (2.8 y)   INR used for dosing:   3.2! (5/10/2019)   Warfarin maintenance plan:   7.5 mg (5 mg x 1.5) every Sun, Tue, Thu; 5 mg (5 mg x 1) all other days   Full warfarin instructions:   7.5 mg every Sun, Tue, Thu; 5 mg all other days   Weekly warfarin total:   42.5 mg   Plan last modified:   Marlena Hong RN (5/10/2019)   Next INR check:   5/23/2019   Target end date:   Indefinite    Indications    Atrial fibrillation (AFIB) on Coumadin [I48.91]  Long term current use of anticoagulants with INR goal of 2.0-3.0 [Z79.01]             Anticoagulation Episode Summary     INR check location:       Preferred lab:       Send INR reminders to:   [facility] HEART INR NURSE    Comments:         Anticoagulation Care Providers     Provider Role Specialty Phone number    Lottie GomezDO [facility] Cardiology [phone]            See the Encounter Report to view Anticoagulation Flowsheet and Dosing Calendar (Go to Encounters tab in chart review, and find the Anticoagulation Therapy Visit)    INR 3.2 INR now elevated with increase in dosing. He has replaced some broccoli intake with stir fries made with pea pods and other green veggies. No change in other meds. No abnormal bleeding or bruising. Will decrease dosing back to 7.5 mg SuTuTh and 5 mg all other days with recheck in 2 weeks. Dosage adjustment made based on physician directed care plan.    Marlena Hong RN

## 2019-05-17 NOTE — TELEPHONE ENCOUNTER
Addended by: MARIANGEL BRANDON on: 5/17/2019 02:26 PM     Modules accepted: Orders     Spoke with patient and explained to him why he no longer has the copay waivers and how that was due to a change in insurance.  He did say that he was still interested in Pharm D he just could not make an apt right now due to the fact that he was driving.  I gave him our MTM line # to call back when he could and he said he would be doing that.  Patti Quinones, MTM Coordinator

## 2019-05-23 ENCOUNTER — ANTICOAGULATION THERAPY VISIT (OUTPATIENT)
Dept: CARDIOLOGY | Facility: CLINIC | Age: 74
End: 2019-05-23
Payer: COMMERCIAL

## 2019-05-23 DIAGNOSIS — Z79.01 LONG TERM CURRENT USE OF ANTICOAGULANTS WITH INR GOAL OF 2.0-3.0: ICD-10-CM

## 2019-05-23 DIAGNOSIS — I48.91 ATRIAL FIBRILLATION, UNSPECIFIED TYPE (H): ICD-10-CM

## 2019-05-23 LAB — INR POINT OF CARE: 2.1 (ref 0.86–1.14)

## 2019-05-23 PROCEDURE — 85610 PROTHROMBIN TIME: CPT | Mod: QW | Performed by: INTERNAL MEDICINE

## 2019-05-23 PROCEDURE — 36416 COLLJ CAPILLARY BLOOD SPEC: CPT | Performed by: INTERNAL MEDICINE

## 2019-05-23 NOTE — PROGRESS NOTES
ANTICOAGULATION FOLLOW-UP CLINIC VISIT    Patient Name:  Jt Montgomery  Date:  2019  Contact Type:  Face to Face    SUBJECTIVE:  Patient Findings         Clinical Outcomes     Negatives:   Major bleeding event, Thromboembolic event, Anticoagulation-related hospital admission, Anticoagulation-related ED visit, Anticoagulation-related fatality           OBJECTIVE    INR Protime   Date Value Ref Range Status   2019 2.1 (A) 0.86 - 1.14 Final       ASSESSMENT / PLAN  INR assessment THER    Recheck INR In: 3 WEEKS    INR Location Clinic      Anticoagulation Summary  As of 2019    INR goal:   2.0-3.0   TTR:   81.0 % (2.9 y)   INR used for dosin.1 (2019)   Warfarin maintenance plan:   7.5 mg (5 mg x 1.5) every Sun, Tue, Thu; 5 mg (5 mg x 1) all other days   Full warfarin instructions:   7.5 mg every Sun, Tue, Thu; 5 mg all other days   Weekly warfarin total:   42.5 mg   No change documented:   Paola Roberson RN   Plan last modified:   Marlena Hong RN (5/10/2019)   Next INR check:   2019   Target end date:   Indefinite    Indications    Atrial fibrillation (AFIB) on Coumadin [I48.91]  Long term current use of anticoagulants with INR goal of 2.0-3.0 [Z79.01]             Anticoagulation Episode Summary     INR check location:       Preferred lab:       Send INR reminders to:   [facility] HEART INR NURSE    Comments:         Anticoagulation Care Providers     Provider Role Specialty Phone number    Lottie Gomez DO [facility] Cardiology [phone]            See the Encounter Report to view Anticoagulation Flowsheet and Dosing Calendar (Go to Encounters tab in chart review, and find the Anticoagulation Therapy Visit)    INR 2.1.  He is back to 3 days of 7.5mg.  His INR runs on the lower side with 3 days of 7.5mg but went above 3 with 4 days of 7.5mg.  He said he is eating less broccoli so we will keep the lower schedule and recheck in 3 weeks.  Brian  RN    Paola Roberson, RN

## 2019-06-07 ENCOUNTER — HOSPITAL ENCOUNTER (EMERGENCY)
Facility: CLINIC | Age: 74
Discharge: HOME | End: 2019-06-07
Attending: EMERGENCY MEDICINE | Admitting: EMERGENCY MEDICINE
Payer: COMMERCIAL

## 2019-06-07 ENCOUNTER — OFFICE VISIT (OUTPATIENT)
Dept: FAMILY MEDICINE | Facility: CLINIC | Age: 74
End: 2019-06-07
Payer: COMMERCIAL

## 2019-06-07 VITALS
SYSTOLIC BLOOD PRESSURE: 137 MMHG | WEIGHT: 270 LBS | TEMPERATURE: 98.3 F | RESPIRATION RATE: 22 BRPM | DIASTOLIC BLOOD PRESSURE: 81 MMHG | BODY MASS INDEX: 35.78 KG/M2 | OXYGEN SATURATION: 95 % | HEART RATE: 88 BPM | HEIGHT: 73 IN

## 2019-06-07 VITALS
OXYGEN SATURATION: 100 % | BODY MASS INDEX: 35.78 KG/M2 | TEMPERATURE: 97.4 F | SYSTOLIC BLOOD PRESSURE: 146 MMHG | HEART RATE: 94 BPM | WEIGHT: 270 LBS | DIASTOLIC BLOOD PRESSURE: 78 MMHG | HEIGHT: 73 IN

## 2019-06-07 DIAGNOSIS — J02.9 SORE THROAT: ICD-10-CM

## 2019-06-07 DIAGNOSIS — K13.79 ACUTE PAIN OF MOUTH: ICD-10-CM

## 2019-06-07 DIAGNOSIS — J36 PERITONSILLAR ABSCESS: ICD-10-CM

## 2019-06-07 DIAGNOSIS — K04.7 DENTAL ABSCESS: ICD-10-CM

## 2019-06-07 DIAGNOSIS — K04.7 TOOTH INFECTION: Primary | ICD-10-CM

## 2019-06-07 LAB
ANION GAP SERPL CALCULATED.3IONS-SCNC: 7 MMOL/L (ref 3–14)
BASOPHILS # BLD AUTO: 0 10E9/L (ref 0–0.2)
BASOPHILS NFR BLD AUTO: 0.2 %
BUN SERPL-MCNC: 32 MG/DL (ref 7–30)
CALCIUM SERPL-MCNC: 9.1 MG/DL (ref 8.5–10.1)
CHLORIDE SERPL-SCNC: 98 MMOL/L (ref 94–109)
CO2 SERPL-SCNC: 27 MMOL/L (ref 20–32)
CREAT SERPL-MCNC: 1.17 MG/DL (ref 0.66–1.25)
DEPRECATED S PYO AG THROAT QL EIA: NORMAL
DIFFERENTIAL METHOD BLD: ABNORMAL
EOSINOPHIL # BLD AUTO: 0 10E9/L (ref 0–0.7)
EOSINOPHIL NFR BLD AUTO: 0.1 %
ERYTHROCYTE [DISTWIDTH] IN BLOOD BY AUTOMATED COUNT: 13.1 % (ref 10–15)
GFR SERPL CREATININE-BSD FRML MDRD: 62 ML/MIN/{1.73_M2}
GLUCOSE SERPL-MCNC: 187 MG/DL (ref 70–99)
HCT VFR BLD AUTO: 44 % (ref 40–53)
HGB BLD-MCNC: 15.5 G/DL (ref 13.3–17.7)
IMM GRANULOCYTES # BLD: 0 10E9/L (ref 0–0.4)
IMM GRANULOCYTES NFR BLD: 0.1 %
INR PPP: 1.87 (ref 0.86–1.14)
LYMPHOCYTES # BLD AUTO: 1.1 10E9/L (ref 0.8–5.3)
LYMPHOCYTES NFR BLD AUTO: 7.3 %
MCH RBC QN AUTO: 35.1 PG (ref 26.5–33)
MCHC RBC AUTO-ENTMCNC: 35.2 G/DL (ref 31.5–36.5)
MCV RBC AUTO: 100 FL (ref 78–100)
MONOCYTES # BLD AUTO: 1.9 10E9/L (ref 0–1.3)
MONOCYTES NFR BLD AUTO: 12.1 %
NEUTROPHILS # BLD AUTO: 12.3 10E9/L (ref 1.6–8.3)
NEUTROPHILS NFR BLD AUTO: 80.2 %
NRBC # BLD AUTO: 0 10*3/UL
NRBC BLD AUTO-RTO: 0 /100
PLATELET # BLD AUTO: 198 10E9/L (ref 150–450)
POTASSIUM SERPL-SCNC: 4.8 MMOL/L (ref 3.4–5.3)
RBC # BLD AUTO: 4.41 10E12/L (ref 4.4–5.9)
SODIUM SERPL-SCNC: 132 MMOL/L (ref 133–144)
SPECIMEN SOURCE: NORMAL
WBC # BLD AUTO: 15.3 10E9/L (ref 4–11)

## 2019-06-07 PROCEDURE — 99214 OFFICE O/P EST MOD 30 MIN: CPT | Performed by: INTERNAL MEDICINE

## 2019-06-07 PROCEDURE — 80048 BASIC METABOLIC PNL TOTAL CA: CPT | Performed by: EMERGENCY MEDICINE

## 2019-06-07 PROCEDURE — 25000125 ZZHC RX 250: Performed by: EMERGENCY MEDICINE

## 2019-06-07 PROCEDURE — 96365 THER/PROPH/DIAG IV INF INIT: CPT

## 2019-06-07 PROCEDURE — 42700 I&D ABSCESS PERITONSILLAR: CPT

## 2019-06-07 PROCEDURE — 25000128 H RX IP 250 OP 636: Performed by: EMERGENCY MEDICINE

## 2019-06-07 PROCEDURE — 96375 TX/PRO/DX INJ NEW DRUG ADDON: CPT

## 2019-06-07 PROCEDURE — 85025 COMPLETE CBC W/AUTO DIFF WBC: CPT | Performed by: EMERGENCY MEDICINE

## 2019-06-07 PROCEDURE — 87081 CULTURE SCREEN ONLY: CPT | Performed by: INTERNAL MEDICINE

## 2019-06-07 PROCEDURE — 96361 HYDRATE IV INFUSION ADD-ON: CPT

## 2019-06-07 PROCEDURE — 99285 EMERGENCY DEPT VISIT HI MDM: CPT | Mod: 25

## 2019-06-07 PROCEDURE — 87880 STREP A ASSAY W/OPTIC: CPT | Performed by: INTERNAL MEDICINE

## 2019-06-07 PROCEDURE — 85610 PROTHROMBIN TIME: CPT | Performed by: EMERGENCY MEDICINE

## 2019-06-07 RX ORDER — DEXAMETHASONE SODIUM PHOSPHATE 10 MG/ML
10 INJECTION, SOLUTION INTRAMUSCULAR; INTRAVENOUS ONCE
Status: COMPLETED | OUTPATIENT
Start: 2019-06-07 | End: 2019-06-07

## 2019-06-07 RX ORDER — CLINDAMYCIN HCL 300 MG
300 CAPSULE ORAL 4 TIMES DAILY
Qty: 40 CAPSULE | Refills: 0 | Status: SHIPPED | OUTPATIENT
Start: 2019-06-07 | End: 2019-06-07

## 2019-06-07 RX ORDER — LIDOCAINE 40 MG/G
CREAM TOPICAL
Status: DISCONTINUED | OUTPATIENT
Start: 2019-06-07 | End: 2019-06-07 | Stop reason: HOSPADM

## 2019-06-07 RX ORDER — CLINDAMYCIN PHOSPHATE 900 MG/50ML
900 INJECTION, SOLUTION INTRAVENOUS ONCE
Status: COMPLETED | OUTPATIENT
Start: 2019-06-07 | End: 2019-06-07

## 2019-06-07 RX ORDER — CLINDAMYCIN HCL 300 MG
300 CAPSULE ORAL 4 TIMES DAILY
Qty: 40 CAPSULE | Refills: 0 | Status: SHIPPED | OUTPATIENT
Start: 2019-06-07 | End: 2019-07-02

## 2019-06-07 RX ORDER — FENTANYL CITRATE 50 UG/ML
0.5 INJECTION, SOLUTION INTRAMUSCULAR; INTRAVENOUS ONCE
Status: COMPLETED | OUTPATIENT
Start: 2019-06-07 | End: 2019-06-07

## 2019-06-07 RX ADMIN — DEXAMETHASONE SODIUM PHOSPHATE 10 MG: 10 INJECTION, SOLUTION INTRAMUSCULAR; INTRAVENOUS at 16:24

## 2019-06-07 RX ADMIN — SODIUM CHLORIDE 1000 ML: 9 INJECTION, SOLUTION INTRAVENOUS at 16:41

## 2019-06-07 RX ADMIN — CLINDAMYCIN IN 5 PERCENT DEXTROSE 900 MG: 18 INJECTION, SOLUTION INTRAVENOUS at 16:24

## 2019-06-07 RX ADMIN — FENTANYL CITRATE 61.5 MCG: 50 INJECTION, SOLUTION INTRAMUSCULAR; INTRAVENOUS at 16:58

## 2019-06-07 ASSESSMENT — MIFFLIN-ST. JEOR
SCORE: 2028.59
SCORE: 2028.59

## 2019-06-07 ASSESSMENT — ENCOUNTER SYMPTOMS
TROUBLE SWALLOWING: 1
COUGH: 0
FEVER: 0
VOICE CHANGE: 1
SHORTNESS OF BREATH: 0

## 2019-06-07 NOTE — ED NOTES
Patient has diffuse swelling to back of left side of throat with voice change. Pt reports more labored breathing and difficulty swallowing. Pt afebrile

## 2019-06-07 NOTE — ED TRIAGE NOTES
Pt states that he was sent here from [facility]. Reports swelling and pain in right upper teeth. Concerns for worsening abcess.

## 2019-06-07 NOTE — ED PROVIDER NOTES
History     Chief Complaint:  Oral Swelling    HPI   Jt Montgomery is a 73 year old male with a history of atrial fibrillation, on coumadin, who presents with oral swelling. The patient states he developed symptoms of oral swelling, upper left-sided dental pain, a hoarse voice, and excess saliva production three days ago. His symptoms have gotten progressively worse over the past three days which prompted him to seek evaluation at the [facility] today. He was started on Augmentin and was sent here for further evaluation of his symptoms. He feels that the swelling on the left side of his throat is causing difficulty swallowing his saliva. He otherwise denies any cough, difficulty breathing, or fever.   The patient tried taking tylenol, gargling with hydrogen peroxide, non-alcoholic mouth wash and apple cider vinegar, with little to no relief of his symptoms.     Allergies:  No known drug allergies    Medications:    Augmentin - 6/7/19-6/14/19  Aspirin 81 mg  Digoxin  Diltiazem  Furosemide  Novolog  Lantus  Irbesartan  Metoprolol  Spironolactone  Warfarin    Past Medical History:    Atrial fibrillation  Coronary artery disease  Sleep apnea with Pat Villanueva breathing  Chronic kidney disease  Dilated cardiomyopathy  Alcohol abuse  Hypertension  Gout  Type II diabetes mellitus    Past Surgical History:    Incarcerated hernia repair  Suspend hyoid, genioglossal advancement    Family History:    Hypertension  Coronary artery disease  Diabetes  Cerebrovascular disease    Social History:  Smoking status: Former smoker, quit 1972  Alcohol use: Previous alcohol abuse  Drug use: No  PCP: Digna Jones  Marital Status:  [4]     Review of Systems   Constitutional: Negative for fever.   HENT: Positive for dental problem, trouble swallowing and voice change.    Respiratory: Negative for cough and shortness of breath.    All other systems reviewed and are negative.      Physical Exam  "    Patient Vitals for the past 24 hrs:   BP Temp Temp src Pulse Heart Rate Resp SpO2 Height Weight   06/07/19 1918 -- -- -- -- 93 22 -- -- --   06/07/19 1833 -- -- -- -- 90 22 95 % -- --   06/07/19 1803 137/81 -- -- 88 90 16 95 % -- --   06/07/19 1708 (!) 141/101 -- -- 96 97 11 90 % -- --   06/07/19 1700 -- -- -- -- 81 21 94 % -- --   06/07/19 1600 -- -- -- -- -- -- 95 % -- --   06/07/19 1438 143/71 98.3  F (36.8  C) Oral 95 -- 16 96 % 1.854 m (6' 1\") 122.5 kg (270 lb)     Physical Exam    Gen: Pleasant, appears stated age.    Eye:   Pupils are equal, round, and reactive.     Sclera non-injected.    ENT:  Moist mucus membranes.  Normal tongue.  Oropharynx erythematous.  No exudate.  Normal uvula, in midline.  Swelling and full to left peritonsillar area, uvula pushed to the right.  Hoarse voice.   No stridor.  No drooling.    Cardiac:     Regularly irregular rate and rhythm   No murmurs, gallops, or rubs.    Pulmonary:     Clear to auscultation bilaterally.    No wheezes, rales, or rhonchi.    Abdomen:     Normal active bowel sounds.     Abdomen is soft and non-distended, without focal tenderness.    Musculoskeletal:     Normal movement of all extremities without evidence for deficit.    Extremities:    No edema.    Skin:   Warm and dry.    Neurologic:    Non-focal exam without asymmetric weakness or numbness.    Normal tone       Psychiatric:    Normal affect with appropriate interaction with examiner.    Emergency Department Course   Laboratory:  CBC: WBC: 15.3 (H), HGB: 15.5, PLT: 198  BMP: Glucose 187 (H),  (L), BUN 32 (H), o/w WNL (Creatinine: 1.17)  INR 1.87 (H)    Procedures:    Incision and Drainage     LOCATIONS:  Superior pole, left peritonsillar area     ANESTHESIA:  Local block using Lidocaine 1% with epinephrine, total of 3 mLs     PROCEDURE:  I attempted aspiration, but purulence was too thick.  The area was incised with # 11 Blade ([place]) with a Single Straight incision.  Area opened " slightly with hemostat.  Approximately 5-7cc of purulent drainage was expressed.  Wound treatment included Expression of Material.  Packing consisted of No Packing.  Appropriate dressing was applied to cover the area.    PATIENT STATUS:        Patient tolerated the procedure well. There were no complications.    Interventions:  1624 Decadron 10 mg IV   Cleocin 900 mg IV  1641 NS 1,000 mLs IV  1658 Fentanyl 61.5 mcg IV    Emergency Department Course:  Nursing notes and vitals reviewed. (2623) I performed an exam of the patient as documented above.     IV inserted. Medicine administered as documented above. Blood drawn. This was sent to the lab for further testing, results above.      1851 I rechecked the patient and discussed the results of his workup thus far.     I performed an incision and drainage as noted above.     Findings and plan explained to the Patient. Patient discharged home with instructions regarding supportive care, medications, and reasons to return. The importance of close follow-up was reviewed. The patient was prescribed Cleocin.    I personally reviewed the laboratory results with the Patient and answered all related questions prior to discharge.     Impression & Plan    Medical Decision Making:  This is a 73 year old male with history of atrial fibrillation, who presents with several days of sore throat, hoarse voice, and obvious swelling in the peritonsillar area. The patient was able to maintain his airway in the ED. An abscess was drained as above. He does have an elevated white blood cell count, but otherwise does not appear to be septic at this time. He is feeling significantly improved following drainage of the peritonsillar abscess.  I do not think he needs additional imaging at this time given improvement following drainage. There is no significant bleeding from the area. He will continue clindamycin for the next ten days. Otherwise, he will return to the ED sooner for any increased  swelling, difficulty swallowing, fevers or chills, or for any other concerns.         Diagnosis:    ICD-10-CM    1. Peritonsillar abscess J36        Disposition: discharged to home    Discharge Medications:     Medication List      Started    clindamycin 300 MG capsule  Commonly known as:  CLEOCIN  300 mg, Oral, 4 TIMES DAILY          Scribe Disclosure:  IKiley, am serving as a scribe on 6/7/2019 at 1555 PM to personally document services performed by Pili Turner MD * based on my observations and the provider's statements to me.     Scribe Disclosure:  IReza, am serving as a scribe on 6/7/2019 at 7:46 PM to personally document services performed by Pili Turner MD based on my observations and the provider's statements to me.       6/7/2019    EMERGENCY DEPARTMENT     Pili Turner MD  06/10/19 0937

## 2019-06-07 NOTE — PROGRESS NOTES
Chief Complaint:     Tooth infection, sore throat and mouth pain    Dental symptoms    Duration: 3 days    Description  Tooth pain and infection symptoms with mouth pain, sore throat, ear pain left for the past 3 days    Severity: severe, worsening    Accompanying signs and symptoms: None    History (predisposing factors):  none    Precipitating or alleviating factors: None    Therapies tried and outcome:  OTC meds      Current Medications:     Current Outpatient Medications   Medication Sig Dispense Refill     alpha-lipoic acid 600 MG CAPS Take 1 capsule by mouth 2 times daily       amoxicillin-clavulanate (AUGMENTIN) 875-125 MG tablet Take 1 tablet by mouth 2 times daily for 7 days 14 tablet 1     aspirin EC 81 MG EC tablet Take 1 tablet by mouth daily. 90 tablet 1     blood glucose monitoring (ACCU-CHEK LUIS PLUS) meter device kit Use to test blood sugar 3 times daily or as directed. 1 kit 0     blood glucose monitoring (ACCU-CHEK LUIS PLUS) test strip Use to test blood sugar 5 times daily or as directed. 400 strip 1     blood glucose monitoring (SOFTCLIX) lancets Use to test blood sugar 5 times daily or as directed. (Patient taking differently: Use to test blood sugar 2 times daily or as directed.) 1 Box prn     Coenzyme Q10 (COQ10) 100 MG CAPS Take 200 mg by mouth daily        Continuous Blood Gluc Sensor (FREESTYLE RINKU 14 DAY SENSOR) MISC 1 Device every 14 days 2 each 11     digoxin (LANOXIN) 125 MCG tablet Take 1 tablet (125 mcg) by mouth daily 90 tablet 3     diltiazem (CARTIA XT) 300 MG 24 hr capsule Take 1 capsule (300 mg) by mouth daily 90 capsule 3     febuxostat (ULORIC) 40 MG TABS Take 40 mg by mouth daily       fish oil-omega-3 fatty acids 1000 MG capsule Take 2-3 g by mouth daily        furosemide (LASIX) 20 MG tablet Take 1 tablet (20 mg) by mouth daily as needed (edema) 90 tablet 3     Green Tea, Camillia sinensis, (GREEN TEA EXTRACT PO) Take by mouth 2 times daily Doesn't always take  regularly       HERBALS Nerve by Plexis 2 a day,   Healthy Feet and Nerves  By [employer]  3 a day, Heart Food Caps  (cayenna pepper, garlic, hawthorn, onion & karla 1-2 a day), Super Red Drink Powder daily, garlic daily       insulin aspart (NOVOLOG FLEXPEN) 100 UNIT/ML pen Inject 15 Units Subcutaneous 3 times daily (with meals) 45 mL 1     insulin glargine (LANTUS SOLOSTAR PEN) 100 UNIT/ML pen ADMINISTER 35 UNITS UNDER THE SKIN DAILY 30 mL 11     insulin pen needle (BD GERMÁN U/F) 32G X 4 MM USE TO TEST FOUR TIMES DAILY OR AS DIRECTED 200 each 11     irbesartan (AVAPRO) 300 MG tablet Take 1 tablet (300 mg) by mouth At Bedtime 90 tablet 2     Magnesium 125 MG CAPS Take by mouth daily       MELATONIN PO Take 0.5 mg by mouth At Bedtime        metoprolol succinate (TOPROL-XL) 100 MG 24 hr tablet Take 1 tablet (100 mg) by mouth 2 times daily 180 tablet 3     multivitamin, therapeutic with minerals (MULTI-VITAMIN) TABS tablet [employer]lee [employer]stion       order for DME Equipment being ordered: Compression stockings 1 each 3     Probiotic Product (PROBIOTIC & ACIDOPHILUS EX ST PO) Take 1 capsule by mouth daily       spironolactone (ALDACTONE) 25 MG tablet Take 1 tablet (25 mg) by mouth daily 90 tablet 0     Taurine 500 MG CAPS Take 1 capsule by mouth 2 times daily       UNABLE TO FIND MEDICATION NAME: Super Beet Juice       vitamin D3 (CHOLECALCIFEROL) 2000 units tablet Take 2 tablets by mouth daily        warfarin (COUMADIN) 5 MG tablet Take 1 1/2 tabs on Sundays, Tuesdays and Thursdays and take 1 tab on all other days or as directed by INR clinic 110 tablet 0         Allergies:      Allergies   Allergen Reactions     Corn Dextrin      All corn products - gets tired an ornery            Past Medical History:     Past Medical History:   Diagnosis Date     Atrial fibrillation (H)      CAD (coronary artery disease)     MI with RONEL to dRCA April 2011     Central Sleep Apnea with Pat Villanueva breathing 9/14/2010    Also  TOMMY with CPAP     CKD (chronic kidney disease) stage 3, GFR 30-59 ml/min (H)      Dilated cardiomyopathy (H)     nonischemic (possibly related to h/o a.fib with RVR) EF 30-40% 2013     DM2 (diabetes mellitus, type 2) (H)     Goal HgbA1c < 7%     Gout     uric acid level correlates; 2014 h/o +knee aspirate     Hypertension     Goal <140/90     Pulmonary hypertension (H)     mild per echo 3/2013         Past Surgical History:     Past Surgical History:   Procedure Laterality Date     CORONARY ANGIOGRAPHY ADULT ORDER      distal circ-RONEL     HERNIA REPAIR  11/20/10    incarcinated     SUSPEND HYOID, GENIOGLOSSAL ADVANCEMENT           Family Medical History:     Family History   Problem Relation Age of Onset     Hypertension Mother      Coronary Artery Disease Mother      Coronary Artery Disease Father      Hypertension Father      Diabetes Sister      Cerebrovascular Disease Sister          Social History:     Social History     Socioeconomic History     Marital status:      Spouse name: Not on file     Number of children: Not on file     Years of education: Not on file     Highest education level: Not on file   Occupational History     Not on file   Social Needs     Financial resource strain: Not on file     Food insecurity:     Worry: Not on file     Inability: Not on file     Transportation needs:     Medical: Not on file     Non-medical: Not on file   Tobacco Use     Smoking status: Former Smoker     Packs/day: 1.50     Years: 7.00     Pack years: 10.50     Types: Cigarettes     Last attempt to quit: 1972     Years since quittin.4     Smokeless tobacco: Never Used     Tobacco comment: quit in       Started at around age 18-19   Substance and Sexual Activity     Alcohol use: No     Alcohol/week: 0.0 oz     Comment: 74   recovering     Drug use: No     Sexual activity: Not Currently     Partners: Female   Lifestyle     Physical activity:     Days per week: Not on file      Minutes per session: Not on file     Stress: Not on file   Relationships     Social connections:     Talks on phone: Not on file     Gets together: Not on file     Attends [employer] service: Not on file     Active member of club or organization: Not on file     Attends meetings of clubs or organizations: Not on file     Relationship status: Not on file     Intimate partner violence:     Fear of current or ex partner: Not on file     Emotionally abused: Not on file     Physically abused: Not on file     Forced sexual activity: Not on file   Other Topics Concern      Service Not Asked     Blood Transfusions Not Asked     Caffeine Concern No     Occupational Exposure Not Asked     Hobby Hazards Not Asked     Sleep Concern Yes     Comment: sleep apnea, wears bi-pap     Stress Concern Not Asked     Weight Concern Not Asked     Special Diet No     Comment: low carb diet     Back Care Not Asked     Exercise No     Comment: walking     Bike Helmet Not Asked     Seat Belt Yes     Self-Exams Not Asked     Parent/sibling w/ CABG, MI or angioplasty before 65F 55M? Not Asked   Social History Narrative     Not on file           Review of System:     Constitutional: Negative for fever or chills  Skin: Negative for rashes  Ears/Nose/Throat: positive for acute tooth pain, dental infection, mouth pain and sore throat of the left upper mouth/teeth area   Respiratory: No shortness of breath, dyspnea on exertion, cough, or hemoptysis  Cardiovascular: Negative for chest pain  Gastrointestinal: Negative for nausea, vomiting  Genitourinary: Negative for dysuria, hematuria  Musculoskeletal: Negative for myalgias  Neurologic: Negative for headaches  Psychiatric: Negative for depression, anxiety  Hematologic/Lymphatic/Immunologic: Negative  Endocrine: Negative for recent hypoglycemia events  Behavioral: Negative for tobacco use       Physical Exam:   /78 (BP Location: Right arm, Cuff Size: Adult Large)   Pulse 94   Temp 97.4  " F (36.3  C) (Oral)   Ht 1.854 m (6' 1\")   Wt 122.5 kg (270 lb)   SpO2 100%   BMI 35.62 kg/m      GENERAL: alert and no distress  EYES: eyes grossly normal to inspection, and conjunctivae and sclerae normal  HENT: Normocephalic atraumatic. Left upper tooth and gum line area infection, erythema and swelling symptoms present concerning for possible acute dental abscess  NECK: supple  RESP: lungs clear to auscultation   CV: regular rate and rhythm, normal S1 S2  LYMPH: no peripheral edema   ABDOMEN: nondistended  MS: no gross musculoskeletal defects noted  SKIN: no suspicious lesions or rashes  NEURO: Alert & Oriented x 3.   PSYCH: mentation appears normal, affect normal        Diagnostic Test Results:     Diagnostic Test Results:  Results for orders placed or performed in visit on 06/07/19   Strep, Rapid Screen   Result Value Ref Range    Specimen Description Throat     Rapid Strep A Screen       NEGATIVE: No Group A streptococcal antigen detected by immunoassay, await culture report.       ASSESSMENT/PLAN:     (K04.7) Dental abscess  (K04.7) Tooth infection  (primary encounter diagnosis)  (J02.9) Sore throat   Comment: tooth pain and sore throat due to tooth infection concerning for acute dental abscess  Plan: Strep, Rapid Screen, amoxicillin-clavulanate         (AUGMENTIN) 875-125 MG tablet, DENTAL REFERRAL      Follow Up Plan:     Patient is instructed to go to the [facility] ER for further ealuvation today if he is not able to get any same day urgent care with a dental clinic for his current tooth infection symptoms. Patient endorses understanding of and agreement to the above recommendations. He reports that he has to go home first to feed his cat before he can go to the [facility] ER later today.      Digna Jones MD  Internal Medicine  [facility]    "

## 2019-06-07 NOTE — ED AVS SNAPSHOT
Emergency Department  [address] [facility] [address]  Phone:  [phone]  Fax:  [phone]                                    Jt Montgomery   MRN: [id number]    Department:   Emergency Department   Date of Visit:  6/7/2019           After Visit Summary Signature Page    I have received my discharge instructions, and my questions have been answered. I have discussed any challenges I see with this plan with the nurse or doctor.    ..........................................................................................................................................  Patient/Patient Representative Signature      ..........................................................................................................................................  Patient Representative Print Name and Relationship to Patient    ..................................................               ................................................  Date                                   Time    ..........................................................................................................................................  Reviewed by Signature/Title    ...................................................              ..............................................  Date                                               Time          22EPIC Rev 08/18

## 2019-06-08 LAB
BACTERIA SPEC CULT: NORMAL
SPECIMEN SOURCE: NORMAL

## 2019-06-13 ENCOUNTER — ANTICOAGULATION THERAPY VISIT (OUTPATIENT)
Dept: CARDIOLOGY | Facility: CLINIC | Age: 74
End: 2019-06-13
Payer: COMMERCIAL

## 2019-06-13 ENCOUNTER — TELEPHONE (OUTPATIENT)
Dept: FAMILY MEDICINE | Facility: CLINIC | Age: 74
End: 2019-06-13

## 2019-06-13 DIAGNOSIS — I48.91 ATRIAL FIBRILLATION, UNSPECIFIED TYPE (H): ICD-10-CM

## 2019-06-13 DIAGNOSIS — Z79.01 LONG TERM CURRENT USE OF ANTICOAGULANTS WITH INR GOAL OF 2.0-3.0: ICD-10-CM

## 2019-06-13 LAB — INR POINT OF CARE: 2.2 (ref 0.86–1.14)

## 2019-06-13 PROCEDURE — 85610 PROTHROMBIN TIME: CPT | Mod: QW | Performed by: INTERNAL MEDICINE

## 2019-06-13 PROCEDURE — 99207 ZZC NO CHARGE NURSE ONLY: CPT | Performed by: INTERNAL MEDICINE

## 2019-06-13 PROCEDURE — 36416 COLLJ CAPILLARY BLOOD SPEC: CPT | Performed by: INTERNAL MEDICINE

## 2019-06-13 NOTE — TELEPHONE ENCOUNTER
Reason for Call:  Other FYI    Detailed comments: patient stopped into clinic to thank Dr. Jones for all of his help last Friday!  Patient is doing much better now!  He had a great experience at the ER and here in clinic!      Call taken on 6/13/2019 at 11:49 AM by Rina Golden  .

## 2019-06-13 NOTE — PROGRESS NOTES
ANTICOAGULATION FOLLOW-UP CLINIC VISIT    Patient Name:  Jt Montgomery  Date:  2019  Contact Type:  Face to Face    SUBJECTIVE:  Patient Findings     Positives:   Emergency department visit (seen in ER  for peritonsillar abscess), Change in medications (taking Clindamycin for peritonsillar abscess)             OBJECTIVE    INR Protime   Date Value Ref Range Status   2019 2.2 (A) 0.86 - 1.14 Final       ASSESSMENT / PLAN  INR assessment THER    Recheck INR In: 4 WEEKS    INR Location Clinic      Anticoagulation Summary  As of 2019    INR goal:   2.0-3.0   TTR:   80.4 % (2.9 y)   INR used for dosin.2 (2019)   Warfarin maintenance plan:   7.5 mg (5 mg x 1.5) every Sun, Tue, Thu; 5 mg (5 mg x 1) all other days   Full warfarin instructions:   7.5 mg every Sun, Tue, Thu; 5 mg all other days   Weekly warfarin total:   42.5 mg   No change documented:   Marlena Hong RN   Plan last modified:   Marlena Hong RN (5/10/2019)   Next INR check:   2019   Target end date:   Indefinite    Indications    Atrial fibrillation (AFIB) on Coumadin [I48.91]  Long term current use of anticoagulants with INR goal of 2.0-3.0 [Z79.01]             Anticoagulation Episode Summary     INR check location:       Preferred lab:       Send INR reminders to:   [facility] HEART INR NURSE    Comments:         Anticoagulation Care Providers     Provider Role Specialty Phone number    Lottie Gomez DO [facility] Cardiology [phone]            See the Encounter Report to view Anticoagulation Flowsheet and Dosing Calendar (Go to Encounters tab in chart review, and find the Anticoagulation Therapy Visit)    INR 2.2 He had a sore throat last week so was seen by PMD then sent to the ER for a peritonsillar abscess which was drained then he was started on a 10 day course of Clindamycin. He has felt better since the day after the ER visit. He wasn't able to eat his usual diet last week but no change  in diet this week. No abnormal bleeding or bruising. Will continue current dosing of 7.5 mg SuTuTh and 5 mg all other days with recheck in 4 weeks.    Marlena Hong RN

## 2019-06-17 DIAGNOSIS — I25.119 CORONARY ARTERY DISEASE INVOLVING NATIVE CORONARY ARTERY OF NATIVE HEART WITH ANGINA PECTORIS (H): ICD-10-CM

## 2019-06-17 RX ORDER — METOPROLOL SUCCINATE 100 MG/1
100 TABLET, EXTENDED RELEASE ORAL
Qty: 180 TABLET | Refills: 0 | Status: SHIPPED | OUTPATIENT
Start: 2019-06-17 | End: 2019-08-06

## 2019-06-20 DIAGNOSIS — Z79.01 LONG TERM CURRENT USE OF ANTICOAGULANT THERAPY: ICD-10-CM

## 2019-06-20 DIAGNOSIS — I48.91 ATRIAL FIBRILLATION, UNSPECIFIED TYPE (H): ICD-10-CM

## 2019-06-20 RX ORDER — WARFARIN SODIUM 5 MG/1
TABLET ORAL
Qty: 110 TABLET | Refills: 0 | Status: SHIPPED | OUTPATIENT
Start: 2019-06-20 | End: 2019-09-16

## 2019-07-02 ENCOUNTER — OFFICE VISIT (OUTPATIENT)
Dept: SLEEP MEDICINE | Facility: CLINIC | Age: 74
End: 2019-07-02
Payer: COMMERCIAL

## 2019-07-02 VITALS
BODY MASS INDEX: 36.84 KG/M2 | HEART RATE: 85 BPM | WEIGHT: 278 LBS | SYSTOLIC BLOOD PRESSURE: 139 MMHG | OXYGEN SATURATION: 95 % | DIASTOLIC BLOOD PRESSURE: 88 MMHG | HEIGHT: 73 IN

## 2019-07-02 DIAGNOSIS — G47.31 CENTRAL SLEEP APNEA: ICD-10-CM

## 2019-07-02 DIAGNOSIS — G47.33 OSA (OBSTRUCTIVE SLEEP APNEA): ICD-10-CM

## 2019-07-02 PROCEDURE — 99213 OFFICE O/P EST LOW 20 MIN: CPT | Performed by: INTERNAL MEDICINE

## 2019-07-02 ASSESSMENT — MIFFLIN-ST. JEOR: SCORE: 2064.88

## 2019-07-02 NOTE — NURSING NOTE
"Chief Complaint   Patient presents with     CPAP Follow Up       Initial /88   Pulse 85   Ht 1.854 m (6' 1\")   Wt 126.1 kg (278 lb)   SpO2 95%   BMI 36.68 kg/m   Estimated body mass index is 36.68 kg/m  as calculated from the following:    Height as of this encounter: 1.854 m (6' 1\").    Weight as of this encounter: 126.1 kg (278 lb).    Medication Reconciliation: complete      "

## 2019-07-02 NOTE — PROGRESS NOTES
Obstructive Sleep Apnea - PAP Follow-Up Visit:    Chief Complaint   Patient presents with     CPAP Follow Up       Jt Montgomery comes in today for follow-up of their severe mixed sleep apnea, managed with CPAP.     History of DM2, morbid obesity, alcohol dependence in remission, HTN, Pulmonary HTN, CAD, atrial fibrillation, and dilated cardiomyopathy with recovered EF (around 50-55% although with 1 - 2+ MR).  8/27/2010 Polysomnography at [facility] - AHI 64.5/hr; RDI 65.6/hr; PHUC 34/hr with Cheyne-Villanueva.    2/27/2011 Polysomnography at [facility] - Titration for Adaptive Servo-Ventilation - Ineffective.  3/23/2018 PSG at  - Repeat diagnostic - AHI 80.7, CAHI 11.7. didn't sleep well    Overall, he rates the experience with PAP as 10 (0 poor, 10 great). The mask is comfortable.    The mask is not leaking .  He is not snoring with the mask on. He is not having gasp arousals.  He is not having significant oral/nasal dryness. The pressure is comfortable. His PAP interface is Full Face Mask.    Bedtime is typically 2330. Usually it takes about 30 min minutes to fall asleep with the mask on. Wake time is typically 0730.  Patient is using PAP therapy 8 hours per night. The patient is usually getting 8 hours of sleep per night.  He does feel rested in the morning.    Total score - Aubrey: 3 (7/2/2019 12:00 PM)  LUDWIN Total Score: 1    ResMed - Auto-PAP 11.0 - 15.0 cmH2O 30 day usage data:    100% of days with > 4 hours of use. 0/30 days with no use.   Average use 730 minutes per day.   95%ile Leak 11.3 L/min.   CPAP 95% pressure 14.3 cm.   AHI 8.54 events per hour (40% CA, 50% H, 10% OA).    Past medical/surgical history, family history, social history, medications and allergies were reviewed.      Problem List:  Patient Active Problem List    Diagnosis Date Noted     Facial cellulitis 12/02/2018     Priority: Medium     Long term current use of anticoagulants with INR goal of 2.0-3.0 10/05/2018     Priority: Medium      "Anemia, unspecified type 01/12/2017     Priority: Medium     Long-term (current) use of anticoagulants [Z79.01] 06/14/2016     Priority: Medium     Mixed hyperlipidemia due to type 2 diabetes mellitus (H) 12/07/2015     Priority: Medium     Pulmonary hypertension (H)      Priority: Medium     mild per echo 3/2013       Morbid obesity (H) 10/25/2015     Priority: Medium     CKD (chronic kidney disease) stage 3, GFR 30-59 ml/min (H)      Priority: Medium     Atrial fibrillation (AFIB) on Coumadin 09/21/2014     Priority: Medium     CAD (coronary artery disease)      Priority: Medium     04/15/2011: RONEL to CFX       Gout 08/19/2014     Priority: Medium     Problem list name updated by automated process. Provider to review       Type 2 diabetes mellitus with diabetic neuropathy; goal HgbA1c < 7% 08/19/2014     Priority: Medium     S/p angioplasty with stent w/ RONEL in distal RCA 05/07/2011     Priority: Medium     Dilated cardiomyopathy, nonischemic EF 30-40% in March 2013      Priority: Medium     TOMMY (obstructive sleep apnea) 09/14/2010     Priority: Medium     Central Sleep Apnea with Cheyne Villanueva breathing 09/14/2010     Priority: Medium     Recovery of EF to 50-55%  3/23/2018 [place] [facility] Sleep Study (277.0 lbs) - AHI 80.7, RDI 84.0, Supine AHI 92.2, REM AHI -, Low O2 83.4%, Time Spent ?88% 15.0 minutes  Testing does not show Cheyne-Villanueva anymore.       HTN (hypertension); goal <140/90 08/05/2010     Priority: Medium     Alcohol dependence in remission (H) 08/05/2010     Priority: Medium     (Problem list name updated by automated process. Provider to review and confirm.)        /88   Pulse 85   Ht 1.854 m (6' 1\")   Wt 126.1 kg (278 lb)   SpO2 95%   BMI 36.68 kg/m      Impression/Plan:  1. TOMMY (obstructive sleep apnea)  2. Central sleep apnea  Severe mixed Sleep apnea. Tolerating PAP well. Daytime symptoms are improved.     May consider changing to fixed pressure of 13 cmH2O or consider Adaptive " Servo-Ventilation evaluation (CPAP titration and consider switching to Adaptive Servo-Ventilation if centrals persist) if residual AHI climbs above 10/hr.   For now will leave alone.    Jt Montgomery will follow up in about 1 year(s).     Fifteen minutes spent with patient, all of which were spent face-to-face counseling, consulting, coordinating plan of care.      CC:  Digna Jones,

## 2019-07-02 NOTE — PATIENT INSTRUCTIONS
Your BMI is Body mass index is 36.68 kg/m .  Weight management is a personal decision.  If you are interested in exploring weight loss strategies, the following discussion covers the approaches that may be successful. Body mass index (BMI) is one way to tell whether you are at a healthy weight, overweight, or obese. It measures your weight in relation to your height.  A BMI of 18.5 to 24.9 is in the healthy range. A person with a BMI of 25 to 29.9 is considered overweight, and someone with a BMI of 30 or greater is considered obese. More than two-thirds of American adults are considered overweight or obese.  Being overweight or obese increases the risk for further weight gain. Excess weight may lead to heart disease and diabetes.  Creating and following plans for healthy eating and physical activity may help you improve your health.  Weight control is part of healthy lifestyle and includes exercise, emotional health, and healthy eating habits. Careful eating habits lifelong are the mainstay of weight control. Though there are significant health benefits from weight loss, long-term weight loss with diet alone may be very difficult to achieve- studies show long-term success with dietary management in less than 10% of people. Attaining a healthy weight may be especially difficult to achieve in those with severe obesity. In some cases, medications, devices and surgical management might be considered.  What can you do?  If you are overweight or obese and are interested in methods for weight loss, you should discuss this with your provider.     Consider reducing daily calorie intake by 500 calories.     Keep a food journal.     Avoiding skipping meals, consider cutting portions instead.    Diet combined with exercise helps maintain muscle while optimizing fat loss. Strength training is particularly important for building and maintaining muscle mass. Exercise helps reduce stress, increase energy, and improves fitness.  Increasing exercise without diet control, however, may not burn enough calories to loose weight.       Start walking three days a week 10-20 minutes at a time    Work towards walking thirty minutes five days a week     Eventually, increase the speed of your walking for 1-2 minutes at time    In addition, we recommend that you review healthy lifestyles and methods for weight loss available through the National Institutes of Health patient information sites:  http://win.niddk.nih.gov/publications/index.htm    And look into health and wellness programs that may be available through your health insurance provider, employer, local community center, or otilia club.    Weight management plan: Patient was referred to their PCP to discuss a diet and exercise plan.

## 2019-07-08 ENCOUNTER — TELEPHONE (OUTPATIENT)
Dept: PHARMACY | Facility: CLINIC | Age: 74
End: 2019-07-08

## 2019-07-08 DIAGNOSIS — M1A.39X0 CHRONIC GOUT DUE TO RENAL IMPAIRMENT OF MULTIPLE SITES WITHOUT TOPHUS: Chronic | ICD-10-CM

## 2019-07-08 NOTE — TELEPHONE ENCOUNTER
MTM referral from: F/up outreach    MTM referral outreach attempt #1 on July 8, 2019 at 1:56 PM      Outcome: Spoke with patient - f/up appt scheduled 7/16 @ 11am    Nathaly Hughes PharmD, [facility]  Medication Therapy Management Provider  Pager: [phone]

## 2019-07-09 NOTE — TELEPHONE ENCOUNTER
Pending Prescriptions:                       Disp   Refills    predniSONE (DELTASONE) 20 MG tablet [Phar*10 tab*0            Sig: TAKE 1 TABLET(20 MG) BY MOUTH DAILY AS NEEDED           GOUT FLARE    Last Written Prescription Date:  Patient reported  Last Fill Quantity: na,   # refills: na  Last Office Visit: 06/07/2019  Future Office visit:    Next 5 appointments (look out 90 days)    Jul 16, 2019 11:00 AM CDT  Office Visit with Nathaly Hughes RPH  [facility] ([address] [id number] [phone]           Routing refill request to provider for review/approval because:  Drug not on the [facility], P or  Health refill protocol or controlled substance

## 2019-07-10 RX ORDER — PREDNISONE 20 MG/1
TABLET ORAL
Qty: 10 TABLET | Refills: 0 | OUTPATIENT
Start: 2019-07-10

## 2019-07-10 NOTE — TELEPHONE ENCOUNTER
"Left message on answering machine for patient to call back.    Request of the \"gout flare up\".      Will triage then send request to PCP or advise OV.     Lucrecia LLANOS RN,BSN    "

## 2019-07-10 NOTE — TELEPHONE ENCOUNTER
"Patient returned call.     Reports intermittent \"gout flare ups\"---destiny after eating \"beef\".      Current symptoms---right wrist.   Onset yesterday.     Patient states taking Rx Uloric 40mg daily.   Also has Prednisone 20mg--usually takes 1/2 tab once with new flare up---and helps improve/resolve symptoms quickly.  \"Prescription last me a long time\".     Last Rx 2/2019.  #20 tabs    Routing refill request to provider for review/approval because:  Drug not on the [facility] refill protocol     Pended.   Lucrecia LLANOS RN,BSN          " no yes

## 2019-07-11 RX ORDER — PREDNISONE 20 MG/1
TABLET ORAL
Qty: 10 TABLET | Refills: 0 | Status: SHIPPED | OUTPATIENT
Start: 2019-07-11 | End: 2020-07-01

## 2019-07-16 ENCOUNTER — OFFICE VISIT (OUTPATIENT)
Dept: PHARMACY | Facility: CLINIC | Age: 74
End: 2019-07-16
Payer: COMMERCIAL

## 2019-07-16 ENCOUNTER — ANTICOAGULATION THERAPY VISIT (OUTPATIENT)
Dept: CARDIOLOGY | Facility: CLINIC | Age: 74
End: 2019-07-16
Payer: COMMERCIAL

## 2019-07-16 VITALS
SYSTOLIC BLOOD PRESSURE: 134 MMHG | DIASTOLIC BLOOD PRESSURE: 78 MMHG | BODY MASS INDEX: 37.07 KG/M2 | HEART RATE: 64 BPM | WEIGHT: 281 LBS

## 2019-07-16 DIAGNOSIS — Z78.9 TAKES DIETARY SUPPLEMENTS: ICD-10-CM

## 2019-07-16 DIAGNOSIS — Z79.01 LONG TERM CURRENT USE OF ANTICOAGULANTS WITH INR GOAL OF 2.0-3.0: ICD-10-CM

## 2019-07-16 DIAGNOSIS — M1A.39X0 CHRONIC GOUT DUE TO RENAL IMPAIRMENT OF MULTIPLE SITES WITHOUT TOPHUS: ICD-10-CM

## 2019-07-16 DIAGNOSIS — I10 ESSENTIAL HYPERTENSION: Primary | ICD-10-CM

## 2019-07-16 DIAGNOSIS — Z79.4 TYPE 2 DIABETES MELLITUS WITH DIABETIC NEUROPATHY, WITH LONG-TERM CURRENT USE OF INSULIN (H): Chronic | ICD-10-CM

## 2019-07-16 DIAGNOSIS — I25.10 CORONARY ARTERY DISEASE INVOLVING NATIVE CORONARY ARTERY OF NATIVE HEART WITHOUT ANGINA PECTORIS: ICD-10-CM

## 2019-07-16 DIAGNOSIS — E11.40 TYPE 2 DIABETES MELLITUS WITH DIABETIC NEUROPATHY, WITH LONG-TERM CURRENT USE OF INSULIN (H): Chronic | ICD-10-CM

## 2019-07-16 DIAGNOSIS — I42.0 DILATED CARDIOMYOPATHY (H): ICD-10-CM

## 2019-07-16 DIAGNOSIS — G47.00 INSOMNIA, UNSPECIFIED TYPE: ICD-10-CM

## 2019-07-16 DIAGNOSIS — I48.91 ATRIAL FIBRILLATION, UNSPECIFIED TYPE (H): ICD-10-CM

## 2019-07-16 DIAGNOSIS — E11.69 MIXED HYPERLIPIDEMIA DUE TO TYPE 2 DIABETES MELLITUS (H): ICD-10-CM

## 2019-07-16 DIAGNOSIS — E78.2 MIXED HYPERLIPIDEMIA DUE TO TYPE 2 DIABETES MELLITUS (H): ICD-10-CM

## 2019-07-16 DIAGNOSIS — G62.9 NEUROPATHY: ICD-10-CM

## 2019-07-16 LAB — INR POINT OF CARE: 2.9 (ref 0.86–1.14)

## 2019-07-16 PROCEDURE — 85610 PROTHROMBIN TIME: CPT | Mod: QW | Performed by: INTERNAL MEDICINE

## 2019-07-16 PROCEDURE — 99207 ZZC NO CHARGE LOS: CPT | Performed by: PHARMACIST

## 2019-07-16 PROCEDURE — 99207 ZZC NO CHARGE LOS: CPT | Performed by: INTERNAL MEDICINE

## 2019-07-16 PROCEDURE — 36416 COLLJ CAPILLARY BLOOD SPEC: CPT | Performed by: INTERNAL MEDICINE

## 2019-07-16 NOTE — PROGRESS NOTES
ANTICOAGULATION FOLLOW-UP CLINIC VISIT    Patient Name:  Jt Montgomery  Date:  2019  Contact Type:  Face to Face    SUBJECTIVE:  Patient Findings     Positives:   Change in diet/appetite (eating less salad and broccoli this month)        Clinical Outcomes     Negatives:   Major bleeding event, Thromboembolic event, Anticoagulation-related hospital admission, Anticoagulation-related ED visit, Anticoagulation-related fatality           OBJECTIVE    INR Protime   Date Value Ref Range Status   2019 2.9 (A) 0.86 - 1.14 Final       ASSESSMENT / PLAN  INR assessment THER    Recheck INR In: 4 WEEKS    INR Location Clinic      Anticoagulation Summary  As of 2019    INR goal:   2.0-3.0   TTR:   80.9 % (3 y)   INR used for dosin.9 (2019)   Warfarin maintenance plan:   7.5 mg (5 mg x 1.5) every Sun, Tue, Thu; 5 mg (5 mg x 1) all other days   Full warfarin instructions:   7.5 mg every Sun, Tue, Thu; 5 mg all other days   Weekly warfarin total:   42.5 mg   No change documented:   Marlena Hong RN   Plan last modified:   Marlena Hong RN (5/10/2019)   Next INR check:   2019   Target end date:   Indefinite    Indications    Atrial fibrillation (AFIB) on Coumadin [I48.91]  Long term current use of anticoagulants with INR goal of 2.0-3.0 [Z79.01]             Anticoagulation Episode Summary     INR check location:       Preferred lab:       Send INR reminders to:   [facility] HEART INR NURSE    Comments:         Anticoagulation Care Providers     Provider Role Specialty Phone number    Patricia Lottie Perla DO [facility] [phone]            See the Encounter Report to view Anticoagulation Flowsheet and Dosing Calendar (Go to Encounters tab in chart review, and find the Anticoagulation Therapy Visit)    INR 2.9 He has been eating less greens and less broccoli this month but is still in range. No change in meds. No abnormal bleeding or bruising. Will continue current dosing  of 7.5 mg TuThSa and 5 mg all other days with recheck in 4 weeks.    Marlena Hong RN

## 2019-07-16 NOTE — PROGRESS NOTES
"SUBJECTIVE/OBJECTIVE:                Jt Montgomery is a 73 year old male coming in for a follow-up visit for Medication Therapy Management.  He was referred to me from Dr. David, he has now established care with Dr. Jones.     Chief Complaint: Follow up from his visit with Genevieve Wen, PharmD on 2/27/19.  He's here to f/up on diabetes management.    Tobacco: History of tobacco dependence - quit 1972  Alcohol: history of alcohol dependence - sober since 1973     Social history:  He's still planning to move to [employer] or [employer], he's thinking it might not be until spring before he does so.  Is thinking about transferring health care to [facility].  Hasn't made any final decisions yet.    Medication Adherence/Access: no issues reported    Diabetes:  Currently taking Novolog 15 units TID with meals.  He has not been taking Lantus for unclear amount of time - saying \"I decided that on my own.\"  Pt also takes taurine 500mg BID (which he states is an amino acid and is helping his sugars). Pt is not experiencing side effects.   SMBG: He's using the Freestyle Umair and \"loves it.\"  He feels he's keeping a much closer eye on his BG and is happy about this        Symptoms of low blood sugar? none. Frequency of hypoglycemia? Never  Recent symptoms of high blood sugar? none  Eye exam: up to date  Foot exam: due   Microalbumin  > 30 mg/g. Pt is taking an ACEi/ARB.  Aspirin: Taking 81mg daily and denies side effects  Lab Results   Component Value Date    A1C 6.5 03/06/2019    A1C 6.1 09/06/2018    A1C 6.3 02/09/2018    A1C 7.7 03/06/2017    A1C 7.6 12/05/2016     Hypertension/CAD/A. Fib/HFrEF: Currently taking digoxin 125mcg daily, diltiazem ER 300mg daily, furosemide 20mg daily PRN (rarely needs it, can't remember the last time he had to take it), irbesartan 300mg daily, metoprolol succinate ER 100mg BID, spironolactone 25mg daily and warfarin as directed. Patient does not self-monitor BP. Patient reports no current " medication side effects.  He denies edema and wears compression stockings occasionally (more so in the winter months).   BP Readings from Last 3 Encounters:   07/02/19 139/88   06/07/19 137/81   06/07/19 146/78      INR Protime   Date Value Ref Range Status   06/13/2019 2.2 (A) 0.86 - 1.14 Final       Hyperlipidemia: Currently taking fish oil 2-3g daily (Super Omega 3 capsule and Udo's Oil 3:6:9 Blend daily) which he has started taking more regularly within the last few months and increased the dose - he remains hesitant about statin therapy. He is skeptical on the effectiveness and safety of statins and rather uses supplements at this time. Pt is not experiencing any medication side effects.  The 10-year ASCVD risk score (Fortino VILLANUEVA Jr., et al., 2013) is: 51.6%    Values used to calculate the score:      Age: 73 years      Sex: Male      Is Non- : No      Diabetic: Yes      Tobacco smoker: No      Systolic Blood Pressure: 139 mmHg      Is BP treated: Yes      HDL Cholesterol: 26 mg/dL      Total Cholesterol: 162 mg/dL     LDL Cholesterol Direct   Date Value Ref Range Status   03/06/2019 86 <100 mg/dL Final     Comment:     Desirable:       <100 mg/dl      Gout: Currently taking Uloric 40mg daily. Pt reports no current pain concerns. Pt is not experiencing any medication side effects. He also has prednisone available to use if needed for gout flares (feels pain/ache in his arm or back), which he hasn't needed for a couple of months. He does not want to increase the dose of Uloric.  Estimated Creatinine Clearance: 79.8 mL/min (based on SCr of 1.17 mg/dL).   Uric Acid   Date Value Ref Range Status   02/21/2018 7.4 (H) 3.5 - 7.2 mg/dL Final      Neuropathy: Currently taking alpha-lipoic acid 600mg BID which he thinks is somewhat helpful. He doesn't want to take any additional medications for neuropathy at this time. Pt is not experiencing any medication side effects.  He's been doing acupuncture,  "and feels this has been quite helpful for his neuropathy (and insomnia, see below).    Insomnia:  Current medications include: melatonin 0.5mg HS. Pt states this works very well for his sleep and reports no side effects.      Supplements:  Jamal does a lot of reading about wellness and likes to look into many different types of therapies and supplements. He is taking a number of different supplements including taurine as discussed above for diabetes, alpha-lipoic acid 600mg BID for neuropathy, CoQ10 100mg daily, fish oil daily, magnesium 250mg daily, vitamin D3 4000 units daily, super beet juice, probiotic daily, multivitamins daily, green tea, heart food caps daily, and \"healthy feet and nerves\" daily.  He feels better taking his supplements and prefers to continue.   Vitamin D Deficiency Screening Results:  Lab Results   Component Value Date    VITDT 34 11/28/2018    VITDT 38 10/01/2014      Today's Vitals: /78   Pulse 64   Wt 281 lb (127.5 kg)   BMI 37.07 kg/m      ASSESSMENT:              Current medications were reviewed today as discussed above.      Medication Adherence: fair-good, unclear why he stopped taking Lantus    Diabetes: Needs improvement. Pt's most recent A1c is within goal of <7%, however it sounds like he's stopped Lantus since that time and BG are running high overall.  Would benefit from resuming Lantus, but at a lower dose than he was previously taking.  Will need to work to get basal/bolus insulin closer to 50/50 ratio in future visits.     Hypertension/CAD/A. Fib/HFrEF: Stable. Pt is meeting BP goal of < 140/90mmHg.     Hyperlipidemia: Needs improvement. Pt is indicated to be on a high-intensity statin per 2018 ACC/AHA guidelines for lipid management. Pt refuses medication at this time and wants to work on lifestyle/diet changes and use supplements instead.    Gout: Stable. Pt's last uric acid level was not below goal at <6mg/dL, but pt is satisfied with current therapy and does not " want to make any changes to his medications at this time.     Neuropathy: Stable.     Insomnia:  Stable.    Supplements: Stable. It is questionable how much benefit he is getting from his supplements, but he would like to continue taking them.      PLAN:                  1.  Resume Lantus once a day, but start with 15 units vs the 35 units you were previously taking.    I spent 40 minutes with this patient today. All changes were made via collaborative practice agreement with Dr. Jones. A copy of the visit note was provided to the patient's primary care provider.     Will follow up in 2 weeks, appt scheduled.    The patient was given a summary of these recommendations as an after visit summary.    Nathaly Hughes, PharmD, [facility]  Medication Therapy Management Provider  Pager: [phone]

## 2019-07-16 NOTE — PATIENT INSTRUCTIONS
Recommendations from today's MTM visit:                                                    MTM (medication therapy management) is a service provided by a clinical pharmacist designed to help you get the most of out of your medicines.   Today we reviewed what your medicines are for, how to know if they are working, that your medicines are safe and how to make your medicine regimen as easy as possible.     1.  Resume Lantus once a day, but start with 15 units vs the 35 units you were previously taking.    Next MTM visit: Tuesday, July 30th at 11am    To schedule another MTM appointment, please call the clinic directly or you may call the MTM scheduling line at [phone] or toll-free at [phone].     My Clinical Pharmacist's contact information:                                                      It was a pleasure talking with you today!  Please feel free to contact me with any questions or concerns you have.      Nathaly Hughes, Rogelio, [facility]  Medication Therapy Management Provider  Pager: [phone]     You may receive a survey about the MTM services you received by email and/or US Mail.  I would appreciate your feedback to help me serve you better in the future. Your comments will be anonymous.

## 2019-07-25 DIAGNOSIS — I48.91 ATRIAL FIBRILLATION (H): ICD-10-CM

## 2019-07-25 RX ORDER — DILTIAZEM HYDROCHLORIDE 300 MG/1
300 CAPSULE, COATED, EXTENDED RELEASE ORAL DAILY
Qty: 30 CAPSULE | Refills: 0 | Status: SHIPPED | OUTPATIENT
Start: 2019-07-25 | End: 2019-08-06

## 2019-07-25 RX ORDER — DIGOXIN 125 MCG
125 TABLET ORAL DAILY
Qty: 30 TABLET | Refills: 0 | Status: SHIPPED | OUTPATIENT
Start: 2019-07-25 | End: 2019-08-06

## 2019-07-25 NOTE — TELEPHONE ENCOUNTER
Received VM from pt stating he made an appointment for his annual follow up. Noted pt is scheduled with YOJANA Robins on 8/6/19. Pt stated he is all out of diltiazem and digoxin, requested refills. Called back, no answer. LVM stating will send 30 day supply of diltiazem and digoxin to his preferred pharmacy, left Team 1 call back number if any questions.

## 2019-07-30 ENCOUNTER — OFFICE VISIT (OUTPATIENT)
Dept: PHARMACY | Facility: CLINIC | Age: 74
End: 2019-07-30
Payer: COMMERCIAL

## 2019-07-30 VITALS — HEART RATE: 56 BPM | SYSTOLIC BLOOD PRESSURE: 143 MMHG | DIASTOLIC BLOOD PRESSURE: 84 MMHG

## 2019-07-30 DIAGNOSIS — E11.40 TYPE 2 DIABETES MELLITUS WITH DIABETIC NEUROPATHY, WITH LONG-TERM CURRENT USE OF INSULIN (H): Chronic | ICD-10-CM

## 2019-07-30 DIAGNOSIS — Z79.4 TYPE 2 DIABETES MELLITUS WITH DIABETIC NEUROPATHY, WITH LONG-TERM CURRENT USE OF INSULIN (H): Chronic | ICD-10-CM

## 2019-07-30 PROCEDURE — 99207 ZZC NO CHARGE LOS: CPT | Performed by: PHARMACIST

## 2019-07-30 NOTE — PROGRESS NOTES
"SUBJECTIVE/OBJECTIVE:                Jt Montgomery is a 73 year old male coming in for a follow-up visit for Medication Therapy Management.  He was referred to me from Dr. David, he has now established care with Dr. Jones    Chief Complaint: Follow up from our visit on 7/16/19.  He's here to f/up on diabetes management.    Tobacco: History of tobacco dependence - quit 1972  Alcohol: history of alcohol dependence - sober since 1973    Medication Adherence/Access: no issues reported    Diabetes:  Currently taking Novolog 15 units TID with meals and Lantus 12 units daily (resumed following last MTM visit, but had prescribed 15 units).  Pt also takes taurine 500mg BID (which he states is an amino acid and is helping his sugars). Pt is not experiencing side effects.   SMBG: per Freestyle Umair report:        Symptoms of low blood sugar? none. Frequency of hypoglycemia? Never  Recent symptoms of high blood sugar? none  Eye exam: up to date  Foot exam: due   Microalbumin  > 30 mg/g. Pt is taking an ACEi/ARB.  Aspirin: Taking 81mg daily and denies side effects  Diet:  He's been reading books by Dr. Art Gonzales to reverse chronic disease - the principles are \"eat well, move more, stress less, and love more.\"  He's enjoying this and feels he's learning a lot.  Lab Results   Component Value Date    A1C 6.5 03/06/2019    A1C 6.1 09/06/2018    A1C 6.3 02/09/2018    A1C 7.7 03/06/2017    A1C 7.6 12/05/2016     Other medications/conditions were not discussed today due to time constraints.    ASSESSMENT:              Current medications were reviewed today as discussed above.      Medication Adherence: good, no issues identified    Diabetes: Needs Improvement. Patient is meeting A1c goal of < 8%. Self monitoring of blood glucose is not at goal of fasting  mg/dL and post prandial < 180 mg/dL.  Will benefit from increasing Lantus dose slightly.  Of note, he's taking quite a bit more bolus vs basal insulin, but BG are " very steady throughout the day so will not adjust this at this time.    PLAN:                  1.  Increase Lantus to 18 units daily.  Continue taking 15 units of Novolog with meals.    I spent 45 minutes with this patient today. All changes were made via collaborative practice agreement with Dr. Jones. A copy of the visit note was provided to the patient's primary care provider.     Will follow up in 6 weeks, appt scheduled.    The patient was given a summary of these recommendations as an after visit summary.    Nathaly Hughes, PharmD, [facility]  Medication Therapy Management Provider  Pager: [phone]

## 2019-07-30 NOTE — PATIENT INSTRUCTIONS
Recommendations from today's MTM visit:                                                    MTM (medication therapy management) is a service provided by a clinical pharmacist designed to help you get the most of out of your medicines.   Today we reviewed what your medicines are for, how to know if they are working, that your medicines are safe and how to make your medicine regimen as easy as possible.     1.  Increase Lantus to 18 units daily.  Continue taking 15 units of Novolog with meals.    Next MTM visit: Tuesday, September 17th at 11am    To schedule another MTM appointment, please call the clinic directly or you may call the MTM scheduling line at [phone] or toll-free at [phone].     My Clinical Pharmacist's contact information:                                                      It was a pleasure talking with you today!  Please feel free to contact me with any questions or concerns you have.      Nathaly Hughes, Rogelio, [facility]  Medication Therapy Management Provider  Pager: [phone]     You may receive a survey about the MTM services you received by email and/or US Mail.  I would appreciate your feedback to help me serve you better in the future. Your comments will be anonymous.

## 2019-08-06 ENCOUNTER — OFFICE VISIT (OUTPATIENT)
Dept: CARDIOLOGY | Facility: CLINIC | Age: 74
End: 2019-08-06
Payer: COMMERCIAL

## 2019-08-06 VITALS
SYSTOLIC BLOOD PRESSURE: 122 MMHG | DIASTOLIC BLOOD PRESSURE: 70 MMHG | BODY MASS INDEX: 37.91 KG/M2 | HEART RATE: 48 BPM | HEIGHT: 73 IN | WEIGHT: 286 LBS

## 2019-08-06 DIAGNOSIS — I10 ESSENTIAL HYPERTENSION: ICD-10-CM

## 2019-08-06 DIAGNOSIS — I48.91 ATRIAL FIBRILLATION, UNSPECIFIED TYPE (H): ICD-10-CM

## 2019-08-06 DIAGNOSIS — I25.119 CORONARY ARTERY DISEASE INVOLVING NATIVE CORONARY ARTERY OF NATIVE HEART WITH ANGINA PECTORIS (H): Primary | ICD-10-CM

## 2019-08-06 PROCEDURE — 99214 OFFICE O/P EST MOD 30 MIN: CPT | Performed by: PHYSICIAN ASSISTANT

## 2019-08-06 PROCEDURE — 93000 ELECTROCARDIOGRAM COMPLETE: CPT | Performed by: PHYSICIAN ASSISTANT

## 2019-08-06 RX ORDER — DIGOXIN 125 MCG
125 TABLET ORAL DAILY
Qty: 90 TABLET | Refills: 3 | Status: SHIPPED | OUTPATIENT
Start: 2019-08-06 | End: 2020-08-26

## 2019-08-06 RX ORDER — SPIRONOLACTONE 25 MG/1
25 TABLET ORAL DAILY
Qty: 90 TABLET | Refills: 3 | Status: ON HOLD | OUTPATIENT
Start: 2019-08-06 | End: 2019-12-21

## 2019-08-06 RX ORDER — DILTIAZEM HYDROCHLORIDE 300 MG/1
300 CAPSULE, COATED, EXTENDED RELEASE ORAL DAILY
Qty: 90 CAPSULE | Refills: 3 | Status: SHIPPED | OUTPATIENT
Start: 2019-08-06 | End: 2020-09-11

## 2019-08-06 RX ORDER — METOPROLOL SUCCINATE 100 MG/1
100 TABLET, EXTENDED RELEASE ORAL
Qty: 180 TABLET | Refills: 3 | Status: SHIPPED | OUTPATIENT
Start: 2019-08-06 | End: 2019-10-16

## 2019-08-06 RX ORDER — IRBESARTAN 300 MG/1
300 TABLET ORAL AT BEDTIME
Qty: 90 TABLET | Refills: 3 | Status: SHIPPED | OUTPATIENT
Start: 2019-08-06 | End: 2019-10-28

## 2019-08-06 ASSESSMENT — MIFFLIN-ST. JEOR: SCORE: 2101.17

## 2019-08-06 NOTE — PATIENT INSTRUCTIONS
Visit Summary:    Today we discussed:   Start working more on your diet and exercise as we discussed.   You can use your lasix only as needed as we talked about.   No medication changes today, I have refilled your heart medications for 1 year.     Follow up:   With Dr. Gomez in 1 year.

## 2019-08-06 NOTE — PROGRESS NOTES
"  Cardiology Progress Note    Date of Service: 08/06/2019      Reason for visit: Annual follow up nonischemic cardiomyopathy, hypertension.     Primary cardiologist: Dr. Minda Gomez    HPI:  Mr. Montgomery is a pleasant 73-year-old gentleman with a PMhx including sleep apnea, CKD, hypertension, DM2, TOMMY on CPAP, coronary artery disease (single vessel dz s/p circumflex stenting), and nonischemic cardiomyopathy. He also has persistent atrial fibrillation and is on warfarin. He was seen last by Dr. Gomez in May of 2018 at which time he was having some ongoing MADRID and mild lower extremity edema. She recommended ischemic testing with a stress test. This was performed shortly after that visit, which showed a fixed basal inferior defect felt most likely consistent with attenuation artifact. There was no ischemia noted. A 1 year follow up was recommended.     He is back today for his planned annual follow up. He tells me that he has been feeling relatively well. He says overall his breathing is good, and he denies any exertional chest, arm, or jaw discomfort. He is asymptomatic in regard to his afib and has no palpitations or dizziness. He has mild chronic/intermittent lower extremity edema and takes lasix only PRN, but admits he takes it only rarely mostly because of excess urination. He has known TOMMY and is compliant with his CPAP. BP is under good control today at 122/70, and initially his HR per our rooming staff was low at 48, though follow up ECG showed an afib rate of mid 70s (along with some PVCs), which is similar to what he gets routinely with his Apple Watch.     Today he comes with multiple questions, though mostly related to herbal supplements. He tells me he does a lot of reading about \"wellness\" and tries to use \"natural\" methods when possible. We discussed that his weight has gone up since his last visit, and he acknowledges that his eating habits have gotten worse lately and he is eating too much butter, " bj etc. He also admits he isn't very active, and needs to start walking more which he tells me he is going to put a good effort toward in the near future.       ASSESSMENT/PLAN:    1. Nonischemic cardiomyopathy.   --Hx impaired EF 30-40%, but recovered to low normal 50-55% via Echo in 2016. He has trace pitting edema on exam, but no overt signs of heart failure. I encouraged him to continue to use the lasix PRN, in the setting of his CKD. I also encouraged him to use compression stockings routinely.    --Continue Toprol XL, losartan, and spironolactone without change. BP today is under good control.     2. Coronary artery disease.   --Hx of single vessel CAD, s/p circumflex stenting remotely. Last ischemic workup in May 2018 with a Lexiscan that did not suggest ischemia, but showed a fixed basal inferior defect.    --Continue ASA 81mg daily.    --He declines statin therapy as he is skeptical on the effectiveness and safety. He tells me he prefers to stay on fish oil and plans to add some omega 3/flax to his diet. His last LDL (direct) was 86 in March 2019. Notably, he also has a very low HDL at 26, and TG are poorly controlled at 523. He has been working with his DM team at improved blood sugar control.    --As above, we had an extensive discussion about the need to be more active with walking, etc, and make some further positive dietary changes.     3. Persistent atrial fibrillation.    --EKG today shows rate controlled afib and he is asymptomatic. Continue digoxin and diltiazem without change as overall he is feeling well.    --He is on anticoagulation with warfarin, follows at [facility] clinic.    --He remains compliant with his CPAP for known TOMMY.       Follow up plan: With Dr. Minda Gomez in 1 year, or sooner if needed.       Orders this Visit:  Orders Placed This Encounter   Procedures     Follow-Up with Cardiologist     EKG 12-lead complete w/read - Clinics     Orders Placed This Encounter   Medications      digoxin (LANOXIN) 125 MCG tablet     Sig: Take 1 tablet (125 mcg) by mouth daily     Dispense:  90 tablet     Refill:  3     Do not fill until pt calls     diltiazem ER COATED BEADS (CARTIA XT) 300 MG 24 hr capsule     Sig: Take 1 capsule (300 mg) by mouth daily     Dispense:  90 capsule     Refill:  3     Do not fill until pt calls     irbesartan (AVAPRO) 300 MG tablet     Sig: Take 1 tablet (300 mg) by mouth At Bedtime     Dispense:  90 tablet     Refill:  3     Do not fill until pt calls     metoprolol succinate ER (TOPROL-XL) 100 MG 24 hr tablet     Sig: Take 1 tablet (100 mg) by mouth 2 times daily     Dispense:  180 tablet     Refill:  3     Do not fill until pt calls     spironolactone (ALDACTONE) 25 MG tablet     Sig: Take 1 tablet (25 mg) by mouth daily     Dispense:  90 tablet     Refill:  3     Do not fill until pt calls     Medications Discontinued During This Encounter   Medication Reason     digoxin (LANOXIN) 125 MCG tablet Reorder     diltiazem ER COATED BEADS (CARTIA XT) 300 MG 24 hr capsule Reorder     irbesartan (AVAPRO) 300 MG tablet Reorder     metoprolol succinate ER (TOPROL-XL) 100 MG 24 hr tablet Reorder     spironolactone (ALDACTONE) 25 MG tablet Reorder           CURRENT MEDICATIONS:  Current Outpatient Medications   Medication Sig Dispense Refill     alpha-lipoic acid 600 MG CAPS Take 1 capsule by mouth 2 times daily       Amino Acids (L-CARNITINE PO) Take 2,000 mg by mouth daily       aspirin EC 81 MG EC tablet Take 1 tablet by mouth daily. 90 tablet 1     Coenzyme Q10 (COQ10) 100 MG CAPS Take 200 mg by mouth daily        Continuous Blood Gluc Sensor (FREESTYLE RINKU 14 DAY SENSOR) MISC 1 Device every 14 days 2 each 11     digoxin (LANOXIN) 125 MCG tablet Take 1 tablet (125 mcg) by mouth daily 90 tablet 3     diltiazem ER COATED BEADS (CARTIA XT) 300 MG 24 hr capsule Take 1 capsule (300 mg) by mouth daily 90 capsule 3     febuxostat (ULORIC) 40 MG TABS Take 40 mg by mouth daily        fish oil-omega-3 fatty acids 1000 MG capsule Take 2 g by mouth daily        furosemide (LASIX) 20 MG tablet Take 1 tablet (20 mg) by mouth daily as needed (edema) 90 tablet 3     Green Tea, Camillia sinensis, (GREEN TEA EXTRACT PO) Take by mouth 2 times daily Doesn't always take regularly       HERBALS Nerve by [employer]lex[employer] 2 a day,   Healthy Feet and Nerves  By [employer]  3 a day, Heart Food Caps  (cayenna pepper, garlic, hawthorn, onion & ginger 1-2 a day), Super Red Drink Powder daily, garlic daily       insulin aspart (NOVOLOG FLEXPEN) 100 UNIT/ML pen Inject 15 Units Subcutaneous 3 times daily (with meals) 15 mL      insulin glargine (LANTUS SOLOSTAR) 100 UNIT/ML pen ADMINISTER 18 UNITS UNDER THE SKIN DAILY 30 mL 11     insulin pen needle (BD GERMÁN U/F) 32G X 4 MM USE TO TEST FOUR TIMES DAILY OR AS DIRECTED 200 each 11     irbesartan (AVAPRO) 300 MG tablet Take 1 tablet (300 mg) by mouth At Bedtime 90 tablet 3     magnesium oxide 200 MG TABS Take 2 tablets by mouth daily       MELATONIN PO Take 0.5 mg by mouth At Bedtime        metoprolol succinate ER (TOPROL-XL) 100 MG 24 hr tablet Take 1 tablet (100 mg) by mouth 2 times daily 180 tablet 3     multivitamin, therapeutic with minerals (MULTI-VITAMIN) TABS tablet Take 4-8 tablets by mouth daily Life Extenstion       order for DME Equipment being ordered: Compression stockings 1 each 3     predniSONE (DELTASONE) 20 MG tablet TAKE 1 TABLET(20 MG) BY MOUTH DAILY AS NEEDED FOR GOUT FLARE 10 tablet 0     Probiotic Product (PROBIOTIC & ACIDOPHILUS EX ST PO) Take 1 capsule by mouth daily       spironolactone (ALDACTONE) 25 MG tablet Take 1 tablet (25 mg) by mouth daily 90 tablet 3     Taurine 500 MG CAPS Take 1 capsule by mouth as needed        UNABLE TO FIND MEDICATION NAME: Super Beet Juice       vitamin D3 (CHOLECALCIFEROL) 2000 units tablet Take 2 tablets by mouth daily        warfarin (COUMADIN) 5 MG tablet Take 1 1/2 tabs on Sundays, Tuesdays and Thursdays and take 1 tab  on all other days or as directed by INR clinic 110 tablet 0       ALLERGIES     Allergies   Allergen Reactions     Corn Dextrin      All corn products - gets tired an ornery       PAST MEDICAL HISTORY:  Past Medical History:   Diagnosis Date     Atrial fibrillation (H)      CAD (coronary artery disease)     MI with RONEL to dRCA 2011     Central Sleep Apnea with Pat Villanueva breathing 2010    Also TOMMY with CPAP     CKD (chronic kidney disease) stage 3, GFR 30-59 ml/min (H)      Dilated cardiomyopathy (H)     nonischemic (possibly related to h/o a.fib with RVR) EF 30-40% 2013     DM2 (diabetes mellitus, type 2) (H)     Goal HgbA1c < 7%     Gout     uric acid level correlates; 2014 h/o +knee aspirate     Hypertension     Goal <140/90     Pulmonary hypertension (H)     mild per echo 3/2013       PAST SURGICAL HISTORY:  Past Surgical History:   Procedure Laterality Date     CORONARY ANGIOGRAPHY ADULT ORDER      distal circ-RONEL     HERNIA REPAIR  11/20/10    incarcinated     SUSPEND HYOID, GENIOGLOSSAL ADVANCEMENT         FAMILY HISTORY:  Family History   Problem Relation Age of Onset     Hypertension Mother      Coronary Artery Disease Mother      Coronary Artery Disease Father      Hypertension Father      Diabetes Sister      Cerebrovascular Disease Sister        SOCIAL HISTORY:  Social History     Socioeconomic History     Marital status:      Spouse name: None     Number of children: None     Years of education: None     Highest education level: None   Occupational History     None   Social Needs     Financial resource strain: None     Food insecurity:     Worry: None     Inability: None     Transportation needs:     Medical: None     Non-medical: None   Tobacco Use     Smoking status: Former Smoker     Packs/day: 1.50     Years: 7.00     Pack years: 10.50     Types: Cigarettes     Last attempt to quit: 1972     Years since quittin.6     Smokeless tobacco: Never Used      Tobacco comment: quit in 1971      Started at around age 18-19   Substance and Sexual Activity     Alcohol use: No     Alcohol/week: 0.0 oz     Comment: 11/20/73   recovering     Drug use: No     Sexual activity: Not Currently     Partners: Female   Lifestyle     Physical activity:     Days per week: None     Minutes per session: None     Stress: None   Relationships     Social connections:     Talks on phone: None     Gets together: None     Attends Restorationist service: None     Active member of club or organization: None     Attends meetings of clubs or organizations: None     Relationship status: None     Intimate partner violence:     Fear of current or ex partner: None     Emotionally abused: None     Physically abused: None     Forced sexual activity: None   Other Topics Concern      Service Not Asked     Blood Transfusions Not Asked     Caffeine Concern No     Occupational Exposure Not Asked     Hobby Hazards Not Asked     Sleep Concern Yes     Comment: sleep apnea, wears bi-pap     Stress Concern Not Asked     Weight Concern Not Asked     Special Diet No     Comment: low carb diet     Back Care Not Asked     Exercise No     Comment: walking     Bike Helmet Not Asked     Seat Belt Yes     Self-Exams Not Asked     Parent/sibling w/ CABG, MI or angioplasty before 65F 55M? Not Asked   Social History Narrative     None       Review of Systems:  Cardiovascular: negative for chest pain, palpitations, orthopnea, pos mild LE edema  Constitutional: negative for chills, sweats, fevers   Resp: Negative for dyspnea at rest, dyspnea on exertion, cough, known chronic lung disease  HEENT: Negative for new visual changes, frequent headaches  Gastrointestinal: negative for abdominal pain, diarrhea, nausea, vomiting  Hematologic/lymphatic: pos for current systemic anticoagulation, neg hx of blood clots  Musculoskeletal: negative for new back pain, joint pain  Neurological: negative for focal weakness, LOC, seizures,  "syncope/presyncope.       Physical Exam:  Vitals: /70   Pulse (!) 48   Ht 1.854 m (6' 1\")   Wt 129.7 kg (286 lb)   BMI 37.73 kg/m     Wt Readings from Last 4 Encounters:   08/06/19 129.7 kg (286 lb)   07/30/19 (P) 128.4 kg (283 lb)   07/16/19 127.5 kg (281 lb)   07/02/19 126.1 kg (278 lb)       GEN:  In general, this is an overweight  male in no acute distress on room air.  Patient ambulatory, unaccompanied today.   HEENT:  Pupils equal, round. Sclerae nonicteric.   NECK: Supple, no masses appreciated. Trachea midline. Thick neck. No obvious JVD though has a beard.   C/V:  Irregularly irregular rhythm. No murmur, rub or gallop.   RESP: Respirations are unlabored. No use of accessory muscles. Clear to auscultation bilaterally without wheezing, rales, or rhonchi.  GI: Abdomen soft, nontender, large abdomen.   EXTREM: Trace pitting PT edema with some skin color changes. No cyanosis or clubbing.  NEURO: Alert and oriented, cooperative. Gait not formally assessed. No obvious focal deficits.   PSYCH: Normal affect.      Recent Lab Results:  LIPID RESULTS:  Lab Results   Component Value Date    CHOL 162 03/06/2019    HDL 26 (L) 03/06/2019    LDL  03/06/2019     Cannot estimate LDL when triglyceride exceeds 400 mg/dL    LDL 86 03/06/2019    TRIG 523 (H) 03/06/2019    CHOLHDLRATIO 4.7 (H) 04/16/2013       BMP RESULTS:  Lab Results   Component Value Date     (L) 06/07/2019    POTASSIUM 4.8 06/07/2019    CHLORIDE 98 06/07/2019    CO2 27 06/07/2019    ANIONGAP 7 06/07/2019     (H) 06/07/2019    BUN 32 (H) 06/07/2019    CR 1.17 06/07/2019    GFRESTIMATED 62 06/07/2019    GFRESTBLACK 71 06/07/2019    HARSHA 9.1 06/07/2019        A1C RESULTS:  Lab Results   Component Value Date    A1C 6.5 (H) 03/06/2019       INR RESULTS:  Lab Results   Component Value Date    INR 2.9 (A) 07/16/2019    INR 2.2 (A) 06/13/2019    INR 1.87 (H) 06/07/2019    INR 1.67 (H) 12/03/2018         Additional pertinent " testing:  Lexiscan 5/22/18  Impression  1.  Myocardial perfusion imaging using single isotope technique  demonstrated fixed basal inferior wall defect most likely consistent  with attenuation artifact. No ischemia appreciated in present study.   2. Gated images not performed.  3. No prior study for comparison        Danette Paulino PA-C  [facility]  Pager [phone]

## 2019-08-06 NOTE — LETTER
8/6/2019    Digna Jones MD  [address]  [facility] [address]    RE: Jt Montgomery       Dear Colleague,    I had the pleasure of seeing Jt Montgomery in the [facility].      Cardiology Progress Note    Date of Service: 08/06/2019      Reason for visit: Annual follow up nonischemic cardiomyopathy, hypertension.     Primary cardiologist: Dr. Minda Gomez    HPI:  Mr. Montgomery is a pleasant 73-year-old gentleman with a PMhx including sleep apnea, CKD, hypertension, DM2, TOMMY on CPAP, coronary artery disease (single vessel dz s/p circumflex stenting), and nonischemic cardiomyopathy. He also has persistent atrial fibrillation and is on warfarin. He was seen last by Dr. Gomez in May of 2018 at which time he was having some ongoing MADRID and mild lower extremity edema. She recommended ischemic testing with a stress test. This was performed shortly after that visit, which showed a fixed basal inferior defect felt most likely consistent with attenuation artifact. There was no ischemia noted. A 1 year follow up was recommended.     He is back today for his planned annual follow up. He tells me that he has been feeling relatively well. He says overall his breathing is good, and he denies any exertional chest, arm, or jaw discomfort. He is asymptomatic in regard to his afib and has no palpitations or dizziness. He has mild chronic/intermittent lower extremity edema and takes lasix only PRN, but admits he takes it only rarely mostly because of excess urination. He has known TOMMY and is compliant with his CPAP. BP is under good control today at 122/70, and initially his HR per our rooming staff was low at 48, though follow up ECG showed an afib rate of mid 70s (along with some PVCs), which is similar to what he gets routinely with his Apple Watch.     Today he comes with multiple questions, though mostly related to herbal supplements. He tells me he does a lot of reading  "about \"wellness\" and tries to use \"natural\" methods when possible. We discussed that his weight has gone up since his last visit, and he acknowledges that his eating habits have gotten worse lately and he is eating too much butter, mayonnaise etc. He also admits he isn't very active, and needs to start walking more which he tells me he is going to put a good effort toward in the near future.       ASSESSMENT/PLAN:    1. Nonischemic cardiomyopathy.   --Hx impaired EF 30-40%, but recovered to low normal 50-55% via Echo in 2016. He has trace pitting edema on exam, but no overt signs of heart failure. I encouraged him to continue to use the lasix PRN, in the setting of his CKD. I also encouraged him to use compression stockings routinely.    --Continue Toprol XL, losartan, and spironolactone without change. BP today is under good control.     2. Coronary artery disease.   --Hx of single vessel CAD, s/p circumflex stenting remotely. Last ischemic workup in May 2018 with a Lexiscan that did not suggest ischemia, but showed a fixed basal inferior defect.    --Continue ASA 81mg daily.    --He declines statin therapy as he is skeptical on the effectiveness and safety. He tells me he prefers to stay on fish oil and plans to add some omega 3/flax to his diet. His last LDL (direct) was 86 in March 2019. Notably, he also has a very low HDL at 26, and TG are poorly controlled at 523. He has been working with his DM team at improved blood sugar control.    --As above, we had an extensive discussion about the need to be more active with walking, etc, and make some further positive dietary changes.     3. Persistent atrial fibrillation.    --EKG today shows rate controlled afib and he is asymptomatic. Continue digoxin and diltiazem without change as overall he is feeling well.    --He is on anticoagulation with warfarin, follows at INR clinic.    --He remains compliant with his CPAP for known TOMMY.       Follow up plan: With Dr." Minda Gomez in 1 year, or sooner if needed.       Orders this Visit:  Orders Placed This Encounter   Procedures     Follow-Up with Cardiologist     EKG 12-lead complete w/read - Clinics     Orders Placed This Encounter   Medications     digoxin (LANOXIN) 125 MCG tablet     Sig: Take 1 tablet (125 mcg) by mouth daily     Dispense:  90 tablet     Refill:  3     Do not fill until pt calls     diltiazem ER COATED BEADS (CARTIA XT) 300 MG 24 hr capsule     Sig: Take 1 capsule (300 mg) by mouth daily     Dispense:  90 capsule     Refill:  3     Do not fill until pt calls     irbesartan (AVAPRO) 300 MG tablet     Sig: Take 1 tablet (300 mg) by mouth At Bedtime     Dispense:  90 tablet     Refill:  3     Do not fill until pt calls     metoprolol succinate ER (TOPROL-XL) 100 MG 24 hr tablet     Sig: Take 1 tablet (100 mg) by mouth 2 times daily     Dispense:  180 tablet     Refill:  3     Do not fill until pt calls     spironolactone (ALDACTONE) 25 MG tablet     Sig: Take 1 tablet (25 mg) by mouth daily     Dispense:  90 tablet     Refill:  3     Do not fill until pt calls     Medications Discontinued During This Encounter   Medication Reason     digoxin (LANOXIN) 125 MCG tablet Reorder     diltiazem ER COATED BEADS (CARTIA XT) 300 MG 24 hr capsule Reorder     irbesartan (AVAPRO) 300 MG tablet Reorder     metoprolol succinate ER (TOPROL-XL) 100 MG 24 hr tablet Reorder     spironolactone (ALDACTONE) 25 MG tablet Reorder           CURRENT MEDICATIONS:  Current Outpatient Medications   Medication Sig Dispense Refill     alpha-lipoic acid 600 MG CAPS Take 1 capsule by mouth 2 times daily       Amino Acids (L-CARNITINE PO) Take 2,000 mg by mouth daily       aspirin EC 81 MG EC tablet Take 1 tablet by mouth daily. 90 tablet 1     Coenzyme Q10 (COQ10) 100 MG CAPS Take 200 mg by mouth daily        Continuous Blood Gluc Sensor (FREESTYLE RINKU 14 DAY SENSOR) MISC 1 Device every 14 days 2 each 11     digoxin (LANOXIN) 125 MCG  tablet Take 1 tablet (125 mcg) by mouth daily 90 tablet 3     diltiazem ER COATED BEADS (CARTIA XT) 300 MG 24 hr capsule Take 1 capsule (300 mg) by mouth daily 90 capsule 3     febuxostat (ULORIC) 40 MG TABS Take 40 mg by mouth daily       fish oil-omega-3 fatty acids 1000 MG capsule Take 2 g by mouth daily        furosemide (LASIX) 20 MG tablet Take 1 tablet (20 mg) by mouth daily as needed (edema) 90 tablet 3     Green Tea, Camillia sinensis, (GREEN TEA EXTRACT PO) Take by mouth 2 times daily Doesn't always take regularly       HERBALS Nerve by Plexis 2 a day,   Healthy Feet and Nerves  By [employer]  3 a day, Heart Food Caps  (cayenna pepper, garlic, hawthorn, onion & ginger 1-2 a day), Super Red Drink Powder daily, garlic daily       insulin aspart (NOVOLOG FLEXPEN) 100 UNIT/ML pen Inject 15 Units Subcutaneous 3 times daily (with meals) 15 mL      insulin glargine (LANTUS SOLOSTAR) 100 UNIT/ML pen ADMINISTER 18 UNITS UNDER THE SKIN DAILY 30 mL 11     insulin pen needle (BD GERMÁN U/F) 32G X 4 MM USE TO TEST FOUR TIMES DAILY OR AS DIRECTED 200 each 11     irbesartan (AVAPRO) 300 MG tablet Take 1 tablet (300 mg) by mouth At Bedtime 90 tablet 3     magnesium oxide 200 MG TABS Take 2 tablets by mouth daily       MELATONIN PO Take 0.5 mg by mouth At Bedtime        metoprolol succinate ER (TOPROL-XL) 100 MG 24 hr tablet Take 1 tablet (100 mg) by mouth 2 times daily 180 tablet 3     multivitamin, therapeutic with minerals (MULTI-VITAMIN) TABS tablet Take 4-8 tablets by mouth daily Life Extenstion       order for DME Equipment being ordered: Compression stockings 1 each 3     predniSONE (DELTASONE) 20 MG tablet TAKE 1 TABLET(20 MG) BY MOUTH DAILY AS NEEDED FOR GOUT FLARE 10 tablet 0     Probiotic Product (PROBIOTIC & ACIDOPHILUS EX ST PO) Take 1 capsule by mouth daily       spironolactone (ALDACTONE) 25 MG tablet Take 1 tablet (25 mg) by mouth daily 90 tablet 3     Taurine 500 MG CAPS Take 1 capsule by mouth as needed         UNABLE TO FIND MEDICATION NAME: Super Beet Juice       vitamin D3 (CHOLECALCIFEROL) 2000 units tablet Take 2 tablets by mouth daily        warfarin (COUMADIN) 5 MG tablet Take 1 1/2 tabs on Sundays, Tuesdays and Thursdays and take 1 tab on all other days or as directed by INR clinic 110 tablet 0       ALLERGIES     Allergies   Allergen Reactions     Corn Dextrin      All corn products - gets tired an ornery       PAST MEDICAL HISTORY:  Past Medical History:   Diagnosis Date     Atrial fibrillation (H)      CAD (coronary artery disease)     MI with RONEL to dRCA April 2011     Central Sleep Apnea with Pat Villanueva breathing 9/14/2010    Also TOMMY with CPAP     CKD (chronic kidney disease) stage 3, GFR 30-59 ml/min (H)      Dilated cardiomyopathy (H)     nonischemic (possibly related to h/o a.fib with RVR) EF 30-40% March 2013     DM2 (diabetes mellitus, type 2) (H)     Goal HgbA1c < 7%     Gout     uric acid level correlates; April 2014 h/o +knee aspirate     Hypertension     Goal <140/90     Pulmonary hypertension (H)     mild per echo 3/2013       PAST SURGICAL HISTORY:  Past Surgical History:   Procedure Laterality Date     CORONARY ANGIOGRAPHY ADULT ORDER  2011    distal circ-RONEL     HERNIA REPAIR  11/20/10    incarcinated     SUSPEND HYOID, GENIOGLOSSAL ADVANCEMENT         FAMILY HISTORY:  Family History   Problem Relation Age of Onset     Hypertension Mother      Coronary Artery Disease Mother      Coronary Artery Disease Father      Hypertension Father      Diabetes Sister      Cerebrovascular Disease Sister        SOCIAL HISTORY:  Social History     Socioeconomic History     Marital status:      Spouse name: None     Number of children: None     Years of education: None     Highest education level: None   Occupational History     None   Social Needs     Financial resource strain: None     Food insecurity:     Worry: None     Inability: None     Transportation needs:     Medical: None      Non-medical: None   Tobacco Use     Smoking status: Former Smoker     Packs/day: 1.50     Years: 7.00     Pack years: 10.50     Types: Cigarettes     Last attempt to quit: 1972     Years since quittin.6     Smokeless tobacco: Never Used     Tobacco comment: quit in       Started at around age 18-19   Substance and Sexual Activity     Alcohol use: No     Alcohol/week: 0.0 oz     Comment: 74   recovering     Drug use: No     Sexual activity: Not Currently     Partners: Female   Lifestyle     Physical activity:     Days per week: None     Minutes per session: None     Stress: None   Relationships     Social connections:     Talks on phone: None     Gets together: None     Attends [employer] service: None     Active member of club or organization: None     Attends meetings of clubs or organizations: None     Relationship status: None     Intimate partner violence:     Fear of current or ex partner: None     Emotionally abused: None     Physically abused: None     Forced sexual activity: None   Other Topics Concern      Service Not Asked     Blood Transfusions Not Asked     Caffeine Concern No     Occupational Exposure Not Asked     Hobby Hazards Not Asked     Sleep Concern Yes     Comment: sleep apnea, wears bi-pap     Stress Concern Not Asked     Weight Concern Not Asked     Special Diet No     Comment: low carb diet     Back Care Not Asked     Exercise No     Comment: walking     Bike Helmet Not Asked     Seat Belt Yes     Self-Exams Not Asked     Parent/sibling w/ CABG, MI or angioplasty before 65F 55M? Not Asked   Social History Narrative     None       Review of Systems:  Cardiovascular: negative for chest pain, palpitations, orthopnea, pos mild LE edema  Constitutional: negative for chills, sweats, fevers   Resp: Negative for dyspnea at rest, dyspnea on exertion, cough, known chronic lung disease  HEENT: Negative for new visual changes, frequent headaches  Gastrointestinal: negative for  "abdominal pain, diarrhea, nausea, vomiting  Hematologic/lymphatic: pos for current systemic anticoagulation, neg hx of blood clots  Musculoskeletal: negative for new back pain, joint pain  Neurological: negative for focal weakness, LOC, seizures, syncope/presyncope.       Physical Exam:  Vitals: /70   Pulse (!) 48   Ht 1.854 m (6' 1\")   Wt 129.7 kg (286 lb)   BMI 37.73 kg/m      Wt Readings from Last 4 Encounters:   08/06/19 129.7 kg (286 lb)   07/30/19 (P) 128.4 kg (283 lb)   07/16/19 127.5 kg (281 lb)   07/02/19 126.1 kg (278 lb)       GEN:  In general, this is an overweight  male in no acute distress on room air.  Patient ambulatory, unaccompanied today.   HEENT:  Pupils equal, round. Sclerae nonicteric.   NECK: Supple, no masses appreciated. Trachea midline. Thick neck. No obvious JVD though has a beard.   C/V:  Irregularly irregular rhythm. No murmur, rub or gallop.   RESP: Respirations are unlabored. No use of accessory muscles. Clear to auscultation bilaterally without wheezing, rales, or rhonchi.  GI: Abdomen soft, nontender, large abdomen.   EXTREM: Trace pitting PT edema with some skin color changes. No cyanosis or clubbing.  NEURO: Alert and oriented, cooperative. Gait not formally assessed. No obvious focal deficits.   PSYCH: Normal affect.      Recent Lab Results:  LIPID RESULTS:  Lab Results   Component Value Date    CHOL 162 03/06/2019    HDL 26 (L) 03/06/2019    LDL  03/06/2019     Cannot estimate LDL when triglyceride exceeds 400 mg/dL    LDL 86 03/06/2019    TRIG 523 (H) 03/06/2019    CHOLHDLRATIO 4.7 (H) 04/16/2013       BMP RESULTS:  Lab Results   Component Value Date     (L) 06/07/2019    POTASSIUM 4.8 06/07/2019    CHLORIDE 98 06/07/2019    CO2 27 06/07/2019    ANIONGAP 7 06/07/2019     (H) 06/07/2019    BUN 32 (H) 06/07/2019    CR 1.17 06/07/2019    GFRESTIMATED 62 06/07/2019    GFRESTBLACK 71 06/07/2019    HARSHA 9.1 06/07/2019        A1C RESULTS:  Lab Results "   Component Value Date    A1C 6.5 (H) 03/06/2019       INR RESULTS:  Lab Results   Component Value Date    INR 2.9 (A) 07/16/2019    INR 2.2 (A) 06/13/2019    INR 1.87 (H) 06/07/2019    INR 1.67 (H) 12/03/2018         Additional pertinent testing:  Lexiscan 5/22/18  Impression  1.  Myocardial perfusion imaging using single isotope technique  demonstrated fixed basal inferior wall defect most likely consistent  with attenuation artifact. No ischemia appreciated in present study.   2. Gated images not performed.  3. No prior study for comparison        Danette Paulino PA-C  [facility]  Pager [phone]      Thank you for allowing me to participate in the care of your patient.      Sincerely,     YOJANA Robins     [facility]    cc:   Digna Jones MD  [address]  [place] [address]

## 2019-08-06 NOTE — LETTER
8/6/2019    Digna Jones MD  [address]  [facility] [address]    RE: Jt Montgomery       Dear Colleague,    I had the pleasure of seeing Jt Montgomery in the [facility].      Cardiology Progress Note    Date of Service: 08/06/2019      Reason for visit: Annual follow up nonischemic cardiomyopathy, hypertension.     Primary cardiologist: Dr. Minda Gomez    HPI:  Mr. Montgomery is a pleasant 73-year-old gentleman with a PMhx including sleep apnea, CKD, hypertension, DM2, TOMMY on CPAP, coronary artery disease (single vessel dz s/p circumflex stenting), and nonischemic cardiomyopathy. He also has persistent atrial fibrillation and is on warfarin. He was seen last by Dr. Gomez in May of 2018 at which time he was having some ongoing MADRID and mild lower extremity edema. She recommended ischemic testing with a stress test. This was performed shortly after that visit, which showed a fixed basal inferior defect felt most likely consistent with attenuation artifact. There was no ischemia noted. A 1 year follow up was recommended.     He is back today for his planned annual follow up. He tells me that he has been feeling relatively well. He says overall his breathing is good, and he denies any exertional chest, arm, or jaw discomfort. He is asymptomatic in regard to his afib and has no palpitations or dizziness. He has mild chronic/intermittent lower extremity edema and takes lasix only PRN, but admits he takes it only rarely mostly because of excess urination. He has known TOMMY and is compliant with his CPAP. BP is under good control today at 122/70, and initially his HR per our rooming staff was low at 48, though follow up ECG showed an afib rate of mid 70s (along with some PVCs), which is similar to what he gets routinely with his Apple Watch.     Today he comes with multiple questions, though mostly related to herbal supplements. He tells me he does a lot of reading  "about \"wellness\" and tries to use \"natural\" methods when possible. We discussed that his weight has gone up since his last visit, and he acknowledges that his eating habits have gotten worse lately and he is eating too much butter, mayonnaise etc. He also admits he isn't very active, and needs to start walking more which he tells me he is going to put a good effort toward in the near future.       ASSESSMENT/PLAN:    1. Nonischemic cardiomyopathy.   --Hx impaired EF 30-40%, but recovered to low normal 50-55% via Echo in 2016. He has trace pitting edema on exam, but no overt signs of heart failure. I encouraged him to continue to use the lasix PRN, in the setting of his CKD. I also encouraged him to use compression stockings routinely.    --Continue Toprol XL, losartan, and spironolactone without change. BP today is under good control.     2. Coronary artery disease.   --Hx of single vessel CAD, s/p circumflex stenting remotely. Last ischemic workup in May 2018 with a Lexiscan that did not suggest ischemia, but showed a fixed basal inferior defect.    --Continue ASA 81mg daily.    --He declines statin therapy as he is skeptical on the effectiveness and safety. He tells me he prefers to stay on fish oil and plans to add some omega 3/flax to his diet. His last LDL (direct) was 86 in March 2019. Notably, he also has a very low HDL at 26, and TG are poorly controlled at 523. He has been working with his DM team at improved blood sugar control.    --As above, we had an extensive discussion about the need to be more active with walking, etc, and make some further positive dietary changes.     3. Persistent atrial fibrillation.    --EKG today shows rate controlled afib and he is asymptomatic. Continue digoxin and diltiazem without change as overall he is feeling well.    --He is on anticoagulation with warfarin, follows at INR clinic.    --He remains compliant with his CPAP for known TOMMY.       Follow up plan: With Dr." Minda Gomez in 1 year, or sooner if needed.       Orders this Visit:  Orders Placed This Encounter   Procedures     Follow-Up with Cardiologist     EKG 12-lead complete w/read - Clinics     Orders Placed This Encounter   Medications     digoxin (LANOXIN) 125 MCG tablet     Sig: Take 1 tablet (125 mcg) by mouth daily     Dispense:  90 tablet     Refill:  3     Do not fill until pt calls     diltiazem ER COATED BEADS (CARTIA XT) 300 MG 24 hr capsule     Sig: Take 1 capsule (300 mg) by mouth daily     Dispense:  90 capsule     Refill:  3     Do not fill until pt calls     irbesartan (AVAPRO) 300 MG tablet     Sig: Take 1 tablet (300 mg) by mouth At Bedtime     Dispense:  90 tablet     Refill:  3     Do not fill until pt calls     metoprolol succinate ER (TOPROL-XL) 100 MG 24 hr tablet     Sig: Take 1 tablet (100 mg) by mouth 2 times daily     Dispense:  180 tablet     Refill:  3     Do not fill until pt calls     spironolactone (ALDACTONE) 25 MG tablet     Sig: Take 1 tablet (25 mg) by mouth daily     Dispense:  90 tablet     Refill:  3     Do not fill until pt calls     Medications Discontinued During This Encounter   Medication Reason     digoxin (LANOXIN) 125 MCG tablet Reorder     diltiazem ER COATED BEADS (CARTIA XT) 300 MG 24 hr capsule Reorder     irbesartan (AVAPRO) 300 MG tablet Reorder     metoprolol succinate ER (TOPROL-XL) 100 MG 24 hr tablet Reorder     spironolactone (ALDACTONE) 25 MG tablet Reorder           CURRENT MEDICATIONS:  Current Outpatient Medications   Medication Sig Dispense Refill     alpha-lipoic acid 600 MG CAPS Take 1 capsule by mouth 2 times daily       Amino Acids (L-CARNITINE PO) Take 2,000 mg by mouth daily       aspirin EC 81 MG EC tablet Take 1 tablet by mouth daily. 90 tablet 1     Coenzyme Q10 (COQ10) 100 MG CAPS Take 200 mg by mouth daily        Continuous Blood Gluc Sensor (FREESTYLE RINKU 14 DAY SENSOR) MISC 1 Device every 14 days 2 each 11     digoxin (LANOXIN) 125 MCG  tablet Take 1 tablet (125 mcg) by mouth daily 90 tablet 3     diltiazem ER COATED BEADS (CARTIA XT) 300 MG 24 hr capsule Take 1 capsule (300 mg) by mouth daily 90 capsule 3     febuxostat (ULORIC) 40 MG TABS Take 40 mg by mouth daily       fish oil-omega-3 fatty acids 1000 MG capsule Take 2 g by mouth daily        furosemide (LASIX) 20 MG tablet Take 1 tablet (20 mg) by mouth daily as needed (edema) 90 tablet 3     Green Tea, Camillia sinensis, (GREEN TEA EXTRACT PO) Take by mouth 2 times daily Doesn't always take regularly       HERBALS Nerve by Plexis 2 a day,   Healthy Feet and Nerves  By [employer]  3 a day, Heart Food Caps  (cayenna pepper, garlic, hawthorn, onion & ginger 1-2 a day), Super Red Drink Powder daily, garlic daily       insulin aspart (NOVOLOG FLEXPEN) 100 UNIT/ML pen Inject 15 Units Subcutaneous 3 times daily (with meals) 15 mL      insulin glargine (LANTUS SOLOSTAR) 100 UNIT/ML pen ADMINISTER 18 UNITS UNDER THE SKIN DAILY 30 mL 11     insulin pen needle (BD GERMÁN U/F) 32G X 4 MM USE TO TEST FOUR TIMES DAILY OR AS DIRECTED 200 each 11     irbesartan (AVAPRO) 300 MG tablet Take 1 tablet (300 mg) by mouth At Bedtime 90 tablet 3     magnesium oxide 200 MG TABS Take 2 tablets by mouth daily       MELATONIN PO Take 0.5 mg by mouth At Bedtime        metoprolol succinate ER (TOPROL-XL) 100 MG 24 hr tablet Take 1 tablet (100 mg) by mouth 2 times daily 180 tablet 3     multivitamin, therapeutic with minerals (MULTI-VITAMIN) TABS tablet Take 4-8 tablets by mouth daily Life Extenstion       order for DME Equipment being ordered: Compression stockings 1 each 3     predniSONE (DELTASONE) 20 MG tablet TAKE 1 TABLET(20 MG) BY MOUTH DAILY AS NEEDED FOR GOUT FLARE 10 tablet 0     Probiotic Product (PROBIOTIC & ACIDOPHILUS EX ST PO) Take 1 capsule by mouth daily       spironolactone (ALDACTONE) 25 MG tablet Take 1 tablet (25 mg) by mouth daily 90 tablet 3     Taurine 500 MG CAPS Take 1 capsule by mouth as needed         UNABLE TO FIND MEDICATION NAME: Super Beet Juice       vitamin D3 (CHOLECALCIFEROL) 2000 units tablet Take 2 tablets by mouth daily        warfarin (COUMADIN) 5 MG tablet Take 1 1/2 tabs on Sundays, Tuesdays and Thursdays and take 1 tab on all other days or as directed by INR clinic 110 tablet 0       ALLERGIES     Allergies   Allergen Reactions     Corn Dextrin      All corn products - gets tired an ornery       PAST MEDICAL HISTORY:  Past Medical History:   Diagnosis Date     Atrial fibrillation (H)      CAD (coronary artery disease)     MI with RONEL to dRCA April 2011     Central Sleep Apnea with Pat Villanueva breathing 9/14/2010    Also TOMMY with CPAP     CKD (chronic kidney disease) stage 3, GFR 30-59 ml/min (H)      Dilated cardiomyopathy (H)     nonischemic (possibly related to h/o a.fib with RVR) EF 30-40% March 2013     DM2 (diabetes mellitus, type 2) (H)     Goal HgbA1c < 7%     Gout     uric acid level correlates; April 2014 h/o +knee aspirate     Hypertension     Goal <140/90     Pulmonary hypertension (H)     mild per echo 3/2013       PAST SURGICAL HISTORY:  Past Surgical History:   Procedure Laterality Date     CORONARY ANGIOGRAPHY ADULT ORDER  2011    distal circ-RONEL     HERNIA REPAIR  11/20/10    incarcinated     SUSPEND HYOID, GENIOGLOSSAL ADVANCEMENT         FAMILY HISTORY:  Family History   Problem Relation Age of Onset     Hypertension Mother      Coronary Artery Disease Mother      Coronary Artery Disease Father      Hypertension Father      Diabetes Sister      Cerebrovascular Disease Sister        SOCIAL HISTORY:  Social History     Socioeconomic History     Marital status:      Spouse name: None     Number of children: None     Years of education: None     Highest education level: None   Occupational History     None   Social Needs     Financial resource strain: None     Food insecurity:     Worry: None     Inability: None     Transportation needs:     Medical: None      Non-medical: None   Tobacco Use     Smoking status: Former Smoker     Packs/day: 1.50     Years: 7.00     Pack years: 10.50     Types: Cigarettes     Last attempt to quit: 1972     Years since quittin.6     Smokeless tobacco: Never Used     Tobacco comment: quit in       Started at around age 18-19   Substance and Sexual Activity     Alcohol use: No     Alcohol/week: 0.0 oz     Comment: 74   recovering     Drug use: No     Sexual activity: Not Currently     Partners: Female   Lifestyle     Physical activity:     Days per week: None     Minutes per session: None     Stress: None   Relationships     Social connections:     Talks on phone: None     Gets together: None     Attends Catholic service: None     Active member of club or organization: None     Attends meetings of clubs or organizations: None     Relationship status: None     Intimate partner violence:     Fear of current or ex partner: None     Emotionally abused: None     Physically abused: None     Forced sexual activity: None   Other Topics Concern      Service Not Asked     Blood Transfusions Not Asked     Caffeine Concern No     Occupational Exposure Not Asked     Hobby Hazards Not Asked     Sleep Concern Yes     Comment: sleep apnea, wears bi-pap     Stress Concern Not Asked     Weight Concern Not Asked     Special Diet No     Comment: low carb diet     Back Care Not Asked     Exercise No     Comment: walking     Bike Helmet Not Asked     Seat Belt Yes     Self-Exams Not Asked     Parent/sibling w/ CABG, MI or angioplasty before 65F 55M? Not Asked   Social History Narrative     None       Review of Systems:  Cardiovascular: negative for chest pain, palpitations, orthopnea, pos mild LE edema  Constitutional: negative for chills, sweats, fevers   Resp: Negative for dyspnea at rest, dyspnea on exertion, cough, known chronic lung disease  HEENT: Negative for new visual changes, frequent headaches  Gastrointestinal: negative for  "abdominal pain, diarrhea, nausea, vomiting  Hematologic/lymphatic: pos for current systemic anticoagulation, neg hx of blood clots  Musculoskeletal: negative for new back pain, joint pain  Neurological: negative for focal weakness, LOC, seizures, syncope/presyncope.       Physical Exam:  Vitals: /70   Pulse (!) 48   Ht 1.854 m (6' 1\")   Wt 129.7 kg (286 lb)   BMI 37.73 kg/m      Wt Readings from Last 4 Encounters:   08/06/19 129.7 kg (286 lb)   07/30/19 (P) 128.4 kg (283 lb)   07/16/19 127.5 kg (281 lb)   07/02/19 126.1 kg (278 lb)       GEN:  In general, this is an overweight  male in no acute distress on room air.  Patient ambulatory, unaccompanied today.   HEENT:  Pupils equal, round. Sclerae nonicteric.   NECK: Supple, no masses appreciated. Trachea midline. Thick neck. No obvious JVD though has a beard.   C/V:  Irregularly irregular rhythm. No murmur, rub or gallop.   RESP: Respirations are unlabored. No use of accessory muscles. Clear to auscultation bilaterally without wheezing, rales, or rhonchi.  GI: Abdomen soft, nontender, large abdomen.   EXTREM: Trace pitting PT edema with some skin color changes. No cyanosis or clubbing.  NEURO: Alert and oriented, cooperative. Gait not formally assessed. No obvious focal deficits.   PSYCH: Normal affect.      Recent Lab Results:  LIPID RESULTS:  Lab Results   Component Value Date    CHOL 162 03/06/2019    HDL 26 (L) 03/06/2019    LDL  03/06/2019     Cannot estimate LDL when triglyceride exceeds 400 mg/dL    LDL 86 03/06/2019    TRIG 523 (H) 03/06/2019    CHOLHDLRATIO 4.7 (H) 04/16/2013       BMP RESULTS:  Lab Results   Component Value Date     (L) 06/07/2019    POTASSIUM 4.8 06/07/2019    CHLORIDE 98 06/07/2019    CO2 27 06/07/2019    ANIONGAP 7 06/07/2019     (H) 06/07/2019    BUN 32 (H) 06/07/2019    CR 1.17 06/07/2019    GFRESTIMATED 62 06/07/2019    GFRESTBLACK 71 06/07/2019    HARSHA 9.1 06/07/2019        A1C RESULTS:  Lab Results "   Component Value Date    A1C 6.5 (H) 03/06/2019       INR RESULTS:  Lab Results   Component Value Date    INR 2.9 (A) 07/16/2019    INR 2.2 (A) 06/13/2019    INR 1.87 (H) 06/07/2019    INR 1.67 (H) 12/03/2018         Additional pertinent testing:  Lexiscan 5/22/18  Impression  1.  Myocardial perfusion imaging using single isotope technique  demonstrated fixed basal inferior wall defect most likely consistent  with attenuation artifact. No ischemia appreciated in present study.   2. Gated images not performed.  3. No prior study for comparison        Danette Paulino PA-C  [facility]  Pager [phone]      Thank you for allowing me to participate in the care of your patient.    Sincerely,     YOJANA Robins     [facility]

## 2019-08-13 ENCOUNTER — ANTICOAGULATION THERAPY VISIT (OUTPATIENT)
Dept: CARDIOLOGY | Facility: CLINIC | Age: 74
End: 2019-08-13
Payer: COMMERCIAL

## 2019-08-13 DIAGNOSIS — I48.91 ATRIAL FIBRILLATION, UNSPECIFIED TYPE (H): ICD-10-CM

## 2019-08-13 DIAGNOSIS — Z79.01 LONG TERM CURRENT USE OF ANTICOAGULANTS WITH INR GOAL OF 2.0-3.0: ICD-10-CM

## 2019-08-13 LAB — INR POINT OF CARE: 2.3 (ref 0.86–1.14)

## 2019-08-13 PROCEDURE — 99207 ZZC NO CHARGE NURSE ONLY: CPT | Performed by: INTERNAL MEDICINE

## 2019-08-13 PROCEDURE — 36416 COLLJ CAPILLARY BLOOD SPEC: CPT | Performed by: INTERNAL MEDICINE

## 2019-08-13 PROCEDURE — 85610 PROTHROMBIN TIME: CPT | Mod: QW | Performed by: INTERNAL MEDICINE

## 2019-08-13 NOTE — PROGRESS NOTES
ANTICOAGULATION FOLLOW-UP CLINIC VISIT    Patient Name:  Jt Montgomery  Date:  2019  Contact Type:  Face to Face    SUBJECTIVE:  Patient Findings     Comments:   The patient was assessed for diet, medication, and activity level changes, missed or extra doses, bruising or bleeding, with no problem findings.        Clinical Outcomes     Negatives:   Major bleeding event, Thromboembolic event, Anticoagulation-related hospital admission, Anticoagulation-related ED visit, Anticoagulation-related fatality    Comments:   The patient was assessed for diet, medication, and activity level changes, missed or extra doses, bruising or bleeding, with no problem findings.           OBJECTIVE    INR Protime   Date Value Ref Range Status   2019 2.3 (A) 0.86 - 1.14 Final       ASSESSMENT / PLAN  INR assessment THER    Recheck INR In: 4 WEEKS    INR Location Clinic      Anticoagulation Summary  As of 2019    INR goal:   2.0-3.0   TTR:   81.4 % (3.1 y)   INR used for dosin.3 (2019)   Warfarin maintenance plan:   7.5 mg (5 mg x 1.5) every Sun, Tue, Thu; 5 mg (5 mg x 1) all other days   Full warfarin instructions:   7.5 mg every Sun, Tue, Thu; 5 mg all other days   Weekly warfarin total:   42.5 mg   No change documented:   Marlena Hong RN   Plan last modified:   Marlena Hong RN (5/10/2019)   Next INR check:   9/10/2019   Target end date:   Indefinite    Indications    Atrial fibrillation (AFIB) on Coumadin [I48.91]  Long term current use of anticoagulants with INR goal of 2.0-3.0 [Z79.01]             Anticoagulation Episode Summary     INR check location:       Preferred lab:       Send INR reminders to:   [facility] HEART INR NURSE    Comments:         Anticoagulation Care Providers     Provider Role Specialty Phone number    Patricia Lottie Perla DO [facility] Card[facility] [phone]            See the Encounter Report to view Anticoagulation Flowsheet and Dosing Calendar (Go to Encounters  tab in chart review, and find the Anticoagulation Therapy Visit)    INR 2.3 No change in meds or diet. Denies abnormal bleeding or bruising. Will continue current dosing of 7.5 mg SuTuTh and 5 mg all other days with recheck in 4 weeks.    Marlena Hong RN

## 2019-09-10 ENCOUNTER — ANTICOAGULATION THERAPY VISIT (OUTPATIENT)
Dept: CARDIOLOGY | Facility: CLINIC | Age: 74
End: 2019-09-10
Payer: COMMERCIAL

## 2019-09-10 DIAGNOSIS — Z79.01 LONG TERM CURRENT USE OF ANTICOAGULANTS WITH INR GOAL OF 2.0-3.0: ICD-10-CM

## 2019-09-10 DIAGNOSIS — I48.91 ATRIAL FIBRILLATION, UNSPECIFIED TYPE (H): ICD-10-CM

## 2019-09-10 LAB — INR POINT OF CARE: 2.3 (ref 0.86–1.14)

## 2019-09-10 PROCEDURE — 85610 PROTHROMBIN TIME: CPT | Mod: QW | Performed by: INTERNAL MEDICINE

## 2019-09-10 PROCEDURE — 36416 COLLJ CAPILLARY BLOOD SPEC: CPT | Performed by: INTERNAL MEDICINE

## 2019-09-10 NOTE — PROGRESS NOTES
ANTICOAGULATION FOLLOW-UP CLINIC VISIT    Patient Name:  Jt Montgomery  Date:  9/10/2019  Contact Type:  Face to Face    SUBJECTIVE:  Patient Findings     Positives:   Change in diet/appetite (Eating vegan diet after the last 3 weeks)        Clinical Outcomes     Negatives:   Major bleeding event, Thromboembolic event, Anticoagulation-related hospital admission, Anticoagulation-related ED visit, Anticoagulation-related fatality           OBJECTIVE    INR Protime   Date Value Ref Range Status   09/10/2019 2.3 (A) 0.86 - 1.14 Final       ASSESSMENT / PLAN  INR assessment THER    Recheck INR In: 2 WEEKS    INR Location Clinic      Anticoagulation Summary  As of 9/10/2019    INR goal:   2.0-3.0   TTR:   81.9 % (3.2 y)   INR used for dosin.3 (9/10/2019)   Warfarin maintenance plan:   7.5 mg (5 mg x 1.5) every Sun, Tue, Thu; 5 mg (5 mg x 1) all other days   Full warfarin instructions:   7.5 mg every Sun, Tue, Thu; 5 mg all other days   Weekly warfarin total:   42.5 mg   No change documented:   Helga Luz RN   Plan last modified:   Marlena Hong RN (5/10/2019)   Next INR check:   2019   Target end date:   Indefinite    Indications    Atrial fibrillation (AFIB) on Coumadin [I48.91]  Long term current use of anticoagulants with INR goal of 2.0-3.0 [Z79.01]             Anticoagulation Episode Summary     INR check location:       Preferred lab:       Send INR reminders to:   [facility] HEART INR NURSE    Comments:         Anticoagulation Care Providers     Provider Role Specialty Phone number    Patricia Lottie Perla DO [facility] Cardiology [phone]            See the Encounter Report to view Anticoagulation Flowsheet and Dosing Calendar (Go to Encounters tab in chart review, and find the Anticoagulation Therapy Visit)    INR 2.3. No changes to dosing. Pt has been eating a Vegan diet for 3 weeks. Provided a food/vitamin K handout for review to help with vegetable choices/serving sizes.  Denies unusual bleeding or bruising. Next appt in 2 weeks to determine if continued Vegan diet will require changes in warfarin dosing. Dosage adjustment made based on physician directed care plan.    Helga Luz RN

## 2019-09-16 DIAGNOSIS — I48.91 ATRIAL FIBRILLATION, UNSPECIFIED TYPE (H): ICD-10-CM

## 2019-09-16 DIAGNOSIS — Z79.01 LONG TERM CURRENT USE OF ANTICOAGULANT THERAPY: ICD-10-CM

## 2019-09-16 RX ORDER — WARFARIN SODIUM 5 MG/1
TABLET ORAL
Qty: 110 TABLET | Refills: 1 | Status: SHIPPED | OUTPATIENT
Start: 2019-09-16 | End: 2020-04-28

## 2019-09-27 ENCOUNTER — ANTICOAGULATION THERAPY VISIT (OUTPATIENT)
Dept: CARDIOLOGY | Facility: CLINIC | Age: 74
End: 2019-09-27
Payer: COMMERCIAL

## 2019-09-27 DIAGNOSIS — Z79.01 LONG TERM CURRENT USE OF ANTICOAGULANTS WITH INR GOAL OF 2.0-3.0: ICD-10-CM

## 2019-09-27 DIAGNOSIS — I48.91 ATRIAL FIBRILLATION, UNSPECIFIED TYPE (H): ICD-10-CM

## 2019-09-27 LAB — INR POINT OF CARE: 2.3 (ref 0.86–1.14)

## 2019-09-27 PROCEDURE — 85610 PROTHROMBIN TIME: CPT | Mod: QW | Performed by: INTERNAL MEDICINE

## 2019-09-27 PROCEDURE — 36416 COLLJ CAPILLARY BLOOD SPEC: CPT | Performed by: INTERNAL MEDICINE

## 2019-09-27 PROCEDURE — 99207 ZZC NO CHARGE NURSE ONLY: CPT | Performed by: INTERNAL MEDICINE

## 2019-09-27 NOTE — PROGRESS NOTES
ANTICOAGULATION FOLLOW-UP CLINIC VISIT    Patient Name:  Jt Montgomery  Date:  2019  Contact Type:  Face to Face    SUBJECTIVE:  Patient Findings         Clinical Outcomes     Negatives:   Major bleeding event, Thromboembolic event, Anticoagulation-related hospital admission, Anticoagulation-related ED visit, Anticoagulation-related fatality           OBJECTIVE    INR Protime   Date Value Ref Range Status   2019 2.3 (A) 0.86 - 1.14 Final       ASSESSMENT / PLAN  INR assessment THER    Recheck INR In: 4 WEEKS    INR Location Clinic      Anticoagulation Summary  As of 2019    INR goal:   2.0-3.0   TTR:   82.1 % (3.2 y)   INR used for dosin.3 (2019)   Warfarin maintenance plan:   7.5 mg (5 mg x 1.5) every Sun, Tue, Thu; 5 mg (5 mg x 1) all other days   Full warfarin instructions:   7.5 mg every Sun, Tue, Thu; 5 mg all other days   Weekly warfarin total:   42.5 mg   No change documented:   Marlena Hong RN   Plan last modified:   Marlena Hong RN (5/10/2019)   Next INR check:   10/24/2019   Target end date:   Indefinite    Indications    Atrial fibrillation (AFIB) on Coumadin [I48.91]  Long term current use of anticoagulants with INR goal of 2.0-3.0 [Z79.01]             Anticoagulation Episode Summary     INR check location:       Preferred lab:       Send INR reminders to:   [facility] HEART INR NURSE    Comments:         Anticoagulation Care Providers     Provider Role Specialty Phone number    Lottie Gomez MindaDO [facility] Cardiology [phone]            See the Encounter Report to view Anticoagulation Flowsheet and Dosing Calendar (Go to Encounters tab in chart review, and find the Anticoagulation Therapy Visit)    INR 2.3 Continues to eat a mostly vegan diet. No change in meds. Denies abnormal bleeding or bruising. Will continue current dosing of 7.5 mg SuTuTh and 5 mg all other days with recheck in 4 weeks.    Marlena Hong RN

## 2019-10-04 DIAGNOSIS — Z79.4 TYPE 2 DIABETES MELLITUS WITH DIABETIC NEUROPATHY, WITH LONG-TERM CURRENT USE OF INSULIN (H): Chronic | ICD-10-CM

## 2019-10-04 DIAGNOSIS — E11.40 TYPE 2 DIABETES MELLITUS WITH DIABETIC NEUROPATHY, WITH LONG-TERM CURRENT USE OF INSULIN (H): Chronic | ICD-10-CM

## 2019-10-05 ENCOUNTER — OFFICE VISIT (OUTPATIENT)
Dept: URGENT CARE | Facility: URGENT CARE | Age: 74
End: 2019-10-05
Payer: COMMERCIAL

## 2019-10-05 VITALS
WEIGHT: 286 LBS | HEART RATE: 117 BPM | OXYGEN SATURATION: 100 % | DIASTOLIC BLOOD PRESSURE: 59 MMHG | SYSTOLIC BLOOD PRESSURE: 106 MMHG | TEMPERATURE: 98.3 F | BODY MASS INDEX: 37.73 KG/M2

## 2019-10-05 DIAGNOSIS — N39.0 URINARY TRACT INFECTION WITHOUT HEMATURIA, SITE UNSPECIFIED: Primary | ICD-10-CM

## 2019-10-05 DIAGNOSIS — Z79.4 TYPE 2 DIABETES MELLITUS WITH DIABETIC NEUROPATHY, WITH LONG-TERM CURRENT USE OF INSULIN (H): Chronic | ICD-10-CM

## 2019-10-05 DIAGNOSIS — E11.40 TYPE 2 DIABETES MELLITUS WITH DIABETIC NEUROPATHY, WITH LONG-TERM CURRENT USE OF INSULIN (H): Chronic | ICD-10-CM

## 2019-10-05 LAB
ALBUMIN UR-MCNC: 30 MG/DL
APPEARANCE UR: CLEAR
BACTERIA #/AREA URNS HPF: ABNORMAL /HPF
BILIRUB UR QL STRIP: NEGATIVE
COLOR UR AUTO: YELLOW
GLUCOSE UR STRIP-MCNC: NEGATIVE MG/DL
HGB UR QL STRIP: ABNORMAL
KETONES UR STRIP-MCNC: NEGATIVE MG/DL
LEUKOCYTE ESTERASE UR QL STRIP: ABNORMAL
NITRATE UR QL: POSITIVE
NON-SQ EPI CELLS #/AREA URNS LPF: ABNORMAL /LPF
PH UR STRIP: 5.5 PH (ref 5–7)
RBC #/AREA URNS AUTO: ABNORMAL /HPF
SOURCE: ABNORMAL
SP GR UR STRIP: 1.02 (ref 1–1.03)
UROBILINOGEN UR STRIP-ACNC: 0.2 EU/DL (ref 0.2–1)
WBC #/AREA URNS AUTO: ABNORMAL /HPF

## 2019-10-05 PROCEDURE — 81001 URINALYSIS AUTO W/SCOPE: CPT | Performed by: FAMILY MEDICINE

## 2019-10-05 PROCEDURE — 99213 OFFICE O/P EST LOW 20 MIN: CPT | Performed by: FAMILY MEDICINE

## 2019-10-05 PROCEDURE — 87086 URINE CULTURE/COLONY COUNT: CPT | Performed by: FAMILY MEDICINE

## 2019-10-05 PROCEDURE — 87088 URINE BACTERIA CULTURE: CPT | Performed by: FAMILY MEDICINE

## 2019-10-05 PROCEDURE — 87186 SC STD MICRODIL/AGAR DIL: CPT | Performed by: FAMILY MEDICINE

## 2019-10-05 RX ORDER — SULFAMETHOXAZOLE/TRIMETHOPRIM 800-160 MG
1 TABLET ORAL 2 TIMES DAILY
Qty: 14 TABLET | Refills: 0 | Status: SHIPPED | OUTPATIENT
Start: 2019-10-05 | End: 2019-10-14

## 2019-10-05 RX ORDER — INSULIN ASPART 100 [IU]/ML
INJECTION, SOLUTION INTRAVENOUS; SUBCUTANEOUS
Qty: 45 ML | Refills: 0 | Status: CANCELLED | OUTPATIENT
Start: 2019-10-05

## 2019-10-05 NOTE — TELEPHONE ENCOUNTER
"Requested Prescriptions   Pending Prescriptions Disp Refills     insulin pen needle (BD GERMÁN U/F) 32G X 4 MM miscellaneous [Pharmacy Med Name: B-D PEN NDL GERMÁN [id number](5/32) [facility]]  Last Written Prescription Date:  9/6/2018  Last Fill Quantity: 200 each,  # refills: 11   Last Office Visit: 6/7/2019 Karen  Future Office Visit:    Next 5 appointments (look out 90 days)    Oct 08, 2019 10:30 AM CDT  Office Visit with Nathaly [facility] ([facility]) [address] [place] [id number]  [phone]          200 each 0     Sig: USING FOUR TIMES DAILY OR AS DIRECTED       Diabetic Supplies Protocol Passed - 10/4/2019 11:31 PM        Passed - Medication is active on med list        Passed - Patient is 18 years of age or older        Passed - Recent (6 mo) or future (30 days) visit within the authorizing provider's specialty     Patient had office visit in the last 6 months or has a visit in the next 30 days with authorizing provider.  See \"Patient Info\" tab in inbasket, or \"Choose Columns\" in Meds & Orders section of the refill encounter.            "

## 2019-10-05 NOTE — PROGRESS NOTES
Subjective     Jt Montgomery is a 73 year old male who presents to clinic today for the following health issues:    HPI   Presents with increased urinary urgency and mild dysuria since last .  No fever or flank pain.  Has not been drinking as much fluid lately.  Has had more constipation.    No hx of BPH or urinary retention.      Patient Active Problem List   Diagnosis     HTN (hypertension); goal <140/90     Alcohol dependence in remission (H)     TOMMY (obstructive sleep apnea)     Central Sleep Apnea with Cheyne Villanueva breathing     Dilated cardiomyopathy, nonischemic EF 30-40% in 2013     S/p angioplasty with stent w/ RONEL in distal RCA     Gout     Type 2 diabetes mellitus with diabetic neuropathy; goal HgbA1c < 7%     CAD (coronary artery disease)     Atrial fibrillation (AFIB) on Coumadin     CKD (chronic kidney disease) stage 3, GFR 30-59 ml/min (H)     Morbid obesity (H)     Pulmonary hypertension (H)     Mixed hyperlipidemia due to type 2 diabetes mellitus (H)     Long-term (current) use of anticoagulants [Z79.01]     Anemia, unspecified type     Long term current use of anticoagulants with INR goal of 2.0-3.0     Facial cellulitis     Past Surgical History:   Procedure Laterality Date     CORONARY ANGIOGRAPHY ADULT ORDER      distal circ-RONEL     HERNIA REPAIR  11/20/10    incarcinated     SUSPEND HYOID, GENIOGLOSSAL ADVANCEMENT         Social History     Tobacco Use     Smoking status: Former Smoker     Packs/day: 1.50     Years: 7.00     Pack years: 10.50     Types: Cigarettes     Last attempt to quit: 1972     Years since quittin.7     Smokeless tobacco: Never Used     Tobacco comment: quit in       Started at around age 18-19   Substance Use Topics     Alcohol use: No     Alcohol/week: 0.0 standard drinks     Comment: 74   recovering     Family History   Problem Relation Age of Onset     Hypertension Mother      Coronary Artery Disease Mother      Coronary  Artery Disease Father      Hypertension Father      Diabetes Sister      Cerebrovascular Disease Sister          Current Outpatient Medications   Medication Sig Dispense Refill     alpha-lipoic acid 600 MG CAPS Take 1 capsule by mouth 2 times daily       Amino Acids (L-CARNITINE PO) Take 2,000 mg by mouth daily       aspirin EC 81 MG EC tablet Take 1 tablet by mouth daily. 90 tablet 1     Coenzyme Q10 (COQ10) 100 MG CAPS Take 200 mg by mouth daily        Continuous Blood Gluc Sensor (FREESTYLE RINKU 14 DAY SENSOR) MISC 1 Device every 14 days 2 each 11     digoxin (LANOXIN) 125 MCG tablet Take 1 tablet (125 mcg) by mouth daily 90 tablet 3     diltiazem ER COATED BEADS (CARTIA XT) 300 MG 24 hr capsule Take 1 capsule (300 mg) by mouth daily 90 capsule 3     febuxostat (ULORIC) 40 MG TABS Take 40 mg by mouth daily       fish oil-omega-3 fatty acids 1000 MG capsule Take 2 g by mouth daily        furosemide (LASIX) 20 MG tablet Take 1 tablet (20 mg) by mouth daily as needed (edema) 90 tablet 3     Green Tea, Camillia sinensis, (GREEN TEA EXTRACT PO) Take by mouth 2 times daily Doesn't always take regularly       HERBALS Nerve by Plexis 2 a day,   Healthy Feet and Nerves  By [employer]  3 a day, Heart Food Caps  (cayenna pepper, garlic, hawthorn, onion & ginger 1-2 a day), Super Red Drink Powder daily, garlic daily       insulin aspart (NOVOLOG FLEXPEN) 100 UNIT/ML pen Inject 15 Units Subcutaneous 3 times daily (with meals) 15 mL      insulin glargine (LANTUS SOLOSTAR) 100 UNIT/ML pen ADMINISTER 18 UNITS UNDER THE SKIN DAILY 30 mL 11     insulin pen needle (BD GERMÁN U/F) 32G X 4 MM USE TO TEST FOUR TIMES DAILY OR AS DIRECTED 200 each 11     irbesartan (AVAPRO) 300 MG tablet Take 1 tablet (300 mg) by mouth At Bedtime 90 tablet 3     magnesium oxide 200 MG TABS Take 2 tablets by mouth daily       MELATONIN PO Take 0.5 mg by mouth At Bedtime        metoprolol succinate ER (TOPROL-XL) 100 MG 24 hr tablet Take 1 tablet (100 mg)  by mouth 2 times daily 180 tablet 3     multivitamin, therapeutic with minerals (MULTI-VITAMIN) TABS tablet Take 4-8 tablets by mouth daily Life Extenstion       order for DME Equipment being ordered: Compression stockings 1 each 3     predniSONE (DELTASONE) 20 MG tablet TAKE 1 TABLET(20 MG) BY MOUTH DAILY AS NEEDED FOR GOUT FLARE 10 tablet 0     Probiotic Product (PROBIOTIC & ACIDOPHILUS EX ST PO) Take 1 capsule by mouth daily       spironolactone (ALDACTONE) 25 MG tablet Take 1 tablet (25 mg) by mouth daily 90 tablet 3     sulfamethoxazole-trimethoprim (BACTRIM DS/SEPTRA DS) 800-160 MG tablet Take 1 tablet by mouth 2 times daily for 7 days 14 tablet 0     Taurine 500 MG CAPS Take 1 capsule by mouth as needed        UNABLE TO FIND MEDICATION NAME: Super Beet Juice       vitamin D3 (CHOLECALCIFEROL) 2000 units tablet Take 2 tablets by mouth daily        warfarin ANTICOAGULANT (COUMADIN) 5 MG tablet Take 1 1/2 tabs on Sundays, Tuesdays and Thursdays and take 1 tab on all other days or as directed by INR clinic 110 tablet 1     Allergies   Allergen Reactions     Corn Dextrin      All corn products - gets tired an ornery     Recent Labs   Lab Test 06/07/19  1614 03/06/19  0951 12/03/18  0918 12/02/18  1637  11/28/18  1226 09/06/18  1450 02/21/18  1202 02/09/18  1603  01/02/17  1639  04/29/16  0803  09/09/12  1440   A1C  --  6.5*  --   --   --   --  6.1*  --  6.3*   < >  --    < > 7.5*   < > 6.0   LDL  --  Cannot estimate LDL when triglyceride exceeds 400 mg/dL  86  --   --   --  Cannot estimate LDL when triglyceride exceeds 400 mg/dL  76  --  75  --   --   --   --  Cannot estimate LDL when triglyceride exceeds 400 mg/dL  99   < >  --    HDL  --  26*  --   --   --  26*  --  32*  --   --   --   --  27*   < >  --    TRIG  --  523*  --   --   --  645*  --  323*  --   --   --   --  487*   < >  --    ALT  --   --   --  40  --   --   --  27  --   --  44  --   --    < > 69   CR 1.17  --  1.21 1.65*   < > 1.17  --  1.29*  --    --  1.61*   < > 1.43*   < > 1.25   GFRESTIMATED 62  --  59* 41*   < > 61  --  55*  --   --  43*   < > 49*   < > 58*   GFRESTBLACK 71  --  71 50*   < > 74  --  66  --   --  52*   < > 59*   < > 70   POTASSIUM 4.8  --  4.2 4.1   < > 4.6  --  4.4  --   --  4.7   < > 4.5   < > 3.8   TSH  --   --   --   --   --   --   --   --   --   --   --   --  2.35  --  3.07    < > = values in this interval not displayed.      BP Readings from Last 3 Encounters:   10/05/19 106/59   08/06/19 122/70   07/30/19 (!) 143/84    Wt Readings from Last 3 Encounters:   10/05/19 129.7 kg (286 lb)   08/06/19 129.7 kg (286 lb)   07/30/19 (P) 128.4 kg (283 lb)                    Reviewed and updated as needed this visit by Provider         Review of Systems   ROS COMP: Constitutional, HEENT, cardiovascular, pulmonary, gi and gu systems are negative, except as otherwise noted.      Objective    /59   Pulse 117   Temp 98.3  F (36.8  C) (Oral)   Wt 129.7 kg (286 lb)   SpO2 100%   BMI 37.73 kg/m    Body mass index is 37.73 kg/m .  Physical Exam   GENERAL: healthy, alert and no distress  EYES: Eyes grossly normal to inspection, PERRL and conjunctivae and sclerae normal  NECK: no adenopathy, no asymmetry, masses, or scars and thyroid normal to palpation  ABDOMEN: soft, nontender, no hepatosplenomegaly, no masses and bowel sounds normal  MS: no gross musculoskeletal defects noted, no edema  SKIN: no suspicious lesions or rashes  NEURO: Normal strength and tone, mentation intact and speech normal  PSYCH: mentation appears normal, affect normal/bright    Diagnostic Test Results:  Labs reviewed in Epic  Results for orders placed or performed in visit on 10/05/19 (from the past 24 hour(s))   *UA reflex to Microscopic and Culture ([place] and [facility] (except [place] and [place])   Result Value Ref Range    Color Urine Yellow     Appearance Urine Clear     Glucose Urine Negative NEG^Negative mg/dL    Bilirubin Urine Negative NEG^Negative     Ketones Urine Negative NEG^Negative mg/dL    Specific Gravity Urine 1.020 1.003 - 1.035    Blood Urine Small (A) NEG^Negative    pH Urine 5.5 5.0 - 7.0 pH    Protein Albumin Urine 30 (A) NEG^Negative mg/dL    Urobilinogen Urine 0.2 0.2 - 1.0 EU/dL    Nitrite Urine Positive (A) NEG^Negative    Leukocyte Esterase Urine Small (A) NEG^Negative    Source Midstream Urine    Urine Microscopic   Result Value Ref Range    WBC Urine 25-50 (A) OTO5^0 - 5 /HPF    RBC Urine O - 2 OTO2^O - 2 /HPF    Squamous Epithelial /LPF Urine Moderate (A) FEW^Few /LPF    Bacteria Urine Many (A) NEG^Negative /HPF           Assessment & Plan     1. Urinary tract infection without hematuria, site unspecified    - *UA reflex to Microscopic and Culture ([place] and [facility] (except [place] and [place])  - Urine Microscopic  - Urine Culture Aerobic Bacterial  - sulfamethoxazole-trimethoprim (BACTRIM DS/SEPTRA DS) 800-160 MG tablet; Take 1 tablet by mouth 2 times daily for 7 days  Dispense: 14 tablet; Refill: 0    Will treat with Bactrim with close Follow-up with INR CLinic advised with chronic warfarin use.  Advised to continue with increased fluids.  Advised if symptoms do not improve quickly of becomes unable to void- needs to present to ED for bladder scan and eval for retention/BPH.  At this time- he does not feel this is necessary-- Follow-up prn.     Neisha Saini MD  [facility]

## 2019-10-07 ENCOUNTER — ANTICOAGULATION THERAPY VISIT (OUTPATIENT)
Dept: CARDIOLOGY | Facility: CLINIC | Age: 74
End: 2019-10-07
Payer: COMMERCIAL

## 2019-10-07 DIAGNOSIS — I48.91 ATRIAL FIBRILLATION, UNSPECIFIED TYPE (H): ICD-10-CM

## 2019-10-07 DIAGNOSIS — Z79.01 LONG TERM CURRENT USE OF ANTICOAGULANTS WITH INR GOAL OF 2.0-3.0: ICD-10-CM

## 2019-10-07 LAB — INR POINT OF CARE: 2.8 (ref 0.86–1.14)

## 2019-10-07 PROCEDURE — 85610 PROTHROMBIN TIME: CPT | Mod: QW | Performed by: INTERNAL MEDICINE

## 2019-10-07 PROCEDURE — 36416 COLLJ CAPILLARY BLOOD SPEC: CPT | Performed by: INTERNAL MEDICINE

## 2019-10-07 RX ORDER — INSULIN ASPART 100 [IU]/ML
INJECTION, SOLUTION INTRAVENOUS; SUBCUTANEOUS
Start: 2019-10-07

## 2019-10-07 NOTE — PROGRESS NOTES
ANTICOAGULATION FOLLOW-UP CLINIC VISIT    Patient Name:  Jt Montgomery  Date:  10/7/2019  Contact Type:  Face to Face    SUBJECTIVE:  Patient Findings     Positives:   Change in health (New UTI), Change in medications (Pt started a 7day course of bactrim on 10/5/19 for UTI), Change in diet/appetite (Reports decreased appetite prior to UTI, is improving now on antibiotics)        Clinical Outcomes     Negatives:   Major bleeding event, Thromboembolic event, Anticoagulation-related hospital admission, Anticoagulation-related ED visit, Anticoagulation-related fatality           OBJECTIVE    INR Protime   Date Value Ref Range Status   10/07/2019 2.8 (A) 0.86 - 1.14 Final       ASSESSMENT / PLAN  INR assessment THER    Recheck INR In: 1 WEEK    INR Location Clinic      Anticoagulation Summary  As of 10/7/2019    INR goal:   2.0-3.0   TTR:   82.3 % (3.2 y)   INR used for dosin.8 (10/7/2019)   Warfarin maintenance plan:   7.5 mg (5 mg x 1.5) every Sun, Tue, Thu; 5 mg (5 mg x 1) all other days   Full warfarin instructions:   7.5 mg every Sun, Tue, Thu; 5 mg all other days   Weekly warfarin total:   42.5 mg   No change documented:   Tori Rico RN   Plan last modified:   Marlena Hong RN (5/10/2019)   Next INR check:   10/15/2019   Target end date:   Indefinite    Indications    Atrial fibrillation (AFIB) on Coumadin [I48.91]  Long term current use of anticoagulants with INR goal of 2.0-3.0 [Z79.01]             Anticoagulation Episode Summary     INR check location:       Preferred lab:       Send INR reminders to:   [facility] HEART INR NURSE    Comments:         Anticoagulation Care Providers     Provider Role Specialty Phone number    GiusepperoxannamikLottie DO [facility] Cardiology [phone]            See the Encounter Report to view Anticoagulation Flowsheet and Dosing Calendar (Go to Encounters tab in chart review, and find the Anticoagulation Therapy Visit)    INR 2.8. No abnormal bleeding  or bruising. Pt was started on bactrim x7 days for UTI, took first dose 10/5/19 evening. Prior to starting antibiotics, pt reported decreased appetite, however his appetite has returned. Eats mixed green salads 3x/wk and green beans a few times a week. Otherwise, no changes to diet or medications. Pt will continue with current maintenance plan of 7.5mg Sun/Tues/Thurs and 5mg all other weeks. Pt will eat a few extra servings of broccoli this week to account for antibiotic, recheck INR in 1wk.     Tori Rico RN

## 2019-10-07 NOTE — TELEPHONE ENCOUNTER
insulin aspart (NOVOLOG FLEXPEN) 100 UNIT/ML pen 15 mL  7/30/2019         Last Written Prescription Date:  07/30/2019  Last Fill Quantity: 15 ML,  # refills: 0   Last office visit: 6/7/2019 with prescribing provider:     Future Office Visit:   Next 5 appointments (look out 90 days)    Oct 08, 2019 10:30 AM CDT  Office Visit with Nathaly Hughes [facility] [address] [place] [id number] [phone]         Requested Prescriptions   Pending Prescriptions Disp Refills     NOVOLOG FLEXPEN 100 UNIT/ML soln [Pharmacy Med Name: NOVOLOG FLEXPEN INJ 3ML (ORANGE)] 45 mL 0     Sig: ADMINISTER 15 UNITS UNDER THE SKIN THREE TIMES DAILY WITH MEALS       Short Acting Insulin Protocol Failed - 10/5/2019  3:16 PM        Failed - Microalbumin on file in past 12 months     Recent Labs   Lab Test 02/21/18  1202   MICROL 65   UMALCR 84.38*             Failed - HgbA1C in past 3 or 6 months     If HgbA1C is 8 or greater, it needs to be on file within the past 3 months.  If less than 8, must be on file within the past 6 months.     Recent Labs   Lab Test 03/06/19  0951   A1C 6.5*             Passed - Blood pressure less than 140/90 in past 6 months     BP Readings from Last 3 Encounters:   10/05/19 106/59   08/06/19 122/70   07/30/19 (!) 143/84                 Passed - LDL on file in past 12 months     Recent Labs   Lab Test 03/06/19  0951   LDL Cannot estimate LDL when triglyceride exceeds 400 mg/dL  86             Passed - Serum creatinine on file in past 12 months     Recent Labs   Lab Test 06/07/19  1614   CR 1.17             Passed - Medication is active on med list        Passed - Patient is age 18 or older        Passed - Recent (6 mo) or future (30 days) visit within the authorizing provider's specialty     Patient had office visit in the last 6 months or has a visit in the next 30 days with authorizing provider or within the authorizing  "provider's specialty.  See \"Patient Info\" tab in inbasket, or \"Choose Columns\" in Meds & Orders section of the refill encounter.              "

## 2019-10-08 ENCOUNTER — OFFICE VISIT (OUTPATIENT)
Dept: PHARMACY | Facility: CLINIC | Age: 74
End: 2019-10-08
Payer: COMMERCIAL

## 2019-10-08 VITALS
DIASTOLIC BLOOD PRESSURE: 65 MMHG | BODY MASS INDEX: 36.15 KG/M2 | WEIGHT: 274 LBS | SYSTOLIC BLOOD PRESSURE: 114 MMHG | HEART RATE: 62 BPM

## 2019-10-08 DIAGNOSIS — N39.0 URINARY TRACT INFECTION WITHOUT HEMATURIA, SITE UNSPECIFIED: Primary | ICD-10-CM

## 2019-10-08 DIAGNOSIS — Z79.4 TYPE 2 DIABETES MELLITUS WITH DIABETIC NEUROPATHY, WITH LONG-TERM CURRENT USE OF INSULIN (H): ICD-10-CM

## 2019-10-08 DIAGNOSIS — E11.40 TYPE 2 DIABETES MELLITUS WITH DIABETIC NEUROPATHY, WITH LONG-TERM CURRENT USE OF INSULIN (H): ICD-10-CM

## 2019-10-08 DIAGNOSIS — Z23 NEED FOR VACCINATION: ICD-10-CM

## 2019-10-08 LAB
BACTERIA SPEC CULT: ABNORMAL
BACTERIA SPEC CULT: ABNORMAL
DIGOXIN SERPL-MCNC: 0.7 UG/L (ref 0.5–2)
HBA1C MFR BLD: 7.6 % (ref 0–5.6)
SPECIMEN SOURCE: ABNORMAL

## 2019-10-08 PROCEDURE — 84443 ASSAY THYROID STIM HORMONE: CPT | Performed by: INTERNAL MEDICINE

## 2019-10-08 PROCEDURE — 80162 ASSAY OF DIGOXIN TOTAL: CPT | Performed by: INTERNAL MEDICINE

## 2019-10-08 PROCEDURE — 83036 HEMOGLOBIN GLYCOSYLATED A1C: CPT | Performed by: INTERNAL MEDICINE

## 2019-10-08 PROCEDURE — 80048 BASIC METABOLIC PNL TOTAL CA: CPT | Performed by: INTERNAL MEDICINE

## 2019-10-08 PROCEDURE — 36415 COLL VENOUS BLD VENIPUNCTURE: CPT | Performed by: INTERNAL MEDICINE

## 2019-10-08 PROCEDURE — 99207 ZZC NO CHARGE LOS: CPT | Performed by: PHARMACIST

## 2019-10-08 PROCEDURE — 82043 UR ALBUMIN QUANTITATIVE: CPT | Performed by: INTERNAL MEDICINE

## 2019-10-08 NOTE — PROGRESS NOTES
SUBJECTIVE/OBJECTIVE:                Jt Montgomery is a 73 year old male coming in for a follow-up visit for Medication Therapy Management.  He was referred to me from Dr. David, he has since established care with Dr. Jones.     Chief Complaint: Follow up from our visit on 7/30/19.  He's here to f/up on diabetes management.    Tobacco: History of tobacco dependence - quit 1972  Alcohol: history of alcohol dependence - sober since 1973    Medication Adherence/Access: no issues reported    UTI:  He's being treated for a UTI, which he reports is improving.  He's taking Bactrim DS BID x7 days (started 10/5).  INR is being closely monitored due to concurrent warfarin therapy.  INR Protime   Date Value Ref Range Status   10/07/2019 2.8 (A) 0.86 - 1.14 Final       Diabetes:  Currently taking Novolog 15 units TID with meals and Lantus 18 units daily.  Pt also takes taurine 500mg BID. Pt is not experiencing side effects.   SMBG: per Freestyle Umair report:        Symptoms of low blood sugar? none. Frequency of hypoglycemia? Never  Recent symptoms of high blood sugar? none  Eye exam: up to date  Foot exam: due   Microalbumin  > 30 mg/g. Pt is taking an ACEi/ARB.  Aspirin: Taking 81mg daily and denies side effects  Diet:  He continues reading and is working on converting more to a plant based diet.  He's lost about 12 pounds, which he's pleased with.  Lab Results   Component Value Date    A1C 6.5 03/06/2019    A1C 6.1 09/06/2018    A1C 6.3 02/09/2018    A1C 7.7 03/06/2017    A1C 7.6 12/05/2016     Wt Readings from Last 4 Encounters:   10/08/19 274 lb (124.3 kg)   10/05/19 286 lb (129.7 kg)   08/06/19 286 lb (129.7 kg)   07/30/19 (P) 283 lb (128.4 kg)      Immunizations: He has not yet received annual influenza vaccine and declines doing so today.  He's concerned about potential side effects.    Today's Vitals: /65   Pulse 62   Wt 274 lb (124.3 kg)   BMI 36.15 kg/m      ASSESSMENT:              Current  medications were reviewed today as discussed above.      Medication Adherence: good, no issues identified.  Due for digoxin level per health maintenance.    UTI: Improved, plan in place.    Diabetes: Needs Improvement. Patient is meeting A1c goal of < 8%. Self monitoring of blood glucose is at goal of fasting  mg/dL and post prandial < 180 mg/dL.  Due for A1c, TSH, microalbumin and BMP.      Immunizations: Would benefit from getting influenza vaccine.     PLAN:                  1.  Labs today - A1c, protein in the urine, thyroid function, digoxin level, and kidney function/electrolytes.  2.  We would recommend you get a flu shot - give this some thought.  You can get it here or at your pharmacy.    I spent 45 minutes with this patient today. All changes were made via collaborative practice agreement with Dr. Jones. A copy of the visit note was provided to the patient's primary care provider.     Will follow up pending lab results.    The patient was given a summary of these recommendations as an after visit summary.    Nathaly Hughes, PharmD, BCACP  Medication Therapy Management Provider  Pager: [phone]

## 2019-10-08 NOTE — PATIENT INSTRUCTIONS
Recommendations from today's MTM visit:                                                    MTM (medication therapy management) is a service provided by a clinical pharmacist designed to help you get the most of out of your medicines.   Today we reviewed what your medicines are for, how to know if they are working, that your medicines are safe and how to make your medicine regimen as easy as possible.      1.  Labs today - A1c, protein in the urine, thyroid function, digoxin level, and kidney function/electrolytes.    2.  We would recommend you get a flu shot - give this some thought.  You can get it here or at your pharmacy.    It was great to speak with you today.  I value your experience and would be very thankful for your time with providing feedback on our clinic survey. You may receive a survey via email or text message in the next few days.     Next MTM visit: Pending lab results    To schedule another MTM appointment, please call the clinic directly or you may call the MTM scheduling line at [phone] or toll-free at [phone].     My Clinical Pharmacist's contact information:                                                      It was a pleasure talking with you today!  Please feel free to contact me with any questions or concerns you have.      Charlene Smith PharmD  Medication Therapy Management Resident  Pager: [phone]    Nathaly Hughes PharmD, [facility]  Medication Therapy Management Provider  Pager: [phone]

## 2019-10-09 DIAGNOSIS — E11.40 TYPE 2 DIABETES MELLITUS WITH DIABETIC NEUROPATHY, WITH LONG-TERM CURRENT USE OF INSULIN (H): Chronic | ICD-10-CM

## 2019-10-09 DIAGNOSIS — Z79.4 TYPE 2 DIABETES MELLITUS WITH DIABETIC NEUROPATHY, WITH LONG-TERM CURRENT USE OF INSULIN (H): Chronic | ICD-10-CM

## 2019-10-09 LAB
ANION GAP SERPL CALCULATED.3IONS-SCNC: 11 MMOL/L (ref 3–14)
BUN SERPL-MCNC: 22 MG/DL (ref 7–30)
CALCIUM SERPL-MCNC: 8.1 MG/DL (ref 8.5–10.1)
CHLORIDE SERPL-SCNC: 105 MMOL/L (ref 94–109)
CO2 SERPL-SCNC: 19 MMOL/L (ref 20–32)
CREAT SERPL-MCNC: 1.19 MG/DL (ref 0.66–1.25)
CREAT UR-MCNC: 126 MG/DL
GFR SERPL CREATININE-BSD FRML MDRD: 60 ML/MIN/{1.73_M2}
GLUCOSE SERPL-MCNC: 113 MG/DL (ref 70–99)
MICROALBUMIN UR-MCNC: 45 MG/L
MICROALBUMIN/CREAT UR: 35.95 MG/G CR (ref 0–17)
POTASSIUM SERPL-SCNC: 4.8 MMOL/L (ref 3.4–5.3)
SODIUM SERPL-SCNC: 135 MMOL/L (ref 133–144)
TSH SERPL DL<=0.005 MIU/L-ACNC: 1.94 MU/L (ref 0.4–4)

## 2019-10-09 NOTE — TELEPHONE ENCOUNTER
Rx already approved.  Nathaly Hughes, MikaelaD, [facility]  Medication Therapy Management Provider  Pager: [phone]

## 2019-10-09 NOTE — TELEPHONE ENCOUNTER
"Requested Prescriptions   Pending Prescriptions Disp Refills     insulin aspart (NOVOLOG FLEXPEN) 100 UNIT/ML pen 15 mL      Sig: Inject 15 Units Subcutaneous 3 times daily (with meals)       Short Acting Insulin Protocol Passed - 10/9/2019  2:53 PM        Passed - Blood pressure less than 140/90 in past 6 months     BP Readings from Last 3 Encounters:   10/08/19 114/65   10/05/19 106/59   08/06/19 122/70                 Passed - LDL on file in past 12 months     Recent Labs   Lab Test 03/06/19  0951   LDL Cannot estimate LDL when triglyceride exceeds 400 mg/dL  86             Passed - Microalbumin on file in past 12 months     Recent Labs   Lab Test 10/08/19  1118   MICROL 45   UMALCR 35.95*             Passed - Serum creatinine on file in past 12 months     Recent Labs   Lab Test 10/08/19  1117   CR 1.19             Passed - HgbA1C in past 3 or 6 months     If HgbA1C is 8 or greater, it needs to be on file within the past 3 months.  If less than 8, must be on file within the past 6 months.     Recent Labs   Lab Test 10/08/19  1117   A1C 7.6*             Passed - Medication is active on med list        Passed - Patient is age 18 or older        Passed - Recent (6 mo) or future (30 days) visit within the authorizing provider's specialty     Patient had office visit in the last 6 months or has a visit in the next 30 days with authorizing provider or within the authorizing provider's specialty.  See \"Patient Info\" tab in inbasket, or \"Choose Columns\" in Meds & Orders section of the refill encounter.            Prescription approved per [facility] Refill Protocol.    Allen CORTEZ RN  "

## 2019-10-09 NOTE — TELEPHONE ENCOUNTER
"Requested Prescriptions   Pending Prescriptions Disp Refills     insulin aspart (NOVOLOG FLEXPEN) 100 UNIT/ML pen 15 mL      Sig: Inject 15 Units Subcutaneous 3 times daily (with meals)     Last Written Prescription Date:  7-30-19 (no print out)  Last Fill Quantity: 15mL,  # refills: 0   Last office visit: 6/7/2019 with prescribing provider:  10-8-19 LIZ,  6-7-19 Karen  Future Office Visit:          Short Acting Insulin Protocol Failed - 10/9/2019 10:12 AM        Failed - Microalbumin on file in past 12 months     Recent Labs   Lab Test 02/21/18  1202   MICROL 65   UMALCR 84.38*             Passed - Blood pressure less than 140/90 in past 6 months     BP Readings from Last 3 Encounters:   10/08/19 114/65   10/05/19 106/59   08/06/19 122/70                 Passed - LDL on file in past 12 months     Recent Labs   Lab Test 03/06/19  0951   LDL Cannot estimate LDL when triglyceride exceeds 400 mg/dL  86             Passed - Serum creatinine on file in past 12 months     Recent Labs   Lab Test 10/08/19  1117   CR PENDING             Passed - HgbA1C in past 3 or 6 months     If HgbA1C is 8 or greater, it needs to be on file within the past 3 months.  If less than 8, must be on file within the past 6 months.     Recent Labs   Lab Test 10/08/19  1117   A1C 7.6*             Passed - Medication is active on med list        Passed - Patient is age 18 or older        Passed - Recent (6 mo) or future (30 days) visit within the authorizing provider's specialty     Patient had office visit in the last 6 months or has a visit in the next 30 days with authorizing provider or within the authorizing provider's specialty.  See \"Patient Info\" tab in inbasket, or \"Choose Columns\" in Meds & Orders section of the refill encounter.            Awilda Wade RT (R)    "

## 2019-10-09 NOTE — TELEPHONE ENCOUNTER
Patient had MTM visit yesterday 10-8-19 and some lab results are still pended.    Nathaly - do you recommend any changes in patient's Novolog?  Pharmacy seeking refill for patient.          RT Radha (R)

## 2019-10-14 ENCOUNTER — TELEPHONE (OUTPATIENT)
Dept: FAMILY MEDICINE | Facility: CLINIC | Age: 74
End: 2019-10-14

## 2019-10-14 ENCOUNTER — OFFICE VISIT (OUTPATIENT)
Dept: FAMILY MEDICINE | Facility: CLINIC | Age: 74
End: 2019-10-14
Payer: COMMERCIAL

## 2019-10-14 VITALS
OXYGEN SATURATION: 97 % | HEART RATE: 38 BPM | DIASTOLIC BLOOD PRESSURE: 62 MMHG | HEIGHT: 73 IN | SYSTOLIC BLOOD PRESSURE: 113 MMHG | TEMPERATURE: 97.3 F | WEIGHT: 265 LBS | BODY MASS INDEX: 35.12 KG/M2

## 2019-10-14 DIAGNOSIS — N39.0 URINARY TRACT INFECTION WITHOUT HEMATURIA, SITE UNSPECIFIED: ICD-10-CM

## 2019-10-14 DIAGNOSIS — I48.91 ATRIAL FIBRILLATION, UNSPECIFIED TYPE (H): Chronic | ICD-10-CM

## 2019-10-14 DIAGNOSIS — E11.40 TYPE 2 DIABETES MELLITUS WITH DIABETIC NEUROPATHY, WITH LONG-TERM CURRENT USE OF INSULIN (H): Chronic | ICD-10-CM

## 2019-10-14 DIAGNOSIS — Z79.4 TYPE 2 DIABETES MELLITUS WITH DIABETIC NEUROPATHY, WITH LONG-TERM CURRENT USE OF INSULIN (H): Chronic | ICD-10-CM

## 2019-10-14 DIAGNOSIS — R30.0 DYSURIA: Primary | ICD-10-CM

## 2019-10-14 DIAGNOSIS — I95.1 ORTHOSTATIC HYPOTENSION: ICD-10-CM

## 2019-10-14 DIAGNOSIS — R00.1 BRADYCARDIA: ICD-10-CM

## 2019-10-14 LAB
ALBUMIN UR-MCNC: NEGATIVE MG/DL
APPEARANCE UR: CLEAR
BACTERIA #/AREA URNS HPF: ABNORMAL /HPF
BILIRUB UR QL STRIP: NEGATIVE
COLOR UR AUTO: YELLOW
GLUCOSE UR STRIP-MCNC: NEGATIVE MG/DL
HGB UR QL STRIP: NEGATIVE
KETONES UR STRIP-MCNC: NEGATIVE MG/DL
LEUKOCYTE ESTERASE UR QL STRIP: ABNORMAL
MUCOUS THREADS #/AREA URNS LPF: PRESENT /LPF
NITRATE UR QL: NEGATIVE
NON-SQ EPI CELLS #/AREA URNS LPF: ABNORMAL /LPF
PH UR STRIP: 5.5 PH (ref 5–7)
RBC #/AREA URNS AUTO: ABNORMAL /HPF
SOURCE: ABNORMAL
SP GR UR STRIP: 1.02 (ref 1–1.03)
UROBILINOGEN UR STRIP-ACNC: 0.2 EU/DL (ref 0.2–1)
WBC #/AREA URNS AUTO: ABNORMAL /HPF

## 2019-10-14 PROCEDURE — 81001 URINALYSIS AUTO W/SCOPE: CPT | Performed by: INTERNAL MEDICINE

## 2019-10-14 PROCEDURE — 99214 OFFICE O/P EST MOD 30 MIN: CPT | Performed by: INTERNAL MEDICINE

## 2019-10-14 ASSESSMENT — MIFFLIN-ST. JEOR: SCORE: 2005.91

## 2019-10-14 NOTE — PROGRESS NOTES
Chief Complaint:       Jt Montgomery is a 73 year old male who presents to clinic today for the following health issues:      UC Followup:    Facility:  [facility]  Date of visit: 10/05/2019  Reason for visit: Urinary tract infection without hematuria  Current Status: Patient state that he still having urgency.         HPI:   Patient Jt Montgomery is a very pleasant 73 year old male with history of Type 2 Diabetes who presents to Internal Medicine clinic today for post UC clinic follow up of recent dysuria and acute UTI episode. Regarding the patient's acute UTI, the patient has been treated with Bactrim. He still complains of mild dysuria symptoms at this time. Regarding the patient's chronic diabetes and morbid obesity, the patient has lost about 20 lbs recently through a low carb diet plan.         Current Medications:     Current Outpatient Medications   Medication Sig Dispense Refill     Amino Acids (L-CARNITINE PO) Take 2,000 mg by mouth daily       amoxicillin-clavulanate (AUGMENTIN) 875-125 MG tablet Take 1 tablet by mouth 2 times daily for 5 days 10 tablet 1     aspirin EC 81 MG EC tablet Take 1 tablet by mouth daily. 90 tablet 1     Coenzyme Q10 (COQ10) 100 MG CAPS Take 200 mg by mouth daily        Continuous Blood Gluc Sensor (FREESTYLE RINKU 14 DAY SENSOR) MISC 1 Device every 14 days 2 each 11     digoxin (LANOXIN) 125 MCG tablet Take 1 tablet (125 mcg) by mouth daily 90 tablet 3     diltiazem ER COATED BEADS (CARTIA XT) 300 MG 24 hr capsule Take 1 capsule (300 mg) by mouth daily 90 capsule 3     febuxostat (ULORIC) 40 MG TABS Take 40 mg by mouth daily       furosemide (LASIX) 20 MG tablet Take 1 tablet (20 mg) by mouth daily as needed (edema) 90 tablet 3     Green Tea, Camillia sinensis, (GREEN TEA EXTRACT PO) Take by mouth 2 times daily Doesn't always take regularly       HERBALS Nerve by Plexis 2 a day,   Healthy Feet and Nerves  By [employer]  3 a day, Heart Food  Caps  (cayenna pepper, garlic, hawthorn, onion & ginger 1-2 a day), Super Red Drink Powder daily, garlic daily       insulin aspart (NOVOLOG FLEXPEN) 100 UNIT/ML pen Inject 15 Units Subcutaneous 3 times daily (with meals) 15 mL 3     insulin glargine (LANTUS SOLOSTAR) 100 UNIT/ML pen ADMINISTER 18 UNITS UNDER THE SKIN DAILY 30 mL 11     insulin pen needle (BD GERMÁN U/F) 32G X 4 MM miscellaneous USING FOUR TIMES DAILY OR AS DIRECTED 200 each 0     irbesartan (AVAPRO) 300 MG tablet Take 1 tablet (300 mg) by mouth At Bedtime 90 tablet 3     magnesium oxide 200 MG TABS Take 2 tablets by mouth daily       MELATONIN PO Take 0.5 mg by mouth At Bedtime        metoprolol succinate ER (TOPROL-XL) 100 MG 24 hr tablet Take 1 tablet (100 mg) by mouth 2 times daily 180 tablet 3     multivitamin, therapeutic with minerals (MULTI-VITAMIN) TABS tablet Take 4-8 tablets by mouth daily Life Extenstion       order for DME Equipment being ordered: Compression stockings 1 each 3     predniSONE (DELTASONE) 20 MG tablet TAKE 1 TABLET(20 MG) BY MOUTH DAILY AS NEEDED FOR GOUT FLARE 10 tablet 0     Probiotic Product (PROBIOTIC & ACIDOPHILUS EX ST PO) Take 1 capsule by mouth daily       spironolactone (ALDACTONE) 25 MG tablet Take 1 tablet (25 mg) by mouth daily 90 tablet 3     Taurine 500 MG CAPS Take 1 capsule by mouth as needed        UNABLE TO FIND MEDICATION NAME: Udos Oil - combination of omega 3, 6, 9.  Taking 2-3 capsules daily       UNABLE TO FIND MEDICATION NAME: Super Beet Juice       vitamin D3 (CHOLECALCIFEROL) 2000 units tablet Take 2 tablets by mouth daily        warfarin ANTICOAGULANT (COUMADIN) 5 MG tablet Take 1 1/2 tabs on Sundays, Tuesdays and Thursdays and take 1 tab on all other days or as directed by INR clinic 110 tablet 1         Allergies:      Allergies   Allergen Reactions     Corn Dextrin      All corn products - gets tired an ornery            Past Medical History:     Past Medical History:   Diagnosis Date      Atrial fibrillation (H)      CAD (coronary artery disease)     MI with RONEL to dRCA 2011     Central Sleep Apnea with Pat Villanueva breathing 2010    Also TOMMY with CPAP     CKD (chronic kidney disease) stage 3, GFR 30-59 ml/min (H)      Dilated cardiomyopathy (H)     nonischemic (possibly related to h/o a.fib with RVR) EF 30-40% 2013     DM2 (diabetes mellitus, type 2) (H)     Goal HgbA1c < 7%     Gout     uric acid level correlates; 2014 h/o +knee aspirate     Hypertension     Goal <140/90     Pulmonary hypertension (H)     mild per echo 3/2013         Past Surgical History:     Past Surgical History:   Procedure Laterality Date     CORONARY ANGIOGRAPHY ADULT ORDER      distal circ-RONEL     HERNIA REPAIR  11/20/10    incarcinated     SUSPEND HYOID, GENIOGLOSSAL ADVANCEMENT           Family Medical History:     Family History   Problem Relation Age of Onset     Hypertension Mother      Coronary Artery Disease Mother      Coronary Artery Disease Father      Hypertension Father      Diabetes Sister      Cerebrovascular Disease Sister          Social History:     Social History     Socioeconomic History     Marital status:      Spouse name: Not on file     Number of children: Not on file     Years of education: Not on file     Highest education level: Not on file   Occupational History     Not on file   Social Needs     Financial resource strain: Not on file     Food insecurity:     Worry: Not on file     Inability: Not on file     Transportation needs:     Medical: Not on file     Non-medical: Not on file   Tobacco Use     Smoking status: Former Smoker     Packs/day: 1.50     Years: 7.00     Pack years: 10.50     Types: Cigarettes     Last attempt to quit: 1972     Years since quittin.8     Smokeless tobacco: Never Used     Tobacco comment: quit in       Started at around age 18-19   Substance and Sexual Activity     Alcohol use: No     Alcohol/week: 0.0 standard drinks      Comment: 11/20/73   recovering     Drug use: No     Sexual activity: Not Currently     Partners: Female   Lifestyle     Physical activity:     Days per week: Not on file     Minutes per session: Not on file     Stress: Not on file   Relationships     Social connections:     Talks on phone: Not on file     Gets together: Not on file     Attends Gnosticism service: Not on file     Active member of club or organization: Not on file     Attends meetings of clubs or organizations: Not on file     Relationship status: Not on file     Intimate partner violence:     Fear of current or ex partner: Not on file     Emotionally abused: Not on file     Physically abused: Not on file     Forced sexual activity: Not on file   Other Topics Concern      Service Not Asked     Blood Transfusions Not Asked     Caffeine Concern No     Occupational Exposure Not Asked     Hobby Hazards Not Asked     Sleep Concern Yes     Comment: sleep apnea, wears bi-pap     Stress Concern Not Asked     Weight Concern Not Asked     Special Diet No     Comment: low carb diet     Back Care Not Asked     Exercise No     Comment: walking     Bike Helmet Not Asked     Seat Belt Yes     Self-Exams Not Asked     Parent/sibling w/ CABG, MI or angioplasty before 65F 55M? Not Asked   Social History Narrative     Not on file           Review of System:     Constitutional: Negative for fever or chills, positive for about 20 lbs weight loss recently through low carb diet   Skin: Negative for rashes  Ears/Nose/Throat: Negative for nasal congestion, sore throat  Respiratory: No shortness of breath, dyspnea on exertion, cough, or hemoptysis  Cardiovascular: Negative for chest pain, positive for bradycardia  Gastrointestinal: Negative for nausea, vomiting  Genitourinary: positive for dysuria, UTI  Musculoskeletal: Negative for myalgias  Neurologic: Negative for headaches  Psychiatric: Negative for depression, anxiety  Hematologic/Lymphatic/Immunologic:  "Negative  Endocrine: Negative  Behavioral: Negative for tobacco use       Physical Exam:   /62 (BP Location: Left arm, Patient Position: Sitting, Cuff Size: Adult Large)   Pulse (!) 38   Temp 97.3  F (36.3  C) (Oral)   Ht 1.854 m (6' 1\")   Wt 120.2 kg (265 lb)   SpO2 97%   BMI 34.96 kg/m      GENERAL: chronically ill appearing elderly male, alert and no acute distress  EYES: eyes grossly normal to inspection, and conjunctivae and sclerae normal  HENT: Normocephalic atraumatic. Nose and mouth without ulcers or lesions  NECK: supple  RESP: lungs clear to auscultation   CV: bradycardiac  LYMPH: no peripheral edema   ABDOMEN: nondistended  MS: no gross musculoskeletal defects noted  SKIN: no suspicious lesions or rashes  NEURO: Alert & Oriented x 3.   PSYCH: mentation appears normal, affect normal        Diagnostic Test Results:     Diagnostic Test Results:  Results for orders placed or performed in visit on 10/14/19 (from the past 24 hour(s))   UA reflex to Microscopic and Culture   Result Value Ref Range    Color Urine Yellow     Appearance Urine Clear     Glucose Urine Negative NEG^Negative mg/dL    Bilirubin Urine Negative NEG^Negative    Ketones Urine Negative NEG^Negative mg/dL    Specific Gravity Urine 1.020 1.003 - 1.035    Blood Urine Negative NEG^Negative    pH Urine 5.5 5.0 - 7.0 pH    Protein Albumin Urine Negative NEG^Negative mg/dL    Urobilinogen Urine 0.2 0.2 - 1.0 EU/dL    Nitrite Urine Negative NEG^Negative    Leukocyte Esterase Urine Trace (A) NEG^Negative    Source Midstream Urine    Urine Microscopic   Result Value Ref Range    WBC Urine 0 - 5 OTO5^0 - 5 /HPF    RBC Urine O - 2 OTO2^O - 2 /HPF    Squamous Epithelial /LPF Urine Few FEW^Few /LPF    Bacteria Urine Few (A) NEG^Negative /HPF    Mucous Urine Present (A) NEG^Negative /LPF       ASSESSMENT/PLAN:       (N39.0) Urinary tract infection without hematuria, site unspecified  (R30.0) Dysuria  (primary encounter diagnosis)  Comment: " post UC clinic follow up of recent UTI, dysuria symptoms still present. UA lab today still shows mild UTI.  Plan: UA reflex to Microscopic and Culture, Urine         Microscopic, amoxicillin-clavulanate         (AUGMENTIN) 875-125 MG tablet      (E11.40,  Z79.4) Type 2 diabetes mellitus with diabetic neuropathy, with long-term current use of insulin (H)  Comment: no recent hypoglycemia events  Plan: continue current diabetes therapy      (I48.91) Atrial fibrillation, unspecified type (H)  (I95.1) Orthostatic hypotension  (R00.1) Bradycardia  Comment: heart rate bradycardiac and BP mildly orthostatic after the patient recently lost a lot of weight through diet and exercise for diabetes control  Plan: I have decreased the patient's Metoprolol cardiac medication dose from 100mg 2xDay to 50mg 2xDay. The patient declined to go to the [facility] ER at this time. Patient will return to clinic for follow up in 2 days or sooner as needed.        Follow Up Plan:     Patient is instructed to return to Internal Medicine clinic for follow-up visit in 2 days.        Digna Jones MD  Internal Medicine  [facility]

## 2019-10-14 NOTE — TELEPHONE ENCOUNTER
Reason for call:  Patient reporting a symptom    Symptom or request: possible     Duration (how long have symptoms been present): more than a week, even with treatment    Have you been treated for this before? Yes    Additional comments:UTI symptoms have improved but not completely gone    Phone Number patient can be reached at:  Home number on file [phone] (home)    Best Time:  any    Can we leave a detailed message on this number:  YES    Call taken on 10/14/2019 at 11:27 AM by Annabella Adan

## 2019-10-14 NOTE — TELEPHONE ENCOUNTER
Last 4 nights every 1.5 hours having to go to bathroom.   Worsening UA sx, but denies fever or flank pain. Scheduled with PCP for today at 330 workin, ok per Dr. Jones.  Ivette Dillard RN

## 2019-10-15 ENCOUNTER — ANTICOAGULATION THERAPY VISIT (OUTPATIENT)
Dept: CARDIOLOGY | Facility: CLINIC | Age: 74
End: 2019-10-15
Payer: COMMERCIAL

## 2019-10-15 DIAGNOSIS — I48.91 ATRIAL FIBRILLATION, UNSPECIFIED TYPE (H): ICD-10-CM

## 2019-10-15 DIAGNOSIS — Z79.01 LONG TERM CURRENT USE OF ANTICOAGULANTS WITH INR GOAL OF 2.0-3.0: ICD-10-CM

## 2019-10-15 LAB — INR POINT OF CARE: 2.9 (ref 0.86–1.14)

## 2019-10-15 PROCEDURE — 36416 COLLJ CAPILLARY BLOOD SPEC: CPT | Performed by: INTERNAL MEDICINE

## 2019-10-15 PROCEDURE — 85610 PROTHROMBIN TIME: CPT | Mod: QW | Performed by: INTERNAL MEDICINE

## 2019-10-15 NOTE — PROGRESS NOTES
ANTICOAGULATION FOLLOW-UP CLINIC VISIT    Patient Name:  Jt Montgomery  Date:  10/15/2019  Contact Type:  Face to Face    SUBJECTIVE:  Patient Findings     Positives:   Change in medications (Pt was started on augmentin for residual UTI symptoms; pts metoprolol was decreased for bradycardia), Change in diet/appetite (Pt ate more dark greens this week to offset the antibiotics, ate a serving of broccoli and 3-4 large salads)        Clinical Outcomes     Negatives:   Major bleeding event, Thromboembolic event, Anticoagulation-related hospital admission, Anticoagulation-related ED visit, Anticoagulation-related fatality           OBJECTIVE    INR Protime   Date Value Ref Range Status   10/15/2019 2.9 (A) 0.86 - 1.14 Final       ASSESSMENT / PLAN  INR assessment THER    Recheck INR In: 1 WEEK    INR Location Clinic      Anticoagulation Summary  As of 10/15/2019    INR goal:   2.0-3.0   TTR:   82.4 % (3.3 y)   INR used for dosin.9 (10/15/2019)   Warfarin maintenance plan:   7.5 mg (5 mg x 1.5) every Sun, Tue, Thu; 5 mg (5 mg x 1) all other days   Full warfarin instructions:   7.5 mg every Sun, Tue, Thu; 5 mg all other days   Weekly warfarin total:   42.5 mg   No change documented:   Tori Rico RN   Plan last modified:   Marlena Hong RN (5/10/2019)   Next INR check:   10/22/2019   Target end date:   Indefinite    Indications    Atrial fibrillation (AFIB) on Coumadin [I48.91]  Long term current use of anticoagulants with INR goal of 2.0-3.0 [Z79.01]             Anticoagulation Episode Summary     INR check location:       Preferred lab:       Send INR reminders to:   [facility] HEART INR NURSE    Comments:         Anticoagulation Care Providers     Provider Role Specialty Phone number    Patricia Lottie Perla DO [facility] Cardiology [phone]            See the Encounter Report to view Anticoagulation Flowsheet and Dosing Calendar (Go to Encounters tab in chart review, and find the  Anticoagulation Therapy Visit)    INR 2.9. No abnormal bleeding or bruising. At last visit, patient had just started Bactrim for a UTI, course completed on 10/12/19. Pt went back in to his PCP on 10/14/19 for residual UTI symptoms and was started on Augmentin x5 days. Metoprolol was also decreased due to bradycardia. Pt took his first dose of Augmentin last night, will finish on Saturday morning 10/19/19. While on bactrim, pt was advised to increase his intake of green veggies to offset the effects of the antibiotic on his warfarin. Pt states he ate a serving of broccoli and 3-4 green salads. Will continue with current maintenance plan of 7.5mg Sun/Tues/Thurs and 5mg all other days. Pt will continue with higher intake of greens while on Augmentin. Recheck INR in 1wk.     Tori Rico RN

## 2019-10-16 ENCOUNTER — OFFICE VISIT (OUTPATIENT)
Dept: FAMILY MEDICINE | Facility: CLINIC | Age: 74
End: 2019-10-16
Payer: COMMERCIAL

## 2019-10-16 VITALS
SYSTOLIC BLOOD PRESSURE: 127 MMHG | HEART RATE: 83 BPM | WEIGHT: 265.7 LBS | OXYGEN SATURATION: 95 % | HEIGHT: 73 IN | DIASTOLIC BLOOD PRESSURE: 75 MMHG | BODY MASS INDEX: 35.21 KG/M2 | TEMPERATURE: 98.1 F

## 2019-10-16 DIAGNOSIS — E66.01 MORBID OBESITY (H): ICD-10-CM

## 2019-10-16 DIAGNOSIS — Z79.4 TYPE 2 DIABETES MELLITUS WITH DIABETIC NEUROPATHY, WITH LONG-TERM CURRENT USE OF INSULIN (H): Chronic | ICD-10-CM

## 2019-10-16 DIAGNOSIS — I25.119 CORONARY ARTERY DISEASE INVOLVING NATIVE CORONARY ARTERY OF NATIVE HEART WITH ANGINA PECTORIS (H): ICD-10-CM

## 2019-10-16 DIAGNOSIS — I10 ESSENTIAL HYPERTENSION: Chronic | ICD-10-CM

## 2019-10-16 DIAGNOSIS — E78.2 MIXED HYPERLIPIDEMIA DUE TO TYPE 2 DIABETES MELLITUS (H): ICD-10-CM

## 2019-10-16 DIAGNOSIS — E11.40 TYPE 2 DIABETES MELLITUS WITH DIABETIC NEUROPATHY, WITH LONG-TERM CURRENT USE OF INSULIN (H): Chronic | ICD-10-CM

## 2019-10-16 DIAGNOSIS — Z12.11 SPECIAL SCREENING FOR MALIGNANT NEOPLASMS, COLON: Primary | ICD-10-CM

## 2019-10-16 DIAGNOSIS — I48.91 ATRIAL FIBRILLATION, UNSPECIFIED TYPE (H): Chronic | ICD-10-CM

## 2019-10-16 DIAGNOSIS — Z11.59 ENCOUNTER FOR HEPATITIS C SCREENING TEST FOR LOW RISK PATIENT: ICD-10-CM

## 2019-10-16 DIAGNOSIS — E11.69 MIXED HYPERLIPIDEMIA DUE TO TYPE 2 DIABETES MELLITUS (H): ICD-10-CM

## 2019-10-16 DIAGNOSIS — Z79.01 LONG TERM CURRENT USE OF ANTICOAGULANT THERAPY: ICD-10-CM

## 2019-10-16 PROCEDURE — 99214 OFFICE O/P EST MOD 30 MIN: CPT | Performed by: INTERNAL MEDICINE

## 2019-10-16 PROCEDURE — 99207 C PAF COMPLETED  NO CHARGE: CPT | Performed by: INTERNAL MEDICINE

## 2019-10-16 RX ORDER — METOPROLOL SUCCINATE 50 MG/1
50 TABLET, EXTENDED RELEASE ORAL
Qty: 180 TABLET | Refills: 3
Start: 2019-10-16 | End: 2019-11-22

## 2019-10-16 ASSESSMENT — MIFFLIN-ST. JEOR: SCORE: 2009.09

## 2019-10-16 NOTE — PROGRESS NOTES
Chief Complaint:     Jt Montgomery is a 73 year old male who presents to clinic today for the following health issues:    Follow up on multiple concerns including hypertension, Type 2 Diabetes     HPI:   Patient Jt Montgomery is a very pleasant 73 year old male with history of hypertension, hyperlipidemia, CAD, Type 2 Diabetes who presents to Internal Medicine clinic today for follow up on multiple concerns including hypertension, Type 2 Diabetes. Regarding the patient's Type 2 Diabetes, the patient is compliant with diabetes therapy including insulin. He reports no recent hypoglycemia events. the patient denies any chest pain, headaches, fever or chills.  Regarding the patient's hypertension, the patient is due for a refill of his metoprolol BP medication. Regarding his routine health maintenance, the patient is due for a HCV screening lab and FIT test lab for colon cancer screening at this time. Regarding his atrial fibrillation, the patient's heart rate is well controlled. He is compliant with his warfarin long term anticoagulation therapy at this time.        Current Medications:     Current Outpatient Medications   Medication Sig Dispense Refill     Amino Acids (L-CARNITINE PO) Take 2,000 mg by mouth daily       amoxicillin-clavulanate (AUGMENTIN) 875-125 MG tablet Take 1 tablet by mouth 2 times daily for 5 days 10 tablet 1     aspirin EC 81 MG EC tablet Take 1 tablet by mouth daily. 90 tablet 1     Coenzyme Q10 (COQ10) 100 MG CAPS Take 200 mg by mouth daily        Continuous Blood Gluc Sensor (FREESTYLE RINKU 14 DAY SENSOR) MISC 1 Device every 14 days 2 each 11     digoxin (LANOXIN) 125 MCG tablet Take 1 tablet (125 mcg) by mouth daily 90 tablet 3     diltiazem ER COATED BEADS (CARTIA XT) 300 MG 24 hr capsule Take 1 capsule (300 mg) by mouth daily 90 capsule 3     febuxostat (ULORIC) 40 MG TABS Take 40 mg by mouth daily       furosemide (LASIX) 20 MG tablet Take 1 tablet (20 mg) by mouth  daily as needed (edema) 90 tablet 3     Green Tea, Camillia sinensis, (GREEN TEA EXTRACT PO) Take by mouth 2 times daily Doesn't always take regularly       HERBALS Nerve by Plexis 2 a day,   Healthy Feet and Nerves  By [employer]  3 a day, Heart Food Caps  (cayenna pepper, garlic, hawthorn, onion & ginger 1-2 a day), Super Red Drink Powder daily, garlic daily       insulin aspart (NOVOLOG FLEXPEN) 100 UNIT/ML pen Inject 15 Units Subcutaneous 3 times daily (with meals) 15 mL 3     insulin glargine (LANTUS SOLOSTAR) 100 UNIT/ML pen ADMINISTER 18 UNITS UNDER THE SKIN DAILY 30 mL 11     insulin pen needle (BD GERMÁN U/F) 32G X 4 MM miscellaneous USING FOUR TIMES DAILY OR AS DIRECTED 200 each 0     irbesartan (AVAPRO) 300 MG tablet Take 1 tablet (300 mg) by mouth At Bedtime 90 tablet 3     magnesium oxide 200 MG TABS Take 2 tablets by mouth daily       MELATONIN PO Take 0.5 mg by mouth At Bedtime        metoprolol succinate ER (TOPROL-XL) 50 MG 24 hr tablet Take 1 tablet (50 mg) by mouth 2 times daily 180 tablet 3     multivitamin, therapeutic with minerals (MULTI-VITAMIN) TABS tablet Take 4-8 tablets by mouth daily Life Extenstion       order for DME Equipment being ordered: Compression stockings 1 each 3     predniSONE (DELTASONE) 20 MG tablet TAKE 1 TABLET(20 MG) BY MOUTH DAILY AS NEEDED FOR GOUT FLARE 10 tablet 0     Probiotic Product (PROBIOTIC & ACIDOPHILUS EX ST PO) Take 1 capsule by mouth daily       spironolactone (ALDACTONE) 25 MG tablet Take 1 tablet (25 mg) by mouth daily 90 tablet 3     Taurine 500 MG CAPS Take 1 capsule by mouth as needed        UNABLE TO FIND MEDICATION NAME: Udos Oil - combination of omega 3, 6, 9.  Taking 2-3 capsules daily       UNABLE TO FIND MEDICATION NAME: Super Beet Juice       vitamin D3 (CHOLECALCIFEROL) 2000 units tablet Take 2 tablets by mouth daily        warfarin ANTICOAGULANT (COUMADIN) 5 MG tablet Take 1 1/2 tabs on Sundays, Tuesdays and Thursdays and take 1 tab on all other  days or as directed by INR clinic 110 tablet 1         Allergies:      Allergies   Allergen Reactions     Corn Dextrin      All corn products - gets tired an ornery            Past Medical History:     Past Medical History:   Diagnosis Date     Atrial fibrillation (H)      CAD (coronary artery disease)     MI with RONEL to dRCA April 2011     Central Sleep Apnea with Pat Villanueva breathing 9/14/2010    Also TOMMY with CPAP     CKD (chronic kidney disease) stage 3, GFR 30-59 ml/min (H)      Dilated cardiomyopathy (H)     nonischemic (possibly related to h/o a.fib with RVR) EF 30-40% March 2013     DM2 (diabetes mellitus, type 2) (H)     Goal HgbA1c < 7%     Gout     uric acid level correlates; April 2014 h/o +knee aspirate     Hypertension     Goal <140/90     Pulmonary hypertension (H)     mild per echo 3/2013         Past Surgical History:     Past Surgical History:   Procedure Laterality Date     CORONARY ANGIOGRAPHY ADULT ORDER  2011    distal circ-RONEL     HERNIA REPAIR  11/20/10    incarcinated     SUSPEND HYOID, GENIOGLOSSAL ADVANCEMENT           Family Medical History:     Family History   Problem Relation Age of Onset     Hypertension Mother      Coronary Artery Disease Mother      Coronary Artery Disease Father      Hypertension Father      Diabetes Sister      Cerebrovascular Disease Sister          Social History:     Social History     Socioeconomic History     Marital status:      Spouse name: Not on file     Number of children: Not on file     Years of education: Not on file     Highest education level: Not on file   Occupational History     Not on file   Social Needs     Financial resource strain: Not on file     Food insecurity:     Worry: Not on file     Inability: Not on file     Transportation needs:     Medical: Not on file     Non-medical: Not on file   Tobacco Use     Smoking status: Former Smoker     Packs/day: 1.50     Years: 7.00     Pack years: 10.50     Types: Cigarettes     Last  attempt to quit: 1972     Years since quittin.8     Smokeless tobacco: Never Used     Tobacco comment: quit in       Started at around age 18-19   Substance and Sexual Activity     Alcohol use: No     Alcohol/week: 0.0 standard drinks     Comment: 74   recovering     Drug use: No     Sexual activity: Not Currently     Partners: Female   Lifestyle     Physical activity:     Days per week: Not on file     Minutes per session: Not on file     Stress: Not on file   Relationships     Social connections:     Talks on phone: Not on file     Gets together: Not on file     Attends Buddhist service: Not on file     Active member of club or organization: Not on file     Attends meetings of clubs or organizations: Not on file     Relationship status: Not on file     Intimate partner violence:     Fear of current or ex partner: Not on file     Emotionally abused: Not on file     Physically abused: Not on file     Forced sexual activity: Not on file   Other Topics Concern      Service Not Asked     Blood Transfusions Not Asked     Caffeine Concern No     Occupational Exposure Not Asked     Hobby Hazards Not Asked     Sleep Concern Yes     Comment: sleep apnea, wears bi-pap     Stress Concern Not Asked     Weight Concern Not Asked     Special Diet No     Comment: low carb diet     Back Care Not Asked     Exercise No     Comment: walking     Bike Helmet Not Asked     Seat Belt Yes     Self-Exams Not Asked     Parent/sibling w/ CABG, MI or angioplasty before 65F 55M? Not Asked   Social History Narrative     Not on file           Review of System:     Constitutional: Negative for fever or chills, positive for recent weight loss through diet and exercise in the setting of chronic morbid obesity  Skin: Negative for rashes  Ears/Nose/Throat: Negative for nasal congestion, sore throat  Respiratory: No shortness of breath, dyspnea on exertion, cough, or hemoptysis  Cardiovascular: Negative for chest pain,  "positive for atrial fibrillation  Gastrointestinal: Negative for nausea, vomiting  Genitourinary: Negative for dysuria, hematuria  Musculoskeletal: Negative for myalgias  Neurologic: Negative for headaches  Psychiatric: Negative for depression, anxiety  Hematologic/Lymphatic/Immunologic: Negative  Endocrine: Negative for recent hypoglycemia events  Behavioral: Negative for tobacco use       Physical Exam:   /75 (BP Location: Left arm, Patient Position: Sitting, Cuff Size: Adult Large)   Pulse 83   Temp 98.1  F (36.7  C) (Oral)   Ht 1.854 m (6' 1\")   Wt 120.5 kg (265 lb 11.2 oz)   SpO2 95%   BMI 35.05 kg/m      GENERAL: alert and no distress  EYES: eyes grossly normal to inspection, and conjunctivae and sclerae normal  HENT: Normocephalic atraumatic. Nose and mouth without ulcers or lesions  NECK: supple  RESP: lungs clear to auscultation   CV: irregularly irregular, heart rate is well controlled  LYMPH: no peripheral edema   ABDOMEN: nondistended  MS: no gross musculoskeletal defects noted  SKIN: no suspicious lesions or rashes  NEURO: Alert & Oriented x 3.   PSYCH: mentation appears normal, affect normal        Diagnostic Test Results:     Diagnostic Test Results:  Results for orders placed or performed in visit on 10/15/19   INR point of care   Result Value Ref Range    INR Protime 2.9 (A) 0.86 - 1.14       ASSESSMENT/PLAN:     (E11.40,  Z79.4) Type 2 diabetes mellitus with diabetic neuropathy, with long-term current use of insulin (H) (primary encounter diagnosis)  Comment: no recent hypoglycemia events  Plan: **A1C FUTURE anytime      (Z12.11) Special screening for malignant neoplasms, colon   Comment: patient is due for FIT test for colon cancer screening  Plan: PAF COMPLETED, Fecal colorectal cancer screen (FIT)      (E11.69,  E78.2) Mixed hyperlipidemia due to type 2 diabetes mellitus (H)  (I25.119) Coronary artery disease involving native coronary artery of native heart with angina pectoris " (H)  Comment: no chest pains, patient is due for a refill of metoprolol BP medication at the current dose of 50 MG 2xDay.  Plan: metoprolol succinate ER (TOPROL-XL) 50 MG 24 hr        tablet      (E66.01) Morbid obesity (H)  Comment: patient has lost some weight through diet and exercise program recently  Plan: continue current diet and exercise treatment going forward.      (I10) Essential hypertension  Comment: BP is currently well controlled  Plan: continue current BP medication regimen.      (I48.91) Atrial fibrillation, unspecified type (H)  (Z79.01) Long-term (current) use of anticoagulants [Z79.01]  Comment: heart rate is well controlled  Plan: continue current long term anticoagulation therapy with warfarin      (Z11.59) Encounter for hepatitis C screening test for low risk patient  Comment: patient is due for HCV screening  Plan: Hepatitis C antibody    Follow Up Plan:     Patient is instructed to return to Internal Medicine clinic for follow-up visit in 1 month.        Digna Jones MD  Internal Medicine  [facility]

## 2019-10-22 ENCOUNTER — ANTICOAGULATION THERAPY VISIT (OUTPATIENT)
Dept: CARDIOLOGY | Facility: CLINIC | Age: 74
End: 2019-10-22
Payer: COMMERCIAL

## 2019-10-22 DIAGNOSIS — Z79.01 LONG TERM CURRENT USE OF ANTICOAGULANTS WITH INR GOAL OF 2.0-3.0: ICD-10-CM

## 2019-10-22 DIAGNOSIS — I48.91 ATRIAL FIBRILLATION, UNSPECIFIED TYPE (H): ICD-10-CM

## 2019-10-22 LAB — INR POINT OF CARE: 2.2 (ref 0.86–1.14)

## 2019-10-22 PROCEDURE — 36416 COLLJ CAPILLARY BLOOD SPEC: CPT | Performed by: INTERNAL MEDICINE

## 2019-10-22 PROCEDURE — 85610 PROTHROMBIN TIME: CPT | Mod: QW | Performed by: INTERNAL MEDICINE

## 2019-10-22 NOTE — PROGRESS NOTES
ANTICOAGULATION FOLLOW-UP CLINIC VISIT    Patient Name:  Jt Montgomery  Date:  10/22/2019  Contact Type:  Face to Face    SUBJECTIVE:  Patient Findings     Positives:   Change in health (Completed course of Augmentin on Sat for UTI-asymptomatic now)        Clinical Outcomes     Negatives:   Major bleeding event, Thromboembolic event, Anticoagulation-related hospital admission, Anticoagulation-related ED visit, Anticoagulation-related fatality           OBJECTIVE    INR Protime   Date Value Ref Range Status   10/22/2019 2.2 (A) 0.86 - 1.14 Final       ASSESSMENT / PLAN  INR assessment THER    Recheck INR In: 4 WEEKS    INR Location Clinic      Anticoagulation Summary  As of 10/22/2019    INR goal:   2.0-3.0   TTR:   82.5 % (3.3 y)   INR used for dosin.2 (10/22/2019)   Warfarin maintenance plan:   7.5 mg (5 mg x 1.5) every Sun, Tue, Thu; 5 mg (5 mg x 1) all other days   Full warfarin instructions:   7.5 mg every Sun, Tue, Thu; 5 mg all other days   Weekly warfarin total:   42.5 mg   No change documented:   Lola Mendieta RN   Plan last modified:   Marlena Hong RN (5/10/2019)   Next INR check:   2019   Target end date:   Indefinite    Indications    Atrial fibrillation (AFIB) on Coumadin [I48.91]  Long term current use of anticoagulants with INR goal of 2.0-3.0 [Z79.01]             Anticoagulation Episode Summary     INR check location:       Preferred lab:       Send INR reminders to:   [facility] HEART INR NURSE    Comments:         Anticoagulation Care Providers     Provider Role Specialty Phone number    Patricia Lottie Perla DO [facility] Card[facility] [phone]            See the Encounter Report to view Anticoagulation Flowsheet and Dosing Calendar (Go to Encounters tab in chart review, and find the Anticoagulation Therapy Visit)    INR was 2.2 today. Pt denies any abnormal bleeding or bruising. Completed course of Augmentin on Sat for persistent UTI-asymtomatic now. Denies other  illness or medication changes. Eats daily servings of greens. Will continue current dosing of 7.5 mg every S,T, TH and 5 mg all other days of the week. Next INR check is scheduled in 4 weeks. Pt verbalized understanding.    Lola Mendieta RN

## 2019-10-28 DIAGNOSIS — I10 ESSENTIAL HYPERTENSION: ICD-10-CM

## 2019-10-28 RX ORDER — IRBESARTAN 300 MG/1
300 TABLET ORAL AT BEDTIME
Qty: 90 TABLET | Refills: 2 | Status: ON HOLD | OUTPATIENT
Start: 2019-10-28 | End: 2019-12-21

## 2019-11-14 DIAGNOSIS — E11.40 TYPE 2 DIABETES MELLITUS WITH DIABETIC NEUROPATHY, WITH LONG-TERM CURRENT USE OF INSULIN (H): Chronic | ICD-10-CM

## 2019-11-14 DIAGNOSIS — Z11.59 ENCOUNTER FOR HEPATITIS C SCREENING TEST FOR LOW RISK PATIENT: ICD-10-CM

## 2019-11-14 DIAGNOSIS — Z79.4 TYPE 2 DIABETES MELLITUS WITH DIABETIC NEUROPATHY, WITH LONG-TERM CURRENT USE OF INSULIN (H): Chronic | ICD-10-CM

## 2019-11-14 LAB — HBA1C MFR BLD: 6.2 % (ref 0–5.6)

## 2019-11-14 PROCEDURE — 83036 HEMOGLOBIN GLYCOSYLATED A1C: CPT | Performed by: INTERNAL MEDICINE

## 2019-11-14 PROCEDURE — 36415 COLL VENOUS BLD VENIPUNCTURE: CPT | Performed by: INTERNAL MEDICINE

## 2019-11-14 PROCEDURE — 86803 HEPATITIS C AB TEST: CPT | Performed by: INTERNAL MEDICINE

## 2019-11-15 LAB — HCV AB SERPL QL IA: NONREACTIVE

## 2019-11-21 ENCOUNTER — ANTICOAGULATION THERAPY VISIT (OUTPATIENT)
Dept: CARDIOLOGY | Facility: CLINIC | Age: 74
End: 2019-11-21
Payer: COMMERCIAL

## 2019-11-21 DIAGNOSIS — I48.21 PERMANENT ATRIAL FIBRILLATION (H): ICD-10-CM

## 2019-11-21 DIAGNOSIS — Z79.01 LONG TERM CURRENT USE OF ANTICOAGULANTS WITH INR GOAL OF 2.0-3.0: ICD-10-CM

## 2019-11-21 LAB — INR POINT OF CARE: 4.1 (ref 0.86–1.14)

## 2019-11-21 PROCEDURE — 36416 COLLJ CAPILLARY BLOOD SPEC: CPT | Performed by: INTERNAL MEDICINE

## 2019-11-21 PROCEDURE — 85610 PROTHROMBIN TIME: CPT | Mod: QW | Performed by: INTERNAL MEDICINE

## 2019-11-21 NOTE — PROGRESS NOTES
ANTICOAGULATION FOLLOW-UP CLINIC VISIT    Patient Name:  Jt Montgomery  Date:  2019  Contact Type:  Face to Face    SUBJECTIVE:  Patient Findings     Positives:   Change in medications        Clinical Outcomes     Negatives:   Major bleeding event, Thromboembolic event, Anticoagulation-related hospital admission, Anticoagulation-related ED visit, Anticoagulation-related fatality           OBJECTIVE    INR Protime   Date Value Ref Range Status   2019 4.1 (A) 0.86 - 1.14 Final       ASSESSMENT / PLAN  INR assessment SUPRA    Recheck INR In: 10 DAYS    INR Location Clinic      Anticoagulation Summary  As of 2019    INR goal:   2.0-3.0   TTR:   73.9 % (11.6 mo)   INR used for dosin.1! (2019)   Warfarin maintenance plan:   7.5 mg (5 mg x 1.5) every Sun, Tue, Thu; 5 mg (5 mg x 1) all other days   Full warfarin instructions:   : Hold; Otherwise 7.5 mg every Sun, Tue, Thu; 5 mg all other days   Weekly warfarin total:   42.5 mg   Plan last modified:   Marlena Hong RN (5/10/2019)   Next INR check:   12/3/2019   Target end date:   Indefinite    Indications    Atrial fibrillation (AFIB) on Coumadin [I48.91]  Long term current use of anticoagulants with INR goal of 2.0-3.0 [Z79.01]             Anticoagulation Episode Summary     INR check location:       Preferred lab:       Send INR reminders to:   [facility] HEART INR NURSE    Comments:         Anticoagulation Care Providers     Provider Role Specialty Phone number    Lottie Gomez MindaDO [facility] Cardiology [phone]            See the Encounter Report to view Anticoagulation Flowsheet and Dosing Calendar (Go to Encounters tab in chart review, and find the Anticoagulation Therapy Visit)    INR 4.1.  He finished augmentin for UTI on 10/19.  Since the UTI he started taking 2 supplements and have continued them.  One is unknown and will bring the bottle next time and the other is Super Beta Prostate which includes: vit  D3, calcium, zinc, selenium, copper, manganese, chromium, molybdenum, silica, plant sterols, lycopene, reishi mushroom extract, boron.  No signs of bleeding.  Hold Warfarin dose today x1 then back to normal schedule and recheck in 10 days.  He will trial stopping the supplements since he doesn't have any further bladder problems or symptoms.  He has also lost about 20 pounds in the past couple months.  Brian Roberson RN

## 2019-11-22 ENCOUNTER — OFFICE VISIT (OUTPATIENT)
Dept: FAMILY MEDICINE | Facility: CLINIC | Age: 74
End: 2019-11-22
Payer: COMMERCIAL

## 2019-11-22 VITALS
SYSTOLIC BLOOD PRESSURE: 117 MMHG | DIASTOLIC BLOOD PRESSURE: 73 MMHG | HEIGHT: 73 IN | TEMPERATURE: 99.1 F | HEART RATE: 62 BPM | BODY MASS INDEX: 34.94 KG/M2 | OXYGEN SATURATION: 96 % | WEIGHT: 263.6 LBS

## 2019-11-22 DIAGNOSIS — I10 ESSENTIAL HYPERTENSION: Chronic | ICD-10-CM

## 2019-11-22 DIAGNOSIS — I25.119 CORONARY ARTERY DISEASE INVOLVING NATIVE CORONARY ARTERY OF NATIVE HEART WITH ANGINA PECTORIS (H): ICD-10-CM

## 2019-11-22 DIAGNOSIS — E78.5 HYPERLIPIDEMIA LDL GOAL <100: ICD-10-CM

## 2019-11-22 DIAGNOSIS — E11.40 TYPE 2 DIABETES MELLITUS WITH DIABETIC NEUROPATHY, WITH LONG-TERM CURRENT USE OF INSULIN (H): Primary | Chronic | ICD-10-CM

## 2019-11-22 DIAGNOSIS — Z79.4 TYPE 2 DIABETES MELLITUS WITH DIABETIC NEUROPATHY, WITH LONG-TERM CURRENT USE OF INSULIN (H): Primary | Chronic | ICD-10-CM

## 2019-11-22 DIAGNOSIS — F10.21 ALCOHOL DEPENDENCE IN REMISSION (H): ICD-10-CM

## 2019-11-22 PROCEDURE — 36415 COLL VENOUS BLD VENIPUNCTURE: CPT | Performed by: INTERNAL MEDICINE

## 2019-11-22 PROCEDURE — 99214 OFFICE O/P EST MOD 30 MIN: CPT | Performed by: INTERNAL MEDICINE

## 2019-11-22 PROCEDURE — 83721 ASSAY OF BLOOD LIPOPROTEIN: CPT | Mod: 59 | Performed by: INTERNAL MEDICINE

## 2019-11-22 PROCEDURE — 80061 LIPID PANEL: CPT | Performed by: INTERNAL MEDICINE

## 2019-11-22 RX ORDER — METOPROLOL SUCCINATE 50 MG/1
50 TABLET, EXTENDED RELEASE ORAL
Qty: 180 TABLET | Refills: 3 | Status: SHIPPED | OUTPATIENT
Start: 2019-11-22 | End: 2020-05-20

## 2019-11-22 ASSESSMENT — MIFFLIN-ST. JEOR: SCORE: 1994.56

## 2019-11-22 NOTE — PROGRESS NOTES
Chief Complaint:       Jt Montgomery is a 74 year old male who presents to clinic today for the following health issues:    Follow up on diabetes    HPI:   Patient Jt Montgomery is a very pleasant 74 year old male with history of Type 2 Diabetes, hyperlipidemia, hypertension who presents to Internal Medicine clinic today for follow up of multiple cocnerns. Regarding the patient's Type 2 Diabetes, the patient denies any recent hypoglycemia events. He is here for follow up of most recent A1c lab result for monitoring of diabetes control. He is also due for a refill of his Novolog short acting insulin at this time. Regarding the patient's hypertension, the patient's BP is well controlled at this time. He is due for a refill of Metoprolol BP medication. no chest pain, headaches, fever or chills.    Current Medications:     Current Outpatient Medications   Medication Sig Dispense Refill     Amino Acids (L-CARNITINE PO) Take 2,000 mg by mouth daily       aspirin EC 81 MG EC tablet Take 1 tablet by mouth daily. 90 tablet 1     Coenzyme Q10 (COQ10) 100 MG CAPS Take 200 mg by mouth daily        Continuous Blood Gluc Sensor (FREESTYLE RINKU 14 DAY SENSOR) MISC 1 Device every 14 days 2 each 11     digoxin (LANOXIN) 125 MCG tablet Take 1 tablet (125 mcg) by mouth daily 90 tablet 3     diltiazem ER COATED BEADS (CARTIA XT) 300 MG 24 hr capsule Take 1 capsule (300 mg) by mouth daily 90 capsule 3     febuxostat (ULORIC) 40 MG TABS Take 40 mg by mouth daily       furosemide (LASIX) 20 MG tablet Take 1 tablet (20 mg) by mouth daily as needed (edema) 90 tablet 3     Green Tea, Camillia sinensis, (GREEN TEA EXTRACT PO) Take by mouth 2 times daily Doesn't always take regularly       HERBALS Nerve by Plexis 2 a day,   Healthy Feet and Nerves  By [employer]  3 a day, Heart Food Caps  (cayenna pepper, garlic, hawthorn, onion & ginger 1-2 a day), Super Red Drink Powder daily, garlic daily       insulin aspart (NOVOLOG  FLEXPEN) 100 UNIT/ML pen Inject 15 Units Subcutaneous 3 times daily (with meals) 15 mL 3     insulin glargine (LANTUS SOLOSTAR) 100 UNIT/ML pen ADMINISTER 18 UNITS UNDER THE SKIN DAILY 30 mL 11     insulin pen needle (BD GERMÁN U/F) 32G X 4 MM miscellaneous USING FOUR TIMES DAILY OR AS DIRECTED 200 each 0     irbesartan (AVAPRO) 300 MG tablet Take 1 tablet (300 mg) by mouth At Bedtime 90 tablet 2     magnesium oxide 200 MG TABS Take 2 tablets by mouth daily       MELATONIN PO Take 0.5 mg by mouth At Bedtime        metoprolol succinate ER (TOPROL-XL) 50 MG 24 hr tablet Take 1 tablet (50 mg) by mouth 2 times daily 180 tablet 3     multivitamin, therapeutic with minerals (MULTI-VITAMIN) TABS tablet Take 4-8 tablets by mouth daily Life Extenstion       order for DME Equipment being ordered: Compression stockings 1 each 3     predniSONE (DELTASONE) 20 MG tablet TAKE 1 TABLET(20 MG) BY MOUTH DAILY AS NEEDED FOR GOUT FLARE 10 tablet 0     Probiotic Product (PROBIOTIC & ACIDOPHILUS EX ST PO) Take 1 capsule by mouth daily       spironolactone (ALDACTONE) 25 MG tablet Take 1 tablet (25 mg) by mouth daily 90 tablet 3     Taurine 500 MG CAPS Take 1 capsule by mouth as needed        UNABLE TO FIND MEDICATION NAME: Udos Oil - combination of omega 3, 6, 9.  Taking 2-3 capsules daily       UNABLE TO FIND MEDICATION NAME: Super Beet Juice       vitamin D3 (CHOLECALCIFEROL) 2000 units tablet Take 2 tablets by mouth daily        warfarin ANTICOAGULANT (COUMADIN) 5 MG tablet Take 1 1/2 tabs on Sundays, Tuesdays and Thursdays and take 1 tab on all other days or as directed by INR clinic 110 tablet 1         Allergies:      Allergies   Allergen Reactions     Corn Dextrin      All corn products - gets tired an ornery            Past Medical History:     Past Medical History:   Diagnosis Date     Atrial fibrillation (H)      CAD (coronary artery disease)     MI with RONEL to dRCA April 2011     Central Sleep Apnea with Pat Villanueva  breathing 2010    Also TOMMY with CPAP     CKD (chronic kidney disease) stage 3, GFR 30-59 ml/min (H)      Dilated cardiomyopathy (H)     nonischemic (possibly related to h/o a.fib with RVR) EF 30-40% 2013     DM2 (diabetes mellitus, type 2) (H)     Goal HgbA1c < 7%     Gout     uric acid level correlates; 2014 h/o +knee aspirate     Hypertension     Goal <140/90     Pulmonary hypertension (H)     mild per echo 3/2013         Past Surgical History:     Past Surgical History:   Procedure Laterality Date     CORONARY ANGIOGRAPHY ADULT ORDER      distal circ-RONEL     HERNIA REPAIR  11/20/10    incarcinated     SUSPEND HYOID, GENIOGLOSSAL ADVANCEMENT           Family Medical History:     Family History   Problem Relation Age of Onset     Hypertension Mother      Coronary Artery Disease Mother      Coronary Artery Disease Father      Hypertension Father      Diabetes Sister      Cerebrovascular Disease Sister          Social History:     Social History     Socioeconomic History     Marital status:      Spouse name: Not on file     Number of children: Not on file     Years of education: Not on file     Highest education level: Not on file   Occupational History     Not on file   Social Needs     Financial resource strain: Not on file     Food insecurity:     Worry: Not on file     Inability: Not on file     Transportation needs:     Medical: Not on file     Non-medical: Not on file   Tobacco Use     Smoking status: Former Smoker     Packs/day: 1.50     Years: 7.00     Pack years: 10.50     Types: Cigarettes     Last attempt to quit: 1972     Years since quittin.9     Smokeless tobacco: Never Used     Tobacco comment: quit in       Started at around age 18-19   Substance and Sexual Activity     Alcohol use: No     Alcohol/week: 0.0 standard drinks     Comment: 74   recovering     Drug use: No     Sexual activity: Not Currently     Partners: Female   Lifestyle     Physical  activity:     Days per week: Not on file     Minutes per session: Not on file     Stress: Not on file   Relationships     Social connections:     Talks on phone: Not on file     Gets together: Not on file     Attends Religion service: Not on file     Active member of club or organization: Not on file     Attends meetings of clubs or organizations: Not on file     Relationship status: Not on file     Intimate partner violence:     Fear of current or ex partner: Not on file     Emotionally abused: Not on file     Physically abused: Not on file     Forced sexual activity: Not on file   Other Topics Concern      Service Not Asked     Blood Transfusions Not Asked     Caffeine Concern No     Occupational Exposure Not Asked     Hobby Hazards Not Asked     Sleep Concern Yes     Comment: sleep apnea, wears bi-pap     Stress Concern Not Asked     Weight Concern Not Asked     Special Diet No     Comment: low carb diet     Back Care Not Asked     Exercise No     Comment: walking     Bike Helmet Not Asked     Seat Belt Yes     Self-Exams Not Asked     Parent/sibling w/ CABG, MI or angioplasty before 65F 55M? Not Asked   Social History Narrative     Not on file           Review of System:     Constitutional: Negative for fever or chills  Skin: Negative for rashes  Ears/Nose/Throat: Negative for nasal congestion, sore throat  Respiratory: No shortness of breath, dyspnea on exertion, cough, or hemoptysis  Cardiovascular: Negative for chest pain  Gastrointestinal: Negative for nausea, vomiting  Genitourinary: Negative for dysuria, hematuria  Musculoskeletal: Negative for myalgias  Neurologic: Negative for headaches  Psychiatric: Negative for depression, anxiety  Hematologic/Lymphatic/Immunologic: Negative  Endocrine: Negative for recent hypoglycemia events  Behavioral: Negative for tobacco use       Physical Exam:   /73 (BP Location: Left arm, Patient Position: Sitting, Cuff Size: Adult Regular)   Pulse 62   Temp  "99.1  F (37.3  C) (Oral)   Ht 1.854 m (6' 1\")   Wt 119.6 kg (263 lb 9.6 oz)   SpO2 96%   BMI 34.78 kg/m      GENERAL: alert and no distress  EYES: eyes grossly normal to inspection, and conjunctivae and sclerae normal  HENT: Normocephalic atraumatic. Nose and mouth without ulcers or lesions  NECK: supple  RESP: lungs clear to auscultation   CV: regular rate and rhythm, normal S1 S2  LYMPH: no peripheral edema   ABDOMEN: nondistended  MS: no gross musculoskeletal defects noted  SKIN: no suspicious lesions or rashes  NEURO: Alert & Oriented x 3.   PSYCH: mentation appears normal, affect normal        Diagnostic Test Results:     Diagnostic Test Results:  Lab Results   Component Value Date    A1C 6.2 11/14/2019    A1C 7.6 10/08/2019    A1C 6.5 03/06/2019    A1C 6.1 09/06/2018    A1C 6.3 02/09/2018         ASSESSMENT/PLAN:       (E11.40,  Z79.4) Type 2 diabetes mellitus with diabetic neuropathy, with long-term current use of insulin (H)  (primary encounter diagnosis)  Comment: no recent hypoglycemia events, patient is due for a refill of his Novolog insulin medication. Recent A1c lab shows improvement from prior baseline.  Plan: insulin aspart (NOVOLOG FLEXPEN) 100 UNIT/ML pen      (I10) Essential hypertension  Comment: BP is well controlled  Plan: continue current BP medication going forward. I have refilled the patient's metoprolol succinate ER (TOPROL-XL) 50 MG 24 hr        Tablet BP medication today.      (E78.5) Hyperlipidemia LDL goal <100  Comment: stable, patient is due for a repeat Lipid panel lab  Plan: Lipid panel reflex to direct LDL Fasting    Follow Up Plan:     Patient is instructed to return to Internal Medicine clinic for follow-up visit in 3 months.        Digna Jones MD  Internal Medicine  [facility]    "

## 2019-11-23 LAB
CHOLEST SERPL-MCNC: 164 MG/DL
HDLC SERPL-MCNC: 24 MG/DL
LDLC SERPL CALC-MCNC: ABNORMAL MG/DL
LDLC SERPL DIRECT ASSAY-MCNC: 67 MG/DL
NONHDLC SERPL-MCNC: 140 MG/DL
TRIGL SERPL-MCNC: 843 MG/DL

## 2019-12-05 ENCOUNTER — ANTICOAGULATION THERAPY VISIT (OUTPATIENT)
Dept: CARDIOLOGY | Facility: CLINIC | Age: 74
End: 2019-12-05
Payer: COMMERCIAL

## 2019-12-05 DIAGNOSIS — Z79.01 LONG TERM CURRENT USE OF ANTICOAGULANTS WITH INR GOAL OF 2.0-3.0: ICD-10-CM

## 2019-12-05 DIAGNOSIS — I48.0 PAROXYSMAL ATRIAL FIBRILLATION (H): ICD-10-CM

## 2019-12-05 LAB — INR POINT OF CARE: 2.3 (ref 0.86–1.14)

## 2019-12-05 PROCEDURE — 85610 PROTHROMBIN TIME: CPT | Mod: QW | Performed by: INTERNAL MEDICINE

## 2019-12-05 PROCEDURE — 99207 ZZC NO CHARGE NURSE ONLY: CPT | Performed by: INTERNAL MEDICINE

## 2019-12-05 PROCEDURE — 36416 COLLJ CAPILLARY BLOOD SPEC: CPT | Performed by: INTERNAL MEDICINE

## 2019-12-05 NOTE — PROGRESS NOTES
ANTICOAGULATION FOLLOW-UP CLINIC VISIT    Patient Name:  Jt Montgomery  Date:  2019  Contact Type:  Face to Face    SUBJECTIVE:  Patient Findings         Clinical Outcomes     Negatives:   Major bleeding event, Thromboembolic event, Anticoagulation-related hospital admission, Anticoagulation-related ED visit, Anticoagulation-related fatality           OBJECTIVE    INR Protime   Date Value Ref Range Status   2019 2.3 (A) 0.86 - 1.14 Final       ASSESSMENT / PLAN  INR assessment THER    Recheck INR In: 3 WEEKS    INR Location Clinic      Anticoagulation Summary  As of 2019    INR goal:   2.0-3.0   TTR:   72.4 % (11.7 mo)   INR used for dosin.3 (2019)   Warfarin maintenance plan:   7.5 mg (5 mg x 1.5) every Sun, Tue, Thu; 5 mg (5 mg x 1) all other days   Full warfarin instructions:   7.5 mg every Sun, Tue, Thu; 5 mg all other days   Weekly warfarin total:   42.5 mg   No change documented:   Paola Roberson RN   Plan last modified:   Marlena Hong RN (5/10/2019)   Next INR check:   2019   Target end date:   Indefinite    Indications    Atrial fibrillation (AFIB) on Coumadin [I48.91]  Long term current use of anticoagulants with INR goal of 2.0-3.0 [Z79.01]             Anticoagulation Episode Summary     INR check location:       Preferred lab:       Send INR reminders to:   [facility] HEART INR NURSE    Comments:         Anticoagulation Care Providers     Provider Role Specialty Phone number    Lottie Gomez DO [facility] Card[facility] [phone]            See the Encounter Report to view Anticoagulation Flowsheet and Dosing Calendar (Go to Encounters tab in chart review, and find the Anticoagulation Therapy Visit)    INR 2.3.  See previous note for details.  Since the last check he stopped taking the cranberry extract pills (that was the unknown supplement he mentioned last visit) and continued the Super Beta Prostate pills and wants to continue taking those.   Continue same warfarin scheduled since he is back in range.  Recheck in 3 weeks.  Brian Roberson RN

## 2019-12-16 ENCOUNTER — TELEPHONE (OUTPATIENT)
Dept: FAMILY MEDICINE | Facility: CLINIC | Age: 74
End: 2019-12-16

## 2019-12-16 NOTE — TELEPHONE ENCOUNTER
Pt called and declined appointment - wanting to keep his neela't for tomorrow so he can sleep today. But appreciates Dr Jones working him into his schedule

## 2019-12-16 NOTE — TELEPHONE ENCOUNTER
Reason for Call:  Same Day Appointment, Requested Provider:  Karen    PCP: Digna Jones    Reason for visit: UTI    Duration of symptoms: Since Saturday    Have you been treated for this in the past? Yes    Additional comments: Patient made an appt for tomorrow with RICARDO quezada and was told about UC, but would like Dr Jones this afternoon if possible  Can we leave a detailed message on this number? YES    Phone number patient can be reached at: Cell number on file:    Telephone Information:   Mobile [phone]       Best Time: anytime  Call taken on 12/16/2019 at 10:02 AM by Phoebe Clark

## 2019-12-17 ENCOUNTER — APPOINTMENT (OUTPATIENT)
Dept: ULTRASOUND IMAGING | Facility: CLINIC | Age: 74
DRG: 872 | End: 2019-12-17
Attending: INTERNAL MEDICINE
Payer: COMMERCIAL

## 2019-12-17 ENCOUNTER — HOSPITAL ENCOUNTER (INPATIENT)
Facility: CLINIC | Age: 74
LOS: 4 days | Discharge: NURSING, REHAB OR LONG-TERM CARE | DRG: 872 | End: 2019-12-21
Attending: EMERGENCY MEDICINE | Admitting: INTERNAL MEDICINE
Payer: COMMERCIAL

## 2019-12-17 ENCOUNTER — APPOINTMENT (OUTPATIENT)
Dept: CT IMAGING | Facility: CLINIC | Age: 74
DRG: 872 | End: 2019-12-17
Attending: EMERGENCY MEDICINE
Payer: COMMERCIAL

## 2019-12-17 ENCOUNTER — OFFICE VISIT (OUTPATIENT)
Dept: FAMILY MEDICINE | Facility: CLINIC | Age: 74
End: 2019-12-17
Payer: COMMERCIAL

## 2019-12-17 VITALS
HEART RATE: 105 BPM | WEIGHT: 265 LBS | SYSTOLIC BLOOD PRESSURE: 89 MMHG | HEIGHT: 73 IN | BODY MASS INDEX: 35.12 KG/M2 | OXYGEN SATURATION: 96 % | DIASTOLIC BLOOD PRESSURE: 49 MMHG | TEMPERATURE: 97.9 F

## 2019-12-17 DIAGNOSIS — E80.6 HYPERBILIRUBINEMIA: ICD-10-CM

## 2019-12-17 DIAGNOSIS — N39.0 URINARY TRACT INFECTION WITHOUT HEMATURIA, SITE UNSPECIFIED: ICD-10-CM

## 2019-12-17 DIAGNOSIS — N39.0 COMPLICATED URINARY TRACT INFECTION: ICD-10-CM

## 2019-12-17 DIAGNOSIS — N13.8 BENIGN PROSTATIC HYPERPLASIA WITH URINARY OBSTRUCTION: Primary | ICD-10-CM

## 2019-12-17 DIAGNOSIS — E87.5 HYPERKALEMIA: ICD-10-CM

## 2019-12-17 DIAGNOSIS — E87.1 HYPONATREMIA: ICD-10-CM

## 2019-12-17 DIAGNOSIS — N17.9 ACUTE RENAL FAILURE, UNSPECIFIED ACUTE RENAL FAILURE TYPE (H): ICD-10-CM

## 2019-12-17 DIAGNOSIS — K80.80 BILIARY CALCULUS OF OTHER SITE WITHOUT OBSTRUCTION: ICD-10-CM

## 2019-12-17 DIAGNOSIS — I10 ESSENTIAL HYPERTENSION: ICD-10-CM

## 2019-12-17 DIAGNOSIS — R33.9 URINARY RETENTION: Primary | ICD-10-CM

## 2019-12-17 DIAGNOSIS — R33.9 URINARY RETENTION: ICD-10-CM

## 2019-12-17 DIAGNOSIS — R65.20 SEVERE SEPSIS (H): ICD-10-CM

## 2019-12-17 DIAGNOSIS — I95.9 HYPOTENSION, UNSPECIFIED HYPOTENSION TYPE: ICD-10-CM

## 2019-12-17 DIAGNOSIS — I48.91 ATRIAL FIBRILLATION, UNSPECIFIED TYPE (H): ICD-10-CM

## 2019-12-17 DIAGNOSIS — R17 JAUNDICE: ICD-10-CM

## 2019-12-17 DIAGNOSIS — A41.9 SEVERE SEPSIS (H): ICD-10-CM

## 2019-12-17 DIAGNOSIS — N40.1 BENIGN PROSTATIC HYPERPLASIA WITH URINARY OBSTRUCTION: Primary | ICD-10-CM

## 2019-12-17 PROBLEM — N10 ACUTE PYELONEPHRITIS: Status: ACTIVE | Noted: 2019-12-17

## 2019-12-17 PROBLEM — N18.9 ACUTE ON CHRONIC RENAL FAILURE (H): Status: ACTIVE | Noted: 2019-12-17

## 2019-12-17 PROBLEM — N13.9 OBSTRUCTIVE UROPATHY: Status: ACTIVE | Noted: 2019-12-17

## 2019-12-17 LAB
ALBUMIN SERPL-MCNC: 2.3 G/DL (ref 3.4–5)
ALBUMIN SERPL-MCNC: 2.9 G/DL (ref 3.4–5)
ALBUMIN UR-MCNC: 100 MG/DL
ALP SERPL-CCNC: 89 U/L (ref 40–150)
ALT SERPL W P-5'-P-CCNC: 28 U/L (ref 0–70)
ANION GAP SERPL CALCULATED.3IONS-SCNC: 10 MMOL/L (ref 3–14)
ANION GAP SERPL CALCULATED.3IONS-SCNC: 12 MMOL/L (ref 3–14)
APPEARANCE UR: ABNORMAL
AST SERPL W P-5'-P-CCNC: 37 U/L (ref 0–45)
BACTERIA #/AREA URNS HPF: ABNORMAL /HPF
BASOPHILS # BLD AUTO: 0 10E9/L (ref 0–0.2)
BASOPHILS NFR BLD AUTO: 0.1 %
BILIRUB DIRECT SERPL-MCNC: 1.4 MG/DL (ref 0–0.2)
BILIRUB SERPL-MCNC: 6.5 MG/DL (ref 0.2–1.3)
BILIRUB UR QL STRIP: NEGATIVE
BUN SERPL-MCNC: 72 MG/DL (ref 7–30)
BUN SERPL-MCNC: 80 MG/DL (ref 7–30)
CALCIUM SERPL-MCNC: 7.5 MG/DL (ref 8.5–10.1)
CALCIUM SERPL-MCNC: 8.7 MG/DL (ref 8.5–10.1)
CHLORIDE SERPL-SCNC: 103 MMOL/L (ref 94–109)
CHLORIDE SERPL-SCNC: 94 MMOL/L (ref 94–109)
CO2 BLDCOV-SCNC: 20 MMOL/L (ref 21–28)
CO2 SERPL-SCNC: 19 MMOL/L (ref 20–32)
CO2 SERPL-SCNC: 20 MMOL/L (ref 20–32)
COLOR UR AUTO: YELLOW
CREAT SERPL-MCNC: 3.24 MG/DL (ref 0.66–1.25)
CREAT SERPL-MCNC: 4.31 MG/DL (ref 0.66–1.25)
DIFFERENTIAL METHOD BLD: ABNORMAL
EOSINOPHIL # BLD AUTO: 0 10E9/L (ref 0–0.7)
EOSINOPHIL NFR BLD AUTO: 0 %
ERYTHROCYTE [DISTWIDTH] IN BLOOD BY AUTOMATED COUNT: 14.2 % (ref 10–15)
GFR SERPL CREATININE-BSD FRML MDRD: 13 ML/MIN/{1.73_M2}
GFR SERPL CREATININE-BSD FRML MDRD: 18 ML/MIN/{1.73_M2}
GLUCOSE SERPL-MCNC: 149 MG/DL (ref 70–99)
GLUCOSE SERPL-MCNC: 164 MG/DL (ref 70–99)
GLUCOSE UR STRIP-MCNC: NEGATIVE MG/DL
HCT VFR BLD AUTO: 41.2 % (ref 40–53)
HGB BLD-MCNC: 14.5 G/DL (ref 13.3–17.7)
HGB UR QL STRIP: ABNORMAL
IMM GRANULOCYTES # BLD: 0.1 10E9/L (ref 0–0.4)
IMM GRANULOCYTES NFR BLD: 0.6 %
INR PPP: 2.17 (ref 0.86–1.14)
INTERPRETATION ECG - MUSE: NORMAL
KETONES UR STRIP-MCNC: 5 MG/DL
LACTATE BLD-SCNC: 2.4 MMOL/L (ref 0.7–2)
LACTATE BLD-SCNC: 2.8 MMOL/L (ref 0.7–2.1)
LDH SERPL L TO P-CCNC: 174 U/L (ref 85–227)
LEUKOCYTE ESTERASE UR QL STRIP: ABNORMAL
LIPASE SERPL-CCNC: 60 U/L (ref 73–393)
LYMPHOCYTES # BLD AUTO: 0.2 10E9/L (ref 0.8–5.3)
LYMPHOCYTES NFR BLD AUTO: 1.7 %
MCH RBC QN AUTO: 35.7 PG (ref 26.5–33)
MCHC RBC AUTO-ENTMCNC: 35.2 G/DL (ref 31.5–36.5)
MCV RBC AUTO: 102 FL (ref 78–100)
MONOCYTES # BLD AUTO: 1.1 10E9/L (ref 0–1.3)
MONOCYTES NFR BLD AUTO: 7.4 %
MUCOUS THREADS #/AREA URNS LPF: PRESENT /LPF
NEUTROPHILS # BLD AUTO: 12.8 10E9/L (ref 1.6–8.3)
NEUTROPHILS NFR BLD AUTO: 90.2 %
NITRATE UR QL: NEGATIVE
NRBC # BLD AUTO: 0 10*3/UL
NRBC BLD AUTO-RTO: 0 /100
PCO2 BLDV: 31 MM HG (ref 40–50)
PH BLDV: 7.43 PH (ref 7.32–7.43)
PH UR STRIP: 6 PH (ref 5–7)
PHOSPHATE SERPL-MCNC: 5 MG/DL (ref 2.5–4.5)
PLATELET # BLD AUTO: 172 10E9/L (ref 150–450)
PO2 BLDV: 27 MM HG (ref 25–47)
POTASSIUM SERPL-SCNC: 4.4 MMOL/L (ref 3.4–5.3)
POTASSIUM SERPL-SCNC: 5.4 MMOL/L (ref 3.4–5.3)
PROT SERPL-MCNC: 6.8 G/DL (ref 6.8–8.8)
RBC # BLD AUTO: 4.06 10E12/L (ref 4.4–5.9)
RBC #/AREA URNS AUTO: 18 /HPF (ref 0–2)
SAO2 % BLDV FROM PO2: 52 %
SODIUM SERPL-SCNC: 126 MMOL/L (ref 133–144)
SODIUM SERPL-SCNC: 132 MMOL/L (ref 133–144)
SOURCE: ABNORMAL
SP GR UR STRIP: 1.01 (ref 1–1.03)
UROBILINOGEN UR STRIP-MCNC: NORMAL MG/DL (ref 0–2)
WBC # BLD AUTO: 14.2 10E9/L (ref 4–11)
WBC #/AREA URNS AUTO: >182 /HPF (ref 0–5)

## 2019-12-17 PROCEDURE — 83605 ASSAY OF LACTIC ACID: CPT | Performed by: EMERGENCY MEDICINE

## 2019-12-17 PROCEDURE — 99291 CRITICAL CARE FIRST HOUR: CPT | Performed by: INTERNAL MEDICINE

## 2019-12-17 PROCEDURE — 76705 ECHO EXAM OF ABDOMEN: CPT

## 2019-12-17 PROCEDURE — 80069 RENAL FUNCTION PANEL: CPT | Performed by: INTERNAL MEDICINE

## 2019-12-17 PROCEDURE — 83690 ASSAY OF LIPASE: CPT | Performed by: EMERGENCY MEDICINE

## 2019-12-17 PROCEDURE — 85025 COMPLETE CBC W/AUTO DIFF WBC: CPT | Performed by: EMERGENCY MEDICINE

## 2019-12-17 PROCEDURE — 25800030 ZZH RX IP 258 OP 636: Performed by: EMERGENCY MEDICINE

## 2019-12-17 PROCEDURE — 80076 HEPATIC FUNCTION PANEL: CPT | Performed by: EMERGENCY MEDICINE

## 2019-12-17 PROCEDURE — 93005 ELECTROCARDIOGRAM TRACING: CPT

## 2019-12-17 PROCEDURE — 36415 COLL VENOUS BLD VENIPUNCTURE: CPT | Performed by: INTERNAL MEDICINE

## 2019-12-17 PROCEDURE — 87086 URINE CULTURE/COLONY COUNT: CPT | Performed by: EMERGENCY MEDICINE

## 2019-12-17 PROCEDURE — 25000131 ZZH RX MED GY IP 250 OP 636 PS 637: Performed by: INTERNAL MEDICINE

## 2019-12-17 PROCEDURE — 83605 ASSAY OF LACTIC ACID: CPT

## 2019-12-17 PROCEDURE — 83010 ASSAY OF HAPTOGLOBIN QUANT: CPT | Performed by: EMERGENCY MEDICINE

## 2019-12-17 PROCEDURE — 36415 COLL VENOUS BLD VENIPUNCTURE: CPT | Performed by: EMERGENCY MEDICINE

## 2019-12-17 PROCEDURE — 87186 SC STD MICRODIL/AGAR DIL: CPT | Performed by: EMERGENCY MEDICINE

## 2019-12-17 PROCEDURE — 87040 BLOOD CULTURE FOR BACTERIA: CPT | Performed by: EMERGENCY MEDICINE

## 2019-12-17 PROCEDURE — 87800 DETECT AGNT MULT DNA DIREC: CPT | Performed by: EMERGENCY MEDICINE

## 2019-12-17 PROCEDURE — 25000132 ZZH RX MED GY IP 250 OP 250 PS 637: Performed by: INTERNAL MEDICINE

## 2019-12-17 PROCEDURE — 25000128 H RX IP 250 OP 636: Performed by: INTERNAL MEDICINE

## 2019-12-17 PROCEDURE — 80048 BASIC METABOLIC PNL TOTAL CA: CPT | Performed by: EMERGENCY MEDICINE

## 2019-12-17 PROCEDURE — 25000125 ZZHC RX 250: Performed by: EMERGENCY MEDICINE

## 2019-12-17 PROCEDURE — 25000128 H RX IP 250 OP 636: Performed by: EMERGENCY MEDICINE

## 2019-12-17 PROCEDURE — 87088 URINE BACTERIA CULTURE: CPT | Performed by: EMERGENCY MEDICINE

## 2019-12-17 PROCEDURE — 25000125 ZZHC RX 250: Performed by: INTERNAL MEDICINE

## 2019-12-17 PROCEDURE — 74176 CT ABD & PELVIS W/O CONTRAST: CPT

## 2019-12-17 PROCEDURE — 96361 HYDRATE IV INFUSION ADD-ON: CPT

## 2019-12-17 PROCEDURE — 83615 LACTATE (LD) (LDH) ENZYME: CPT | Performed by: EMERGENCY MEDICINE

## 2019-12-17 PROCEDURE — 82803 BLOOD GASES ANY COMBINATION: CPT

## 2019-12-17 PROCEDURE — 20000003 ZZH R&B ICU

## 2019-12-17 PROCEDURE — 81001 URINALYSIS AUTO W/SCOPE: CPT | Performed by: EMERGENCY MEDICINE

## 2019-12-17 PROCEDURE — 96365 THER/PROPH/DIAG IV INF INIT: CPT

## 2019-12-17 PROCEDURE — 99207 ZZC OFFICE-HOSPITAL ADMIT: CPT | Performed by: NURSE PRACTITIONER

## 2019-12-17 PROCEDURE — 99285 EMERGENCY DEPT VISIT HI MDM: CPT | Mod: 25

## 2019-12-17 PROCEDURE — 85610 PROTHROMBIN TIME: CPT | Performed by: EMERGENCY MEDICINE

## 2019-12-17 PROCEDURE — 87077 CULTURE AEROBIC IDENTIFY: CPT | Performed by: EMERGENCY MEDICINE

## 2019-12-17 RX ORDER — LIDOCAINE 40 MG/G
CREAM TOPICAL
Status: DISCONTINUED | OUTPATIENT
Start: 2019-12-17 | End: 2019-12-21 | Stop reason: HOSPADM

## 2019-12-17 RX ORDER — ACETAMINOPHEN 325 MG/1
650 TABLET ORAL EVERY 4 HOURS PRN
Status: DISCONTINUED | OUTPATIENT
Start: 2019-12-17 | End: 2019-12-21 | Stop reason: HOSPADM

## 2019-12-17 RX ORDER — MORPHINE SULFATE 2 MG/ML
1 INJECTION, SOLUTION INTRAMUSCULAR; INTRAVENOUS
Status: DISCONTINUED | OUTPATIENT
Start: 2019-12-17 | End: 2019-12-21 | Stop reason: HOSPADM

## 2019-12-17 RX ORDER — ONDANSETRON 4 MG/1
4 TABLET, ORALLY DISINTEGRATING ORAL EVERY 6 HOURS PRN
Status: DISCONTINUED | OUTPATIENT
Start: 2019-12-17 | End: 2019-12-21 | Stop reason: HOSPADM

## 2019-12-17 RX ORDER — PROCHLORPERAZINE MALEATE 5 MG
5 TABLET ORAL EVERY 6 HOURS PRN
Status: DISCONTINUED | OUTPATIENT
Start: 2019-12-17 | End: 2019-12-21 | Stop reason: HOSPADM

## 2019-12-17 RX ORDER — DEXTROSE MONOHYDRATE 25 G/50ML
25-50 INJECTION, SOLUTION INTRAVENOUS
Status: DISCONTINUED | OUTPATIENT
Start: 2019-12-17 | End: 2019-12-21 | Stop reason: HOSPADM

## 2019-12-17 RX ORDER — TAMSULOSIN HYDROCHLORIDE 0.4 MG/1
0.4 CAPSULE ORAL EVERY EVENING
Status: DISCONTINUED | OUTPATIENT
Start: 2019-12-18 | End: 2019-12-21 | Stop reason: HOSPADM

## 2019-12-17 RX ORDER — NICOTINE POLACRILEX 4 MG
15-30 LOZENGE BUCCAL
Status: DISCONTINUED | OUTPATIENT
Start: 2019-12-17 | End: 2019-12-21 | Stop reason: HOSPADM

## 2019-12-17 RX ORDER — PROCHLORPERAZINE 25 MG
12.5 SUPPOSITORY, RECTAL RECTAL EVERY 12 HOURS PRN
Status: DISCONTINUED | OUTPATIENT
Start: 2019-12-17 | End: 2019-12-21 | Stop reason: HOSPADM

## 2019-12-17 RX ORDER — FINASTERIDE 5 MG/1
5 TABLET, FILM COATED ORAL DAILY
Status: DISCONTINUED | OUTPATIENT
Start: 2019-12-17 | End: 2019-12-21 | Stop reason: HOSPADM

## 2019-12-17 RX ORDER — PIPERACILLIN SODIUM, TAZOBACTAM SODIUM 3; .375 G/15ML; G/15ML
3.38 INJECTION, POWDER, LYOPHILIZED, FOR SOLUTION INTRAVENOUS ONCE
Status: COMPLETED | OUTPATIENT
Start: 2019-12-17 | End: 2019-12-17

## 2019-12-17 RX ORDER — NALOXONE HYDROCHLORIDE 0.4 MG/ML
.1-.4 INJECTION, SOLUTION INTRAMUSCULAR; INTRAVENOUS; SUBCUTANEOUS
Status: DISCONTINUED | OUTPATIENT
Start: 2019-12-17 | End: 2019-12-19

## 2019-12-17 RX ORDER — SODIUM CHLORIDE 9 MG/ML
INJECTION, SOLUTION INTRAVENOUS CONTINUOUS
Status: DISCONTINUED | OUTPATIENT
Start: 2019-12-17 | End: 2019-12-17

## 2019-12-17 RX ORDER — FEBUXOSTAT 40 MG/1
40 TABLET, FILM COATED ORAL DAILY
Status: DISCONTINUED | OUTPATIENT
Start: 2019-12-18 | End: 2019-12-21 | Stop reason: HOSPADM

## 2019-12-17 RX ORDER — DIGOXIN 125 MCG
125 TABLET ORAL DAILY
Status: DISCONTINUED | OUTPATIENT
Start: 2019-12-18 | End: 2019-12-18

## 2019-12-17 RX ORDER — NALOXONE HYDROCHLORIDE 0.4 MG/ML
.1-.4 INJECTION, SOLUTION INTRAMUSCULAR; INTRAVENOUS; SUBCUTANEOUS
Status: DISCONTINUED | OUTPATIENT
Start: 2019-12-17 | End: 2019-12-21 | Stop reason: HOSPADM

## 2019-12-17 RX ORDER — POLYETHYLENE GLYCOL 3350 17 G/17G
17 POWDER, FOR SOLUTION ORAL 2 TIMES DAILY
Status: DISCONTINUED | OUTPATIENT
Start: 2019-12-17 | End: 2019-12-21 | Stop reason: HOSPADM

## 2019-12-17 RX ORDER — LIDOCAINE HYDROCHLORIDE 20 MG/ML
10 JELLY TOPICAL ONCE
Status: COMPLETED | OUTPATIENT
Start: 2019-12-17 | End: 2019-12-17

## 2019-12-17 RX ORDER — ONDANSETRON 2 MG/ML
4 INJECTION INTRAMUSCULAR; INTRAVENOUS EVERY 6 HOURS PRN
Status: DISCONTINUED | OUTPATIENT
Start: 2019-12-17 | End: 2019-12-21 | Stop reason: HOSPADM

## 2019-12-17 RX ORDER — PIPERACILLIN SODIUM, TAZOBACTAM SODIUM 2; .25 G/10ML; G/10ML
2.25 INJECTION, POWDER, LYOPHILIZED, FOR SOLUTION INTRAVENOUS EVERY 6 HOURS
Status: DISCONTINUED | OUTPATIENT
Start: 2019-12-17 | End: 2019-12-20

## 2019-12-17 RX ORDER — METOCLOPRAMIDE HYDROCHLORIDE 5 MG/ML
5 INJECTION INTRAMUSCULAR; INTRAVENOUS EVERY 6 HOURS PRN
Status: DISCONTINUED | OUTPATIENT
Start: 2019-12-17 | End: 2019-12-21 | Stop reason: HOSPADM

## 2019-12-17 RX ORDER — METOPROLOL SUCCINATE 25 MG/1
25 TABLET, EXTENDED RELEASE ORAL
Status: DISCONTINUED | OUTPATIENT
Start: 2019-12-17 | End: 2019-12-18

## 2019-12-17 RX ORDER — ASPIRIN 81 MG/1
81 TABLET ORAL DAILY
Status: DISCONTINUED | OUTPATIENT
Start: 2019-12-18 | End: 2019-12-21 | Stop reason: HOSPADM

## 2019-12-17 RX ORDER — NOREPINEPHRINE BITARTRATE 0.06 MG/ML
0.03-0.4 INJECTION, SOLUTION INTRAVENOUS CONTINUOUS PRN
Status: DISCONTINUED | OUTPATIENT
Start: 2019-12-17 | End: 2019-12-21 | Stop reason: HOSPADM

## 2019-12-17 RX ORDER — METOCLOPRAMIDE 5 MG/1
5 TABLET ORAL EVERY 6 HOURS PRN
Status: DISCONTINUED | OUTPATIENT
Start: 2019-12-17 | End: 2019-12-21 | Stop reason: HOSPADM

## 2019-12-17 RX ADMIN — SODIUM BICARBONATE: 84 INJECTION, SOLUTION INTRAVENOUS at 18:30

## 2019-12-17 RX ADMIN — PIPERACILLIN AND TAZOBACTAM 2.25 G: 2; .25 INJECTION, POWDER, LYOPHILIZED, FOR SOLUTION INTRAVENOUS at 20:50

## 2019-12-17 RX ADMIN — WARFARIN SODIUM 7.5 MG: 2.5 TABLET ORAL at 17:49

## 2019-12-17 RX ADMIN — FINASTERIDE 5 MG: 5 TABLET, FILM COATED ORAL at 19:02

## 2019-12-17 RX ADMIN — INSULIN GLARGINE 10 UNITS: 100 INJECTION, SOLUTION SUBCUTANEOUS at 22:03

## 2019-12-17 RX ADMIN — SODIUM CHLORIDE 1000 ML: 9 INJECTION, SOLUTION INTRAVENOUS at 12:50

## 2019-12-17 RX ADMIN — POLYETHYLENE GLYCOL 3350 17 G: 17 POWDER, FOR SOLUTION ORAL at 21:09

## 2019-12-17 RX ADMIN — PIPERACILLIN AND TAZOBACTAM 3.38 G: 3; .375 INJECTION, POWDER, LYOPHILIZED, FOR SOLUTION INTRAVENOUS at 12:58

## 2019-12-17 RX ADMIN — LIDOCAINE HYDROCHLORIDE 6 ML: 20 JELLY TOPICAL at 12:20

## 2019-12-17 RX ADMIN — INSULIN ASPART 1 UNITS: 100 INJECTION, SOLUTION INTRAVENOUS; SUBCUTANEOUS at 19:03

## 2019-12-17 RX ADMIN — Medication 1 MG: at 21:10

## 2019-12-17 RX ADMIN — SODIUM CHLORIDE 2606 ML: 9 INJECTION, SOLUTION INTRAVENOUS at 14:30

## 2019-12-17 RX ADMIN — METOPROLOL SUCCINATE 25 MG: 25 TABLET, EXTENDED RELEASE ORAL at 17:49

## 2019-12-17 ASSESSMENT — ACTIVITIES OF DAILY LIVING (ADL): ADLS_ACUITY_SCORE: 13

## 2019-12-17 ASSESSMENT — MIFFLIN-ST. JEOR
SCORE: 1978.88
SCORE: 2000.91
SCORE: 2000.91

## 2019-12-17 NOTE — PHARMACY-ADMISSION MEDICATION HISTORY
Pharmacy Medication History  Admission medication history interview status for the 12/17/2019  admission is complete. See EPIC admission navigator for prior to admission medications     Medication history sources: Patient/surescipt/pharmacy  Medication history source reliability: Good  Adherence assessment: Good    Significant changes made to the medication list:  None      Additional medication history information:   None    Medication reconciliation completed by provider prior to medication history? No    Time spent in this activity: 20 min      Prior to Admission medications    Medication Sig Last Dose Taking? Auth Provider   aspirin EC 81 MG EC tablet Take 1 tablet by mouth daily. 12/17/2019 at Unknown time Yes Ravi Saldana,    Coenzyme Q10 (COQ10) 100 MG CAPS Take 200 mg by mouth daily  12/17/2019 at Unknown time Yes Reported, Patient   digoxin (LANOXIN) 125 MCG tablet Take 1 tablet (125 mcg) by mouth daily 12/17/2019 at Unknown time Yes Danette Paulino PA   diltiazem ER COATED BEADS (CARTIA XT) 300 MG 24 hr capsule Take 1 capsule (300 mg) by mouth daily 12/17/2019 at Unknown time Yes Danette Paulino PA   febuxostat (ULORIC) 40 MG TABS Take 40 mg by mouth daily 12/17/2019 at Unknown time Yes Reported, Patient   furosemide (LASIX) 20 MG tablet Take 1 tablet (20 mg) by mouth daily as needed (edema)  Yes Digna Jones MD   Green Tea, Camillia sinensis, (GREEN TEA EXTRACT PO) Take by mouth 2 times daily Doesn't always take regularly  Yes Reported, Patient   HERBALS Nerve by Plexis 2 a day,   Healthy Feet and Nerves  By [employer]  3 a day, Heart Food Caps  (cayenna pepper, garlic, hawthorn, onion & ginger 1-2 a day), Super Red Drink Powder daily, garlic daily  Yes Reported, Patient   insulin aspart (NOVOLOG FLEXPEN) 100 UNIT/ML pen Inject 15 Units Subcutaneous 3 times daily (with meals) 12/17/2019 at x1 Yes Digna Jones MD   insulin glargine (LANTUS SOLOSTAR) 100 UNIT/ML pen ADMINISTER 18 UNITS UNDER  THE SKIN DAILY  Patient taking differently: Inject 18 Units Subcutaneous At Bedtime ADMINISTER 18 UNITS UNDER THE SKIN DAILY 12/16/2019 at Unknown time Yes Digna Jones MD   irbesartan (AVAPRO) 300 MG tablet Take 1 tablet (300 mg) by mouth At Bedtime 12/16/2019 at Unknown time Yes Danette Paulino PA   magnesium oxide 200 MG TABS Take 2 tablets by mouth At Bedtime  12/16/2019 at Unknown time Yes Reported, Patient   MELATONIN PO Take 0.5 mg by mouth At Bedtime  12/16/2019 at Unknown time Yes Reported, Patient   metoprolol succinate ER (TOPROL-XL) 50 MG 24 hr tablet Take 1 tablet (50 mg) by mouth 2 times daily 12/17/2019 at x1 Yes Digna Jones MD   multivitamin, therapeutic with minerals (MULTI-VITAMIN) TABS tablet Take 4-8 tablets by mouth daily Life Extenstion Past Week at Unknown time Yes Reported, Patient   predniSONE (DELTASONE) 20 MG tablet TAKE 1 TABLET(20 MG) BY MOUTH DAILY AS NEEDED FOR GOUT FLARE  Yes Adolfo Zambrano MD   Probiotic Product (PROBIOTIC & ACIDOPHILUS EX ST PO) Take 1 capsule by mouth daily 12/16/2019 at Unknown time Yes Reported, Patient   spironolactone (ALDACTONE) 25 MG tablet Take 1 tablet (25 mg) by mouth daily  Patient taking differently: Take 25 mg by mouth every evening  12/16/2019 at Unknown time Yes Danette Paulino PA   Taurine 500 MG CAPS Take 1 capsule by mouth as needed   Yes Reported, Patient   UNABLE TO FIND MEDICATION NAME: Udos Oil - combination of omega 3, 6, 9.  Taking 2-3 capsules daily  Yes Reported, Patient   UNABLE TO FIND MEDICATION NAME: Super Beet Juice  Yes Reported, Patient   vitamin D3 (CHOLECALCIFEROL) 2000 units tablet Take 2 tablets by mouth daily  12/16/2019 at Unknown time Yes Reported, Patient   warfarin ANTICOAGULANT (COUMADIN) 5 MG tablet Take 1 1/2 tabs on Sundays, Tuesdays and Thursdays and take 1 tab on all other days or as directed by INR clinic  Patient taking differently: Take by mouth See Admin Instructions Take 1 1/2 tabs on Sundays,  Tuesdays and Thursdays and take 1 tab on all other days or as directed by INR clinic 12/16/2019 at Unknown time Yes Lottie Gomez DO   Continuous Blood Gluc Sensor (FREESTYLE RINKU 14 DAY SENSOR) MISC 1 Device every 14 days   Digna Jones MD   insulin pen needle (BD GERMÁN U/F) 32G X 4 MM miscellaneous USING FOUR TIMES DAILY OR AS DIRECTED   Digna Jones MD   order for DME Equipment being ordered: Compression stockings   Digna Jones MD

## 2019-12-17 NOTE — ED TRIAGE NOTES
Pt made appt today yesterday for huis issues with no being able to urinate and some increase weakness, brought from clinic via w/c because BP was low

## 2019-12-17 NOTE — ED PROVIDER NOTES
History     Chief Complaint:  Hypotension     The history is provided by the patient.      Jt Montgomery is a 74 year old type II diabetic male anticoagulated on Coumadin with a history of atrial fibrillation, stage three chronic kidney disease, and coronary artery disease who presents to the emergency department today for evaluation of hypotension. The patient reports he was at the clinic this morning for evaluation of decreased urinary output, and while there they noted him to be hypotensive and sent him here for further evaluation. He states that over the past three days he has felt like he has had to urinate, but has not been able to fully empty his bladder. The patient does endorse some penile pain and adds that he had a urinary tract infection approximately four months ago treated with antibiotics. He denies a history of kidney stones or problems with his prostate. The patient states he has not had any back pain.  He is on Coumadin for history of A. Fib.    Chart review shows that he grew E. coli in his urine in October 2019, resistant to most cephalosporins though sensitive to fluoroquinolones, Bactrim, and Zosyn.    Allergies:  No Known Drug Allergies     Medications:    Aspirin  Coenzyme Q10  Lanoxin  Diltiazem  Uloric  Lasix  Novolog flexpen  Lantus solostar  BD Freya U/F  Avapro  Magnesium oxide  Metoprolol succinate  Prednisone  Spironolactone  Taurine  Coumadin     Past Medical History:    Atrial fibrillation  Coronary artery disease  Obstructive sleep apnea   CKD, stage three  Dilated cardiomyopathy  Diabetes, type II  Gout  Osteoarthritis   Hypertension  Alcohol dependence, in remission  Hyperlipidemia  Cellulitis, face    Past Surgical History:    Coronary angiography  Hernia repair  Suspend hyoid, genioglossal advancement     Family History:    Hypertension  Coronary artery disease-  Mother & Father  Diabetes  Cerebrovascular disease     Social History:  The patient was unaccompanied to  "the ED.  Smoking Status: Former smoker, quit 1972  Smokeless Tobacco: Never Used  Alcohol Use: Now sober  Drug Use: Negative    The patient lives on his own and has a cat.   Marital Status:      Review of Systems   All other systems reviewed and are negative.    Physical Exam     Patient Vitals for the past 24 hrs:   BP Temp Temp src Pulse Heart Rate Resp SpO2 Height Weight   12/17/19 1530 94/51 -- -- 99 90 -- -- -- --   12/17/19 1519 92/54 -- -- 98 91 -- -- -- --   12/17/19 1500 (!) 83/57 -- -- 93 92 20 -- -- --   12/17/19 1430 103/54 -- -- 101 80 20 -- -- --   12/17/19 1415 91/55 -- -- -- 90 18 -- -- --   12/17/19 1400 (!) 85/43 -- -- 87 92 20 95 % -- --   12/17/19 1330 98/50 -- -- 89 75 18 91 % -- --   12/17/19 1300 91/47 -- -- 91 -- -- -- -- --   12/17/19 1230 95/56 99.4  F (37.4  C) Oral 73 -- -- -- -- --   12/17/19 1200 106/56 -- -- 100 -- -- 94 % -- --   12/17/19 1140 113/67 97.9  F (36.6  C) Oral 71 -- 18 95 % 1.854 m (6' 1\") 120.2 kg (265 lb)      Physical Exam  General: ill appearing male sitting upright in room 14  HENT: mucous membranes slightly dry  CV: regular rate, regular rhythm  Resp: clear throughout, normal effort, no crackles or wheezing  GI: abdomen soft and nontender, no guarding, no palpable masses  MSK:   Thoracic spine: nontender, mild bilateral CVAT  Lumbar spine: nontender  Pelvis stable.  : +purulent urethral discharge, nontender testes with normal lie, no scrotal masses or fluctuance, epididymis nontender  Skin: appropriately warm and dry, no erythema/ecchymosis/vesicles to back  Neuro: alert, clear speech, oriented though somewhat poor historian  Psych: cooperative      Emergency Department Course     ECG:  ECG taken at 1249, ECG read at 1256  Atrial fibrillation with premature ventricular or aberrantly conducted complexes  Low voltage QRS  ST & T wave abnormality, consider lateral ischemia  Abnormal ECG   Rate 69 bpm. MI interval *. QRS duration 94. QT/QTc 372/398. P-R-T axes " * 4 146.       Imaging:  Radiology findings were communicated with the patient who voiced understanding of the findings.  POC US, Abdomen Limited  Significant volume of urine in bladder, c/w urinary retention. Key images were digitally archived in the [facility] radiology system.  As read by, Dr. Femi Ortiz    CT, Abdomen & Pelvis without Contrast  1. Marked bilateral perinephric infiltration with prominent collecting systems and ureters have the appearance of either recently resolved obstruction or infection.  2. Abnormal appearance of the urinary bladder with bladder wall thickening and prostate enlargement. Tumor cannot be excluded.  3. Cirrhotic appearing, fatty infiltrated liver.  4. Cholelithiasis.  5. Severe atherosclerosis.  6. Cardiomegaly.  Reading per radiology       Laboratory:  Laboratory findings were communicated with the patient who voiced understanding of the findings.  CBC: WNL (WBC 14.2, HGB 14.5, )  BMP: Sodium 126 (L), potassium 5.4 (H), glucose 164 (H), urea nitrogen 80 (H) GFR estimate 13 (L). O/w WNL (Creatinine 4.31)  ISTAT gases lactate loraine POCT (collected 1237): pH Venous 7.43, PCO2 Venous 31 (L), PO2 Venous 27, Bicarbonate Venous 20 (L), O2 Sat Venous 52, Lactic Acid 2.8 (H).       Lipase: 60 (L)  INR: 2.17 (H)   Hepatic Panel: Bilirubin Direct 1.4 (H), Bilirubin Total 6.5 (H), Albumin 2.9 (L), Protein Total 6.8, Alkaline Phosphatase 89, ALT 28, AST 37.    Ammonia: Pending  Lactic Acid (Collected at 1444): 2.4 (H)   UA with Microscopic: Slightly cloudy, yellow urine with ketones of 5, small blood, protein albumin of 100, large Leukocyte esterase, WBC of 182 (H), RBC of 18 (H), moderate bacteria, and mucous present. O/w WNL.    Urine Culture Aerobic Bacterial: Pending   Blood Cultures x2: Pending       Interventions:  1250 0.9% NaCl 1L IV  1258 Zosyn 3.375 g IV  1430 0.9% NaCl 2,606 mL IV       Emergency Department Course:  1140 Nursing notes and vitals reviewed.    1152 I  performed an exam of the patient as documented above.     1157 IV was inserted and blood was drawn for laboratory testing, results above.     1158 The patient had a POC ultrasound while in the emergency department, results above.      I performed electronic chart review in [facility].  The patient was placed on continuous cardiac and pulse ox monitoring.'    1221 The patient provided a urine sample here in the emergency department via catheter. This was sent for laboratory testing, findings above.     1236 Patient was rechecked and updated with plan for CT scan.    1249 An ECG was performed, results above.     1312 The patient was sent for a CT scan while in the emergency department, results above.      A second IV was established.   1417 Patient was rechecked and updated with laboratory and imaging results and plan for admission.    1433 I spoke with Dr. NICHOL Castillo of the hospitalist service from [place] regarding patient's presentation, findings, and plan of care.        Findings and plan explained to the Patient who consents to admission. Discussed the patient with Dr. Mendez, who will admit the patient to an ICU bed for further monitoring, evaluation, and treatment.       I personally reviewed the laboratory and imaging results with the Patient and answered all related questions prior to admission.     Impression & Plan    CMS Diagnoses:   The patient has signs of Severe Sepsis  as evidenced by:    1. 2 SIRS criteria, AND  2. Suspected infection, AND   3. Organ dysfunction:  SBP <90, MAP < 65, or SBP decrease of >40 from baseline due to infection    Time severe sepsis diagnosis confirmed: 1237  12/17/19  as this was the time when Lactate resulted, and the level was > 2.0    3 Hour Severe Sepsis Bundle Completion:  1. Initial Lactic Acid Result:   Recent Labs   Lab Test 12/17/19  1444 12/17/19  1237 12/02/18  2105   LACT 2.4* 2.8* 1.9     2. Blood Cultures before Antibiotics: Yes  3. Broad Spectrum Antibiotics  Administered:  yes       Anti-infectives (From admission through now)    Start     Dose/Rate Route Frequency Ordered Stop    12/17/19 1235  piperacillin-tazobactam (ZOSYN) 3.375 g vial to attach to  mL bag      3.375 g  over 1 Hours Intravenous ONCE 12/17/19 1234 12/17/19 1400          4. Volume of IV Fluid administered in ED: 3.606 L - ordered, not yet complete at time of admission   REMINDER: Please use septic shock SmartPhrase for Lactate > 4 or a patient  requiring vasopressors after initial fluid bolus (meaning persistent hypotension)      If one the following conditions is present, a 30cc/kg bolus is recommended as part of the 6 hour bundle (IBW can be used for BMI >30, or document refusal/contraindication)    1.   initial hypotension  defined as 2 bps < 90 or map < 65 in the 6hrs before or 6hrs  after time zero.    2.  Lactate >4.                 Severe Sepsis reassessment:  1. Repeat Lactic Acid Level: 2.4  2. Initial IVF bolus in process, not yet complete at time of admission; BP to be followed closely by admitting MD    I attest to having performed a repeat sepsis exam and assessment of perfusion at 1330 and the results demonstrate improved perfusion.         Medical Decision Making:  He appears ill and has evidence of profound renal failure and mild hyperkalemia, likely due to urinary retention and significant urinary tract infection with imaging evidence of pyelonephritis.  Further concern based on hypotension here in the ED, for which IV fluid bolus was indicated and initiated to achieve 30 cc/kg, which was underway but not yet complete at time of transfer to the ICU under the care of the hospitalist service.  CT imaging was performed to evaluate for an acute structural process such as ureteral stone, which is fortunately not detected.  He has imaging evidence of cholelithiasis though this is thought to be incidental to his presenting symptoms.  Broad-spectrum antibiotics were initiated after  cultures were sent.  Bloodstream infection remains possible.  While there are no immediately surgical findings, he is at high risk for deterioration from this process.  An indwelling Cash catheter was placed uneventfully by the nurse, with note made that he drained 1.5 L of very opaque urine during his ED course.  His hyperbilirubinemia will also require further work-up here in the hospital.  In the absence of EKG changes, and with his potassium level being just above the normal limit, I do not think he requires immediate nephrology consultation, though his electrolytes will need to be rechecked in the next few hours and watch closely.  He was admitted to a monitored bed in the ICU.  If blood pressures cannot be sustained, he may require pressors though at this time I do not think that he needs a central line or vasopressor support.      Diagnosis:    ICD-10-CM    1. Acute renal failure, unspecified acute renal failure type (H) N17.9 UA with Microscopic   2. Complicated urinary tract infection N39.0    3. Urinary retention R33.9    4. Hyperbilirubinemia E80.6    5. Hyperkalemia E87.5    6. Severe sepsis (H) A41.9     R65.20    7. Hyponatremia E87.1    8. Biliary calculus of other site without obstruction K80.80        Disposition:  The patient is admitted into the care of Dr. Castillo    This record was created at least in part using electronic voice recognition software, so please excuse any typographical errors.       Critical Care Time: Over 45 minutes, independent of procedures.  This included time spent obtaining a history and physical, reviewing the patient's chart, interpreting lab and imaging studies, re-examining the patient, coordinating care with other providers, and documenting ED care provided.  He has acute endorgan damage (acute renal failure) with electrolyte abnormalities as well as hypotension from severe sepsis due to bilateral pyelonephritis.  He required aggressive resuscitation with IV fluids and  broad-spectrum antibiotics.  He has an elevated lactic acid.  His condition carries high morbidity mortality.  He was admitted to the ICU.      Scribe Disclosure:  I, Yolanda Jordan, am serving as a scribe at 11:45 AM on 12/17/2019 to document services personally performed by Femi Ortiz MD based on my observations and the provider's statements to me.    12/17/2019    EMERGENCY DEPARTMENT       Femi Ortiz MD  12/17/19 6134

## 2019-12-17 NOTE — H&P
Admitted:     12/17/2019      HISTORY OF PRESENT ILLNESS:  This is a 74-year-old male with history of hypertension, diabetes, dilated cardiomyopathy, chronic kidney disease stage III, coronary artery disease, status post PCI in the past, atrial fibrillation on chronic anticoagulation with Coumadin, sleep apnea on CPAP, gout, obesity who comes to the ER as a transfer from the clinic because of weakness, tiredness, unable to pass urine, lower abdominal pain, constipation and low blood pressure.      According to the patient, since last Saturday he is not feeling that good.  He had chills in the evening and was unable to pee.  He was waking up all night.  He had the urge to pee, but nothing was coming out.  He thought it may be secondary to constipation.  He took some stool softener.  That did not work either.  The next day, Sunday, he continued feeling weak, tired and nauseated, off and on chills, lower abdominal discomfort, then started having some incontinence of urine.  He wears pads and had some urine on there.  He tried to contact his primary care physician who does not work on Monday, so he went to see him today in the clinic for these symptoms and was found to have low blood pressure and was sent to the ER.      At this time, the patient denies any chest pain.  He did have some lightheadedness.  No shortness of breath, orthopnea, PND or palpitation.  He does have constipation.  He has dysuria, increased frequency of urine and unable to increased urge to pee, but unable to pee.  No back pain.  He said that he took his medication this morning.  He tried to use the furosemide only as needed.  The rest of the review of system is negative.      ASSESSMENT AND PLAN:   1.  Severe sepsis secondary to acute pyelonephritis:  This is a 74-year-old male with multiple comorbidities who presented with lower abdominal pain and hypertension.  He had leukocytosis with WBC of 14.2, tachycardic on admission, hypotensive with  blood pressure going down to 83/57 despite boluses in the ER.  Lactic acid mildly elevated at 2.8 initially and now 2.4 consistent with severe sepsis.  If he does not respond to the IV fluids we may need to start him on Levophed.  We will keep him in the ICU, keep his MAP more than 65.  If we need to use the pressor, we can use Levophed for that.  I will start him on IV bicarbonate infusion at 125 mL per hour.  Urine cultures obtained.  Blood cultures obtained in the ER.  We will put him on IV Zosyn as he had the E. coli UTI in October which was resistant to ceftriaxone.  CT scan of the abdomen done which does suggest prominent collecting system, ureter prominent as well and marked bilateral perinephric infiltration I think this is consistent with pyelonephritis and possible ureteral obstruction recently.  The patient had a thick-walled urinary bladder as well and prostate enlargement.  I will put him on Proscar and Flomax from tomorrow evening.   2.  Acute on chronic renal failure secondary to obstructive uropathy:  The patient is obstructed.  He had a Cash catheter placed and cloudy urine came out.  I think his prostate is enlarged and causing renal failure.  At this time, he has mild metabolic acidosis and creatinine increased from 1.1 to 4.31.  I will start him on IV bicarbonate infusion drip at 125 mL per hour.  Cash catheter to remain in there.  Start him on Proscar and Flomax from tomorrow.  Check his renal panel later tonight and then in the morning.  Mild hyperkalemia will improve with IV fluids and IV bicarbonate infusion.  We will recheck the potassium in 3 hours.   3.  Hyponatremia:  Most likely secondary to renal failure, being on Aldactone.  Lasix will now be started.  We will check his urine sodium, urine osmolality.  Keep him on IV fluids.  We will recheck the sodium later tonight and in the morning.  Most likely will improve with IV fluids.     4.  Indirect hyperbilirubinemia:  I saw the patient  has elevated bilirubin, but never more than 4, today is 6.5.  I think the patient has Gilbert syndrome and increased bilirubin is because of the fever and sepsis at this time.  We will keep an eye on it.  Ultrasound of the liver.  All other liver function tests are normal.  Normal alkaline phosphatase.  AST and ALT are normal.  In looking back in his records, always has hyperbilirubinemia.   5.  Hypertension:  The patient has a history of hypertension, on multiple medications including irbesartan, Aldactone, metoprolol and Cardizem.  We will hold those medications for now until his blood pressure improves.   6.  Atrial fibrillation:  He is rate control at this time.  The patient does take metoprolol 25 mg b.i.d. XL and Cardizem 300 mg. I will hold the Cardizem for now, keep him on metoprolol 25 mg b.i.d. to keep his heart rate in control.  Keep him on digoxin as well as he took digoxin at home.   7.  Acute on chronic renal failure secondary to obstructive uropathy as mentioned above.   8.  History of dilated cardiomyopathy:  Last echocardiogram in 2016 showed ejection fraction 50%-55%.  He is on multiple medications.  We need to hold it for now given his current sepsis.  We will restart once his sepsis resolves and blood pressure improved.   9.  Obstructive sleep apnea on CPAP.  Did not bring his CPAP machine.  Will use it from hospital from today.   10.  History of coronary artery disease, stable, had stents in the past.  Keep him on aspirin, Coumadin, metoprolol.  Hold Aldactone, irbesartan and Cardizem at this time.   11.  History of gout, on febuxostat.  We will continue with that.   12.  Obesity:  The patient does have history of obesity.  He needs to work on his diet and lose some weight.   13.  Deep venous thrombosis prophylaxis with Coumadin.  Chronic anticoagulation with Coumadin.  Consult pharmacy to dose  his Coumadin.      CODE STATUS:  I had a detailed discussion with the patient regarding code status  and he wanted to be DNR at this time.      The case was discussed with the ER physician and the nursing staff taking care of the patient.      Total time spent today is more than 60 minutes of critical care time in admitting this patient, which included chart review, history taking, physical examination, formation of the plan, counseling and coordination of care.         SUZANNE TIM MD             D: 2019   T: 2019   MT: DANIEL      Name:     PAL ESCUDERO   MRN:      [id number]        Account:      [id number]   :      1945        Admitted:     2019                   Document: [id number]       cc: Digna Jones MD

## 2019-12-17 NOTE — PHARMACY-ANTICOAGULATION SERVICE
Clinical Pharmacy - Warfarin Dosing Consult     Pharmacy has been consulted to manage this patient s warfarin therapy.  Indication: Atrial Fibrillation  Therapy Goal: INR 2-3  Warfarin Prior to Admission: Yes  Warfarin PTA Regimen: 7.5mg TTSu, 5mg ROW  Recent documented change in oral intake/nutrition: No    INR   Date Value Ref Range Status   12/17/2019 2.17 (H) 0.86 - 1.14 Final     INR Protime   Date Value Ref Range Status   12/05/2019 2.3 (A) 0.86 - 1.14 Final       Recommend warfarin 7.5 mg today.  Pharmacy will monitor Jt Cooperariel daily and order warfarin doses to achieve specified goal.      Please contact pharmacy as soon as possible if the warfarin needs to be held for a procedure or if the warfarin goals change.

## 2019-12-17 NOTE — H&P
[facility]    History and Physical  Hospitalist       Date of Admission:  12/17/2019    Assessment & Plan      This is a 74-year-old male with history of hypertension, diabetes, dilated cardiomyopathy, chronic kidney disease stage III, coronary artery disease, status post PCI in the past, atrial fibrillation on chronic anticoagulation with Coumadin, sleep apnea on CPAP, gout, obesity who comes to the ER as a transfer from the clinic because of weakness, tiredness, unable to pass urine, lower abdominal pain, constipation and low blood pressure.      ASSESSMENT AND PLAN:   1.  Severe sepsis secondary to acute pyelonephritis:  This is a 74-year-old male with multiple comorbidities who presented with lower abdominal pain and hypertension.  He had leukocytosis with WBC of 14.2, tachycardic on admission, hypotensive with blood pressure going down to 83/57 despite boluses in the ER.  Lactic acid mildly elevated at 2.8 initially and now 2.4 consistent with severe sepsis.  If he does not respond to the IV fluids we may need to start him on Levophed.  We will keep him in the ICU, keep his MAP more than 65.  If we need to use the pressor, we can use Levophed for that.  I will start him on IV bicarbonate infusion at 125 mL per hour.  Urine cultures obtained.  Blood cultures obtained in the ER.  We will put him on IV Zosyn as he had the E. coli UTI in October which was resistant to ceftriaxone.  CT scan of the abdomen done which does suggest prominent collecting system, ureter prominent as well and marked bilateral perinephric infiltration I think this is consistent with pyelonephritis and possible ureteral obstruction recently.  The patient had a thick-walled urinary bladder as well and prostate enlargement.  I will put him on Proscar and Flomax from tomorrow evening.   2.  Acute on chronic renal failure secondary to obstructive uropathy:  The patient is obstructed.  He had a Cash catheter placed and cloudy  urine came out.  I think his prostate is enlarged and causing renal failure.  At this time, he has mild metabolic acidosis and creatinine increased from 1.1 to 4.31.  I will start him on IV bicarbonate infusion drip at 125 mL per hour.  Cash catheter to remain in there.  Start him on Proscar and Flomax from tomorrow.  Check his renal panel later tonight and then in the morning.  Mild hyperkalemia will improve with IV fluids and IV bicarbonate infusion.  We will recheck the potassium in 3 hours.   3.  Hyponatremia:  Most likely secondary to renal failure, being on Aldactone.  Lasix will now be started.  We will check his urine sodium, urine osmolality.  Keep him on IV fluids.  We will recheck the sodium later tonight and in the morning.  Most likely will improve with IV fluids.     4.  Indirect hyperbilirubinemia:  I saw the patient has elevated bilirubin, but never more than 4, today is 6.5.  I think the patient has Gilbert syndrome and increased bilirubin is because of the fever and sepsis at this time.  We will keep an eye on it.  Ultrasound of the liver.  All other liver function tests are normal.  Normal alkaline phosphatase.  AST and ALT are normal.  In looking back in his records, always has hyperbilirubinemia.   5.  Hypertension:  The patient has a history of hypertension, on multiple medications including irbesartan, Aldactone, metoprolol and Cardizem.  We will hold those medications for now until his blood pressure improves.   6.  Atrial fibrillation:  He is rate control at this time.  The patient does take metoprolol 25 mg b.i.d. XL and Cardizem 300 mg. I will hold the Cardizem for now, keep him on metoprolol 25 mg b.i.d. to keep his heart rate in control.  Keep him on digoxin as well as he took digoxin at home.   7.  Acute on chronic renal failure secondary to obstructive uropathy as mentioned above.   8.  History of dilated cardiomyopathy:  Last echocardiogram in 2016 showed ejection fraction 50%-55%.   He is on multiple medications.  We need to hold it for now given his current sepsis.  We will restart once his sepsis resolves and blood pressure improved.   9.  Obstructive sleep apnea on CPAP.  Did not bring his CPAP machine.  Will use it from hospital from today.   10.  History of coronary artery disease, stable, had stents in the past.  Keep him on aspirin, Coumadin, metoprolol.  Hold Aldactone, irbesartan and Cardizem at this time.   11.  History of gout, on febuxostat.  We will continue with that.   12.  Obesity:  The patient does have history of obesity.  He needs to work on his diet and lose some weight.   13.  Deep venous thrombosis prophylaxis with Coumadin.  Chronic anticoagulation with Coumadin.  Consult pharmacy to dose  his Coumadin.      CODE STATUS:  I had a detailed discussion with the patient regarding code status and he wanted to be DNR at this time.      The case was discussed with the ER physician and the nursing staff taking care of the patient.      Total time spent today is more than 60 minutes of critical care time in admitting this patient, which included chart review, history taking, physical examination, formation of the plan, counseling and coordination of care.         EPHRAIM CASTILLO MD         DVT Prophylaxis: Warfarin  Code Status: DNR    Disposition: Expected discharge in 2-3 days once stable.    Ephraim Castillo MD    Primary Care Physician   Digna Jones    Chief Complaint   Weakness, low blood pressure, unable to pass urine, lower abdominal pain.    History is obtained from the patient    History of Present Illness   Admitted:     12/17/2019      HISTORY OF PRESENT ILLNESS:  This is a 74-year-old male with history of hypertension, diabetes, dilated cardiomyopathy, chronic kidney disease stage III, coronary artery disease, status post PCI in the past, atrial fibrillation on chronic anticoagulation with Coumadin, sleep apnea on CPAP, gout, obesity who comes to the ER as a transfer from  the clinic because of weakness, tiredness, unable to pass urine, lower abdominal pain, constipation and low blood pressure.      According to the patient, since last Saturday he is not feeling that good.  He had chills in the evening and was unable to pee.  He was waking up all night.  He had the urge to pee, but nothing was coming out.  He thought it may be secondary to constipation.  He took some stool softener.  That did not work either.  The next day, Sunday, he continued feeling weak, tired and nauseated, off and on chills, lower abdominal discomfort, then started having some incontinence of urine.  He wears pads and had some urine on there.  He tried to contact his primary care physician who does not work on Monday, so he went to see him today in the clinic for these symptoms and was found to have low blood pressure and was sent to the ER.      At this time, the patient denies any chest pain.  He did have some lightheadedness.  No shortness of breath, orthopnea, PND or palpitation.  He does have constipation.  He has dysuria, increased frequency of urine and unable to increased urge to pee, but unable to pee.  No back pain.  He said that he took his medication this morning.  He tried to use the furosemide only as needed.  The rest of the review of system is negative.     Past Medical History    I have reviewed this patient's medical history and updated it with pertinent information if needed.   Past Medical History:   Diagnosis Date     Atrial fibrillation (H)      CAD (coronary artery disease)     MI with RONEL to dRCA April 2011     Central Sleep Apnea with Pat Villanueva breathing 9/14/2010    Also TOMMY with CPAP     CKD (chronic kidney disease) stage 3, GFR 30-59 ml/min (H)      Dilated cardiomyopathy (H)     nonischemic (possibly related to h/o a.fib with RVR) EF 30-40% March 2013     DM2 (diabetes mellitus, type 2) (H)     Goal HgbA1c < 7%     Gout     uric acid level correlates; April 2014 h/o +knee  aspirate     Hypertension     Goal <140/90     Pulmonary hypertension (H)     mild per echo 3/2013       Past Surgical History   I have reviewed this patient's surgical history and updated it with pertinent information if needed.  Past Surgical History:   Procedure Laterality Date     CORONARY ANGIOGRAPHY ADULT ORDER      distal circ-RONEL     HERNIA REPAIR  11/20/10    incarcinated     SUSPEND HYOID, GENIOGLOSSAL ADVANCEMENT         Prior to Admission Medications   Prior to Admission Medications   Prescriptions Last Dose Informant Patient Reported? Taking?   Coenzyme Q10 (COQ10) 100 MG CAPS 2019 at Unknown time Self Yes Yes   Sig: Take 200 mg by mouth daily    Continuous Blood Gluc Sensor (FREESTYLE RINKU 14 DAY SENSOR) MISC  Self No No   Si Device every 14 days   Green Tea, Camillia sinensis, (GREEN TEA EXTRACT PO)  Self Yes Yes   Sig: Take by mouth 2 times daily Doesn't always take regularly   HERBALS  Self Yes Yes   Sig: Nerve by Plexis 2 a day,   Healthy Feet and Nerves  By [employer]  3 a day, Heart Food Caps  (cayenna pepper, garlic, hawthorn, onion & ginger 1-2 a day), Super Red Drink Powder daily, garlic daily   MELATONIN PO 2019 at Unknown time Self Yes Yes   Sig: Take 0.5 mg by mouth At Bedtime    Probiotic Product (PROBIOTIC & ACIDOPHILUS EX ST PO) 2019 at Unknown time Self Yes Yes   Sig: Take 1 capsule by mouth daily   Taurine 500 MG CAPS  Self Yes Yes   Sig: Take 1 capsule by mouth as needed    UNABLE TO FIND  Self Yes Yes   Sig: MEDICATION NAME: Super Beet Juice   UNABLE TO FIND  Self Yes Yes   Sig: MEDICATION NAME: Udos Oil - combination of omega 3, 6, 9.  Taking 2-3 capsules daily   aspirin EC 81 MG EC tablet 2019 at Unknown time Self No Yes   Sig: Take 1 tablet by mouth daily.   digoxin (LANOXIN) 125 MCG tablet 2019 at Unknown time Self No Yes   Sig: Take 1 tablet (125 mcg) by mouth daily   diltiazem ER COATED BEADS (CARTIA XT) 300 MG 24 hr capsule 2019 at  Unknown time Nursing Home No Yes   Sig: Take 1 capsule (300 mg) by mouth daily   febuxostat (ULORIC) 40 MG TABS 12/17/2019 at Unknown time Self Yes Yes   Sig: Take 40 mg by mouth daily   furosemide (LASIX) 20 MG tablet  Self No Yes   Sig: Take 1 tablet (20 mg) by mouth daily as needed (edema)   insulin aspart (NOVOLOG FLEXPEN) 100 UNIT/ML pen 12/17/2019 at x1 Self No Yes   Sig: Inject 15 Units Subcutaneous 3 times daily (with meals)   insulin glargine (LANTUS SOLOSTAR) 100 UNIT/ML pen 12/16/2019 at Unknown time Self No Yes   Sig: ADMINISTER 18 UNITS UNDER THE SKIN DAILY   Patient taking differently: Inject 18 Units Subcutaneous At Bedtime ADMINISTER 18 UNITS UNDER THE SKIN DAILY   insulin pen needle (BD GERMÁN U/F) 32G X 4 MM miscellaneous  Self No No   Sig: USING FOUR TIMES DAILY OR AS DIRECTED   irbesartan (AVAPRO) 300 MG tablet 12/16/2019 at Unknown time snf No Yes   Sig: Take 1 tablet (300 mg) by mouth At Bedtime   magnesium oxide 200 MG TABS 12/16/2019 at Unknown time Self Yes Yes   Sig: Take 2 tablets by mouth At Bedtime    metoprolol succinate ER (TOPROL-XL) 50 MG 24 hr tablet 12/17/2019 at x1 Nursing Home No Yes   Sig: Take 1 tablet (50 mg) by mouth 2 times daily   multivitamin, therapeutic with minerals (MULTI-VITAMIN) TABS tablet Past Week at Unknown time Self Yes Yes   Sig: Take 4-8 tablets by mouth daily Life Extenstion   order for DME  Self No No   Sig: Equipment being ordered: Compression stockings   predniSONE (DELTASONE) 20 MG tablet  Self No Yes   Sig: TAKE 1 TABLET(20 MG) BY MOUTH DAILY AS NEEDED FOR GOUT FLARE   spironolactone (ALDACTONE) 25 MG tablet 12/16/2019 at Unknown time Self No Yes   Sig: Take 1 tablet (25 mg) by mouth daily   Patient taking differently: Take 25 mg by mouth every evening    vitamin D3 (CHOLECALCIFEROL) 2000 units tablet 12/16/2019 at Unknown time Self Yes Yes   Sig: Take 2 tablets by mouth daily    warfarin ANTICOAGULANT (COUMADIN) 5 MG tablet 12/16/2019 at Unknown  time Self No Yes   Sig: Take 1 1/2 tabs on Sundays, Tuesdays and Thursdays and take 1 tab on all other days or as directed by INR clinic   Patient taking differently: Take by mouth See Admin Instructions Take 1 1/2 tabs on Sundays, Tuesdays and Thursdays and take 1 tab on all other days or as directed by INR clinic      Facility-Administered Medications: None     Allergies   Allergies   Allergen Reactions     Corn Dextrin      All corn products - gets tired an ornery       Social History   I have reviewed this patient's social history and updated it with pertinent information if needed. Jt Montgomery  reports that he quit smoking about 47 years ago. His smoking use included cigarettes. He has a 10.50 pack-year smoking history. He has never used smokeless tobacco. He reports that he does not drink alcohol or use drugs.    Family History   I have reviewed this patient's family history and updated it with pertinent information if needed.   Family History   Problem Relation Age of Onset     Hypertension Mother      Coronary Artery Disease Mother      Coronary Artery Disease Father      Hypertension Father      Diabetes Sister      Cerebrovascular Disease Sister        Review of Systems   CONSTITUTIONAL:  positive for  chills, fatigue, malaise and anorexia  EYES:  negative  HEENT:  negative  RESPIRATORY:  negative  CARDIOVASCULAR:  negative  GASTROINTESTINAL:  positive for constipation and abdominal pain  GENITOURINARY:  positive for dysuria, nocturia, urinary incontinence and decreased stream  INTEGUMENT/BREAST:  negative  HEMATOLOGIC/LYMPHATIC:  negative  ALLERGIC/IMMUNOLOGIC:  negative  ENDOCRINE:  negative  MUSCULOSKELETAL:  negative  NEUROLOGICAL:  negative  BEHAVIOR/PSYCH:  negative    Physical Exam   Temp: 99.4  F (37.4  C) Temp src: Oral BP: (!) 83/57 Pulse: 93 Heart Rate: 92 Resp: 20 SpO2: 95 %      Vital Signs with Ranges  Temp:  [97.9  F (36.6  C)-99.4  F (37.4  C)] 99.4  F (37.4  C)  Pulse:  []  93  Heart Rate:  [75-92] 92  Resp:  [18-20] 20  BP: ()/(43-67) 83/57  SpO2:  [91 %-96 %] 95 %  265 lbs 0 oz    Constitutional: Awake, alert, cooperative, no apparent distress.  Eyes: Sclera Icteric  pupils examined and normal.  HEENT: very dry mucous membranes, normal dentition.  Respiratory: Clear to auscultation bilaterally, no crackles or wheezing.  Cardiovascular: Irregularly irregular rhythm, normal S1 and S2, and no murmur noted.  GI: Soft, non-distended, mild bilateral flank tenderness, normal bowel sounds.  Lymph/Hematologic: No anterior cervical or supraclavicular adenopathy.  Skin: No rashes, no cyanosis, 3+ edema bilateral LE  Musculoskeletal: No joint swelling, erythema or tenderness.  Neurologic: Cranial nerves 2-12 intact, normal strength and sensation.  Psychiatric: Alert, oriented to person, place and time, no obvious anxiety or depression.    Data   Data reviewed today:  I personally reviewed the EKG tracing showing Atrial fibrillation with premature ventricular or aberrantly conducted complexes  Low voltage QRS  ST & T wave abnormality, consider lateral ischemia.    Recent Labs   Lab 12/17/19  1151   WBC 14.2*   HGB 14.5   *      INR 2.17*   *   POTASSIUM 5.4*   CHLORIDE 94   CO2 20   BUN 80*   CR 4.31*   ANIONGAP 12   HARSHA 8.7   *   ALBUMIN 2.9*   PROTTOTAL 6.8   BILITOTAL 6.5*   ALKPHOS 89   ALT 28   AST 37   LIPASE 60*       Recent Results (from the past 24 hour(s))   POC US ABDOMEN LIMITED    Impression    ED Bedside Limited Ultrasound  Body area scanned: suprapubic abd  Performed by: Femi Ortiz MD  Indication: urinary retention/difficulty, eval for bladder fullness  Findings/Interpretation: significant volume of urine in bladder, c/w urinary retention  Key images were digitally archived in the [employer] radiology system.       CT Abdomen Pelvis w/o Contrast    Narrative    CT ABDOMEN/PELVIS WITHOUT CONTRAST December 17, 2019 1:56 PM     HISTORY: Urinary  retention, renal failure, urine infection.    TECHNIQUE: No IV contrast material. Radiation dose for this scan was  reduced using automated exposure control, adjustment of the mA and/or  kV according to patient size, or iterative reconstruction technique.    COMPARISON: 11/19/2010.    FINDINGS: The heart is enlarged. There is diffuse low-attenuation of  the lobular liver. Several calcified gallstones are present. Spleen is  normal in size. The unenhanced pancreas and adrenal glands are  unremarkable.    There is marked bilateral perinephric infiltration. Calcifications  along the right renal artery are noted. There is a nonobstructing  calcification in the left renal collecting system that measures 6 mm.  Mild prominence of the ureters and collecting systems noted.    The appendix is normal. No bowel obstruction or ascites. No free  intraperitoneal air.    Urinary bladder is decompressed by a catheter. There is apparent  marked urinary bladder wall thickening that appears irregular.    Severe nonaneurysmal aortic and visceral branch atherosclerosis. No  abdominal or retroperitoneal lymphadenopathy. No pelvic adenopathy or  free fluid.    No lytic or blastic bone lesions.      Impression    IMPRESSION:  1. Marked bilateral perinephric infiltration with prominent collecting  systems and ureters have the appearance of either recently resolved  obstruction or infection.  2. Abnormal appearance of the urinary bladder with bladder wall  thickening and prostate enlargement. Tumor cannot be excluded.  3. Cirrhotic appearing, fatty infiltrated liver.  4. Cholelithiasis.  5. Severe atherosclerosis.  6. Cardiomegaly.    JANET BRICE MD

## 2019-12-17 NOTE — PROGRESS NOTES
Subjective     Jt Montgomery is a 74 year old male who presents to clinic today for the following health issues:    HPI   Genitourinary symptoms      Duration: Since Saturday    Description:  Retention on Saturday and Sunday, chills, tiredness    Intensity:  moderate    Accompanying signs and symptoms (fever/discharge/nausea/vomiting/back or abdominal pain):  None    History (frequent UTI's/kidney stones/prostate problems): None  Sexually active: no     Precipitating or alleviating factors: None    Therapies tried and outcome: none   Outcome: NA    Hasn't been able to urinate since Saturday. He only has had a little dribbling.   He is also constipated and states that he got abdominal pain with straining. Currently has mild abd pain   Overall doesn't feel well today.   Knew he should have come in yesterday but he was too tired    Past Medical History:   Diagnosis Date     Atrial fibrillation (H)      CAD (coronary artery disease)     MI with RONEL to dRCA April 2011     Central Sleep Apnea with Pat Villanueva breathing 9/14/2010    Also TOMMY with CPAP     CKD (chronic kidney disease) stage 3, GFR 30-59 ml/min (H)      Dilated cardiomyopathy (H)     nonischemic (possibly related to h/o a.fib with RVR) EF 30-40% March 2013     DM2 (diabetes mellitus, type 2) (H)     Goal HgbA1c < 7%     Gout     uric acid level correlates; April 2014 h/o +knee aspirate     Hypertension     Goal <140/90     Pulmonary hypertension (H)     mild per echo 3/2013     Family History   Problem Relation Age of Onset     Hypertension Mother      Coronary Artery Disease Mother      Coronary Artery Disease Father      Hypertension Father      Diabetes Sister      Cerebrovascular Disease Sister      Past Surgical History:   Procedure Laterality Date     CORONARY ANGIOGRAPHY ADULT ORDER  2011    distal circ-RONEL     HERNIA REPAIR  11/20/10    incarcinated     SUSPEND HYOID, GENIOGLOSSAL ADVANCEMENT       Social History     Tobacco Use      Smoking status: Former Smoker     Packs/day: 1.50     Years: 7.00     Pack years: 10.50     Types: Cigarettes     Last attempt to quit: 1972     Years since quittin.9     Smokeless tobacco: Never Used     Tobacco comment: quit in       Started at around age 18-19   Substance Use Topics     Alcohol use: No     Alcohol/week: 0.0 standard drinks     Comment: 74   recovering     Current Outpatient Medications   Medication Sig Dispense Refill     Amino Acids (L-CARNITINE PO) Take 2,000 mg by mouth daily       aspirin EC 81 MG EC tablet Take 1 tablet by mouth daily. 90 tablet 1     Coenzyme Q10 (COQ10) 100 MG CAPS Take 200 mg by mouth daily        Continuous Blood Gluc Sensor (FREESTYLE RINKU 14 DAY SENSOR) MISC 1 Device every 14 days 2 each 11     digoxin (LANOXIN) 125 MCG tablet Take 1 tablet (125 mcg) by mouth daily 90 tablet 3     diltiazem ER COATED BEADS (CARTIA XT) 300 MG 24 hr capsule Take 1 capsule (300 mg) by mouth daily 90 capsule 3     febuxostat (ULORIC) 40 MG TABS Take 40 mg by mouth daily       furosemide (LASIX) 20 MG tablet Take 1 tablet (20 mg) by mouth daily as needed (edema) 90 tablet 3     Green Tea, Camillia sinensis, (GREEN TEA EXTRACT PO) Take by mouth 2 times daily Doesn't always take regularly       HERBALS Nerve by Plexis 2 a day,   Healthy Feet and Nerves  By [employer]  3 a day, Heart Food Caps  (cayenna pepper, garlic, hawthorn, onion & ginger 1-2 a day), Super Red Drink Powder daily, garlic daily       insulin aspart (NOVOLOG FLEXPEN) 100 UNIT/ML pen Inject 15 Units Subcutaneous 3 times daily (with meals) 30 mL 11     insulin glargine (LANTUS SOLOSTAR) 100 UNIT/ML pen ADMINISTER 18 UNITS UNDER THE SKIN DAILY 30 mL 11     insulin pen needle (BD GERMÁN U/F) 32G X 4 MM miscellaneous USING FOUR TIMES DAILY OR AS DIRECTED 200 each 0     irbesartan (AVAPRO) 300 MG tablet Take 1 tablet (300 mg) by mouth At Bedtime 90 tablet 2     magnesium oxide 200 MG TABS Take 2 tablets by mouth  "daily       MELATONIN PO Take 0.5 mg by mouth At Bedtime        metoprolol succinate ER (TOPROL-XL) 50 MG 24 hr tablet Take 1 tablet (50 mg) by mouth 2 times daily 180 tablet 3     multivitamin, therapeutic with minerals (MULTI-VITAMIN) TABS tablet Take 4-8 tablets by mouth daily Life Extenstion       order for DME Equipment being ordered: Compression stockings 1 each 3     predniSONE (DELTASONE) 20 MG tablet TAKE 1 TABLET(20 MG) BY MOUTH DAILY AS NEEDED FOR GOUT FLARE 10 tablet 0     Probiotic Product (PROBIOTIC & ACIDOPHILUS EX ST PO) Take 1 capsule by mouth daily       spironolactone (ALDACTONE) 25 MG tablet Take 1 tablet (25 mg) by mouth daily 90 tablet 3     Taurine 500 MG CAPS Take 1 capsule by mouth as needed        UNABLE TO FIND MEDICATION NAME: Udos Oil - combination of omega 3, 6, 9.  Taking 2-3 capsules daily       UNABLE TO FIND MEDICATION NAME: Super Beet Juice       vitamin D3 (CHOLECALCIFEROL) 2000 units tablet Take 2 tablets by mouth daily        warfarin ANTICOAGULANT (COUMADIN) 5 MG tablet Take 1 1/2 tabs on Sundays, Tuesdays and Thursdays and take 1 tab on all other days or as directed by INR clinic 110 tablet 1     Allergies   Allergen Reactions     Corn Dextrin      All corn products - gets tired an ornery       Reviewed and updated as needed this visit by clinical staff and provider     Review of Systems   Detailed as above         Objective    BP (!) 89/49 (BP Location: Right arm, Patient Position: Sitting, Cuff Size: Adult Large)   Pulse 105   Temp 97.9  F (36.6  C) (Oral)   Ht 1.854 m (6' 1\")   Wt 120.2 kg (265 lb)   SpO2 96%   BMI 34.96 kg/m    There is no height or weight on file to calculate BMI.  Physical Exam  Eyes:      General: Scleral icterus present.   Cardiovascular:      Rate and Rhythm: Rhythm irregular.   Skin:     Coloration: Skin is jaundiced.   Neurological:      Mental Status: He is alert.   Psychiatric:      Comments: Appears lethargic            Assessment & Plan "       ICD-10-CM    1. Urinary retention R33.9    2. Hypotension, unspecified hypotension type I95.9    3. Jaundice R17         With significant urinary retention, hypotension and jaundice today, pt is brought directly to the ED for further eval.   Of note, pt grew ESBL on urine culture on 12/2 from nephrology office. He wasn't treated as he did not have any symptoms.       RADHA [facility]

## 2019-12-18 LAB
ALBUMIN SERPL-MCNC: 2.2 G/DL (ref 3.4–5)
ALP SERPL-CCNC: 60 U/L (ref 40–150)
ALT SERPL W P-5'-P-CCNC: 26 U/L (ref 0–70)
ANION GAP SERPL CALCULATED.3IONS-SCNC: 10 MMOL/L (ref 3–14)
ANION GAP SERPL CALCULATED.3IONS-SCNC: 6 MMOL/L (ref 3–14)
AST SERPL W P-5'-P-CCNC: 33 U/L (ref 0–45)
BASOPHILS # BLD AUTO: 0 10E9/L (ref 0–0.2)
BASOPHILS NFR BLD AUTO: 0.1 %
BILIRUB DIRECT SERPL-MCNC: 1.1 MG/DL (ref 0–0.2)
BILIRUB SERPL-MCNC: 3.9 MG/DL (ref 0.2–1.3)
BUN SERPL-MCNC: 61 MG/DL (ref 7–30)
BUN SERPL-MCNC: 67 MG/DL (ref 7–30)
CALCIUM SERPL-MCNC: 7.5 MG/DL (ref 8.5–10.1)
CALCIUM SERPL-MCNC: 7.8 MG/DL (ref 8.5–10.1)
CHLORIDE SERPL-SCNC: 100 MMOL/L (ref 94–109)
CHLORIDE SERPL-SCNC: 104 MMOL/L (ref 94–109)
CO2 SERPL-SCNC: 23 MMOL/L (ref 20–32)
CO2 SERPL-SCNC: 29 MMOL/L (ref 20–32)
CREAT SERPL-MCNC: 1.94 MG/DL (ref 0.66–1.25)
CREAT SERPL-MCNC: 2.45 MG/DL (ref 0.66–1.25)
DIFFERENTIAL METHOD BLD: ABNORMAL
EOSINOPHIL # BLD AUTO: 0.1 10E9/L (ref 0–0.7)
EOSINOPHIL NFR BLD AUTO: 0.9 %
ERYTHROCYTE [DISTWIDTH] IN BLOOD BY AUTOMATED COUNT: 14.4 % (ref 10–15)
GFR SERPL CREATININE-BSD FRML MDRD: 25 ML/MIN/{1.73_M2}
GFR SERPL CREATININE-BSD FRML MDRD: 33 ML/MIN/{1.73_M2}
GLUCOSE BLDC GLUCOMTR-MCNC: 157 MG/DL (ref 70–99)
GLUCOSE SERPL-MCNC: 122 MG/DL (ref 70–99)
GLUCOSE SERPL-MCNC: 140 MG/DL (ref 70–99)
HAPTOGLOB SERPL-MCNC: 202 MG/DL (ref 35–175)
HCT VFR BLD AUTO: 36 % (ref 40–53)
HGB BLD-MCNC: 12.1 G/DL (ref 13.3–17.7)
IMM GRANULOCYTES # BLD: 0 10E9/L (ref 0–0.4)
IMM GRANULOCYTES NFR BLD: 0.2 %
INR PPP: 2.5 (ref 0.86–1.14)
LYMPHOCYTES # BLD AUTO: 0.4 10E9/L (ref 0.8–5.3)
LYMPHOCYTES NFR BLD AUTO: 4.4 %
MAGNESIUM SERPL-MCNC: 2.4 MG/DL (ref 1.6–2.3)
MCH RBC QN AUTO: 34.6 PG (ref 26.5–33)
MCHC RBC AUTO-ENTMCNC: 33.6 G/DL (ref 31.5–36.5)
MCV RBC AUTO: 103 FL (ref 78–100)
MONOCYTES # BLD AUTO: 0.9 10E9/L (ref 0–1.3)
MONOCYTES NFR BLD AUTO: 8.8 %
NEUTROPHILS # BLD AUTO: 8.3 10E9/L (ref 1.6–8.3)
NEUTROPHILS NFR BLD AUTO: 85.6 %
NRBC # BLD AUTO: 0 10*3/UL
NRBC BLD AUTO-RTO: 0 /100
OSMOLALITY UR: 387 MMOL/KG (ref 100–1200)
PLATELET # BLD AUTO: 126 10E9/L (ref 150–450)
POTASSIUM SERPL-SCNC: 3.9 MMOL/L (ref 3.4–5.3)
POTASSIUM SERPL-SCNC: 4.2 MMOL/L (ref 3.4–5.3)
PROT SERPL-MCNC: 6.1 G/DL (ref 6.8–8.8)
RBC # BLD AUTO: 3.5 10E12/L (ref 4.4–5.9)
SODIUM SERPL-SCNC: 135 MMOL/L (ref 133–144)
SODIUM SERPL-SCNC: 137 MMOL/L (ref 133–144)
SODIUM UR-SCNC: 27 MMOL/L
WBC # BLD AUTO: 9.7 10E9/L (ref 4–11)

## 2019-12-18 PROCEDURE — 87040 BLOOD CULTURE FOR BACTERIA: CPT | Performed by: INTERNAL MEDICINE

## 2019-12-18 PROCEDURE — 12000000 ZZH R&B MED SURG/OB

## 2019-12-18 PROCEDURE — 82248 BILIRUBIN DIRECT: CPT | Performed by: INTERNAL MEDICINE

## 2019-12-18 PROCEDURE — 99222 1ST HOSP IP/OBS MODERATE 55: CPT | Performed by: UROLOGY

## 2019-12-18 PROCEDURE — 93005 ELECTROCARDIOGRAM TRACING: CPT

## 2019-12-18 PROCEDURE — 36415 COLL VENOUS BLD VENIPUNCTURE: CPT | Performed by: INTERNAL MEDICINE

## 2019-12-18 PROCEDURE — 80053 COMPREHEN METABOLIC PANEL: CPT | Performed by: INTERNAL MEDICINE

## 2019-12-18 PROCEDURE — 00000146 ZZHCL STATISTIC GLUCOSE BY METER IP

## 2019-12-18 PROCEDURE — 85610 PROTHROMBIN TIME: CPT | Performed by: INTERNAL MEDICINE

## 2019-12-18 PROCEDURE — 25000128 H RX IP 250 OP 636: Performed by: INTERNAL MEDICINE

## 2019-12-18 PROCEDURE — 93010 ELECTROCARDIOGRAM REPORT: CPT | Performed by: INTERNAL MEDICINE

## 2019-12-18 PROCEDURE — 80048 BASIC METABOLIC PNL TOTAL CA: CPT | Performed by: INTERNAL MEDICINE

## 2019-12-18 PROCEDURE — 83935 ASSAY OF URINE OSMOLALITY: CPT | Performed by: INTERNAL MEDICINE

## 2019-12-18 PROCEDURE — 84300 ASSAY OF URINE SODIUM: CPT | Performed by: INTERNAL MEDICINE

## 2019-12-18 PROCEDURE — 25000131 ZZH RX MED GY IP 250 OP 636 PS 637: Performed by: INTERNAL MEDICINE

## 2019-12-18 PROCEDURE — 85025 COMPLETE CBC W/AUTO DIFF WBC: CPT | Performed by: INTERNAL MEDICINE

## 2019-12-18 PROCEDURE — 99291 CRITICAL CARE FIRST HOUR: CPT | Performed by: INTERNAL MEDICINE

## 2019-12-18 PROCEDURE — 25000125 ZZHC RX 250: Performed by: INTERNAL MEDICINE

## 2019-12-18 PROCEDURE — 25000132 ZZH RX MED GY IP 250 OP 250 PS 637: Performed by: INTERNAL MEDICINE

## 2019-12-18 PROCEDURE — 83735 ASSAY OF MAGNESIUM: CPT | Performed by: INTERNAL MEDICINE

## 2019-12-18 RX ORDER — WARFARIN SODIUM 5 MG/1
5 TABLET ORAL
Status: COMPLETED | OUTPATIENT
Start: 2019-12-18 | End: 2019-12-18

## 2019-12-18 RX ORDER — DILTIAZEM HYDROCHLORIDE 300 MG/1
300 CAPSULE, COATED, EXTENDED RELEASE ORAL DAILY
Status: DISCONTINUED | OUTPATIENT
Start: 2019-12-18 | End: 2019-12-21 | Stop reason: HOSPADM

## 2019-12-18 RX ORDER — METOPROLOL TARTRATE 25 MG/1
25 TABLET, FILM COATED ORAL ONCE
Status: COMPLETED | OUTPATIENT
Start: 2019-12-18 | End: 2019-12-18

## 2019-12-18 RX ORDER — METOPROLOL SUCCINATE 50 MG/1
50 TABLET, EXTENDED RELEASE ORAL
Status: DISCONTINUED | OUTPATIENT
Start: 2019-12-18 | End: 2019-12-21

## 2019-12-18 RX ORDER — DIGOXIN 125 MCG
125 TABLET ORAL DAILY
Status: DISCONTINUED | OUTPATIENT
Start: 2019-12-18 | End: 2019-12-21 | Stop reason: HOSPADM

## 2019-12-18 RX ORDER — METOPROLOL TARTRATE 1 MG/ML
5 INJECTION, SOLUTION INTRAVENOUS ONCE
Status: COMPLETED | OUTPATIENT
Start: 2019-12-18 | End: 2019-12-18

## 2019-12-18 RX ADMIN — METOPROLOL SUCCINATE 25 MG: 25 TABLET, EXTENDED RELEASE ORAL at 06:31

## 2019-12-18 RX ADMIN — FINASTERIDE 5 MG: 5 TABLET, FILM COATED ORAL at 09:14

## 2019-12-18 RX ADMIN — INSULIN ASPART 1 UNITS: 100 INJECTION, SOLUTION INTRAVENOUS; SUBCUTANEOUS at 10:18

## 2019-12-18 RX ADMIN — INSULIN ASPART 1 UNITS: 100 INJECTION, SOLUTION INTRAVENOUS; SUBCUTANEOUS at 18:06

## 2019-12-18 RX ADMIN — INSULIN GLARGINE 10 UNITS: 100 INJECTION, SOLUTION SUBCUTANEOUS at 22:44

## 2019-12-18 RX ADMIN — DIGOXIN 125 MCG: 0.12 TABLET ORAL at 06:35

## 2019-12-18 RX ADMIN — WARFARIN SODIUM 5 MG: 5 TABLET ORAL at 17:44

## 2019-12-18 RX ADMIN — FEBUXOSTAT 40 MG: 40 TABLET ORAL at 09:15

## 2019-12-18 RX ADMIN — PIPERACILLIN AND TAZOBACTAM 2.25 G: 2; .25 INJECTION, POWDER, LYOPHILIZED, FOR SOLUTION INTRAVENOUS at 21:53

## 2019-12-18 RX ADMIN — SODIUM BICARBONATE: 84 INJECTION, SOLUTION INTRAVENOUS at 19:51

## 2019-12-18 RX ADMIN — METOPROLOL SUCCINATE 50 MG: 50 TABLET, EXTENDED RELEASE ORAL at 19:44

## 2019-12-18 RX ADMIN — ACETAMINOPHEN 650 MG: 325 TABLET, FILM COATED ORAL at 17:45

## 2019-12-18 RX ADMIN — SODIUM BICARBONATE: 84 INJECTION, SOLUTION INTRAVENOUS at 11:32

## 2019-12-18 RX ADMIN — Medication 1 MG: at 21:59

## 2019-12-18 RX ADMIN — TAMSULOSIN HYDROCHLORIDE 0.4 MG: 0.4 CAPSULE ORAL at 19:45

## 2019-12-18 RX ADMIN — PIPERACILLIN AND TAZOBACTAM 2.25 G: 2; .25 INJECTION, POWDER, LYOPHILIZED, FOR SOLUTION INTRAVENOUS at 08:47

## 2019-12-18 RX ADMIN — SODIUM BICARBONATE: 84 INJECTION, SOLUTION INTRAVENOUS at 02:04

## 2019-12-18 RX ADMIN — PIPERACILLIN AND TAZOBACTAM 2.25 G: 2; .25 INJECTION, POWDER, LYOPHILIZED, FOR SOLUTION INTRAVENOUS at 02:04

## 2019-12-18 RX ADMIN — METOPROLOL TARTRATE 5 MG: 5 INJECTION INTRAVENOUS at 10:32

## 2019-12-18 RX ADMIN — METOPROLOL TARTRATE 25 MG: 25 TABLET ORAL at 09:18

## 2019-12-18 RX ADMIN — POLYETHYLENE GLYCOL 3350 17 G: 17 POWDER, FOR SOLUTION ORAL at 09:16

## 2019-12-18 RX ADMIN — ASPIRIN 81 MG: 81 TABLET, COATED ORAL at 09:16

## 2019-12-18 RX ADMIN — PIPERACILLIN AND TAZOBACTAM 2.25 G: 2; .25 INJECTION, POWDER, LYOPHILIZED, FOR SOLUTION INTRAVENOUS at 15:47

## 2019-12-18 RX ADMIN — DILTIAZEM HYDROCHLORIDE 300 MG: 300 CAPSULE, COATED, EXTENDED RELEASE ORAL at 11:33

## 2019-12-18 ASSESSMENT — MIFFLIN-ST. JEOR: SCORE: 1988.88

## 2019-12-18 ASSESSMENT — ACTIVITIES OF DAILY LIVING (ADL)
ADLS_ACUITY_SCORE: 11
ADLS_ACUITY_SCORE: 13
ADLS_ACUITY_SCORE: 11
ADLS_ACUITY_SCORE: 11

## 2019-12-18 NOTE — PROGRESS NOTES
ICU Multi-Disciplinary Note  Patient condition reviewed and discussed while on multidisciplinary rounds today.   The Critical Care service will continue to follow peripherally while the patient is within the ICU. We are readily available should issues arise. Please feel free to contact us for critical care issues with which we may be of assistance. For all other concerns, please contact primary service first.   Meryl Rubin NP

## 2019-12-18 NOTE — PLAN OF CARE
Problem: Adult Inpatient Plan of Care  Goal: Plan of Care Review  Outcome: Improving  Goal: Patient-Specific Goal (Individualization)  Outcome: Improving  Goal: Absence of Hospital-Acquired Illness or Injury  Outcome: Improving  Goal: Optimal Comfort and Wellbeing  Outcome: Improving  Goal: Readiness for Transition of Care  Outcome: Improving  Goal: Rounds/Family Conference  Outcome: Improving     Problem: Bleeding (Sepsis/Septic Shock)  Goal: Absence of Bleeding  Outcome: Improving     Problem: Glycemic Control Impaired (Sepsis/Septic Shock)  Goal: Blood Glucose Level Within Desired Range  Outcome: Improving     Problem: Hemodynamic Instability (Sepsis/Septic Shock)  Goal: Effective Tissue Perfusion  Outcome: Improving     Problem: Infection (Sepsis/Septic Shock)  Goal: Absence of Infection Signs/Symptoms  Outcome: Improving     Problem: Respiratory Compromise (Sepsis/Septic Shock)  Goal: Effective Oxygenation and Ventilation  Outcome: Improving

## 2019-12-18 NOTE — CONSULTS
Urology Consult History and Physical    Name: Jt Montgomery    MRN: [id number]   YOB: 1945       We were asked to see Jt Montgomery at the request of Dr. Castillo for evaluation and treatment of urinary retention with complicated UTI/Pyelonephritis causing sepsis.          Chief Complaint:   urinary retention with complicated UTI/Pyelonephritis causing sepsis    History is obtained from the patient          History of Present Illness:   Jt Montgomery is a 74 year old male who is being seen for evaluation of man with urinary retention with complicated UTI/Pyelonephritis causing sepsis. His medical history is significant for HTN, DM, dilated cardiomyopathy, chronic kidney disease stage III, coronary artery disease, status post PCI in the past, atrial fibrillation on chronic anticoagulation with Coumadin, sleep apnea on CPAP, gout, obesity. He presented to the ER with weakness and urinary retention. Found to have severe sepsis secondary to a complicated UTI with pyelonephritis. Catheter was placed for retention. He notes prior episode of UTI about 4 months ago. He reports that he has been developing LUTS over the past several years with slow stream and incomplete emptying. Was not on any medication for this previously.     Seen and examined in the ICU. Stable and clinically improving following catheter placement and antibiotics.           Past Medical History:     Past Medical History:   Diagnosis Date     Atrial fibrillation (H)      CAD (coronary artery disease)     MI with RONEL to dRCA April 2011     Central Sleep Apnea with Pat Villanueva breathing 9/14/2010    Also TOMMY with CPAP     CKD (chronic kidney disease) stage 3, GFR 30-59 ml/min (H)      Dilated cardiomyopathy (H)     nonischemic (possibly related to h/o a.fib with RVR) EF 30-40% March 2013     DM2 (diabetes mellitus, type 2) (H)     Goal HgbA1c < 7%     Gout     uric acid level correlates; April 2014 h/o +knee  aspirate     Hypertension     Goal <140/90     Pulmonary hypertension (H)     mild per echo 3/2013            Past Surgical History:     Past Surgical History:   Procedure Laterality Date     CORONARY ANGIOGRAPHY ADULT ORDER      distal circ-RONEL     HERNIA REPAIR  11/20/10    incarcinated     SUSPEND HYOID, GENIOGLOSSAL ADVANCEMENT              Social History:     Social History     Tobacco Use     Smoking status: Former Smoker     Packs/day: 1.50     Years: 7.00     Pack years: 10.50     Types: Cigarettes     Last attempt to quit: 1972     Years since quittin.9     Smokeless tobacco: Never Used     Tobacco comment: quit in       Started at around age 18-19   Substance Use Topics     Alcohol use: No     Alcohol/week: 0.0 standard drinks     Comment: 74   recovering            Family History:     Family History   Problem Relation Age of Onset     Hypertension Mother      Coronary Artery Disease Mother      Coronary Artery Disease Father      Hypertension Father      Diabetes Sister      Cerebrovascular Disease Sister               Allergies:     Allergies   Allergen Reactions     Corn Dextrin      All corn products - gets tired an ornery            Medications:     Current Facility-Administered Medications   Medication     acetaminophen (TYLENOL) tablet 650 mg     aspirin EC tablet 81 mg     glucose gel 15-30 g    Or     dextrose 50 % injection 25-50 mL    Or     glucagon injection 1 mg     digoxin (LANOXIN) tablet 125 mcg     diltiazem ER COATED BEADS (CARDIZEM CD/CARTIA XT) 24 hr capsule 300 mg     febuxostat (ULORIC) tablet 40 mg     finasteride (PROSCAR) tablet 5 mg     insulin aspart (NovoLOG) inj (RAPID ACTING)     insulin aspart (NovoLOG) inj (RAPID ACTING)     insulin glargine (LANTUS PEN) injection 10 Units     lidocaine (LMX4) cream     lidocaine 1 % 0.1-1 mL     melatonin tablet 1 mg     melatonin tablet 1 mg     metoclopramide (REGLAN) tablet 5 mg    Or     metoclopramide (REGLAN)  injection 5 mg     metoprolol succinate ER (TOPROL-XL) 24 hr tablet 50 mg     miconazole (MICATIN/MICRO GUARD) 2 % powder     morphine (PF) injection 1 mg     naloxone (NARCAN) injection 0.1-0.4 mg     naloxone (NARCAN) injection 0.1-0.4 mg     norepinephrine (LEVOPHED) 16 mg in  mL infusion     ondansetron (ZOFRAN-ODT) ODT tab 4 mg    Or     ondansetron (ZOFRAN) injection 4 mg     piperacillin-tazobactam (ZOSYN) 2.25 g vial to attach to  ml bag     polyethylene glycol (MIRALAX/GLYCOLAX) Packet 17 g     prochlorperazine (COMPAZINE) injection 5 mg    Or     prochlorperazine (COMPAZINE) tablet 5 mg    Or     prochlorperazine (COMPAZINE) Suppository 12.5 mg     sodium bicarbonate 150 mEq in water for infusion     sodium chloride (PF) 0.9% PF flush 3 mL     sodium chloride (PF) 0.9% PF flush 3 mL     tamsulosin (FLOMAX) capsule 0.4 mg     warfarin ANTICOAGULANT (COUMADIN) tablet 5 mg     Warfarin Therapy Reminder (Check START DATE - warfarin may be starting in the FUTURE)             Review of Systems:    ROS: 10 point ROS neg other than the symptoms noted above in the HPI.          Physical Exam:     Patient Vitals for the past 24 hrs:   BP Temp Temp src Pulse Heart Rate Resp SpO2 Weight   12/18/19 1600 104/66 98.3  F (36.8  C) Oral 82 81 28 94 % --   12/18/19 1530 -- -- -- -- 85 24 90 % --   12/18/19 1500 117/66 -- -- 96 89 13 94 % --   12/18/19 1430 -- -- -- -- 87 -- 93 % --   12/18/19 1415 -- -- -- -- 112 24 93 % --   12/18/19 1400 -- -- -- -- 107 26 96 % --   12/18/19 1345 -- -- -- -- 106 22 92 % --   12/18/19 1330 -- -- -- -- 110 28 93 % --   12/18/19 1315 -- -- -- -- 107 21 94 % --   12/18/19 1300 105/83 -- -- 124 121 26 93 % --   12/18/19 1245 -- -- -- -- 111 27 93 % --   12/18/19 1230 -- -- -- -- 120 26 93 % --   12/18/19 1215 -- -- -- -- 113 30 94 % --   12/18/19 1200 98/65 98.2  F (36.8  C) Oral 101 142 28 96 % --   12/18/19 1130 -- -- -- -- 111 (!) 33 90 % --   12/18/19 1100 121/76 -- -- 124 120  (!) 55 97 % --   12/18/19 1045 -- -- -- -- 99 (!) 34 97 % --   12/18/19 1015 -- -- -- -- 116 22 97 % --   12/18/19 1000 130/70 -- -- -- 125 (!) 33 98 % --   12/18/19 0930 -- -- -- -- 142 26 95 % --   12/18/19 0900 106/86 -- -- 122 -- -- -- --   12/18/19 0830 -- -- -- -- 134 -- -- --   12/18/19 0800 111/73 98  F (36.7  C) Oral 117 98 28 93 % --   12/18/19 0700 105/71 -- -- 118 112 29 92 % --   12/18/19 0631 119/75 -- -- -- 120 -- -- --   12/18/19 0600 (!) 122/105 -- -- 116 98 10 97 % --   12/18/19 0500 137/78 -- -- 109 96 20 96 % --   12/18/19 0430 -- -- -- -- -- -- -- 119 kg (262 lb 5.6 oz)   12/18/19 0400 97/65 98.3  F (36.8  C) Oral 115 106 16 95 % --   12/18/19 0300 (!) 79/50 -- -- 96 103 8 96 % --   12/18/19 0200 (!) 74/47 -- -- 105 111 26 97 % --   12/18/19 0100 (!) 85/61 -- -- 97 101 29 96 % --   12/18/19 0000 113/69 98.1  F (36.7  C) Oral 105 101 26 97 % --   12/17/19 2300 107/58 -- -- 92 96 26 97 % --   12/17/19 2200 (!) 83/76 -- -- 115 102 22 95 % --   12/17/19 2130 94/50 -- -- 93 96 30 93 % --   12/17/19 2100 118/64 -- -- 113 111 (!) 37 97 % --   12/17/19 2030 108/65 -- -- 95 118 9 98 % --   12/17/19 2000 (!) 83/57 -- -- 93 82 14 97 % --   12/17/19 1900 95/58 -- -- 86 96 18 94 % --   12/17/19 1800 93/67 98.1  F (36.7  C) Oral 82 94 16 91 % --   12/17/19 1745 -- -- -- -- 90 13 94 % --   12/17/19 1730 -- -- -- 86 91 18 96 % --   12/17/19 1715 -- -- -- -- 93 16 96 % --     General: age-appropriate appearing male in NAD  HEENT: Head AT/NC, EOMI, CN Grossly intact  Lungs: no respiratory distress, or pursed lip breathing  Heart: No obvious jugular venous distension present  Back: no bony midline tenderness, no CVAT bilaterally.  Abdomen: soft, obesely-distended, non-tender. No organomegaly  : No costovertebral tenderness bilaterally   Lymph: no palpable inguinal lymphadenopathy.  LE: no edema. pneumoboots in place.  Musculoskeltal: extremities normal, no peripheral edema  Skin: no suspicious lesions or  rashes  Neuro: Alert, oriented, speech and mentation normal;  moving all 4 extremities equally.  Psych: affect and mood normal          Data:   All laboratory data reviewed:    Recent Labs   Lab 12/18/19  0738 12/17/19  1151   WBC 9.7 14.2*   HGB 12.1* 14.5   * 172     Recent Labs   Lab 12/18/19  0738 12/17/19  2200 12/17/19  1151    132* 126*   POTASSIUM 4.2 4.4 5.4*   CHLORIDE 104 103 94   CO2 23 19* 20   BUN 67* 72* 80*   CR 2.45* 3.24* 4.31*   * 149* 164*   HARSHA 7.5* 7.5* 8.7   PHOS  --  5.0*  --      Recent Labs   Lab 12/17/19  1222   COLOR Yellow   APPEARANCE Slightly Cloudy   URINEGLC Negative   URINEBILI Negative   URINEKETONE 5*   SG 1.013   URINEPH 6.0   PROTEIN 100*   NITRITE Negative   LEUKEST Large*   RBCU 18*   WBCU >182*       All pertinent imaging reviewed:    All imaging studies reviewed by me.  I personally reviewed these imaging films.  A formal report from radiology will follow.    FINDINGS: The heart is enlarged. There is diffuse low-attenuation of  the lobular liver. Several calcified gallstones are present. Spleen is  normal in size. The unenhanced pancreas and adrenal glands are  unremarkable.     There is marked bilateral perinephric infiltration. Calcifications  along the right renal artery are noted. There is a nonobstructing  calcification in the left renal collecting system that measures 6 mm.  Mild prominence of the ureters and collecting systems noted.     The appendix is normal. No bowel obstruction or ascites. No free  intraperitoneal air.     Urinary bladder is decompressed by a catheter. There is apparent  marked urinary bladder wall thickening that appears irregular.     Severe nonaneurysmal aortic and visceral branch atherosclerosis. No  abdominal or retroperitoneal lymphadenopathy. No pelvic adenopathy or  free fluid.     No lytic or blastic bone lesions.                                                                      IMPRESSION:  1. Marked bilateral  perinephric infiltration with prominent collecting  systems and ureters have the appearance of either recently resolved  obstruction or infection.  2. Abnormal appearance of the urinary bladder with bladder wall  thickening and prostate enlargement. Tumor cannot be excluded.  3. Cirrhotic appearing, fatty infiltrated liver.  4. Cholelithiasis.  5. Severe atherosclerosis.  6. Cardiomegaly.           Impression and Plan:   Impression:   74 year old man who presented with severe sepsis from a urinary source with bilateral pyelonephritis and acute urinary retention.       Plan:   Urinary retention with UTI/pyelonephritis causing severe sepesis  - We discussed that he likely has BPH causing bladder outlet obstruction and that this has been gradually worsening over time  - We discussed the pathophysiology of the bladder and prostate  - We discussed that he will need to keep the catheter in place for 1-2 weeks and will plan for out-patient Trial of void   - I agree with initiation of tamsulosin 0.4mg (Flomax) and finasteride 5mg (Proscar) and he should continue both of these medications on discharge  - I agree with broad spec abx and this can be tailored based on UCx results  - we will arrange necessary follow up     Acute on chronic renal failure  - likely secondary to bladder outlet obstruction and obstructive uropathy   - again, maintain mauricio catheter      Artemio Pitt MD   Urology  [facility]  Clinic Phone [phone]

## 2019-12-18 NOTE — PLAN OF CARE
Neuro: Intact, baseline peripheral neuropathy.  Cardiac: AFib, rate controlled most of night. Digoxin and Metoprolol given early to bring rate back into control. No issues with BP overnight.  Pulmonary: Patient wore home CPAP overnight. No respiratory issues.  GI:  Active bowel sounds.No nausea.  : Cash in place for retention/obstruction. Adequate output.  Integumentary: Skin is warm, dry, and intact.    Morning labs have been reviewed. Plan of care has been explained to patient. Continue to monitor and assess.    Dread Houser RN, BSN, CCRN

## 2019-12-18 NOTE — PLAN OF CARE
Patients heart rate controlled atrial fibrillation rate 90's, frequent PVC;s, rechecking BMP + mg,  Bp stable Map > 65, good urine output, lungs clear , tolerating regular diet, questions answered reassurance given , transfer order received from hospitalist

## 2019-12-18 NOTE — PROGRESS NOTES
[facility]    Hospitalist Progress Note    Brief Summary:   This is a 74-year-old male with history of hypertension, diabetes, dilated cardiomyopathy, chronic kidney disease stage III, coronary artery disease, status post PCI in the past, atrial fibrillation on chronic anticoagulation with Coumadin, sleep apnea on CPAP, gout, obesity who comes to the ER as a transfer from the clinic because of weakness, tiredness, unable to pass urine, lower abdominal pain, constipation and low blood pressure.     Assessment & Plan       1.  Severe sepsis secondary to acute pyelonephritis:  This is a 74-year-old male with multiple comorbidities who presented with lower abdominal pain and hypotension.  He had leukocytosis with WBC of 14.2, tachycardic on admission, hypotensive with blood pressure going down to 83/57 despite boluses in the ER.  Lactic acid mildly elevated at 2.8 initially and now 2.4 consistent with severe sepsis.      No need for Levophed overnight and MAP remain more than 65. Blood culture X 2 positive for GNB and urine culture growing E.coli, sensitivities pending at this time.     He is  on IV bicarbonate infusion at 125 mL per hour and IV Zosyn, will continue with that.    CT scan of the abdomen done which does suggest prominent collecting system, ureter prominent as well and marked bilateral perinephric infiltration I think this is consistent with pyelonephritis and possible Bladder outflow tract obstruction recently.  The patient had a thick-walled urinary bladder as well and prostate enlargement.  He is started on on Proscar and Flomax. Blood pressure are now stable and improve.     2.  Acute on chronic renal failure secondary to obstructive uropathy:  The patient was in urinary retention on admission. He is now s/p  Cash catheter placed. Now making good urine and on IV bicarbonate infusion drip at 125 mL per hour.  Cash catheter to remain in there for at least 2 weeks. Continue with   Proscar and Flomax   Creatinine is improving and making good urine. Continue to hold Irbesartan, Aldactone and Lasix at this time, avoid nephrotoxic medications.     3.  Hyponatremia and Hyperkalemia on admission:  Most likely secondary to renal failure, being on Aldactone.  Lasix and Irbesartan.  Keep him on IV fluids. Sodium improve appropriately.        4.  Indirect hyperbilirubinemia:  I saw the patient has elevated bilirubin, but never more than 4 on admission was 6.5.  I think the patient has Gilbert syndrome and increased bilirubin is because of the severe sepsis at this time.  All other liver function tests are normal.  Normal alkaline phosphatase.  AST and ALT are normal.   U/S shows enlarge fatty liver and possibly cirrhotic liver, has gall stones.     5.  Hypertension:  The patient has a history of hypertension, on multiple medications including irbesartan, Aldactone, metoprolol and Cardizem.  We will hold those medications for now until his blood pressure improves.     6.  Atrial fibrillation with RVR:  RVR this morning with HR going up to 120-130's, increase Metoprolol XL to 50 mg bid and restart his [id number] mg. Was on hold because of the low blood pressure.  Keep him on digoxin as well. I will give him a dose of IV Metoprolol 5 mg X 1 to control his HR till oral medications become available.     7.  Acute on chronic renal failure secondary to obstructive uropathy as mentioned above.     8.  History of dilated cardiomyopathy:  Last echocardiogram in 2016 showed ejection fraction 50%-55%.  He is on multiple medications.  We need to hold it for now given his current sepsis and renal failure.   We will restart once his sepsis resolves and blood pressure improved.     9.  Obstructive sleep apnea on CPAP.  Did not bring his CPAP machine.  Will use it from hospital from today.     10.  History of coronary artery disease, stable, had stents in the past.  Keep him on aspirin, Coumadin, metoprolol.  Hold  Aldactone, irbesartan and Cardizem at this time.     11.  History of gout, on febuxostat.  We will continue with that.     12.  Obesity:  The patient does have history of obesity.  He needs to work on his diet and lose some weight.     13.  Deep venous thrombosis prophylaxis with Coumadin.  Chronic anticoagulation with Coumadin.  Consult pharmacy to dose  his Coumadin.     12.  E coli Bacteremia  Sepsis secondary to UTI. 2 of 2 blood cultures positive. Repeat blood culture today and continue with IV Zosyn for now.        Overall improve at this time, Appreciate input from the Urology.   Need 2 weeks of Cash catheter and need out patient follow up   Give IV Metoprolol 5 mg X 1   Increase oral Metoprolol to 50 mg bid  Restart Cardizem 300 mg daily.   Blood pressure improve at this time.  Continue with IV Bicarb infusion today.   Once HR control and BP remain stable will move him out of the ICU later today.        The case was discussed with Urology  and the nursing staff taking care of the patient.      Total time spent today is more than 30 minutes of critical care time today.          EPHRAIM TIM MD         DVT Prophylaxis: Warfarin  Code Status: DNR    Disposition: Expected discharge in 2 days once stable.    Ephraim Tim MD  Text Page  (7am - 6pm)    Interval History   Feeling better this morning, denies any chest pain, SOB, fever, chills, nausea, vomiting, headache or dizziness. Slept well last night.     No other significant event overnight.     -Data reviewed today: I reviewed all new labs and imaging results over the last 24 hours. I personally reviewed the EKG tracing on monitor showing Afib with RVR.     Physical Exam   Temp: 98  F (36.7  C) Temp src: Oral BP: 106/86 Pulse: 122 Heart Rate: 142 Resp: 26 SpO2: 98 % O2 Device: BiPAP/CPAP    Vitals:    12/17/19 1140 12/17/19 1635 12/18/19 0430   Weight: 120.2 kg (265 lb) 118 kg (260 lb 2.3 oz) 119 kg (262 lb 5.6 oz)     Vital Signs with Ranges  Temp:  [97.9   F (36.6  C)-99.4  F (37.4  C)] 98  F (36.7  C)  Pulse:  [] 122  Heart Rate:  [] 142  Resp:  [8-37] 26  BP: ()/() 106/86  FiO2 (%):  [21 %] 21 %  SpO2:  [91 %-98 %] 98 %  I/O last 3 completed shifts:  In: 1937.5 [P.O.:500; I.V.:1437.5]  Out: 4125 [Urine:4125]    Constitutional: awake, alert, cooperative, no apparent distress, and appears stated age  Eyes: Lids and lashes normal, pupils equal, round and reactive to light, extra ocular muscles intact, sclera clear, conjunctiva normal  Respiratory: No increased work of breathing, good air exchange, clear to auscultation bilaterally, no crackles or wheezing  Cardiovascular: irregularly irregular rhythm and normal S1 and S2  GI: No scars, normal bowel sounds, soft, non-distended, non-tender, no masses palpated, no hepatosplenomegally  Skin: no bruising or bleeding  Musculoskeletal: 1+ lower extremity pitting edema present, much better as compare to yesterday   Neurologic: Awake, alert, oriented to name, place and time.  No focal deficit.   Medications     norepinephrine       sodium bicabonate in water for infusion 125 mL/hr at 12/18/19 0204     Warfarin Therapy Reminder         aspirin  81 mg Oral Daily     digoxin  125 mcg Oral Daily     diltiazem ER COATED BEADS  300 mg Oral Daily     febuxostat  40 mg Oral Daily     finasteride  5 mg Oral Daily     insulin aspart  1-7 Units Subcutaneous TID AC     insulin aspart  1-5 Units Subcutaneous At Bedtime     insulin glargine  10 Units Subcutaneous At Bedtime     melatonin  1 mg Oral At Bedtime     metoprolol succinate ER  50 mg Oral BID     piperacillin-tazobactam  2.25 g Intravenous Q6H     polyethylene glycol  17 g Oral BID     sodium chloride (PF)  3 mL Intracatheter Q8H     tamsulosin  0.4 mg Oral QPM     warfarin ANTICOAGULANT  5 mg Oral ONCE at 18:00       Data   Recent Labs   Lab 12/18/19  0738 12/17/19  2200 12/17/19  1151   WBC 9.7  --  14.2*   HGB 12.1*  --  14.5   *  --  102*   PLT  126*  --  172   INR 2.50*  --  2.17*    132* 126*   POTASSIUM 4.2 4.4 5.4*   CHLORIDE 104 103 94   CO2 23 19* 20   BUN 67* 72* 80*   CR 2.45* 3.24* 4.31*   ANIONGAP 10 10 12   HARSHA 7.5* 7.5* 8.7   * 149* 164*   ALBUMIN 2.2* 2.3* 2.9*   PROTTOTAL 6.1*  --  6.8   BILITOTAL 3.9*  --  6.5*   ALKPHOS 60  --  89   ALT 26  --  28   AST 33  --  37   LIPASE  --   --  60*       Recent Results (from the past 24 hour(s))   POC US ABDOMEN LIMITED    Impression    ED Bedside Limited Ultrasound  Body area scanned: suprapubic abd  Performed by: Femi Ortiz MD  Indication: urinary retention/difficulty, eval for bladder fullness  Findings/Interpretation: significant volume of urine in bladder, c/w urinary retention  Key images were digitally archived in the [employer] radiology system.       CT Abdomen Pelvis w/o Contrast    Narrative    CT ABDOMEN/PELVIS WITHOUT CONTRAST December 17, 2019 1:56 PM     HISTORY: Urinary retention, renal failure, urine infection.    TECHNIQUE: No IV contrast material. Radiation dose for this scan was  reduced using automated exposure control, adjustment of the mA and/or  kV according to patient size, or iterative reconstruction technique.    COMPARISON: 11/19/2010.    FINDINGS: The heart is enlarged. There is diffuse low-attenuation of  the lobular liver. Several calcified gallstones are present. Spleen is  normal in size. The unenhanced pancreas and adrenal glands are  unremarkable.    There is marked bilateral perinephric infiltration. Calcifications  along the right renal artery are noted. There is a nonobstructing  calcification in the left renal collecting system that measures 6 mm.  Mild prominence of the ureters and collecting systems noted.    The appendix is normal. No bowel obstruction or ascites. No free  intraperitoneal air.    Urinary bladder is decompressed by a catheter. There is apparent  marked urinary bladder wall thickening that appears irregular.    Severe nonaneurysmal  aortic and visceral branch atherosclerosis. No  abdominal or retroperitoneal lymphadenopathy. No pelvic adenopathy or  free fluid.    No lytic or blastic bone lesions.      Impression    IMPRESSION:  1. Marked bilateral perinephric infiltration with prominent collecting  systems and ureters have the appearance of either recently resolved  obstruction or infection.  2. Abnormal appearance of the urinary bladder with bladder wall  thickening and prostate enlargement. Tumor cannot be excluded.  3. Cirrhotic appearing, fatty infiltrated liver.  4. Cholelithiasis.  5. Severe atherosclerosis.  6. Cardiomegaly.    JANET BRICE MD   US Abdomen Limited    Narrative    RIGHT UPPER QUADRANT ULTRASOUND 12/17/2019 4:55 PM    HISTORY:  Elevated bilirubin, liver.    COMPARISON: None.    FINDINGS:    Gallbladder: There are numerous shadowing gallstones. Small amount of  free fluid adjacent to the gallbladder. No gallbladder wall thickening       Bile ducts:   CHD is normal diameter.  No intrahepatic biliary  dilatation.    Liver:  The liver is enlarged, measuring 20.8 cm in length. There is  diffuse increased hepatic echogenicity and nodularity is noted of the  liver surface.     Pancreas: Normal.     Right kidney:  Mild to moderate hydronephrosis and proximal  hydroureter. No renal stones demonstrated.       Impression    IMPRESSION:    1. Enlarged, fatty infiltrated, possibly cirrhotic liver.  2. Trace fluid adjacent to the gallbladder is likely related to liver  disease.  3. Mild to moderate right hydronephrosis.    JANET BRICE MD

## 2019-12-19 ENCOUNTER — APPOINTMENT (OUTPATIENT)
Dept: OCCUPATIONAL THERAPY | Facility: CLINIC | Age: 74
DRG: 872 | End: 2019-12-19
Attending: HOSPITALIST
Payer: COMMERCIAL

## 2019-12-19 ENCOUNTER — APPOINTMENT (OUTPATIENT)
Dept: PHYSICAL THERAPY | Facility: CLINIC | Age: 74
DRG: 872 | End: 2019-12-19
Attending: HOSPITALIST
Payer: COMMERCIAL

## 2019-12-19 LAB
ALBUMIN SERPL-MCNC: 2.1 G/DL (ref 3.4–5)
ANION GAP SERPL CALCULATED.3IONS-SCNC: 4 MMOL/L (ref 3–14)
BASOPHILS # BLD AUTO: 0 10E9/L (ref 0–0.2)
BASOPHILS NFR BLD AUTO: 0.1 %
BUN SERPL-MCNC: 49 MG/DL (ref 7–30)
CALCIUM SERPL-MCNC: 7.7 MG/DL (ref 8.5–10.1)
CHLORIDE SERPL-SCNC: 96 MMOL/L (ref 94–109)
CO2 SERPL-SCNC: 33 MMOL/L (ref 20–32)
CREAT SERPL-MCNC: 1.49 MG/DL (ref 0.66–1.25)
DIFFERENTIAL METHOD BLD: ABNORMAL
EOSINOPHIL # BLD AUTO: 0.1 10E9/L (ref 0–0.7)
EOSINOPHIL NFR BLD AUTO: 0.9 %
ERYTHROCYTE [DISTWIDTH] IN BLOOD BY AUTOMATED COUNT: 13.9 % (ref 10–15)
GFR SERPL CREATININE-BSD FRML MDRD: 46 ML/MIN/{1.73_M2}
GLUCOSE BLDC GLUCOMTR-MCNC: 128 MG/DL (ref 70–99)
GLUCOSE BLDC GLUCOMTR-MCNC: 134 MG/DL (ref 70–99)
GLUCOSE BLDC GLUCOMTR-MCNC: 145 MG/DL (ref 70–99)
GLUCOSE BLDC GLUCOMTR-MCNC: 149 MG/DL (ref 70–99)
GLUCOSE BLDC GLUCOMTR-MCNC: 166 MG/DL (ref 70–99)
GLUCOSE BLDC GLUCOMTR-MCNC: 171 MG/DL (ref 70–99)
GLUCOSE SERPL-MCNC: 131 MG/DL (ref 70–99)
HCT VFR BLD AUTO: 36 % (ref 40–53)
HGB BLD-MCNC: 12.1 G/DL (ref 13.3–17.7)
IMM GRANULOCYTES # BLD: 0 10E9/L (ref 0–0.4)
IMM GRANULOCYTES NFR BLD: 0.3 %
INR PPP: 2.23 (ref 0.86–1.14)
LYMPHOCYTES # BLD AUTO: 0.4 10E9/L (ref 0.8–5.3)
LYMPHOCYTES NFR BLD AUTO: 5.1 %
MCH RBC QN AUTO: 34.6 PG (ref 26.5–33)
MCHC RBC AUTO-ENTMCNC: 33.6 G/DL (ref 31.5–36.5)
MCV RBC AUTO: 103 FL (ref 78–100)
MONOCYTES # BLD AUTO: 0.9 10E9/L (ref 0–1.3)
MONOCYTES NFR BLD AUTO: 10.3 %
NEUTROPHILS # BLD AUTO: 7.2 10E9/L (ref 1.6–8.3)
NEUTROPHILS NFR BLD AUTO: 83.3 %
NRBC # BLD AUTO: 0 10*3/UL
NRBC BLD AUTO-RTO: 0 /100
PHOSPHATE SERPL-MCNC: 2.8 MG/DL (ref 2.5–4.5)
PLATELET # BLD AUTO: 129 10E9/L (ref 150–450)
POTASSIUM SERPL-SCNC: 3.5 MMOL/L (ref 3.4–5.3)
RBC # BLD AUTO: 3.5 10E12/L (ref 4.4–5.9)
SODIUM SERPL-SCNC: 133 MMOL/L (ref 133–144)
WBC # BLD AUTO: 8.6 10E9/L (ref 4–11)

## 2019-12-19 PROCEDURE — 25000132 ZZH RX MED GY IP 250 OP 250 PS 637: Performed by: HOSPITALIST

## 2019-12-19 PROCEDURE — 97116 GAIT TRAINING THERAPY: CPT | Mod: GP | Performed by: PHYSICAL THERAPIST

## 2019-12-19 PROCEDURE — 80069 RENAL FUNCTION PANEL: CPT | Performed by: INTERNAL MEDICINE

## 2019-12-19 PROCEDURE — 25000125 ZZHC RX 250: Performed by: INTERNAL MEDICINE

## 2019-12-19 PROCEDURE — 85610 PROTHROMBIN TIME: CPT | Performed by: INTERNAL MEDICINE

## 2019-12-19 PROCEDURE — 97161 PT EVAL LOW COMPLEX 20 MIN: CPT | Mod: GP | Performed by: PHYSICAL THERAPIST

## 2019-12-19 PROCEDURE — 25000131 ZZH RX MED GY IP 250 OP 636 PS 637: Performed by: INTERNAL MEDICINE

## 2019-12-19 PROCEDURE — 97530 THERAPEUTIC ACTIVITIES: CPT | Mod: GP | Performed by: PHYSICAL THERAPIST

## 2019-12-19 PROCEDURE — 00000146 ZZHCL STATISTIC GLUCOSE BY METER IP

## 2019-12-19 PROCEDURE — 25000128 H RX IP 250 OP 636: Performed by: INTERNAL MEDICINE

## 2019-12-19 PROCEDURE — 97165 OT EVAL LOW COMPLEX 30 MIN: CPT | Mod: GO

## 2019-12-19 PROCEDURE — 25800030 ZZH RX IP 258 OP 636: Performed by: HOSPITALIST

## 2019-12-19 PROCEDURE — 12000000 ZZH R&B MED SURG/OB

## 2019-12-19 PROCEDURE — 36415 COLL VENOUS BLD VENIPUNCTURE: CPT | Performed by: INTERNAL MEDICINE

## 2019-12-19 PROCEDURE — 25000132 ZZH RX MED GY IP 250 OP 250 PS 637: Performed by: INTERNAL MEDICINE

## 2019-12-19 PROCEDURE — 85025 COMPLETE CBC W/AUTO DIFF WBC: CPT | Performed by: INTERNAL MEDICINE

## 2019-12-19 PROCEDURE — 99232 SBSQ HOSP IP/OBS MODERATE 35: CPT | Performed by: HOSPITALIST

## 2019-12-19 RX ORDER — SODIUM CHLORIDE 9 MG/ML
INJECTION, SOLUTION INTRAVENOUS CONTINUOUS
Status: DISCONTINUED | OUTPATIENT
Start: 2019-12-19 | End: 2019-12-20

## 2019-12-19 RX ORDER — AMOXICILLIN 250 MG
1 CAPSULE ORAL 2 TIMES DAILY
Status: DISCONTINUED | OUTPATIENT
Start: 2019-12-19 | End: 2019-12-21 | Stop reason: HOSPADM

## 2019-12-19 RX ORDER — WARFARIN SODIUM 7.5 MG/1
7.5 TABLET ORAL
Status: COMPLETED | OUTPATIENT
Start: 2019-12-19 | End: 2019-12-19

## 2019-12-19 RX ADMIN — SODIUM CHLORIDE: 9 INJECTION, SOLUTION INTRAVENOUS at 18:19

## 2019-12-19 RX ADMIN — PIPERACILLIN AND TAZOBACTAM 2.25 G: 2; .25 INJECTION, POWDER, LYOPHILIZED, FOR SOLUTION INTRAVENOUS at 22:19

## 2019-12-19 RX ADMIN — ASPIRIN 81 MG: 81 TABLET, COATED ORAL at 09:52

## 2019-12-19 RX ADMIN — POLYETHYLENE GLYCOL 3350 17 G: 17 POWDER, FOR SOLUTION ORAL at 09:51

## 2019-12-19 RX ADMIN — PIPERACILLIN AND TAZOBACTAM 2.25 G: 2; .25 INJECTION, POWDER, LYOPHILIZED, FOR SOLUTION INTRAVENOUS at 03:37

## 2019-12-19 RX ADMIN — PIPERACILLIN AND TAZOBACTAM 2.25 G: 2; .25 INJECTION, POWDER, LYOPHILIZED, FOR SOLUTION INTRAVENOUS at 09:54

## 2019-12-19 RX ADMIN — DILTIAZEM HYDROCHLORIDE 300 MG: 300 CAPSULE, COATED, EXTENDED RELEASE ORAL at 09:52

## 2019-12-19 RX ADMIN — WARFARIN SODIUM 7.5 MG: 7.5 TABLET ORAL at 17:23

## 2019-12-19 RX ADMIN — DIGOXIN 125 MCG: 0.12 TABLET ORAL at 09:52

## 2019-12-19 RX ADMIN — FEBUXOSTAT 40 MG: 40 TABLET ORAL at 09:52

## 2019-12-19 RX ADMIN — SENNOSIDES AND DOCUSATE SODIUM 1 TABLET: 8.6; 5 TABLET ORAL at 09:59

## 2019-12-19 RX ADMIN — FINASTERIDE 5 MG: 5 TABLET, FILM COATED ORAL at 09:52

## 2019-12-19 RX ADMIN — INSULIN GLARGINE 10 UNITS: 100 INJECTION, SOLUTION SUBCUTANEOUS at 22:19

## 2019-12-19 RX ADMIN — SODIUM BICARBONATE: 84 INJECTION, SOLUTION INTRAVENOUS at 04:28

## 2019-12-19 RX ADMIN — PIPERACILLIN AND TAZOBACTAM 2.25 G: 2; .25 INJECTION, POWDER, LYOPHILIZED, FOR SOLUTION INTRAVENOUS at 17:23

## 2019-12-19 RX ADMIN — INSULIN ASPART 1 UNITS: 100 INJECTION, SOLUTION INTRAVENOUS; SUBCUTANEOUS at 12:50

## 2019-12-19 RX ADMIN — INSULIN ASPART 1 UNITS: 100 INJECTION, SOLUTION INTRAVENOUS; SUBCUTANEOUS at 18:21

## 2019-12-19 RX ADMIN — METOPROLOL SUCCINATE 50 MG: 50 TABLET, EXTENDED RELEASE ORAL at 09:51

## 2019-12-19 RX ADMIN — METOPROLOL SUCCINATE 50 MG: 50 TABLET, EXTENDED RELEASE ORAL at 17:23

## 2019-12-19 RX ADMIN — TAMSULOSIN HYDROCHLORIDE 0.4 MG: 0.4 CAPSULE ORAL at 20:10

## 2019-12-19 ASSESSMENT — ACTIVITIES OF DAILY LIVING (ADL)
ADLS_ACUITY_SCORE: 12
ADLS_ACUITY_SCORE: 11
ADLS_ACUITY_SCORE: 12
ADLS_ACUITY_SCORE: 11

## 2019-12-19 NOTE — PROGRESS NOTES
[facility]    Medicine Progress Note - Hospitalist Service       Date of Admission:  12/17/2019  Assessment & Plan           Severe sepsis from e coli bacteremia and pyelonephritis secondary to bacterial translocation with obstructive uropathy   Acute kidney secondary from obstructive uropathy  Patient admitted with weakness, hypotension, inability to urinate, creatinine elevation]  Improved with mauricio, zosyn  Plan  - continue zosyn today  - ciprofloxacin on discharge  - continue IVF today, reduce rate  - encourage oral hydration  - PT/OT  - seen by urology, continue mauricio for now with outpatient follow-up  - finasteride and flomax    HTN  - hold irbesartan, aldactone, and lasix         Hyperbilirubinemia, mostly indirect  Possibly Gilbert syndrome  Imaging concerning for potentially cirrhosis  Hepatitis serologies have been negative in the past  At this point if he has cirrhosis it would be considered compensated  Plan  - monitor  - will discuss with patient, at this time would recommend outpatient followup      Afib with RVR  Plan  - continue metoprolol, cardizem, digoxin from home list  - continue coumadin  - continue IVF today     History of dilated cardiomyopathy:  Last echocardiogram in 2016 showed ejection fraction 50%-55%.  He is on multiple medications.  Plan  - hold his diuretics for now pending the improvement in creatinine, and potential for auto-diuresis post relief of urinary obstruction     Obstructive sleep apnea on CPAP   - TOMMY       History of gout, on febuxostat.  We will continue with that.      Obesity:  The patient does have history of obesity.  He needs to work on his diet and lose some weight.      Diet: Combination Diet Regular Diet Adult; 2 gm K Diet  Room Service    DVT Prophylaxis: Warfarin  Mauricio Catheter: in place, indication: Retention  Code Status: DNR      Disposition Plan   Expected discharge: tomorrow or saturday, recommended to prior living arrangement once  SIRS/Sepsis treated.  Entered: Vikash Brooks DO 12/19/2019, 3:30 PM       The patient's care was discussed with the Patient.    Vikash Brooks DO  Hospitalist Service  [facility]    ______________________________________________________________________    Interval History   Doing well, explained his course in detail  No chest pain, nausea, or vomiting.  Good urine output    Data reviewed today: I reviewed all medications, new labs and imaging results over the last 24 hours. I personally reviewed no images or EKG's today.    Physical Exam   Vital Signs: Temp: 99.7  F (37.6  C) Temp src: Oral BP: 110/67 Pulse: 55 Heart Rate: 94 Resp: 20 SpO2: 95 % O2 Device: None (Room air)    Weight: 262 lbs 5.56 oz  Constitutional: awake, alert, cooperative, no apparent distress, and appears stated age  Eyes: Lids and lashes normal, pupils equal, round and reactive to light, extra ocular muscles intact, sclera clear, conjunctiva normal  Respiratory: No increased work of breathing, good air exchange, clear to auscultation bilaterally, no crackles or wheezing  Cardiovascular: irregularly irregular rhythm and normal S1 and S2  GI: No scars, normal bowel sounds, soft, non-distended, non-tender, no masses palpated, no hepatosplenomegally  Skin: no bruising or bleeding      Data   Recent Labs   Lab 12/19/19  0839 12/18/19  1703 12/18/19  0738  12/17/19  1151   WBC 8.6  --  9.7  --  14.2*   HGB 12.1*  --  12.1*  --  14.5   *  --  103*  --  102*   *  --  126*  --  172   INR 2.23*  --  2.50*  --  2.17*    135 137   < > 126*   POTASSIUM 3.5 3.9 4.2   < > 5.4*   CHLORIDE 96 100 104   < > 94   CO2 33* 29 23   < > 20   BUN 49* 61* 67*   < > 80*   CR 1.49* 1.94* 2.45*   < > 4.31*   ANIONGAP 4 6 10   < > 12   HARSHA 7.7* 7.8* 7.5*   < > 8.7   * 140* 122*   < > 164*   ALBUMIN 2.1*  --  2.2*   < > 2.9*   PROTTOTAL  --   --  6.1*  --  6.8   BILITOTAL  --   --  3.9*  --  6.5*   ALKPHOS  --   --  60   --  89   ALT  --   --  26  --  28   AST  --   --  33  --  37   LIPASE  --   --   --   --  60*    < > = values in this interval not displayed.     No results found for this or any previous visit (from the past 24 hour(s)).  Medications     norepinephrine       sodium chloride 50 mL/hr at 12/19/19 0955     Warfarin Therapy Reminder         aspirin  81 mg Oral Daily     digoxin  125 mcg Oral Daily     diltiazem ER COATED BEADS  300 mg Oral Daily     febuxostat  40 mg Oral Daily     finasteride  5 mg Oral Daily     insulin aspart  1-7 Units Subcutaneous TID AC     insulin aspart  1-5 Units Subcutaneous At Bedtime     insulin glargine  10 Units Subcutaneous At Bedtime     melatonin  1 mg Oral At Bedtime     metoprolol succinate ER  50 mg Oral BID     piperacillin-tazobactam  2.25 g Intravenous Q6H     polyethylene glycol  17 g Oral BID     senna-docusate  1 tablet Oral BID     sodium chloride (PF)  3 mL Intracatheter Q8H     tamsulosin  0.4 mg Oral QPM     warfarin ANTICOAGULANT  7.5 mg Oral ONCE at 18:00

## 2019-12-19 NOTE — PLAN OF CARE
Discharge Planner PT   Patient plan for discharge: home when able  Current status: PT - eval completed, treatment initiated. Pt is a 75 y/o male admitted with sepsis d/t pyelonephritis. Pt normally lives alone in a one level condo with a flight of stairs to enter. He drives and has been IND with all mobility and ADLs, has 2 adult sons that live in [place], [place]. He does report having a friend/former student who he hires at times to do physical labor for him around the condo, also has someone who sometimes does cleaning for him.    Currently, pt is mildly deconditioned and weak, ambulated 400' with wh walker and CGA progressing to SBA (normally does not use assistive device). Anticipate pt will be able to discharge back home to [facility] from a mobility standpoint, would benefit from home PT.  Barriers to return to prior living situation: stairs to enter (flight), lives alone  Recommendations for discharge: home with home PT  Rationale for recommendations: Pt is below baseline for mobility, recommend discharge back to own home with home PT to address mobility, strength and endurance deficits to allow return to IND ambulation. Pt lives alone and it would require increased effort and assist for him to leave his home.         Entered by: Anastasia Caballero 12/19/2019 2:16 PM

## 2019-12-19 NOTE — PLAN OF CARE
A&O, VSS on RA, LS Diminished, BS audible and active, on Regular combination diet, denies nausea, On mauricio catheter, continent of bowel, up with A1 using GB and walker, denies pain. For possible discharge tomorrow depending on laboratory results.

## 2019-12-19 NOTE — PROGRESS NOTES
12/19/19 1400   Quick Adds   Type of Visit Initial PT Evaluation   Living Environment   Lives With alone   Living Arrangements condominium   Home Accessibility stairs to enter home   Number of Stairs, Main Entrance 10   Stair Railings, Main Entrance railings safe and in good condition;railings on both sides of stairs   Transportation Anticipated car, drives self   Living Environment Comment Pt lives alone in one level condo, flight of stairs to enter.    Self-Care   Usual Activity Tolerance good   Current Activity Tolerance good   Regular Exercise No   Equipment Currently Used at Home none  (but has wh walker from previous episode of gout)   Functional Level Prior   Ambulation 0-->independent   Transferring 0-->independent   Toileting 0-->independent   Bathing 0-->independent   Communication 0-->understands/communicates without difficulty   Swallowing 0-->swallows foods/liquids without difficulty   Cognition 0 - no cognition issues reported   Fall history within last six months no   Which of the above functional risks had a recent onset or change? none   Prior Functional Level Comment IND with ADLs and mobility   General Information   Onset of Illness/Injury or Date of Surgery - Date 12/17/19   Referring Physician Dr Brooks   Patient/Family Goals Statement hopes to go home when able   Pertinent History of Current Problem (include personal factors and/or comorbidities that impact the POC) Pt was admitted from clinic with weakness, hypotension, inability to urinate. Found to have sepsis d/t acute pyelonephritis. PMH includes gout, HTN, DM, cardiomyopathy, CKD III, CAD, A fib, sleep apnea   Precautions/Limitations no known precautions/limitations   Weight-Bearing Status - LUE full weight-bearing   Weight-Bearing Status - RUE full weight-bearing   Weight-Bearing Status - LLE full weight-bearing   Weight-Bearing Status - RLE full weight-bearing   Cognitive Status Examination   Orientation orientation to person,  "place and time   Level of Consciousness alert   Follows Commands and Answers Questions 100% of the time;able to follow multistep instructions   Personal Safety and Judgment intact   Memory intact   Pain Assessment   Patient Currently in Pain No   Integumentary/Edema   Integumentary/Edema no deficits were identifed   Posture    Posture Not impaired   Range of Motion (ROM)   ROM Quick Adds No deficits were identified   Strength   Strength Comments LE's grossly 4/5 and symmetrical   Bed Mobility   Bed Mobility Comments supine to/from sit with rail and SBA, difficult for pt   Transfer Skills   Transfer Comments sit to stand with walker and SBA   Gait   Gait Comments Gt 30' with wh walker and CGA   Balance   Balance Comments good standing balance with walker   Sensory Examination   Sensory Perception no deficits were identified   Coordination   Coordination no deficits were identified   Muscle Tone   Muscle Tone no deficits were identified   General Therapy Interventions   Planned Therapy Interventions strengthening;gait training;transfer training;progressive activity/exercise   Clinical Impression   Criteria for Skilled Therapeutic Intervention yes, treatment indicated   PT Diagnosis impaired functional mobility   Influenced by the following impairments generalized deconditioning   Functional limitations due to impairments inability to ambulate INDly in community   Clinical Presentation Stable/Uncomplicated   Clinical Presentation Rationale per medical record   Clinical Decision Making (Complexity) Low complexity   Therapy Frequency Daily   Predicted Duration of Therapy Intervention (days/wks) 3 days   Anticipated Equipment Needs at Discharge   (pt has wh walker)   Anticipated Discharge Disposition Home;Home with Home Therapy  (pending progress and LOS)   Risk & Benefits of therapy have been explained Yes   Patient, Family & other staff in agreement with plan of care Yes   [facility] AM-PAC TM \"6 Clicks\"   2016, " "Trustees of [employer], under license to [employer].  All rights reserved.   6 Clicks Short Forms Basic Mobility Inpatient Short Form   [employer] AM-PAC  \"6 Clicks\" V.2 Basic Mobility Inpatient Short Form   1. Turning from your back to your side while in a flat bed without using bedrails? 3 - A Little   2. Moving from lying on your back to sitting on the side of a flat bed without using bedrails? 3 - A Little   3. Moving to and from a bed to a chair (including a wheelchair)? 3 - A Little   4. Standing up from a chair using your arms (e.g., wheelchair, or bedside chair)? 3 - A Little   5. To walk in hospital room? 3 - A Little   6. Climbing 3-5 steps with a railing? 3 - A Little   Basic Mobility Raw Score (Score out of 24.Lower scores equate to lower levels of function) 18   Total Evaluation Time   Total Evaluation Time (Minutes) 15     "

## 2019-12-19 NOTE — PLAN OF CARE
Transferred out of ICU today. Tele showing afib RVR. Tachycardic with activity. A&O. Ambulating Ax1. Sodium bicarb infusing. Tolerating renal diet. Cash in place with adequate urine output. Wearing cpap overnight.

## 2019-12-19 NOTE — PROGRESS NOTES
12/19/19 5927   Quick Adds   Type of Visit Initial Occupational Therapy Evaluation   Living Environment   Lives With alone   Living Arrangements condominium   Home Accessibility stairs to enter home   Number of Stairs, Main Entrance 10   Living Environment Comment Pt lives alone in one level condo, flight of stairs to enter.    Self-Care   Usual Activity Tolerance good   Current Activity Tolerance moderate   Regular Exercise No   Equipment Currently Used at Home none   Activity/Exercise/Self-Care Comment No A.D. used PTA, does own a walker from when he needed it 2' gout   Functional Level   Ambulation 0-->independent   Transferring 0-->independent   Toileting 0-->independent   Bathing 1-->assistive equipment   Dressing 0-->independent   Eating 0-->independent   Communication 0-->understands/communicates without difficulty   Swallowing 0-->swallows foods/liquids without difficulty   Cognition 0 - no cognition issues reported   Fall history within last six months no   Prior Functional Level Comment Pt ind w/ all ADL's/IADL's at baseline including driving, shopping, finances, med mgmt. Pt does have someone occasionally assist w/ heavier cleaning/condo maintainence   General Information   Onset of Illness/Injury or Date of Surgery - Date 12/17/19   Referring Physician Vikash Brooks, DO   Patient/Family Goals Statement Return home   Additional Occupational Profile Info/Pertinent History of Current Problem This is a 74-year-old male with history of hypertension, diabetes, dilated cardiomyopathy, chronic kidney disease stage III, coronary artery disease, status post PCI in the past, atrial fibrillation on chronic anticoagulation with Coumadin, sleep apnea on CPAP, gout, obesity who comes to the ER as a transfer from the clinic because of weakness, tiredness, unable to pass urine, lower abdominal pain, constipation and low blood pressure. Pt found to have severe sepsis secondary to acute pyelonephritis    Precautions/Limitations fall precautions   Cognitive Status Examination   Orientation orientation to person, place and time   Level of Consciousness alert   Follows Commands (Cognition) WNL   Visual Perception   Visual Perception Wears glasses   Sensory Examination   Sensory Comments has some numbness in fingers and feet, states this is baseline 2' neuropathy   Pain Assessment   Patient Currently in Pain No   Integumentary/Edema   Integumentary/Edema Comments slight edema in ankles/feet, pt wears compression socks at baseline   Range of Motion (ROM)   ROM Comment BUE AROM WFL   Strength   Strength Comments BUE strength 4+/5   Hand Strength   Hand Strength Comments R  greater than R    Coordination   Coordination Comments WFL bilaterally w/ serial finger to thumb opposition   Mobility   Bed Mobility Comments Mod I, increased time   Transfer Skill: Sit to Stand   Level of [place]: Sit/Stand stand-by assist   Transfer Skill: Toilet Transfer   Level of [place]: Toilet stand-by assist   Bathing   Level of [place] stand-by assist   Upper Body Dressing   Level of [place]: Dress Upper Body independent   Lower Body Dressing   Level of [place]: Dress Lower Body stand-by assist   Toileting   Level of [place]: Toilet stand-by assist   Grooming   Level of [place]: Grooming stand-by assist   Eating/Self Feeding   Level of [place]: Eating independent   Clinical Impression   Criteria for Skilled Therapeutic Interventions Met evaluation only   Clinical Decision Making (Complexity) Low complexity   Anticipated Discharge Disposition Home with Home Therapy   Risks and Benefits of Treatment have been explained. Yes   Patient, Family & other staff in agreement with plan of care Yes   Clinical Impression Comments Upon eval, pt mod I w/ bed mobility w/ extended time/effort needed to complete. Pt completed sit>stand tx w/ spv. Pt able to maintain standing w/ and w/o UE support to simulate  "toileting, LB dressing, and g/h standing at sink w/o LOB. Pt able to don/doff socks using figure4 position w/ mod I. Pt currently spv-SBA for ADL's, anticipate w/ continued walks w/ nursing that pt will build strength/act tolerance to be able to safely return home w/ HH PT. No skilled IP OT needs identified.   [facility] AM-PAC TM \"6 Clicks\"   2016, Trustees of [facility], under license to [employer].  All rights reserved.   6 Clicks Short Forms Daily Activity Inpatient Short Form     "

## 2019-12-19 NOTE — PLAN OF CARE
Discharge Planner OT   Patient plan for discharge: home  Current status: OT eval completed. Pt lives alone in a condo, ind w/ all ADL/IADL's except has some help for heavier cleaning/maintenence tasks around the condo.     Upon eval, pt mod I w/ bed mobility w/ extended time/effort needed to complete. Sit>stand tx w/ spv. Pt able to maintain standing w/ and w/o UE support to simulate toileting, LB dressing, and g/h standing at sink w/o LOB. Pt able to don/doff socks using figure4 position w/ mod I. Pt currently spv-SBA for ADL s, anticipate w/ continued walks w/ nursing that pt will build strength/act tolerance to be able to safely return home w/ HH PT. No skilled IP OT needs identified, will complete OT order.  Barriers to return to prior living situation: flight of stairs to enter (defer to PT)  Recommendations for discharge: home w/ HH PT  Rationale for recommendations: Pt is below baseline w/ act tolerance and mobility (as pt using walker now but did not before admit). Anticipate w/ continued PT and w/ walking w/ nursing staff that pt will be able to return home safely w/o additional assist as pt spv-mod I w/ ADL's.        Entered by: Nieves Rivera 12/19/2019 3:31 PM

## 2019-12-20 ENCOUNTER — APPOINTMENT (OUTPATIENT)
Dept: PHYSICAL THERAPY | Facility: CLINIC | Age: 74
DRG: 872 | End: 2019-12-20
Attending: HOSPITALIST
Payer: COMMERCIAL

## 2019-12-20 LAB
ANION GAP SERPL CALCULATED.3IONS-SCNC: 5 MMOL/L (ref 3–14)
BACTERIA SPEC CULT: ABNORMAL
BUN SERPL-MCNC: 37 MG/DL (ref 7–30)
CALCIUM SERPL-MCNC: 7.8 MG/DL (ref 8.5–10.1)
CHLORIDE SERPL-SCNC: 100 MMOL/L (ref 94–109)
CO2 SERPL-SCNC: 29 MMOL/L (ref 20–32)
CREAT SERPL-MCNC: 1.27 MG/DL (ref 0.66–1.25)
ERYTHROCYTE [DISTWIDTH] IN BLOOD BY AUTOMATED COUNT: 13.5 % (ref 10–15)
GFR SERPL CREATININE-BSD FRML MDRD: 56 ML/MIN/{1.73_M2}
GLUCOSE BLDC GLUCOMTR-MCNC: 124 MG/DL (ref 70–99)
GLUCOSE BLDC GLUCOMTR-MCNC: 129 MG/DL (ref 70–99)
GLUCOSE BLDC GLUCOMTR-MCNC: 135 MG/DL (ref 70–99)
GLUCOSE BLDC GLUCOMTR-MCNC: 141 MG/DL (ref 70–99)
GLUCOSE BLDC GLUCOMTR-MCNC: 142 MG/DL (ref 70–99)
GLUCOSE SERPL-MCNC: 126 MG/DL (ref 70–99)
HCT VFR BLD AUTO: 37 % (ref 40–53)
HGB BLD-MCNC: 12.2 G/DL (ref 13.3–17.7)
INR PPP: 2.03 (ref 0.86–1.14)
INTERPRETATION ECG - MUSE: NORMAL
Lab: ABNORMAL
MCH RBC QN AUTO: 33.6 PG (ref 26.5–33)
MCHC RBC AUTO-ENTMCNC: 33 G/DL (ref 31.5–36.5)
MCV RBC AUTO: 102 FL (ref 78–100)
MRSA DNA SPEC QL NAA+PROBE: NEGATIVE
PLATELET # BLD AUTO: 154 10E9/L (ref 150–450)
POTASSIUM SERPL-SCNC: 3.7 MMOL/L (ref 3.4–5.3)
RBC # BLD AUTO: 3.63 10E12/L (ref 4.4–5.9)
SODIUM SERPL-SCNC: 134 MMOL/L (ref 133–144)
SPECIMEN SOURCE: ABNORMAL
SPECIMEN SOURCE: NORMAL
WBC # BLD AUTO: 8.6 10E9/L (ref 4–11)

## 2019-12-20 PROCEDURE — 99232 SBSQ HOSP IP/OBS MODERATE 35: CPT | Performed by: HOSPITALIST

## 2019-12-20 PROCEDURE — 25000132 ZZH RX MED GY IP 250 OP 250 PS 637: Performed by: INTERNAL MEDICINE

## 2019-12-20 PROCEDURE — 80048 BASIC METABOLIC PNL TOTAL CA: CPT | Performed by: INTERNAL MEDICINE

## 2019-12-20 PROCEDURE — 25000131 ZZH RX MED GY IP 250 OP 636 PS 637: Performed by: INTERNAL MEDICINE

## 2019-12-20 PROCEDURE — 87640 STAPH A DNA AMP PROBE: CPT | Performed by: INTERNAL MEDICINE

## 2019-12-20 PROCEDURE — 12000000 ZZH R&B MED SURG/OB

## 2019-12-20 PROCEDURE — 97530 THERAPEUTIC ACTIVITIES: CPT | Mod: GP | Performed by: PHYSICAL THERAPIST

## 2019-12-20 PROCEDURE — 87641 MR-STAPH DNA AMP PROBE: CPT | Performed by: INTERNAL MEDICINE

## 2019-12-20 PROCEDURE — 97116 GAIT TRAINING THERAPY: CPT | Mod: GP | Performed by: PHYSICAL THERAPIST

## 2019-12-20 PROCEDURE — 36415 COLL VENOUS BLD VENIPUNCTURE: CPT | Performed by: INTERNAL MEDICINE

## 2019-12-20 PROCEDURE — 85027 COMPLETE CBC AUTOMATED: CPT | Performed by: INTERNAL MEDICINE

## 2019-12-20 PROCEDURE — 25000128 H RX IP 250 OP 636: Performed by: INTERNAL MEDICINE

## 2019-12-20 PROCEDURE — 00000146 ZZHCL STATISTIC GLUCOSE BY METER IP

## 2019-12-20 PROCEDURE — 25000132 ZZH RX MED GY IP 250 OP 250 PS 637: Performed by: HOSPITALIST

## 2019-12-20 PROCEDURE — 85610 PROTHROMBIN TIME: CPT | Performed by: INTERNAL MEDICINE

## 2019-12-20 RX ORDER — WARFARIN SODIUM 7.5 MG/1
7.5 TABLET ORAL
Status: COMPLETED | OUTPATIENT
Start: 2019-12-20 | End: 2019-12-20

## 2019-12-20 RX ORDER — PIPERACILLIN SODIUM, TAZOBACTAM SODIUM 3; .375 G/15ML; G/15ML
3.38 INJECTION, POWDER, LYOPHILIZED, FOR SOLUTION INTRAVENOUS EVERY 6 HOURS
Status: DISCONTINUED | OUTPATIENT
Start: 2019-12-20 | End: 2019-12-21 | Stop reason: HOSPADM

## 2019-12-20 RX ORDER — PIPERACILLIN SODIUM, TAZOBACTAM SODIUM 3; .375 G/15ML; G/15ML
3.38 INJECTION, POWDER, LYOPHILIZED, FOR SOLUTION INTRAVENOUS EVERY 6 HOURS
Status: DISCONTINUED | OUTPATIENT
Start: 2019-12-20 | End: 2019-12-20

## 2019-12-20 RX ORDER — FUROSEMIDE 20 MG
20 TABLET ORAL DAILY
Status: DISCONTINUED | OUTPATIENT
Start: 2019-12-20 | End: 2019-12-21 | Stop reason: HOSPADM

## 2019-12-20 RX ADMIN — WARFARIN SODIUM 7.5 MG: 7.5 TABLET ORAL at 18:23

## 2019-12-20 RX ADMIN — PIPERACILLIN AND TAZOBACTAM 3.38 G: 3; .375 INJECTION, POWDER, LYOPHILIZED, FOR SOLUTION INTRAVENOUS at 11:09

## 2019-12-20 RX ADMIN — DILTIAZEM HYDROCHLORIDE 300 MG: 300 CAPSULE, COATED, EXTENDED RELEASE ORAL at 11:10

## 2019-12-20 RX ADMIN — TAMSULOSIN HYDROCHLORIDE 0.4 MG: 0.4 CAPSULE ORAL at 22:36

## 2019-12-20 RX ADMIN — FUROSEMIDE 20 MG: 20 TABLET ORAL at 12:19

## 2019-12-20 RX ADMIN — DIGOXIN 125 MCG: 0.12 TABLET ORAL at 11:10

## 2019-12-20 RX ADMIN — ASPIRIN 81 MG: 81 TABLET, COATED ORAL at 11:10

## 2019-12-20 RX ADMIN — METOPROLOL SUCCINATE 50 MG: 50 TABLET, EXTENDED RELEASE ORAL at 11:10

## 2019-12-20 RX ADMIN — PIPERACILLIN AND TAZOBACTAM 3.38 G: 3; .375 INJECTION, POWDER, LYOPHILIZED, FOR SOLUTION INTRAVENOUS at 16:45

## 2019-12-20 RX ADMIN — FINASTERIDE 5 MG: 5 TABLET, FILM COATED ORAL at 11:10

## 2019-12-20 RX ADMIN — PIPERACILLIN AND TAZOBACTAM 2.25 G: 2; .25 INJECTION, POWDER, LYOPHILIZED, FOR SOLUTION INTRAVENOUS at 04:52

## 2019-12-20 RX ADMIN — INSULIN GLARGINE 10 UNITS: 100 INJECTION, SOLUTION SUBCUTANEOUS at 22:36

## 2019-12-20 RX ADMIN — Medication 1 MG: at 22:36

## 2019-12-20 RX ADMIN — FEBUXOSTAT 40 MG: 40 TABLET ORAL at 11:11

## 2019-12-20 RX ADMIN — METOPROLOL SUCCINATE 50 MG: 50 TABLET, EXTENDED RELEASE ORAL at 16:45

## 2019-12-20 RX ADMIN — PIPERACILLIN AND TAZOBACTAM 3.38 G: 3; .375 INJECTION, POWDER, LYOPHILIZED, FOR SOLUTION INTRAVENOUS at 22:36

## 2019-12-20 ASSESSMENT — ACTIVITIES OF DAILY LIVING (ADL)
ADLS_ACUITY_SCORE: 14
ADLS_ACUITY_SCORE: 14
ADLS_ACUITY_SCORE: 15
ADLS_ACUITY_SCORE: 16
ADLS_ACUITY_SCORE: 16
ADLS_ACUITY_SCORE: 14

## 2019-12-20 ASSESSMENT — MIFFLIN-ST. JEOR: SCORE: 2034.88

## 2019-12-20 NOTE — PROGRESS NOTES
A&O, VSS on RA, LS Diminished, BS audible and active, on Regular combination diet, denies nausea, On mauricio catheter, continent of bowel, up with A1 using GB and walker, denies pain. For possible discharge to home  Tomorrow with PT visits depending on laboratory results.

## 2019-12-20 NOTE — CONSULTS
Care Transition Initial Assessment - SW     Met with: Patient   Active Problems:    TOMMY (obstructive sleep apnea)    Type 2 diabetes mellitus with diabetic neuropathy; goal HgbA1c < 7%    CAD (coronary artery disease)    Atrial fibrillation (AFIB) on Coumadin    Severe sepsis (H)    Acute pyelonephritis    Obstructive uropathy    Acute on chronic renal failure (H)    Indirect hyperbilirubinemia       DATA  Lives With: alone   Living Arrangements: condominium  Insurance [employer] a Medicare replacement      Identified issues/concerns regarding health management: Patient has stairs to navigate and is not independent at this time.  TCU is recommended by PT.  Patient requesting to enter [facility] where he was a patient back in April of 2014.  Referral was made to [facility] via DOD and call placed to the facility.     Transportation Anticipated: At baseline, patient drives.  Today he will ask a friend to transport him.   to TCU  ASSESSMENT  Cognitive Status: alert and oriented x4.  Concerns to be addressed: TCU placement    Financial costs for the patient includes non anticipated.  Patient given options and choices for discharge yes .  Patient/family is agreeable to the plan? yes  Patient Goals and Preferences: [facility]  Patient anticipates discharging to:   PLAN  Await to hear back from [facility].

## 2019-12-20 NOTE — PLAN OF CARE
DATE & TIME: 12/20/19 1752-5905                          Cognitive Concerns/ Orientation : Alert and Oriented x4   BEHAVIOR & AGGRESSION TOOL COLOR: Green   CIWA SCORE: NA    ABNL VS/O2: VSS on RA, patients heart rate bounces around, up into 120's but not sustaining.    MOBILITY: Assist of one and walker, stiff   PAIN MANAGMENT: Denies   DIET: Reg, 2gm K  BOWEL/BLADDER: Continent, Mauricio, patent. Loose BM, will monitor     ABNL LAB/BG:   DRAIN/DEVICES: PIV SL, mauricio for retation  TELEMETRY RHYTHM: Afib with RVR/CVR and BBB, on BB BID.    SKIN: Intact, some bruising and [place]. Rash to scrotum and +3 edema, elevated lasix given  TESTS/PROCEDURES: NA  D/C DAY/GOALS/PLACE: tomorrow to TCU with mauricio   OTHER IMPORTANT INFO: Neuros and CMS are intact.

## 2019-12-20 NOTE — PROGRESS NOTES
[facility]    Medicine Progress Note - Hospitalist Service       Date of Admission:  12/17/2019  Assessment & Plan           Severe sepsis from e coli bacteremia and pyelonephritis secondary to bacterial translocation with obstructive uropathy   Acute kidney secondary from obstructive uropathy  Patient admitted with weakness, hypotension, inability to urinate, creatinine elevation]  Improved with mauricio, zosyn  Plan  - continue zosyn today, FQ on discharge  - ciprofloxacin on discharge  - stop IVF  - encourage oral hydration  - PT/OT  - seen by urology, continue mauricio for now with outpatient follow-up  - finasteride and flomax    HTN  - hold irbesartan, aldactone, and   - restart lasix         Hyperbilirubinemia, mostly indirect  Possibly Gilbert syndrome  Imaging concerning for potentially cirrhosis  Hepatitis serologies have been negative in the past  At this point if he has cirrhosis it would be considered compensated  Plan  - monitor  - will discuss with patient, at this time would recommend outpatient followup      Afib with RVR  Plan  - continue metoprolol, cardizem, digoxin from home list  - continue coumadin  - stop the IVF     History of dilated cardiomyopathy:  Last echocardiogram in 2016 showed ejection fraction 50%-55%.  He is on multiple medications.  Plan  - restart lasix 20 mg daily, has some scrotal edema after all the IVF     Obstructive sleep apnea on CPAP   - TOMMY       History of gout, on febuxostat.  We will continue with that.      Obesity:  The patient does have history of obesity.  He needs to work on his diet and lose some weight.      Diet: Combination Diet Regular Diet Adult; 2 gm K Diet  Room Service    DVT Prophylaxis: Warfarin  Mauricio Catheter: in place, indication: Retention  Code Status: DNR      Disposition Plan   Expected discharge: 1-2 days, recommended to prior living arrangement once SIRS/Sepsis treated.  Entered: Vikash Brooks DO 12/20/2019, 4:35 PM        The patient's care was discussed with the Patient.    Vikash Brooks, DO  Hospitalist Service  [facility]    ______________________________________________________________________    Interval History   Doing well, concerned about going home going up his stairs, might be looking at TCU    Data reviewed today: I reviewed all medications, new labs and imaging results over the last 24 hours. I personally reviewed no images or EKG's today.    Physical Exam   Vital Signs: Temp: 98  F (36.7  C) Temp src: Oral BP: 125/77   Heart Rate: 51 Resp: 20 SpO2: 92 % O2 Device: None (Room air)    Weight: 272 lbs 7.82 oz  Constitutional: awake, alert, cooperative, no apparent distress, and appears stated age  Eyes: Lids and lashes normal, pupils equal, round and reactive to light, extra ocular muscles intact, sclera clear, conjunctiva normal  Respiratory: No increased work of breathing, good air exchange, clear to auscultation bilaterally, no crackles or wheezing  Cardiovascular: irregularly irregular rhythm and normal S1 and S2  GI: No scars, normal bowel sounds, soft, non-distended, non-tender, no masses palpated, no hepatosplenomegally  Skin: no bruising or bleeding      Data   Recent Labs   Lab 12/20/19  0748 12/19/19  0839 12/18/19  1703 12/18/19  0738  12/17/19  1151   WBC 8.6 8.6  --  9.7  --  14.2*   HGB 12.2* 12.1*  --  12.1*  --  14.5   * 103*  --  103*  --  102*    129*  --  126*  --  172   INR 2.03* 2.23*  --  2.50*  --  2.17*    133 135 137   < > 126*   POTASSIUM 3.7 3.5 3.9 4.2   < > 5.4*   CHLORIDE 100 96 100 104   < > 94   CO2 29 33* 29 23   < > 20   BUN 37* 49* 61* 67*   < > 80*   CR 1.27* 1.49* 1.94* 2.45*   < > 4.31*   ANIONGAP 5 4 6 10   < > 12   HARSHA 7.8* 7.7* 7.8* 7.5*   < > 8.7   * 131* 140* 122*   < > 164*   ALBUMIN  --  2.1*  --  2.2*   < > 2.9*   PROTTOTAL  --   --   --  6.1*  --  6.8   BILITOTAL  --   --   --  3.9*  --  6.5*   ALKPHOS  --   --   --  60  --   89   ALT  --   --   --  26  --  28   AST  --   --   --  33  --  37   LIPASE  --   --   --   --   --  60*    < > = values in this interval not displayed.     No results found for this or any previous visit (from the past 24 hour(s)).  Medications     norepinephrine       Warfarin Therapy Reminder         aspirin  81 mg Oral Daily     digoxin  125 mcg Oral Daily     diltiazem ER COATED BEADS  300 mg Oral Daily     febuxostat  40 mg Oral Daily     finasteride  5 mg Oral Daily     furosemide  20 mg Oral Daily     insulin aspart  1-7 Units Subcutaneous TID AC     insulin aspart  1-5 Units Subcutaneous At Bedtime     insulin glargine  10 Units Subcutaneous At Bedtime     melatonin  1 mg Oral At Bedtime     metoprolol succinate ER  50 mg Oral BID     piperacillin-tazobactam  3.375 g Intravenous Q6H     polyethylene glycol  17 g Oral BID     senna-docusate  1 tablet Oral BID     sodium chloride (PF)  3 mL Intracatheter Q8H     tamsulosin  0.4 mg Oral QPM     warfarin ANTICOAGULANT  7.5 mg Oral ONCE at 18:00

## 2019-12-20 NOTE — PLAN OF CARE
Discharge Planner PT   Patient plan for discharge: TCU. Patient is concerned about going home due to difficulty managing catheter, scrotal swelling making moving difficult and difficulty carrying the walker up and down steps.   Current status: Patient seen for initial eval and treatment provided. Patient educated on how to transfer sit to stand from toilet and chair. He tolerated amb a total of 240 feet with ww for most of walk. 200 feet and SBA and without AD for 40 feet with CGA. Slightly unsteady due to scrotal swelling and fatigues quickly without AD. Went up and down 10 steps with 1 rail. Very fatigued at top of steps. Patient doesn't have anyone who can carry the walker up or down the steps for him and had difficulty managing the catheter on steps since he needs to use the rail. Patient left sitting in chair at end of session.   Barriers to return to prior living situation: Lives alone, decreased balance without AD due to scrotal swelling, fatigues quickly without walker and on stairs, Difficulty managing catheter and walker on stairs and he has 10 steps to enter  Recommendations for discharge: TCU  Rationale for recommendations: Patient doesn't need a lot of assist for mobility but independence is limited by scrotal swelling, quick fatigue, the need to use a ww now, difficulty managing catheter and ww on stairs.        Entered by: Brittany Edmonds 12/20/2019 4:37 PM

## 2019-12-20 NOTE — PROGRESS NOTES
SW:D:  [facility] is not available.  [facility] is assessing patient this afternoon for a potential semi-private vacancy for today.  Patient updated and in agreement.  At this time, discharge today will not able to occur however we will plan a discharge on Saturday before 1100.

## 2019-12-21 VITALS
RESPIRATION RATE: 22 BRPM | DIASTOLIC BLOOD PRESSURE: 70 MMHG | HEART RATE: 77 BPM | TEMPERATURE: 97.5 F | WEIGHT: 268.74 LBS | OXYGEN SATURATION: 93 % | BODY MASS INDEX: 35.62 KG/M2 | SYSTOLIC BLOOD PRESSURE: 121 MMHG | HEIGHT: 73 IN

## 2019-12-21 LAB
ANION GAP SERPL CALCULATED.3IONS-SCNC: 5 MMOL/L (ref 3–14)
BUN SERPL-MCNC: 33 MG/DL (ref 7–30)
CALCIUM SERPL-MCNC: 8.1 MG/DL (ref 8.5–10.1)
CHLORIDE SERPL-SCNC: 103 MMOL/L (ref 94–109)
CO2 SERPL-SCNC: 29 MMOL/L (ref 20–32)
CREAT SERPL-MCNC: 1.28 MG/DL (ref 0.66–1.25)
GFR SERPL CREATININE-BSD FRML MDRD: 55 ML/MIN/{1.73_M2}
GLUCOSE BLDC GLUCOMTR-MCNC: 113 MG/DL (ref 70–99)
GLUCOSE BLDC GLUCOMTR-MCNC: 118 MG/DL (ref 70–99)
GLUCOSE SERPL-MCNC: 162 MG/DL (ref 70–99)
INR PPP: 2.31 (ref 0.86–1.14)
POTASSIUM SERPL-SCNC: 3.6 MMOL/L (ref 3.4–5.3)
SODIUM SERPL-SCNC: 137 MMOL/L (ref 133–144)

## 2019-12-21 PROCEDURE — 25000132 ZZH RX MED GY IP 250 OP 250 PS 637: Performed by: INTERNAL MEDICINE

## 2019-12-21 PROCEDURE — 36415 COLL VENOUS BLD VENIPUNCTURE: CPT | Performed by: INTERNAL MEDICINE

## 2019-12-21 PROCEDURE — 99239 HOSP IP/OBS DSCHRG MGMT >30: CPT | Performed by: INTERNAL MEDICINE

## 2019-12-21 PROCEDURE — 25000128 H RX IP 250 OP 636: Performed by: INTERNAL MEDICINE

## 2019-12-21 PROCEDURE — 85610 PROTHROMBIN TIME: CPT | Performed by: INTERNAL MEDICINE

## 2019-12-21 PROCEDURE — 00000146 ZZHCL STATISTIC GLUCOSE BY METER IP

## 2019-12-21 PROCEDURE — 80048 BASIC METABOLIC PNL TOTAL CA: CPT | Performed by: INTERNAL MEDICINE

## 2019-12-21 PROCEDURE — 25000132 ZZH RX MED GY IP 250 OP 250 PS 637: Performed by: HOSPITALIST

## 2019-12-21 RX ORDER — CIPROFLOXACIN 500 MG/1
500 TABLET, FILM COATED ORAL 2 TIMES DAILY
Qty: 20 TABLET | Refills: 0 | Status: SHIPPED | OUTPATIENT
Start: 2019-12-21 | End: 2020-01-06

## 2019-12-21 RX ORDER — METOPROLOL SUCCINATE 25 MG/1
25 TABLET, EXTENDED RELEASE ORAL
Status: DISCONTINUED | OUTPATIENT
Start: 2019-12-21 | End: 2019-12-21

## 2019-12-21 RX ORDER — METOPROLOL SUCCINATE 25 MG/1
25 TABLET, EXTENDED RELEASE ORAL
Status: DISCONTINUED | OUTPATIENT
Start: 2019-12-21 | End: 2019-12-21 | Stop reason: HOSPADM

## 2019-12-21 RX ORDER — FINASTERIDE 5 MG/1
5 TABLET, FILM COATED ORAL DAILY
Qty: 30 TABLET | Refills: 0 | Status: SHIPPED | OUTPATIENT
Start: 2019-12-22 | End: 2020-01-07

## 2019-12-21 RX ORDER — WARFARIN SODIUM 5 MG/1
5 TABLET ORAL
Status: DISCONTINUED | OUTPATIENT
Start: 2019-12-21 | End: 2019-12-21 | Stop reason: HOSPADM

## 2019-12-21 RX ORDER — IRBESARTAN 300 MG/1
150 TABLET ORAL AT BEDTIME
Qty: 90 TABLET | Refills: 2 | Status: SHIPPED | OUTPATIENT
Start: 2019-12-23 | End: 2020-11-19

## 2019-12-21 RX ORDER — TAMSULOSIN HYDROCHLORIDE 0.4 MG/1
0.4 CAPSULE ORAL EVERY EVENING
Qty: 30 CAPSULE | Refills: 0 | Status: SHIPPED | OUTPATIENT
Start: 2019-12-21 | End: 2020-01-07

## 2019-12-21 RX ORDER — SPIRONOLACTONE 25 MG/1
12.5 TABLET ORAL DAILY
Qty: 90 TABLET | Refills: 3
Start: 2019-12-23 | End: 2020-05-20 | Stop reason: ALTCHOICE

## 2019-12-21 RX ADMIN — ASPIRIN 81 MG: 81 TABLET, COATED ORAL at 09:46

## 2019-12-21 RX ADMIN — DIGOXIN 125 MCG: 0.12 TABLET ORAL at 09:46

## 2019-12-21 RX ADMIN — SENNOSIDES AND DOCUSATE SODIUM 1 TABLET: 8.6; 5 TABLET ORAL at 09:48

## 2019-12-21 RX ADMIN — PIPERACILLIN AND TAZOBACTAM 3.38 G: 3; .375 INJECTION, POWDER, LYOPHILIZED, FOR SOLUTION INTRAVENOUS at 09:46

## 2019-12-21 RX ADMIN — DILTIAZEM HYDROCHLORIDE 300 MG: 300 CAPSULE, COATED, EXTENDED RELEASE ORAL at 09:46

## 2019-12-21 RX ADMIN — FINASTERIDE 5 MG: 5 TABLET, FILM COATED ORAL at 09:46

## 2019-12-21 RX ADMIN — FUROSEMIDE 20 MG: 20 TABLET ORAL at 09:46

## 2019-12-21 RX ADMIN — PIPERACILLIN AND TAZOBACTAM 3.38 G: 3; .375 INJECTION, POWDER, LYOPHILIZED, FOR SOLUTION INTRAVENOUS at 04:41

## 2019-12-21 RX ADMIN — METOPROLOL SUCCINATE 25 MG: 25 TABLET, EXTENDED RELEASE ORAL at 09:46

## 2019-12-21 RX ADMIN — FEBUXOSTAT 40 MG: 40 TABLET ORAL at 09:46

## 2019-12-21 ASSESSMENT — ACTIVITIES OF DAILY LIVING (ADL)
ADLS_ACUITY_SCORE: 17
ADLS_ACUITY_SCORE: 15
ADLS_ACUITY_SCORE: 17

## 2019-12-21 ASSESSMENT — MIFFLIN-ST. JEOR: SCORE: 2017.88

## 2019-12-21 NOTE — PLAN OF CARE
Discharge    Patient discharged to [place] via WC with SW and NA  Care plan note: AOx4, VSS, RA. Tele afib CVR-RVR, BBB, PVCs. Discharged on PO abx and with mauricio.    Listed belongings gathered and returned to patient. Yes  Care Plan and Patient education resolved: Yes  Prescriptions if needed, hard copies sent with patient  NA  Home and hospital acquired medications returned to patient: NA  Medication Bin checked and emptied on discharge Yes  Follow up appointment made for patient: No

## 2019-12-21 NOTE — DISCHARGE SUMMARY
[facility]    Discharge Summary  Hospitalist    Date of Admission:  12/17/2019  Date of Discharge:  12/21/2019 11:47 AM  Discharging Provider: Ephraim Castillo MD  Date of Service (when I saw the patient): 12/21/19    Discharge Diagnoses   Please refer below    History of Present Illness   Jt Montgomery is an 74 year old male who presented with weakness, unable to pass urine lower abdominal pain and constipation    Hospital Course       This is a 74-year-old male with history of hypertension, diabetes, dilated cardiomyopathy, chronic kidney disease stage III, coronary artery disease, status post PCI in the past, atrial fibrillation on chronic anticoagulation with Coumadin, sleep apnea on CPAP, gout, obesity who comes to the ER as a transfer from the clinic because of weakness, tiredness, unable to pass urine, lower abdominal pain, constipation and low blood pressure.      Final discharge diagnosis and hospital course     1.  Severe sepsis secondary to acute pyelonephritis:  This is a 74-year-old male with multiple comorbidities who presented with lower abdominal pain and hypotension.  He had leukocytosis with WBC of 14.2, tachycardic on admission, hypotensive with blood pressure going down to 83/57 despite boluses in the ER.  Lactic acid mildly elevated at 2.8 initially and now 2.4 consistent with severe sepsis.       No need for Levophed while in the hospital and MAP remain more than 65. Blood culture X 2 positive for GNB consistently: And urine culture growing E.coli, sensitivities pending at this time.      He was  on IV bicarbonate infusion at 125 mL per hour and IV Zosyn, will continue with that.    CT scan of the abdomen done which does suggest prominent collecting system, ureter prominent as well and marked bilateral perinephric infiltration I think this is consistent with pyelonephritis and possible Bladder outflow tract obstruction recently.  The patient had a thick-walled urinary  bladder as well and prostate enlargement.  He is started on on Proscar and Flomax. Blood pressure are now stable and improve.     Repeat blood cultures remain negative.  The patient is afebrile, blood pressure stable at this time.  And doing well.  He got 4 days of IV antibiotics while he is in the hospital.  And severe sepsis is resolved at this time.  Switch him to oral ciprofloxacin 500 mg twice daily for 10 more days to complete the 14-day course.     2.  Acute on chronic renal failure secondary to obstructive uropathy:  The patient was in urinary retention on admission. He is now s/p  Cash catheter placed. Now making good urine and was on IV bicarbonate infusion drip at 125 mL per hour, which is been now discontinued.  Cash catheter to remain in there for at least 2 weeks. Continue with  Proscar and Flomax   Creatinine is improving and making good urine. Continue to hold Irbesartan, Aldactone and Lasix at this time, avoid nephrotoxic medications.   He can be back on irbesartan and Aldactone in few days if creatinine remains stable.     3.  Hyponatremia and Hyperkalemia on admission:  Most likely secondary to renal failure, being on Aldactone.  Lasix and Irbesartan.    Now resolved       4.  Indirect hyperbilirubinemia most likely secondary to Gilbert's syndrome:  I saw the patient has elevated bilirubin, but never more than 4 on admission was 6.5.  I think the patient has Gilbert syndrome and increased bilirubin is because of the severe sepsis at this time.  All other liver function tests are normal.  Normal alkaline phosphatase.  AST and ALT are normal.   U/S shows enlarge fatty liver and possibly cirrhotic liver, has gall stones.   Bilirubin back to his baseline on discharge     5.  Hypertension:  The patient has a history of hypertension, on multiple medications including irbesartan, Aldactone, metoprolol and Cardizem.  We will hold those medications for now until his blood pressure improves.      6.   Atrial fibrillation with RVR:  RVR with HR going up to 120-130's, increase Metoprolol XL to 50 mg bid and restart his [id number] mg. Was on hold because of the low blood pressure.  Keep him on digoxin as well. I will give him a dose of IV Metoprolol 5 mg X 1 to control his HR till oral medications become available.   Heart rate is well controlled at the time of discharge.     7.  Acute on chronic renal failure secondary to obstructive uropathy as mentioned above.      8.  History of dilated cardiomyopathy:  Last echocardiogram in 2016 showed ejection fraction 50%-55%.  He is on multiple medications.  We need to hold it for now given his current sepsis and renal failure.   We will restart once his sepsis resolves and blood pressure improved.      9.  Obstructive sleep apnea on CPAP.  Did not bring his CPAP machine.  Will use it from hospital from today.      10.  History of coronary artery disease, stable, had stents in the past.  Keep him on aspirin, Coumadin, metoprolol.  Hold Aldactone, irbesartan and Cardizem at this time.      11.  History of gout, on febuxostat.  We will continue with that.      12.  Obesity:  The patient does have history of obesity.  He needs to work on his diet and lose some weight.      13.  Deep venous thrombosis prophylaxis with Coumadin.  Chronic anticoagulation with Coumadin.  Consult pharmacy to dose  his Coumadin.      12.  E coli Bacteremia  Sepsis secondary to UTI. 2 of 2 blood cultures positive. Repeat blood culture on 12/18/2019 remain negative.  He was treated with IV Zosyn while he was in the hospital.  Switch to oral ciprofloxacin on discharge.         Overall improve at this time, Appreciate input from the Urology.   Need 2 weeks of Cash catheter and need out patient follow up   Blood pressure improve at this time.  Will recommend to stop irbesartan but needs to continue he needs to decrease the dose to 150 mg and can start in few days if the creatinine remains  stable.  Patient is stable at this time will discharge to TCU in stable condition.        Ephraim Castillo MD, MD    Significant Results and Procedures       Pending Results   These results will be followed up by PCP  Unresulted Labs Ordered in the Past 30 Days of this Admission     Date and Time Order Name Status Description    12/18/2019 1056 Blood culture Preliminary     12/18/2019 1056 Blood culture Preliminary     11/22/2019 1623 LDL CHOLESTEROL DIRECT In process           Code Status   DNR       Primary Care Physician   Digna Jones    Physical Exam   Temp: 97.5  F (36.4  C) Temp src: Axillary BP: 121/70   Heart Rate: 50 Resp: 22 SpO2: 93 % O2 Device: BiPAP/CPAP    Vitals:    12/18/19 0430 12/20/19 0700 12/21/19 0615   Weight: 119 kg (262 lb 5.6 oz) 123.6 kg (272 lb 7.8 oz) 121.9 kg (268 lb 11.9 oz)     Vital Signs with Ranges  Temp:  [97.5  F (36.4  C)-98.5  F (36.9  C)] 97.5  F (36.4  C)  Heart Rate:  [50-57] 50  Resp:  [18-22] 22  BP: (104-125)/(54-77) 121/70  SpO2:  [92 %-96 %] 93 %  I/O last 3 completed shifts:  In: 100 [P.O.:100]  Out: 3550 [Urine:3550]    Constitutional: awake, alert, cooperative, no apparent distress, and appears stated age  ENT: Normocephalic, without obvious abnormality, atraumatic, sinuses nontender on palpation, external ears without lesions, oral pharynx with moist mucous membranes, tonsils without erythema or exudates, gums normal and good dentition.  Respiratory: No increased work of breathing, good air exchange, clear to auscultation bilaterally, no crackles or wheezing  Cardiovascular: Normal apical impulse, regular rate and rhythm, normal S1 and S2, no S3 or S4, and no murmur noted  GI: No scars, normal bowel sounds, soft, non-distended, non-tender, no masses palpated, no hepatosplenomegally  Skin: no bruising or bleeding  Neurologic: No focal deficit    Discharge Disposition   Discharged to short-term care facility  Condition at discharge: Stable    Consultations This Hospital  Stay   PHARMACY TO DOSE WARFARIN  UROLOGY IP CONSULT  PHYSICAL THERAPY ADULT IP CONSULT  OCCUPATIONAL THERAPY ADULT IP CONSULT  SOCIAL WORK IP CONSULT  PHYSICAL THERAPY ADULT IP CONSULT  OCCUPATIONAL THERAPY ADULT IP CONSULT    Time Spent on this Encounter   IEphraim MD, personally saw the patient today and spent greater than 30 minutes discharging this patient.    Discharge Orders      General info for SNF    Length of Stay Estimate: Short Term Care: Estimated # of Days <30  Condition at Discharge: Stable  Level of care:skilled   Rehabilitation Potential: Good  Admission H&P remains valid and up-to-date: Yes  Recent Chemotherapy: N/A  Use Nursing Home Standing Orders: Yes     Mantoux instructions    Give two-step Mantoux (PPD) Per Facility Policy Yes     Reason for your hospital stay    UTI, Sepsis, BPH and  Urinary obstruction.     Intake and output    Every shift     Glucose monitor nursing POCT    Before meals and at bedtime     Daily weights    Call Provider for weight gain of more than 2 pounds per day or 5 pounds per week.     Cash catheter    To straight gravity drainage. Change catheter every 2 weeks and PRN for leaking or decreased uring output with signs of bladder distention. DO NOT change catheter without a specific MD order IF diagnosis of benign prostatic hypertrophy (BPH), neurogenic bladder, or other urological conditions     Follow Up and recommended labs and tests    Follow up with [facility].  The following labs/tests are recommended: BMP.  Follow up with Urology in 10 days     Activity - Up ad yogesh     Additional Discharge Instructions    CPAP continue with home setting     DNR/DNI     Physical Therapy Adult Consult    Evaluate and treat as clinically indicated.    Reason:  Weakness     Occupational Therapy Adult Consult    Evaluate and treat as clinically indicated.    Reason:  Weakness     Advance Diet as Tolerated    Follow this diet upon discharge: Orders Placed This  Encounter      Room Service      Combination Diet Regular Diet Adult; 2 gm K Diet     Discharge Medications   Discharge Medication List as of 12/21/2019 11:14 AM      START taking these medications    Details   ciprofloxacin (CIPRO) 500 MG tablet Take 1 tablet (500 mg) by mouth 2 times daily for 10 days, Disp-20 tablet, R-0, E-Prescribe      finasteride (PROSCAR) 5 MG tablet Take 1 tablet (5 mg) by mouth daily, Disp-30 tablet, R-0, E-Prescribe      tamsulosin (FLOMAX) 0.4 MG capsule Take 1 capsule (0.4 mg) by mouth every evening, Disp-30 capsule, R-0, E-Prescribe         CONTINUE these medications which have CHANGED    Details   irbesartan (AVAPRO) 300 MG tablet Take 0.5 tablets (150 mg) by mouth At Bedtime, Disp-90 tablet, R-2, E-Prescribe      spironolactone (ALDACTONE) 25 MG tablet Take 0.5 tablets (12.5 mg) by mouth daily, Disp-90 tablet, R-3, No Print Out         CONTINUE these medications which have NOT CHANGED    Details   aspirin EC 81 MG EC tablet Take 1 tablet by mouth daily., Disp-90 tablet, R-1, E-Prescribe      Coenzyme Q10 (COQ10) 100 MG CAPS Take 200 mg by mouth daily , Historical      Continuous Blood Gluc Sensor (BabelgumSTYLE RINKU 14 DAY SENSOR) MISC 1 Device every 14 days, Disp-2 each, R-11, E-Prescribe      digoxin (LANOXIN) 125 MCG tablet Take 1 tablet (125 mcg) by mouth daily, Disp-90 tablet, R-3, E-PrescribeDo not fill until pt calls      diltiazem ER COATED BEADS (CARTIA XT) 300 MG 24 hr capsule Take 1 capsule (300 mg) by mouth daily, Disp-90 capsule, R-3, E-PrescribeDo not fill until pt calls      febuxostat (ULORIC) 40 MG TABS Take 40 mg by mouth daily, Historical      furosemide (LASIX) 20 MG tablet Take 1 tablet (20 mg) by mouth daily as needed (edema), Disp-90 tablet, R-3, Local Print      Green Tea, Camillia sinensis, (GREEN TEA EXTRACT PO) Take by mouth 2 times daily Doesn't always take regularly, Historical      HERBALS Nerve by Plexis 2 a day,   Healthy Feet and Nerves  By [employer]  3  a day, Heart Food Caps  (cayenna pepper, garlic, hawthorn, onion & ginger 1-2 a day), Super Red Drink Powder daily, garlic daily, Historical      insulin aspart (NOVOLOG FLEXPEN) 100 UNIT/ML pen Inject 15 Units Subcutaneous 3 times daily (with meals), Disp-30 mL, R-11, E-Prescribe      insulin glargine (LANTUS SOLOSTAR) 100 UNIT/ML pen ADMINISTER 18 UNITS UNDER THE SKIN DAILY, Disp-30 mL, R-11, No Print OutIf Lantus is not covered by insurance, may substitute Basaglar at same dose and frequency.        insulin pen needle (BD GERMÁN U/F) 32G X 4 MM miscellaneous USING FOUR TIMES DAILY OR AS DIRECTEDDisp-200 each, [id number]      magnesium oxide 200 MG TABS Take 2 tablets by mouth At Bedtime , Historical      MELATONIN PO Take 0.5 mg by mouth At Bedtime , Historical      metoprolol succinate ER (TOPROL-XL) 50 MG 24 hr tablet Take 1 tablet (50 mg) by mouth 2 times daily, Disp-180 tablet, R-3, Local Print      multivitamin, therapeutic with minerals (MULTI-VITAMIN) TABS tablet Take 4-8 tablets by mouth daily Life Extenstion, Historical      order for DME Equipment being ordered: Compression stockingsDisp-1 each, R-3, Local Print      predniSONE (DELTASONE) 20 MG tablet TAKE 1 TABLET(20 MG) BY MOUTH DAILY AS NEEDED FOR GOUT FLARE, Disp-10 tablet, R-0, E-Prescribe      Probiotic Product (PROBIOTIC & ACIDOPHILUS EX ST PO) Take 1 capsule by mouth daily, Historical      Taurine 500 MG CAPS Take 1 capsule by mouth as needed , Historical      !! UNABLE TO FIND MEDICATION NAME: Udos Oil - combination of omega 3, 6, 9.  Taking 2-3 capsules daily, Historical      !! UNABLE TO FIND MEDICATION NAME: Super Beet Juice, Historical      vitamin D3 (CHOLECALCIFEROL) 2000 units tablet Take 2 tablets by mouth daily , Historical      warfarin ANTICOAGULANT (COUMADIN) 5 MG tablet Take 1 1/2 tabs on Sundays, Tuesdays and Thursdays and take 1 tab on all other days or as directed by INR clinic, Disp-110 tablet, R-1, E-Prescribe       !! -  Potential duplicate medications found. Please discuss with provider.        Allergies   Allergies   Allergen Reactions     Corn Dextrin      All corn products - gets tired an ornery     Data   Most Recent 3 CBC's:  Recent Labs   Lab Test 12/20/19  0748 12/19/19  0839 12/18/19  0738   WBC 8.6 8.6 9.7   HGB 12.2* 12.1* 12.1*   * 103* 103*    129* 126*      Most Recent 3 BMP's:  Recent Labs   Lab Test 12/21/19  1022 12/20/19  0748 12/19/19  0839    134 133   POTASSIUM 3.6 3.7 3.5   CHLORIDE 103 100 96   CO2 29 29 33*   BUN 33* 37* 49*   CR 1.28* 1.27* 1.49*   ANIONGAP 5 5 4   HARSHA 8.1* 7.8* 7.7*   * 126* 131*     Most Recent 2 LFT's:  Recent Labs   Lab Test 12/18/19  0738 12/17/19  1151   AST 33 37   ALT 26 28   ALKPHOS 60 89   BILITOTAL 3.9* 6.5*     Most Recent INR's and Anticoagulation Dosing History:  Anticoagulation Dose History     Recent Dosing and Labs Latest Ref Rng & Units 11/21/2019 12/5/2019 12/17/2019 12/18/2019 12/19/2019 12/20/2019 12/21/2019    ALEC IMS TEMPLATE - - - 7.5 mg - - - -    Warfarin 5 mg - - - - 5 mg - - -    Warfarin 7.5 mg - - - - - 7.5 mg 7.5 mg -    INR 0.86 - 1.14 - - 2.17(H) 2.50(H) 2.23(H) 2.03(H) 2.31(H)    INR 0.86 - 1.14 4.1(A) 2.3(A) - - - - -        Most Recent 3 Troponin's:  Recent Labs   Lab Test 08/08/16  1159 09/10/12  1055 09/10/12  0625   TROPI <0.015  The 99th percentile for upper reference range is 0.045 ug/L.  Troponin values in   the range of 0.045 - 0.120 ug/L may be associated with risks of adverse   clinical events.   0.037* 0.043*     Most Recent Cholesterol Panel:  Recent Labs   Lab Test 11/22/19  1623   CHOL 164   LDL Cannot estimate LDL when triglyceride exceeds 400 mg/dL  67   HDL 24*   TRIG 843*     Most Recent 6 Bacteria Isolates From Any Culture (See EPIC Reports for Culture Details):  Recent Labs   Lab Test 12/18/19  1357 12/18/19  1349 12/17/19  1249 12/17/19  1234 12/17/19  1222 10/05/19  0915   CULT No growth after 3 days No  growth after 3 days Cultured on the 1st day of incubation:  Escherichia coli  This isolate does not meet the criteria of an ESBL . A different resistance   mechanism may be present.  *  Critical Value/Significant Value, preliminary result only, called to and read back by  BRIAN HOUSER RN [facility] 0252 12.18.19 CF    (Note)  POSITIVE for E.COLI by Verigene multiplex nucleic acid test. Final  identification and antimicrobial susceptibility testing will be  verified by standard methods. Verigene test will not distinguish  E.coli from Shigella species including S.dysenteriae, S.flexneri,  S.boydii, and S.sonnei. Specimens containing Shigella species or  E.coli will be reported as Positive for E.coli.    Specimen tested with Verigene multiplex, gram-negative blood culture  nucleic acid test for the following targets: Acinetobacter sp.,  Citrobacter sp., Enterobacter sp., Proteus sp., E. coli, K.  pneumoniae/oxytoca, P. aeruginosa, and the following resistance  markers: CTXM, KPC, NDM, VIM, IMP and OXA.    Critical Value/Significant Value called to and read back by BRIAN HOUSER RN 12/18/2019 @ 0715   Cultured on the 1st day of incubation:  Escherichia coli  Susceptibility testing done on previous specimen  *  Critical Value/Significant Value, preliminary result only, called to and read back by  Brian Houser RN at 0344 on 12.18.19 by .   >100,000 colonies/mL  Escherichia coli  This isolate does not meet the criteria of an ESBL . A different resistance   mechanism may be present.  * >100,000 colonies/mL  Escherichia coli  This isolate does not meet the criteria of an ESBL . A different resistance   mechanism may be present.  *  10,000 to 50,000 colonies/mL  mixed urogenital preethi       Most Recent TSH, T4 and A1c Labs:  Recent Labs   Lab Test 11/14/19  0919 10/08/19  1117   TSH  --  1.94   A1C 6.2* 7.6*     Results for orders placed or performed during the hospital encounter of 12/17/19   [facility] US  ABDOMEN LIMITED    Impression    ED Bedside Limited Ultrasound  Body area scanned: suprapubic abd  Performed by: Femi Ortiz MD  Indication: urinary retention/difficulty, eval for bladder fullness  Findings/Interpretation: significant volume of urine in bladder, c/w urinary retention  Key images were digitally archived in the [employer] radiology system.       CT Abdomen Pelvis w/o Contrast    Narrative    CT ABDOMEN/PELVIS WITHOUT CONTRAST December 17, 2019 1:56 PM     HISTORY: Urinary retention, renal failure, urine infection.    TECHNIQUE: No IV contrast material. Radiation dose for this scan was  reduced using automated exposure control, adjustment of the mA and/or  kV according to patient size, or iterative reconstruction technique.    COMPARISON: 11/19/2010.    FINDINGS: The heart is enlarged. There is diffuse low-attenuation of  the lobular liver. Several calcified gallstones are present. Spleen is  normal in size. The unenhanced pancreas and adrenal glands are  unremarkable.    There is marked bilateral perinephric infiltration. Calcifications  along the right renal artery are noted. There is a nonobstructing  calcification in the left renal collecting system that measures 6 mm.  Mild prominence of the ureters and collecting systems noted.    The appendix is normal. No bowel obstruction or ascites. No free  intraperitoneal air.    Urinary bladder is decompressed by a catheter. There is apparent  marked urinary bladder wall thickening that appears irregular.    Severe nonaneurysmal aortic and visceral branch atherosclerosis. No  abdominal or retroperitoneal lymphadenopathy. No pelvic adenopathy or  free fluid.    No lytic or blastic bone lesions.      Impression    IMPRESSION:  1. Marked bilateral perinephric infiltration with prominent collecting  systems and ureters have the appearance of either recently resolved  obstruction or infection.  2. Abnormal appearance of the urinary bladder with bladder  wall  thickening and prostate enlargement. Tumor cannot be excluded.  3. Cirrhotic appearing, fatty infiltrated liver.  4. Cholelithiasis.  5. Severe atherosclerosis.  6. Cardiomegaly.    JANET BRICE MD   US Abdomen Limited    Narrative    RIGHT UPPER QUADRANT ULTRASOUND 12/17/2019 4:55 PM    HISTORY:  Elevated bilirubin, liver.    COMPARISON: None.    FINDINGS:    Gallbladder: There are numerous shadowing gallstones. Small amount of  free fluid adjacent to the gallbladder. No gallbladder wall thickening       Bile ducts:   CHD is normal diameter.  No intrahepatic biliary  dilatation.    Liver:  The liver is enlarged, measuring 20.8 cm in length. There is  diffuse increased hepatic echogenicity and nodularity is noted of the  liver surface.     Pancreas: Normal.     Right kidney:  Mild to moderate hydronephrosis and proximal  hydroureter. No renal stones demonstrated.       Impression    IMPRESSION:    1. Enlarged, fatty infiltrated, possibly cirrhotic liver.  2. Trace fluid adjacent to the gallbladder is likely related to liver  disease.  3. Mild to moderate right hydronephrosis.    JANET BRICE MD     Most Recent 3 CBC's:  Recent Labs   Lab Test 12/20/19  0748 12/19/19  0839 12/18/19  0738   WBC 8.6 8.6 9.7   HGB 12.2* 12.1* 12.1*   * 103* 103*    129* 126*     Most Recent 3 BMP's:  Recent Labs   Lab Test 12/21/19  1022 12/20/19  0748 12/19/19  0839    134 133   POTASSIUM 3.6 3.7 3.5   CHLORIDE 103 100 96   CO2 29 29 33*   BUN 33* 37* 49*   CR 1.28* 1.27* 1.49*   ANIONGAP 5 5 4   HARSHA 8.1* 7.8* 7.7*   * 126* 131*

## 2019-12-21 NOTE — PLAN OF CARE
DATE & TIME: 12/21/19 1900-0730                          Cognitive Concerns/ Orientation : A&O x4   BEHAVIOR & AGGRESSION TOOL COLOR: Green   CIWA SCORE: NA    ABNL VS/O2: VSS on RA, HR RVR at times but not sustained.   MOBILITY: Ax1 and walker, stiff   PAIN MANAGMENT: Denies   DIET: Reg, 2gm K  BOWEL/BLADDER: Continent, Mauricio, patent. Loose BM.   ABNL LAB/BG: create 1.27, INR 2.03  DRAIN/DEVICES: 2 PIV SL, mauricio for retention  TELEMETRY RHYTHM: Afib CVR with BBB and PVC.   SKIN: Intact, some bruising and [place]. Rash to scrotum and +3 edema, elevated, lasix given  TESTS/PROCEDURES: NA  D/C DAY/GOALS/PLACE: Discharge to TCU today with mauricio   OTHER IMPORTANT INFO: Neuros and CMS are intact.

## 2019-12-21 NOTE — PLAN OF CARE
Physical Therapy Discharge Summary    Reason for therapy discharge:    Discharged to transitional care facility.    Progress towards therapy goal(s). See goals on Care Plan in [facility] electronic health record for goal details.  Goals not met.  Barriers to achieving goals:   discharge from facility.    Therapy recommendation(s):    Continued therapy is recommended.  Rationale/Recommendations:  PT at TCU to progress mobility.

## 2019-12-21 NOTE — PROGRESS NOTES
SW:  D:  Patient discharged today to [facility] into a semi-private room.  Discharge time was coordinated with [facility].  PA approved.  Effective date: 12/21/19  PA reference #: [id number]  Pt. notified: yes

## 2019-12-23 ENCOUNTER — NURSING HOME VISIT (OUTPATIENT)
Dept: GERIATRICS | Facility: CLINIC | Age: 74
End: 2019-12-23
Payer: COMMERCIAL

## 2019-12-23 VITALS
DIASTOLIC BLOOD PRESSURE: 79 MMHG | RESPIRATION RATE: 18 BRPM | OXYGEN SATURATION: 98 % | WEIGHT: 259.6 LBS | BODY MASS INDEX: 36.34 KG/M2 | TEMPERATURE: 96.9 F | SYSTOLIC BLOOD PRESSURE: 126 MMHG | HEART RATE: 69 BPM | HEIGHT: 71 IN

## 2019-12-23 DIAGNOSIS — I42.0 DILATED CARDIOMYOPATHY (H): ICD-10-CM

## 2019-12-23 DIAGNOSIS — Z79.4 TYPE 2 DIABETES MELLITUS WITH DIABETIC NEUROPATHY, WITH LONG-TERM CURRENT USE OF INSULIN (H): Chronic | ICD-10-CM

## 2019-12-23 DIAGNOSIS — N17.9 ACUTE RENAL FAILURE SUPERIMPOSED ON STAGE 3 CHRONIC KIDNEY DISEASE, UNSPECIFIED ACUTE RENAL FAILURE TYPE: ICD-10-CM

## 2019-12-23 DIAGNOSIS — Z79.01 ANTICOAGULATION MANAGEMENT ENCOUNTER: ICD-10-CM

## 2019-12-23 DIAGNOSIS — I48.0 PAROXYSMAL ATRIAL FIBRILLATION (H): Chronic | ICD-10-CM

## 2019-12-23 DIAGNOSIS — G47.33 OSA (OBSTRUCTIVE SLEEP APNEA): Chronic | ICD-10-CM

## 2019-12-23 DIAGNOSIS — N18.3 ACUTE RENAL FAILURE SUPERIMPOSED ON STAGE 3 CHRONIC KIDNEY DISEASE, UNSPECIFIED ACUTE RENAL FAILURE TYPE: ICD-10-CM

## 2019-12-23 DIAGNOSIS — E11.40 TYPE 2 DIABETES MELLITUS WITH DIABETIC NEUROPATHY, WITH LONG-TERM CURRENT USE OF INSULIN (H): Chronic | ICD-10-CM

## 2019-12-23 DIAGNOSIS — Z51.81 ANTICOAGULATION MANAGEMENT ENCOUNTER: ICD-10-CM

## 2019-12-23 DIAGNOSIS — I25.10 CORONARY ARTERY DISEASE INVOLVING NATIVE CORONARY ARTERY OF NATIVE HEART WITHOUT ANGINA PECTORIS: Chronic | ICD-10-CM

## 2019-12-23 DIAGNOSIS — Z95.820 S/P ANGIOPLASTY WITH STENT: Chronic | ICD-10-CM

## 2019-12-23 DIAGNOSIS — N13.9 OBSTRUCTIVE UROPATHY: ICD-10-CM

## 2019-12-23 DIAGNOSIS — N10 ACUTE PYELONEPHRITIS: Primary | ICD-10-CM

## 2019-12-23 DIAGNOSIS — E66.01 MORBID OBESITY (H): ICD-10-CM

## 2019-12-23 DIAGNOSIS — I42.0 DILATED CARDIOMYOPATHY (H): Chronic | ICD-10-CM

## 2019-12-23 DIAGNOSIS — I10 ESSENTIAL HYPERTENSION: Chronic | ICD-10-CM

## 2019-12-23 DIAGNOSIS — R53.81 PHYSICAL DECONDITIONING: ICD-10-CM

## 2019-12-23 DIAGNOSIS — Z71.89 ADVANCED DIRECTIVES, COUNSELING/DISCUSSION: ICD-10-CM

## 2019-12-23 LAB — INR PPP: 3.3 (ref 0.9–1.1)

## 2019-12-23 PROCEDURE — 99310 SBSQ NF CARE HIGH MDM 45: CPT | Performed by: NURSE PRACTITIONER

## 2019-12-23 RX ORDER — FUROSEMIDE 20 MG
20 TABLET ORAL DAILY
Qty: 90 TABLET | Refills: 3 | Status: ON HOLD
Start: 2019-12-23 | End: 2020-05-09

## 2019-12-23 ASSESSMENT — MIFFLIN-ST. JEOR: SCORE: 1944.67

## 2019-12-23 NOTE — PROGRESS NOTES
[facility]  PRIMARY CARE PROVIDER AND CLINIC:  Digna Jones MD, [id number]  / [place] [address]  Chief Complaint   Patient presents with     Hospital F/U     [facility] Record Number:  [id number]  Place of Service where encounter took place:  [facility] [[id number]]    Jt Montgomery  is a 74 year old  (1945), admitted to the above facility from  [facility]. Hospital stay 12/17/19 through 12/21/19..  Admitted to this facility for  rehab, medical management and nursing care.    HPI:    HPI information obtained from: facility chart records, facility staff, patient report and [facility] chart review.   Brief Summary of Hospital Course: recent hospitalization due to hypotension. PMH includes DM2, atrial fibrillation - anticoagulated on coumadin, CKD3, CAD, hypertension, TOMMY- on CPAP, gout, obesity. Patient was at PCP clinic on 12/17 and found to have BP of 89/49 as well as urinary retention. Work up in ED revealed BPs 80-90s/40-50s, CT of abdomen/pelvis showed marked bilateral perinephric infiltration, abnormal appearance of bladder. Lab work showed Na 126, K 5.4, BUN 80, creat 4.31, GFR 13. WBC 14.2 Lactic acid 2.4, bilirubin 6.5. he was admitted to hospital for sepsis due to acute pyelonephritis with acute on chronic renal failure due to obstructive uropathy. He was treated with IVF and zosyn. Acsh catheter was placed. Nephrotoxic meds held.   Urology was consulted, who suspects BPH- bladder outlet obstruction contributing to UTI pyelonephritis. He was started on tamsulosin 0.4mg and finasteride 5mg.   Blood cultures grew out GNB and UC showing e coli.   On 12/18 his -130 likely due to metoprolol and cardiezem on hold due to hypotension.   Once patient stable he was transferred to TCU for strengthening.      Updates on Status Since Skilled nursing Admission: patient denies shortness of breath or chest pain. He does report  some scrotal swelling and LE swelling. He has questions about the new meds for his bladder. He does not know who he is supposed to follow up with for urology.     CODE STATUS/ADVANCE DIRECTIVES DISCUSSION:   Full code- discussed today.   Patient's living condition: lives alone in [place] [facility].   ALLERGIES: Corn dextrin  PAST MEDICAL HISTORY:  has a past medical history of Atrial fibrillation (H), CAD (coronary artery disease), Central Sleep Apnea with Pat Villanueva breathing (9/14/2010), CKD (chronic kidney disease) stage 3, GFR 30-59 ml/min (H), Dilated cardiomyopathy (H), DM2 (diabetes mellitus, type 2) (H), Gout, Hypertension, and Pulmonary hypertension (H).  PAST SURGICAL HISTORY:   has a past surgical history that includes hernia repair (11/20/10); Suspend hyoid, genioglossal advancement; and Coronary Angiography Adult Order (2011).  FAMILY HISTORY: family history includes Cerebrovascular Disease in his sister; Coronary Artery Disease in his father and mother; Diabetes in his sister; Hypertension in his father and mother.  SOCIAL HISTORY:   reports that he quit smoking about 48 years ago. His smoking use included cigarettes. He has a 10.50 pack-year smoking history. He has never used smokeless tobacco. He reports that he does not drink alcohol or use drugs.    Post Discharge Medication Reconciliation Status: discharge medications reconciled, continue medications without change    Current Outpatient Medications   Medication Sig Dispense Refill     aspirin EC 81 MG EC tablet Take 1 tablet by mouth daily. 90 tablet 1     ciprofloxacin (CIPRO) 500 MG tablet Take 1 tablet (500 mg) by mouth 2 times daily for 10 days 20 tablet 0     Coenzyme Q10 (COQ10) 100 MG CAPS Take 200 mg by mouth daily        Continuous Blood Gluc Sensor (FREESTYLE RINKU 14 DAY SENSOR) MISC 1 Device every 14 days 2 each 11     digoxin (LANOXIN) 125 MCG tablet Take 1 tablet (125 mcg) by mouth daily 90 tablet 3     diltiazem ER COATED BEADS  (CARTIA XT) 300 MG 24 hr capsule Take 1 capsule (300 mg) by mouth daily 90 capsule 3     febuxostat (ULORIC) 40 MG TABS Take 40 mg by mouth daily       finasteride (PROSCAR) 5 MG tablet Take 1 tablet (5 mg) by mouth daily 30 tablet 0     furosemide (LASIX) 20 MG tablet Take 1 tablet (20 mg) by mouth daily as needed (edema) 90 tablet 3     insulin aspart (NOVOLOG FLEXPEN) 100 UNIT/ML pen Inject 15 Units Subcutaneous 3 times daily (with meals) 30 mL 11     insulin glargine (LANTUS SOLOSTAR) 100 UNIT/ML pen ADMINISTER 18 UNITS UNDER THE SKIN DAILY (Patient taking differently: Inject 18 Units Subcutaneous At Bedtime ADMINISTER 18 UNITS UNDER THE SKIN DAILY) 30 mL 11     insulin pen needle (BD GERMÁN U/F) 32G X 4 MM miscellaneous USING FOUR TIMES DAILY OR AS DIRECTED 200 each 0     irbesartan (AVAPRO) 300 MG tablet Take 0.5 tablets (150 mg) by mouth At Bedtime 90 tablet 2     magnesium oxide 200 MG TABS Take 2 tablets by mouth At Bedtime        MELATONIN PO Take 0.5 mg by mouth At Bedtime        metoprolol succinate ER (TOPROL-XL) 50 MG 24 hr tablet Take 1 tablet (50 mg) by mouth 2 times daily 180 tablet 3     order for DME Equipment being ordered: Compression stockings 1 each 3     predniSONE (DELTASONE) 20 MG tablet TAKE 1 TABLET(20 MG) BY MOUTH DAILY AS NEEDED FOR GOUT FLARE 10 tablet 0     Probiotic Product (PROBIOTIC & ACIDOPHILUS EX ST PO) Take 1 capsule by mouth daily       spironolactone (ALDACTONE) 25 MG tablet Take 0.5 tablets (12.5 mg) by mouth daily 90 tablet 3     tamsulosin (FLOMAX) 0.4 MG capsule Take 1 capsule (0.4 mg) by mouth every evening 30 capsule 0     Taurine 500 MG CAPS Take 1 capsule by mouth as needed        UNABLE TO FIND MEDICATION NAME: Udos Oil - combination of omega 3, 6, 9.  Taking 2-3 capsules daily       UNABLE TO FIND MEDICATION NAME: Super Beet Juice       vitamin D3 (CHOLECALCIFEROL) 2000 units tablet Take 2 tablets by mouth daily        warfarin ANTICOAGULANT (COUMADIN) 5 MG tablet  "Take 1 1/2 tabs on Sundays, Tuesdays and Thursdays and take 1 tab on all other days or as directed by INR clinic (Patient taking differently: Take by mouth See Admin Instructions Take 1 1/2 tabs on Sundays, Tuesdays and Thursdays and take 1 tab on all other days or as directed by INR clinic) 110 tablet 1     Green Tea, Camillia sinensis, (GREEN TEA EXTRACT PO) Take by mouth 2 times daily Doesn't always take regularly       HERBALS Nerve by Plexis 2 a day,   Healthy Feet and Nerves  By [employer]  3 a day, Heart Food Caps  (cayenna pepper, garlic, hawthorn, onion & karla 1-2 a day), Super Red Drink Powder daily, garlic daily       multivitamin, therapeutic with minerals (MULTI-VITAMIN) TABS tablet Take 4-8 tablets by mouth daily Life Extenstion       ROS:  4 point ROS including Respiratory, CV, GI and , other than that noted in the HPI,  is negative    Vitals:  /79   Pulse 69   Temp 96.9  F (36.1  C)   Resp 18   Ht 1.803 m (5' 11\")   Wt 117.8 kg (259 lb 9.6 oz)   SpO2 98%   BMI 36.21 kg/m    Exam:  GENERAL APPEARANCE:  Alert, in no distress  ENT:  Mouth and posterior oropharynx normal, moist mucous membranes, hearing acuity adequate   EYES:  EOM, conjunctivae, lids, pupils and irises normal    RESP:  respiratory effort and palpation of chest normal, no respiratory distress, Lung sounds clear  CV:  Palpation and auscultation of heart done , rate and rhythm irreg, no murmur, no rub or gallop, Edema 1+, scrotal swelling  ABDOMEN:  normal bowel sounds, soft, nontender, no hepatosplenomegaly or other masses  M/S:   Gait and station not observed. Able to stand up without issue, Digits and nails normal   SKIN:  Inspection/Palpation of skin and subcutaneous tissue scrotum is red  NEURO: 2-12 in normal limits and at patient's baseline  PSYCH:  insight and judgement, memory intact , affect and mood normal    Lab/Diagnostic data:  CBC RESULTS:   Recent Labs   Lab Test 12/20/19  0748 12/19/19  0839   WBC 8.6 8.6 "   RBC 3.63* 3.50*   HGB 12.2* 12.1*   HCT 37.0* 36.0*   * 103*   MCH 33.6* 34.6*   MCHC 33.0 33.6   RDW 13.5 13.9    129*       Last Basic Metabolic Panel:  Recent Labs   Lab Test 12/21/19  1022 12/20/19  0748    134   POTASSIUM 3.6 3.7   CHLORIDE 103 100   HARSHA 8.1* 7.8*   CO2 29 29   BUN 33* 37*   CR 1.28* 1.27*   * 126*       Liver Function Studies -   Recent Labs   Lab Test 12/19/19  0839 12/18/19  0738  12/17/19  1151   PROTTOTAL  --  6.1*  --  6.8   ALBUMIN 2.1* 2.2*   < > 2.9*   BILITOTAL  --  3.9*  --  6.5*   ALKPHOS  --  60  --  89   AST  --  33  --  37   ALT  --  26  --  28    < > = values in this interval not displayed.       TSH   Date Value Ref Range Status   10/08/2019 1.94 0.40 - 4.00 mU/L Final   04/29/2016 2.35 0.40 - 4.00 mU/L Final   ]    Lab Results   Component Value Date    A1C 6.2 11/14/2019    A1C 7.6 10/08/2019           ASSESSMENT/PLAN:  (N10) Acute pyelonephritis  (primary encounter diagnosis)  (N13.9) Obstructive uropathy  (N17.9,  N18.3) Acute renal failure superimposed on stage 3 chronic kidney disease, unspecified acute renal failure type (H)  Comment: recent hospitalization due to hypotension and inability to urinate. He was found to have creat 4.31, WBC 14.3, blood and urine cultures grew out e coli. Imaging of kidneys suggested obstruction. Patient was treated with mauricio catheter, IV antibiotics and IVF. Urology started him on flomax and proscar. Nephrotoxic meds were held until values improved. Baseline creat 1.19. follows with Dr Walker.   Plan: continue mauricio catheter until follow up with urology  Scheduled urology follow up with [facility] urology- Dr Pitt for next week if possible  Continue flomax and proscar as ordered.   BMP in am    (E11.40,  Z79.4) Type 2 diabetes mellitus with diabetic neuropathy, with long-term current use of insulin (H)  Comment: he is back on PTA insulin. BG running 120-130 here.   Plan:  FBG TID   lantus 18u subcutaneous q  hs  Novolog 15u TID with meals      (I42.0) Dilated cardiomyopathy, nonischemic EF 30-40% in March 2013 then EF 50-55% in 2016.   Comment: followed by Dr Gomez. Patient has scrotal edema and 1+ LE edema today. He says he has Rx for lasix 20mg that he takes when he feels like he needs to. He is also taking spironolactone 12.5mg q day  Plan: daily wts  Elevate scrotum with towel  Schedule lasix 20mg q day  Continue spironolactone 12.5mg q day  Continue metoprolol 50mg ER 50mg BID  BMP in am  Follow up with cardiology as needed    (I48.0) Paroxysmal atrial fibrillation (H)  (Z51.81,  Z79.01) Anticoagulation management encounter  Comment: patient did have bout of AF wth RVR in hospital while rate control meds on hold. These meds have been restarted. HR running 70s in TCU.  INR today 3.3. INR goal 2-3. Home dose of coumadin 7.5mg TTSu, 5mg ROW  Plan: coumadin 2.5mg tonight  INR in am  Continue digoxin 125mcg q day  Continue metoprolol XR 50mg BID  continue diltiazem 300mg q day  Monitor HR    (I25.10) Coronary artery disease involving native coronary artery of native heart without angina pectoris  (Z95.820) S/p angioplasty with stent w/ RONEL in distal RCA  Comment: lexiscan in 2018 did not show ischemia. He has declined statin therapy in the past. no recent issues with CP. He continues on ASA 81mg q day  Plan: continue with current meds    (I10) Essential hypertension  Comment: BPs in TCU running 126/79, 132/72, 128/79  Plan: irbesartan 150mg q hs  In addition to meds as above    (E66.01) Morbid obesity (H)  Comment: Body mass index is 36.21 kg/m . patient is making an effort to eat a healthier diet with less meat.   Plan: supportive care    (G47.33) TOMMY (obstructive sleep apnea)  Comment: uses CPAP  Plan: continue current plan    (R53.81) Physical deconditioning  Comment: lives alone in condominium.  Has been independent.  Does not feel strong enough to be alone yet.   Plan: physical therapy and OCCUPATIONAL THERAPY      Orders written by provider at facility  Nystatin cream to scrotum BID    Total time spent with patient visit at the [facility] nursing facility was 38 minutes including patient visit and review of past records. Greater than 50% of total time spent with counseling and coordinating care due to unresolved medical issues including elevated creatinine, ongoing edema and need to schedule urology follow up.     Electronically signed by:  RADHA Langford CNP

## 2019-12-23 NOTE — PROGRESS NOTES
[facility]  INITIAL VISIT NOTE  December 24, 2019    PRIMARY CARE PROVIDER AND CLINIC:  Digna Jones [address]  / [place] [address]    Chief Complaint   Patient presents with     Hospital F/U       HPI:    Jt Montgomery is a 74 year old  (1945) male who was seen at [facility] on December [facility], 2019 for an initial visit. Medical history is notable for sleep apnea, coronary artery disease, chronic atrial fibrillation, dilated cardiomyopathy, hypertension, dyslipidemia, chronic kidney disease, and gout.      He was hospitalized at [facility] from December 17 through December 21, 2019 after presenting with lower abdominal pain and hypotension.  The patient was initially septic with hypotension, tachycardia, leukocytosis, and elevated lactic acid of 2.8.  Creatinine was elevated at 4.31.  Urinalysis revealed large leukocyte estrace, more than 182 WBCs, and moderate bacteria.  CT abdomen/pelvis demonstrated marked bilateral perinephric infiltration with prominent collecting systems with ureters having the appearance of either recently resolved obstruction or infection.  There was also bladder wall thickening and prostate enlargement, cirrhotic appearing fatty infiltrated liver, cholelithiasis, and severe atherosclerosis.  Abdominal ultrasound showed mild to moderate right hydronephrosis and enlarged fatty infiltrated, possibly cirrhotic liver.    He was started empirically on intravenous Zosyn and resuscitated with IV fluids.  Both blood and urine cultures grew E. coli which was resistant to ampicillin, Augmentin, cefazolin, cefoxitin, and ceftriaxone.  He was noted to have urine retention and therefore a Cash catheter was placed.  Patient was seen by urology and Proscar and Flomax were started in the hospital.  Renal function improved and his creatinine was down to 1.28 upon discharge from hospital.  He was discharged on oral ciprofloxacin 500 mg  p.o. twice daily for 10 days.    Patient is admitted to this facility for medical management, nursing care, and rehab.     Patient was seen today in his room, while sitting in a chair.  Patient continues to have a Cash catheter.  He is doing well with rehab.  He denies fever, chills, chest pain, palpitation, nausea, vomiting, or diarrhea.  He complains of swelling of his scrotum.    CODE STATUS:   CPR/Full code     PAST MEDICAL HISTORY:   Coronary artery disease, status post drug-eluting stent to distal circumflex in 04/2011.  Most recent nuclear stress test in May 2018 showed no ischemia.  History of dilated/nonischemic cardiomyopathy.  Last LV ejection fraction was 50-55% in November 2016  Chronic atrial fibrillation, on warfarin  Diabetes mellitus type 2 with neuropathy  Hypertension  Dyslipidemia  Obstructive sleep apnea/central sleep apnea, using CPAP  Chronic kidney disease, stage III, baseline creatinine 1.3-1.6.  Facial cellulitis  Gout  BPH with urinary retention  E. coli pyelonephritis as outlined in HPI  Morbid obesity, BMI 36.21  Cirrhotic appearing fatty liver  History of alcoholism; sober since 1973      PAST SURGICAL HISTORY:   Past Surgical History:   Procedure Laterality Date     CORONARY ANGIOGRAPHY ADULT ORDER  2011    distal circ-RONEL     HERNIA REPAIR  11/20/10    incarcinated     SUSPEND HYOID, GENIOGLOSSAL ADVANCEMENT         FAMILY HISTORY:   Family History   Problem Relation Age of Onset     Hypertension Mother      Coronary Artery Disease Mother      Coronary Artery Disease Father      Hypertension Father      Diabetes Sister      Cerebrovascular Disease Sister        SOCIAL HISTORY:  Patient is  and lives [facility] a [facility].  He normally does not use any assistive device for ambulation.    Social History     Tobacco Use     Smoking status: Former Smoker     Packs/day: 1.50     Years: 7.00     Pack years: 10.50     Types: Cigarettes     Last attempt to quit: 1/1/1972     Years since  quittin.0     Smokeless tobacco: Never Used     Tobacco comment: quit in       Started at around age 18-19   Substance Use Topics     Alcohol use: No     Alcohol/week: 0.0 standard drinks     Comment: 74   recovering       MEDICATIONS:  Current Outpatient Medications   Medication Sig Dispense Refill     aspirin EC 81 MG EC tablet Take 1 tablet by mouth daily. 90 tablet 1     ciprofloxacin (CIPRO) 500 MG tablet Take 1 tablet (500 mg) by mouth 2 times daily for 10 days 20 tablet 0     Coenzyme Q10 (COQ10) 100 MG CAPS Take 200 mg by mouth daily        Continuous Blood Gluc Sensor (FREESTYLE RINKU 14 DAY SENSOR) MISC 1 Device every 14 days 2 each 11     digoxin (LANOXIN) 125 MCG tablet Take 1 tablet (125 mcg) by mouth daily 90 tablet 3     diltiazem ER COATED BEADS (CARTIA XT) 300 MG 24 hr capsule Take 1 capsule (300 mg) by mouth daily 90 capsule 3     febuxostat (ULORIC) 40 MG TABS Take 40 mg by mouth daily       finasteride (PROSCAR) 5 MG tablet Take 1 tablet (5 mg) by mouth daily 30 tablet 0     furosemide (LASIX) 20 MG tablet Take 1 tablet (20 mg) by mouth daily 90 tablet 3     Green Tea, Camillia sinensis, (GREEN TEA EXTRACT PO) Take by mouth 2 times daily Doesn't always take regularly       HERBALS Nerve by Plexis 2 a day,   Healthy Feet and Nerves  By [employer]  3 a day, Heart Food Caps  (cayenna pepper, garlic, hawthorn, onion & karla 1-2 a day), Super Red Drink Powder daily, garlic daily       insulin aspart (NOVOLOG FLEXPEN) 100 UNIT/ML pen Inject 15 Units Subcutaneous 3 times daily (with meals) 30 mL 11     insulin glargine (LANTUS SOLOSTAR) 100 UNIT/ML pen ADMINISTER 18 UNITS UNDER THE SKIN DAILY (Patient taking differently: Inject 18 Units Subcutaneous At Bedtime ADMINISTER 18 UNITS UNDER THE SKIN DAILY) 30 mL 11     insulin pen needle (BD GERÁMN U/F) 32G X 4 MM miscellaneous USING FOUR TIMES DAILY OR AS DIRECTED 200 each 0     irbesartan (AVAPRO) 300 MG tablet Take 0.5 tablets (150 mg) by  mouth At Bedtime 90 tablet 2     magnesium oxide 200 MG TABS Take 2 tablets by mouth At Bedtime        MELATONIN PO Take 0.5 mg by mouth At Bedtime        metoprolol succinate ER (TOPROL-XL) 50 MG 24 hr tablet Take 1 tablet (50 mg) by mouth 2 times daily 180 tablet 3     multivitamin, therapeutic with minerals (MULTI-VITAMIN) TABS tablet Take 4-8 tablets by mouth daily Life Extenstion       order for DME Equipment being ordered: Compression stockings 1 each 3     predniSONE (DELTASONE) 20 MG tablet TAKE 1 TABLET(20 MG) BY MOUTH DAILY AS NEEDED FOR GOUT FLARE 10 tablet 0     Probiotic Product (PROBIOTIC & ACIDOPHILUS EX ST PO) Take 1 capsule by mouth daily       spironolactone (ALDACTONE) 25 MG tablet Take 0.5 tablets (12.5 mg) by mouth daily 90 tablet 3     tamsulosin (FLOMAX) 0.4 MG capsule Take 1 capsule (0.4 mg) by mouth every evening 30 capsule 0     Taurine 500 MG CAPS Take 1 capsule by mouth as needed        UNABLE TO FIND MEDICATION NAME: Udos Oil - combination of omega 3, 6, 9.  Taking 2-3 capsules daily       UNABLE TO FIND MEDICATION NAME: Super Beet Juice       vitamin D3 (CHOLECALCIFEROL) 2000 units tablet Take 2 tablets by mouth daily        warfarin ANTICOAGULANT (COUMADIN) 5 MG tablet Take 1 1/2 tabs on Sundays, Tuesdays and Thursdays and take 1 tab on all other days or as directed by INR clinic (Patient taking differently: Take by mouth See Admin Instructions Take 1 1/2 tabs on Sundays, Tuesdays and Thursdays and take 1 tab on all other days or as directed by INR clinic) 110 tablet 1       ALLERGIES:  Allergies   Allergen Reactions     Corn Dextrin      All corn products - gets tired an ornery       ROS:  10 point ROS neg other than the symptoms noted above in the HPI.    PHYSICAL EXAM:  Vital signs: Blood pressure 137/78, heart rate 68, respiratory rate 18, temperature 96.9  F, O2 saturation 96%  Gen: Cooperative and in no acute distress  HEENT: Normocephalic; oropharynx clear  Card: Normal S1, S2,  irregularly irregular rhythm, normal rate  Resp: Lungs clear to auscultation bilaterally  GI: Abdomen soft, non-tender, non-distended, +BS  Ext: 1+ B/L LE edema  Neuro: CX II-XII grossly intact; ROM in all four extremities grossly in tact  Psych: Alert and oriented x3; normal affect  Skin: No acute rash    LABORATORY/IMAGING DATA:  Lab Results   Component Value Date    WBC 8.6 12/20/2019     Lab Results   Component Value Date    RBC 3.63 12/20/2019     Lab Results   Component Value Date    HGB 12.2 12/20/2019     Lab Results   Component Value Date    HCT 37.0 12/20/2019     Lab Results   Component Value Date     12/20/2019     Lab Results   Component Value Date    MCH 33.6 12/20/2019     Lab Results   Component Value Date    MCHC 33.0 12/20/2019     Lab Results   Component Value Date    RDW 13.5 12/20/2019     Lab Results   Component Value Date     12/20/2019     Last Comprehensive Metabolic Panel:  Sodium   Date Value Ref Range Status   12/21/2019 137 133 - 144 mmol/L Final     Potassium   Date Value Ref Range Status   12/21/2019 3.6 3.4 - 5.3 mmol/L Final     Chloride   Date Value Ref Range Status   12/21/2019 103 94 - 109 mmol/L Final     Carbon Dioxide   Date Value Ref Range Status   12/21/2019 29 20 - 32 mmol/L Final     Anion Gap   Date Value Ref Range Status   12/21/2019 5 3 - 14 mmol/L Final     Glucose   Date Value Ref Range Status   12/21/2019 162 (H) 70 - 99 mg/dL Final     Urea Nitrogen   Date Value Ref Range Status   12/21/2019 33 (H) 7 - 30 mg/dL Final     Creatinine   Date Value Ref Range Status   12/21/2019 1.28 (H) 0.66 - 1.25 mg/dL Final     GFR Estimate   Date Value Ref Range Status   12/21/2019 55 (L) >60 mL/min/[1.73_m2] Final     Comment:     Non  GFR Calc  Starting 12/18/2018, serum creatinine based estimated GFR (eGFR) will be   calculated using the Chronic Kidney Disease Epidemiology Collaboration   (CKD-EPI) equation.       Calcium   Date Value Ref Range Status    12/21/2019 8.1 (L) 8.5 - 10.1 mg/dL Final         ASSESSMENT/PLAN:  Acute pyelonephritis,  E. coli UTI with sepsis,  BPH with obstructive uropathy,  Acute kidney injury, superimposed on a CKD stage III.  Baseline creatinine is 1.3-1.6.  He presented with creatinine of 4.31.  Renal imaging suggested obstruction.    He was treated empirically with intravenous Zosyn for acute pyonephritis.  Blood and urine cultures grew E. coli.  Obstructive uropathy was managed with Cash catheter, Flomax, and Proscar.  He was discharged on 10 days of oral ciprofloxacin.  Plan:  Continue ciprofloxacin 500 mg p.o. twice daily through December 31, 2019  Continue Flomax 0.4 mg p.o. daily and Proscar 5 mg p.o. daily  Continue Cash catheter  Monitor fever curve, renal function, electrolytes  Avoid NSAIDs or other nephrotoxins  Follow-up with Dr. Pitt of Urology next week    Bilateral lower extremity and scrotal swelling.  Likely due to aggressive IV fluid therapy in the hospital for sepsis.  Plan:  Instructed the patient to keep his legs elevated  Consider one time furosemide if edema persists    Diabetes mellitus type 2 with diabetic neuropathy.  Most recent hemoglobin A1c was 6.2% on November 14, 2019.  Blood glucose levels are currently 100-150 mg/dL.  Plan:  Continue NovoLog 15 units subcu 3 times daily  Continue Lantus 18 units subcu daily  Continue to monitor blood glucose     Coronary artery disease, status post RONEL to circumflex in April 2011,  Chronic atrial fibrillation,  History of dilated cardiomyopathy (improved, last LV ejection fraction 50-55% in 2016),  Hypertension, benign essential.  Blood pressure is currently controlled.  The patient appears to be euvolemic.  In the hospital the patient had rapid ventricular response due to sepsis which eventually resolved.  Plan:  Continue PTA diltiazem  mg p.o. daily, metoprolol XL 50 mg p.o. twice daily, Avapro 150 mg p.o. at bedtime, digoxin 125 mcg p.o. daily, and aspirin  81 mg p.o. daily  Continue PTA furosemide 20 mg p.o. daily and spironolactone 12.5 mg p.o. daily  Continue chronic anticoagulant therapy with warfarin for INR target of 2-3  Monitor blood pressure, heart rate, and volume status    Gout.  Plan:  Continue PTA Uloric 40 mg p.o. daily    Obstructive sleep apnea,  Central sleep apnea,  Morbid obesity, BMI 36.21.  Plan:  Continue CPAP at night    Cirrhotic appearing fatty liver.  Noted on abdominal imaging.  He has a history of alcoholism but has been sober since 1973.  Plan:  Follow up with PCP for further work-up and optimal management (this was discussed with the patient at extent)    Physical deconditioning.  Plan:  Continue PT/OT evaluation and therapy        Electronically signed by:  Nila Doran MD

## 2019-12-23 NOTE — LETTER
12/23/2019        RE: Jt Montgomery  [address]  [facility] [address]        [facility]  PRIMARY CARE PROVIDER AND CLINIC:  Digna Jones MD, [address]  / [place] [address]  Chief Complaint   Patient presents with     Hospital F/U     [facility] Record Number:  [id number]  Place of Service where encounter took place:  [facility] [[id number]]    Jt Montgomery  is a 74 year old  (1945), admitted to the above facility from  [facility]. Hospital stay 12/17/19 through 12/21/19..  Admitted to this facility for  rehab, medical management and nursing care.    HPI:    HPI information obtained from: facility chart records, facility staff, patient report and [facility] chart review.   Brief Summary of Hospital Course: recent hospitalization due to hypotension. PMH includes DM2, atrial fibrillation - anticoagulated on coumadin, CKD3, CAD, hypertension, TOMMY- on CPAP, gout, obesity. Patient was at PCP clinic on 12/17 and found to have BP of 89/49 as well as urinary retention. Work up in ED revealed BPs 80-90s/40-50s, CT of abdomen/pelvis showed marked bilateral perinephric infiltration, abnormal appearance of bladder. Lab work showed Na 126, K 5.4, BUN 80, creat 4.31, GFR 13. WBC 14.2 Lactic acid 2.4, bilirubin 6.5. he was admitted to hospital for sepsis due to acute pyelonephritis with acute on chronic renal failure due to obstructive uropathy. He was treated with IVF and zosyn. Cash catheter was placed. Nephrotoxic meds held.   Urology was consulted, who suspects BPH- bladder outlet obstruction contributing to UTI pyelonephritis. He was started on tamsulosin 0.4mg and finasteride 5mg.   Blood cultures grew out GNB and UC showing e coli.   On 12/18 his -130 likely due to metoprolol and cardiezem on hold due to hypotension.   Once patient stable he was transferred to TCU for strengthening.       Updates on Status Since Skilled nursing Admission: patient denies shortness of breath or chest pain. He does report some scrotal swelling and LE swelling. He has questions about the new meds for his bladder. He does not know who he is supposed to follow up with for urology.     CODE STATUS/ADVANCE DIRECTIVES DISCUSSION:   Full code- discussed today.   Patient's living condition: lives alone in [facility].   ALLERGIES: Corn dextrin  PAST MEDICAL HISTORY:  has a past medical history of Atrial fibrillation (H), CAD (coronary artery disease), Central Sleep Apnea with Pat Villanueva breathing (9/14/2010), CKD (chronic kidney disease) stage 3, GFR 30-59 ml/min (H), Dilated cardiomyopathy (H), DM2 (diabetes mellitus, type 2) (H), Gout, Hypertension, and Pulmonary hypertension (H).  PAST SURGICAL HISTORY:   has a past surgical history that includes hernia repair (11/20/10); Suspend hyoid, genioglossal advancement; and Coronary Angiography Adult Order (2011).  FAMILY HISTORY: family history includes Cerebrovascular Disease in his sister; Coronary Artery Disease in his father and mother; Diabetes in his sister; Hypertension in his father and mother.  SOCIAL HISTORY:   reports that he quit smoking about 48 years ago. His smoking use included cigarettes. He has a 10.50 pack-year smoking history. He has never used smokeless tobacco. He reports that he does not drink alcohol or use drugs.    Post Discharge Medication Reconciliation Status: discharge medications reconciled, continue medications without change    Current Outpatient Medications   Medication Sig Dispense Refill     aspirin EC 81 MG EC tablet Take 1 tablet by mouth daily. 90 tablet 1     ciprofloxacin (CIPRO) 500 MG tablet Take 1 tablet (500 mg) by mouth 2 times daily for 10 days 20 tablet 0     Coenzyme Q10 (COQ10) 100 MG CAPS Take 200 mg by mouth daily        Continuous Blood Gluc Sensor (FREESTYLE RINKU 14 DAY SENSOR) MISC 1 Device every 14 days 2 each 11      digoxin (LANOXIN) 125 MCG tablet Take 1 tablet (125 mcg) by mouth daily 90 tablet 3     diltiazem ER COATED BEADS (CARTIA XT) 300 MG 24 hr capsule Take 1 capsule (300 mg) by mouth daily 90 capsule 3     febuxostat (ULORIC) 40 MG TABS Take 40 mg by mouth daily       finasteride (PROSCAR) 5 MG tablet Take 1 tablet (5 mg) by mouth daily 30 tablet 0     furosemide (LASIX) 20 MG tablet Take 1 tablet (20 mg) by mouth daily as needed (edema) 90 tablet 3     insulin aspart (NOVOLOG FLEXPEN) 100 UNIT/ML pen Inject 15 Units Subcutaneous 3 times daily (with meals) 30 mL 11     insulin glargine (LANTUS SOLOSTAR) 100 UNIT/ML pen ADMINISTER 18 UNITS UNDER THE SKIN DAILY (Patient taking differently: Inject 18 Units Subcutaneous At Bedtime ADMINISTER 18 UNITS UNDER THE SKIN DAILY) 30 mL 11     insulin pen needle (BD GERMÁN U/F) 32G X 4 MM miscellaneous USING FOUR TIMES DAILY OR AS DIRECTED 200 each 0     irbesartan (AVAPRO) 300 MG tablet Take 0.5 tablets (150 mg) by mouth At Bedtime 90 tablet 2     magnesium oxide 200 MG TABS Take 2 tablets by mouth At Bedtime        MELATONIN PO Take 0.5 mg by mouth At Bedtime        metoprolol succinate ER (TOPROL-XL) 50 MG 24 hr tablet Take 1 tablet (50 mg) by mouth 2 times daily 180 tablet 3     order for DME Equipment being ordered: Compression stockings 1 each 3     predniSONE (DELTASONE) 20 MG tablet TAKE 1 TABLET(20 MG) BY MOUTH DAILY AS NEEDED FOR GOUT FLARE 10 tablet 0     Probiotic Product (PROBIOTIC & ACIDOPHILUS EX ST PO) Take 1 capsule by mouth daily       spironolactone (ALDACTONE) 25 MG tablet Take 0.5 tablets (12.5 mg) by mouth daily 90 tablet 3     tamsulosin (FLOMAX) 0.4 MG capsule Take 1 capsule (0.4 mg) by mouth every evening 30 capsule 0     Taurine 500 MG CAPS Take 1 capsule by mouth as needed        UNABLE TO FIND MEDICATION NAME: Udos Oil - combination of omega 3, 6, 9.  Taking 2-3 capsules daily       UNABLE TO FIND MEDICATION NAME: Super Beet Juice       vitamin D3  "(CHOLECALCIFEROL) 2000 units tablet Take 2 tablets by mouth daily        warfarin ANTICOAGULANT (COUMADIN) 5 MG tablet Take 1 1/2 tabs on Sundays, Tuesdays and Thursdays and take 1 tab on all other days or as directed by INR clinic (Patient taking differently: Take by mouth See Admin Instructions Take 1 1/2 tabs on Sundays, Tuesdays and Thursdays and take 1 tab on all other days or as directed by INR clinic) 110 tablet 1     Green Tea, Camillia sinensis, (GREEN TEA EXTRACT PO) Take by mouth 2 times daily Doesn't always take regularly       HERBALS Nerve by Plexis 2 a day,   Healthy Feet and Nerves  By [employer]  3 a day, Heart Food Caps  (cayenna pepper, garlic, hawthorn, onion & karla 1-2 a day), Super Red Drink Powder daily, garlic daily       multivitamin, therapeutic with minerals (MULTI-VITAMIN) TABS tablet Take 4-8 tablets by mouth daily Life Extenstion       ROS:  4 point ROS including Respiratory, CV, GI and , other than that noted in the HPI,  is negative    Vitals:  /79   Pulse 69   Temp 96.9  F (36.1  C)   Resp 18   Ht 1.803 m (5' 11\")   Wt 117.8 kg (259 lb 9.6 oz)   SpO2 98%   BMI 36.21 kg/m     Exam:  GENERAL APPEARANCE:  Alert, in no distress  ENT:  Mouth and posterior oropharynx normal, moist mucous membranes, hearing acuity adequate   EYES:  EOM, conjunctivae, lids, pupils and irises normal    RESP:  respiratory effort and palpation of chest normal, no respiratory distress, Lung sounds clear  CV:  Palpation and auscultation of heart done , rate and rhythm irreg, no murmur, no rub or gallop, Edema 1+, scrotal swelling  ABDOMEN:  normal bowel sounds, soft, nontender, no hepatosplenomegaly or other masses  M/S:   Gait and station not observed. Able to stand up without issue, Digits and nails normal   SKIN:  Inspection/Palpation of skin and subcutaneous tissue scrotum is red  NEURO: 2-12 in normal limits and at patient's baseline  PSYCH:  insight and judgement, memory intact , affect and " mood normal    Lab/Diagnostic data:  CBC RESULTS:   Recent Labs   Lab Test 12/20/19  0748 12/19/19  0839   WBC 8.6 8.6   RBC 3.63* 3.50*   HGB 12.2* 12.1*   HCT 37.0* 36.0*   * 103*   MCH 33.6* 34.6*   MCHC 33.0 33.6   RDW 13.5 13.9    129*       Last Basic Metabolic Panel:  Recent Labs   Lab Test 12/21/19  1022 12/20/19  0748    134   POTASSIUM 3.6 3.7   CHLORIDE 103 100   HARSHA 8.1* 7.8*   CO2 29 29   BUN 33* 37*   CR 1.28* 1.27*   * 126*       Liver Function Studies -   Recent Labs   Lab Test 12/19/19  0839 12/18/19  0738  12/17/19  1151   PROTTOTAL  --  6.1*  --  6.8   ALBUMIN 2.1* 2.2*   < > 2.9*   BILITOTAL  --  3.9*  --  6.5*   ALKPHOS  --  60  --  89   AST  --  33  --  37   ALT  --  26  --  28    < > = values in this interval not displayed.       TSH   Date Value Ref Range Status   10/08/2019 1.94 0.40 - 4.00 mU/L Final   04/29/2016 2.35 0.40 - 4.00 mU/L Final   ]    Lab Results   Component Value Date    A1C 6.2 11/14/2019    A1C 7.6 10/08/2019           ASSESSMENT/PLAN:  (N10) Acute pyelonephritis  (primary encounter diagnosis)  (N13.9) Obstructive uropathy  (N17.9,  N18.3) Acute renal failure superimposed on stage 3 chronic kidney disease, unspecified acute renal failure type (H)  Comment: recent hospitalization due to hypotension and inability to urinate. He was found to have creat 4.31, WBC 14.3, blood and urine cultures grew out e coli. Imaging of kidneys suggested obstruction. Patient was treated with mauricio catheter, IV antibiotics and IVF. Urology started him on flomax and proscar. Nephrotoxic meds were held until values improved. Baseline creat 1.19. follows with Dr Walker.   Plan: continue mauricio catheter until follow up with urology  Scheduled urology follow up with [facility] urology- Dr Pitt for next week if possible  Continue flomax and proscar as ordered.   BMP in am    (E11.40,  Z79.4) Type 2 diabetes mellitus with diabetic neuropathy, with long-term current use of insulin  (H)  Comment: he is back on PTA insulin. BG running 120-130 here.   Plan:  FBG TID   lantus 18u subcutaneous q hs  Novolog 15u TID with meals      (I42.0) Dilated cardiomyopathy, nonischemic EF 30-40% in March 2013 then EF 50-55% in 2016.   Comment: followed by Dr Gomez. Patient has scrotal edema and 1+ LE edema today. He says he has Rx for lasix 20mg that he takes when he feels like he needs to. He is also taking spironolactone 12.5mg q day  Plan: daily wts  Elevate scrotum with towel  Schedule lasix 20mg q day  Continue spironolactone 12.5mg q day  Continue metoprolol 50mg ER 50mg BID  BMP in am  Follow up with cardiology as needed    (I48.0) Paroxysmal atrial fibrillation (H)  (Z51.81,  Z79.01) Anticoagulation management encounter  Comment: patient did have bout of AF wth RVR in hospital while rate control meds on hold. These meds have been restarted. HR running 70s in TCU.  INR today 3.3. INR goal 2-3. Home dose of coumadin 7.5mg TTSu, 5mg ROW  Plan: coumadin 2.5mg tonight  INR in am  Continue digoxin 125mcg q day  Continue metoprolol XR 50mg BID  continue diltiazem 300mg q day  Monitor HR    (I25.10) Coronary artery disease involving native coronary artery of native heart without angina pectoris  (Z95.820) S/p angioplasty with stent w/ RONEL in distal RCA  Comment: lexiscan in 2018 did not show ischemia. He has declined statin therapy in the past. no recent issues with CP. He continues on ASA 81mg q day  Plan: continue with current meds    (I10) Essential hypertension  Comment: BPs in TCU running 126/79, 132/72, 128/79  Plan: irbesartan 150mg q hs  In addition to meds as above    (E66.01) Morbid obesity (H)  Comment: Body mass index is 36.21 kg/m . patient is making an effort to eat a healthier diet with less meat.   Plan: supportive care    (G47.33) TOMMY (obstructive sleep apnea)  Comment: uses CPAP  Plan: continue current plan    (R53.81) Physical deconditioning  Comment: [facility].  Has been  independent.  Does not feel strong enough to be alone yet.   Plan: physical therapy and OCCUPATIONAL THERAPY     Orders written by provider at facility  Nystatin cream to scrotum BID    Total time spent with patient visit at the skilled nursing facility was 38 minutes including patient visit and review of past records. Greater than 50% of total time spent with counseling and coordinating care due to unresolved medical issues including elevated creatinine, ongoing edema and need to schedule urology follow up.     Electronically signed by:  RADHA Langford CNP                         Sincerely,        RADHA Langford CNP

## 2019-12-24 ENCOUNTER — HOSPITAL LABORATORY (OUTPATIENT)
Dept: OTHER | Facility: CLINIC | Age: 74
End: 2019-12-24

## 2019-12-24 ENCOUNTER — NURSING HOME VISIT (OUTPATIENT)
Dept: GERIATRICS | Facility: CLINIC | Age: 74
End: 2019-12-24
Payer: COMMERCIAL

## 2019-12-24 DIAGNOSIS — N17.9 ACUTE KIDNEY INJURY (H): ICD-10-CM

## 2019-12-24 DIAGNOSIS — K76.0 FATTY LIVER: ICD-10-CM

## 2019-12-24 DIAGNOSIS — R53.81 PHYSICAL DECONDITIONING: ICD-10-CM

## 2019-12-24 DIAGNOSIS — I25.10 CORONARY ARTERY DISEASE INVOLVING NATIVE CORONARY ARTERY OF NATIVE HEART WITHOUT ANGINA PECTORIS: ICD-10-CM

## 2019-12-24 DIAGNOSIS — N10 ACUTE PYELONEPHRITIS: Primary | ICD-10-CM

## 2019-12-24 DIAGNOSIS — Z79.4 TYPE 2 DIABETES MELLITUS WITH DIABETIC NEUROPATHY, WITH LONG-TERM CURRENT USE OF INSULIN (H): ICD-10-CM

## 2019-12-24 DIAGNOSIS — I42.0 DILATED CARDIOMYOPATHY (H): ICD-10-CM

## 2019-12-24 DIAGNOSIS — R60.0 BILATERAL LEG EDEMA: ICD-10-CM

## 2019-12-24 DIAGNOSIS — I10 ESSENTIAL HYPERTENSION: ICD-10-CM

## 2019-12-24 DIAGNOSIS — I48.20 CHRONIC ATRIAL FIBRILLATION (H): ICD-10-CM

## 2019-12-24 DIAGNOSIS — N13.9 OBSTRUCTIVE UROPATHY: ICD-10-CM

## 2019-12-24 DIAGNOSIS — N18.30 CKD (CHRONIC KIDNEY DISEASE) STAGE 3, GFR 30-59 ML/MIN (H): ICD-10-CM

## 2019-12-24 DIAGNOSIS — G47.33 OSA (OBSTRUCTIVE SLEEP APNEA): ICD-10-CM

## 2019-12-24 DIAGNOSIS — E11.40 TYPE 2 DIABETES MELLITUS WITH DIABETIC NEUROPATHY, WITH LONG-TERM CURRENT USE OF INSULIN (H): ICD-10-CM

## 2019-12-24 DIAGNOSIS — N50.89 SCROTAL EDEMA: ICD-10-CM

## 2019-12-24 DIAGNOSIS — E66.01 MORBID OBESITY (H): ICD-10-CM

## 2019-12-24 LAB
ANION GAP SERPL CALCULATED.3IONS-SCNC: 5 MMOL/L (ref 3–14)
BACTERIA SPEC CULT: NO GROWTH
BACTERIA SPEC CULT: NO GROWTH
BUN SERPL-MCNC: 24 MG/DL (ref 7–30)
CALCIUM SERPL-MCNC: 8.6 MG/DL (ref 8.5–10.1)
CHLORIDE SERPL-SCNC: 110 MMOL/L (ref 94–109)
CO2 SERPL-SCNC: 26 MMOL/L (ref 20–32)
CREAT SERPL-MCNC: 0.99 MG/DL (ref 0.66–1.25)
GFR SERPL CREATININE-BSD FRML MDRD: 75 ML/MIN/{1.73_M2}
GLUCOSE SERPL-MCNC: 104 MG/DL (ref 70–99)
INR PPP: 3.4 (ref 0.9–1.1)
Lab: NORMAL
Lab: NORMAL
POTASSIUM SERPL-SCNC: 3.6 MMOL/L (ref 3.4–5.3)
SODIUM SERPL-SCNC: 141 MMOL/L (ref 133–144)
SPECIMEN SOURCE: NORMAL
SPECIMEN SOURCE: NORMAL

## 2019-12-24 PROCEDURE — 99306 1ST NF CARE HIGH MDM 50: CPT | Performed by: INTERNAL MEDICINE

## 2019-12-24 NOTE — LETTER
12/24/2019        RE: Jt Montgomery  [address]  [facility] [facility] [address]        [place] [facility]  INITIAL VISIT NOTE  December 24, 2019    PRIMARY CARE PROVIDER AND CLINIC:  Digna Jones [address] / [place] [address]    Chief Complaint   Patient presents with     Hospital F/U       HPI:    Jt Montgomery is a 74 year old  (1945) male who [facility] for an initial visit. Medical history is notable for sleep apnea, coronary artery disease, chronic atrial fibrillation, dilated cardiomyopathy, hypertension, dyslipidemia, chronic kidney disease, and gout.      He was hospitalized at [facility] from December 17 through December 21, 2019 after presenting with lower abdominal pain and hypotension.  The patient was initially septic with hypotension, tachycardia, leukocytosis, and elevated lactic acid of 2.8.  Creatinine was elevated at 4.31.  Urinalysis revealed large leukocyte estrace, more than 182 WBCs, and moderate bacteria.  CT abdomen/pelvis demonstrated marked bilateral perinephric infiltration with prominent collecting systems with ureters having the appearance of either recently resolved obstruction or infection.  There was also bladder wall thickening and prostate enlargement, cirrhotic appearing fatty infiltrated liver, cholelithiasis, and severe atherosclerosis.  Abdominal ultrasound showed mild to moderate right hydronephrosis and enlarged fatty infiltrated, possibly cirrhotic liver.    He was started empirically on intravenous Zosyn and resuscitated with IV fluids.  Both blood and urine cultures grew E. coli which was resistant to ampicillin, Augmentin, cefazolin, cefoxitin, and ceftriaxone.  He was noted to have urine retention and therefore a Cash catheter was placed.  Patient was seen by urology and Proscar and Flomax were started in the hospital.  Renal function improved and  his creatinine was down to 1.28 upon discharge from hospital.  He was discharged on oral ciprofloxacin 500 mg p.o. twice daily for 10 days.    Patient is admitted to this facility for medical management, nursing care, and rehab.     Patient was seen today in his room, while sitting in a chair.  Patient continues to have a Cash catheter.  He is doing well with rehab.  He denies fever, chills, chest pain, palpitation, nausea, vomiting, or diarrhea.  He complains of swelling of his scrotum.    CODE STATUS:   CPR/Full code     PAST MEDICAL HISTORY:   Coronary artery disease, status post drug-eluting stent to distal circumflex in 04/2011.  Most recent nuclear stress test in May 2018 showed no ischemia.  History of dilated/nonischemic cardiomyopathy.  Last LV ejection fraction was 50-55% in November 2016  Chronic atrial fibrillation, on warfarin  Diabetes mellitus type 2 with neuropathy  Hypertension  Dyslipidemia  Obstructive sleep apnea/central sleep apnea, using CPAP  Chronic kidney disease, stage III, baseline creatinine 1.3-1.6.  Facial cellulitis  Gout  BPH with urinary retention  E. coli pyelonephritis as outlined in HPI  Morbid obesity, BMI 36.21  Cirrhotic appearing fatty liver  History of alcoholism; sober since 1973      PAST SURGICAL HISTORY:   Past Surgical History:   Procedure Laterality Date     CORONARY ANGIOGRAPHY ADULT ORDER  2011    distal circ-RONEL     HERNIA REPAIR  11/20/10    incarcinated     SUSPEND HYOID, GENIOGLOSSAL ADVANCEMENT         FAMILY HISTORY:   Family History   Problem Relation Age of Onset     Hypertension Mother      Coronary Artery Disease Mother      Coronary Artery Disease Father      Hypertension Father      Diabetes Sister      Cerebrovascular Disease Sister        SOCIAL HISTORY:  Patient is  and lives in a [facility].  He normally does not use any assistive device for ambulation.    Social History     Tobacco Use     Smoking status: Former Smoker     Packs/day: 1.50      Years: 7.00     Pack years: 10.50     Types: Cigarettes     Last attempt to quit: 1972     Years since quittin.0     Smokeless tobacco: Never Used     Tobacco comment: quit in       Started at around age 18-20   Substance Use Topics     Alcohol use: No     Alcohol/week: 0.0 standard drinks     Comment: 74   recovering       MEDICATIONS:  Current Outpatient Medications   Medication Sig Dispense Refill     aspirin EC 81 MG EC tablet Take 1 tablet by mouth daily. 90 tablet 1     ciprofloxacin (CIPRO) 500 MG tablet Take 1 tablet (500 mg) by mouth 2 times daily for 10 days 20 tablet 0     Coenzyme Q10 (COQ10) 100 MG CAPS Take 200 mg by mouth daily        Continuous Blood Gluc Sensor (BGS InternationalSTYLE RINKU 14 DAY SENSOR) MISC 1 Device every 14 days 2 each 11     digoxin (LANOXIN) 125 MCG tablet Take 1 tablet (125 mcg) by mouth daily 90 tablet 3     diltiazem ER COATED BEADS (CARTIA XT) 300 MG 24 hr capsule Take 1 capsule (300 mg) by mouth daily 90 capsule 3     febuxostat (ULORIC) 40 MG TABS Take 40 mg by mouth daily       finasteride (PROSCAR) 5 MG tablet Take 1 tablet (5 mg) by mouth daily 30 tablet 0     furosemide (LASIX) 20 MG tablet Take 1 tablet (20 mg) by mouth daily 90 tablet 3     Green Tea, Camillia sinensis, (GREEN TEA EXTRACT PO) Take by mouth 2 times daily Doesn't always take regularly       HERBALS Nerve by Plexis 2 a day,   Healthy Feet and Nerves  By [employer]  3 a day, Heart Food Caps  (cayenna pepper, garlic, hawthorn, onion & karla 1-2 a day), Super Red Drink Powder daily, garlic daily       insulin aspart (NOVOLOG FLEXPEN) 100 UNIT/ML pen Inject 15 Units Subcutaneous 3 times daily (with meals) 30 mL 11     insulin glargine (LANTUS SOLOSTAR) 100 UNIT/ML pen ADMINISTER 18 UNITS UNDER THE SKIN DAILY (Patient taking differently: Inject 18 Units Subcutaneous At Bedtime ADMINISTER 18 UNITS UNDER THE SKIN DAILY) 30 mL 11     insulin pen needle (BD GERMÁN U/F) 32G X 4 MM miscellaneous USING FOUR  TIMES DAILY OR AS DIRECTED 200 each 0     irbesartan (AVAPRO) 300 MG tablet Take 0.5 tablets (150 mg) by mouth At Bedtime 90 tablet 2     magnesium oxide 200 MG TABS Take 2 tablets by mouth At Bedtime        MELATONIN PO Take 0.5 mg by mouth At Bedtime        metoprolol succinate ER (TOPROL-XL) 50 MG 24 hr tablet Take 1 tablet (50 mg) by mouth 2 times daily 180 tablet 3     multivitamin, therapeutic with minerals (MULTI-VITAMIN) TABS tablet Take 4-8 tablets by mouth daily Life Extenstion       order for DME Equipment being ordered: Compression stockings 1 each 3     predniSONE (DELTASONE) 20 MG tablet TAKE 1 TABLET(20 MG) BY MOUTH DAILY AS NEEDED FOR GOUT FLARE 10 tablet 0     Probiotic Product (PROBIOTIC & ACIDOPHILUS EX ST PO) Take 1 capsule by mouth daily       spironolactone (ALDACTONE) 25 MG tablet Take 0.5 tablets (12.5 mg) by mouth daily 90 tablet 3     tamsulosin (FLOMAX) 0.4 MG capsule Take 1 capsule (0.4 mg) by mouth every evening 30 capsule 0     Taurine 500 MG CAPS Take 1 capsule by mouth as needed        UNABLE TO FIND MEDICATION NAME: Udos Oil - combination of omega 3, 6, 9.  Taking 2-3 capsules daily       UNABLE TO FIND MEDICATION NAME: Super Beet Juice       vitamin D3 (CHOLECALCIFEROL) 2000 units tablet Take 2 tablets by mouth daily        warfarin ANTICOAGULANT (COUMADIN) 5 MG tablet Take 1 1/2 tabs on Sundays, Tuesdays and Thursdays and take 1 tab on all other days or as directed by INR clinic (Patient taking differently: Take by mouth See Admin Instructions Take 1 1/2 tabs on Sundays, Tuesdays and Thursdays and take 1 tab on all other days or as directed by INR clinic) 110 tablet 1       ALLERGIES:  Allergies   Allergen Reactions     Corn Dextrin      All corn products - gets tired an ornery       ROS:  10 point ROS neg other than the symptoms noted above in the HPI.    PHYSICAL EXAM:  Vital signs: Blood pressure 137/78, heart rate 68, respiratory rate 18, temperature 96.9  F, O2 saturation  96%  Gen: Cooperative and in no acute distress  HEENT: Normocephalic; oropharynx clear  Card: Normal S1, S2, irregularly irregular rhythm, normal rate  Resp: Lungs clear to auscultation bilaterally  GI: Abdomen soft, non-tender, non-distended, +BS  Ext: 1+ B/L LE edema  Neuro: CX II-XII grossly intact; ROM in all four extremities grossly in tact  Psych: Alert and oriented x3; normal affect  Skin: No acute rash    LABORATORY/IMAGING DATA:  Lab Results   Component Value Date    WBC 8.6 12/20/2019     Lab Results   Component Value Date    RBC 3.63 12/20/2019     Lab Results   Component Value Date    HGB 12.2 12/20/2019     Lab Results   Component Value Date    HCT 37.0 12/20/2019     Lab Results   Component Value Date     12/20/2019     Lab Results   Component Value Date    MCH 33.6 12/20/2019     Lab Results   Component Value Date    MCHC 33.0 12/20/2019     Lab Results   Component Value Date    RDW 13.5 12/20/2019     Lab Results   Component Value Date     12/20/2019     Last Comprehensive Metabolic Panel:  Sodium   Date Value Ref Range Status   12/21/2019 137 133 - 144 mmol/L Final     Potassium   Date Value Ref Range Status   12/21/2019 3.6 3.4 - 5.3 mmol/L Final     Chloride   Date Value Ref Range Status   12/21/2019 103 94 - 109 mmol/L Final     Carbon Dioxide   Date Value Ref Range Status   12/21/2019 29 20 - 32 mmol/L Final     Anion Gap   Date Value Ref Range Status   12/21/2019 5 3 - 14 mmol/L Final     Glucose   Date Value Ref Range Status   12/21/2019 162 (H) 70 - 99 mg/dL Final     Urea Nitrogen   Date Value Ref Range Status   12/21/2019 33 (H) 7 - 30 mg/dL Final     Creatinine   Date Value Ref Range Status   12/21/2019 1.28 (H) 0.66 - 1.25 mg/dL Final     GFR Estimate   Date Value Ref Range Status   12/21/2019 55 (L) >60 mL/min/[1.73_m2] Final     Comment:     Non  GFR Calc  Starting 12/18/2018, serum creatinine based estimated GFR (eGFR) will be   calculated using the  Chronic Kidney Disease Epidemiology Collaboration   (CKD-EPI) equation.       Calcium   Date Value Ref Range Status   12/21/2019 8.1 (L) 8.5 - 10.1 mg/dL Final         ASSESSMENT/PLAN:  Acute pyelonephritis,  E. coli UTI with sepsis,  BPH with obstructive uropathy,  Acute kidney injury, superimposed on a CKD stage III.  Baseline creatinine is 1.3-1.6.  He presented with creatinine of 4.31.  Renal imaging suggested obstruction.    He was treated empirically with intravenous Zosyn for acute pyonephritis.  Blood and urine cultures grew E. coli.  Obstructive uropathy was managed with Cash catheter, Flomax, and Proscar.  He was discharged on 10 days of oral ciprofloxacin.  Plan:  Continue ciprofloxacin 500 mg p.o. twice daily through December 31, 2019  Continue Flomax 0.4 mg p.o. daily and Proscar 5 mg p.o. daily  Continue Cash catheter  Monitor fever curve, renal function, electrolytes  Avoid NSAIDs or other nephrotoxins  Follow-up with Dr. Pitt of Urology next week    Bilateral lower extremity and scrotal swelling.  Likely due to aggressive IV fluid therapy in the hospital for sepsis.  Plan:  Instructed the patient to keep his legs elevated  Consider one time furosemide if edema persists    Diabetes mellitus type 2 with diabetic neuropathy.  Most recent hemoglobin A1c was 6.2% on November 14, 2019.  Blood glucose levels are currently 100-150 mg/dL.  Plan:  Continue NovoLog 15 units subcu 3 times daily  Continue Lantus 18 units subcu daily  Continue to monitor blood glucose     Coronary artery disease, status post RONEL to circumflex in April 2011,  Chronic atrial fibrillation,  History of dilated cardiomyopathy (improved, last LV ejection fraction 50-55% in 2016),  Hypertension, benign essential.  Blood pressure is currently controlled.  The patient appears to be euvolemic.  In the hospital the patient had rapid ventricular response due to sepsis which eventually resolved.  Plan:  Continue PTA diltiazem  mg p.o.  daily, metoprolol XL 50 mg p.o. twice daily, Avapro 150 mg p.o. at bedtime, digoxin 125 mcg p.o. daily, and aspirin 81 mg p.o. daily  Continue PTA furosemide 20 mg p.o. daily and spironolactone 12.5 mg p.o. daily  Continue chronic anticoagulant therapy with warfarin for INR target of 2-3  Monitor blood pressure, heart rate, and volume status    Gout.  Plan:  Continue PTA Uloric 40 mg p.o. daily    Obstructive sleep apnea,  Central sleep apnea,  Morbid obesity, BMI 36.21.  Plan:  Continue CPAP at night    Cirrhotic appearing fatty liver.  Noted on abdominal imaging.  He has a history of alcoholism but has been sober since 1973.  Plan:  Follow up with PCP for further work-up and optimal management (this was discussed with the patient at extent)    Physical deconditioning.  Plan:  Continue PT/OT evaluation and therapy        Electronically signed by:  Nila Doran MD                      Sincerely,        iNla Doran MD

## 2019-12-26 ENCOUNTER — NURSING HOME VISIT (OUTPATIENT)
Dept: GERIATRICS | Facility: CLINIC | Age: 74
End: 2019-12-26
Payer: COMMERCIAL

## 2019-12-26 VITALS
OXYGEN SATURATION: 97 % | SYSTOLIC BLOOD PRESSURE: 106 MMHG | BODY MASS INDEX: 35.98 KG/M2 | TEMPERATURE: 96.7 F | HEART RATE: 69 BPM | DIASTOLIC BLOOD PRESSURE: 62 MMHG | HEIGHT: 71 IN | WEIGHT: 257 LBS | RESPIRATION RATE: 18 BRPM

## 2019-12-26 DIAGNOSIS — Z79.4 TYPE 2 DIABETES MELLITUS WITH DIABETIC NEUROPATHY, WITH LONG-TERM CURRENT USE OF INSULIN (H): Chronic | ICD-10-CM

## 2019-12-26 DIAGNOSIS — Z95.820 S/P ANGIOPLASTY WITH STENT: Chronic | ICD-10-CM

## 2019-12-26 DIAGNOSIS — I42.0 DILATED CARDIOMYOPATHY (H): Chronic | ICD-10-CM

## 2019-12-26 DIAGNOSIS — I25.10 CORONARY ARTERY DISEASE INVOLVING NATIVE CORONARY ARTERY OF NATIVE HEART WITHOUT ANGINA PECTORIS: Chronic | ICD-10-CM

## 2019-12-26 DIAGNOSIS — E66.01 MORBID OBESITY (H): ICD-10-CM

## 2019-12-26 DIAGNOSIS — R53.81 PHYSICAL DECONDITIONING: ICD-10-CM

## 2019-12-26 DIAGNOSIS — N18.30 ACUTE RENAL FAILURE WITH ACUTE TUBULAR NECROSIS SUPERIMPOSED ON STAGE 3 CHRONIC KIDNEY DISEASE (H): ICD-10-CM

## 2019-12-26 DIAGNOSIS — N10 ACUTE PYELONEPHRITIS: ICD-10-CM

## 2019-12-26 DIAGNOSIS — I10 ESSENTIAL HYPERTENSION: Chronic | ICD-10-CM

## 2019-12-26 DIAGNOSIS — M10.00 ACUTE IDIOPATHIC GOUT, UNSPECIFIED SITE: Primary | Chronic | ICD-10-CM

## 2019-12-26 DIAGNOSIS — E11.40 TYPE 2 DIABETES MELLITUS WITH DIABETIC NEUROPATHY, WITH LONG-TERM CURRENT USE OF INSULIN (H): Chronic | ICD-10-CM

## 2019-12-26 DIAGNOSIS — Z51.81 ENCOUNTER FOR THERAPEUTIC DRUG MONITORING: ICD-10-CM

## 2019-12-26 DIAGNOSIS — N17.0 ACUTE RENAL FAILURE WITH ACUTE TUBULAR NECROSIS SUPERIMPOSED ON STAGE 3 CHRONIC KIDNEY DISEASE (H): ICD-10-CM

## 2019-12-26 DIAGNOSIS — I48.0 PAROXYSMAL ATRIAL FIBRILLATION (H): Chronic | ICD-10-CM

## 2019-12-26 DIAGNOSIS — Z79.01 LONG TERM (CURRENT) USE OF ANTICOAGULANTS: ICD-10-CM

## 2019-12-26 DIAGNOSIS — N13.9 OBSTRUCTIVE UROPATHY: ICD-10-CM

## 2019-12-26 DIAGNOSIS — G47.33 OSA (OBSTRUCTIVE SLEEP APNEA): Chronic | ICD-10-CM

## 2019-12-26 LAB — INR PPP: 2.1 (ref 0.9–1.1)

## 2019-12-26 PROCEDURE — 99309 SBSQ NF CARE MODERATE MDM 30: CPT | Performed by: NURSE PRACTITIONER

## 2019-12-26 RX ORDER — NYSTATIN 100000 U/G
CREAM TOPICAL 2 TIMES DAILY
COMMUNITY
Start: 2019-12-23 | End: 2020-01-06

## 2019-12-26 RX ORDER — LACTOBACILLUS RHAMNOSUS GG 10B CELL
1 CAPSULE ORAL DAILY
Status: ON HOLD | COMMUNITY
End: 2020-05-09

## 2019-12-26 ASSESSMENT — MIFFLIN-ST. JEOR: SCORE: 1932.87

## 2019-12-26 NOTE — LETTER
12/26/2019        RE: Jt Montgomery  [address]  [place] [address]          [place] [facility]  [place] Medical Record Number:  [id number]  Place of Service where encounter took place:  [facility] [[id number]]  Chief Complaint   Patient presents with     INR RESULTS       HPI:    Jt Montgomery  is a 74 year old (1945), who is being seen today for an episodic care visit.  HPI information obtained from: facility chart records, facility staff, patient report and [facility] chart review. Today's concern is:  Brief Summary of Hospital Course: recent hospitalization due to hypotension. PMH includes DM2, atrial fibrillation - anticoagulated on coumadin, CKD3, CAD, hypertension, TOMMY- on CPAP, gout, obesity. Patient was at PCP clinic on 12/17 and found to have BP of 89/49 as well as urinary retention. Work up in ED revealed BPs 80-90s/40-50s, CT of abdomen/pelvis showed marked bilateral perinephric infiltration, abnormal appearance of bladder. Lab work showed Na 126, K 5.4, BUN 80, creat 4.31, GFR 13. WBC 14.2 Lactic acid 2.4, bilirubin 6.5. he was admitted to hospital for sepsis due to acute pyelonephritis with acute on chronic renal failure due to obstructive uropathy. He was treated with IVF and zosyn. Cash catheter was placed. Nephrotoxic meds held.   Urology was consulted, who suspects BPH- bladder outlet obstruction contributing to UTI pyelonephritis. He was started on tamsulosin 0.4mg and finasteride 5mg.   Blood cultures grew out GNB and UC showing e coli.   On 12/18 his -130 likely due to metoprolol and cardiezem on hold due to hypotension.   Once patient stable he was transferred to TCU for strengthening.       Updates on Status Since Skilled nursing Admission:  new pain in right hand. He thinks it is gout. Nursing is concerned about insulin    Past Medical and Surgical History reviewed in Epic  today.    MEDICATIONS:  Current Outpatient Medications   Medication Sig Dispense Refill     aspirin EC 81 MG EC tablet Take 1 tablet by mouth daily. 90 tablet 1     ciprofloxacin (CIPRO) 500 MG tablet Take 1 tablet (500 mg) by mouth 2 times daily for 10 days 20 tablet 0     Coenzyme Q10 (COQ10) 100 MG CAPS Take 200 mg by mouth daily        Continuous Blood Gluc Sensor (FREESTYLE RINKU 14 DAY SENSOR) MISC 1 Device every 14 days 2 each 11     digoxin (LANOXIN) 125 MCG tablet Take 1 tablet (125 mcg) by mouth daily 90 tablet 3     diltiazem ER COATED BEADS (CARTIA XT) 300 MG 24 hr capsule Take 1 capsule (300 mg) by mouth daily 90 capsule 3     febuxostat (ULORIC) 40 MG TABS Take 40 mg by mouth daily       finasteride (PROSCAR) 5 MG tablet Take 1 tablet (5 mg) by mouth daily 30 tablet 0     furosemide (LASIX) 20 MG tablet Take 1 tablet (20 mg) by mouth daily 90 tablet 3     insulin aspart (NOVOLOG FLEXPEN) 100 UNIT/ML pen Inject 15 Units Subcutaneous 3 times daily (with meals) 30 mL 11     insulin glargine (LANTUS SOLOSTAR) 100 UNIT/ML pen ADMINISTER 18 UNITS UNDER THE SKIN DAILY (Patient taking differently: Inject 18 Units Subcutaneous At Bedtime ADMINISTER 18 UNITS UNDER THE SKIN DAILY) 30 mL 11     insulin pen needle (BD GERMÁN U/F) 32G X 4 MM miscellaneous USING FOUR TIMES DAILY OR AS DIRECTED 200 each 0     irbesartan (AVAPRO) 300 MG tablet Take 0.5 tablets (150 mg) by mouth At Bedtime 90 tablet 2     lactobacillus rhamnosus, GG, (CULTURELL) capsule Take 1 capsule by mouth daily       magnesium oxide 200 MG TABS Take 2 tablets by mouth At Bedtime        MELATONIN PO Take 0.5 mg by mouth At Bedtime        metoprolol succinate ER (TOPROL-XL) 50 MG 24 hr tablet Take 1 tablet (50 mg) by mouth 2 times daily 180 tablet 3     nystatin (MYCOSTATIN) 451913 UNIT/GM external cream Apply topically 2 times daily       order for DME Equipment being ordered: Compression stockings 1 each 3     predniSONE (DELTASONE) 20 MG tablet TAKE  "1 TABLET(20 MG) BY MOUTH DAILY AS NEEDED FOR GOUT FLARE 10 tablet 0     Probiotic Product (PROBIOTIC & ACIDOPHILUS EX ST PO) Take 1 capsule by mouth daily       spironolactone (ALDACTONE) 25 MG tablet Take 0.5 tablets (12.5 mg) by mouth daily 90 tablet 3     tamsulosin (FLOMAX) 0.4 MG capsule Take 1 capsule (0.4 mg) by mouth every evening 30 capsule 0     Taurine 500 MG CAPS Take 1 capsule by mouth as needed        vitamin D3 (CHOLECALCIFEROL) 2000 units tablet Take 2 tablets by mouth daily        warfarin ANTICOAGULANT (COUMADIN) 5 MG tablet Take 1 1/2 tabs on Sundays, Tuesdays and Thursdays and take 1 tab on all other days or as directed by INR clinic (Patient taking differently: Take by mouth See Admin Instructions Take 1 1/2 tabs on Sundays, Tuesdays and Thursdays and take 1 tab on all other days or as directed by INR clinic) 110 tablet 1     Green Tea, Camillia sinensis, (GREEN TEA EXTRACT PO) Take by mouth 2 times daily Doesn't always take regularly       HERBALS Nerve by Plexis 2 a day,   Healthy Feet and Nerves  By [employer]  3 a day, Heart Food Caps  (cayenna pepper, garlic, hawthorn, onion & ginger 1-2 a day), Super Red Drink Powder daily, garlic daily       multivitamin, therapeutic with minerals (MULTI-VITAMIN) TABS tablet Take 4-8 tablets by mouth daily Life Extenstion       UNABLE TO FIND MEDICATION NAME: Udos Oil - combination of omega 3, 6, 9.  Taking 2-3 capsules daily       UNABLE TO FIND MEDICATION NAME: Super Beet Juice           REVIEW OF SYSTEMS:  4 point ROS including Respiratory, CV, GI and , other than that noted in the HPI,  is negative    Objective:  /62   Pulse 69   Temp 96.7  F (35.9  C)   Resp 18   Ht 1.803 m (5' 11\")   Wt 116.6 kg (257 lb)   SpO2 97%   BMI 35.84 kg/m     Exam:  GENERAL APPEARANCE:  Alert, in no distress  ENT:  Mouth and posterior oropharynx normal, moist mucous membranes, hearing acuity adequate   EYES:  EOM, conjunctivae, lids, pupils and irises " normal    RESP:  respiratory effort and palpation of chest normal, no respiratory distress, Lung sounds clear  CV:  Palpation and auscultation of heart done , rate and rhythm reg, no murmur, no rub or gallop, Edema trace. Scrotal edema  ABDOMEN:  normal bowel sounds, soft, nontender, no hepatosplenomegaly or other masses  M/S:   Gait and station not observed, right hand is sore, Digits and nails normal   SKIN:  Inspection/Palpation of skin and subcutaneous tissue no rash  NEURO: 2-12 in normal limits and at patient's baseline  PSYCH:  insight and judgement, memory intact , affect and mood normal    Labs:   CBC RESULTS:   Recent Labs   Lab Test 12/20/19  0748 12/19/19  0839   WBC 8.6 8.6   RBC 3.63* 3.50*   HGB 12.2* 12.1*   HCT 37.0* 36.0*   * 103*   MCH 33.6* 34.6*   MCHC 33.0 33.6   RDW 13.5 13.9    129*       Last Basic Metabolic Panel:  Recent Labs   Lab Test 12/24/19  1014 12/21/19  1022    137   POTASSIUM 3.6 3.6   CHLORIDE 110* 103   HARSHA 8.6 8.1*   CO2 26 29   BUN 24 33*   CR 0.99 1.28*   * 162*       Liver Function Studies -   Recent Labs   Lab Test 12/19/19  0839 12/18/19  0738  12/17/19  1151   PROTTOTAL  --  6.1*  --  6.8   ALBUMIN 2.1* 2.2*   < > 2.9*   BILITOTAL  --  3.9*  --  6.5*   ALKPHOS  --  60  --  89   AST  --  33  --  37   ALT  --  26  --  28    < > = values in this interval not displayed.       TSH   Date Value Ref Range Status   10/08/2019 1.94 0.40 - 4.00 mU/L Final   04/29/2016 2.35 0.40 - 4.00 mU/L Final   ]    Lab Results   Component Value Date    A1C 6.2 11/14/2019    A1C 7.6 10/08/2019           ASSESSMENT/PLAN:  (M10.00) Acute idiopathic gout, unspecified site  (primary encounter diagnosis)  Comment: new pain in right hand. Unable to  things. In past he takes prednisone 10-20 mg one time. He continues on febuxostat daily  Plan: prednisone 20 mg po q day for 5 days    (N13.9) Obstructive uropathy  (N10) Acute pyelonephritis  (N17.0,  N18.3) Acute renal  failure with acute tubular necrosis superimposed on stage 3 chronic kidney disease (H)  Comment: recent hospitalization due to hypotension and inability to urinate. He was found to have creat 4.31, WBC 14.3, blood and urine cultures grew out e coli. Imaging of kidneys suggested obstruction. Patient was treated with mauricio catheter, IV antibiotics and IVF. Urology started him on flomax and proscar. Nephrotoxic meds were held until values improved. Baseline creat 1.19. follows with Dr Walker.  creat now at baseline   Plan: continue mauricio catheter until follow up with urology  Scheduled urology follow up with [facility]rology- Dr Pitt for next week if possible  Continue flomax and proscar as ordered.       (E11.40,  Z79.4) Type 2 diabetes mellitus with diabetic neuropathy, with long-term current use of insulin (H)  Comment: he is back on PTA insulin. BG running 120-170 here.  reviewed with nursing again today.   Plan:  FBG TID   lantus 18u subcutaneous q hs  Novolog 15u TID with meals    (I42.0) Dilated cardiomyopathy, nonischemic EF 30-40% in March 2013  Comment: followed by Dr Gomez. Patient has scrotal edema and 1+ LE edema today. He says he has Rx for lasix 20mg that he takes when he feels like he needs to. He is also taking spironolactone 12.5mg q day. Weight is down 6# since adm to TCU. Lasix 20mg q day started  Plan: daily wts  Elevate scrotum with towel  continue lasix 20mg q day  Continue spironolactone 12.5mg q day  Continue metoprolol 50mg ER 50mg BID  BMP in am- 12/26  Follow up with cardiology as needed    (I48.0) Paroxysmal atrial fibrillation (H)  (Z51.81) Encounter for therapeutic drug monitoring  (Z79.01) Long term (current) use of anticoagulants  Comment: INR today 2.1. INR goal 2-3. Last check INR 3.4 on 12/24. Coumadin was held for two days. Usual coumadin does 5mg MWF and 7.5mg ROW  Plan: coumadin 5mg q day  INR on 12/30    (I25.10) Coronary artery disease involving native coronary artery of native  heart without angina pectoris  (Z95.820) S/p angioplasty with stent w/ RONEL in distal RCA  Comment: lexiscan in 2018 did not show ischemia. He has declined statin therapy in the past. no recent issues with CP. He continues on ASA 81mg q day  Plan: continue with current meds    (I10) Essential hypertension  Comment: BPs in TCU running 106/62, 128/79, 133/70  Plan: irbesartan 150mg q hs  In addition to meds as above    (G47.33) TOMMY (obstructive sleep apnea)  Comment: uses CPAP q hs  Plan: continue    (E66.01) Morbid obesity (H)  Comment: Body mass index is 35.84 kg/m .  Plan: physical therapy     (R53.81) Physical deconditioning  Comment: lives alone in condominium.  Has been independent.  Does not feel strong enough to be alone yet.   Plan: physical therapy and OCCUPATIONAL THERAPY    Electronically signed by:  RADHA Langford CNP               Sincerely,        RADHA Langford CNP

## 2019-12-26 NOTE — PROGRESS NOTES
[place] [facility]  [place] Medical Record Number:  [id number]  Place of Service where encounter took place:  [facility] ([facility]) [[id number]]  Chief Complaint   Patient presents with     INR RESULTS       HPI:    Jt Montgomery  is a 74 year old (1945), who is being seen today for an episodic care visit.  HPI information obtained from: facility chart records, facility staff, patient report and [facility] chart review. Today's concern is:  Brief Summary of Hospital Course: recent hospitalization due to hypotension. PMH includes DM2, atrial fibrillation - anticoagulated on coumadin, CKD3, CAD, hypertension, TOMMY- on CPAP, gout, obesity. Patient was at PCP clinic on 12/17 and found to have BP of 89/49 as well as urinary retention. Work up in ED revealed BPs 80-90s/40-50s, CT of abdomen/pelvis showed marked bilateral perinephric infiltration, abnormal appearance of bladder. Lab work showed Na 126, K 5.4, BUN 80, creat 4.31, GFR 13. WBC 14.2 Lactic acid 2.4, bilirubin 6.5. he was admitted to hospital for sepsis due to acute pyelonephritis with acute on chronic renal failure due to obstructive uropathy. He was treated with IVF and zosyn. Cash catheter was placed. Nephrotoxic meds held.   Urology was consulted, who suspects BPH- bladder outlet obstruction contributing to UTI pyelonephritis. He was started on tamsulosin 0.4mg and finasteride 5mg.   Blood cultures grew out GNB and UC showing e coli.   On 12/18 his -130 likely due to metoprolol and cardiezem on hold due to hypotension.   Once patient stable he was transferred to TCU for strengthening.       Updates on Status Since Skilled nursing Admission: new pain in right hand. He thinks it is gout. Nursing is concerned about insulin    Past Medical and Surgical History reviewed in Epic today.    MEDICATIONS:  Current Outpatient Medications   Medication Sig Dispense Refill     aspirin EC 81 MG EC tablet Take 1 tablet by  mouth daily. 90 tablet 1     ciprofloxacin (CIPRO) 500 MG tablet Take 1 tablet (500 mg) by mouth 2 times daily for 10 days 20 tablet 0     Coenzyme Q10 (COQ10) 100 MG CAPS Take 200 mg by mouth daily        Continuous Blood Gluc Sensor (FREESTYLE RINKU 14 DAY SENSOR) MISC 1 Device every 14 days 2 each 11     digoxin (LANOXIN) 125 MCG tablet Take 1 tablet (125 mcg) by mouth daily 90 tablet 3     diltiazem ER COATED BEADS (CARTIA XT) 300 MG 24 hr capsule Take 1 capsule (300 mg) by mouth daily 90 capsule 3     febuxostat (ULORIC) 40 MG TABS Take 40 mg by mouth daily       finasteride (PROSCAR) 5 MG tablet Take 1 tablet (5 mg) by mouth daily 30 tablet 0     furosemide (LASIX) 20 MG tablet Take 1 tablet (20 mg) by mouth daily 90 tablet 3     insulin aspart (NOVOLOG FLEXPEN) 100 UNIT/ML pen Inject 15 Units Subcutaneous 3 times daily (with meals) 30 mL 11     insulin glargine (LANTUS SOLOSTAR) 100 UNIT/ML pen ADMINISTER 18 UNITS UNDER THE SKIN DAILY (Patient taking differently: Inject 18 Units Subcutaneous At Bedtime ADMINISTER 18 UNITS UNDER THE SKIN DAILY) 30 mL 11     insulin pen needle (BD GERMÁN U/F) 32G X 4 MM miscellaneous USING FOUR TIMES DAILY OR AS DIRECTED 200 each 0     irbesartan (AVAPRO) 300 MG tablet Take 0.5 tablets (150 mg) by mouth At Bedtime 90 tablet 2     lactobacillus rhamnosus, GG, (CULTURELL) capsule Take 1 capsule by mouth daily       magnesium oxide 200 MG TABS Take 2 tablets by mouth At Bedtime        MELATONIN PO Take 0.5 mg by mouth At Bedtime        metoprolol succinate ER (TOPROL-XL) 50 MG 24 hr tablet Take 1 tablet (50 mg) by mouth 2 times daily 180 tablet 3     nystatin (MYCOSTATIN) 836173 UNIT/GM external cream Apply topically 2 times daily       order for DME Equipment being ordered: Compression stockings 1 each 3     predniSONE (DELTASONE) 20 MG tablet TAKE 1 TABLET(20 MG) BY MOUTH DAILY AS NEEDED FOR GOUT FLARE 10 tablet 0     Probiotic Product (PROBIOTIC & ACIDOPHILUS EX ST PO) Take 1  "capsule by mouth daily       spironolactone (ALDACTONE) 25 MG tablet Take 0.5 tablets (12.5 mg) by mouth daily 90 tablet 3     tamsulosin (FLOMAX) 0.4 MG capsule Take 1 capsule (0.4 mg) by mouth every evening 30 capsule 0     Taurine 500 MG CAPS Take 1 capsule by mouth as needed        vitamin D3 (CHOLECALCIFEROL) 2000 units tablet Take 2 tablets by mouth daily        warfarin ANTICOAGULANT (COUMADIN) 5 MG tablet Take 1 1/2 tabs on Sundays, Tuesdays and Thursdays and take 1 tab on all other days or as directed by INR clinic (Patient taking differently: Take by mouth See Admin Instructions Take 1 1/2 tabs on Sundays, Tuesdays and Thursdays and take 1 tab on all other days or as directed by INR clinic) 110 tablet 1     Green Tea, Camillia sinensis, (GREEN TEA EXTRACT PO) Take by mouth 2 times daily Doesn't always take regularly       HERBALS Nerve by Plexis 2 a day,   Healthy Feet and Nerves  By [employer]  3 a day, Heart Food Caps  (cayenna pepper, garlic, hawthorn, onion & ginger 1-2 a day), Super Red Drink Powder daily, garlic daily       multivitamin, therapeutic with minerals (MULTI-VITAMIN) TABS tablet Take 4-8 tablets by mouth daily Life Extenstion       UNABLE TO FIND MEDICATION NAME: Udos Oil - combination of omega 3, 6, 9.  Taking 2-3 capsules daily       UNABLE TO FIND MEDICATION NAME: Super Beet Juice           REVIEW OF SYSTEMS:  4 point ROS including Respiratory, CV, GI and , other than that noted in the HPI,  is negative    Objective:  /62   Pulse 69   Temp 96.7  F (35.9  C)   Resp 18   Ht 1.803 m (5' 11\")   Wt 116.6 kg (257 lb)   SpO2 97%   BMI 35.84 kg/m    Exam:  GENERAL APPEARANCE:  Alert, in no distress  ENT:  Mouth and posterior oropharynx normal, moist mucous membranes, hearing acuity adequate   EYES:  EOM, conjunctivae, lids, pupils and irises normal    RESP:  respiratory effort and palpation of chest normal, no respiratory distress, Lung sounds clear  CV:  Palpation and " auscultation of heart done , rate and rhythm reg, no murmur, no rub or gallop, Edema trace. Scrotal edema  ABDOMEN:  normal bowel sounds, soft, nontender, no hepatosplenomegaly or other masses  M/S:   Gait and station not observed, right hand is sore, Digits and nails normal   SKIN:  Inspection/Palpation of skin and subcutaneous tissue no rash  NEURO: 2-12 in normal limits and at patient's baseline  PSYCH:  insight and judgement, memory intact , affect and mood normal    Labs:   CBC RESULTS:   Recent Labs   Lab Test 12/20/19  0748 12/19/19  0839   WBC 8.6 8.6   RBC 3.63* 3.50*   HGB 12.2* 12.1*   HCT 37.0* 36.0*   * 103*   MCH 33.6* 34.6*   MCHC 33.0 33.6   RDW 13.5 13.9    129*       Last Basic Metabolic Panel:  Recent Labs   Lab Test 12/24/19  1014 12/21/19  1022    137   POTASSIUM 3.6 3.6   CHLORIDE 110* 103   HARSHA 8.6 8.1*   CO2 26 29   BUN 24 33*   CR 0.99 1.28*   * 162*       Liver Function Studies -   Recent Labs   Lab Test 12/19/19  0839 12/18/19  0738  12/17/19  1151   PROTTOTAL  --  6.1*  --  6.8   ALBUMIN 2.1* 2.2*   < > 2.9*   BILITOTAL  --  3.9*  --  6.5*   ALKPHOS  --  60  --  89   AST  --  33  --  37   ALT  --  26  --  28    < > = values in this interval not displayed.       TSH   Date Value Ref Range Status   10/08/2019 1.94 0.40 - 4.00 mU/L Final   04/29/2016 2.35 0.40 - 4.00 mU/L Final   ]    Lab Results   Component Value Date    A1C 6.2 11/14/2019    A1C 7.6 10/08/2019           ASSESSMENT/PLAN:  (M10.00) Acute idiopathic gout, unspecified site  (primary encounter diagnosis)  Comment: new pain in right hand. Unable to  things. In past he takes prednisone 10-20 mg one time. He continues on febuxostat daily  Plan: prednisone 20 mg po q day for 5 days    (N13.9) Obstructive uropathy  (N10) Acute pyelonephritis  (N17.0,  N18.3) Acute renal failure with acute tubular necrosis superimposed on stage 3 chronic kidney disease (H)  Comment: recent hospitalization due to  hypotension and inability to urinate. He was found to have creat 4.31, WBC 14.3, blood and urine cultures grew out e coli. Imaging of kidneys suggested obstruction. Patient was treated with mauricio catheter, IV antibiotics and IVF. Urology started him on flomax and proscar. Nephrotoxic meds were held until values improved. Baseline creat 1.19. follows with Dr Walker. creat now at baseline   Plan: continue mauricio catheter until follow up with urology  Scheduled urology follow up with [facility] urology- Dr Pitt for next week if possible  Continue flomax and proscar as ordered.       (E11.40,  Z79.4) Type 2 diabetes mellitus with diabetic neuropathy, with long-term current use of insulin (H)  Comment: he is back on PTA insulin. BG running 120-170 here. reviewed with nursing again today.   Plan:  FBG TID   lantus 18u subcutaneous q hs  Novolog 15u TID with meals    (I42.0) Dilated cardiomyopathy, nonischemic EF 30-40% in March 2013  Comment: followed by Dr Gomez. Patient has scrotal edema and 1+ LE edema today. He says he has Rx for lasix 20mg that he takes when he feels like he needs to. He is also taking spironolactone 12.5mg q day. Weight is down 6# since adm to TCU. Lasix 20mg q day started  Plan: daily wts  Elevate scrotum with towel  continue lasix 20mg q day  Continue spironolactone 12.5mg q day  Continue metoprolol 50mg ER 50mg BID  BMP in am- 12/26  Follow up with cardiology as needed    (I48.0) Paroxysmal atrial fibrillation (H)  (Z51.81) Encounter for therapeutic drug monitoring  (Z79.01) Long term (current) use of anticoagulants  Comment: INR today 2.1. INR goal 2-3. Last check INR 3.4 on 12/24. Coumadin was held for two days. Usual coumadin does 5mg MWF and 7.5mg ROW  Plan: coumadin 5mg q day  INR on 12/30    (I25.10) Coronary artery disease involving native coronary artery of native heart without angina pectoris  (Z95.820) S/p angioplasty with stent w/ RONEL in distal RCA  Comment: lexiscan in 2018 did not show  ischemia. He has declined statin therapy in the past. no recent issues with CP. He continues on ASA 81mg q day  Plan: continue with current meds    (I10) Essential hypertension  Comment: BPs in TCU running 106/62, 128/79, 133/70  Plan: irbesartan 150mg q hs  In addition to meds as above    (G47.33) TOMMY (obstructive sleep apnea)  Comment: uses CPAP q hs  Plan: continue    (E66.01) Morbid obesity (H)  Comment: Body mass index is 35.84 kg/m .  Plan: physical therapy     (R53.81) Physical deconditioning  Comment: lives alone in condominium.  Has been independent.  Does not feel strong enough to be alone yet.   Plan: physical therapy and OCCUPATIONAL THERAPY    Electronically signed by:  RADHA Langford CNP

## 2019-12-27 ENCOUNTER — NURSING HOME VISIT (OUTPATIENT)
Dept: GERIATRICS | Facility: CLINIC | Age: 74
End: 2019-12-27
Payer: COMMERCIAL

## 2019-12-27 ENCOUNTER — HOSPITAL LABORATORY (OUTPATIENT)
Dept: OTHER | Facility: CLINIC | Age: 74
End: 2019-12-27

## 2019-12-27 VITALS
WEIGHT: 251 LBS | BODY MASS INDEX: 35.14 KG/M2 | OXYGEN SATURATION: 97 % | SYSTOLIC BLOOD PRESSURE: 107 MMHG | DIASTOLIC BLOOD PRESSURE: 67 MMHG | TEMPERATURE: 97 F | RESPIRATION RATE: 18 BRPM | HEIGHT: 71 IN | HEART RATE: 79 BPM

## 2019-12-27 DIAGNOSIS — N10 ACUTE PYELONEPHRITIS: ICD-10-CM

## 2019-12-27 DIAGNOSIS — E66.01 MORBID OBESITY (H): ICD-10-CM

## 2019-12-27 DIAGNOSIS — G47.33 OSA (OBSTRUCTIVE SLEEP APNEA): ICD-10-CM

## 2019-12-27 DIAGNOSIS — Z79.01 ANTICOAGULATION MANAGEMENT ENCOUNTER: ICD-10-CM

## 2019-12-27 DIAGNOSIS — M10.00 ACUTE IDIOPATHIC GOUT, UNSPECIFIED SITE: Primary | Chronic | ICD-10-CM

## 2019-12-27 DIAGNOSIS — Z95.820 S/P ANGIOPLASTY WITH STENT: ICD-10-CM

## 2019-12-27 DIAGNOSIS — I10 ESSENTIAL HYPERTENSION: ICD-10-CM

## 2019-12-27 DIAGNOSIS — I42.0 DILATED CARDIOMYOPATHY (H): ICD-10-CM

## 2019-12-27 DIAGNOSIS — I48.0 PAROXYSMAL ATRIAL FIBRILLATION (H): ICD-10-CM

## 2019-12-27 DIAGNOSIS — N13.9 OBSTRUCTIVE UROPATHY: ICD-10-CM

## 2019-12-27 DIAGNOSIS — N17.0 ACUTE RENAL FAILURE WITH ACUTE TUBULAR NECROSIS SUPERIMPOSED ON STAGE 3 CHRONIC KIDNEY DISEASE (H): ICD-10-CM

## 2019-12-27 DIAGNOSIS — R53.81 PHYSICAL DECONDITIONING: ICD-10-CM

## 2019-12-27 DIAGNOSIS — I25.10 CORONARY ARTERY DISEASE INVOLVING NATIVE CORONARY ARTERY OF NATIVE HEART WITHOUT ANGINA PECTORIS: ICD-10-CM

## 2019-12-27 DIAGNOSIS — Z51.81 ANTICOAGULATION MANAGEMENT ENCOUNTER: ICD-10-CM

## 2019-12-27 DIAGNOSIS — Z79.4 TYPE 2 DIABETES MELLITUS WITH DIABETIC NEUROPATHY, WITH LONG-TERM CURRENT USE OF INSULIN (H): ICD-10-CM

## 2019-12-27 DIAGNOSIS — E11.40 TYPE 2 DIABETES MELLITUS WITH DIABETIC NEUROPATHY, WITH LONG-TERM CURRENT USE OF INSULIN (H): ICD-10-CM

## 2019-12-27 DIAGNOSIS — N18.30 ACUTE RENAL FAILURE WITH ACUTE TUBULAR NECROSIS SUPERIMPOSED ON STAGE 3 CHRONIC KIDNEY DISEASE (H): ICD-10-CM

## 2019-12-27 LAB
ANION GAP SERPL CALCULATED.3IONS-SCNC: 3 MMOL/L (ref 3–14)
BUN SERPL-MCNC: 23 MG/DL (ref 7–30)
CALCIUM SERPL-MCNC: 8.6 MG/DL (ref 8.5–10.1)
CHLORIDE SERPL-SCNC: 108 MMOL/L (ref 94–109)
CO2 SERPL-SCNC: 29 MMOL/L (ref 20–32)
CREAT SERPL-MCNC: 0.92 MG/DL (ref 0.66–1.25)
GFR SERPL CREATININE-BSD FRML MDRD: 82 ML/MIN/{1.73_M2}
GLUCOSE SERPL-MCNC: 108 MG/DL (ref 70–99)
INR PPP: 1.76 (ref 0.86–1.14)
POTASSIUM SERPL-SCNC: 4.1 MMOL/L (ref 3.4–5.3)
SODIUM SERPL-SCNC: 140 MMOL/L (ref 133–144)

## 2019-12-27 PROCEDURE — 99309 SBSQ NF CARE MODERATE MDM 30: CPT | Performed by: NURSE PRACTITIONER

## 2019-12-27 RX ORDER — FLASH GLUCOSE SENSOR
KIT MISCELLANEOUS
Qty: 100 EACH | Refills: 3 | Status: SHIPPED | OUTPATIENT
Start: 2019-12-27 | End: 2021-01-09

## 2019-12-27 ASSESSMENT — MIFFLIN-ST. JEOR: SCORE: 1905.66

## 2019-12-27 NOTE — TELEPHONE ENCOUNTER
Continuous Blood Gluc Sensor (FREESTYLE RINKU 14 DAY SENSOR) MISC 2 each 11 11/28/2018  --   Sig - Route: 1 Device every 14 days      Last Written Prescription Date:  11/28/2018  Last Fill Quantity: 2 ea,  # refills: 11   Last office visit: 10/8/2019 with prescribing provider:     Future Office Visit:   Next 5 appointments (look out 90 days)    Dec 27, 2019  2:00 PM CST  [facility] [address] [place] [id number]  [phone]   Jan 09, 2020 11:00 AM CST  Office Visit with Nathaly Hughes [facility] ([address] [address]  [phone]   Feb 19, 2020 11:30 AM CST  Office Visit with Digna Jones MD  [facility] ([address] [address]  [phone]           Routing refill request to provider for review/approval because:  Drug not on the G, P or  Health refill protocol or controlled substance

## 2019-12-27 NOTE — PROGRESS NOTES
[place] [facility]  [place] Medical Record Number:  [id number]  Place of Service where encounter took place:  [facility] ([facility] [[id number]]  Chief Complaint   Patient presents with     RECHECK       HPI:    Jt Montgomery  is a 74 year old (1945), who is being seen today for an episodic care visit.  HPI information obtained from: facility chart records, facility staff, patient report and [facility] chart review. Today's concern is:  Brief Summary of Hospital Course: recent hospitalization due to hypotension. PMH includes DM2, atrial fibrillation - anticoagulated on coumadin, CKD3, CAD, hypertension, TOMMY- on CPAP, gout, obesity. Patient was at PCP clinic on 12/17 and found to have BP of 89/49 as well as urinary retention. Work up in ED revealed BPs 80-90s/40-50s, CT of abdomen/pelvis showed marked bilateral perinephric infiltration, abnormal appearance of bladder. Lab work showed Na 126, K 5.4, BUN 80, creat 4.31, GFR 13. WBC 14.2 Lactic acid 2.4, bilirubin 6.5. he was admitted to hospital for sepsis due to acute pyelonephritis with acute on chronic renal failure due to obstructive uropathy. He was treated with IVF and zosyn. Cash catheter was placed. Nephrotoxic meds held.   Urology was consulted, who suspects BPH- bladder outlet obstruction contributing to UTI pyelonephritis. He was started on tamsulosin 0.4mg and finasteride 5mg.   Blood cultures grew out GNB and UC showing e coli.   On 12/18 his -130 likely due to metoprolol and cardiezem on hold due to hypotension.   Once patient stable he was transferred to TCU for strengthening.       Updates on Status Since Skilled nursing Admission: pain in right hand is less today.       Past Medical and Surgical History reviewed in Epic today.    MEDICATIONS:  Current Outpatient Medications   Medication Sig Dispense Refill     aspirin EC 81 MG EC tablet Take 1 tablet by mouth daily. 90 tablet 1     ciprofloxacin (CIPRO) 500  MG tablet Take 1 tablet (500 mg) by mouth 2 times daily for 10 days 20 tablet 0     Coenzyme Q10 (COQ10) 100 MG CAPS Take 200 mg by mouth daily        Continuous Blood Gluc Sensor (FREESTYLE RINKU 14 DAY SENSOR) MIS AS DIRECTED EVERY 14 DAYS 100 each 3     digoxin (LANOXIN) 125 MCG tablet Take 1 tablet (125 mcg) by mouth daily 90 tablet 3     diltiazem ER COATED BEADS (CARTIA XT) 300 MG 24 hr capsule Take 1 capsule (300 mg) by mouth daily 90 capsule 3     febuxostat (ULORIC) 40 MG TABS Take 40 mg by mouth daily       finasteride (PROSCAR) 5 MG tablet Take 1 tablet (5 mg) by mouth daily 30 tablet 0     furosemide (LASIX) 20 MG tablet Take 1 tablet (20 mg) by mouth daily 90 tablet 3     insulin aspart (NOVOLOG FLEXPEN) 100 UNIT/ML pen Inject 15 Units Subcutaneous 3 times daily (with meals) 30 mL 11     insulin glargine (LANTUS SOLOSTAR) 100 UNIT/ML pen ADMINISTER 18 UNITS UNDER THE SKIN DAILY (Patient taking differently: Inject 18 Units Subcutaneous At Bedtime ADMINISTER 18 UNITS UNDER THE SKIN DAILY) 30 mL 11     insulin pen needle (BD GERMÁN U/F) 32G X 4 MM miscellaneous USING FOUR TIMES DAILY OR AS DIRECTED 200 each 0     irbesartan (AVAPRO) 300 MG tablet Take 0.5 tablets (150 mg) by mouth At Bedtime 90 tablet 2     lactobacillus rhamnosus, GG, (CULTURELL) capsule Take 1 capsule by mouth daily       magnesium oxide 200 MG TABS Take 2 tablets by mouth At Bedtime        MELATONIN PO Take 0.5 mg by mouth At Bedtime        metoprolol succinate ER (TOPROL-XL) 50 MG 24 hr tablet Take 1 tablet (50 mg) by mouth 2 times daily 180 tablet 3     nystatin (MYCOSTATIN) 028580 UNIT/GM external cream Apply topically 2 times daily       order for DME Equipment being ordered: Compression stockings 1 each 3     predniSONE (DELTASONE) 20 MG tablet TAKE 1 TABLET(20 MG) BY MOUTH DAILY AS NEEDED FOR GOUT FLARE 10 tablet 0     Probiotic Product (PROBIOTIC & ACIDOPHILUS EX ST PO) Take 1 capsule by mouth daily       spironolactone  "(ALDACTONE) 25 MG tablet Take 0.5 tablets (12.5 mg) by mouth daily 90 tablet 3     tamsulosin (FLOMAX) 0.4 MG capsule Take 1 capsule (0.4 mg) by mouth every evening 30 capsule 0     Taurine 500 MG CAPS Take 1 capsule by mouth as needed        vitamin D3 (CHOLECALCIFEROL) 2000 units tablet Take 2 tablets by mouth daily        warfarin ANTICOAGULANT (COUMADIN) 5 MG tablet Take 1 1/2 tabs on Sundays, Tuesdays and Thursdays and take 1 tab on all other days or as directed by INR clinic (Patient taking differently: Take by mouth See Admin Instructions Take 1 1/2 tabs on Sundays, Tuesdays and Thursdays and take 1 tab on all other days or as directed by INR clinic) 110 tablet 1     Green Tea, Camillia sinensis, (GREEN TEA EXTRACT PO) Take by mouth 2 times daily Doesn't always take regularly       HERBALS Nerve by Plexis 2 a day,   Healthy Feet and Nerves  By [employer]  3 a day, Heart Food Caps  (cayenna pepper, garlic, hawthorn, onion & ginger 1-2 a day), Super Red Drink Powder daily, garlic daily       multivitamin, therapeutic with minerals (MULTI-VITAMIN) TABS tablet Take 4-8 tablets by mouth daily Life Extenstion       UNABLE TO FIND MEDICATION NAME: Udos Oil - combination of omega 3, 6, 9.  Taking 2-3 capsules daily       UNABLE TO FIND MEDICATION NAME: Super Beet Juice           REVIEW OF SYSTEMS:  4 point ROS including Respiratory, CV, GI and , other than that noted in the HPI,  is negative    Objective:  /67   Pulse 79   Temp 97  F (36.1  C)   Resp 18   Ht 1.803 m (5' 11\")   Wt 113.9 kg (251 lb)   SpO2 97%   BMI 35.01 kg/m    Exam:  GENERAL APPEARANCE:  Alert, in no distress  ENT:  Mouth and posterior oropharynx normal, moist mucous membranes, hearing acuity adequate   EYES:  EOM, conjunctivae, lids, pupils and irises normal  NECK:  No adenopathy,masses or thyromegaly  RESP:  respiratory effort and palpation of chest normal, no respiratory distress, Lung sounds clear  CV:  , Edema none  ABDOMEN:  " normal bowel sounds, soft, nontender, no hepatosplenomegaly or other masses  M/S:   Gait and station steady, Digits and nails nl  SKIN:  Inspection/Palpation of skin and subcutaneous tissue LEs with hemosiderin staining  NEURO: 2-12 in normal limits and at patient's baseline  PSYCH:  insight and judgement, memory intact , affect and mood normal    Labs:   CBC RESULTS:   Recent Labs   Lab Test 12/20/19  0748 12/19/19  0839   WBC 8.6 8.6   RBC 3.63* 3.50*   HGB 12.2* 12.1*   HCT 37.0* 36.0*   * 103*   MCH 33.6* 34.6*   MCHC 33.0 33.6   RDW 13.5 13.9    129*       Last Basic Metabolic Panel:  Recent Labs   Lab Test 12/27/19  0730 12/24/19  1014    141   POTASSIUM 4.1 3.6   CHLORIDE 108 110*   HARSHA 8.6 8.6   CO2 29 26   BUN 23 24   CR 0.92 0.99   * 104*       Liver Function Studies -   Recent Labs   Lab Test 12/19/19  0839 12/18/19  0738  12/17/19  1151   PROTTOTAL  --  6.1*  --  6.8   ALBUMIN 2.1* 2.2*   < > 2.9*   BILITOTAL  --  3.9*  --  6.5*   ALKPHOS  --  60  --  89   AST  --  33  --  37   ALT  --  26  --  28    < > = values in this interval not displayed.       TSH   Date Value Ref Range Status   10/08/2019 1.94 0.40 - 4.00 mU/L Final   04/29/2016 2.35 0.40 - 4.00 mU/L Final   ]    Lab Results   Component Value Date    A1C 6.2 11/14/2019    A1C 7.6 10/08/2019           ASSESSMENT/PLAN:  (M10.00) Acute idiopathic gout, unspecified site  (primary encounter diagnosis)  Comment: new pain in right hand started 12/26. Unable to  things. In past he takes prednisone 10-20 mg one time. He continues on febuxostat daily. Prednisone 20mg q day started on 12/26. Today he states less pain.   Plan:continue prednisone 20 mg po q day for total of 5 days    (N10) Acute pyelonephritis  (N13.9) Obstructive uropathy  (N17.0,  N18.3) Acute renal failure with acute tubular necrosis superimposed on stage 3 chronic kidney disease (H)  Comment: recent hospitalization due to hypotension and inability to  urinate. He was found to have creat 4.31, WBC 14.3, blood and urine cultures grew out e coli. Imaging of kidneys suggested obstruction. Patient was treated with mauricio catheter, IV antibiotics and IVF. Urology started him on flomax and proscar. Nephrotoxic meds were held until values improved. Baseline creat 1.19. follows with Dr Walker. creat now at baseline   Plan: continue mauricio catheter until follow up with urology on 12/30  Continue flomax and proscar as ordered.    (E11.40,  Z79.4) Type 2 diabetes mellitus with diabetic neuropathy, with long-term current use of insulin (H)  Comment: he is back on PTA insulin. BG running 120-150 here.   Plan:  FBG TID   lantus 18u subcutaneous q hs  Novolog 15u TID with meals    (I42.0) Dilated cardiomyopathy, nonischemic EF 30-40% in March 2013  Comment: followed by Dr Gomez. Patient has less scrotal edema and no LE edema today. He says he has Rx for lasix 20mg that he takes when he feels like he needs to. He is also taking spironolactone 12.5mg q day. Weight is down 8# since adm to TCU. Lasix 20mg q day started  Plan: daily wts  Elevate scrotum with towel  continue lasix 20mg q day  Continue spironolactone 12.5mg q day  Continue metoprolol 50mg ER 50mg BID  Follow up with cardiology as needed    (I48.0) Paroxysmal atrial fibrillation (H)  (Z51.81,  Z79.01) Anticoagulation management encounter  Comment: INR today 1.76. INR goal 2-3. Last check INR 2.1on 12/26.Usual coumadin does 5mg MWF and 7.5mg ROW. He received 5mg last night  Plan: coumadin 7.5mg today then 5mg on Saturday and sunday  INR on 12/30    (I25.10) Coronary artery disease involving native coronary artery of native heart without angina pectoris  (Z95.820) S/p angioplasty with stent w/ RONEL in distal RCA  Comment: lexiscan in 2018 did not show ischemia. He has declined statin therapy in the past. no recent issues with CP. He continues on ASA 81mg q day  Plan: continue with current meds    (I10) Essential  hypertension  Comment: BPs in TCU running 107/67, 106/62, 128/79, 133/70  Plan: irbesartan 150mg q hs  In addition to meds as above    (E66.01) Morbid obesity (H)  Comment: Body mass index is 35.84 kg/m .  Plan: physical therapy     (G47.33) TOMMY (obstructive sleep apnea)  Comment: uses CPAP q hs  Plan: continue    (R53.81) Physical deconditioning  Comment: lives alone in condominium.  Has been independent.  Does not feel strong enough to be alone yet.   Plan: physical therapy and OCCUPATIONAL THERAPY      Electronically signed by:  RADHA Langford CNP

## 2019-12-27 NOTE — LETTER
12/27/2019        RE: Jt Montgomery  [address]  [facility] [address]        [facility]  [facility] Record Number:  [id number]  [facility] of [facility] where encounter took place:  [facility] [[id number]]  Chief Complaint   Patient presents with     RECHECK       HPI:    Jt Montgomery  is a 74 year old (1945), who is being seen today for an episodic care visit.  HPI information obtained from: facility chart records, facility staff, patient report and [facility] chart review. Today's concern is:  Brief Summary of Hospital Course: recent hospitalization due to hypotension. PMH includes DM2, atrial fibrillation - anticoagulated on coumadin, CKD3, CAD, hypertension, TOMMY- on CPAP, gout, obesity. Patient was at PCP clinic on 12/17 and found to have BP of 89/49 as well as urinary retention. Work up in ED revealed BPs 80-90s/40-50s, CT of abdomen/pelvis showed marked bilateral perinephric infiltration, abnormal appearance of bladder. Lab work showed Na 126, K 5.4, BUN 80, creat 4.31, GFR 13. WBC 14.2 Lactic acid 2.4, bilirubin 6.5. he was admitted to hospital for sepsis due to acute pyelonephritis with acute on chronic renal failure due to obstructive uropathy. He was treated with IVF and zosyn. Cash catheter was placed. Nephrotoxic meds held.   Urology was consulted, who suspects BPH- bladder outlet obstruction contributing to UTI pyelonephritis. He was started on tamsulosin 0.4mg and finasteride 5mg.   Blood cultures grew out GNB and UC showing e coli.   On 12/18 his -130 likely due to metoprolol and cardiezem on hold due to hypotension.   Once patient stable he was transferred to TCU for strengthening.       Updates on Status Since Skilled nursing Admission: pain in right hand is less today.       Past Medical and Surgical History reviewed in Epic today.    MEDICATIONS:  Current Outpatient Medications   Medication Sig  Dispense Refill     aspirin EC 81 MG EC tablet Take 1 tablet by mouth daily. 90 tablet 1     ciprofloxacin (CIPRO) 500 MG tablet Take 1 tablet (500 mg) by mouth 2 times daily for 10 days 20 tablet 0     Coenzyme Q10 (COQ10) 100 MG CAPS Take 200 mg by mouth daily        Continuous Blood Gluc Sensor (FREESTYLE RINKU 14 DAY SENSOR) MISC AS DIRECTED EVERY 14 DAYS 100 each 3     digoxin (LANOXIN) 125 MCG tablet Take 1 tablet (125 mcg) by mouth daily 90 tablet 3     diltiazem ER COATED BEADS (CARTIA XT) 300 MG 24 hr capsule Take 1 capsule (300 mg) by mouth daily 90 capsule 3     febuxostat (ULORIC) 40 MG TABS Take 40 mg by mouth daily       finasteride (PROSCAR) 5 MG tablet Take 1 tablet (5 mg) by mouth daily 30 tablet 0     furosemide (LASIX) 20 MG tablet Take 1 tablet (20 mg) by mouth daily 90 tablet 3     insulin aspart (NOVOLOG FLEXPEN) 100 UNIT/ML pen Inject 15 Units Subcutaneous 3 times daily (with meals) 30 mL 11     insulin glargine (LANTUS SOLOSTAR) 100 UNIT/ML pen ADMINISTER 18 UNITS UNDER THE SKIN DAILY (Patient taking differently: Inject 18 Units Subcutaneous At Bedtime ADMINISTER 18 UNITS UNDER THE SKIN DAILY) 30 mL 11     insulin pen needle (BD GERMÁN U/F) 32G X 4 MM miscellaneous USING FOUR TIMES DAILY OR AS DIRECTED 200 each 0     irbesartan (AVAPRO) 300 MG tablet Take 0.5 tablets (150 mg) by mouth At Bedtime 90 tablet 2     lactobacillus rhamnosus, GG, (CULTURELL) capsule Take 1 capsule by mouth daily       magnesium oxide 200 MG TABS Take 2 tablets by mouth At Bedtime        MELATONIN PO Take 0.5 mg by mouth At Bedtime        metoprolol succinate ER (TOPROL-XL) 50 MG 24 hr tablet Take 1 tablet (50 mg) by mouth 2 times daily 180 tablet 3     nystatin (MYCOSTATIN) 345144 UNIT/GM external cream Apply topically 2 times daily       order for DME Equipment being ordered: Compression stockings 1 each 3     predniSONE (DELTASONE) 20 MG tablet TAKE 1 TABLET(20 MG) BY MOUTH DAILY AS NEEDED FOR GOUT FLARE 10 tablet 0  "    Probiotic Product (PROBIOTIC & ACIDOPHILUS EX ST PO) Take 1 capsule by mouth daily       spironolactone (ALDACTONE) 25 MG tablet Take 0.5 tablets (12.5 mg) by mouth daily 90 tablet 3     tamsulosin (FLOMAX) 0.4 MG capsule Take 1 capsule (0.4 mg) by mouth every evening 30 capsule 0     Taurine 500 MG CAPS Take 1 capsule by mouth as needed        vitamin D3 (CHOLECALCIFEROL) 2000 units tablet Take 2 tablets by mouth daily        warfarin ANTICOAGULANT (COUMADIN) 5 MG tablet Take 1 1/2 tabs on Sundays, Tuesdays and Thursdays and take 1 tab on all other days or as directed by INR clinic (Patient taking differently: Take by mouth See Admin Instructions Take 1 1/2 tabs on Sundays, Tuesdays and Thursdays and take 1 tab on all other days or as directed by INR clinic) 110 tablet 1     Green Tea, Camillia sinensis, (GREEN TEA EXTRACT PO) Take by mouth 2 times daily Doesn't always take regularly       HERBALS Nerve by Plexis 2 a day,   Healthy Feet and Nerves  By [employer]  3 a day, Heart Food Caps  (cayenna pepper, garlic, hawthorn, onion & ginger 1-2 a day), Super Red Drink Powder daily, garlic daily       multivitamin, therapeutic with minerals (MULTI-VITAMIN) TABS tablet Take 4-8 tablets by mouth daily Life Extenstion       UNABLE TO FIND MEDICATION NAME: Udos Oil - combination of omega 3, 6, 9.  Taking 2-3 capsules daily       UNABLE TO FIND MEDICATION NAME: Super Beet Juice           REVIEW OF SYSTEMS:  4 point ROS including Respiratory, CV, GI and , other than that noted in the HPI,  is negative    Objective:  /67   Pulse 79   Temp 97  F (36.1  C)   Resp 18   Ht 1.803 m (5' 11\")   Wt 113.9 kg (251 lb)   SpO2 97%   BMI 35.01 kg/m     Exam:  GENERAL APPEARANCE:  Alert, in no distress  ENT:  Mouth and posterior oropharynx normal, moist mucous membranes, hearing acuity adequate   EYES:  EOM, conjunctivae, lids, pupils and irises normal  NECK:  No adenopathy,masses or thyromegaly  RESP:  respiratory " effort and palpation of chest normal, no respiratory distress, Lung sounds clear  CV:  , Edema none  ABDOMEN:  normal bowel sounds, soft, nontender, no hepatosplenomegaly or other masses  M/S:   Gait and station steady, Digits and nails nl  SKIN:  Inspection/Palpation of skin and subcutaneous tissue LEs with hemosiderin staining  NEURO: 2-12 in normal limits and at patient's baseline  PSYCH:  insight and judgement, memory intact , affect and mood normal    Labs:   CBC RESULTS:   Recent Labs   Lab Test 12/20/19  0748 12/19/19  0839   WBC 8.6 8.6   RBC 3.63* 3.50*   HGB 12.2* 12.1*   HCT 37.0* 36.0*   * 103*   MCH 33.6* 34.6*   MCHC 33.0 33.6   RDW 13.5 13.9    129*       Last Basic Metabolic Panel:  Recent Labs   Lab Test 12/27/19  0730 12/24/19  1014    141   POTASSIUM 4.1 3.6   CHLORIDE 108 110*   HARSHA 8.6 8.6   CO2 29 26   BUN 23 24   CR 0.92 0.99   * 104*       Liver Function Studies -   Recent Labs   Lab Test 12/19/19  0839 12/18/19  0738  12/17/19  1151   PROTTOTAL  --  6.1*  --  6.8   ALBUMIN 2.1* 2.2*   < > 2.9*   BILITOTAL  --  3.9*  --  6.5*   ALKPHOS  --  60  --  89   AST  --  33  --  37   ALT  --  26  --  28    < > = values in this interval not displayed.       TSH   Date Value Ref Range Status   10/08/2019 1.94 0.40 - 4.00 mU/L Final   04/29/2016 2.35 0.40 - 4.00 mU/L Final   ]    Lab Results   Component Value Date    A1C 6.2 11/14/2019    A1C 7.6 10/08/2019           ASSESSMENT/PLAN:  (M10.00) Acute idiopathic gout, unspecified site  (primary encounter diagnosis)  Comment: new pain in right hand started 12/26. Unable to  things. In past he takes prednisone 10-20 mg one time. He continues on febuxostat daily. Prednisone 20mg q day started on 12/26. Today he states less pain.   Plan:continue prednisone 20 mg po q day for total of 5 days    (N10) Acute pyelonephritis  (N13.9) Obstructive uropathy  (N17.0,  N18.3) Acute renal failure with acute tubular necrosis superimposed on  stage 3 chronic kidney disease (H)  Comment: recent hospitalization due to hypotension and inability to urinate. He was found to have creat 4.31, WBC 14.3, blood and urine cultures grew out e coli. Imaging of kidneys suggested obstruction. Patient was treated with mauricio catheter, IV antibiotics and IVF. Urology started him on flomax and proscar. Nephrotoxic meds were held until values improved. Baseline creat 1.19. follows with Dr Walker. creat now at baseline   Plan: continue mauricio catheter until follow up with urology on 12/30  Continue flomax and proscar as ordered.    (E11.40,  Z79.4) Type 2 diabetes mellitus with diabetic neuropathy, with long-term current use of insulin (H)  Comment: he is back on PTA insulin. BG running 120-150 here.   Plan:  FBG TID   lantus 18u subcutaneous q hs  Novolog 15u TID with meals    (I42.0) Dilated cardiomyopathy, nonischemic EF 30-40% in March 2013  Comment: followed by Dr Gomez. Patient has less scrotal edema and no LE edema today. He says he has Rx for lasix 20mg that he takes when he feels like he needs to. He is also taking spironolactone 12.5mg q day. Weight is down 8# since adm to TCU. Lasix 20mg q day started  Plan: daily wts  Elevate scrotum with towel  continue lasix 20mg q day  Continue spironolactone 12.5mg q day  Continue metoprolol 50mg ER 50mg BID  Follow up with cardiology as needed    (I48.0) Paroxysmal atrial fibrillation (H)  (Z51.81,  Z79.01) Anticoagulation management encounter  Comment: INR today 1.76. INR goal 2-3. Last check INR 2.1on 12/26.Usual coumadin does 5mg MWF and 7.5mg ROW. He received 5mg last night  Plan: coumadin  7.5mg today then 5mg on Saturday and sunday  INR on 12/30    (I25.10) Coronary artery disease involving native coronary artery of native heart without angina pectoris  (Z95.820) S/p angioplasty with stent w/ RONEL in distal RCA  Comment: lexiscan in 2018 did not show ischemia. He has declined statin therapy in the past. no recent issues  with CP. He continues on ASA 81mg q day  Plan: continue with current meds    (I10) Essential hypertension  Comment: BPs in TCU running 107/67, 106/62, 128/79, 133/70  Plan: irbesartan 150mg q hs  In addition to meds as above    (E66.01) Morbid obesity (H)  Comment: Body mass index is 35.84 kg/m .  Plan: physical therapy     (G47.33) TOMMY (obstructive sleep apnea)  Comment: uses CPAP q hs  Plan: continue    (R53.81) Physical deconditioning  Comment: lives alone in condominium.  Has been independent.  Does not feel strong enough to be alone yet.   Plan: physical therapy and OCCUPATIONAL THERAPY      Electronically signed by:  RADHA Langford CNP               Sincerely,        RADAH Langford CNP

## 2019-12-30 ENCOUNTER — OFFICE VISIT (OUTPATIENT)
Dept: UROLOGY | Facility: CLINIC | Age: 74
End: 2019-12-30
Payer: COMMERCIAL

## 2019-12-30 ENCOUNTER — NURSING HOME VISIT (OUTPATIENT)
Dept: GERIATRICS | Facility: CLINIC | Age: 74
End: 2019-12-30
Payer: COMMERCIAL

## 2019-12-30 VITALS
HEIGHT: 71 IN | WEIGHT: 250.2 LBS | DIASTOLIC BLOOD PRESSURE: 94 MMHG | BODY MASS INDEX: 35.03 KG/M2 | SYSTOLIC BLOOD PRESSURE: 149 MMHG | TEMPERATURE: 96.7 F | OXYGEN SATURATION: 98 % | HEART RATE: 57 BPM | RESPIRATION RATE: 20 BRPM

## 2019-12-30 VITALS
DIASTOLIC BLOOD PRESSURE: 80 MMHG | HEART RATE: 72 BPM | BODY MASS INDEX: 33.13 KG/M2 | WEIGHT: 250 LBS | HEIGHT: 73 IN | OXYGEN SATURATION: 98 % | SYSTOLIC BLOOD PRESSURE: 140 MMHG

## 2019-12-30 DIAGNOSIS — Z79.4 TYPE 2 DIABETES MELLITUS WITH DIABETIC NEUROPATHY, WITH LONG-TERM CURRENT USE OF INSULIN (H): Chronic | ICD-10-CM

## 2019-12-30 DIAGNOSIS — N17.0 ACUTE RENAL FAILURE WITH ACUTE TUBULAR NECROSIS SUPERIMPOSED ON STAGE 3 CHRONIC KIDNEY DISEASE (H): ICD-10-CM

## 2019-12-30 DIAGNOSIS — N13.9 OBSTRUCTIVE UROPATHY: ICD-10-CM

## 2019-12-30 DIAGNOSIS — R53.81 PHYSICAL DECONDITIONING: ICD-10-CM

## 2019-12-30 DIAGNOSIS — Z95.820 S/P ANGIOPLASTY WITH STENT: ICD-10-CM

## 2019-12-30 DIAGNOSIS — I42.0 DILATED CARDIOMYOPATHY (H): ICD-10-CM

## 2019-12-30 DIAGNOSIS — R33.9 URINARY RETENTION: Primary | ICD-10-CM

## 2019-12-30 DIAGNOSIS — E11.40 TYPE 2 DIABETES MELLITUS WITH DIABETIC NEUROPATHY, WITH LONG-TERM CURRENT USE OF INSULIN (H): ICD-10-CM

## 2019-12-30 DIAGNOSIS — I48.0 PAROXYSMAL ATRIAL FIBRILLATION (H): ICD-10-CM

## 2019-12-30 DIAGNOSIS — Z79.01 ANTICOAGULATION MANAGEMENT ENCOUNTER: ICD-10-CM

## 2019-12-30 DIAGNOSIS — Z51.81 ANTICOAGULATION MANAGEMENT ENCOUNTER: ICD-10-CM

## 2019-12-30 DIAGNOSIS — Z79.4 TYPE 2 DIABETES MELLITUS WITH DIABETIC NEUROPATHY, WITH LONG-TERM CURRENT USE OF INSULIN (H): ICD-10-CM

## 2019-12-30 DIAGNOSIS — R39.9 LOWER URINARY TRACT SYMPTOMS (LUTS): ICD-10-CM

## 2019-12-30 DIAGNOSIS — N10 ACUTE PYELONEPHRITIS: Primary | ICD-10-CM

## 2019-12-30 DIAGNOSIS — E66.01 MORBID OBESITY (H): ICD-10-CM

## 2019-12-30 DIAGNOSIS — N18.30 ACUTE RENAL FAILURE WITH ACUTE TUBULAR NECROSIS SUPERIMPOSED ON STAGE 3 CHRONIC KIDNEY DISEASE (H): ICD-10-CM

## 2019-12-30 DIAGNOSIS — I25.10 CORONARY ARTERY DISEASE INVOLVING NATIVE CORONARY ARTERY OF NATIVE HEART WITHOUT ANGINA PECTORIS: ICD-10-CM

## 2019-12-30 DIAGNOSIS — E11.40 TYPE 2 DIABETES MELLITUS WITH DIABETIC NEUROPATHY, WITH LONG-TERM CURRENT USE OF INSULIN (H): Chronic | ICD-10-CM

## 2019-12-30 DIAGNOSIS — G47.33 OSA (OBSTRUCTIVE SLEEP APNEA): ICD-10-CM

## 2019-12-30 DIAGNOSIS — I10 ESSENTIAL HYPERTENSION: ICD-10-CM

## 2019-12-30 LAB — INR PPP: 2 (ref 0.9–1.1)

## 2019-12-30 PROCEDURE — 51702 INSERT TEMP BLADDER CATH: CPT | Performed by: UROLOGY

## 2019-12-30 PROCEDURE — 99204 OFFICE O/P NEW MOD 45 MIN: CPT | Mod: 25 | Performed by: UROLOGY

## 2019-12-30 PROCEDURE — 99207 ZZC CDG-MDM COMPONENT: MEETS MODERATE - DOWN CODED: CPT | Performed by: NURSE PRACTITIONER

## 2019-12-30 PROCEDURE — 99309 SBSQ NF CARE MODERATE MDM 30: CPT | Performed by: NURSE PRACTITIONER

## 2019-12-30 ASSESSMENT — MIFFLIN-ST. JEOR
SCORE: 1902.03
SCORE: 1932.87

## 2019-12-30 ASSESSMENT — PAIN SCALES - GENERAL: PAINLEVEL: NO PAIN (0)

## 2019-12-30 NOTE — NURSING NOTE
Chief Complaint   Patient presents with     Clinic Care Coordination - Follow-up     urinary retention, catheter removal     Catheter removal documentation on 12/30/2019:    Jt Montgomery presents to the clinic for catheter removal.  Reason for removal: post procedure  Order has been verified. yes  Catheter successfully removed at 11:41 AM without immediate complication.  400 cc's of urine present in the catheter bag.  Urethral meatus is free of secretions and encrustation.  The patient is afebrile.  The patient tolerated the procedure and was instructed to do a trial of void  Trial of void performed with pt. Catheter detached from leg-bag and just a few drops of urine noted after 250 cc water was used.    Catheter insertion documentation on 12/30/2019:    Jt Montgomery presents to the clinic for catheter insertion.  Reason for insertion: urinary retention  Order has been verified. yes  Catheter successfully inserted into the urethral meatus in the usual sterile fashion without immediate complication.  Type of catheter placed: 18 British Coude catheter  Urine is jeferson in color.  900 cc's of urine output returned.  Balloon was filled with 8cc's of normal saline.  Securement device placed for the catheter.  The patient tolerated the procedure well and was instructed to drink plenty of water.    Minda Pulliam LPN    .

## 2019-12-30 NOTE — LETTER
12/30/2019        RE: Jt Montgomery  [address]  [facility] [address]        [facility]  [facility] Record Number:  [id number]  Place of Service where encounter took place:  [facility] [[id number]]  Chief Complaint   Patient presents with     RECHECK       HPI:    Jt Montgomery  is a 74 year old (1945), who is being seen today for an episodic care visit.  HPI information obtained from: facility chart records, facility staff, patient report and [facility] chart review.     Per recent TCU provider progress notes:  74 year old male with PMH including DM2 (on lantus and Novolog), AFib on coumadin, CKD3, CAD (on ASA, declined statin), HTN, TOMMY - on CPAP, gout, obesity.Sent to ED from clinic with BP of 89/49 and urinary retention. Work up in ED revealed BPs 80-90s/40-50s, CT of abdomen/pelvis showed marked bilateral perinephric infiltration, abnormal appearance of bladder. Lab work showed Na 126, K 5.4, BUN 80, creat 4.31, GFR 13. WBC 14.2 Lactic acid 2.4, bilirubin 6.5. Admitted for sepsis d/t acute pyelonephritis with acute on chronic renal failure due to obstructive uropathy. Treated with IVF and zosyn, mauricio. Urology saw:  suspects BPH - bladder outlet obstruction contributing to UTI pyelonephritis, started tamsulosin 0.4mg and finasteride 5mg. BCs grew out GNB and UC showing e coli. Dilated cardiomyopathy, nonischemic EF 30-40% in March 2013 then EF 50-55% in 2016 sees Dr Gomez, on lasix and spironolactone and metoprolol as well as Coumadin, metoprolol, digoxin, diltiazem due to A fib. Gout in hand - started prednisone.    Today's concern is:  Patient seen today for episodic TCU follow up. Saw urology today - failed voiding trial, will need to continue catheter and return for further f/u. Denies headaches, dizziness, chest pain, dyspnea, bowel issues. Reports scrotal swelling is much improved with nystatin. Note weight  down 13 lbs since admission, now stabilized. Edema improving per patients. SBP range 106-154 and BG range . Sats 98% on room air. Walks 150 ft with 2WW in therapies. Completing Cipro, no fevers. Reports hand pain resolved with prednisone.     Past Medical and Surgical History reviewed in Epic today.    MEDICATIONS:  Current Outpatient Medications   Medication Sig Dispense Refill     aspirin EC 81 MG EC tablet Take 1 tablet by mouth daily. 90 tablet 1     ciprofloxacin (CIPRO) 500 MG tablet Take 1 tablet (500 mg) by mouth 2 times daily for 10 days 20 tablet 0     Coenzyme Q10 (COQ10) 100 MG CAPS Take 200 mg by mouth daily        Continuous Blood Gluc Sensor (YadaHomeYLE RINKU 14 DAY SENSOR) MISC AS DIRECTED EVERY 14 DAYS 100 each 3     digoxin (LANOXIN) 125 MCG tablet Take 1 tablet (125 mcg) by mouth daily 90 tablet 3     diltiazem ER COATED BEADS (CARTIA XT) 300 MG 24 hr capsule Take 1 capsule (300 mg) by mouth daily 90 capsule 3     febuxostat (ULORIC) 40 MG TABS Take 40 mg by mouth daily       finasteride (PROSCAR) 5 MG tablet Take 1 tablet (5 mg) by mouth daily 30 tablet 0     furosemide (LASIX) 20 MG tablet Take 1 tablet (20 mg) by mouth daily 90 tablet 3     insulin aspart (NOVOLOG FLEXPEN) 100 UNIT/ML pen Inject 15 Units Subcutaneous 3 times daily (with meals) 30 mL 11     insulin glargine (LANTUS SOLOSTAR) 100 UNIT/ML pen ADMINISTER 18 UNITS UNDER THE SKIN DAILY (Patient taking differently: Inject 18 Units Subcutaneous At Bedtime ADMINISTER 18 UNITS UNDER THE SKIN DAILY) 30 mL 11     insulin pen needle (BD GERMÁN U/F) 32G X 4 MM miscellaneous USING FOUR TIMES DAILY OR AS DIRECTED 200 each 0     irbesartan (AVAPRO) 300 MG tablet Take 0.5 tablets (150 mg) by mouth At Bedtime 90 tablet 2     lactobacillus rhamnosus, GG, (CULTURELL) capsule Take 1 capsule by mouth daily       magnesium oxide 200 MG TABS Take 2 tablets by mouth At Bedtime        MELATONIN PO Take 0.5 mg by mouth At Bedtime        metoprolol  succinate ER (TOPROL-XL) 50 MG 24 hr tablet Take 1 tablet (50 mg) by mouth 2 times daily 180 tablet 3     nystatin (MYCOSTATIN) 871564 UNIT/GM external cream Apply topically 2 times daily       order for DME Equipment being ordered: Compression stockings 1 each 3     predniSONE (DELTASONE) 20 MG tablet TAKE 1 TABLET(20 MG) BY MOUTH DAILY AS NEEDED FOR GOUT FLARE 10 tablet 0     Probiotic Product (PROBIOTIC & ACIDOPHILUS EX ST PO) Take 1 capsule by mouth daily       spironolactone (ALDACTONE) 25 MG tablet Take 0.5 tablets (12.5 mg) by mouth daily 90 tablet 3     tamsulosin (FLOMAX) 0.4 MG capsule Take 1 capsule (0.4 mg) by mouth every evening 30 capsule 0     Taurine 500 MG CAPS Take 1 capsule by mouth as needed        vitamin D3 (CHOLECALCIFEROL) 2000 units tablet Take 2 tablets by mouth daily        warfarin ANTICOAGULANT (COUMADIN) 5 MG tablet Take 1 1/2 tabs on Sundays, Tuesdays and Thursdays and take 1 tab on all other days or as directed by INR clinic (Patient taking differently: Take by mouth See Admin Instructions Take 1 1/2 tabs on Sundays, Tuesdays and Thursdays and take 1 tab on all other days or as directed by INR clinic) 110 tablet 1     Green Tea, Camillia sinensis, (GREEN TEA EXTRACT PO) Take by mouth 2 times daily Doesn't always take regularly       HERBALS Nerve by Plexis 2 a day,   Healthy Feet and Nerves  By [employer]  3 a day, Heart Food Caps  (cayenna pepper, garlic, hawthorn, onion & ginger 1-2 a day), Super Red Drink Powder daily, garlic daily       multivitamin, therapeutic with minerals (MULTI-VITAMIN) TABS tablet Take 4-8 tablets by mouth daily Life Extenstion       UNABLE TO FIND MEDICATION NAME: Udos Oil - combination of omega 3, 6, 9.  Taking 2-3 capsules daily       UNABLE TO FIND MEDICATION NAME: Super Beet Juice         REVIEW OF SYSTEMS:  10 point ROS of systems including Constitutional, Eyes, Respiratory, Cardiovascular, Gastroenterology, Genitourinary, Integumentary,  "Musculoskeletal, Psychiatric were all negative except for pertinent positives noted in my HPI.    Objective:  BP (!) 149/94   Pulse 57   Temp 96.7  F (35.9  C)   Resp 20   Ht 1.803 m (5' 11\")   Wt 113.5 kg (250 lb 3.2 oz)   SpO2 98%   BMI 34.90 kg/m     Exam:  GENERAL APPEARANCE:  Alert, in no distress, pleasant, cooperative, oriented x 4  EYES:  EOM, lids, pupils and irises normal, sclera clear and conjunctiva normal, no discharge or mattering on lids or lashes noted  ENT:  Mouth normal, moist mucous membranes, nose normal without drainage or crusting, external ears without lesions, hearing acuity intact  NECK: supple, symmetrical, trachea midline  RESP:  respiratory effort normal, no chest wall tenderness, no respiratory distress, Lung sounds clear, patient is on room air  CV:  Auscultation of heart done, rate and rhythm controlled and regular, no murmur, no rub or gallop. Edema trace bilateral lower extremities. VASCULAR: no open areas lower legs  ABDOMEN:  normal bowel sounds, soft, nontender, no palpable masses. Cash in place with blood tinged urine  M/S:   Gait and station walks with assist , no tenderness or swelling of the joints; able to move all extremities, digits normal  SKIN:  Inspection and palpation of skin and subcutaneous tissue: skin on extremities warm, dry and intact without rashes  NEURO: cranial nerves 2-12 grossly intact, no facial asymmetry, no speech deficits and able to follow directions, moves all extremities symmetrically  PSYCH:  insight and judgement and memory intact, affect and mood normal     Labs:   Most Recent 3 CBC's:  Recent Labs   Lab Test 12/20/19  0748 12/19/19  0839 12/18/19  0738   WBC 8.6 8.6 9.7   HGB 12.2* 12.1* 12.1*   * 103* 103*    129* 126*     Most Recent 3 BMP's:  Recent Labs   Lab Test 12/27/19  0730 12/24/19  1014 12/21/19  1022    141 137   POTASSIUM 4.1 3.6 3.6   CHLORIDE 108 110* 103   CO2 29 26 29   BUN 23 24 33*   CR 0.92 0.99 " 1.28*   ANIONGAP 3 5 5   HARSHA 8.6 8.6 8.1*   * 104* 162*     Most Recent TSH and T4:  Recent Labs   Lab Test 10/08/19  1117   TSH 1.94     Most Recent Hemoglobin A1c:  Recent Labs   Lab Test 11/14/19  0919   A1C 6.2*       ASSESSMENT/PLAN:  Acute pyelonephritis  Obstructive uropathy  Acute renal failure with acute tubular necrosis superimposed on stage 3 chronic kidney disease (H)  Acute on chronic issues, improving. Completing Cipro - no s/s recurrent infection. Renal function improved - avoid nephrotoxic meds. Failed void trial with urology today - back to using mauricio, needs teaching on care. F/U urology as ordered.     Type 2 diabetes mellitus with diabetic neuropathy, with long-term current use of insulin (H)  Morbid obesity (H)  TOMMY (obstructive sleep apnea)  Chronic issues. Encourage weight loss. Use CPAP per home routine. BG well managed: range . Continue meds and monitoring as ordered.     Dilated cardiomyopathy, nonischemic EF 30-40% in March 2013  Coronary artery disease involving native coronary artery of native heart without angina pectoris  S/p angioplasty with stent w/ RONEL in distal RCA  Essential hypertension  Chronic, stable at this time. Meds, vs, wt as ordered. F/U with status next visit. F/U cards per routine.     Paroxysmal atrial fibrillation (H)  Anticoagulation management encounter  Chronic issues. Rate controlled. INR 2.0 in acceptable range. Coumadin and INR checks as below.     Physical deconditioning  Acute with illness, making gains. Therapies - f/u with progress next week.     Orders written by provider at facility  1. INR 2.0 diagnosis a fib. Coumadin 4 mg PO daily and INR check on 1/3  2. Staff to start mauricio catheter care teaching.     Total time spent with patient visit at the [facility] nursing facility was 35 min including patient visit, review of past records and conversation with staff. Greater than 50% of total time spent with counseling and coordinating care due to  discussion with the patient concerning recent TCU course and current status; diagnostic results, impressions, and recommended follow up diagnostic studies; expected TCU course and goals to be achieved for discharge; education regarding current medical concerns and plan of care for management of urinary retention, infection, anticoagulation, scrotal swelling; instructions for recommended follow up as well as coordination of care including communicating with other medical professionals regarding status and POC and needed follow up.    Electronically signed by:  RADHA Monaco CNP               Sincerely,        RADHA Monaco CNP

## 2019-12-30 NOTE — PROGRESS NOTES
[facility]  [facility] Record Number:  [id number]  Place of Service where encounter took place:  [facility] [[id number]]  Chief Complaint   Patient presents with     RECHECK       HPI:    Jt Montgomery  is a 74 year old (1945), who is being seen today for an episodic care visit.  HPI information obtained from: facility chart records, facility staff, patient report and [facility] chart review.     Per recent TCU provider progress notes:  74 year old male with PMH including DM2 (on lantus and Novolog), AFib on coumadin, CKD3, CAD (on ASA, declined statin), HTN, TOMMY - on CPAP, gout, obesity.Sent to ED from clinic with BP of 89/49 and urinary retention. Work up in ED revealed BPs 80-90s/40-50s, CT of abdomen/pelvis showed marked bilateral perinephric infiltration, abnormal appearance of bladder. Lab work showed Na 126, K 5.4, BUN 80, creat 4.31, GFR 13. WBC 14.2 Lactic acid 2.4, bilirubin 6.5. Admitted for sepsis d/t acute pyelonephritis with acute on chronic renal failure due to obstructive uropathy. Treated with IVF and zosyn, mauricio. Urology saw:  suspects BPH - bladder outlet obstruction contributing to UTI pyelonephritis, started tamsulosin 0.4mg and finasteride 5mg. BCs grew out GNB and UC showing e coli. Dilated cardiomyopathy, nonischemic EF 30-40% in March 2013 then EF 50-55% in 2016 sees Dr Gomez, on lasix and spironolactone and metoprolol as well as Coumadin, metoprolol, digoxin, diltiazem due to A fib. Gout in hand - started prednisone.    Today's concern is:  Patient seen today for episodic TCU follow up. Saw urology today - failed voiding trial, will need to continue catheter and return for further f/u. Denies headaches, dizziness, chest pain, dyspnea, bowel issues. Reports scrotal swelling is much improved with nystatin. Note weight down 13 lbs since admission, now stabilized. Edema improving per patients. SBP range 106-154 and BG range  . Sats 98% on room air. Walks 150 ft with 2WW in therapies. Completing Cipro, no fevers. Reports hand pain resolved with prednisone.     Past Medical and Surgical History reviewed in Epic today.    MEDICATIONS:  Current Outpatient Medications   Medication Sig Dispense Refill     aspirin EC 81 MG EC tablet Take 1 tablet by mouth daily. 90 tablet 1     ciprofloxacin (CIPRO) 500 MG tablet Take 1 tablet (500 mg) by mouth 2 times daily for 10 days 20 tablet 0     Coenzyme Q10 (COQ10) 100 MG CAPS Take 200 mg by mouth daily        Continuous Blood Gluc Sensor (FREESTYLE RINKU 14 DAY SENSOR) MISC AS DIRECTED EVERY 14 DAYS 100 each 3     digoxin (LANOXIN) 125 MCG tablet Take 1 tablet (125 mcg) by mouth daily 90 tablet 3     diltiazem ER COATED BEADS (CARTIA XT) 300 MG 24 hr capsule Take 1 capsule (300 mg) by mouth daily 90 capsule 3     febuxostat (ULORIC) 40 MG TABS Take 40 mg by mouth daily       finasteride (PROSCAR) 5 MG tablet Take 1 tablet (5 mg) by mouth daily 30 tablet 0     furosemide (LASIX) 20 MG tablet Take 1 tablet (20 mg) by mouth daily 90 tablet 3     insulin aspart (NOVOLOG FLEXPEN) 100 UNIT/ML pen Inject 15 Units Subcutaneous 3 times daily (with meals) 30 mL 11     insulin glargine (LANTUS SOLOSTAR) 100 UNIT/ML pen ADMINISTER 18 UNITS UNDER THE SKIN DAILY (Patient taking differently: Inject 18 Units Subcutaneous At Bedtime ADMINISTER 18 UNITS UNDER THE SKIN DAILY) 30 mL 11     insulin pen needle (BD GERMÁN U/F) 32G X 4 MM miscellaneous USING FOUR TIMES DAILY OR AS DIRECTED 200 each 0     irbesartan (AVAPRO) 300 MG tablet Take 0.5 tablets (150 mg) by mouth At Bedtime 90 tablet 2     lactobacillus rhamnosus, GG, (CULTURELL) capsule Take 1 capsule by mouth daily       magnesium oxide 200 MG TABS Take 2 tablets by mouth At Bedtime        MELATONIN PO Take 0.5 mg by mouth At Bedtime        metoprolol succinate ER (TOPROL-XL) 50 MG 24 hr tablet Take 1 tablet (50 mg) by mouth 2 times daily 180 tablet 3      nystatin (MYCOSTATIN) 493041 UNIT/GM external cream Apply topically 2 times daily       order for DME Equipment being ordered: Compression stockings 1 each 3     predniSONE (DELTASONE) 20 MG tablet TAKE 1 TABLET(20 MG) BY MOUTH DAILY AS NEEDED FOR GOUT FLARE 10 tablet 0     Probiotic Product (PROBIOTIC & ACIDOPHILUS EX ST PO) Take 1 capsule by mouth daily       spironolactone (ALDACTONE) 25 MG tablet Take 0.5 tablets (12.5 mg) by mouth daily 90 tablet 3     tamsulosin (FLOMAX) 0.4 MG capsule Take 1 capsule (0.4 mg) by mouth every evening 30 capsule 0     Taurine 500 MG CAPS Take 1 capsule by mouth as needed        vitamin D3 (CHOLECALCIFEROL) 2000 units tablet Take 2 tablets by mouth daily        warfarin ANTICOAGULANT (COUMADIN) 5 MG tablet Take 1 1/2 tabs on Sundays, Tuesdays and Thursdays and take 1 tab on all other days or as directed by INR clinic (Patient taking differently: Take by mouth See Admin Instructions Take 1 1/2 tabs on Sundays, Tuesdays and Thursdays and take 1 tab on all other days or as directed by INR clinic) 110 tablet 1     Green Tea, Camillia sinensis, (GREEN TEA EXTRACT PO) Take by mouth 2 times daily Doesn't always take regularly       HERBALS Nerve by Plexis 2 a day,   Healthy Feet and Nerves  By [employer]  3 a day, Heart Food Caps  (cayenna pepper, garlic, hawthorn, onion & ginger 1-2 a day), Super Red Drink Powder daily, garlic daily       multivitamin, therapeutic with minerals (MULTI-VITAMIN) TABS tablet Take 4-8 tablets by mouth daily Life Extenstion       UNABLE TO FIND MEDICATION NAME: Udos Oil - combination of omega 3, 6, 9.  Taking 2-3 capsules daily       UNABLE TO FIND MEDICATION NAME: Super Beet Juice         REVIEW OF SYSTEMS:  10 point ROS of systems including Constitutional, Eyes, Respiratory, Cardiovascular, Gastroenterology, Genitourinary, Integumentary, Musculoskeletal, Psychiatric were all negative except for pertinent positives noted in my HPI.    Objective:  BP (!)  "149/94   Pulse 57   Temp 96.7  F (35.9  C)   Resp 20   Ht 1.803 m (5' 11\")   Wt 113.5 kg (250 lb 3.2 oz)   SpO2 98%   BMI 34.90 kg/m    Exam:  GENERAL APPEARANCE:  Alert, in no distress, pleasant, cooperative, oriented x 4  EYES:  EOM, lids, pupils and irises normal, sclera clear and conjunctiva normal, no discharge or mattering on lids or lashes noted  ENT:  Mouth normal, moist mucous membranes, nose normal without drainage or crusting, external ears without lesions, hearing acuity intact  NECK: supple, symmetrical, trachea midline  RESP:  respiratory effort normal, no chest wall tenderness, no respiratory distress, Lung sounds clear, patient is on room air  CV:  Auscultation of heart done, rate and rhythm controlled and regular, no murmur, no rub or gallop. Edema trace bilateral lower extremities. VASCULAR: no open areas lower legs  ABDOMEN:  normal bowel sounds, soft, nontender, no palpable masses. Cash in place with blood tinged urine  M/S:   Gait and station walks with assist , no tenderness or swelling of the joints; able to move all extremities, digits normal  SKIN:  Inspection and palpation of skin and subcutaneous tissue: skin on extremities warm, dry and intact without rashes  NEURO: cranial nerves 2-12 grossly intact, no facial asymmetry, no speech deficits and able to follow directions, moves all extremities symmetrically  PSYCH:  insight and judgement and memory intact, affect and mood normal     Labs:   Most Recent 3 CBC's:  Recent Labs   Lab Test 12/20/19  0748 12/19/19  0839 12/18/19  0738   WBC 8.6 8.6 9.7   HGB 12.2* 12.1* 12.1*   * 103* 103*    129* 126*     Most Recent 3 BMP's:  Recent Labs   Lab Test 12/27/19  0730 12/24/19  1014 12/21/19  1022    141 137   POTASSIUM 4.1 3.6 3.6   CHLORIDE 108 110* 103   CO2 29 26 29   BUN 23 24 33*   CR 0.92 0.99 1.28*   ANIONGAP 3 5 5   HARSHA 8.6 8.6 8.1*   * 104* 162*     Most Recent TSH and T4:  Recent Labs   Lab Test " 10/08/19  1117   TSH 1.94     Most Recent Hemoglobin A1c:  Recent Labs   Lab Test 11/14/19  0919   A1C 6.2*       ASSESSMENT/PLAN:  Acute pyelonephritis  Obstructive uropathy  Acute renal failure with acute tubular necrosis superimposed on stage 3 chronic kidney disease (H)  Acute on chronic issues, improving. Completing Cipro - no s/s recurrent infection. Renal function improved - avoid nephrotoxic meds. Failed void trial with urology today - back to using mauricio, needs teaching on care. F/U urology as ordered.     Type 2 diabetes mellitus with diabetic neuropathy, with long-term current use of insulin (H)  Morbid obesity (H)  TOMMY (obstructive sleep apnea)  Chronic issues. Encourage weight loss. Use CPAP per home routine. BG well managed: range . Continue meds and monitoring as ordered.     Dilated cardiomyopathy, nonischemic EF 30-40% in March 2013  Coronary artery disease involving native coronary artery of native heart without angina pectoris  S/p angioplasty with stent w/ RONEL in distal RCA  Essential hypertension  Chronic, stable at this time. Meds, vs, wt as ordered. F/U with status next visit. F/U cards per routine.     Paroxysmal atrial fibrillation (H)  Anticoagulation management encounter  Chronic issues. Rate controlled. INR 2.0 in acceptable range. Coumadin and INR checks as below.     Physical deconditioning  Acute with illness, making gains. Therapies - f/u with progress next week.     Orders written by provider at facility  1. INR 2.0 diagnosis a fib. Coumadin 4 mg PO daily and INR check on 1/3  2. Staff to start mauricio catheter care teaching.     Total time spent with patient visit at the skilled nursing facility was 35 min including patient visit, review of past records and conversation with staff. Greater than 50% of total time spent with counseling and coordinating care due to discussion with the patient concerning recent TCU course and current status; diagnostic results, impressions, and  recommended follow up diagnostic studies; expected TCU course and goals to be achieved for discharge; education regarding current medical concerns and plan of care for management of urinary retention, infection, anticoagulation, scrotal swelling; instructions for recommended follow up as well as coordination of care including communicating with other medical professionals regarding status and POC and needed follow up.    Electronically signed by:  RADHA Monaco CNP

## 2019-12-30 NOTE — PROGRESS NOTES
"Urology Consult History and Physical  [facility]  Name: Jt Montgomery    MRN: [id number]   YOB: 1945       We were asked to see Jt Montgomery at the request of SELF for evaluation and treatment of acute urinary retention.        Chief Complaint:   Acute urinary retention     History is obtained from the patient            History of Present Illness:   Jt Montgomery is a 74 year old male who is being seen for evaluation of acute urinary retention.    Seen as hospital consult on 12/18/19:  \"Jt Montgomery is a 74 year old male who is being seen for evaluation of man with urinary retention with complicated UTI/Pyelonephritis causing sepsis. His medical history is significant for HTN, DM, dilated cardiomyopathy, chronic kidney disease stage III, coronary artery disease, status post PCI in the past, atrial fibrillation on chronic anticoagulation with Coumadin, sleep apnea on CPAP, gout, obesity. He presented to the ER with weakness and urinary retention. Found to have severe sepsis secondary to a complicated UTI with pyelonephritis. Catheter was placed for retention. He notes prior episode of UTI about 4 months ago. He reports that he has been developing LUTS over the past several years with slow stream and incomplete emptying. Was not on any medication for this previously.\"    He was discharged on 12/21/19 with a course of Cipro and started on tamsulosin 0.4mg (Flomax) and finasteride 5mg (Proscar).  He presents today for Trial of void.     UCx  12/17/19    >100k E Coli  (R - Amp, Cefazolin, Cefoxitin, Ceftazidime, Ceftriaxone)    AUASS: 6-3-2-4-5-3-3 = 29  QOL = 6             Past Medical History:     Past Medical History:   Diagnosis Date     Atrial fibrillation (H)      CAD (coronary artery disease)     MI with RONEL to dRCA April 2011     Central Sleep Apnea with Pat Villanueva breathing 9/14/2010    Also TOMMY with CPAP     CKD (chronic kidney disease) stage 3, GFR " 30-59 ml/min (H)      Dilated cardiomyopathy (H)     nonischemic (possibly related to h/o a.fib with RVR) EF 30-40% 2013     DM2 (diabetes mellitus, type 2) (H)     Goal HgbA1c < 7%     Gout     uric acid level correlates; 2014 h/o +knee aspirate     Hernia, abdominal      Hypertension     Goal <140/90     Pulmonary hypertension (H)     mild per echo 3/2013     Spider veins             Past Surgical History:     Past Surgical History:   Procedure Laterality Date     CORONARY ANGIOGRAPHY ADULT ORDER      distal circ-RONEL     HERNIA REPAIR  11/20/10    incarcinated     SUSPEND HYOID, GENIOGLOSSAL ADVANCEMENT              Social History:     Social History     Tobacco Use     Smoking status: Former Smoker     Packs/day: 1.50     Years: 7.00     Pack years: 10.50     Types: Cigarettes     Last attempt to quit: 1972     Years since quittin.0     Smokeless tobacco: Never Used     Tobacco comment: quit in       Started at around age [age]   Substance Use Topics     Alcohol use: No     Alcohol/week: 0.0 standard drinks     Comment: 74   recovering       History   Smoking Status     Former Smoker     Packs/day: 1.50     Years: 7.00     Types: Cigarettes     Quit date: 1972   Smokeless Tobacco     Never Used     Comment: quit in       Started at around age [age]            Family History:     Family History   Problem Relation Age of Onset     Hypertension Mother      Coronary Artery Disease Mother      Coronary Artery Disease Father      Hypertension Father      Diabetes Sister      Cerebrovascular Disease Sister               Allergies:     Allergies   Allergen Reactions     Corn Dextrin      All corn products - gets tired an ornery            Medications:     Current Outpatient Medications   Medication Sig     aspirin EC 81 MG EC tablet Take 1 tablet by mouth daily.     Coenzyme Q10 (COQ10) 100 MG CAPS Take 200 mg by mouth daily      Continuous Blood Gluc Sensor (FREESTYLE RINKU 14  DAY SENSOR) MISC AS DIRECTED EVERY 14 DAYS     digoxin (LANOXIN) 125 MCG tablet Take 1 tablet (125 mcg) by mouth daily     diltiazem ER COATED BEADS (CARTIA XT) 300 MG 24 hr capsule Take 1 capsule (300 mg) by mouth daily     febuxostat (ULORIC) 40 MG TABS Take 40 mg by mouth daily     finasteride (PROSCAR) 5 MG tablet Take 1 tablet (5 mg) by mouth daily     furosemide (LASIX) 20 MG tablet Take 1 tablet (20 mg) by mouth daily     Green Tea, Camillia sinensis, (GREEN TEA EXTRACT PO) Take by mouth 2 times daily Doesn't always take regularly     HERBALS Nerve by Plexis 2 a day,   Healthy Feet and Nerves  By [employer]  3 a day, Heart Food Caps  (cayenna pepper, garlic, hawthorn, onion & ginger 1-2 a day), Super Red Drink Powder daily, garlic daily     insulin aspart (NOVOLOG FLEXPEN) 100 UNIT/ML pen Inject 15 Units Subcutaneous 3 times daily (with meals)     insulin glargine (LANTUS SOLOSTAR) 100 UNIT/ML pen ADMINISTER 18 UNITS UNDER THE SKIN DAILY (Patient taking differently: Inject 18 Units Subcutaneous At Bedtime ADMINISTER 18 UNITS UNDER THE SKIN DAILY)     insulin pen needle (BD GERMÁN U/F) 32G X 4 MM miscellaneous USING FOUR TIMES DAILY OR AS DIRECTED     irbesartan (AVAPRO) 300 MG tablet Take 0.5 tablets (150 mg) by mouth At Bedtime     lactobacillus rhamnosus, GG, (CULTURELL) capsule Take 1 capsule by mouth daily     magnesium oxide 200 MG TABS Take 2 tablets by mouth At Bedtime      MELATONIN PO Take 0.5 mg by mouth At Bedtime      metoprolol succinate ER (TOPROL-XL) 50 MG 24 hr tablet Take 1 tablet (50 mg) by mouth 2 times daily     multivitamin, therapeutic with minerals (MULTI-VITAMIN) TABS tablet Take 4-8 tablets by mouth daily Life Extenstion     order for DME Equipment being ordered: Compression stockings     predniSONE (DELTASONE) 20 MG tablet TAKE 1 TABLET(20 MG) BY MOUTH DAILY AS NEEDED FOR GOUT FLARE     spironolactone (ALDACTONE) 25 MG tablet Take 0.5 tablets (12.5 mg) by mouth daily     tamsulosin  (FLOMAX) 0.4 MG capsule Take 1 capsule (0.4 mg) by mouth every evening     vitamin D3 (CHOLECALCIFEROL) 2000 units tablet Take 2 tablets by mouth daily      warfarin ANTICOAGULANT (COUMADIN) 5 MG tablet Take 1 1/2 tabs on Sundays, Tuesdays and Thursdays and take 1 tab on all other days or as directed by INR clinic (Patient taking differently: Take by mouth See Admin Instructions Take 1 1/2 tabs on Sundays, Tuesdays and Thursdays and take 1 tab on all other days or as directed by INR clinic)     Probiotic Product (PROBIOTIC & ACIDOPHILUS EX ST PO) Take 1 capsule by mouth daily     Taurine 500 MG CAPS Take 1 capsule by mouth as needed      UNABLE TO FIND MEDICATION NAME: Udos Oil - combination of omega 3, 6, 9.  Taking 2-3 capsules daily     UNABLE TO FIND MEDICATION NAME: Super Beet Juice     No current facility-administered medications for this visit.              Review of Systems:     Skin: negative  Eyes: negative  Ears/Nose/Throat: negative  Respiratory: No shortness of breath, dyspnea on exertion, cough, or hemoptysis  Cardiovascular: as above  Gastrointestinal: negative  Genitourinary: as above  Musculoskeletal: negative  Neurologic: negative  Psychiatric: negative  Hematologic/Lymphatic/Immunologic: negative  Endocrine: as above          Physical Exam:   No data found.  Body mass index is 32.98 kg/m .     General: age-appropriate appearing male in NAD  HEENT: Head AT/NC, EOMI, CN Grossly intact  Lungs: no respiratory distress, or pursed lip breathing  Heart: No obvious jugular venous distension present  Back: no bony midline tenderness, no CVAT bilaterally.  Abdomen: soft, obesely-distended, non-tender. No organomegaly  Lymph: no palpable inguinal lymphadenopathy.  LE: no edema.   Musculoskeltal: extremities normal, no peripheral edema  Skin: no suspicious lesions or rashes  Neuro: Alert, oriented, speech and mentation normal;  moving all 4 extremities equally.  Psych: affect and mood normal          Data:    All laboratory data reviewed:    UA RESULTS:  Recent Labs   Lab Test 12/17/19  1222 10/14/19  1521   COLOR Yellow Yellow   APPEARANCE Slightly Cloudy Clear   URINEGLC Negative Negative   URINEBILI Negative Negative   URINEKETONE 5* Negative   SG 1.013 1.020   UBLD Small* Negative   URINEPH 6.0 5.5   PROTEIN 100* Negative   UROBILINOGEN  --  0.2   NITRITE Negative Negative   LEUKEST Large* Trace*   RBCU 18* O - 2   WBCU >182* 0 - 5      Lab Results   Component Value Date    CR 0.92 12/27/2019      IMAGING:  All pertinent imaging reviewed:    All imaging studies reviewed by me.  I personally reviewed these imaging films.  A formal report from radiology will follow.    CT ABD/PEL 12/17/19:  I reviewed these images and agree with the prostatomegaly    IMPRESSION:  1. Marked bilateral perinephric infiltration with prominent collecting  systems and ureters have the appearance of either recently resolved  obstruction or infection.  2. Abnormal appearance of the urinary bladder with bladder wall  thickening and prostate enlargement. Tumor cannot be excluded.  3. Cirrhotic appearing, fatty infiltrated liver.  4. Cholelithiasis.  5. Severe atherosclerosis.  6. Cardiomegaly.         Impression and Plan:   Impression:   74 year old man with recent acute urinary retention and UTI      Plan:   Acute urinary retention   - Trial of void was unsuccessful today  - catheter replaced with 900cc output  - F/U in 1-2 weeks for cystoscopy and repeat Trial of void   - continue tamsulosin 0.4mg (Flomax) and finasteride 5mg (Proscar)     Thank you for the kind consultation.    Time spent: 30 minutes of which >50% was spent counseling.    Artemio Pitt MD   Urology  [facility]  [facility] Phone: [phone]  [facility] Phone: [phone]

## 2019-12-30 NOTE — Clinical Note
Jose Jones,I saw Jamal last Monday for hospital follow up and Trial of void. Unfortunately, he was unable to urinate and the catheter was replaced. I'll see him back on 1/13/20 for cystoscopy and repeat Trial of void and will likely need to discuss bladder outlet procedure options with him at that time.Thanks, Artemio Pitt M.D.Cell: [phone]

## 2019-12-30 NOTE — LETTER
"12/30/2019       RE: Jt Montgomery  [address]  [place] [address]     Dear Colleague,    Thank you for referring your patient, Jt Montgomery, to the [facility] at [facility]. Please see a copy of my visit note below.    Urology Consult History and Physical  [place]  Name: Jt Montgomery    MRN: [id number]   YOB: 1945       We were asked to see Jt Montgomery at the request of SELF for evaluation and treatment of acute urinary retention.        Chief Complaint:   Acute urinary retention     History is obtained from the patient            History of Present Illness:   Jt Montgomery is a 74 year old male who is being seen for evaluation of acute urinary retention.    Seen as hospital consult on 12/18/19:  \"Jt Montgomery is a 74 year old male who is being seen for evaluation of man with urinary retention with complicated UTI/Pyelonephritis causing sepsis. His medical history is significant for HTN, DM, dilated cardiomyopathy, chronic kidney disease stage III, coronary artery disease, status post PCI in the past, atrial fibrillation on chronic anticoagulation with Coumadin, sleep apnea on CPAP, gout, obesity. He presented to the ER with weakness and urinary retention. Found to have severe sepsis secondary to a complicated UTI with pyelonephritis. Catheter was placed for retention. He notes prior episode of UTI about 4 months ago. He reports that he has been developing LUTS over the past several years with slow stream and incomplete emptying. Was not on any medication for this previously.\"    He was discharged on 12/21/19 with a course of Cipro and started on tamsulosin 0.4mg (Flomax) and finasteride 5mg (Proscar).  He presents today for Trial of void.     UCx  12/17/19    >100k E Coli  (R - Amp, Cefazolin, Cefoxitin, Ceftazidime, Ceftriaxone)    AUASS: " 1-4-5-4-5-3-3 = 29  QOL = 6             Past Medical History:     Past Medical History:   Diagnosis Date     Atrial fibrillation (H)      CAD (coronary artery disease)     MI with RONEL to dRCA 2011     Central Sleep Apnea with Pat Villanueva breathing 2010    Also TOMMY with CPAP     CKD (chronic kidney disease) stage 3, GFR 30-59 ml/min (H)      Dilated cardiomyopathy (H)     nonischemic (possibly related to h/o a.fib with RVR) EF 30-40% 2013     DM2 (diabetes mellitus, type 2) (H)     Goal HgbA1c < 7%     Gout     uric acid level correlates; 2014 h/o +knee aspirate     Hernia, abdominal      Hypertension     Goal <140/90     Pulmonary hypertension (H)     mild per echo 3/2013     Spider veins             Past Surgical History:     Past Surgical History:   Procedure Laterality Date     CORONARY ANGIOGRAPHY ADULT ORDER      distal circ-RONEL     HERNIA REPAIR  11/20/10    incarcinated     SUSPEND HYOID, GENIOGLOSSAL ADVANCEMENT              Social History:     Social History     Tobacco Use     Smoking status: Former Smoker     Packs/day: 1.50     Years: 7.00     Pack years: 10.50     Types: Cigarettes     Last attempt to quit: 1972     Years since quittin.0     Smokeless tobacco: Never Used     Tobacco comment: quit in       Started at around age [age]   Substance Use Topics     Alcohol use: No     Alcohol/week: 0.0 standard drinks     Comment: 74   recovering       History   Smoking Status     Former Smoker     Packs/day: 1.50     Years: 7.00     Types: Cigarettes     Quit date: 1972   Smokeless Tobacco     Never Used     Comment: quit in       Started at around age [age]            Family History:     Family History   Problem Relation Age of Onset     Hypertension Mother      Coronary Artery Disease Mother      Coronary Artery Disease Father      Hypertension Father      Diabetes Sister      Cerebrovascular Disease Sister               Allergies:     Allergies    Allergen Reactions     Corn Dextrin      All corn products - gets tired an ornery            Medications:     Current Outpatient Medications   Medication Sig     aspirin EC 81 MG EC tablet Take 1 tablet by mouth daily.     Coenzyme Q10 (COQ10) 100 MG CAPS Take 200 mg by mouth daily      Continuous Blood Gluc Sensor (FREESTYLE RINKU 14 DAY SENSOR) MISC AS DIRECTED EVERY 14 DAYS     digoxin (LANOXIN) 125 MCG tablet Take 1 tablet (125 mcg) by mouth daily     diltiazem ER COATED BEADS (CARTIA XT) 300 MG 24 hr capsule Take 1 capsule (300 mg) by mouth daily     febuxostat (ULORIC) 40 MG TABS Take 40 mg by mouth daily     finasteride (PROSCAR) 5 MG tablet Take 1 tablet (5 mg) by mouth daily     furosemide (LASIX) 20 MG tablet Take 1 tablet (20 mg) by mouth daily     Green Tea, Camillia sinensis, (GREEN TEA EXTRACT PO) Take by mouth 2 times daily Doesn't always take regularly     HERBALS Nerve by Plexis 2 a day,   Healthy Feet and Nerves  By [employer]  3 a day, Heart Food Caps  (cayenna pepper, garlic, hawthorn, onion & karla 1-2 a day), Super Red Drink Powder daily, garlic daily     insulin aspart (NOVOLOG FLEXPEN) 100 UNIT/ML pen Inject 15 Units Subcutaneous 3 times daily (with meals)     insulin glargine (LANTUS SOLOSTAR) 100 UNIT/ML pen ADMINISTER 18 UNITS UNDER THE SKIN DAILY (Patient taking differently: Inject 18 Units Subcutaneous At Bedtime ADMINISTER 18 UNITS UNDER THE SKIN DAILY)     insulin pen needle (BD GERMÁN U/F) 32G X 4 MM miscellaneous USING FOUR TIMES DAILY OR AS DIRECTED     irbesartan (AVAPRO) 300 MG tablet Take 0.5 tablets (150 mg) by mouth At Bedtime     lactobacillus rhamnosus, GG, (CULTURELL) capsule Take 1 capsule by mouth daily     magnesium oxide 200 MG TABS Take 2 tablets by mouth At Bedtime      MELATONIN PO Take 0.5 mg by mouth At Bedtime      metoprolol succinate ER (TOPROL-XL) 50 MG 24 hr tablet Take 1 tablet (50 mg) by mouth 2 times daily     multivitamin, therapeutic with minerals  (MULTI-VITAMIN) TABS tablet Take 4-8 tablets by mouth daily Life Extenstion     order for DME Equipment being ordered: Compression stockings     predniSONE (DELTASONE) 20 MG tablet TAKE 1 TABLET(20 MG) BY MOUTH DAILY AS NEEDED FOR GOUT FLARE     spironolactone (ALDACTONE) 25 MG tablet Take 0.5 tablets (12.5 mg) by mouth daily     tamsulosin (FLOMAX) 0.4 MG capsule Take 1 capsule (0.4 mg) by mouth every evening     vitamin D3 (CHOLECALCIFEROL) 2000 units tablet Take 2 tablets by mouth daily      warfarin ANTICOAGULANT (COUMADIN) 5 MG tablet Take 1 1/2 tabs on Sundays, Tuesdays and Thursdays and take 1 tab on all other days or as directed by INR clinic (Patient taking differently: Take by mouth See Admin Instructions Take 1 1/2 tabs on Sundays, Tuesdays and Thursdays and take 1 tab on all other days or as directed by INR clinic)     Probiotic Product (PROBIOTIC & ACIDOPHILUS EX ST PO) Take 1 capsule by mouth daily     Taurine 500 MG CAPS Take 1 capsule by mouth as needed      UNABLE TO FIND MEDICATION NAME: Udos Oil - combination of omega 3, 6, 9.  Taking 2-3 capsules daily     UNABLE TO FIND MEDICATION NAME: Super Beet Juice     No current facility-administered medications for this visit.              Review of Systems:     Skin: negative  Eyes: negative  Ears/Nose/Throat: negative  Respiratory: No shortness of breath, dyspnea on exertion, cough, or hemoptysis  Cardiovascular: as above  Gastrointestinal: negative  Genitourinary: as above  Musculoskeletal: negative  Neurologic: negative  Psychiatric: negative  Hematologic/Lymphatic/Immunologic: negative  Endocrine: as above          Physical Exam:   No data found.  Body mass index is 32.98 kg/m .     General: age-appropriate appearing male in NAD  HEENT: Head AT/NC, EOMI, CN Grossly intact  Lungs: no respiratory distress, or pursed lip breathing  Heart: No obvious jugular venous distension present  Back: no bony midline tenderness, no CVAT bilaterally.  Abdomen: soft,  obesely-distended, non-tender. No organomegaly  Lymph: no palpable inguinal lymphadenopathy.  LE: no edema.   Musculoskeltal: extremities normal, no peripheral edema  Skin: no suspicious lesions or rashes  Neuro: Alert, oriented, speech and mentation normal;  moving all 4 extremities equally.  Psych: affect and mood normal          Data:   All laboratory data reviewed:    UA RESULTS:  Recent Labs   Lab Test 12/17/19  1222 10/14/19  1521   COLOR Yellow Yellow   APPEARANCE Slightly Cloudy Clear   URINEGLC Negative Negative   URINEBILI Negative Negative   URINEKETONE 5* Negative   SG 1.013 1.020   UBLD Small* Negative   URINEPH 6.0 5.5   PROTEIN 100* Negative   UROBILINOGEN  --  0.2   NITRITE Negative Negative   LEUKEST Large* Trace*   RBCU 18* O - 2   WBCU >182* 0 - 5      Lab Results   Component Value Date    CR 0.92 12/27/2019      IMAGING:  All pertinent imaging reviewed:    All imaging studies reviewed by me.  I personally reviewed these imaging films.  A formal report from radiology will follow.    CT ABD/PEL 12/17/19:  I reviewed these images and agree with the prostatomegaly    IMPRESSION:  1. Marked bilateral perinephric infiltration with prominent collecting  systems and ureters have the appearance of either recently resolved  obstruction or infection.  2. Abnormal appearance of the urinary bladder with bladder wall  thickening and prostate enlargement. Tumor cannot be excluded.  3. Cirrhotic appearing, fatty infiltrated liver.  4. Cholelithiasis.  5. Severe atherosclerosis.  6. Cardiomegaly.         Impression and Plan:   Impression:   74 year old man with recent acute urinary retention and UTI      Plan:   Acute urinary retention   - Trial of void was unsuccessful today  - catheter replaced with 900cc output  - F/U in 1-2 weeks for cystoscopy and repeat Trial of void   - continue tamsulosin 0.4mg (Flomax) and finasteride 5mg (Proscar)     Thank you for the kind consultation.    Time spent: 30 minutes of  which >50% was spent counseling.    Artemio Pitt MD   Urology  [facility]  [facility] Phone: [phone]  [facility] Phone: [phone]         Again, thank you for allowing me to participate in the care of your patient.      Sincerely,    Artemio Pitt MD

## 2020-01-02 ENCOUNTER — TELEPHONE (OUTPATIENT)
Dept: FAMILY MEDICINE | Facility: CLINIC | Age: 75
End: 2020-01-02

## 2020-01-02 DIAGNOSIS — R33.9 URINARY RETENTION: Primary | ICD-10-CM

## 2020-01-02 NOTE — TELEPHONE ENCOUNTER
Jamal called to get Dr Jones's recommendation on a Urologist that he should see, he had a bad experience with the one he saw while he was at [facility] and wants to see someone else. He is at [place] right now and can be reached at [phone]. (Marlena in referrals covering for Marnie)

## 2020-01-02 NOTE — TELEPHONE ENCOUNTER
Please notify pt that urology referral ordered for    Your provider has referred you to: [facility]: [facility]   [place] [phone]   [email or web address]

## 2020-01-03 ENCOUNTER — NURSING HOME VISIT (OUTPATIENT)
Dept: GERIATRICS | Facility: CLINIC | Age: 75
End: 2020-01-03
Payer: COMMERCIAL

## 2020-01-03 VITALS
HEART RATE: 66 BPM | WEIGHT: 241 LBS | TEMPERATURE: 96.3 F | OXYGEN SATURATION: 97 % | RESPIRATION RATE: 18 BRPM | SYSTOLIC BLOOD PRESSURE: 116 MMHG | BODY MASS INDEX: 33.74 KG/M2 | DIASTOLIC BLOOD PRESSURE: 64 MMHG | HEIGHT: 71 IN

## 2020-01-03 DIAGNOSIS — Z51.81 ENCOUNTER FOR THERAPEUTIC DRUG MONITORING: ICD-10-CM

## 2020-01-03 DIAGNOSIS — I48.0 PAROXYSMAL ATRIAL FIBRILLATION (H): Primary | Chronic | ICD-10-CM

## 2020-01-03 DIAGNOSIS — Z79.01 LONG TERM (CURRENT) USE OF ANTICOAGULANTS: ICD-10-CM

## 2020-01-03 PROCEDURE — 99308 SBSQ NF CARE LOW MDM 20: CPT | Performed by: NURSE PRACTITIONER

## 2020-01-03 ASSESSMENT — MIFFLIN-ST. JEOR: SCORE: 1860.3

## 2020-01-03 NOTE — LETTER
"    1/3/2020        RE: Jt Montgomery  [address]  [place] [address]        [place] [facility]  [place] Medical Record Number:  [id number]  Place of Service where encounter took place: [facility] [[id number]]    HPI:    Jt Montgomery is a 74 year old  (1945), who is being seen today for an episodic care visit at the above location.   HPI information obtained from: facility chart records, facility staff and [facility] chart review. Today's concern is INR/Coumadin management for A. Fib    ROS/Subjective:  Bleeding Signs/Symptoms:  None  Thromboembolic Signs/Symptoms:  None  Medication Changes:  Yes: coumadin adjustment  Dietary Changes:  Yes: at TCU  Activity Changes: Yes: at TCU  Bacterial/Viral Infection:  Yes: completing treatment for pyelonephritis  Missed Coumadin Doses:  None  On ASA: Yes - 81 mg po q day  Other Concerns:  No    OBJECTIVE:  /64   Pulse 66   Temp 96.3  F (35.7  C)   Resp 18   Ht 1.803 m (5' 11\")   Wt 109.3 kg (241 lb)   SpO2 97%   BMI 33.61 kg/m     Last INR: 2.0 on 12/30   INR Today:  2.0  Current Dose:  Coumadin 7.5 mg on 12/27, 5 mg on 12/28-29, 4 mg on 12/30-1/2.     ASSESSMENT:  1. Paroxysmal atrial fibrillation (H)    2. Encounter for therapeutic drug monitoring    3. Long term (current) use of anticoagulants      Therapeutic INR for goal of 2-3    PLAN:   Orders written by provider at facility  New Dose: Coumadin 5 mg PO daily    Next INR: 1/7    Electronically signed by:  RADHA Monaco CNP         Sincerely,        RADHA Monaco CNP    "

## 2020-01-03 NOTE — PROGRESS NOTES
"[place] [facility]  [place] Medical Record Number:  [id number]  Place of Service where encounter took place: [facility] [[id number]]    HPI:    Jt Montgomery is a 74 year old  (1945), who is being seen today for an episodic care visit at the above location.   HPI information obtained from: facility chart records, facility staff and [facility] chart review. Today's concern is INR/Coumadin management for A. Fib    ROS/Subjective:  Bleeding Signs/Symptoms:  None  Thromboembolic Signs/Symptoms:  None  Medication Changes:  Yes: coumadin adjustment  Dietary Changes:  Yes: at TCU  Activity Changes: Yes: at TCU  Bacterial/Viral Infection:  Yes: completing treatment for pyelonephritis  Missed Coumadin Doses:  None  On ASA: Yes - 81 mg po q day  Other Concerns:  No    OBJECTIVE:  /64   Pulse 66   Temp 96.3  F (35.7  C)   Resp 18   Ht 1.803 m (5' 11\")   Wt 109.3 kg (241 lb)   SpO2 97%   BMI 33.61 kg/m    Last INR: 2.0 on 12/30   INR Today:  2.0  Current Dose:  Coumadin 7.5 mg on 12/27, 5 mg on 12/28-29, 4 mg on 12/30-1/2.     ASSESSMENT:  1. Paroxysmal atrial fibrillation (H)    2. Encounter for therapeutic drug monitoring    3. Long term (current) use of anticoagulants      Therapeutic INR for goal of 2-3    PLAN:   Orders written by provider at facility  New Dose: Coumadin 5 mg PO daily    Next INR: 1/7    Electronically signed by:  RADHA Monaco CNP     "

## 2020-01-06 ENCOUNTER — TELEPHONE (OUTPATIENT)
Dept: PHARMACY | Facility: CLINIC | Age: 75
End: 2020-01-06

## 2020-01-06 ENCOUNTER — NURSING HOME VISIT (OUTPATIENT)
Dept: GERIATRICS | Facility: CLINIC | Age: 75
End: 2020-01-06
Payer: COMMERCIAL

## 2020-01-06 VITALS
WEIGHT: 241 LBS | TEMPERATURE: 97.5 F | HEIGHT: 71 IN | DIASTOLIC BLOOD PRESSURE: 69 MMHG | BODY MASS INDEX: 33.74 KG/M2 | SYSTOLIC BLOOD PRESSURE: 105 MMHG | RESPIRATION RATE: 18 BRPM | HEART RATE: 56 BPM | OXYGEN SATURATION: 96 %

## 2020-01-06 DIAGNOSIS — N17.0 ACUTE RENAL FAILURE WITH ACUTE TUBULAR NECROSIS SUPERIMPOSED ON STAGE 3 CHRONIC KIDNEY DISEASE (H): ICD-10-CM

## 2020-01-06 DIAGNOSIS — I25.10 CORONARY ARTERY DISEASE INVOLVING NATIVE CORONARY ARTERY OF NATIVE HEART WITHOUT ANGINA PECTORIS: ICD-10-CM

## 2020-01-06 DIAGNOSIS — Z51.81 ANTICOAGULATION MANAGEMENT ENCOUNTER: ICD-10-CM

## 2020-01-06 DIAGNOSIS — G47.33 OSA (OBSTRUCTIVE SLEEP APNEA): ICD-10-CM

## 2020-01-06 DIAGNOSIS — E11.40 TYPE 2 DIABETES MELLITUS WITH DIABETIC NEUROPATHY, WITH LONG-TERM CURRENT USE OF INSULIN (H): ICD-10-CM

## 2020-01-06 DIAGNOSIS — R53.81 PHYSICAL DECONDITIONING: ICD-10-CM

## 2020-01-06 DIAGNOSIS — N18.30 ACUTE RENAL FAILURE WITH ACUTE TUBULAR NECROSIS SUPERIMPOSED ON STAGE 3 CHRONIC KIDNEY DISEASE (H): ICD-10-CM

## 2020-01-06 DIAGNOSIS — N13.9 OBSTRUCTIVE UROPATHY: ICD-10-CM

## 2020-01-06 DIAGNOSIS — N10 ACUTE PYELONEPHRITIS: Primary | ICD-10-CM

## 2020-01-06 DIAGNOSIS — E66.01 MORBID OBESITY (H): ICD-10-CM

## 2020-01-06 DIAGNOSIS — I10 ESSENTIAL HYPERTENSION: ICD-10-CM

## 2020-01-06 DIAGNOSIS — I48.0 PAROXYSMAL ATRIAL FIBRILLATION (H): ICD-10-CM

## 2020-01-06 DIAGNOSIS — Z95.820 S/P ANGIOPLASTY WITH STENT: ICD-10-CM

## 2020-01-06 DIAGNOSIS — Z79.01 ANTICOAGULATION MANAGEMENT ENCOUNTER: ICD-10-CM

## 2020-01-06 DIAGNOSIS — Z79.4 TYPE 2 DIABETES MELLITUS WITH DIABETIC NEUROPATHY, WITH LONG-TERM CURRENT USE OF INSULIN (H): ICD-10-CM

## 2020-01-06 DIAGNOSIS — I42.0 DILATED CARDIOMYOPATHY (H): ICD-10-CM

## 2020-01-06 PROCEDURE — 99207 ZZC CDG-CODE CATEGORY CHANGED: CPT | Performed by: NURSE PRACTITIONER

## 2020-01-06 PROCEDURE — 99316 NF DSCHRG MGMT 30 MIN+: CPT | Performed by: NURSE PRACTITIONER

## 2020-01-06 ASSESSMENT — MIFFLIN-ST. JEOR: SCORE: 1860.3

## 2020-01-06 NOTE — PROGRESS NOTES
[place] [facility]  [place] Medical Record Number:  [id number]  Place of Service where encounter took place:   [facility] ([facility] [[id number]]  Chief Complaint   Patient presents with     RECHECK       HPI:    Jt Montgomery  is a 74 year old (1945), who is being seen today for an episodic care visit.  HPI information obtained from: facility chart records, facility staff, patient report and [facility] chart review. Today's concern is:  Brief Summary of Hospital Course: recent hospitalization due to hypotension. PMH includes DM2, atrial fibrillation - anticoagulated on coumadin, CKD3, CAD, hypertension, TOMMY- on CPAP, gout, obesity. Patient was at PCP clinic on 12/17 and found to have BP of 89/49 as well as urinary retention. Work up in ED revealed BPs 80-90s/40-50s, CT of abdomen/pelvis showed marked bilateral perinephric infiltration, abnormal appearance of bladder. Lab work showed Na 126, K 5.4, BUN 80, creat 4.31, GFR 13. WBC 14.2 Lactic acid 2.4, bilirubin 6.5. he was admitted to hospital for sepsis due to acute pyelonephritis with acute on chronic renal failure due to obstructive uropathy. He was treated with IVF and zosyn. Cash catheter was placed. Nephrotoxic meds held.   Urology was consulted, who suspects BPH- bladder outlet obstruction contributing to UTI pyelonephritis. He was started on tamsulosin 0.4mg and finasteride 5mg.   Blood cultures grew out GNB and UC showing e coli.   On 12/18 his -130 likely due to metoprolol and cardiezem on hold due to hypotension.   Once patient stable he was transferred to TCU for strengthening.       Updates on Status Since Skilled nursing Admission: he has been progressing with therapy. He did go out to urology on 12/30 and failed TOV. Cash catheter was replaced and patient scheduled for cystoscopy in 2 weeks.     Past Medical and Surgical History reviewed in Epic today.    MEDICATIONS:  Current Outpatient Medications   Medication  Sig Dispense Refill     aspirin EC 81 MG EC tablet Take 1 tablet by mouth daily. 90 tablet 1     Coenzyme Q10 (COQ10) 100 MG CAPS Take 200 mg by mouth daily        Continuous Blood Gluc Sensor (FREESTYLE RINKU 14 DAY SENSOR) MIS AS DIRECTED EVERY 14 DAYS 100 each 3     digoxin (LANOXIN) 125 MCG tablet Take 1 tablet (125 mcg) by mouth daily 90 tablet 3     diltiazem ER COATED BEADS (CARTIA XT) 300 MG 24 hr capsule Take 1 capsule (300 mg) by mouth daily 90 capsule 3     febuxostat (ULORIC) 40 MG TABS Take 40 mg by mouth daily       finasteride (PROSCAR) 5 MG tablet Take 1 tablet (5 mg) by mouth daily 30 tablet 0     furosemide (LASIX) 20 MG tablet Take 1 tablet (20 mg) by mouth daily 90 tablet 3     insulin aspart (NOVOLOG FLEXPEN) 100 UNIT/ML pen Inject 15 Units Subcutaneous 3 times daily (with meals) 30 mL 11     insulin glargine (LANTUS SOLOSTAR) 100 UNIT/ML pen ADMINISTER 18 UNITS UNDER THE SKIN DAILY (Patient taking differently: Inject 18 Units Subcutaneous At Bedtime ADMINISTER 18 UNITS UNDER THE SKIN DAILY) 30 mL 11     insulin pen needle (BD GERMÁN U/F) 32G X 4 MM miscellaneous USING FOUR TIMES DAILY OR AS DIRECTED 200 each 0     irbesartan (AVAPRO) 300 MG tablet Take 0.5 tablets (150 mg) by mouth At Bedtime 90 tablet 2     lactobacillus rhamnosus, GG, (CULTURELL) capsule Take 1 capsule by mouth daily       magnesium oxide 200 MG TABS Take 2 tablets by mouth At Bedtime        MELATONIN PO Take 0.5 mg by mouth At Bedtime        metoprolol succinate ER (TOPROL-XL) 50 MG 24 hr tablet Take 1 tablet (50 mg) by mouth 2 times daily 180 tablet 3     multivitamin, therapeutic with minerals (MULTI-VITAMIN) TABS tablet Take 4-8 tablets by mouth daily Life Extenstion       order for DME Equipment being ordered: Compression stockings 1 each 3     predniSONE (DELTASONE) 20 MG tablet TAKE 1 TABLET(20 MG) BY MOUTH DAILY AS NEEDED FOR GOUT FLARE 10 tablet 0     Probiotic Product (PROBIOTIC & ACIDOPHILUS EX ST PO) Take 1 capsule  "by mouth daily       spironolactone (ALDACTONE) 25 MG tablet Take 0.5 tablets (12.5 mg) by mouth daily 90 tablet 3     tamsulosin (FLOMAX) 0.4 MG capsule Take 1 capsule (0.4 mg) by mouth every evening 30 capsule 0     Taurine 500 MG CAPS Take 1 capsule by mouth as needed        vitamin D3 (CHOLECALCIFEROL) 2000 units tablet Take 2 tablets by mouth daily        warfarin ANTICOAGULANT (COUMADIN) 5 MG tablet Take 1 1/2 tabs on Sundays, Tuesdays and Thursdays and take 1 tab on all other days or as directed by INR clinic (Patient taking differently: Take by mouth See Admin Instructions Take 1 1/2 tabs on Sundays, Tuesdays and Thursdays and take 1 tab on all other days or as directed by INR clinic) 110 tablet 1     Green Tea, Camillia sinensis, (GREEN TEA EXTRACT PO) Take by mouth 2 times daily Doesn't always take regularly       HERBALS Nerve by Plexis 2 a day,   Healthy Feet and Nerves  By [employer]  3 a day, Heart Food Caps  (cayenna pepper, garlic, hawthorn, onion & ginger 1-2 a day), Super Red Drink Powder daily, garlic daily       UNABLE TO FIND MEDICATION NAME: Udos Oil - combination of omega 3, 6, 9.  Taking 2-3 capsules daily       UNABLE TO FIND MEDICATION NAME: Super Beet Juice           REVIEW OF SYSTEMS:  4 point ROS including Respiratory, CV, GI and , other than that noted in the HPI,  is negative    Objective:  /69   Pulse 56   Temp 97.5  F (36.4  C)   Resp 18   Ht 1.803 m (5' 11\")   Wt 109.3 kg (241 lb)   SpO2 96%   BMI 33.61 kg/m    Exam:  GENERAL APPEARANCE:  Alert, in no distress  ENT:  Mouth and posterior oropharynx normal, moist mucous membranes, hearing acuity adequate   EYES:  EOM, conjunctivae, lids, pupils and irises normal  NECK:  No adenopathy,masses or thyromegaly  RESP:  respiratory effort and palpation of chest normal, no respiratory distress, Lung sounds clear  CV:  Palpation and auscultation of heart done , rate and rhythm reg, no murmur, no rub or gallop, Edema " none  ABDOMEN:  normal bowel sounds, soft, nontender, no hepatosplenomegaly or other masses  M/S:   Gait and station steady, Digits and nails normal   SKIN:  Inspection/Palpation of skin and subcutaneous tissue no rash  NEURO: 2-12 in normal limits and at patient's baseline  PSYCH:  insight and judgement, memory intact , affect and mood normal    Labs:   CBC RESULTS:   Recent Labs   Lab Test 12/20/19  0748 12/19/19  0839   WBC 8.6 8.6   RBC 3.63* 3.50*   HGB 12.2* 12.1*   HCT 37.0* 36.0*   * 103*   MCH 33.6* 34.6*   MCHC 33.0 33.6   RDW 13.5 13.9    129*       Last Basic Metabolic Panel:  Recent Labs   Lab Test 12/27/19  0730 12/24/19  1014    141   POTASSIUM 4.1 3.6   CHLORIDE 108 110*   HARSHA 8.6 8.6   CO2 29 26   BUN 23 24   CR 0.92 0.99   * 104*       Liver Function Studies -   Recent Labs   Lab Test 12/19/19  0839 12/18/19  0738  12/17/19  1151   PROTTOTAL  --  6.1*  --  6.8   ALBUMIN 2.1* 2.2*   < > 2.9*   BILITOTAL  --  3.9*  --  6.5*   ALKPHOS  --  60  --  89   AST  --  33  --  37   ALT  --  26  --  28    < > = values in this interval not displayed.       TSH   Date Value Ref Range Status   10/08/2019 1.94 0.40 - 4.00 mU/L Final   04/29/2016 2.35 0.40 - 4.00 mU/L Final   ]    Lab Results   Component Value Date    A1C 6.2 11/14/2019    A1C 7.6 10/08/2019           ASSESSMENT/PLAN:  (N10) Acute pyelonephritis  (primary encounter diagnosis)  (N13.9) Obstructive uropathy  (N17.0,  N18.3) Acute renal failure with acute tubular necrosis superimposed on stage 3 chronic kidney disease (H)  Comment: recent hospitalization due to hypotension and inability to urinate. He was found to have creat 4.31, WBC 14.3, blood and urine cultures grew out e coli. Imaging of kidneys suggested obstruction. Patient was treated with mauricio catheter, IV antibiotics and IVF. Urology started him on flomax and proscar.  Baseline creat 1.19. follows with Dr Walker. yusef now at baseline. He did follow up with urology  on 12/30 but failed TOV.   Plan: continue mauricio catheter until follow up with urology for cystoscopy.   Continue flomax and proscar as ordered.    (E11.40,  Z79.4) Type 2 diabetes mellitus with diabetic neuropathy, with long-term current use of insulin (H)  Comment: he is back on PTA insulin. BG running 120-150 here.   Plan:  FBG TID   lantus 18u subcutaneous q hs  Novolog 15u TID with meals- hold novolog if BG <150    (E66.01) Morbid obesity (H)  Comment: Body mass index is 35.84 kg/m . He recently lost 40# by eating better  Plan: physical therapy     (G47.33) TOMMY (obstructive sleep apnea)  Comment: uses CPAP q hs  Plan: continue    (I42.0) Dilated cardiomyopathy, nonischemic EF 30-40% in March 2013  Comment: followed by Dr Gomez. Patient has less scrotal edema and no LE edema today. He says he has Rx for lasix 20mg that he takes when he feels like he needs to. He is also taking spironolactone 12.5mg q day. Weight is down 23# since adm to TCU. Lasix 20mg q day has been scheduled.   Plan: daily wts  continue lasix 20mg q day  Continue spironolactone 12.5mg q day  Continue metoprolol 50mg ER 50mg BID  Follow up with cardiology as needed    (I25.10) Coronary artery disease involving native coronary artery of native heart without angina pectoris  (Z95.820) S/p angioplasty with stent w/ RONEL in distal RCA  Comment: lexiscan in 2018 did not show ischemia. He has declined statin therapy in the past. no recent issues with CP. He continues on ASA 81mg q day  Plan: continue with current meds    (I10) Essential hypertension  Comment: BPs in TCU running 105/69, 114/73,  126/73, 133/70  Plan: irbesartan 150mg q hs  In addition to meds as above    (I48.0) Paroxysmal atrial fibrillation (H)  (Z51.81,  Z79.01) Anticoagulation management encounter  Comment: last INR 2.0 on 12/30. INR goal 2-3 Current coumadin dose 5mg q day.   Plan: continue current coumadin dose.   INR 1/7    (R53.81) Physical deconditioning  Comment: lives alone  in condo. Getting ready to return home. Walking in hallways.   Plan: Prepare for discharge later this week.      Electronically signed by:  RADHA Langford CNP

## 2020-01-06 NOTE — LETTER
1/6/2020        RE: Jt Montgomery  [address]  [facility] [id number]        [place] [facility]  [facility] Record Number:  [id number]  [facility] of [facility] where encounter took place:  [facility] [[id number]]  Chief Complaint   Patient presents with     RECHECK       HPI:    Jt Montgomery  is a 74 year old (1945), who is being seen today for an episodic care visit.  HPI information obtained from: facility chart records, facility staff, patient report and [facility] chart review. Today's concern is:  Brief Summary of Hospital Course: recent hospitalization due to hypotension. PMH includes DM2, atrial fibrillation - anticoagulated on coumadin, CKD3, CAD, hypertension, TOMMY- on CPAP, gout, obesity. Patient was at PCP clinic on 12/17 and found to have BP of 89/49 as well as urinary retention. Work up in ED revealed BPs 80-90s/40-50s, CT of abdomen/pelvis showed marked bilateral perinephric infiltration, abnormal appearance of bladder. Lab work showed Na 126, K 5.4, BUN 80, creat 4.31, GFR 13. WBC 14.2 Lactic acid 2.4, bilirubin 6.5. he was admitted to hospital for sepsis due to acute pyelonephritis with acute on chronic renal failure due to obstructive uropathy. He was treated with IVF and zosyn. Cash catheter was placed. Nephrotoxic meds held.   Urology was consulted, who suspects BPH- bladder outlet obstruction contributing to UTI pyelonephritis. He was started on tamsulosin 0.4mg and finasteride 5mg.   Blood cultures grew out GNB and UC showing e coli.   On 12/18 his -130 likely due to metoprolol and cardiezem on hold due to hypotension.   Once patient stable he was transferred to TCU for strengthening.       Updates on Status Since Skilled nursing Admission: he has been progressing with therapy. He did go out to urology on 12/30 and failed TOV. Cash catheter was replaced and patient scheduled for cystoscopy in 2 weeks.     Past Medical  and Surgical History reviewed in Epic today.    MEDICATIONS:  Current Outpatient Medications   Medication Sig Dispense Refill     aspirin EC 81 MG EC tablet Take 1 tablet by mouth daily. 90 tablet 1     Coenzyme Q10 (COQ10) 100 MG CAPS Take 200 mg by mouth daily        Continuous Blood Gluc Sensor (FREESTYLE RINKU 14 DAY SENSOR) MISC AS DIRECTED EVERY 14 DAYS 100 each 3     digoxin (LANOXIN) 125 MCG tablet Take 1 tablet (125 mcg) by mouth daily 90 tablet 3     diltiazem ER COATED BEADS (CARTIA XT) 300 MG 24 hr capsule Take 1 capsule (300 mg) by mouth daily 90 capsule 3     febuxostat (ULORIC) 40 MG TABS Take 40 mg by mouth daily       finasteride (PROSCAR) 5 MG tablet Take 1 tablet (5 mg) by mouth daily 30 tablet 0     furosemide (LASIX) 20 MG tablet Take 1 tablet (20 mg) by mouth daily 90 tablet 3     insulin aspart (NOVOLOG FLEXPEN) 100 UNIT/ML pen Inject 15 Units Subcutaneous 3 times daily (with meals) 30 mL 11     insulin glargine (LANTUS SOLOSTAR) 100 UNIT/ML pen ADMINISTER 18 UNITS UNDER THE SKIN DAILY (Patient taking differently: Inject 18 Units Subcutaneous At Bedtime ADMINISTER 18 UNITS UNDER THE SKIN DAILY) 30 mL 11     insulin pen needle (BD GERMÁN U/F) 32G X 4 MM miscellaneous USING FOUR TIMES DAILY OR AS DIRECTED 200 each 0     irbesartan (AVAPRO) 300 MG tablet Take 0.5 tablets (150 mg) by mouth At Bedtime 90 tablet 2     lactobacillus rhamnosus, GG, (CULTURELL) capsule Take 1 capsule by mouth daily       magnesium oxide 200 MG TABS Take 2 tablets by mouth At Bedtime        MELATONIN PO Take 0.5 mg by mouth At Bedtime        metoprolol succinate ER (TOPROL-XL) 50 MG 24 hr tablet Take 1 tablet (50 mg) by mouth 2 times daily 180 tablet 3     multivitamin, therapeutic with minerals (MULTI-VITAMIN) TABS tablet Take 4-8 tablets by mouth daily Life Extenstion       order for DME Equipment being ordered: Compression stockings 1 each 3     predniSONE (DELTASONE) 20 MG tablet TAKE 1 TABLET(20 MG) BY MOUTH DAILY AS  "NEEDED FOR GOUT FLARE 10 tablet 0     Probiotic Product (PROBIOTIC & ACIDOPHILUS EX ST PO) Take 1 capsule by mouth daily       spironolactone (ALDACTONE) 25 MG tablet Take 0.5 tablets (12.5 mg) by mouth daily 90 tablet 3     tamsulosin (FLOMAX) 0.4 MG capsule Take 1 capsule (0.4 mg) by mouth every evening 30 capsule 0     Taurine 500 MG CAPS Take 1 capsule by mouth as needed        vitamin D3 (CHOLECALCIFEROL) 2000 units tablet Take 2 tablets by mouth daily        warfarin ANTICOAGULANT (COUMADIN) 5 MG tablet Take 1 1/2 tabs on Sundays, Tuesdays and Thursdays and take 1 tab on all other days or as directed by INR clinic (Patient taking differently: Take by mouth See Admin Instructions Take 1 1/2 tabs on Sundays, Tuesdays and Thursdays and take 1 tab on all other days or as directed by INR clinic) 110 tablet 1     Green Tea, Camillia sinensis, (GREEN TEA EXTRACT PO) Take by mouth 2 times daily Doesn't always take regularly       HERBALS Nerve by Plexis 2 a day,   Healthy Feet and Nerves  By [employer]  3 a day, Heart Food Caps  (cayenna pepper, garlic, hawthorn, onion & ginger 1-2 a day), Super Red Drink Powder daily, garlic daily       UNABLE TO FIND MEDICATION NAME: Udos Oil - combination of omega 3, 6, 9.  Taking 2-3 capsules daily       UNABLE TO FIND MEDICATION NAME: Super Beet Juice           REVIEW OF SYSTEMS:  4 point ROS including Respiratory, CV, GI and , other than that noted in the HPI,  is negative    Objective:  /69   Pulse 56   Temp 97.5  F (36.4  C)   Resp 18   Ht 1.803 m (5' 11\")   Wt 109.3 kg (241 lb)   SpO2 96%   BMI 33.61 kg/m     Exam:  GENERAL APPEARANCE:  Alert, in no distress  ENT:  Mouth and posterior oropharynx normal, moist mucous membranes, hearing acuity adequate   EYES:  EOM, conjunctivae, lids, pupils and irises normal  NECK:  No adenopathy,masses or thyromegaly  RESP:  respiratory effort and palpation of chest normal, no respiratory distress, Lung sounds clear  CV:  " Palpation and auscultation of heart done , rate and rhythm reg, no murmur, no rub or gallop, Edema none  ABDOMEN:  normal bowel sounds, soft, nontender, no hepatosplenomegaly or other masses  M/S:   Gait and station steady, Digits and nails normal   SKIN:  Inspection/Palpation of skin and subcutaneous tissue no rash  NEURO: 2-12 in normal limits and at patient's baseline  PSYCH:  insight and judgement, memory intact , affect and mood normal    Labs:   CBC RESULTS:   Recent Labs   Lab Test 12/20/19  0748 12/19/19  0839   WBC 8.6 8.6   RBC 3.63* 3.50*   HGB 12.2* 12.1*   HCT 37.0* 36.0*   * 103*   MCH 33.6* 34.6*   MCHC 33.0 33.6   RDW 13.5 13.9    129*       Last Basic Metabolic Panel:  Recent Labs   Lab Test 12/27/19  0730 12/24/19  1014    141   POTASSIUM 4.1 3.6   CHLORIDE 108 110*   HARSHA 8.6 8.6   CO2 29 26   BUN 23 24   CR 0.92 0.99   * 104*       Liver Function Studies -   Recent Labs   Lab Test 12/19/19  0839 12/18/19  0738  12/17/19  1151   PROTTOTAL  --  6.1*  --  6.8   ALBUMIN 2.1* 2.2*   < > 2.9*   BILITOTAL  --  3.9*  --  6.5*   ALKPHOS  --  60  --  89   AST  --  33  --  37   ALT  --  26  --  28    < > = values in this interval not displayed.       TSH   Date Value Ref Range Status   10/08/2019 1.94 0.40 - 4.00 mU/L Final   04/29/2016 2.35 0.40 - 4.00 mU/L Final   ]    Lab Results   Component Value Date    A1C 6.2 11/14/2019    A1C 7.6 10/08/2019           ASSESSMENT/PLAN:  (N10) Acute pyelonephritis  (primary encounter diagnosis)  (N13.9) Obstructive uropathy  (N17.0,  N18.3) Acute renal failure with acute tubular necrosis superimposed on stage 3 chronic kidney disease (H)  Comment: recent hospitalization due to hypotension and inability to urinate. He was found to have creat 4.31, WBC 14.3, blood and urine cultures grew out e coli. Imaging of kidneys suggested obstruction. Patient was treated with mauricio catheter, IV antibiotics and IVF. Urology started him on flomax and  proscar.  Baseline creat 1.19. follows with Dr Walker. creat now at baseline. He did follow up with urology on 12/30 but failed TOV.   Plan: continue mauricio catheter until follow up with urology for cystoscopy.   Continue flomax and proscar as ordered.    (E11.40,  Z79.4) Type 2 diabetes mellitus with diabetic neuropathy, with long-term current use of insulin (H)  Comment: he is back on PTA insulin. BG running 120-150 here.   Plan:  FBG TID   lantus 18u subcutaneous q hs  Novolog 15u TID with meals- hold novolog if BG <150    (E66.01) Morbid obesity (H)  Comment: Body mass index is 35.84 kg/m .  He recently lost 40# by eating better  Plan: physical therapy     (G47.33) TOMMY (obstructive sleep apnea)  Comment: uses CPAP q hs  Plan: continue    (I42.0) Dilated cardiomyopathy, nonischemic EF 30-40% in March 2013  Comment: followed by Dr Goemz. Patient has less scrotal edema and no LE edema today. He says he has Rx for lasix 20mg that he takes when he feels like he needs to. He is also taking spironolactone 12.5mg q day. Weight is down 23# since adm to TCU. Lasix 20mg q day has been scheduled.   Plan: daily wts  continue lasix 20mg q day  Continue spironolactone 12.5mg q day  Continue metoprolol 50mg ER 50mg BID  Follow up with cardiology as needed    (I25.10) Coronary artery disease involving native coronary artery of native heart without angina pectoris  (Z95.820) S/p angioplasty with stent w/ RONEL in distal RCA  Comment: lexiscan in 2018 did not show ischemia. He has declined statin therapy in the past. no recent issues with CP. He continues on ASA 81mg q day  Plan: continue with current meds    (I10) Essential hypertension  Comment: BPs in TCU running 10 5/69, 1 14/73,  126/73, 133/70  Plan: irbesartan 150mg q hs  In addition to meds as above    (I48.0) Paroxysmal atrial fibrillation (H)  (Z51.81,  Z79.01) Anticoagulation management encounter  Comment: last INR 2.0 on 12/30. INR goal 2-3 Current coumadin dose 5mg q day.    Plan: continue current coumadin dose.   INR 1/7    (R53.81) Physical deconditioning  Comment: [place] [facility] [place]. Getting ready to return home. Walking in hallways.   Plan: Prepare for discharge later this week.      Electronically signed by:  RADHA Langford CNP               Sincerely,        RADHA Langford CNP

## 2020-01-07 ENCOUNTER — DISCHARGE SUMMARY NURSING HOME (OUTPATIENT)
Dept: GERIATRICS | Facility: CLINIC | Age: 75
End: 2020-01-07
Payer: COMMERCIAL

## 2020-01-07 VITALS
HEART RATE: 73 BPM | SYSTOLIC BLOOD PRESSURE: 132 MMHG | TEMPERATURE: 97.6 F | HEIGHT: 71 IN | RESPIRATION RATE: 16 BRPM | OXYGEN SATURATION: 96 % | BODY MASS INDEX: 33.63 KG/M2 | DIASTOLIC BLOOD PRESSURE: 80 MMHG | WEIGHT: 240.2 LBS

## 2020-01-07 DIAGNOSIS — G47.33 OSA (OBSTRUCTIVE SLEEP APNEA): Chronic | ICD-10-CM

## 2020-01-07 DIAGNOSIS — Z51.81 ANTICOAGULATION MANAGEMENT ENCOUNTER: ICD-10-CM

## 2020-01-07 DIAGNOSIS — Z79.4 TYPE 2 DIABETES MELLITUS WITH DIABETIC NEUROPATHY, WITH LONG-TERM CURRENT USE OF INSULIN (H): Chronic | ICD-10-CM

## 2020-01-07 DIAGNOSIS — E11.40 TYPE 2 DIABETES MELLITUS WITH DIABETIC NEUROPATHY, WITH LONG-TERM CURRENT USE OF INSULIN (H): Chronic | ICD-10-CM

## 2020-01-07 DIAGNOSIS — R53.81 PHYSICAL DECONDITIONING: ICD-10-CM

## 2020-01-07 DIAGNOSIS — N18.30 ACUTE RENAL FAILURE WITH ACUTE TUBULAR NECROSIS SUPERIMPOSED ON STAGE 3 CHRONIC KIDNEY DISEASE (H): ICD-10-CM

## 2020-01-07 DIAGNOSIS — I48.0 PAROXYSMAL ATRIAL FIBRILLATION (H): Chronic | ICD-10-CM

## 2020-01-07 DIAGNOSIS — I10 ESSENTIAL HYPERTENSION: Chronic | ICD-10-CM

## 2020-01-07 DIAGNOSIS — I25.10 CORONARY ARTERY DISEASE INVOLVING NATIVE CORONARY ARTERY OF NATIVE HEART WITHOUT ANGINA PECTORIS: Chronic | ICD-10-CM

## 2020-01-07 DIAGNOSIS — N13.9 OBSTRUCTIVE UROPATHY: ICD-10-CM

## 2020-01-07 DIAGNOSIS — N40.1 BENIGN PROSTATIC HYPERPLASIA WITH URINARY OBSTRUCTION: ICD-10-CM

## 2020-01-07 DIAGNOSIS — N17.0 ACUTE RENAL FAILURE WITH ACUTE TUBULAR NECROSIS SUPERIMPOSED ON STAGE 3 CHRONIC KIDNEY DISEASE (H): ICD-10-CM

## 2020-01-07 DIAGNOSIS — N13.8 BENIGN PROSTATIC HYPERPLASIA WITH URINARY OBSTRUCTION: ICD-10-CM

## 2020-01-07 DIAGNOSIS — M10.00 ACUTE IDIOPATHIC GOUT, UNSPECIFIED SITE: Chronic | ICD-10-CM

## 2020-01-07 DIAGNOSIS — I42.0 DILATED CARDIOMYOPATHY (H): Chronic | ICD-10-CM

## 2020-01-07 DIAGNOSIS — N10 ACUTE PYELONEPHRITIS: Primary | ICD-10-CM

## 2020-01-07 DIAGNOSIS — Z79.01 ANTICOAGULATION MANAGEMENT ENCOUNTER: ICD-10-CM

## 2020-01-07 LAB — INR PPP: 1.9 (ref 0.9–1.1)

## 2020-01-07 PROCEDURE — 99316 NF DSCHRG MGMT 30 MIN+: CPT | Performed by: NURSE PRACTITIONER

## 2020-01-07 RX ORDER — FINASTERIDE 5 MG/1
5 TABLET, FILM COATED ORAL DAILY
Qty: 30 TABLET | Refills: 0 | Status: SHIPPED | OUTPATIENT
Start: 2020-01-07 | End: 2020-01-16

## 2020-01-07 RX ORDER — TAMSULOSIN HYDROCHLORIDE 0.4 MG/1
0.4 CAPSULE ORAL EVERY EVENING
Qty: 30 CAPSULE | Refills: 0 | Status: SHIPPED | OUTPATIENT
Start: 2020-01-07 | End: 2020-01-16

## 2020-01-07 ASSESSMENT — MIFFLIN-ST. JEOR: SCORE: 1856.67

## 2020-01-07 NOTE — TELEPHONE ENCOUNTER
I speak with Jamal this morning. He is in a transitional care facility. He lets me know that he has a FreeStyle Umair continuous glucose meter that is measuring differently than fingerstick monitoring at his transitional care facility. He notices that there is a large difference between his Freestyle Umair measurements and his fingerstick measurements. He notes he has lost about 40 lbs since August and the TCU is not giving him mealtime insulin because they are concerned his blood sugar is going too low. He wonders why this is and what to do when he gets home. He will discharge on Thursday and see Nathaly on Thursday as well. We discuss the difference between ISF measurements and traditional blood glucose measurements. He is instructed to take his long-acting insulin as directed and to avoid self-administering short-acting insulin on Thursday until he sees Nathaly for follow-up. Routing to Nathaly Hughes as LINETTE.  
Noted, thanks Ag.  Nathaly Hughes, PharmD, [facility]  Medication Therapy Management Provider  Pager: [phone]     
Reason for Call:  Medication-- New Conditions    Other request: per patient he would like to talk to Nathaly before his appointment with her this week  Re his Insulin & Sugar levels, states the Aura is having a hard time regulating them    Can we leave a detailed message on this number? YES    Phone number patient can be reached at: Home number on file [phone] (home)    Best Time: anytime    Call taken on 1/6/2020 at 8:33 AM by Yury Wynne      
Ativan

## 2020-01-07 NOTE — LETTER
1/7/2020        RE: Jt Montgomery  [address]  [facility] [address]        [place] [facility] DISCHARGE SUMMARY  PATIENT'S NAME: Jt Montgomery  YOB: 1945  MEDICAL RECORD NUMBER:  [id number]  Place of Service where encounter took place:  [facility] [[id number]]    PRIMARY CARE PROVIDER AND CLINIC RESPONSIBLE AFTER TRANSFER:   Digna Jones MD, [address]  / [place] [address]    [facility]     Transferring providers: RADHA Langford CNP, Nila Doran MD  Recent Hospitalization/ED:  [facility]17/[facility] to 12/21/19.  Date of SNF Admission: December / 21 / 2019  Date of SNF (anticipated) Discharge: January / 09 / 2020  Discharged to: previous independent home  Cognitive Scores: SLUMS: 22/30 and CPT: 5.3/5.6  Physical Function: Ambulating 300 ft with walker  DME: none    CODE STATUS/ADVANCE DIRECTIVES DISCUSSION:  Full Code   ALLERGIES: Corn dextrin    DISCHARGE DIAGNOSIS/NURSING FACILITY COURSE:   Brief Summary of Hospital Course: recent hospitalization due to hypotension. PMH includes DM2, atrial fibrillation - anticoagulated on coumadin, CKD3, CAD, hypertension, TOMMY- on CPAP, gout, obesity. Patient was at PCP clinic on 12/17 and found to have BP of 89/49 as well as urinary retention. Work up in ED revealed BPs 80-90s/40-50s, CT of abdomen/pelvis showed marked bilateral perinephric infiltration, abnormal appearance of bladder. Lab work showed Na 126, K 5.4, BUN 80, creat 4.31, GFR 13. WBC 14.2 Lactic acid 2.4, bilirubin 6.5. he was admitted to hospital for sepsis due to acute pyelonephritis with acute on chronic renal failure due to obstructive uropathy. He was treated with IVF and zosyn. Cash catheter was placed. Nephrotoxic meds held.   Urology was consulted, who suspects BPH- bladder outlet obstruction contributing to UTI pyelonephritis. He was started on tamsulosin 0.4mg and  finasteride 5mg.   Blood cultures grew out GNB and UC showing e coli.   On 12/18 his -130 likely due to metoprolol and cardiezem on hold due to hypotension.   Once patient stable he was transferred to TCU for strengthening.         ASSESSMENT/PLAN:  (N10) Acute pyelonephritis  (primary encounter diagnosis)  (N13.9) Obstructive uropathy  (N17.0,  N18.3) Acute renal failure with acute tubular necrosis superimposed on stage 3 chronic kidney disease (H)  Comment: recent hospitalization due to hypotension and inability to urinate. He was found to have creat 4.31, WBC 14.3, blood and urine cultures grew out e coli. Imaging of kidneys suggested obstruction. Patient was treated with mauricio catheter, IV antibiotics and IVF. Urology started him on flomax and proscar.  Baseline creat 1.19. follows with Dr Walker. creafia now at baseline. He did follow up with urology on 12/30 but failed TOV.   Plan: continue mauricio catheter until follow up with urology for cystoscopy.   Continue flomax and proscar as ordered. Rx sent to [facility].      (E11.40,  Z79.4) Type 2 diabetes mellitus with diabetic neuropathy, with long-term current use of insulin (H)  Comment: he is back on PTA insulin. BG running 120-150 here.   Plan:  FBG TID   lantus 18u subcutaneous q hs  Novolog 15u TID with meals- hold novolog if BG <150  Patient will follow up with diabetes coordinator at PCP clinic       (G47.33) TOMMY (obstructive sleep apnea)  Comment: uses CPAP q hs  Plan: continue     (I42.0) Dilated cardiomyopathy, nonischemic EF 30-40% in March 2013  Comment: followed by Dr Gomez. Patient has less scrotal edema and no LE edema today. He says he has Rx for lasix 20mg that he takes when he feels like he needs to. He is also taking spironolactone 12.5mg q day. Weight is down 23# since adm to TCU. Lasix 20mg q day has been scheduled.  he will decide with his PCP regarding how often to continue lasix.   Plan: daily wts  continue lasix 20mg q day  Continue  spironolactone 12.5mg q day  Continue metoprolol 50mg ER 50mg BID  Follow up with cardiology as needed     (I25.10) Coronary artery disease involving native coronary artery of native heart without angina pectoris  (Z95.820) S/p angioplasty with stent w/ RONEL in distal RCA  Comment: lexiscan in 2018 did not show ischemia. He has declined statin therapy in the past. no recent issues with CP. He continues on ASA 81mg q day  Plan: continue with current meds     (I10) Essential hypertension  Comment: BPs in TCU running 105/69, 114/73,  126/73, 133/70  Plan: irbesartan 150mg q hs  In addition to meds as above     (I48.0) Paroxysmal atrial fibrillation (H)  (Z51.81,  Z79.01) Anticoagulation management encounter  Comment: last INR 1.9 on 1/7. INR goal 2-3 Current coumadin dose 5mg q day.   Plan:  resume PTA coumadin 7.5mg Sund Tues and THurs and 5mg CAL  INR 1/14     Gout  Comment: patient has had two episodes of acute wrist/hand pain while in TCU. First bout on 12/26 resolved with 5 days of prednisone.  Second bout just started today is less painful.   Plan: prednisone 10mg q day for two days    (R53.81) Physical deconditioning  Comment: lives alone in [facility]. Getting ready to return home. Walking in hallways.   Plan:  discharge home   Home care to follow to assist with mauricio catheter and more therapy.       Past Medical History:  has a past medical history of Atrial fibrillation (H), CAD (coronary artery disease), Central Sleep Apnea with Pat Villanueva breathing (9/14/2010), CKD (chronic kidney disease) stage 3, GFR 30-59 ml/min (H), Dilated cardiomyopathy (H), DM2 (diabetes mellitus, type 2) (H), Gout, Hernia, abdominal, Hypertension, Pulmonary hypertension (H), and Spider veins. He also has no past medical history of Asymptomatic human immunodeficiency virus (HIV) infection status (H), Blood in semen, Cerebral palsy (H), Complication of anesthesia, Congenital renal agenesis and dysgenesis, Epididymitis, bilateral,  Epididymitis, left, Epididymitis, right, Goiter, History of spinal cord injury, History of thrombophlebitis, Horseshoe kidney, Hydrocephalus (H), Malignant hyperthermia, Mumps, Orchitis, epididymitis, and epididymo-orchitis, with abscess, Palpitations, Parkinsons disease (H), Penile discharge, Prostate infection, Spina bifida (H), STD (sexually transmitted disease), Swelling of testicle, Tethered cord (H), or Tuberculosis.    Discharge Medications:  Current Outpatient Medications   Medication Sig Dispense Refill     aspirin EC 81 MG EC tablet Take 1 tablet by mouth daily. 90 tablet 1     Coenzyme Q10 (COQ10) 100 MG CAPS Take 200 mg by mouth daily        Continuous Blood Gluc Sensor (SCIO Diamond CorporationYLE RINKU 14 DAY SENSOR) MISC AS DIRECTED EVERY 14 DAYS 100 each 3     digoxin (LANOXIN) 125 MCG tablet Take 1 tablet (125 mcg) by mouth daily 90 tablet 3     diltiazem ER COATED BEADS (CARTIA XT) 300 MG 24 hr capsule Take 1 capsule (300 mg) by mouth daily 90 capsule 3     febuxostat (ULORIC) 40 MG TABS Take 40 mg by mouth daily       finasteride (PROSCAR) 5 MG tablet Take 1 tablet (5 mg) by mouth daily 30 tablet 0     furosemide (LASIX) 20 MG tablet Take 1 tablet (20 mg) by mouth daily 90 tablet 3     insulin aspart (NOVOLOG FLEXPEN) 100 UNIT/ML pen Inject 15 Units Subcutaneous 3 times daily (with meals) 30 mL 11     insulin glargine (LANTUS SOLOSTAR) 100 UNIT/ML pen ADMINISTER 18 UNITS UNDER THE SKIN DAILY (Patient taking differently: Inject 18 Units Subcutaneous At Bedtime ADMINISTER 18 UNITS UNDER THE SKIN DAILY) 30 mL 11     insulin pen needle (BD GERMÁN U/F) 32G X 4 MM miscellaneous USING FOUR TIMES DAILY OR AS DIRECTED 200 each 0     irbesartan (AVAPRO) 300 MG tablet Take 0.5 tablets (150 mg) by mouth At Bedtime 90 tablet 2     lactobacillus rhamnosus, GG, (CULTURELL) capsule Take 1 capsule by mouth daily       magnesium oxide 200 MG TABS Take 2 tablets by mouth At Bedtime        MELATONIN PO Take 0.5 mg by mouth At Bedtime     "    metoprolol succinate ER (TOPROL-XL) 50 MG 24 hr tablet Take 1 tablet (50 mg) by mouth 2 times daily 180 tablet 3     order for DME Equipment being ordered: Compression stockings 1 each 3     predniSONE (DELTASONE) 20 MG tablet TAKE 1 TABLET(20 MG) BY MOUTH DAILY AS NEEDED FOR GOUT FLARE 10 tablet 0     Probiotic Product (PROBIOTIC & ACIDOPHILUS EX ST PO) Take 1 capsule by mouth daily       spironolactone (ALDACTONE) 25 MG tablet Take 0.5 tablets (12.5 mg) by mouth daily 90 tablet 3     tamsulosin (FLOMAX) 0.4 MG capsule Take 1 capsule (0.4 mg) by mouth every evening 30 capsule 0     Taurine 500 MG CAPS Take 1 capsule by mouth as needed        vitamin D3 (CHOLECALCIFEROL) 2000 units tablet Take 2 tablets by mouth daily        warfarin ANTICOAGULANT (COUMADIN) 5 MG tablet Take 1 1/2 tabs on Sundays, Tuesdays and Thursdays and take 1 tab on all other days or as directed by INR clinic (Patient taking differently: Take by mouth See Admin Instructions Take 1 1/2 tabs on Sundays, Tuesdays and Thursdays and take 1 tab on all other days or as directed by INR clinic) 110 tablet 1     Green Tea, Camillia sinensis, (GREEN TEA EXTRACT PO) Take by mouth 2 times daily Doesn't always take regularly       HERBALS Nerve by Plexis 2 a day,   Healthy Feet and Nerves  By [employer]  3 a day, Heart Food Caps  (cayenna pepper, garlic, hawthorn, onion & ginger 1-2 a day), Super Red Drink Powder daily, garlic daily       UNABLE TO FIND MEDICATION NAME: Udos Oil - combination of omega 3, 6, 9.  Taking 2-3 capsules daily       UNABLE TO FIND MEDICATION NAME: Super Beet Juice         Medication Changes/Rationale:     As above    Controlled medications sent with patient:   not applicable/none     ROS:   4 point ROS including Respiratory, CV, GI and , other than that noted in the HPI,  is negative    Physical Exam:   Vitals: /80   Pulse 73   Temp 97.6  F (36.4  C)   Resp 16   Ht 1.803 m (5' 11\")   Wt 109 kg (240 lb 3.2 oz)   " SpO2 96%   BMI 33.50 kg/m     BMI= Body mass index is 33.5 kg/m .  GENERAL APPEARANCE:  Alert, in no distress  ENT:  Mouth and posterior oropharynx normal, moist mucous membranes, hearing acuity adequate   EYES:  EOM, conjunctivae, lids, pupils and irises normal    RESP:  respiratory effort and palpation of chest normal, no respiratory distress  CV:   Edema none  ABDOMEN:  normal bowel sounds, soft, nontender, no hepatosplenomegaly or other masses  M/S:   Gait and station steady, Digits and nails normal   SKIN:  Inspection/Palpation of skin and subcutaneous tissue no rash  NEURO: 2-12 in normal limits and at patient's baseline  PSYCH:  insight and judgement, memory intact , affect and mood normal     SNF labs  CBC RESULTS:   Recent Labs   Lab Test 12/20/19  0748 12/19/19  0839   WBC 8.6 8.6   RBC 3.63* 3.50*   HGB 12.2* 12.1*   HCT 37.0* 36.0*   * 103*   MCH 33.6* 34.6*   MCHC 33.0 33.6   RDW 13.5 13.9    129*       Last Basic Metabolic Panel:  Recent Labs   Lab Test 12/27/19  0730 12/24/19  1014    141   POTASSIUM 4.1 3.6   CHLORIDE 108 110*   HARSHA 8.6 8.6   CO2 29 26   BUN 23 24   CR 0.92 0.99   * 104*       Liver Function Studies -   Recent Labs   Lab Test 12/19/19  0839 12/18/19  0738  12/17/19  1151   PROTTOTAL  --  6.1*  --  6.8   ALBUMIN 2.1* 2.2*   < > 2.9*   BILITOTAL  --  3.9*  --  6.5*   ALKPHOS  --  60  --  89   AST  --  33  --  37   ALT  --  26  --  28    < > = values in this interval not displayed.       TSH   Date Value Ref Range Status   10/08/2019 1.94 0.40 - 4.00 mU/L Final   04/29/2016 2.35 0.40 - 4.00 mU/L Final   ]    Lab Results   Component Value Date    A1C 6.2 11/14/2019    A1C 7.6 10/08/2019           DISCHARGE PLAN:    Follow up labs: patient will follow up with PCP    Medical Follow Up:      Follow up with primary care provider in 1 weeks    MTM referral needed and placed by this provider: No    Current Cassandra scheduled appointments:  Next 5 appointments (look  out 90 days)    Jan 09, 2020 11:00 AM CST  Office Visit with Nathaly Hughes RPH  [facility] ([facility]) [address] [id number] [phone]   Feb 19, 2020 11:30 AM CST  Office Visit with Digna Jones MD  [facility] ([facility]) [address] [place] [id number] [phone]           Discharge Services: Home Care:  Occupational Therapy, Physical Therapy, Registered Nurse, Home Health Aide and From:  [facility]    Discharge Instructions Verbalized to Patient at Discharge:     Cash catheter care      TOTAL DISCHARGE TIME:   Greater than 30 minutes  Electronically signed by:  RADHA Langford CNP     Home care Face to Face documentation done in [facility] attached to Home care orders for [facility].                     Sincerely,        RADHA Langford CNP

## 2020-01-07 NOTE — PROGRESS NOTES
[place] [facility] DISCHARGE SUMMARY  PATIENT'S NAME: Jt Montgomery  YOB: 1945  MEDICAL RECORD NUMBER:  [id number]  Place of Service where encounter took place:  [facility] [[id number]]    PRIMARY CARE PROVIDER AND CLINIC RESPONSIBLE AFTER TRANSFER:   Digna Jones MD, [address]  / [place] [address]    [facility] Provider     Transferring providers: RADHA Langford CNP, Nila Doran MD  Recent Hospitalization/ED:  [facility] stay 12/17/19 to 12/21/19.  Date of SNF Admission: December / 21 / 2019  Date of SNF (anticipated) Discharge: January / 09 / 2020  Discharged to: previous independent home  Cognitive Scores: SLUMS: 22/30 and CPT: 5.3/5.6  Physical Function: Ambulating 300 ft with walker  DME: none    CODE STATUS/ADVANCE DIRECTIVES DISCUSSION:  Full Code   ALLERGIES: Corn dextrin    DISCHARGE DIAGNOSIS/NURSING FACILITY COURSE:   Brief Summary of Hospital Course: recent hospitalization due to hypotension. PMH includes DM2, atrial fibrillation - anticoagulated on coumadin, CKD3, CAD, hypertension, TOMMY- on CPAP, gout, obesity. Patient was at PCP clinic on 12/17 and found to have BP of 89/49 as well as urinary retention. Work up in ED revealed BPs 80-90s/40-50s, CT of abdomen/pelvis showed marked bilateral perinephric infiltration, abnormal appearance of bladder. Lab work showed Na 126, K 5.4, BUN 80, creat 4.31, GFR 13. WBC 14.2 Lactic acid 2.4, bilirubin 6.5. he was admitted to hospital for sepsis due to acute pyelonephritis with acute on chronic renal failure due to obstructive uropathy. He was treated with IVF and zosyn. Cash catheter was placed. Nephrotoxic meds held.   Urology was consulted, who suspects BPH- bladder outlet obstruction contributing to UTI pyelonephritis. He was started on tamsulosin 0.4mg and finasteride 5mg.   Blood cultures grew out GNB and UC showing e coli.   On 12/18 his -130 likely due  to metoprolol and cardiezem on hold due to hypotension.   Once patient stable he was transferred to TCU for strengthening.         ASSESSMENT/PLAN:  (N10) Acute pyelonephritis  (primary encounter diagnosis)  (N13.9) Obstructive uropathy  (N17.0,  N18.3) Acute renal failure with acute tubular necrosis superimposed on stage 3 chronic kidney disease (H)  Comment: recent hospitalization due to hypotension and inability to urinate. He was found to have creat 4.31, WBC 14.3, blood and urine cultures grew out e coli. Imaging of kidneys suggested obstruction. Patient was treated with mauricio catheter, IV antibiotics and IVF. Urology started him on flomax and proscar.  Baseline creat 1.19. follows with Dr Walker. creat now at baseline. He did follow up with urology on 12/30 but failed TOV.   Plan: continue mauricio catheter until follow up with urology for cystoscopy.   Continue flomax and proscar as ordered. [facility]      (E11.40,  Z79.4) Type 2 diabetes mellitus with diabetic neuropathy, with long-term current use of insulin (H)  Comment: he is back on PTA insulin. BG running 120-150 here.   Plan:  FBG TID   lantus 18u subcutaneous q hs  Novolog 15u TID with meals- hold novolog if BG <150  Patient will follow up with diabetes coordinator at PCP clinic       (G47.33) TOMMY (obstructive sleep apnea)  Comment: uses CPAP q hs  Plan: continue     (I42.0) Dilated cardiomyopathy, nonischemic EF 30-40% in March 2013  Comment: followed by Dr Gomez. Patient has less scrotal edema and no LE edema today. He says he has Rx for lasix 20mg that he takes when he feels like he needs to. He is also taking spironolactone 12.5mg q day. Weight is down 23# since adm to TCU. Lasix 20mg q day has been scheduled. he will decide with his PCP regarding how often to continue lasix.   Plan: daily wts  continue lasix 20mg q day  Continue spironolactone 12.5mg q day  Continue metoprolol 50mg ER 50mg BID  Follow up with cardiology as  needed     (I25.10) Coronary artery disease involving native coronary artery of native heart without angina pectoris  (Z95.820) S/p angioplasty with stent w/ RONEL in distal RCA  Comment: lexiscan in 2018 did not show ischemia. He has declined statin therapy in the past. no recent issues with CP. He continues on ASA 81mg q day  Plan: continue with current meds     (I10) Essential hypertension  Comment: BPs in TCU running 105/69, 114/73,  126/73, 133/70  Plan: irbesartan 150mg q hs  In addition to meds as above     (I48.0) Paroxysmal atrial fibrillation (H)  (Z51.81,  Z79.01) Anticoagulation management encounter  Comment: last INR 1.9 on 1/7. INR goal 2-3 Current coumadin dose 5mg q day.   Plan: resume PTA coumadin 7.5mg Sund Tues and THurs and 5mg CAL  INR 1/14     Gout  Comment: patient has had two episodes of acute wrist/hand pain while in TCU. First bout on 12/26 resolved with 5 days of prednisone.  Second bout just started today is less painful.   Plan: prednisone 10mg q day for two days    (R53.81) Physical deconditioning  Comment: [place]. Getting ready to return home. Walking in hallways.   Plan: discharge home   Home care to follow to assist with mauricio catheter and more therapy.       Past Medical History:  has a past medical history of Atrial fibrillation (H), CAD (coronary artery disease), Central Sleep Apnea with Pat Villanueva breathing (9/14/2010), CKD (chronic kidney disease) stage 3, GFR 30-59 ml/min (H), Dilated cardiomyopathy (H), DM2 (diabetes mellitus, type 2) (H), Gout, Hernia, abdominal, Hypertension, Pulmonary hypertension (H), and Spider veins. He also has no past medical history of Asymptomatic human immunodeficiency virus (HIV) infection status (H), Blood in semen, Cerebral palsy (H), Complication of anesthesia, Congenital renal agenesis and dysgenesis, Epididymitis, bilateral, Epididymitis, left, Epididymitis, right, Goiter, History of spinal cord injury, History of  thrombophlebitis, Horseshoe kidney, Hydrocephalus (H), Malignant hyperthermia, Mumps, Orchitis, epididymitis, and epididymo-orchitis, with abscess, Palpitations, Parkinsons disease (H), Penile discharge, Prostate infection, Spina bifida (H), STD (sexually transmitted disease), Swelling of testicle, Tethered cord (H), or Tuberculosis.    Discharge Medications:  Current Outpatient Medications   Medication Sig Dispense Refill     aspirin EC 81 MG EC tablet Take 1 tablet by mouth daily. 90 tablet 1     Coenzyme Q10 (COQ10) 100 MG CAPS Take 200 mg by mouth daily        Continuous Blood Gluc Sensor (GuarnicSTYLE RINKU 14 DAY SENSOR) MISC AS DIRECTED EVERY 14 DAYS 100 each 3     digoxin (LANOXIN) 125 MCG tablet Take 1 tablet (125 mcg) by mouth daily 90 tablet 3     diltiazem ER COATED BEADS (CARTIA XT) 300 MG 24 hr capsule Take 1 capsule (300 mg) by mouth daily 90 capsule 3     febuxostat (ULORIC) 40 MG TABS Take 40 mg by mouth daily       finasteride (PROSCAR) 5 MG tablet Take 1 tablet (5 mg) by mouth daily 30 tablet 0     furosemide (LASIX) 20 MG tablet Take 1 tablet (20 mg) by mouth daily 90 tablet 3     insulin aspart (NOVOLOG FLEXPEN) 100 UNIT/ML pen Inject 15 Units Subcutaneous 3 times daily (with meals) 30 mL 11     insulin glargine (LANTUS SOLOSTAR) 100 UNIT/ML pen ADMINISTER 18 UNITS UNDER THE SKIN DAILY (Patient taking differently: Inject 18 Units Subcutaneous At Bedtime ADMINISTER 18 UNITS UNDER THE SKIN DAILY) 30 mL 11     insulin pen needle (BD GERMÁN U/F) 32G X 4 MM miscellaneous USING FOUR TIMES DAILY OR AS DIRECTED 200 each 0     irbesartan (AVAPRO) 300 MG tablet Take 0.5 tablets (150 mg) by mouth At Bedtime 90 tablet 2     lactobacillus rhamnosus, GG, (CULTURELL) capsule Take 1 capsule by mouth daily       magnesium oxide 200 MG TABS Take 2 tablets by mouth At Bedtime        MELATONIN PO Take 0.5 mg by mouth At Bedtime        metoprolol succinate ER (TOPROL-XL) 50 MG 24 hr tablet Take 1 tablet (50 mg) by mouth  "2 times daily 180 tablet 3     order for DME Equipment being ordered: Compression stockings 1 each 3     predniSONE (DELTASONE) 20 MG tablet TAKE 1 TABLET(20 MG) BY MOUTH DAILY AS NEEDED FOR GOUT FLARE 10 tablet 0     Probiotic Product (PROBIOTIC & ACIDOPHILUS EX ST PO) Take 1 capsule by mouth daily       spironolactone (ALDACTONE) 25 MG tablet Take 0.5 tablets (12.5 mg) by mouth daily 90 tablet 3     tamsulosin (FLOMAX) 0.4 MG capsule Take 1 capsule (0.4 mg) by mouth every evening 30 capsule 0     Taurine 500 MG CAPS Take 1 capsule by mouth as needed        vitamin D3 (CHOLECALCIFEROL) 2000 units tablet Take 2 tablets by mouth daily        warfarin ANTICOAGULANT (COUMADIN) 5 MG tablet Take 1 1/2 tabs on Sundays, Tuesdays and Thursdays and take 1 tab on all other days or as directed by INR clinic (Patient taking differently: Take by mouth See Admin Instructions Take 1 1/2 tabs on Sundays, Tuesdays and Thursdays and take 1 tab on all other days or as directed by INR clinic) 110 tablet 1     Green Tea, Camillia sinensis, (GREEN TEA EXTRACT PO) Take by mouth 2 times daily Doesn't always take regularly       HERBALS Nerve by Plexis 2 a day,   Healthy Feet and Nerves  By [employer]  3 a day, Heart Food Caps  (cayenna pepper, garlic, hawthorn, onion & ginger 1-2 a day), Super Red Drink Powder daily, garlic daily       UNABLE TO FIND MEDICATION NAME: Udos Oil - combination of omega 3, 6, 9.  Taking 2-3 capsules daily       UNABLE TO FIND MEDICATION NAME: Super Beet Juice         Medication Changes/Rationale:     As above    Controlled medications sent with patient:   not applicable/none     ROS:   4 point ROS including Respiratory, CV, GI and , other than that noted in the HPI,  is negative    Physical Exam:   Vitals: /80   Pulse 73   Temp 97.6  F (36.4  C)   Resp 16   Ht 1.803 m (5' 11\")   Wt 109 kg (240 lb 3.2 oz)   SpO2 96%   BMI 33.50 kg/m    BMI= Body mass index is 33.5 kg/m .  GENERAL APPEARANCE:  " Alert, in no distress  ENT:  Mouth and posterior oropharynx normal, moist mucous membranes, hearing acuity adequate   EYES:  EOM, conjunctivae, lids, pupils and irises normal    RESP:  respiratory effort and palpation of chest normal, no respiratory distress  CV:   Edema none  ABDOMEN:  normal bowel sounds, soft, nontender, no hepatosplenomegaly or other masses  M/S:   Gait and station steady, Digits and nails normal   SKIN:  Inspection/Palpation of skin and subcutaneous tissue no rash  NEURO: 2-12 in normal limits and at patient's baseline  PSYCH:  insight and judgement, memory intact , affect and mood normal     SNF labs  CBC RESULTS:   Recent Labs   Lab Test 12/20/19  0748 12/19/19  0839   WBC 8.6 8.6   RBC 3.63* 3.50*   HGB 12.2* 12.1*   HCT 37.0* 36.0*   * 103*   MCH 33.6* 34.6*   MCHC 33.0 33.6   RDW 13.5 13.9    129*       Last Basic Metabolic Panel:  Recent Labs   Lab Test 12/27/19  0730 12/24/19  1014    141   POTASSIUM 4.1 3.6   CHLORIDE 108 110*   HARSHA 8.6 8.6   CO2 29 26   BUN 23 24   CR 0.92 0.99   * 104*       Liver Function Studies -   Recent Labs   Lab Test 12/19/19  0839 12/18/19  0738  12/17/19  1151   PROTTOTAL  --  6.1*  --  6.8   ALBUMIN 2.1* 2.2*   < > 2.9*   BILITOTAL  --  3.9*  --  6.5*   ALKPHOS  --  60  --  89   AST  --  33  --  37   ALT  --  26  --  28    < > = values in this interval not displayed.       TSH   Date Value Ref Range Status   10/08/2019 1.94 0.40 - 4.00 mU/L Final   04/29/2016 2.35 0.40 - 4.00 mU/L Final   ]    Lab Results   Component Value Date    A1C 6.2 11/14/2019    A1C 7.6 10/08/2019           DISCHARGE PLAN:    Follow up labs: patient will follow up with PCP    Medical Follow Up:      Follow up with primary care provider in 1 weeks    MTM referral needed and placed by this provider: No    Current [place] scheduled appointments:  Next 5 appointments (look out 90 days)    Jan 09, 2020 11:00 AM CST  Office Visit with Nathaly Hughes,  [facility] ([facility]) [address] [address]  [phone]   Feb 19, 2020 11:30 AM CST  Office Visit with [facility] MD  [facility] [address] [facility] [address]  [phone]           Discharge Services: Home Care:  Occupational Therapy, Physical Therapy, Registered Nurse, Home Health Aide and From:  [facility]    Discharge Instructions Verbalized to Patient at Discharge:     Cash catheter care      TOTAL DISCHARGE TIME:   Greater than 30 minutes  Electronically signed by:  RADHA Langford CNP     Home care Face to Face documentation done in [facility] attached to Home care orders for [facility].

## 2020-01-09 ENCOUNTER — OFFICE VISIT (OUTPATIENT)
Dept: PHARMACY | Facility: CLINIC | Age: 75
End: 2020-01-09
Payer: COMMERCIAL

## 2020-01-09 VITALS
SYSTOLIC BLOOD PRESSURE: 108 MMHG | DIASTOLIC BLOOD PRESSURE: 61 MMHG | HEART RATE: 64 BPM | WEIGHT: 247 LBS | BODY MASS INDEX: 34.45 KG/M2

## 2020-01-09 DIAGNOSIS — I25.10 CORONARY ARTERY DISEASE INVOLVING NATIVE CORONARY ARTERY OF NATIVE HEART WITHOUT ANGINA PECTORIS: ICD-10-CM

## 2020-01-09 DIAGNOSIS — Z78.9 TAKES DIETARY SUPPLEMENTS: ICD-10-CM

## 2020-01-09 DIAGNOSIS — I10 ESSENTIAL HYPERTENSION: ICD-10-CM

## 2020-01-09 DIAGNOSIS — I48.91 ATRIAL FIBRILLATION, UNSPECIFIED TYPE (H): ICD-10-CM

## 2020-01-09 DIAGNOSIS — E11.69 MIXED HYPERLIPIDEMIA DUE TO TYPE 2 DIABETES MELLITUS (H): ICD-10-CM

## 2020-01-09 DIAGNOSIS — E78.2 MIXED HYPERLIPIDEMIA DUE TO TYPE 2 DIABETES MELLITUS (H): ICD-10-CM

## 2020-01-09 DIAGNOSIS — Z79.4 TYPE 2 DIABETES MELLITUS WITH DIABETIC NEUROPATHY, WITH LONG-TERM CURRENT USE OF INSULIN (H): Chronic | ICD-10-CM

## 2020-01-09 DIAGNOSIS — E11.40 TYPE 2 DIABETES MELLITUS WITH DIABETIC NEUROPATHY, WITH LONG-TERM CURRENT USE OF INSULIN (H): Chronic | ICD-10-CM

## 2020-01-09 DIAGNOSIS — I42.0 DILATED CARDIOMYOPATHY (H): ICD-10-CM

## 2020-01-09 DIAGNOSIS — M1A.39X0 CHRONIC GOUT DUE TO RENAL IMPAIRMENT OF MULTIPLE SITES WITHOUT TOPHUS: ICD-10-CM

## 2020-01-09 DIAGNOSIS — N40.1 BENIGN PROSTATIC HYPERPLASIA WITH URINARY OBSTRUCTION: Primary | ICD-10-CM

## 2020-01-09 DIAGNOSIS — G47.00 INSOMNIA, UNSPECIFIED TYPE: ICD-10-CM

## 2020-01-09 DIAGNOSIS — N13.8 BENIGN PROSTATIC HYPERPLASIA WITH URINARY OBSTRUCTION: Primary | ICD-10-CM

## 2020-01-09 PROCEDURE — 99607 MTMS BY PHARM ADDL 15 MIN: CPT | Performed by: PHARMACIST

## 2020-01-09 PROCEDURE — 99605 MTMS BY PHARM NP 15 MIN: CPT | Performed by: PHARMACIST

## 2020-01-09 NOTE — PROGRESS NOTES
SUBJECTIVE/OBJECTIVE:                Jt Montgomery is a 74 year old male coming in for a transitions of care visit.  He was discharged from [facility] on 12/21/19 for severe sepsis secondary to acute pyelonephritis.  [facility].  This visit serves as an initial visit for 2020.    Chief Complaint: He has some questions about DM management    Tobacco:  reports that he quit smoking about 48 years ago. His smoking use included cigarettes. He has a 10.50 pack-year smoking history. He has never used smokeless tobacco.  Alcohol: history of alcohol dependence - sober since 1973    Medication Adherence/Access: no issues reported, medications have been administered by hospital/TCU staff for the last 3 weeks    BPH: Was felt that BPH related bladder outlet obstruction contributed to UTI pyelonephritis.  He was started on tamsulosin 0.4mg daily as well as finasteride 5mg daily.  He denies side effects of therapy.  He is using a catheter currently.  He has f/up planned with urology on 1/22.  He's been discharged with home nursing and PT/OT, possibly social work.    Diabetes:  Currently taking Novolog 15 units TID with meals (holding if pre-meal BG is <150mg/dL) and Lantus 18 units daily.  Pt is not experiencing side effects.   SMBG:  Was having fingersticks in TCU for the last week as he didn't have a replacement for his Umair sensors.  We don't have that data, but BG prior to that were as follows:          Symptoms of low blood sugar? none. Frequency of hypoglycemia? Never  Recent symptoms of high blood sugar? none  Eye exam: due  Foot exam: due   Microalbumin  > 30 mg/g. Pt is taking an ACEi/ARB.  Aspirin: Taking 81mg daily and denies side effects  Diet:  He's lost about 45lbs since August  Lab Results   Component Value Date    A1C 6.2 11/14/2019    A1C 7.6 10/08/2019    A1C 6.5 03/06/2019    A1C 6.1 09/06/2018    A1C 6.3 02/09/2018      Hypertension/CAD/A. Fib/HFrEF: Currently  taking ASA 81mg daily, digoxin 125mcg daily, diltiazem ER 300mg daily, furosemide 20mg daily (changed from PRN use), irbesartan 150mg daily (dose reduced since our last visit), metoprolol succinate ER 50mg BID (dose reduced since last visit), spironolactone 12.5mg daily (dose reduced since last visit) and warfarin as directed. Patient does not self-monitor BP. Patient reports no current medication side effects.    BP Readings from Last 3 Encounters:   01/08/20 124/75   01/07/20 132/80   01/06/20 105/69      Last INR per discharge summary was 1.9 on 1/7.  He will talk with his homecare RN about having this re-checked on 1/14.        Hyperlipidemia: Currently taking fish oil 2-3g daily (Super Omega 3 capsule and Udo's Oil 3:6:9 Blend daily).  He continues to refuse statin therapy.  The 10-year ASCVD risk score (Fortino VILLANUEVA Jr., et al., 2013) is: 48%    Values used to calculate the score:      Age: 74 years      Sex: Male      Is Non- : No      Diabetic: Yes      Tobacco smoker: No      Systolic Blood Pressure: 124 mmHg      Is BP treated: Yes      HDL Cholesterol: 24 mg/dL      Total Cholesterol: 164 mg/dL     LDL Cholesterol Direct   Date Value Ref Range Status   11/22/2019 67 <100 mg/dL Final     Comment:     Desirable:       <100 mg/dl       Gout: Currently taking Uloric 40mg daily. Pt reports no current pain concerns, but did have 2 gout flares during TCU stay. Pt is not experiencing any medication side effects. He also has prednisone available to use if needed for gout flares (feels pain/ache in his arm or back), he did receive a course in the hospital.   Estimated Creatinine Clearance: 91 mL/min (based on SCr of 0.92 mg/dL).   Uric Acid   Date Value Ref Range Status   02/21/2018 7.4 (H) 3.5 - 7.2 mg/dL Final      Insomnia:  Current medications include: melatonin 0.5mg HS. Pt states this works very well for his sleep and reports no side effects.      Supplements:  Jamal hasn't been taking his  supplements as he's been in hospital/TCU but he plans to resume these when he gets home today.  He feels better taking his supplements and prefers to continue.   Vitamin D Deficiency Screening Results:  Lab Results   Component Value Date    VITDT 34 11/28/2018    VITDT 38 10/01/2014      Today's Vitals: /61   Pulse 64   Wt 247 lb (112 kg)   BMI 34.45 kg/m      ASSESSMENT:                 Medication Adherence: good, no issues identified    BPH: Plan in place.    Diabetes: Needs Improvement. Patient is meeting A1c goal of < 8%, but I think we're being a bit too aggressive with BG control given recent weight loss.  It sounds like Novolog has been held frequently recently due to hypoglycemia/low pre-meal BG.  I think we can continue to hold for BG <150mg/dL, and will reduce dose if BG is >150mg/dL to get closer to a 1:1 ratio of long:short acting insulin.    Hypertension/CAD/A. Fib/HFrEF: Stable.  BP is being closely monitored.  He may not need to continue daily furosemide long term, will continue to monitor as well.    Hyperlipidemia: Unimproved. Pt is not on moderate-high intensity statin which is indicated based on 2019 ACC/AHA guidelines for lipid management.       Gout: Stable. Pt's last uric acid level was not below goal at <6mg/dL, but pt is satisfied with current therapy and does not want to make any changes to his medications at this time.     Insomnia:  Stable.    Supplements: Stable. It is questionable how much benefit he is getting from his supplements, but he would like to continue taking them and they likely aren't harmful.    PLAN:                Post Discharge Medication Reconciliation Status: discharge medications reconciled and changed, per note/orders (see AVS).    1.  Continue Lantus 18 units every night.  2.  Only take Novolog with your meal if pre-meal blood sugar is >150mg/dL.  We'll reduce this dose to 5 units instead of 15 units.  3.  If your blood sugar is <70mg/dL, have a snack to  increase your blood sugars.    I spent 60 minutes with this patient today. All changes were made via collaborative practice agreement with Dr. Jones. A copy of the visit note was provided to the patient's primary care provider.    Will follow up in one week, appt scheduled.    The patient was given a summary of these recommendations as an after visit summary.    Nathaly Hughes, PharmD, [facility]  Medication Therapy Management Provider  Pager: [phone]

## 2020-01-09 NOTE — PATIENT INSTRUCTIONS
Recommendations from today's MTM visit:                                                    MTM (medication therapy management) is a service provided by a clinical pharmacist designed to help you get the most of out of your medicines.   Today we reviewed what your medicines are for, how to know if they are working, that your medicines are safe and how to make your medicine regimen as easy as possible.      1.  Continue Lantus 18 units every night.    2.  Only take Novolog with your meal if pre-meal blood sugar is >150mg/dL.  We'll reduce this dose to 5 units instead of 15 units.    3.  If your blood sugar is <70mg/dL, have a snack to increase your blood sugars and closely monitor.    It was great to speak with you today.  I value your experience and would be very thankful for your time with providing feedback on our clinic survey. You may receive a survey via email or text message in the next few days.     Next MTM visit: Thursday, January 16th at 11:30am, right after you see Dr. Jones.  Bring in your Freestyle Umair reader for this visit and I'll download again    To schedule another MTM appointment, please call the clinic directly or you may call the MTM scheduling line at [phone] or toll-free at [phone].     My Clinical Pharmacist's contact information:                                                      It was a pleasure talking with you today!  Please feel free to contact me with any questions or concerns you have.      Charlene Smith PharmD  Medication Therapy Management Resident  Pager: [phone]    Nathaly Hughes PharmD, [facility]  Medication Therapy Management Provider  Pager: [phone]

## 2020-01-10 ENCOUNTER — TELEPHONE (OUTPATIENT)
Dept: CARDIOLOGY | Facility: CLINIC | Age: 75
End: 2020-01-10

## 2020-01-10 ENCOUNTER — TELEPHONE (OUTPATIENT)
Dept: FAMILY MEDICINE | Facility: CLINIC | Age: 75
End: 2020-01-10

## 2020-01-10 DIAGNOSIS — I48.0 PAROXYSMAL ATRIAL FIBRILLATION (H): ICD-10-CM

## 2020-01-10 NOTE — TELEPHONE ENCOUNTER
Pt has been discharged to home (from TCU). Per TCU discharge note, pt is due for next INR check on 1/14/20. Homecare visits were ordered by TCU so contacted [facility] and left a voicemail message for the nurse coordinator with a verbal order for INR check on 1/14/20 per Dr Gomez. Updated anticoagulation flowsheet with TCU INR's and warfarin doses. Antoni

## 2020-01-10 NOTE — TELEPHONE ENCOUNTER
Reason for Call:  Home Health Care    kenneth with [facility] called regarding (reason for call): Home Care Orders     Orders are needed for this patient. **Orders needed by Monday 01/13/20 Skilled Nursing     PT: To evaluate and treat    OT: Evaluate and treat     Skilled Nursing: INR starting next week 01/14/20, and two times a week for 2 weeks, 1 time a week for one week, with 3 prn. Social work to assess.    FYI-  Interaction came up on pt med list and it was increased risk of bleeding with warfarin and aspirin.     Pt Provider: Karen     Phone Number Homecare Nurse can be reached at: [phone]    Can we leave a detailed message on this number? YES    Phone number patient can be reached at: Home number on file [phone] (home)    Best Time: Anytime     Call taken on 1/10/2020 at 3:49 PM by Shagufta Duong

## 2020-01-10 NOTE — TELEPHONE ENCOUNTER
Next 5 appointments (look out 90 days)    Jan 16, 2020 11:00 AM CST  Office Visit with Digna Jones MD  [facility] [address] [facility] [address] [phone]   Jan 16, 2020 11:30 AM CST  SHORT with Nathaly Hughes [facility] ([facility]) [address] [id number] [phone]   Feb 19, 2020 11:30 AM CST  Office Visit with Digna Jones MD  [facility] ([facility]) [address] [facility] [id number] [phone]        Called [facility] - gave verbal on below requested home care orders     Also recommended she call cardiology regarding interaction concern as they manage warfarin/asa     Franci RIOS RN

## 2020-01-14 ENCOUNTER — TELEPHONE (OUTPATIENT)
Dept: CARDIOLOGY | Facility: CLINIC | Age: 75
End: 2020-01-14

## 2020-01-14 NOTE — TELEPHONE ENCOUNTER
Received a call from Emerita Granados Homecare Coordinator, to let us know that the homecare nurse that was going to see pt today to check his INR was ill and when she contacted pt about setting up another visit for a homecare INR the patient declined the INR. He told Roberta that he had already scheduled an INR appt at his PMD office on 1/16/20 when he was in their clinic for an office visit. Antoni

## 2020-01-15 ENCOUNTER — DOCUMENTATION ONLY (OUTPATIENT)
Dept: CARE COORDINATION | Facility: CLINIC | Age: 75
End: 2020-01-15

## 2020-01-15 NOTE — PROGRESS NOTES
SUBJECTIVE/OBJECTIVE:                Jt oMntgomery is a 74 year old male coming in for a follow-up visit for Medication Therapy Management.  He was referred to me from Dr. David, he has since established care with Dr. Jones, who he saw immediately before our visit today.    Chief Complaint: Follow up from MT visit on 1/9/20.  He's here to f/up on DM management.    Tobacco:  reports that he quit smoking about 48 years ago. His smoking use included cigarettes. He has a 10.50 pack-year smoking history. He has never used smokeless tobacco.  Alcohol: history of alcohol dependence - sober since 1973    Medication Adherence/Access: no issues reported    Diabetes:  Currently taking Novolog 5 units TID with meals (dose reduced at last visit, holding if pre-meal BG is <150mg/dL, has only needed to use once since TCU discharge) and Lantus 18 units daily.  Pt is not experiencing side effects.   SMBG:  Using Freestyle Umair and monitoring several times throughout the day.            Daily logs:        Symptoms of low blood sugar? none. Frequency of hypoglycemia? Never  Recent symptoms of high blood sugar? none  Eye exam: due  Foot exam: due   Microalbumin  > 30 mg/g. Pt is taking an ACEi/ARB.  Aspirin: Taking 81mg daily and denies side effects  Diet:  He's lost about 45lbs since August  Lab Results   Component Value Date    A1C 6.2 11/14/2019    A1C 7.6 10/08/2019    A1C 6.5 03/06/2019    A1C 6.1 09/06/2018    A1C 6.3 02/09/2018     Today's Vitals: There were no vitals taken for this visit. - vitals were taken during PCP visit immediately before ours:  BP Readings from Last 3 Encounters:   01/16/20 121/63   01/09/20 108/61   01/08/20 124/75      ASSESSMENT:                Medication Adherence: good, no issues identified    Diabetes: Stable. Patient is meeting A1c goal of < 8%. Self monitoring of blood glucose is at goal of fasting  mg/dL and post prandial < 180 mg/dL.  No changes needed, but will need to closely monitor.       PLAN:                  1.  Continue current medication regimen.    I spent 15 minutes with this patient today. A copy of the visit note was provided to the patient's primary care provider.     Will follow up in 1 month, appt scheduled.    The patient was given a summary of these recommendations as an after visit summary.    Mikaela StroudD, [facility]  Medication Therapy Management Provider  Pager: [phone]

## 2020-01-16 ENCOUNTER — OFFICE VISIT (OUTPATIENT)
Dept: PHARMACY | Facility: CLINIC | Age: 75
End: 2020-01-16
Payer: COMMERCIAL

## 2020-01-16 ENCOUNTER — ANTICOAGULATION THERAPY VISIT (OUTPATIENT)
Dept: NURSING | Facility: CLINIC | Age: 75
End: 2020-01-16
Payer: COMMERCIAL

## 2020-01-16 ENCOUNTER — OFFICE VISIT (OUTPATIENT)
Dept: FAMILY MEDICINE | Facility: CLINIC | Age: 75
End: 2020-01-16
Payer: COMMERCIAL

## 2020-01-16 VITALS
BODY MASS INDEX: 34.58 KG/M2 | HEART RATE: 50 BPM | DIASTOLIC BLOOD PRESSURE: 63 MMHG | OXYGEN SATURATION: 95 % | HEIGHT: 71 IN | TEMPERATURE: 97.4 F | WEIGHT: 247 LBS | SYSTOLIC BLOOD PRESSURE: 121 MMHG

## 2020-01-16 DIAGNOSIS — E11.69 MIXED HYPERLIPIDEMIA DUE TO TYPE 2 DIABETES MELLITUS (H): ICD-10-CM

## 2020-01-16 DIAGNOSIS — Z79.4 TYPE 2 DIABETES MELLITUS WITH DIABETIC NEUROPATHY, WITH LONG-TERM CURRENT USE OF INSULIN (H): Primary | ICD-10-CM

## 2020-01-16 DIAGNOSIS — I48.0 PAROXYSMAL ATRIAL FIBRILLATION (H): ICD-10-CM

## 2020-01-16 DIAGNOSIS — E78.2 MIXED HYPERLIPIDEMIA DUE TO TYPE 2 DIABETES MELLITUS (H): ICD-10-CM

## 2020-01-16 DIAGNOSIS — N10 ACUTE PYELONEPHRITIS: Primary | ICD-10-CM

## 2020-01-16 DIAGNOSIS — N40.1 BENIGN PROSTATIC HYPERPLASIA WITH URINARY OBSTRUCTION: ICD-10-CM

## 2020-01-16 DIAGNOSIS — I10 ESSENTIAL HYPERTENSION: Chronic | ICD-10-CM

## 2020-01-16 DIAGNOSIS — N13.8 BENIGN PROSTATIC HYPERPLASIA WITH URINARY OBSTRUCTION: ICD-10-CM

## 2020-01-16 DIAGNOSIS — E11.40 TYPE 2 DIABETES MELLITUS WITH DIABETIC NEUROPATHY, WITH LONG-TERM CURRENT USE OF INSULIN (H): Primary | ICD-10-CM

## 2020-01-16 DIAGNOSIS — Z79.4 TYPE 2 DIABETES MELLITUS WITH DIABETIC NEUROPATHY, WITH LONG-TERM CURRENT USE OF INSULIN (H): Chronic | ICD-10-CM

## 2020-01-16 DIAGNOSIS — E11.40 TYPE 2 DIABETES MELLITUS WITH DIABETIC NEUROPATHY, WITH LONG-TERM CURRENT USE OF INSULIN (H): Chronic | ICD-10-CM

## 2020-01-16 LAB — INR POINT OF CARE: 2.1 (ref 0.86–1.14)

## 2020-01-16 PROCEDURE — 99214 OFFICE O/P EST MOD 30 MIN: CPT | Performed by: INTERNAL MEDICINE

## 2020-01-16 PROCEDURE — 99606 MTMS BY PHARM EST 15 MIN: CPT | Performed by: PHARMACIST

## 2020-01-16 PROCEDURE — 85610 PROTHROMBIN TIME: CPT | Mod: QW

## 2020-01-16 PROCEDURE — 99207 ZZC NO CHARGE NURSE ONLY: CPT

## 2020-01-16 PROCEDURE — 36416 COLLJ CAPILLARY BLOOD SPEC: CPT

## 2020-01-16 RX ORDER — TAMSULOSIN HYDROCHLORIDE 0.4 MG/1
0.4 CAPSULE ORAL EVERY EVENING
Qty: 90 CAPSULE | Refills: 3 | Status: SHIPPED | OUTPATIENT
Start: 2020-01-16 | End: 2022-01-06

## 2020-01-16 RX ORDER — FINASTERIDE 5 MG/1
5 TABLET, FILM COATED ORAL DAILY
Qty: 90 TABLET | Refills: 3 | Status: SHIPPED | OUTPATIENT
Start: 2020-01-16 | End: 2021-02-19

## 2020-01-16 ASSESSMENT — MIFFLIN-ST. JEOR: SCORE: 1887.51

## 2020-01-16 NOTE — PROGRESS NOTES
Chief Complaint:     Jt Montgomery is a 74 year old male who presents to clinic today for the following health issues:      Hospital Follow-up Visit:    Hospital/Nursing Home/IP Rehab Facility: [facility]   Date of Admission: 12/21/2019  Date of Discharge: 1/9/2020  Reason(s) for Admission:   Acute pyelonephritis    Obstructive uropathy  Acute renal failure with acute tubular necrosis superimposed on stage 3 chronic kidney disease             Problems taking medications regularly:  Side effects from Tamsulosin       Medication changes since discharge: None       Problems adhering to non-medication therapy:  None    Summary of hospitalization:  [facility] discharge summary reviewed  Diagnostic Tests/Treatments reviewed.  Follow up needed: urology clinic  Other Healthcare Providers Involved in Patient s Care:         Homecare  Update since discharge: improved.     Post Discharge Medication Reconciliation: discharge medications reconciled and changed, per note/orders (see AVS).  Plan of care communicated with patient     Coding guidelines for this visit:  Type of Medical   Decision Making Face-to-Face Visit       within 7 Days of discharge Face-to-Face Visit        within 14 days of discharge   Moderate Complexity [id number]   High Complexity [id number]            HPI:   Patient Jt Montgomery is a very pleasant 74 year old male with history of BPH, Type 2 Diabetes, hypertension, hyperlipidemia who presents to Internal Medicine clinic today for post hospital discharge follow up of recent hospitalization for acute pyelonephritis in the setting of chronic BPH. the patient's acute pyelonephritis symptoms have already resolved with antibiotics treatment. Patient denies any flank pain, fever or chills symptoms at this time. His chronic BPH symptoms are improved with current Flomax and finasteride medication therapy. He is due for a refill of his current Flomax and finasteride  medications today. He is also followed by outpatient urology clinic. Regarding his chronic Type 2 diabetes mellitus the patient is compliant with his diabetes medication therapy including long-term current use of insulin therapy. He denies any recent hypoglycemia events      Current Medications:     Current Outpatient Medications   Medication Sig Dispense Refill     aspirin EC 81 MG EC tablet Take 1 tablet by mouth daily. 90 tablet 1     Coenzyme Q10 (COQ10) 100 MG CAPS Take 200 mg by mouth daily        Continuous Blood Gluc Sensor (FREESTYLE RINKU 14 DAY SENSOR) MISC AS DIRECTED EVERY 14 DAYS 100 each 3     digoxin (LANOXIN) 125 MCG tablet Take 1 tablet (125 mcg) by mouth daily 90 tablet 3     diltiazem ER COATED BEADS (CARTIA XT) 300 MG 24 hr capsule Take 1 capsule (300 mg) by mouth daily 90 capsule 3     febuxostat (ULORIC) 40 MG TABS Take 40 mg by mouth daily       finasteride (PROSCAR) 5 MG tablet Take 1 tablet (5 mg) by mouth daily 90 tablet 3     furosemide (LASIX) 20 MG tablet Take 1 tablet (20 mg) by mouth daily 90 tablet 3     Green Tea, Camillia sinensis, (GREEN TEA EXTRACT PO) Take by mouth 2 times daily Doesn't always take regularly       insulin aspart (NOVOLOG FLEXPEN) 100 UNIT/ML pen Inject 5 Units Subcutaneous 3 times daily (with meals) Only if pre-meal blood sugar is over 150mg/dL 30 mL 11     insulin glargine (LANTUS SOLOSTAR) 100 UNIT/ML pen ADMINISTER 18 UNITS UNDER THE SKIN DAILY 30 mL 11     insulin pen needle (BD GERMÁN U/F) 32G X 4 MM miscellaneous USING FOUR TIMES DAILY OR AS DIRECTED 200 each 0     irbesartan (AVAPRO) 300 MG tablet Take 0.5 tablets (150 mg) by mouth At Bedtime 90 tablet 2     lactobacillus rhamnosus, GG, (CULTURELL) capsule Take 1 capsule by mouth daily       magnesium oxide 200 MG TABS Take 2 tablets by mouth At Bedtime        MELATONIN PO Take 0.5 mg by mouth At Bedtime        metoprolol succinate ER (TOPROL-XL) 50 MG 24 hr tablet Take 1 tablet (50 mg) by mouth 2 times  daily 180 tablet 3     order for DME Equipment being ordered: Compression stockings 1 each 3     predniSONE (DELTASONE) 20 MG tablet TAKE 1 TABLET(20 MG) BY MOUTH DAILY AS NEEDED FOR GOUT FLARE 10 tablet 0     spironolactone (ALDACTONE) 25 MG tablet Take 0.5 tablets (12.5 mg) by mouth daily 90 tablet 3     STATIN NOT PRESCRIBED (INTENTIONAL) Statin not prescribed intentionally due to Other pt refusal (This option does not exclude patient from measure)       tamsulosin (FLOMAX) 0.4 MG capsule Take 1 capsule (0.4 mg) by mouth every evening 90 capsule 3     UNABLE TO FIND MEDICATION NAME: Udos Oil - combination of omega 3, 6, 9.  Taking 2-3 capsules daily       UNABLE TO FIND MEDICATION NAME: Super Beet Juice       vitamin D3 (CHOLECALCIFEROL) 2000 units tablet Take 2 tablets by mouth daily        warfarin ANTICOAGULANT (COUMADIN) 5 MG tablet Take 1 1/2 tabs on Sundays, Tuesdays and Thursdays and take 1 tab on all other days or as directed by INR clinic (Patient taking differently: Take by mouth See Admin Instructions Take 1 1/2 tabs on Sundays, Tuesdays and Thursdays and take 1 tab on all other days or as directed by INR clinic) 110 tablet 1         Allergies:      Allergies   Allergen Reactions     Corn Dextrin      All corn products - gets tired an ornery            Past Medical History:     Past Medical History:   Diagnosis Date     Atrial fibrillation (H)      CAD (coronary artery disease)     MI with RONEL to dRCA April 2011     Central Sleep Apnea with Pat Villanueva breathing 9/14/2010    Also TOMMY with CPAP     CKD (chronic kidney disease) stage 3, GFR 30-59 ml/min (H)      Dilated cardiomyopathy (H)     nonischemic (possibly related to h/o a.fib with RVR) EF 30-40% March 2013     DM2 (diabetes mellitus, type 2) (H)     Goal HgbA1c < 7%     Gout     uric acid level correlates; April 2014 h/o +knee aspirate     Hernia, abdominal      Hypertension     Goal <140/90     Pulmonary hypertension (H)     mild per echo  3/2013     Spider veins          Past Surgical History:     Past Surgical History:   Procedure Laterality Date     CORONARY ANGIOGRAPHY ADULT ORDER  2011    distal circ-RONEL     HERNIA REPAIR  11/20/10    incarcinated     SUSPEND HYOID, GENIOGLOSSAL ADVANCEMENT           Family Medical History:     Family History   Problem Relation Age of Onset     Hypertension Mother      Coronary Artery Disease Mother      Coronary Artery Disease Father      Hypertension Father      Diabetes Sister      Cerebrovascular Disease Sister          Social History:     Social History     Socioeconomic History     Marital status:      Spouse name: Not on file     Number of children: Not on file     Years of education: Not on file     Highest education level: Not on file   Occupational History     Not on file   Social Needs     Financial resource strain: Not on file     Food insecurity:     Worry: Not on file     Inability: Not on file     Transportation needs:     Medical: Not on file     Non-medical: Not on file   Tobacco Use     Smoking status: Former Smoker     Packs/day: 1.50     Years: 7.00     Pack years: 10.50     Types: Cigarettes     Last attempt to quit: 1972     Years since quittin.0     Smokeless tobacco: Never Used     Tobacco comment: quit in       Started at around age 18-19   Substance and Sexual Activity     Alcohol use: No     Alcohol/week: 0.0 standard drinks     Comment: 74   recovering     Drug use: No     Sexual activity: Not Currently     Partners: Female   Lifestyle     Physical activity:     Days per week: Not on file     Minutes per session: Not on file     Stress: Not on file   Relationships     Social connections:     Talks on phone: Not on file     Gets together: Not on file     Attends Holiness service: Not on file     Active member of club or organization: Not on file     Attends meetings of clubs or organizations: Not on file     Relationship status: Not on file     Intimate  "partner violence:     Fear of current or ex partner: Not on file     Emotionally abused: Not on file     Physically abused: Not on file     Forced sexual activity: Not on file   Other Topics Concern      Service Not Asked     Blood Transfusions Not Asked     Caffeine Concern No     Occupational Exposure Not Asked     Hobby Hazards Not Asked     Sleep Concern Yes     Comment: sleep apnea, wears bi-pap     Stress Concern Not Asked     Weight Concern Not Asked     Special Diet No     Comment: low carb diet     Back Care Not Asked     Exercise No     Comment: walking     Bike Helmet Not Asked     Seat Belt Yes     Self-Exams Not Asked     Parent/sibling w/ CABG, MI or angioplasty before 65F 55M? Not Asked   Social History Narrative     Not on file           Review of System:     Constitutional: Negative for fever or chills  Skin: Negative for rashes  Ears/Nose/Throat: Negative for nasal congestion, sore throat  Respiratory: No shortness of breath, dyspnea on exertion, cough, or hemoptysis  Cardiovascular: Negative for chest pain  Gastrointestinal: Negative for nausea, vomiting  Genitourinary: Negative for dysuria, hematuria, positive for chronic BPH with recent acute pyelonephritis  Musculoskeletal: Negative for myalgias  Neurologic: Negative for headaches  Psychiatric: Negative for depression, anxiety  Hematologic/Lymphatic/Immunologic: Negative  Endocrine: Negative for recent hypoglycemia events  Behavioral: Negative for tobacco use       Physical Exam:   /63 (BP Location: Right arm, Patient Position: Sitting, Cuff Size: Adult Regular)   Pulse 50   Temp 97.4  F (36.3  C) (Oral)   Ht 1.803 m (5' 11\")   Wt 112 kg (247 lb)   SpO2 95%   BMI 34.45 kg/m      GENERAL: chronically ill appearing older male, alert and no acute distress  EYES: eyes grossly normal to inspection, and conjunctivae and sclerae normal  HENT: Normocephalic atraumatic. Nose and mouth without ulcers or lesions  NECK: supple  RESP: " lungs clear to auscultation   CV: regular rate and rhythm, normal S1 S2  LYMPH: no peripheral edema   ABDOMEN: nondistended  MS: no gross musculoskeletal defects noted  SKIN: no suspicious lesions or rashes  NEURO: Alert & Oriented x 3.   PSYCH: mentation appears normal, affect normal        Diagnostic Test Results:     Diagnostic Test Results:  Results for orders placed or performed in visit on 01/16/20   INR point of care     Status: Abnormal   Result Value Ref Range    INR Protime 2.1 (A) 0.86 - 1.14       ASSESSMENT/PLAN:     (N40.1,  N13.8) Benign prostatic hyperplasia with urinary obstruction  (N10) Acute pyelonephritis  (primary encounter diagnosis)  Comment: post hospital discharge follow up of recent hospitalization for acute pyelonephritis. Acute pyelonephritis symptoms have already resolved with treatment. Patient denies any flank pain, fever or chills symptoms at this time. BPH symptoms are improved with current Flomax and finasteride medication therapy.  Plan: I have refilled the patient's continue current Flomax and finasteride medications today. Continue outpatient urology clinic follow up going forward.      (E11.40,  Z79.4) Type 2 diabetes mellitus with diabetic neuropathy, with long-term current use of insulin (H)  Comment: no recent hypoglycemia events  Plan: continue current diabetes medication therapy including insulin.      (I10) Essential hypertension  Comment: BP is at goal  Plan: continue current BP medication regimen.      (E11.69,  E78.2) Mixed hyperlipidemia due to type 2 diabetes mellitus (H)  Comment: stable on diet and exercise treatment  Plan: continue current diet and exercise treatment going forward.    Follow Up Plan:     Patient is instructed to return to Internal Medicine clinic for follow-up visit in 1 month.        Digna Jones MD  Internal Medicine  [facility]

## 2020-01-16 NOTE — PATIENT INSTRUCTIONS
Recommendations from today's MTM visit:                                                    MTM (medication therapy management) is a service provided by a clinical pharmacist designed to help you get the most of out of your medicines.   Today we reviewed what your medicines are for, how to know if they are working, that your medicines are safe and how to make your medicine regimen as easy as possible.     1.  Continue current medication regimen.    It was great to speak with you today.  I value your experience and would be very thankful for your time with providing feedback on our clinic survey. You may receive a survey via email or text message in the next few days.     Next MTM visit: With Charlene Matt PharmD on Wednesday, February 19th at 11am    To schedule another MTM appointment, please call the clinic directly or you may call the MTM scheduling line at [phone] or toll-free at [phone].     My Clinical Pharmacist's contact information:                                                      It was a pleasure talking with you today!  Please feel free to contact me with any questions or concerns you have.      Charlene Smith PharmD  Medication Therapy Management Resident  Pager: [phone]    Nathaly Hughes PharmD, [facility]  Medication Therapy Management Provider  Pager: [phone]

## 2020-01-16 NOTE — PROGRESS NOTES
ANTICOAGULATION FOLLOW-UP CLINIC VISIT    Patient Name:  Jt Montgomery  Date:  2020  Contact Type:  Face to Face    SUBJECTIVE:  Patient Findings     Comments:   The patient was assessed for diet, medication, and activity level changes, missed or extra doses, bruising or bleeding, with no problem findings.          Clinical Outcomes     Negatives:   Major bleeding event, Thromboembolic event, Anticoagulation-related hospital admission, Anticoagulation-related ED visit, Anticoagulation-related fatality    Comments:   The patient was assessed for diet, medication, and activity level changes, missed or extra doses, bruising or bleeding, with no problem findings.             OBJECTIVE    INR Protime   Date Value Ref Range Status   2020 2.1 (A) 0.86 - 1.14 Final       ASSESSMENT / PLAN  No question data found.  Anticoagulation Summary  As of 2020    INR goal:   2.0-3.0   TTR:   69.5 % (12 mo)   INR used for dosin.1 (2020)   Warfarin maintenance plan:   7.5 mg (5 mg x 1.5) every Sun, Tue, Thu; 5 mg (5 mg x 1) all other days   Full warfarin instructions:   7.5 mg every Sun, Tue, Thu; 5 mg all other days   Weekly warfarin total:   42.5 mg   No change documented:   Shirley Cevallos RN   Plan last modified:   Marlena Hong RN (5/10/2019)   Next INR check:   2020   Target end date:   Indefinite    Indications    Atrial fibrillation (AFIB) on Coumadin [I48.91]  Long term current use of anticoagulants with INR goal of 2.0-3.0 (Resolved) [Z79.01]             Anticoagulation Episode Summary     INR check location:       Preferred lab:       Send INR reminders to:   [facility] HEART INR NURSE    Comments:         Anticoagulation Care Providers     Provider Role Specialty Phone number    PatriciaLottie DO [facility] Cardiology [phone]            See the Encounter Report to view Anticoagulation Flowsheet and Dosing Calendar (Go to Encounters tab in chart review, and find the  Anticoagulation Therapy Visit)    Dosage adjustment made based on physician directed care plan.    Patient was discharged from TCU last week on 1/9/2020.  Patient was being managed by the Heart Clinic for INR's.  Would like to come to [facility] in the future due to convenience and PCP is here.  Patient does have home care at this time, starting tomorrow.  Homecare can check the results and call to the [place] office for dosing.  If patient decides to remain with the Heart Clinic results will need to be called to them.  Detailed message left with home care nurse Helen [phone].      Shirley Cevallos RN

## 2020-01-16 NOTE — PROGRESS NOTES
[facility] now requests orders and shares plan of care/discharge summaries for some patients through [place].  Please REPLY TO THIS MESSAGE OR ROUTE BACK TO THE AUTHOR in order to give authorization for orders when needed.  This is considered a verbal order, you will still receive a faxed copy of orders for signature.  Thank you for your assistance in improving collaboration for our patients.    ORDER    At this time, the pt requires further skilled OT to address energy conservation skills, UE strengthening to increase endurance, and DME recommendations to increase safety/ind. OT to see the pt 2w1, 1w2 effective 1/19/20.     AUBREY Bill/L  Occupational Therapist  [phone]  [email or web address]

## 2020-01-17 ENCOUNTER — TELEPHONE (OUTPATIENT)
Dept: CARDIOLOGY | Facility: CLINIC | Age: 75
End: 2020-01-17

## 2020-01-17 NOTE — TELEPHONE ENCOUNTER
Received a call from OLIVERIO Otoole -[phone], who is calling to confirm date of next INR check. Pt had INR checked at PMD clinic [facility] yesterday and he wishes to continue to have INR's managed at PMD office so per the anticoagulation flowsheet, homecare will check an INR on 1/23 and call the result to the [facility] anticoagulation office for dosing. Antoni

## 2020-01-22 ENCOUNTER — TRANSFERRED RECORDS (OUTPATIENT)
Dept: HEALTH INFORMATION MANAGEMENT | Facility: CLINIC | Age: 75
End: 2020-01-22

## 2020-01-23 ENCOUNTER — TELEPHONE (OUTPATIENT)
Dept: FAMILY MEDICINE | Facility: CLINIC | Age: 75
End: 2020-01-23

## 2020-01-23 DIAGNOSIS — I48.0 PAROXYSMAL ATRIAL FIBRILLATION (H): ICD-10-CM

## 2020-01-23 LAB — INR PPP: 2.8 (ref 0.9–1.1)

## 2020-01-23 NOTE — TELEPHONE ENCOUNTER
ANTICOAGULATION MANAGEMENT     Patient Name:  Jt Montgomery  Date:  2020    ASSESSMENT /SUBJECTIVE:    Today's INR result of 2.8 is therapeutic. Goal INR of 2.0-3.0      Warfarin dose taken: Warfarin taken as previously instructed    Diet: Decreased greens/vitamin K intake may be affecting INR    Medication changes/ interactions: No new medications/supplements affecting INR    Previous INR: Therapeutic     S/S of bleeding or thromboembolism: No    New injury or illness:  No    Upcoming surgery, procedure or cardioversion:  No    Additional findings: None      PLAN:    Spoke with Mick ([facility] RN) regarding INR result and instructed:     Warfarin Dosing Instructions: Continue your current warfarin dose of 7.5mg Sun/Tues/Thurs; 5mg all other days    Instructed patient to follow up no later than: 2 weeks  Orders given to  Homecare nurse/facility to recheck    Education provided: Yes: Eating decreased amounts of greens can increase INR; try and maintain consistency w/ amount of greens in diet    Mick verbalizes understanding and agrees to warfarin dosing plan.    Instructed to call the Anticoagulation Clinic for any changes, questions or concerns. (#[phone])        OBJECTIVE:  INR   Date Value Ref Range Status   2020 2.8 (A) 0.90 - 1.10 Final             Anticoagulation Summary  As of 2020    INR goal:   2.0-3.0   TTR:   69.5 % (12 mo)   INR used for dosin.8 (2020)   Warfarin maintenance plan:   7.5 mg (5 mg x 1.5) every Sun, Tue, Thu; 5 mg (5 mg x 1) all other days   Full warfarin instructions:   7.5 mg every Sun, Tue, Thu; 5 mg all other days   Weekly warfarin total:   42.5 mg   Plan last modified:   Marlena Hong RN (5/10/2019)   Next INR check:   2020   Priority:   High   Target end date:   Indefinite    Indications    Atrial fibrillation (AFIB) on Coumadin [I48.91]  Long term current use of anticoagulants with INR goal of 2.0-3.0 (Resolved) [Z79.01]              Anticoagulation Episode Summary     INR check location:       Preferred lab:       Send INR reminders to:   [facility] INR NURSE    Comments:         Anticoagulation Care Providers     Provider Role Specialty Phone number    Lottie Gomez DO [facility] Cardiology [phone]

## 2020-01-25 DIAGNOSIS — Z79.4 TYPE 2 DIABETES MELLITUS WITH DIABETIC NEUROPATHY, WITH LONG-TERM CURRENT USE OF INSULIN (H): Chronic | ICD-10-CM

## 2020-01-25 DIAGNOSIS — E11.40 TYPE 2 DIABETES MELLITUS WITH DIABETIC NEUROPATHY, WITH LONG-TERM CURRENT USE OF INSULIN (H): Chronic | ICD-10-CM

## 2020-01-27 NOTE — TELEPHONE ENCOUNTER
"Last Written Prescription Date:  10/07/2019  Last Fill Quantity: 200,  # refills: 0   Last office visit: 1/16/2020 with prescribing provider: Karen  Future Office Visit:   Next 5 appointments (look out 90 days)    Feb 19, 2020 11:00 AM CST  SHORT with Charlene Matt RPH  [facility] ([facility]) [address] [place] [id number]  [phone]   Feb 19, 2020 11:30 AM CST  Office Visit with Digna Jones MD  [facility]) [address] [place] [id number] [phone]             Requested Prescriptions   Pending Prescriptions Disp Refills     insulin pen needle (BD GERMÁN U/F) 32G X 4 MM miscellaneous [Pharmacy Med Name: B-D PEN NDL GERMÁN [id number](5/32) GRN]       Sig: USE FOUR TIMES DAILY AS DIRECTED       Diabetic Supplies Protocol Passed - 1/25/2020 12:04 PM        Passed - Medication is active on med list        Passed - Patient is 18 years of age or older        Passed - Recent (6 mo) or future (30 days) visit within the authorizing provider's specialty     Patient had office visit in the last 6 months or has a visit in the next 30 days with authorizing provider.  See \"Patient Info\" tab in inbasket, or \"Choose Columns\" in Meds & Orders section of the refill encounter.              "

## 2020-01-29 ENCOUNTER — DOCUMENTATION ONLY (OUTPATIENT)
Dept: FAMILY MEDICINE | Facility: CLINIC | Age: 75
End: 2020-01-29

## 2020-01-29 NOTE — PROGRESS NOTES
[facility] now requests orders and shares plan of care/discharge summaries for some patients through [place].  Please REPLY TO THIS MESSAGE OR ROUTE BACK TO THE AUTHOR in order to give authorization for orders when needed.  This is considered a verbal order, you will still receive a faxed copy of orders for signature.  Thank you for your assistance in improving collaboration for our patients.    MD SUMMARY/PLAN OF CARE    Pt's bp was 90/60 with pulse of 49 at 1015 this am with physical therapy- pt felt a little light headed. Checked by nursing at noon and was 132/62 with pulse 71. Educated to check bp with home monitor if feeling lightheaded, call clinic for sys bp <100, pulse <60, and drink water throughout the day.     Thanks,  Aleyda

## 2020-01-31 ENCOUNTER — DOCUMENTATION ONLY (OUTPATIENT)
Dept: FAMILY MEDICINE | Facility: CLINIC | Age: 75
End: 2020-01-31

## 2020-01-31 NOTE — PROGRESS NOTES
[facility] now requests orders and shares plan of care/discharge summaries for some patients through [facility].  Please REPLY TO THIS MESSAGE OR ROUTE BACK TO THE AUTHOR in order to give authorization for orders when needed.  This is considered a verbal order, you will still receive a faxed copy of orders for signature.  Thank you for your assistance in improving collaboration for our patients.    ORDER    Requesting SN 1 week 1 next week for final assessment and SN Discharge. Pt declined final SN visit this week.     Thanks,  Aleyda

## 2020-02-19 ENCOUNTER — OFFICE VISIT (OUTPATIENT)
Dept: PHARMACY | Facility: CLINIC | Age: 75
End: 2020-02-19
Payer: COMMERCIAL

## 2020-02-19 ENCOUNTER — OFFICE VISIT (OUTPATIENT)
Dept: FAMILY MEDICINE | Facility: CLINIC | Age: 75
End: 2020-02-19
Payer: COMMERCIAL

## 2020-02-19 VITALS
HEIGHT: 71 IN | SYSTOLIC BLOOD PRESSURE: 120 MMHG | BODY MASS INDEX: 35.45 KG/M2 | TEMPERATURE: 97.8 F | DIASTOLIC BLOOD PRESSURE: 68 MMHG | HEART RATE: 81 BPM | OXYGEN SATURATION: 97 %

## 2020-02-19 VITALS — BODY MASS INDEX: 35.45 KG/M2 | WEIGHT: 254.2 LBS

## 2020-02-19 DIAGNOSIS — E11.40 TYPE 2 DIABETES MELLITUS WITH DIABETIC NEUROPATHY, WITH LONG-TERM CURRENT USE OF INSULIN (H): Primary | ICD-10-CM

## 2020-02-19 DIAGNOSIS — Z79.4 TYPE 2 DIABETES MELLITUS WITH DIABETIC NEUROPATHY, WITH LONG-TERM CURRENT USE OF INSULIN (H): Primary | Chronic | ICD-10-CM

## 2020-02-19 DIAGNOSIS — Z79.4 TYPE 2 DIABETES MELLITUS WITH DIABETIC NEUROPATHY, WITH LONG-TERM CURRENT USE OF INSULIN (H): Primary | ICD-10-CM

## 2020-02-19 DIAGNOSIS — E11.40 TYPE 2 DIABETES MELLITUS WITH DIABETIC NEUROPATHY, WITH LONG-TERM CURRENT USE OF INSULIN (H): Primary | Chronic | ICD-10-CM

## 2020-02-19 LAB — HBA1C MFR BLD: 6.1 % (ref 0–5.6)

## 2020-02-19 PROCEDURE — 99214 OFFICE O/P EST MOD 30 MIN: CPT | Performed by: INTERNAL MEDICINE

## 2020-02-19 PROCEDURE — 99607 MTMS BY PHARM ADDL 15 MIN: CPT | Performed by: PHARMACIST

## 2020-02-19 PROCEDURE — 36415 COLL VENOUS BLD VENIPUNCTURE: CPT | Performed by: INTERNAL MEDICINE

## 2020-02-19 PROCEDURE — 99606 MTMS BY PHARM EST 15 MIN: CPT | Performed by: PHARMACIST

## 2020-02-19 PROCEDURE — 83036 HEMOGLOBIN GLYCOSYLATED A1C: CPT | Performed by: INTERNAL MEDICINE

## 2020-02-19 NOTE — PROGRESS NOTES
"[facility] ENCOUNTER  SUBJECTIVE/OBJECTIVE:                Jt Montgomery is a 74 year old male coming in for a follow-up visit.  He was referred to me from Dr. David, he has since established care with Dr. Jones, who he will see immediately after our visit today.     Chief Complaint: Follow up from [facility] visit on 1/16/20.  Follow-up on diabetes.    Tobacco:  reports that he quit smoking about 48 years ago. His smoking use included cigarettes. He has a 10.50 pack-year smoking history. He has never used smokeless tobacco.  Alcohol: history of alcohol dependence - sober since 1973    Medication Adherence/Access:  Patient reports he has not been taking any insulin - MTM was under the impression he was still taking Lantus and no longer taking Novolog unless needed. See below for details.     Diabetes:  Pt currently taking Novolog 5 units TID with meals ( holding if pre-meal BG is <150mg/dL, has not used at all since last visit) and Lantus 18 units daily (patient reports he has not been taking Lantus and was under the impression he should not take this). Pt is not experiencing side effects.  SMBG: CGM. He forgot to bring his Umair device with him today.  Ranges (patient reported): 110s - 130s fasting, highest he can remember was 212 one hour after eating  Patient is experiencing hypoglycemia. Frequency of hypoglycemia? rarely. Symptoms of low blood sugar? none. Had one instance where it was in the 70s, he didn't feel anything but it made him nervous.  Recent symptoms of high blood sugar? none  Eye exam: due  Foot exam: due  ACEi/ARB: Yes: Irbesartan.   Urine Albumin:   Lab Results   Component Value Date    UMALCR 35.95 (H) 10/08/2019   Aspirin: Taking 81mg daily and denies side effects  Diet/Exercise: Doing the \"Plant Strong\" diet and he's lost about 40lbs since August   Lab Results   Component Value Date    A1C 6.2 11/14/2019    A1C 7.6 10/08/2019    A1C 6.5 03/06/2019    A1C 6.1 09/06/2018    A1C 6.3 02/09/2018     Today's " Vitals: Wt 254 lb 3.2 oz (115.3 kg)   BMI 35.45 kg/m       BP Readings from Last 3 Encounters:   02/19/20 120/68   01/16/20 121/63   01/09/20 108/61     ASSESSMENT:                Medication Adherence: issues addressed above    Diabetes: Stable. Patient is meeting A1c goal of < 7%. Due for an A1c recheck today - will be ordered at PCP visit. His SMBG is at goal without insulin today, per patient report.  He would benefit from close follow-up with MTM being able to access his Umair data.      PLAN:                  1. Continue current regimen and come back tomorrow with your Freestyle Umair reader.     I spent 20 minutes with this patient today. A copy of the visit note was provided to the patient's primary care provider.     Will follow up this week, appt scheduled.    Future considerations: clarify if patient should be taking insulin or not based on A1c from today and umair data.     The patient declined a summary of these recommendations. See Provider note/AVS from today.     Mikaela ReyD  PGY1 Medication Therapy Management Resident   [phone]    Preceptor cosignature: Jt Montgomery was seen independently by Dr. Matt. I have reviewed the assessment and plan. Grace Lockhart, MikaelaD, MILAD, BCACP

## 2020-02-19 NOTE — PROGRESS NOTES
Chief Complaint:     Jt Montgomery is a 74 year old male who presents to clinic today for the following health issues:      Follow up on multiple concerns including BPH with urinary retention, Type 2 Diabetes       HPI:   Patient Jt Montgomery is a very pleasant 74 year old male with history of BPH, Type 2 Diabetes, hypertension, hyperlipidemia who presents to Internal Medicine clinic today for follow up on multiple concerns including BPH with urinary retention, Type 2 Diabetes. the patient's previous acute pyelonephritis symptoms have already resolved with antibiotics treatment. Patient denies any flank pain, fever or chills symptoms at this time. His chronic BPH symptoms are improved with current Flomax and finasteride medication therapy. He is still having indwelling urinary catheter therapy at this time. He is due for a follow up with the [facility] clinic in [place] next Monday to remove the indwelling mauricio urinary catheter. Regarding his chronic Type 2 diabetes mellitus the patient is compliant with his diabetes medication therapy including long-term current use of insulin therapy. He denies any recent hypoglycemia events. He is due for a repeat A1c lab today for monitoring of diabetes control.      Current Medications:     Current Outpatient Medications   Medication Sig Dispense Refill     aspirin EC 81 MG EC tablet Take 1 tablet by mouth daily. 90 tablet 1     Coenzyme Q10 (COQ10) 100 MG CAPS Take 200 mg by mouth daily        Continuous Blood Gluc Sensor (FREESTYLE RINKU 14 DAY SENSOR) MISC AS DIRECTED EVERY 14 DAYS 100 each 3     digoxin (LANOXIN) 125 MCG tablet Take 1 tablet (125 mcg) by mouth daily 90 tablet 3     diltiazem ER COATED BEADS (CARTIA XT) 300 MG 24 hr capsule Take 1 capsule (300 mg) by mouth daily 90 capsule 3     febuxostat (ULORIC) 40 MG TABS Take 40 mg by mouth daily       finasteride (PROSCAR) 5 MG tablet Take 1 tablet (5 mg) by mouth daily 90 tablet 3     furosemide  (LASIX) 20 MG tablet Take 1 tablet (20 mg) by mouth daily (Patient taking differently: Take 20 mg by mouth daily Patient takes as needed) 90 tablet 3     Green Tea, Camillia sinensis, (GREEN TEA EXTRACT PO) Take by mouth 2 times daily Doesn't always take regularly       insulin aspart (NOVOLOG FLEXPEN) 100 UNIT/ML pen Inject 5 Units Subcutaneous 3 times daily (with meals) Only if pre-meal blood sugar is over 150mg/dL 30 mL 11     insulin glargine (LANTUS SOLOSTAR) 100 UNIT/ML pen ADMINISTER 18 UNITS UNDER THE SKIN DAILY 30 mL 11     insulin pen needle (BD GERMÁN U/F) 32G X 4 MM miscellaneous USE FOUR TIMES DAILY AS DIRECTED 200 each 1     irbesartan (AVAPRO) 300 MG tablet Take 0.5 tablets (150 mg) by mouth At Bedtime 90 tablet 2     lactobacillus rhamnosus, GG, (CULTURELL) capsule Take 1 capsule by mouth daily       magnesium oxide 200 MG TABS Take 2 tablets by mouth At Bedtime        MELATONIN PO Take 0.5 mg by mouth At Bedtime        metoprolol succinate ER (TOPROL-XL) 50 MG 24 hr tablet Take 1 tablet (50 mg) by mouth 2 times daily 180 tablet 3     order for DME Equipment being ordered: Compression stockings 1 each 3     predniSONE (DELTASONE) 20 MG tablet TAKE 1 TABLET(20 MG) BY MOUTH DAILY AS NEEDED FOR GOUT FLARE 10 tablet 0     spironolactone (ALDACTONE) 25 MG tablet Take 0.5 tablets (12.5 mg) by mouth daily 90 tablet 3     STATIN NOT PRESCRIBED (INTENTIONAL) Statin not prescribed intentionally due to Other pt refusal (This option does not exclude patient from measure)       tamsulosin (FLOMAX) 0.4 MG capsule Take 1 capsule (0.4 mg) by mouth every evening 90 capsule 3     UNABLE TO FIND MEDICATION NAME: Udos Oil - combination of omega 3, 6, 9.  Taking 2-3 capsules daily       UNABLE TO FIND MEDICATION NAME: Super Beet Juice       vitamin D3 (CHOLECALCIFEROL) 2000 units tablet Take 2 tablets by mouth daily        warfarin ANTICOAGULANT (COUMADIN) 5 MG tablet Take 1 1/2 tabs on Sundays, Tuesdays and Thursdays and  take 1 tab on all other days or as directed by INR clinic (Patient taking differently: Take by mouth See Admin Instructions Take 1 1/2 tabs on Sundays, Tuesdays and Thursdays and take 1 tab on all other days or as directed by INR clinic) 110 tablet 1         Allergies:      Allergies   Allergen Reactions     Corn Dextrin      All corn products - gets tired an ornery            Past Medical History:     Past Medical History:   Diagnosis Date     Atrial fibrillation (H)      CAD (coronary artery disease)     MI with RONEL to dRCA April 2011     Central Sleep Apnea with Pat Villanueva breathing 9/14/2010    Also TOMMY with CPAP     CKD (chronic kidney disease) stage 3, GFR 30-59 ml/min (H)      Dilated cardiomyopathy (H)     nonischemic (possibly related to h/o a.fib with RVR) EF 30-40% March 2013     DM2 (diabetes mellitus, type 2) (H)     Goal HgbA1c < 7%     Gout     uric acid level correlates; April 2014 h/o +knee aspirate     Hernia, abdominal      Hypertension     Goal <140/90     Pulmonary hypertension (H)     mild per echo 3/2013     Spider veins          Past Surgical History:     Past Surgical History:   Procedure Laterality Date     CORONARY ANGIOGRAPHY ADULT ORDER  2011    distal circ-RONEL     HERNIA REPAIR  11/20/10    incarcinated     SUSPEND HYOID, GENIOGLOSSAL ADVANCEMENT           Family Medical History:     Family History   Problem Relation Age of Onset     Hypertension Mother      Coronary Artery Disease Mother      Coronary Artery Disease Father      Hypertension Father      Diabetes Sister      Cerebrovascular Disease Sister          Social History:     Social History     Socioeconomic History     Marital status:      Spouse name: Not on file     Number of children: Not on file     Years of education: Not on file     Highest education level: Not on file   Occupational History     Not on file   Social Needs     Financial resource strain: Not on file     Food insecurity:     Worry: Not on file      Inability: Not on file     Transportation needs:     Medical: Not on file     Non-medical: Not on file   Tobacco Use     Smoking status: Former Smoker     Packs/day: 1.50     Years: 7.00     Pack years: 10.50     Types: Cigarettes     Last attempt to quit: 1972     Years since quittin.1     Smokeless tobacco: Never Used     Tobacco comment: quit in       Started at around age 18-19   Substance and Sexual Activity     Alcohol use: No     Alcohol/week: 0.0 standard drinks     Comment: 74   recovering     Drug use: No     Sexual activity: Not Currently     Partners: Female   Lifestyle     Physical activity:     Days per week: Not on file     Minutes per session: Not on file     Stress: Not on file   Relationships     Social connections:     Talks on phone: Not on file     Gets together: Not on file     Attends Yazdanism service: Not on file     Active member of club or organization: Not on file     Attends meetings of clubs or organizations: Not on file     Relationship status: Not on file     Intimate partner violence:     Fear of current or ex partner: Not on file     Emotionally abused: Not on file     Physically abused: Not on file     Forced sexual activity: Not on file   Other Topics Concern      Service Not Asked     Blood Transfusions Not Asked     Caffeine Concern No     Occupational Exposure Not Asked     Hobby Hazards Not Asked     Sleep Concern Yes     Comment: sleep apnea, wears bi-pap     Stress Concern Not Asked     Weight Concern Not Asked     Special Diet No     Comment: low carb diet     Back Care Not Asked     Exercise No     Comment: walking     Bike Helmet Not Asked     Seat Belt Yes     Self-Exams Not Asked     Parent/sibling w/ CABG, MI or angioplasty before 65F 55M? Not Asked   Social History Narrative     Not on file           Review of System:     Constitutional: Negative for fever or chills  Skin: Negative for rashes  Ears/Nose/Throat: Negative for nasal  "congestion, sore throat  Respiratory: No shortness of breath, dyspnea on exertion, cough, or hemoptysis  Cardiovascular: Negative for chest pain  Gastrointestinal: Negative for nausea, vomiting  Genitourinary: Negative for dysuria, hematuria, positive for chronic BPH with urinary retention  Musculoskeletal: Negative for myalgias  Neurologic: Negative for headaches  Psychiatric: Negative for depression, anxiety  Hematologic/Lymphatic/Immunologic: Negative  Endocrine: Negative for recent hypoglycemia events  Behavioral: Negative for tobacco use       Physical Exam:   /68 (BP Location: Left arm, Patient Position: Sitting, Cuff Size: Adult Regular)   Pulse 81   Temp 97.8  F (36.6  C) (Tympanic)   Ht 1.803 m (5' 11\")   SpO2 97%   BMI 35.45 kg/m      GENERAL: chronically ill appearing older male, alert and no acute distress  EYES: eyes grossly normal to inspection, and conjunctivae and sclerae normal  HENT: Normocephalic atraumatic. Nose and mouth without ulcers or lesions  NECK: supple  RESP: lungs clear to auscultation   CV: regular rate and rhythm, normal S1 S2  : indwelling mauricio catheter with urinary collection leg bag present on the left lower leg  LYMPH: no peripheral edema   ABDOMEN: nondistended  MS: no gross musculoskeletal defects noted  SKIN: no suspicious lesions or rashes  NEURO: Alert & Oriented x 3.   PSYCH: mentation appears normal, affect normal        Diagnostic Test Results:     Diagnostic Test Results:    Lab Results   Component Value Date    A1C 6.2 11/14/2019    A1C 7.6 10/08/2019    A1C 6.5 03/06/2019    A1C 6.1 09/06/2018    A1C 6.3 02/09/2018         ASSESSMENT/PLAN:     (N40.1,  N13.8) Benign prostatic hyperplasia with urinary obstruction  (N10) Acute pyelonephritis  (primary encounter diagnosis)  Comment: follow up on multiple concerns including BPH with urinary retention, Type 2 Diabetes. the patient's previous acute pyelonephritis symptoms have already resolved with antibiotics " treatment. Patient denies any flank pain, fever or chills symptoms at this time. His chronic BPH symptoms are improved with current Flomax and finasteride medication therapy. He is still having indwelling urinary catheter therapy at this time. He is due for a follow up with the [facility] clinic in [place] next Monday to remove the indwelling mauricio urinary catheter.   Plan: continue current Flomax and finasteride medications today. Continue outpatient urology clinic follow up going forward.      (E11.40,  Z79.4) Type 2 diabetes mellitus with diabetic neuropathy, with long-term current use of insulin (H)  Comment: no recent hypoglycemia events. Regarding his chronic Type 2 diabetes mellitus the patient is compliant with his diabetes medication therapy including long-term current use of insulin therapy. He denies any recent hypoglycemia events. He is due for a repeat A1c lab today for monitoring of diabetes control.  Plan: continue current diabetes medication therapy including insulin. A1c lab.      (I10) Essential hypertension  Comment: BP is at goal  Plan: continue current BP medication regimen.      (E11.69,  E78.2) Mixed hyperlipidemia due to type 2 diabetes mellitus (H)  Comment: stable on diet and exercise treatment  Plan: continue current diet and exercise treatment going forward.    Follow Up Plan:     Patient is instructed to return to Internal Medicine clinic for follow-up visit in 1 month.        Digna Jones MD  Internal Medicine  [facility]

## 2020-02-20 ENCOUNTER — ANTICOAGULATION THERAPY VISIT (OUTPATIENT)
Dept: CARDIOLOGY | Facility: CLINIC | Age: 75
End: 2020-02-20
Payer: COMMERCIAL

## 2020-02-20 ENCOUNTER — OFFICE VISIT (OUTPATIENT)
Dept: PHARMACY | Facility: CLINIC | Age: 75
End: 2020-02-20
Payer: COMMERCIAL

## 2020-02-20 VITALS — WEIGHT: 253.8 LBS | BODY MASS INDEX: 35.4 KG/M2

## 2020-02-20 DIAGNOSIS — Z79.4 TYPE 2 DIABETES MELLITUS WITH DIABETIC NEUROPATHY, WITH LONG-TERM CURRENT USE OF INSULIN (H): Primary | ICD-10-CM

## 2020-02-20 DIAGNOSIS — I48.0 PAROXYSMAL ATRIAL FIBRILLATION (H): ICD-10-CM

## 2020-02-20 DIAGNOSIS — E11.40 TYPE 2 DIABETES MELLITUS WITH DIABETIC NEUROPATHY, WITH LONG-TERM CURRENT USE OF INSULIN (H): Primary | ICD-10-CM

## 2020-02-20 LAB — INR POINT OF CARE: 3.5 (ref 0.86–1.14)

## 2020-02-20 PROCEDURE — 99607 MTMS BY PHARM ADDL 15 MIN: CPT | Performed by: PHARMACIST

## 2020-02-20 PROCEDURE — 99606 MTMS BY PHARM EST 15 MIN: CPT | Performed by: PHARMACIST

## 2020-02-20 PROCEDURE — 36416 COLLJ CAPILLARY BLOOD SPEC: CPT | Performed by: INTERNAL MEDICINE

## 2020-02-20 PROCEDURE — 85610 PROTHROMBIN TIME: CPT | Mod: QW | Performed by: INTERNAL MEDICINE

## 2020-02-20 NOTE — PATIENT INSTRUCTIONS
Recommendations from today's MTM visit:                                                      1. Your A1C was 6.1% (you were on Lantus most of the last three months)    2. Restart Lantus at 8 units daily.      3. Goal blood sugars in the morning are  mg/dL.     4. Goal blood sugars two hours after a meal are less than 180 mg/dL.    5. Let us know if your blood sugars are going too low, in 70s or less than 70s.     It was great to speak with you today.  I value your experience and would be very thankful for your time with providing feedback on our clinic survey. You may receive a survey via email or text message in the next few days.     Next MTM visit: 3 weeks    To schedule another MTM appointment, please call the clinic directly or you may call the MTM scheduling line at [phone] or toll-free at [phone].     My Clinical Pharmacist's contact information:                                                      It was a pleasure talking with you today!  Please feel free to contact me with any questions or concerns you have.      Fawn Herman, PharmD  Medication Therapy Management Pharmacist  [phone]

## 2020-02-20 NOTE — PROGRESS NOTES
ANTICOAGULATION FOLLOW-UP CLINIC VISIT    Patient Name:  Jt Montgomery  Date:  2/20/2020  Contact Type:  Face to Face    SUBJECTIVE:  Patient Findings     Positives:   Change in medications        Clinical Outcomes     Negatives:   Major bleeding event, Thromboembolic event, Anticoagulation-related hospital admission, Anticoagulation-related ED visit, Anticoagulation-related fatality           OBJECTIVE    INR Protime   Date Value Ref Range Status   02/20/2020 3.5 (A) 0.86 - 1.14 Final       ASSESSMENT / PLAN  INR assessment SUPRA    Recheck INR In: 2 WEEKS    INR Location Clinic      Anticoagulation Summary  As of 2/20/2020    INR goal:   2.0-3.0   TTR:   63.9 % (12 mo)   INR used for dosing:   3.5! (2/20/2020)   Warfarin maintenance plan:   7.5 mg (5 mg x 1.5) every Sun, Tue, Thu; 5 mg (5 mg x 1) all other days   Full warfarin instructions:   2/20: Hold; Otherwise 7.5 mg every Sun, Tue, Thu; 5 mg all other days   Weekly warfarin total:   42.5 mg   Plan last modified:   Marlena Hong RN (5/10/2019)   Next INR check:   3/4/2020   Priority:   High   Target end date:   Indefinite    Indications    Atrial fibrillation (AFIB) on Coumadin [I48.91]  Long term current use of anticoagulants with INR goal of 2.0-3.0 (Resolved) [Z79.01]             Anticoagulation Episode Summary     INR check location:       Preferred lab:       Send INR reminders to:   FREDERICK [facility] HEART INR NURSE    Comments:         Anticoagulation Care Providers     Provider Role Specialty Phone number    Lottie Gomez Minda,  [facility] Cardiology [phone]            See the Encounter Report to view Anticoagulation Flowsheet and Dosing Calendar (Go to Encounters tab in chart review, and find the Anticoagulation Therapy Visit)    INR 3.5.  He was inpatient from 12/17 to 12/21 for severe sepsis secondary to acute pyelonephritis.  He then went to [facility] for several weeks then had homecare and PCP managed his INRs briefly but he is returning  to our INR clinic now.  The flomax and proscar are new since hospitalization.  He is still needing a urine catheter at this time and his urologist gave him antibiotics to take for about 30 days and he thinks he finishes those in a few days, drug name unknown.  Hold dose today x1 then back to normal schedule and recheck in 10-14 days.  If INR is still elevated when off the antibiotics then we can overall decrease his dosing schedule.  He will increase his greens for the next few days while taking antibiotics.  Brian Roberson RN

## 2020-02-20 NOTE — PROGRESS NOTES
"[facility] ENCOUNTER  SUBJECTIVE/OBJECTIVE:                Jt Montgomery is a 74 year old male coming in for a follow-up visit.  He was referred to me from Dr. David, he has since established care with Dr. Jones, who he saw yesterday.      Chief Complaint: Follow up from MT visit yesterday, patient brought in [place] today.   Personal Healthcare Goals: Get off insulin  Tobacco:  reports that he quit smoking about 48 years ago. His smoking use included cigarettes. He has a 10.50 pack-year smoking history. He has never used smokeless tobacco.  Alcohol: history of alcohol dependence - sober since 1973    Medication Adherence/Access:  no issues reported    Medication Adherence/Access:  Patient reports he has not been taking any insulin - MTM was under the impression he was still taking Lantus and no longer taking Novolog unless needed. See below for details.     Diabetes:  Pt currently taking Novolog 5 units TID with meals ( holding if pre-meal BG is <150mg/dL, has not used at all since last visit).    He is no longer on Lantus 18 units daily, he stopped taking this ~3-4 weeks ago, after he got home from [facility]. Per [facility] nursing notes, he was getting Lantus 18 units during his time there. He stopped because he had a few morning BG readings in 70s at home.  Pt is not experiencing side effects.  SMBG: CGM.                  Patient is experiencing hypoglycemia. Frequency of hypoglycemia? rarely. Symptoms of low blood sugar? none. Had one instance where it was in the 70s, he didn't feel anything but it made him nervous.  Recent symptoms of high blood sugar? none  Eye exam: due  Foot exam: due  ACEi/ARB: Yes: Irbesartan.   Urine Albumin:   Lab Results   Component Value Date    UMALCR 35.95 (H) 10/08/2019   ]  Aspirin: Taking 81mg daily and denies side effects  Diet/Exercise: Doing the \"Plant Strong\" diet and he's lost about 40lbs since August   Lab Results   Component Value Date    A1C 6.1 02/19/2020    A1C 6.2 " 11/14/2019    A1C 7.6 10/08/2019    A1C 6.5 03/06/2019    A1C 6.1 09/06/2018     Today's Vitals: Wt 254 lb 3.2 oz (115.3 kg)   BMI 35.45 kg/m       BP Readings from Last 3 Encounters:   02/19/20 120/68   01/16/20 121/63   01/09/20 108/61     Today's Vitals: Wt 253 lb 12.8 oz (115.1 kg)   BMI 35.40 kg/m   Did not obtain BP d/t time, at goal yesterday.    ASSESSMENT:                  Medication Adherence: good, no issues identified    Diabetes: Stable. Patient is meeting A1c goal of < 7%, however per Freestyle Umair readings, AM blood glucose is above goal  mg/dL while off Lantus. Pt may benefit from restating Lantus at a lower dose.     PLAN:                    1. Your A1C was 6.1%.    2. Restart Lantus at 8 units daily.      3. Goal blood sugars in the morning are  mg/dL.     4. Goal blood sugars two hours after a meal are less than 180 mg/dL.    5. Let us know if your blood sugars are going too low, in 70s or less than 70s.     I spent 30 minutes with this patient today. All changes were made via collaborative practice agreement with Digna Jones. A copy of the visit note was provided to the patient's primary care provider.     Will follow up in 3 weeks.    The patient was given a summary of these recommendations.     Fanw Herman, PharmD  Medication Therapy Management Pharmacist  [phone]

## 2020-02-24 ENCOUNTER — TRANSFERRED RECORDS (OUTPATIENT)
Dept: HEALTH INFORMATION MANAGEMENT | Facility: CLINIC | Age: 75
End: 2020-02-24

## 2020-03-02 ENCOUNTER — TRANSFERRED RECORDS (OUTPATIENT)
Dept: HEALTH INFORMATION MANAGEMENT | Facility: CLINIC | Age: 75
End: 2020-03-02

## 2020-03-04 ENCOUNTER — ANTICOAGULATION THERAPY VISIT (OUTPATIENT)
Dept: CARDIOLOGY | Facility: CLINIC | Age: 75
End: 2020-03-04
Payer: COMMERCIAL

## 2020-03-04 DIAGNOSIS — I48.0 PAROXYSMAL ATRIAL FIBRILLATION (H): ICD-10-CM

## 2020-03-04 LAB — INR POINT OF CARE: 3.8 (ref 0.86–1.14)

## 2020-03-04 PROCEDURE — 36416 COLLJ CAPILLARY BLOOD SPEC: CPT | Performed by: INTERNAL MEDICINE

## 2020-03-04 PROCEDURE — 85610 PROTHROMBIN TIME: CPT | Mod: QW | Performed by: INTERNAL MEDICINE

## 2020-03-04 NOTE — PROGRESS NOTES
ANTICOAGULATION FOLLOW-UP CLINIC VISIT    Patient Name:  Jt Montgomery  Date:  3/4/2020  Contact Type:  Face to Face    SUBJECTIVE:  Patient Findings     Positives:   Change in medications (Has been off antibiotic x1wk; still on proscar and flomax; took prednisone 10mg on Sun and 5mg Sat), Change in diet/appetite (Thinks hes had less greens (2-3x/wk) instead of a daily salad)        Clinical Outcomes     Negatives:   Major bleeding event, Thromboembolic event, Anticoagulation-related hospital admission, Anticoagulation-related ED visit, Anticoagulation-related fatality           OBJECTIVE    INR Protime   Date Value Ref Range Status   03/04/2020 3.8 (A) 0.86 - 1.14 Final       ASSESSMENT / PLAN  INR assessment SUPRA    Recheck INR In: 2 WEEKS    INR Location Clinic      Anticoagulation Summary  As of 3/4/2020    INR goal:   2.0-3.0   TTR:   60.3 % (12 mo)   INR used for dosing:   3.8! (3/4/2020)   Warfarin maintenance plan:   7.5 mg (5 mg x 1.5) every Sun, Tue, Thu; 5 mg (5 mg x 1) all other days   Full warfarin instructions:   3/4: Hold; Otherwise 7.5 mg every Sun, Tue, Thu; 5 mg all other days   Weekly warfarin total:   42.5 mg   Plan last modified:   Marlena Hong RN (5/10/2019)   Next INR check:   3/17/2020   Priority:   High   Target end date:   Indefinite    Indications    Atrial fibrillation (AFIB) on Coumadin [I48.91]  Long term current use of anticoagulants with INR goal of 2.0-3.0 (Resolved) [Z79.01]             Anticoagulation Episode Summary     INR check location:       Preferred lab:       Send INR reminders to:   [facility] HEART INR NURSE    Comments:         Anticoagulation Care Providers     Provider Role Specialty Phone number    Patricia Lottie Perla DO [facility] Cardiology [phone]            See the Encounter Report to view Anticoagulation Flowsheet and Dosing Calendar (Go to Encounters tab in chart review, and find the Anticoagulation Therapy Visit)    INR 3.8. No abnormal  bleeding or bruising. Pt has been off of antibiotics x1wk but took prednisone 10mg on Monday and 5mg yesterday for a gout flare. Still on proscar and flomax (no interactions with warfarin). Pt admits to not eating as many greens lately, has been having salad 2-3x/wk instead of daily. Given recent prednisone use, will just plan to hold today's dose and then resume normal maintenance plan of 7.5mg Sun/Tues/Thurs and 5mg all other days. Dosage adjustment made based on physician directed care plan. Pt will resume daily green intake. Recheck INR in 2wks. If no temporary factors and INR is still elevated, consider decreasing maintenance plan. Reviewed signs/symptoms of bleeding and when to seek medical attention for these.     Tori Rico RN

## 2020-03-12 ENCOUNTER — OFFICE VISIT (OUTPATIENT)
Dept: PHARMACY | Facility: CLINIC | Age: 75
End: 2020-03-12
Payer: COMMERCIAL

## 2020-03-12 VITALS — BODY MASS INDEX: 35.84 KG/M2 | DIASTOLIC BLOOD PRESSURE: 58 MMHG | WEIGHT: 257 LBS | SYSTOLIC BLOOD PRESSURE: 114 MMHG

## 2020-03-12 DIAGNOSIS — Z79.4 TYPE 2 DIABETES MELLITUS WITH DIABETIC NEUROPATHY, WITH LONG-TERM CURRENT USE OF INSULIN (H): Primary | ICD-10-CM

## 2020-03-12 DIAGNOSIS — E11.40 TYPE 2 DIABETES MELLITUS WITH DIABETIC NEUROPATHY, WITH LONG-TERM CURRENT USE OF INSULIN (H): Primary | ICD-10-CM

## 2020-03-12 PROCEDURE — 99606 MTMS BY PHARM EST 15 MIN: CPT | Performed by: PHARMACIST

## 2020-03-12 PROCEDURE — 99607 MTMS BY PHARM ADDL 15 MIN: CPT | Performed by: PHARMACIST

## 2020-03-12 NOTE — Clinical Note
LIZ SUE note, thanks!    Fawn Herman, PharmD  Medication Therapy Management Pharmacist  [phone]

## 2020-03-12 NOTE — PROGRESS NOTES
"MTM ENCOUNTER  SUBJECTIVE/OBJECTIVE:                           Jt Montgomery is a 74 year old male coming in for a follow-up visit. He was referred to me from Dr. David, he has since established care with Dr. Jones.  Today's visit is a follow-up MTM visit from 2/20/20.     Chief Complaint: diabetes follow-up.  Personal Healthcare Goals: Get off insulin  Tobacco:  reports that he quit smoking about 48 years ago. His smoking use included cigarettes. He has a 10.50 pack-year smoking history. He has never used smokeless tobacco.  Alcohol: history of alcohol dependence - sober since 1973    Medication Adherence/Access:  Patient reports he has not been taking any insulin - MTM was under the impression he was still taking Lantus and no longer taking Novolog unless needed. See below for details.     Diabetes:   Lantus 8 units daily    Novolog 5 units TID with meals (holding if pre-meal BG is <150mg/dL, has not used at all since last visit, although he has had some pre meal readings in 160s.     He is no longer on Lantus 18 units daily, he stopped taking this after he got home from [place], restarted Lantus 8 units two weeks ago.  Per [place] nursing notes, he was getting Lantus 18 units during his time there. He stopped because he had a few morning BG readings in 70s at home.  Pt is not experiencing side effects.   SMBG: CGM.                               Patient is not experiencing hypoglycemia. Frequency of hypoglycemia? rarely. Symptoms of low blood sugar? none. Had one instance where it was in the 70s, he didn't feel anything but it made him nervous.  Recent symptoms of high blood sugar? none  Eye exam: due  Foot exam: due  ACEi/ARB: Yes: Irbesartan.   Urine Albumin:   Lab Results   Component Value Date    UMALCR 35.95 (H) 10/08/2019   ]  Aspirin: Taking 81mg daily and denies side effects  Diet/Exercise: Doing the \"Plant Strong\" diet and he's lost about 40lbs since August. In the last two months, he's been eating more " bread.  Lab Results   Component Value Date    A1C 6.1 02/19/2020    A1C 6.2 11/14/2019    A1C 7.6 10/08/2019    A1C 6.5 03/06/2019    A1C 6.1 09/06/2018     BP Readings from Last 3 Encounters:   02/19/20 120/68   01/16/20 121/63   01/09/20 108/61     Today's Vitals: /58   Wt 257 lb (116.6 kg)   BMI 35.84 kg/m      ASSESSMENT:                              Medication Adherence: good, no issues identified    Diabetes: Stable. Patient is meeting A1c goal of < 7%, however per Freestyle Umair readings, AM blood glucose is above goal  mg/dL while off Lantus. Pt may benefit from increasing Lantus dose.    PLAN:                            Patient:    1. Increase Lantus to 12 units once daily.     I spent 30 minutes with this patient today. All changes were made via collaborative practice agreement with Digna Jones. A copy of the visit note was provided to the patient's primary care provider.    Will follow up in 1 week.    The patient was given a summary of these recommendations.     Fawn Herman, PharmD  Medication Therapy Management Pharmacist  [phone]

## 2020-03-12 NOTE — PATIENT INSTRUCTIONS
Recommendations from today's MTM visit:                                                      1. Increase Lantus to 12 units once daily.     It was great to speak with you today.  I value your experience and would be very thankful for your time with providing feedback on our clinic survey. You may receive a survey via email or text message in the next few days.     Next MTM visit: I will call you 3/19 at 9:00 am    To schedule another MTM appointment, please call the clinic directly or you may call the MTM scheduling line at [phone] or toll-free at [phone].     My Clinical Pharmacist's contact information:                                                      It was a pleasure talking with you today!  Please feel free to contact me with any questions or concerns you have.      Fawn Herman, PharmD  Medication Therapy Management Pharmacist  [phone]

## 2020-03-16 ENCOUNTER — TELEPHONE (OUTPATIENT)
Dept: CARDIOLOGY | Facility: CLINIC | Age: 75
End: 2020-03-16

## 2020-03-16 NOTE — TELEPHONE ENCOUNTER
Attempted to reach patient to complete travel screening before INR check 3/17/20, LVM to call back.       Addendum 0956:     Wellness Screening Tool    Symptom Screening:    Do you have a:    Fever?  NO    Cough?  NO    Shortness of breath?  NO    Skin rash?  NO    Within the past 14 days, have you been in contact with someone who:    Is currently being ruled out for COVID-19 (novel coronavirus)?  NO    Has tested positive for COVID-10?  NO    Has symptoms of a respiratory illness (fever, cough, shortness of breath)?  NO    Have you or someone you have been in contact with traveled to an area with COVID-19:    Refer to the CDC Coronavirus webpage for COVID-19 areas:  NO    Within the past 3 weeks, have you been exposed to the following:      Pertussis?  NO    Chicken pox?  NO    Measles? NO    Patient's appointment status:  Patient will be seen in clinic as scheduled.

## 2020-03-19 ENCOUNTER — ALLIED HEALTH/NURSE VISIT (OUTPATIENT)
Dept: PHARMACY | Facility: CLINIC | Age: 75
End: 2020-03-19
Payer: COMMERCIAL

## 2020-03-19 ENCOUNTER — ANTICOAGULATION THERAPY VISIT (OUTPATIENT)
Dept: CARDIOLOGY | Facility: CLINIC | Age: 75
End: 2020-03-19
Payer: COMMERCIAL

## 2020-03-19 DIAGNOSIS — I48.0 PAROXYSMAL ATRIAL FIBRILLATION (H): ICD-10-CM

## 2020-03-19 DIAGNOSIS — E11.40 TYPE 2 DIABETES MELLITUS WITH DIABETIC NEUROPATHY, WITH LONG-TERM CURRENT USE OF INSULIN (H): Primary | ICD-10-CM

## 2020-03-19 DIAGNOSIS — Z79.4 TYPE 2 DIABETES MELLITUS WITH DIABETIC NEUROPATHY, WITH LONG-TERM CURRENT USE OF INSULIN (H): Primary | ICD-10-CM

## 2020-03-19 LAB — INR POINT OF CARE: 3.5 (ref 0.86–1.14)

## 2020-03-19 PROCEDURE — 99607 MTMS BY PHARM ADDL 15 MIN: CPT | Performed by: PHARMACIST

## 2020-03-19 PROCEDURE — 85610 PROTHROMBIN TIME: CPT | Mod: QW | Performed by: INTERNAL MEDICINE

## 2020-03-19 PROCEDURE — 99606 MTMS BY PHARM EST 15 MIN: CPT | Performed by: PHARMACIST

## 2020-03-19 PROCEDURE — 99207 ZZC NO CHARGE NURSE ONLY: CPT | Performed by: INTERNAL MEDICINE

## 2020-03-19 PROCEDURE — 36416 COLLJ CAPILLARY BLOOD SPEC: CPT | Performed by: INTERNAL MEDICINE

## 2020-03-19 NOTE — PROGRESS NOTES
MTM ENCOUNTER  SUBJECTIVE/OBJECTIVE:                           Jt Montgomery is a 74 year old male called for a follow-up visit. He was referred to me from Dr. David, he has since established care with Dr. Jones.  Today's visit is a follow-up MTM visit from 3/12/20.     Chief Complaint: diabetes follow-up.  Personal Healthcare Goals: Get off insulin  Tobacco:  reports that he quit smoking about 48 years ago. His smoking use included cigarettes. He has a 10.50 pack-year smoking history. He has never used smokeless tobacco.  Alcohol: history of alcohol dependence - sober since 1973    Medication Adherence/Access:  Patient reports he has not been taking any insulin - MTM was under the impression he was still taking Lantus and no longer taking Novolog unless needed. See below for details.     Diabetes:   Lantus 12 units daily  Novolog 5 units TID with meals (holding if pre-meal BG is <150mg/dL, has not used at all since last visit, although he has had some pre meal readings in 160s.    He is no longer on Lantus 18 units daily, he stopped taking this after he got home from [place], restarted Lantus at 8 units three weeks ago and titrating up.  Per [place] nursing notes, he was getting Lantus 18 units during his time there. He stopped because he had a few morning BG readings in 70s at home.  Pt is not experiencing side effects.   SMBG: CGM.     Date FBG/ 2hours post Lunch/2hours post Dinner /2hours post    3/19 147   /208 (45 min after oatmeal)      3/18 150  158 126   /157 157   154  177 (after snack, bread and PB)    3/17 195 180  174 179 163  161   /154    3/16 131 /149 190  155    3/15 167 (2 am) 143 150 195 114  163  160    3/14 176 170  /167     3/13 151  144  /194 131 132  136  153    3/12    Increased Lantus form 8 units to 12 units   Patient is not experiencing hypoglycemia. Frequency of hypoglycemia? rarely. Symptoms of low blood sugar? none. Had one instance where it was in the 70s, he didn't feel anything but  "it made him nervous.  Recent symptoms of high blood sugar? none  Eye exam: due  Foot exam: due  ACEi/ARB: Yes: Irbesartan.   Urine Albumin:   Lab Results   Component Value Date    UMALCR 35.95 (H) 10/08/2019   ]  Aspirin: Taking 81mg daily and denies side effects  Diet/Exercise: Doing the \"Plant Strong\" diet and he's lost about 40lbs since August. In the last two months, he's been eating more bread at bedtime.  Plans to do more walking outside.   Lab Results   Component Value Date    A1C 6.1 02/19/2020    A1C 6.2 11/14/2019    A1C 7.6 10/08/2019    A1C 6.5 03/06/2019    A1C 6.1 09/06/2018     BP Readings from Last 3 Encounters:   03/12/20 114/58   02/19/20 120/68   01/16/20 121/63     Today's Vitals: There were no vitals taken for this visit.- phone visit    ASSESSMENT:                              Medication Adherence: good, no issues identified    Diabetes: Stable. Patient is meeting A1c goal of < 7%, however per Freestyle Umair readings, AM blood glucose is above goal  mg/dL while off Lantus. Pt may benefit from increasing Lantus dose, however declines today.  He will work on lifestyle modifications with follow-up in one week, see plan.    PLAN:                            Patient:    1. Limit bread and PB at bedtime, try walnuts or an apple instead.     2. Goal blood sugars in the morning are < 150 mg/dL and two hours after a meal < 180 mg/dL.    3. Increase walking, aim for getting to 30 minutes 5 days/week. After meals are good times too.    I spent 30 minutes with this patient today. All changes were made via collaborative practice agreement with Digna Jones. A copy of the visit note was provided to the patient's primary care provider.    Will follow up in 1 week.    The patient declined a summary of these recommendations.     Fawn Herman, PharmD  Medication Therapy Management Pharmacist  [phone]          "

## 2020-03-19 NOTE — Clinical Note
LIZ SUE note, thanks!    Fawn Herman, PharmD  Medication Therapy Management Pharmacist  [phone]

## 2020-03-19 NOTE — PROGRESS NOTES
ANTICOAGULATION FOLLOW-UP CLINIC VISIT    Patient Name:  Jt Montgomery  Date:  3/19/2020  Contact Type:  Face to Face    SUBJECTIVE:  Patient Findings         Clinical Outcomes     Negatives:   Major bleeding event, Thromboembolic event, Anticoagulation-related hospital admission, Anticoagulation-related ED visit, Anticoagulation-related fatality           OBJECTIVE    INR Protime   Date Value Ref Range Status   03/19/2020 3.5 (A) 0.86 - 1.14 Final       ASSESSMENT / PLAN  INR assessment SUPRA    Recheck INR In: 2 WEEKS    INR Location Clinic      Anticoagulation Summary  As of 3/19/2020    INR goal:   2.0-3.0   TTR:   60.0 % (12 mo)   INR used for dosing:   3.5! (3/19/2020)   Warfarin maintenance plan:   7.5 mg (5 mg x 1.5) every Sun, Wed; 5 mg (5 mg x 1) all other days   Full warfarin instructions:   7.5 mg every Sun, Wed; 5 mg all other days   Weekly warfarin total:   40 mg   Plan last modified:   Paola Roberson RN (3/19/2020)   Next INR check:   4/1/2020   Priority:   High   Target end date:   Indefinite    Indications    Atrial fibrillation (AFIB) on Coumadin [I48.91]  Long term current use of anticoagulants with INR goal of 2.0-3.0 (Resolved) [Z79.01]             Anticoagulation Episode Summary     INR check location:       Preferred lab:       Send INR reminders to:   [facility] HEART INR NURSE    Comments:         Anticoagulation Care Providers     Provider Role Specialty Phone number    Lottie GomezDO [facility] Cardiology [phone]            See the Encounter Report to view Anticoagulation Flowsheet and Dosing Calendar (Go to Encounters tab in chart review, and find the Anticoagulation Therapy Visit)    INR 3.5.  INR is staying high still.  NO bleeding.  We will overall decrease by 2.5mg and recheck in 2 weeks.  He will continue increasing the greens.  NO bleeding.  Brian Roberson, MARCELLA

## 2020-03-23 ENCOUNTER — TRANSFERRED RECORDS (OUTPATIENT)
Dept: HEALTH INFORMATION MANAGEMENT | Facility: CLINIC | Age: 75
End: 2020-03-23

## 2020-03-26 ENCOUNTER — TELEPHONE (OUTPATIENT)
Dept: CARDIOLOGY | Facility: CLINIC | Age: 75
End: 2020-03-26

## 2020-03-26 ENCOUNTER — ALLIED HEALTH/NURSE VISIT (OUTPATIENT)
Dept: PHARMACY | Facility: CLINIC | Age: 75
End: 2020-03-26
Payer: COMMERCIAL

## 2020-03-26 DIAGNOSIS — Z79.01 LONG TERM CURRENT USE OF ANTICOAGULANTS WITH INR GOAL OF 2.0-3.0: Primary | ICD-10-CM

## 2020-03-26 DIAGNOSIS — E11.40 TYPE 2 DIABETES MELLITUS WITH DIABETIC NEUROPATHY, WITH LONG-TERM CURRENT USE OF INSULIN (H): Primary | ICD-10-CM

## 2020-03-26 DIAGNOSIS — Z79.4 TYPE 2 DIABETES MELLITUS WITH DIABETIC NEUROPATHY, WITH LONG-TERM CURRENT USE OF INSULIN (H): Primary | ICD-10-CM

## 2020-03-26 PROCEDURE — 99606 MTMS BY PHARM EST 15 MIN: CPT | Performed by: PHARMACIST

## 2020-03-26 PROCEDURE — 99607 MTMS BY PHARM ADDL 15 MIN: CPT | Performed by: PHARMACIST

## 2020-03-26 NOTE — PROGRESS NOTES
MTM ENCOUNTER  SUBJECTIVE/OBJECTIVE:                           Jt Montgomery is a 74 year old male called for a follow-up visit. He was referred to me from Dr. David, he has since established care with Dr. Jones.  Today's visit is a follow-up MTM visit from 3/19/20.     Chief Complaint: diabetes follow-up  Personal Healthcare Goals: Get off insulin  Tobacco:  reports that he quit smoking about 48 years ago. His smoking use included cigarettes. He has a 10.50 pack-year smoking history. He has never used smokeless tobacco.  Alcohol: history of alcohol dependence - sober since 1973    Medication Adherence/Access:  No concerns.    Plan from last time:  1. Limit bread and PB at bedtime, try walnuts or an apple instead.     2. Goal blood sugars in the morning are < 150 mg/dL and two hours after a meal < 180 mg/dL.    3. Increase walking, aim for getting to 30 minutes 5 days/week. After meals are good times too.    Diabetes:   Lantus 12 units daily  Novolog 5 units TID with meals (holding if pre-meal BG is <150mg/dL, hasn't been using any since at [place])    He is no longer on Lantus 18 units daily, he stopped taking this after he got home from [place], restarted Lantus at 8 units a month ago and is titrating up/working on diet/exercise, although he hasn't been walking like he'd planned to last week.  Per [place] nursing notes, he was getting Lantus 18 units during his time there. He stopped because he had a few morning BG readings in 70s at home.  Pt is not experiencing side effects.   SMBG: CGM.   Date FBG/ 2hours post Lunch/2hours post Dinner /2hours post    3/19 147 169   /226 (45 min after oatmeal) 198      3/20 181   202 198  /187 161    3/21 211 179 (1 pm), 252 247    3/21 179  202   /180 185     3/22 198 255  /200 169   211    3/23 200    /184 214  220    3/24  228 (3pm) (ate at 2 pm) 252  /212  217 Stopped tamsulosin   3/25 173    /171 /199  197  142      3/26 134  /168 (small breakfast)             Patient is not  "experiencing hypoglycemia. Frequency of hypoglycemia? rarely. Symptoms of low blood sugar? none. Had one instance where it was in the 70s, he didn't feel anything but it made him nervous.  Recent symptoms of high blood sugar? none  Eye exam: due  Foot exam: due  ACEi/ARB: Yes: Irbesartan.   Urine Albumin:   Lab Results   Component Value Date    UMALCR 35.95 (H) 10/08/2019   ]  Aspirin: Taking 81mg daily and denies side effects  Diet/Exercise: Doing the \"Plant Strong\" diet and he's lost about 40lbs since August. In the last two months, he's been eating more bread at bedtime.  Lab Results   Component Value Date    A1C 6.1 02/19/2020    A1C 6.2 11/14/2019    A1C 7.6 10/08/2019    A1C 6.5 03/06/2019    A1C 6.1 09/06/2018     BP Readings from Last 3 Encounters:   03/12/20 114/58   02/19/20 120/68   01/16/20 121/63     Today's Vitals: There were no vitals taken for this visit. -phone visit      ASSESSMENT:                            Medication Adherence: good, no issues identified    Diabetes: Stable. Patient is meeting A1c goal of < 7% (soon after discharge from [place]), however per Freestyle Umair readings, AM blood glucose is above goal  mg/dL. Pt may benefit from increasing Lantus dose.      PLAN:                            1. Increase your Lantus to 14 units once daily     I spent 30 minutes with this patient today. All changes were made via collaborative practice agreement with Digna Jones. A copy of the visit note was provided to the patient's primary care provider.    Will follow up in 1 week. .    The patient declined a summary of these recommendations.     Fawn Herman, PharmD  Medication Therapy Management Pharmacist  [phone]          "

## 2020-03-26 NOTE — TELEPHONE ENCOUNTER
Left message for patient to call the INR clinic.  INR appointment is being switched to POCT INR check at [facility] clinic lab.  Standing order placed.  Name placed on view schedule.  Brian NARANJO

## 2020-03-27 NOTE — TELEPHONE ENCOUNTER
Spoke to patient to discuss changing in-clinic INR appointment to outside clinic lab INR POCT check. Pt will have INR checked at [facility] on 4/1/20. Clinic address and phone number for scheduling provided to patient. Patient will schedule INR check in the morning at their convenience on the day they are due. INR RN will review anticoagulation management and follow up with patient via telephone pending INR result. Standing order was already entered. Antoni

## 2020-03-28 ENCOUNTER — NURSE TRIAGE (OUTPATIENT)
Dept: NURSING | Facility: CLINIC | Age: 75
End: 2020-03-28

## 2020-03-28 NOTE — TELEPHONE ENCOUNTER
Patient has an indwelling mauricio catheter and last night the leg bag started leaking.  [facility] clarified with [facility] that they do not have a replacement leg bag and contacted patient and left message that he would have to go to ED.

## 2020-04-01 ENCOUNTER — ANTICOAGULATION THERAPY VISIT (OUTPATIENT)
Dept: CARDIOLOGY | Facility: CLINIC | Age: 75
End: 2020-04-01
Payer: COMMERCIAL

## 2020-04-01 DIAGNOSIS — Z79.01 LONG TERM CURRENT USE OF ANTICOAGULANTS WITH INR GOAL OF 2.0-3.0: ICD-10-CM

## 2020-04-01 DIAGNOSIS — I48.0 PAROXYSMAL ATRIAL FIBRILLATION (H): ICD-10-CM

## 2020-04-01 LAB
CAPILLARY BLOOD COLLECTION: NORMAL
INR PPP: 2.8 (ref 0.86–1.14)

## 2020-04-01 PROCEDURE — 36416 COLLJ CAPILLARY BLOOD SPEC: CPT | Performed by: INTERNAL MEDICINE

## 2020-04-01 PROCEDURE — 85610 PROTHROMBIN TIME: CPT | Performed by: INTERNAL MEDICINE

## 2020-04-01 PROCEDURE — 99207 ZZC NO CHARGE NURSE ONLY: CPT | Performed by: INTERNAL MEDICINE

## 2020-04-01 NOTE — PROGRESS NOTES
ANTICOAGULATION FOLLOW-UP CLINIC VISIT    Patient Name:  Jt Montgomery  Date:  2020  Contact Type:  Telephone    SUBJECTIVE:  Patient Findings         Clinical Outcomes     Negatives:   Major bleeding event, Thromboembolic event, Anticoagulation-related hospital admission, Anticoagulation-related ED visit, Anticoagulation-related fatality           OBJECTIVE    INR   Date Value Ref Range Status   2020 2.80 (H) 0.86 - 1.14 Final       ASSESSMENT / PLAN  INR assessment THER    Recheck INR In: 3 WEEKS    INR Location Outside lab      Anticoagulation Summary  As of 2020    INR goal:   2.0-3.0   TTR:   61.0 % (12 mo)   INR used for dosin.80 (2020)   Warfarin maintenance plan:   7.5 mg (5 mg x 1.5) every Sun, Wed; 5 mg (5 mg x 1) all other days   Full warfarin instructions:   7.5 mg every Sun, Wed; 5 mg all other days   Weekly warfarin total:   40 mg   No change documented:   Marlena Hong RN   Plan last modified:   Paola Roberson RN (3/19/2020)   Next INR check:   2020   Priority:   Maintenance   Target end date:   Indefinite    Indications    Atrial fibrillation (AFIB) on Coumadin [I48.91]  Long term current use of anticoagulants with INR goal of 2.0-3.0 (Resolved) [Z79.01]             Anticoagulation Episode Summary     INR check location:       Preferred lab:       Send INR reminders to:   [facility] HEART INR NURSE    Comments:         Anticoagulation Care Providers     Provider Role Specialty Phone number    Lottie GomezDO [facility] Cardiology [phone]            See the Encounter Report to view Anticoagulation Flowsheet and Dosing Calendar (Go to Encounters tab in chart review, and find the Anticoagulation Therapy Visit)    INR 2.80 INR in range on lower dosing schedule. Not taking Finasteride right now. He has an indwelling mauricio catheter and will be scheduling a procedure on his prostate in the future. He will let us know if any warfarin hold would be  required. No change in diet. Denies abnormal bleeding or bruising. Will continue current dosing of 7.5 mg SuW aNd 5 mg all other days with recheck in 3 weeks (he'll schedule a lab appt and we'll call him to review results).    Marlena Hong RN

## 2020-04-02 ENCOUNTER — ALLIED HEALTH/NURSE VISIT (OUTPATIENT)
Dept: PHARMACY | Facility: CLINIC | Age: 75
End: 2020-04-02
Payer: COMMERCIAL

## 2020-04-02 DIAGNOSIS — Z79.4 TYPE 2 DIABETES MELLITUS WITH DIABETIC NEUROPATHY, WITH LONG-TERM CURRENT USE OF INSULIN (H): Primary | ICD-10-CM

## 2020-04-02 DIAGNOSIS — E11.40 TYPE 2 DIABETES MELLITUS WITH DIABETIC NEUROPATHY, WITH LONG-TERM CURRENT USE OF INSULIN (H): Primary | ICD-10-CM

## 2020-04-02 PROCEDURE — 99607 MTMS BY PHARM ADDL 15 MIN: CPT | Performed by: PHARMACIST

## 2020-04-02 PROCEDURE — 99606 MTMS BY PHARM EST 15 MIN: CPT | Performed by: PHARMACIST

## 2020-04-02 NOTE — PROGRESS NOTES
MTM ENCOUNTER  SUBJECTIVE/OBJECTIVE:                           Jt Montgomery is a 74 year old male called for a follow-up visit. He was referred to me from Dr. David, he has since established care with Dr. Jones.  Today's visit is a follow-up MTM visit from 3/26/20.     Patient consented to a telehealth visit: yes    Chief Complaint: diabetes follow-up  Personal Healthcare Goals: Get off insulin  Tobacco:  reports that he quit smoking about 48 years ago. His smoking use included cigarettes. He has a 10.50 pack-year smoking history. He has never used smokeless tobacco.  Alcohol: history of alcohol dependence - sober since 1973    Medication Adherence/Access:  No concerns.    Plan from last time:  1. Limit bread and PB at bedtime, try walnuts or an apple instead.     2. Goal blood sugars in the morning are < 150 mg/dL and two hours after a meal < 180 mg/dL.    3. Increase walking, aim for getting to 30 minutes 5 days/week. After meals are good times too.    1. Increase your Lantus to 14 units once daily      Diabetes:   Lantus 14 units daily  Novolog 5 units TID with meals (holding if pre-meal BG is <150mg/dL, hasn't been using any since at [facility])    He is no longer on Lantus 18 units daily, he stopped taking this after he got home from [facility], restarted Lantus at 8 units a month ago and is titrating up/working on diet/exercise, although he hasn't been walking like he'd planned to d/t issues with catheter/pain.  Per [facility] nursing notes, he was getting Lantus 18 units during his time there. He stopped this after he returned home because he had a few morning BG readings in 70s.  Pt is not experiencing side effects.   SMBG: CGM    Date FBG/ 2hours post Lunch/2hours post Dinner /2hours post    3/27 161            /164    3/28 203  151    3/29 156  119    3/30 183  147    /144    4/1 150               /150    4/2 119 157                  Patient is not experiencing hypoglycemia. Frequency of hypoglycemia? rarely.  "Symptoms of low blood sugar? none. Had one instance where it was in the 70s, he didn't feel anything but it made him nervous.  Recent symptoms of high blood sugar? none  Eye exam: due  Foot exam: due  ACEi/ARB: Yes: Irbesartan.   Urine Albumin:   Lab Results   Component Value Date    UMALCR 35.95 (H) 10/08/2019   ]  Aspirin: Taking 81mg daily and denies side effects  Diet/Exercise: Doing the \"Plant Strong\" diet and he's lost about 40lbs since August. In the last two months, he's been eating more bread at bedtime.  Always oatmeal for breakfast  Lab Results   Component Value Date    A1C 6.1 02/19/2020    A1C 6.2 11/14/2019    A1C 7.6 10/08/2019    A1C 6.5 03/06/2019    A1C 6.1 09/06/2018     BP Readings from Last 3 Encounters:   03/12/20 114/58   02/19/20 120/68   01/16/20 121/63     Today's Vitals: There were no vitals taken for this visit.-phone       ASSESSMENT:                              Medication Adherence: good, no issues identified    Diabetes: Stable. Patient is meeting A1c goal of < 7% (soon after discharge from [place]), however per Freestyle Umair readings, AM blood glucose is above goal  mg/dL. Pt may benefit from increasing Lantus dose.    PLAN:                            Patient:    1. Check BG before breakfast/fasting and two hours after lunch    2. Increase Lantus to 17 units once daily    I spent 20 minutes with this patient today. All changes were made via collaborative practice agreement with Digna Jones. A copy of the visit note was provided to the patient's primary care provider.    Will follow up in 1 week.    The patient declined a summary of these recommendations.     Fawn Herman, PharmD  Medication Therapy Management Pharmacist  [phone]          "

## 2020-04-02 NOTE — Clinical Note
LIZ SUE note, thanks!    Fawn Herman, PharmD  Medication Therapy Management Pharmacist  [phone]

## 2020-04-09 ENCOUNTER — ALLIED HEALTH/NURSE VISIT (OUTPATIENT)
Dept: PHARMACY | Facility: CLINIC | Age: 75
End: 2020-04-09
Payer: COMMERCIAL

## 2020-04-09 DIAGNOSIS — E11.40 TYPE 2 DIABETES MELLITUS WITH DIABETIC NEUROPATHY, WITH LONG-TERM CURRENT USE OF INSULIN (H): Primary | ICD-10-CM

## 2020-04-09 DIAGNOSIS — Z79.4 TYPE 2 DIABETES MELLITUS WITH DIABETIC NEUROPATHY, WITH LONG-TERM CURRENT USE OF INSULIN (H): Primary | ICD-10-CM

## 2020-04-09 PROCEDURE — 99607 MTMS BY PHARM ADDL 15 MIN: CPT | Performed by: PHARMACIST

## 2020-04-09 PROCEDURE — 99606 MTMS BY PHARM EST 15 MIN: CPT | Performed by: PHARMACIST

## 2020-04-09 NOTE — PROGRESS NOTES
MTM ENCOUNTER  SUBJECTIVE/OBJECTIVE:                           Jt Montgomery is a 74 year old male called for a follow-up visit. He was referred to me from Dr. David, he has since established care with Dr. Jones.  Today's visit is a follow-up MTM visit from 4/2/20.     Patient consented to a telehealth visit: yes    Chief Complaint: diabetes follow-up  Personal Healthcare Goals: Get off insulin  Tobacco:  reports that he quit smoking about 48 years ago. His smoking use included cigarettes. He has a 10.50 pack-year smoking history. He has never used smokeless tobacco.  Alcohol: history of alcohol dependence - sober since 1973    Medication Adherence/Access:  No concerns.    Diabetes:   Lantus 17 units daily  Novolog 5 units TID with meals (holding if pre-meal BG is <150mg/dL, hasn't been using any since at [facility], and he doesn't think he got this very often)    He is no longer on Lantus 18 units daily, he stopped taking this after he got home from [facility], restarted Lantus at 8 units a couple of months ago and is titrating up/working on diet/exercise, although he hasn't been walking like he'd planned to d/t issues with catheter/pain.  Per [facility] nursing notes, he was getting Lantus 18 units during his time there. He stopped this after he returned home because he had a few morning BG readings in 70s.  Pt is not experiencing side effects.   SMBG: CGM  Date FBG/ 2hours post Lunch/2hours post Dinner /2hours post    4/3 132    /161      4/4 130   /119      4/5 148   /139      4/6 149     134    4/7 148   /183      4/8 127   /142      4/9 142  /183             Patient is not experiencing hypoglycemia. Frequency of hypoglycemia? rarely. Symptoms of low blood sugar? none. Had one instance where it was in the 70s, he didn't feel anything but it made him nervous.  Recent symptoms of high blood sugar? none  Eye exam: due  Foot exam: due  ACEi/ARB: Yes: Irbesartan.   Urine Albumin:   Lab Results   Component Value Date    UMALCR  "35.95 (H) 10/08/2019   ]  Aspirin: Taking 81mg daily and denies side effects  Diet/Exercise: Doing the \"Plant Strong\" diet and he's lost about 40lbs since August. In the last two months, he's been eating more bread at bedtime.  Always oatmeal for breakfast with fruit, oil, flaxmeal.  Lab Results   Component Value Date    A1C 6.1 02/19/2020    A1C 6.2 11/14/2019    A1C 7.6 10/08/2019    A1C 6.5 03/06/2019    A1C 6.1 09/06/2018     BP Readings from Last 3 Encounters:   03/12/20 114/58   02/19/20 120/68   01/16/20 121/63     Today's Vitals: There were no vitals taken for this visit.-phone visit      ASSESSMENT:                            Medication Adherence: good, no issues identified    Diabetes: Stable. Patient is meeting A1c goal of < 7% (soon after discharge from [facility]), however per Freestyle Umair readings, AM blood glucose is above goal  mg/dL, he may benefit from increasing Lantus dose. Given prior concern of hypoglycemia with 18 units Lantus, will only increase to this dose at this time, although unlikely to occur based on today's readings.    PLAN:                            Patient:    1. Increase Lantus to 18 units once daily    I spent 20 minutes with this patient today. All changes were made via collaborative practice agreement with Digna Jones. A copy of the visit note was provided to the patient's primary care provider.    Will follow up in 2 weeks with Nathaly Hughes.    The patient declined a summary of these recommendations.     Fawn Herman, PharmD  Medication Therapy Management Pharmacist  [phone]          "

## 2020-04-09 NOTE — Clinical Note
LIZ SUE note, thanks!    Fawn Herman, PharmD  Medication Therapy Management Pharmacist  [phone]      
done

## 2020-04-23 ENCOUNTER — ALLIED HEALTH/NURSE VISIT (OUTPATIENT)
Dept: PHARMACY | Facility: CLINIC | Age: 75
End: 2020-04-23
Payer: COMMERCIAL

## 2020-04-23 DIAGNOSIS — Z79.4 TYPE 2 DIABETES MELLITUS WITH DIABETIC NEUROPATHY, WITH LONG-TERM CURRENT USE OF INSULIN (H): Primary | ICD-10-CM

## 2020-04-23 DIAGNOSIS — E11.40 TYPE 2 DIABETES MELLITUS WITH DIABETIC NEUROPATHY, WITH LONG-TERM CURRENT USE OF INSULIN (H): Primary | ICD-10-CM

## 2020-04-23 PROCEDURE — 99607 MTMS BY PHARM ADDL 15 MIN: CPT | Performed by: PHARMACIST

## 2020-04-23 PROCEDURE — 99606 MTMS BY PHARM EST 15 MIN: CPT | Performed by: PHARMACIST

## 2020-04-23 NOTE — PROGRESS NOTES
MTM ENCOUNTER  SUBJECTIVE/OBJECTIVE:                           Jt Montgomery is a 74 year old male called for a follow-up visit. He was referred to me from Dr. Jones.  Today's visit is a follow-up MTM visit from 4/9/20 with Fawn Herman, Rogelio     Patient consented to a telehealth visit: yes    Chief Complaint: DM f/up    Tobacco:  reports that he quit smoking about 48 years ago. His smoking use included cigarettes. He has a 10.50 pack-year smoking history. He has never used smokeless tobacco.  Alcohol: history of alcohol dependence - he's been doing online AA meetings almost daily, which he's loving (he hadn't been active in meetings recently before this)    Medication Adherence/Access: no issues reported    Diabetes: Jamal is currently using Lantus 18 units daily and Novolog 5 units TID with meals (holding if pre-meal BG is <150mg/dL, no recent use)  Pt is not experiencing side effects.   SMBG: CGM  Date FBG/ 2hours post Mid-day (unknown if pre-meal or post-prandial)   4/10/20 119 139   4/11/20 124 191   4/12/20 146 153   4/13/20 119 124   4/14/20 180 (took 10mg of prednisone on the evening of 13th) 107   4/15/20 118 217   4/16/20 137 203   4/17/20 149 202   4/18/20 150 194   4/19/20 163 201   4/20/20 171 153   4/21/20 162 197   4/22/20 175 167   4/23/20 134    Patient is not experiencing hypoglycemia. Frequency of hypoglycemia? rarely. Symptoms of low blood sugar? none.   Recent symptoms of high blood sugar? none  Eye exam: due  Foot exam: due  ACEi/ARB: Yes: Irbesartan.   Urine Albumin:   Lab Results   Component Value Date    UMALCR 35.95 (H) 10/08/2019   ]  Aspirin: Taking 81mg daily and denies side effects  Diet/Exercise: He's been eating fast food in the evening, attributes higher AM BG readings to this.  He has not been exercising, but would like to start doing more walking.  Lab Results   Component Value Date    A1C 6.1 02/19/2020    A1C 6.2 11/14/2019    A1C 7.6 10/08/2019    A1C 6.5 03/06/2019    A1C 6.1  09/06/2018      Today's Vitals: There were no vitals taken for this visit. - telephone visit due to COVID-19 pandemic    ASSESSMENT:                            Medication Adherence: good, no issues identified    Type 2 Diabetes: Needs Improvement. Patient is meeting A1c goal of < 7%. Self monitoring of blood glucose is not at goal of fasting  mg/dL and post prandial < 180 mg/dL.  Would benefit from increasing basal insulin dose slightly, and from increasing exercise.    PLAN:                            1.  Increase Lantus to 20 units daily.  2.  Encouraged Ajmal to add in exercise as able    I spent 20 minutes with this patient today. All changes were made via collaborative practice agreement with Dr. Jones. A copy of the visit note was provided to the patient's primary care provider.    Will follow up in 2 weeks, phone visit scheduled.    The patient declined a summary of these recommendations.     Mikaela StroudD, [facility]  Medication Therapy Management Provider  Pager: [phone]     Telemedicine Visit Details    Type of service:  Telephone visit    Start Time: 11:08 AM  End Time (time video/phone call stopped): 11:28AM    Originating Location (pt. Location): Home    Distant Location (provider location):  [facility]    Mode of Communication:  Telephone

## 2020-04-27 ENCOUNTER — TELEPHONE (OUTPATIENT)
Dept: CARDIOLOGY | Facility: CLINIC | Age: 75
End: 2020-04-27

## 2020-04-27 NOTE — TELEPHONE ENCOUNTER
Left a message asking pt to schedule the overdue INR lab only appt at a [facility]. Left the phone number for the [facility] clinic where he had the last INR done. Antoni

## 2020-04-28 DIAGNOSIS — I48.91 ATRIAL FIBRILLATION, UNSPECIFIED TYPE (H): ICD-10-CM

## 2020-04-28 DIAGNOSIS — Z79.01 LONG TERM CURRENT USE OF ANTICOAGULANT THERAPY: ICD-10-CM

## 2020-04-28 RX ORDER — WARFARIN SODIUM 5 MG/1
TABLET ORAL
Qty: 110 TABLET | Refills: 1 | Status: ON HOLD | OUTPATIENT
Start: 2020-04-28 | End: 2020-05-09

## 2020-04-30 ENCOUNTER — ANTICOAGULATION THERAPY VISIT (OUTPATIENT)
Dept: CARDIOLOGY | Facility: CLINIC | Age: 75
End: 2020-04-30
Payer: COMMERCIAL

## 2020-04-30 DIAGNOSIS — Z79.01 LONG TERM CURRENT USE OF ANTICOAGULANTS WITH INR GOAL OF 2.0-3.0: ICD-10-CM

## 2020-04-30 DIAGNOSIS — I48.0 PAROXYSMAL ATRIAL FIBRILLATION (H): ICD-10-CM

## 2020-04-30 LAB — INR PPP: 2.7 (ref 0.86–1.14)

## 2020-04-30 PROCEDURE — 36416 COLLJ CAPILLARY BLOOD SPEC: CPT | Performed by: INTERNAL MEDICINE

## 2020-04-30 PROCEDURE — 99207 ZZC NO CHARGE NURSE ONLY: CPT

## 2020-04-30 PROCEDURE — 85610 PROTHROMBIN TIME: CPT | Performed by: INTERNAL MEDICINE

## 2020-04-30 NOTE — PROGRESS NOTES
ANTICOAGULATION FOLLOW-UP CLINIC VISIT    Patient Name:  Jt Montgomery  Date:  2020  Contact Type:  Telephone    SUBJECTIVE:  Patient Findings         Clinical Outcomes     Negatives:   Major bleeding event, Thromboembolic event, Anticoagulation-related hospital admission, Anticoagulation-related ED visit, Anticoagulation-related fatality           OBJECTIVE    INR   Date Value Ref Range Status   2020 2.70 (H) 0.86 - 1.14 Final       ASSESSMENT / PLAN  INR assessment THER    Recheck INR In: 4 WEEKS    INR Location Outside lab      Anticoagulation Summary  As of 2020    INR goal:   2.0-3.0   TTR:   67.5 % (1 y)   INR used for dosin.70 (2020)   Warfarin maintenance plan:   7.5 mg (5 mg x 1.5) every Sun, Wed; 5 mg (5 mg x 1) all other days   Full warfarin instructions:   7.5 mg every Sun, Wed; 5 mg all other days   Weekly warfarin total:   40 mg   No change documented:   Paola Roberson RN   Plan last modified:   Paola Roberson RN (3/19/2020)   Next INR check:   2020   Priority:   Maintenance   Target end date:   Indefinite    Indications    Atrial fibrillation (AFIB) on Coumadin [I48.91]  Long term current use of anticoagulants with INR goal of 2.0-3.0 (Resolved) [Z79.01]             Anticoagulation Episode Summary     INR check location:       Preferred lab:       Send INR reminders to:   [facility] HEART INR NURSE    Comments:         Anticoagulation Care Providers     Provider Role Specialty Phone number    Lottie Gomez DO [facility] Cardiology [phone]            See the Encounter Report to view Anticoagulation Flowsheet and Dosing Calendar (Go to Encounters tab in chart review, and find the Anticoagulation Therapy Visit)    INR 2.7.  Called and spoke to the patient.  NO med changes.  NO plans for any bladder procedures at this time.  Continue same schedule and recheck in 4 weeks.  Brian Roberson RN

## 2020-05-05 DIAGNOSIS — G47.33 OBSTRUCTIVE SLEEP APNEA (ADULT) (PEDIATRIC): Primary | ICD-10-CM

## 2020-05-08 ENCOUNTER — HOSPITAL ENCOUNTER (INPATIENT)
Facility: CLINIC | Age: 75
LOS: 5 days | Discharge: HOME | DRG: 666 | End: 2020-05-16
Attending: EMERGENCY MEDICINE | Admitting: INTERNAL MEDICINE
Payer: COMMERCIAL

## 2020-05-08 DIAGNOSIS — R31.0 GROSS HEMATURIA: ICD-10-CM

## 2020-05-08 DIAGNOSIS — T83.011A MALFUNCTION OF FOLEY CATHETER, INITIAL ENCOUNTER (H): ICD-10-CM

## 2020-05-08 DIAGNOSIS — N13.9 OBSTRUCTIVE UROPATHY: Primary | ICD-10-CM

## 2020-05-08 PROCEDURE — 99285 EMERGENCY DEPT VISIT HI MDM: CPT | Mod: 25

## 2020-05-08 PROCEDURE — 51700 IRRIGATION OF BLADDER: CPT

## 2020-05-08 PROCEDURE — 51702 INSERT TEMP BLADDER CATH: CPT

## 2020-05-08 ASSESSMENT — MIFFLIN-ST. JEOR: SCORE: 1910.19

## 2020-05-09 PROBLEM — R31.9 HEMATURIA: Status: ACTIVE | Noted: 2020-05-09

## 2020-05-09 LAB
ANION GAP SERPL CALCULATED.3IONS-SCNC: 8 MMOL/L (ref 3–14)
BASOPHILS # BLD AUTO: 0 10E9/L (ref 0–0.2)
BASOPHILS NFR BLD AUTO: 0.5 %
BUN SERPL-MCNC: 23 MG/DL (ref 7–30)
CALCIUM SERPL-MCNC: 8.6 MG/DL (ref 8.5–10.1)
CHLORIDE SERPL-SCNC: 104 MMOL/L (ref 94–109)
CO2 SERPL-SCNC: 23 MMOL/L (ref 20–32)
CREAT SERPL-MCNC: 0.98 MG/DL (ref 0.66–1.25)
DIFFERENTIAL METHOD BLD: ABNORMAL
EOSINOPHIL # BLD AUTO: 0.1 10E9/L (ref 0–0.7)
EOSINOPHIL NFR BLD AUTO: 1.2 %
ERYTHROCYTE [DISTWIDTH] IN BLOOD BY AUTOMATED COUNT: 12.3 % (ref 10–15)
GFR SERPL CREATININE-BSD FRML MDRD: 76 ML/MIN/{1.73_M2}
GLUCOSE BLDC GLUCOMTR-MCNC: 131 MG/DL (ref 70–99)
GLUCOSE BLDC GLUCOMTR-MCNC: 139 MG/DL (ref 70–99)
GLUCOSE BLDC GLUCOMTR-MCNC: 144 MG/DL (ref 70–99)
GLUCOSE BLDC GLUCOMTR-MCNC: 146 MG/DL (ref 70–99)
GLUCOSE SERPL-MCNC: 174 MG/DL (ref 70–99)
HCT VFR BLD AUTO: 40.7 % (ref 40–53)
HGB BLD-MCNC: 12.9 G/DL (ref 13.3–17.7)
HGB BLD-MCNC: 14.5 G/DL (ref 13.3–17.7)
IMM GRANULOCYTES # BLD: 0 10E9/L (ref 0–0.4)
IMM GRANULOCYTES NFR BLD: 0.1 %
INR PPP: 2.97 (ref 0.86–1.14)
LYMPHOCYTES # BLD AUTO: 1.2 10E9/L (ref 0.8–5.3)
LYMPHOCYTES NFR BLD AUTO: 14.8 %
MCH RBC QN AUTO: 34.9 PG (ref 26.5–33)
MCHC RBC AUTO-ENTMCNC: 35.6 G/DL (ref 31.5–36.5)
MCV RBC AUTO: 98 FL (ref 78–100)
MONOCYTES # BLD AUTO: 0.8 10E9/L (ref 0–1.3)
MONOCYTES NFR BLD AUTO: 9.4 %
NEUTROPHILS # BLD AUTO: 6.2 10E9/L (ref 1.6–8.3)
NEUTROPHILS NFR BLD AUTO: 74 %
PLATELET # BLD AUTO: 184 10E9/L (ref 150–450)
POTASSIUM SERPL-SCNC: 3.8 MMOL/L (ref 3.4–5.3)
RBC # BLD AUTO: 4.15 10E12/L (ref 4.4–5.9)
SODIUM SERPL-SCNC: 135 MMOL/L (ref 133–144)
WBC # BLD AUTO: 8.3 10E9/L (ref 4–11)

## 2020-05-09 PROCEDURE — G0378 HOSPITAL OBSERVATION PER HR: HCPCS

## 2020-05-09 PROCEDURE — 25000132 ZZH RX MED GY IP 250 OP 250 PS 637: Performed by: INTERNAL MEDICINE

## 2020-05-09 PROCEDURE — 25800025 ZZH RX 258: Performed by: INTERNAL MEDICINE

## 2020-05-09 PROCEDURE — 85025 COMPLETE CBC W/AUTO DIFF WBC: CPT | Performed by: EMERGENCY MEDICINE

## 2020-05-09 PROCEDURE — 99220 ZZC INITIAL OBSERVATION CARE,LEVL III: CPT | Performed by: INTERNAL MEDICINE

## 2020-05-09 PROCEDURE — 00000146 ZZHCL STATISTIC GLUCOSE BY METER IP

## 2020-05-09 PROCEDURE — 85018 HEMOGLOBIN: CPT | Performed by: INTERNAL MEDICINE

## 2020-05-09 PROCEDURE — 96372 THER/PROPH/DIAG INJ SC/IM: CPT

## 2020-05-09 PROCEDURE — 80048 BASIC METABOLIC PNL TOTAL CA: CPT | Performed by: EMERGENCY MEDICINE

## 2020-05-09 PROCEDURE — 85610 PROTHROMBIN TIME: CPT | Performed by: EMERGENCY MEDICINE

## 2020-05-09 PROCEDURE — 36415 COLL VENOUS BLD VENIPUNCTURE: CPT | Performed by: INTERNAL MEDICINE

## 2020-05-09 PROCEDURE — 25000131 ZZH RX MED GY IP 250 OP 636 PS 637: Performed by: INTERNAL MEDICINE

## 2020-05-09 RX ORDER — ACETAMINOPHEN 650 MG/1
650 SUPPOSITORY RECTAL EVERY 4 HOURS PRN
Status: DISCONTINUED | OUTPATIENT
Start: 2020-05-09 | End: 2020-05-16 | Stop reason: HOSPADM

## 2020-05-09 RX ORDER — WARFARIN SODIUM 5 MG/1
5 TABLET ORAL DAILY
Status: ON HOLD | COMMUNITY
End: 2020-05-16

## 2020-05-09 RX ORDER — FINASTERIDE 5 MG/1
5 TABLET, FILM COATED ORAL DAILY
Status: DISCONTINUED | OUTPATIENT
Start: 2020-05-09 | End: 2020-05-16 | Stop reason: HOSPADM

## 2020-05-09 RX ORDER — METOPROLOL SUCCINATE 50 MG/1
50 TABLET, EXTENDED RELEASE ORAL
Status: DISCONTINUED | OUTPATIENT
Start: 2020-05-09 | End: 2020-05-16 | Stop reason: HOSPADM

## 2020-05-09 RX ORDER — NICOTINE POLACRILEX 4 MG
15-30 LOZENGE BUCCAL
Status: DISCONTINUED | OUTPATIENT
Start: 2020-05-09 | End: 2020-05-16 | Stop reason: HOSPADM

## 2020-05-09 RX ORDER — SPIRONOLACTONE 25 MG
12.5 TABLET ORAL DAILY
Status: DISCONTINUED | OUTPATIENT
Start: 2020-05-09 | End: 2020-05-11

## 2020-05-09 RX ORDER — ONDANSETRON 4 MG/1
4 TABLET, ORALLY DISINTEGRATING ORAL EVERY 6 HOURS PRN
Status: DISCONTINUED | OUTPATIENT
Start: 2020-05-09 | End: 2020-05-15

## 2020-05-09 RX ORDER — GINSENG 100 MG
CAPSULE ORAL
Status: DISCONTINUED | OUTPATIENT
Start: 2020-05-09 | End: 2020-05-16 | Stop reason: HOSPADM

## 2020-05-09 RX ORDER — DILTIAZEM HYDROCHLORIDE 300 MG/1
300 CAPSULE, COATED, EXTENDED RELEASE ORAL DAILY
Status: DISCONTINUED | OUTPATIENT
Start: 2020-05-09 | End: 2020-05-16 | Stop reason: HOSPADM

## 2020-05-09 RX ORDER — FEBUXOSTAT 40 MG/1
40 TABLET, FILM COATED ORAL DAILY
Status: DISCONTINUED | OUTPATIENT
Start: 2020-05-09 | End: 2020-05-16 | Stop reason: HOSPADM

## 2020-05-09 RX ORDER — MAGNESIUM OXIDE 400 MG/1
400 TABLET ORAL AT BEDTIME
Status: DISCONTINUED | OUTPATIENT
Start: 2020-05-09 | End: 2020-05-16 | Stop reason: HOSPADM

## 2020-05-09 RX ORDER — ONDANSETRON 2 MG/ML
4 INJECTION INTRAMUSCULAR; INTRAVENOUS EVERY 6 HOURS PRN
Status: DISCONTINUED | OUTPATIENT
Start: 2020-05-09 | End: 2020-05-15

## 2020-05-09 RX ORDER — HYDROCODONE BITARTRATE AND ACETAMINOPHEN 5; 325 MG/1; MG/1
1-2 TABLET ORAL EVERY 4 HOURS PRN
Status: DISCONTINUED | OUTPATIENT
Start: 2020-05-09 | End: 2020-05-15 | Stop reason: ALTCHOICE

## 2020-05-09 RX ORDER — DIGOXIN 125 MCG
125 TABLET ORAL DAILY
Status: DISCONTINUED | OUTPATIENT
Start: 2020-05-09 | End: 2020-05-16 | Stop reason: HOSPADM

## 2020-05-09 RX ORDER — DEXTROSE MONOHYDRATE 25 G/50ML
25-50 INJECTION, SOLUTION INTRAVENOUS
Status: DISCONTINUED | OUTPATIENT
Start: 2020-05-09 | End: 2020-05-16 | Stop reason: HOSPADM

## 2020-05-09 RX ORDER — TAMSULOSIN HYDROCHLORIDE 0.4 MG/1
0.4 CAPSULE ORAL EVERY EVENING
Status: DISCONTINUED | OUTPATIENT
Start: 2020-05-09 | End: 2020-05-16 | Stop reason: HOSPADM

## 2020-05-09 RX ORDER — NALOXONE HYDROCHLORIDE 0.4 MG/ML
.1-.4 INJECTION, SOLUTION INTRAMUSCULAR; INTRAVENOUS; SUBCUTANEOUS
Status: DISCONTINUED | OUTPATIENT
Start: 2020-05-09 | End: 2020-05-15

## 2020-05-09 RX ORDER — ACETAMINOPHEN 325 MG/1
650 TABLET ORAL EVERY 4 HOURS PRN
Status: DISCONTINUED | OUTPATIENT
Start: 2020-05-09 | End: 2020-05-16 | Stop reason: HOSPADM

## 2020-05-09 RX ADMIN — Medication 400 MG: at 22:37

## 2020-05-09 RX ADMIN — SODIUM CHLORIDE 6000 ML: 900 IRRIGANT IRRIGATION at 19:15

## 2020-05-09 RX ADMIN — FEBUXOSTAT 40 MG: 40 TABLET ORAL at 10:26

## 2020-05-09 RX ADMIN — DIGOXIN 125 MCG: 125 TABLET ORAL at 10:52

## 2020-05-09 RX ADMIN — INSULIN ASPART 1 UNITS: 100 INJECTION, SOLUTION INTRAVENOUS; SUBCUTANEOUS at 17:24

## 2020-05-09 RX ADMIN — SODIUM CHLORIDE 6000 ML: 900 IRRIGANT IRRIGATION at 13:29

## 2020-05-09 RX ADMIN — SODIUM CHLORIDE 6000 ML: 900 IRRIGANT IRRIGATION at 17:03

## 2020-05-09 RX ADMIN — SODIUM CHLORIDE 6000 ML: 900 IRRIGANT IRRIGATION at 04:00

## 2020-05-09 RX ADMIN — INSULIN ASPART 1 UNITS: 100 INJECTION, SOLUTION INTRAVENOUS; SUBCUTANEOUS at 10:53

## 2020-05-09 RX ADMIN — TAMSULOSIN HYDROCHLORIDE 0.4 MG: 0.4 CAPSULE ORAL at 21:11

## 2020-05-09 RX ADMIN — SPIRONOLACTONE 12.5 MG: 25 TABLET ORAL at 10:28

## 2020-05-09 RX ADMIN — METOPROLOL SUCCINATE 50 MG: 50 TABLET, EXTENDED RELEASE ORAL at 09:27

## 2020-05-09 RX ADMIN — INSULIN GLARGINE 20 UNITS: 100 INJECTION, SOLUTION SUBCUTANEOUS at 22:37

## 2020-05-09 RX ADMIN — SODIUM CHLORIDE 6000 ML: 900 IRRIGANT IRRIGATION at 23:06

## 2020-05-09 RX ADMIN — SODIUM CHLORIDE 6000 ML: 900 IRRIGANT IRRIGATION at 09:29

## 2020-05-09 RX ADMIN — SODIUM CHLORIDE 6000 ML: 900 IRRIGANT IRRIGATION at 05:54

## 2020-05-09 RX ADMIN — DILTIAZEM HYDROCHLORIDE 300 MG: 300 CAPSULE, COATED, EXTENDED RELEASE ORAL at 10:27

## 2020-05-09 RX ADMIN — FINASTERIDE 5 MG: 5 TABLET, FILM COATED ORAL at 10:28

## 2020-05-09 ASSESSMENT — ENCOUNTER SYMPTOMS
ABDOMINAL PAIN: 1
SHORTNESS OF BREATH: 0
VOMITING: 0
DIFFICULTY URINATING: 1
FEVER: 0
HEMATURIA: 1

## 2020-05-09 NOTE — ED NOTES
"[facility]  ED Nurse Handoff Report    ED Chief complaint: Catheter Problem and Hematuria      ED Diagnosis:   Final diagnoses:   None       Code Status: Full Code    Allergies:   Allergies   Allergen Reactions     Corn Dextrin      All corn products - gets tired an ornery       Patient Story: Patient reports his urinary catheter is not draining. It last drained normally sometime yesterday. Blood noted in leg beg. Catheter last changed about 2 weeks ago. Pt reports he is taking warfarin  Focused Assessment:  Catheter not draining, blood in catheter bag    Treatments and/or interventions provided: catheter changed to a triple lumen, CBI's started. Have had to had irrigate at times and returns large clots. On 2nd bag for CBI's, pt uses Cpap at night  Patient's response to treatments and/or interventions:     To be done/followed up on inpatient unit:  continue CBI    Does this patient have any cognitive concerns?: alert and oriented    Activity level - Baseline/Home:  Independent  Activity Level - Current:   Independent    Patient's Preferred language: English   Needed?: No    Isolation: None  Infection: Not Applicable  Bariatric?: No    Vital Signs:   Vitals:    05/08/20 2341   BP: (!) 165/97   Pulse: 78   Resp: 22   Temp: 96  F (35.6  C)   TempSrc: Temporal   SpO2: 98%   Weight: 111.1 kg (245 lb)   Height: 1.854 m (6' 1\")       Cardiac Rhythm:     Was the PSS-3 completed:   Yes  What interventions are required if any?               Family Comments:   OBS brochure/video discussed/provided to patient/family: N/A              Name of person given brochure if not patient:               Relationship to patient:     For the majority of the shift this patient's behavior was Green.   Behavioral interventions performed were .    ED NURSE PHONE NUMBER: [phone]       "

## 2020-05-09 NOTE — ED NOTES
Pt c/o abd pain, catheter not draining. Catheter hand irrigated large clots returned CBI's started up again and draining without difficulty

## 2020-05-09 NOTE — PLAN OF CARE
[id number]: A/O x 4.  HR Perez, evening metoprolol held, otherwise VSS on RA.  Cash in place, clotted off once, hand irrigated, no clots obtained, but flow was re-established.  CBI running at moderate/fast rate.  Up independently. Blood sugars monitored and corrected per sliding scale insulin. Plan to likely start weaning CBI tomorrow.

## 2020-05-09 NOTE — PLAN OF CARE
Patient arrived on station 88 at approximately 0300.  A&Ox4.  VSS, RA.  Denies pain.  Cash in place with CBI running at a moderate rate; cherry colored urine, no clots noted.  PIV SL.  SBA.  Tolerating clear liquid diet.  Urology consulted.  Discharge pending progress.

## 2020-05-09 NOTE — PROGRESS NOTES
RECEIVING UNIT ED HANDOFF REVIEW    ED Nurse Handoff Report was reviewed by: Roberta Lan RN on May 9, 2020 at 2:34 AM

## 2020-05-09 NOTE — PROVIDER NOTIFICATION
Prescriber Notification Note    The pharmacist has communicated with this patient's provider regarding a concern or therapy recommendation.    Notified Person: SALVADOR Castillo  Date/Time of Notification: 5/9/20 2384  Interaction: text page  Concern/Recommendation:prandial novolog ordered overnight from an inaccurate/incomplete home med list. Patient no longer uses novolog. Prandial novolog put on hold until clarification received (has sliding scale). Awaiting response.

## 2020-05-09 NOTE — CONSULTS
Care Transition Initial Assessment - RN      Met with: Patient.  Patient lives alone in 1 level condo.  He has 12 steps from building lobby to condo (no elevator).  Patient does not use any assistive devices.  His adult children live in [facility] [place], but he does have 1 friend he can depend on if he needs anything.  That friend is currently looking over his cat.  In December of 2019 patient stated that he did spend about 3 weeks at [facility] followed by a few weeks of homecare services (RN/PT/OT/SW) thru [facility].  Patient stated that if homecare needed he would like [facility] again but he is really hoping he does not need homecare at discharge.      DATA   Active Problems:    Hematuria       Cognitive Status: awake, alert and oriented.      Contact information and PCP information verified: Yes     Insurance concerns: No Insurance issues identified  ASSESSMENT  Patient currently receives the following services:  none        Identified issues/concerns regarding health management: N/A  PLAN  Financial costs for the patient include N/A .  Patient given options and choices for discharge Yes .  Patient/family is agreeable to the plan?  Likely home w/ no needs  Patient anticipates discharging to Home.      Patient anticipates needs for home equipment: No  Plan/Disposition: Home   Appointments: To be arranged  Care  (CTS) will continue to follow as needed.    Amena Parikh RN  Care Coordinator  [facility]  [phone]

## 2020-05-09 NOTE — H&P
[facility]  History and Physical  Hospitalist       Date of Admission:  5/8/2020    Assessment & Plan   Jt Montgomery is a pleasant 74 year old male with multiple co-morbidities including chronic atrial fibrillation on coumadin, CAD, NSTEMI s/p RONEL placement, nonischemic dilated cardiomyopathy, hypertension, diabetes, central sleep apnea, obesity, gout, BPH who was admitted on 5/8/2020 for hematuria and intermittent mauricio obstruction due to clots.     BPH with indwelling Mauricio catheter  Obstructed Mauricio Catheter  Indwelling fole has been recommended since hospitalization in Dec 2019 for sepsis, pyelonephritis, urinary obstruction. Hematuria and clots in bladder causing intermittent obstruction. Mauricio last changed 2 weeks ago. Continuous bladder irrigation attempted in ED but still many clots and manipulation of tubing/flushing needed.  -observation  -continuous bladder irrigation  -usual mauricio cares  -resume PTA proscar  -urology consult (Dr. Mac's group)  -allow INR to drift down naturally. Recheck INR in AM.   -hold coumadin, aspirin    Chronic atrial fibrillation on coumadin  CAD, NSTEMI s/p RONEL placement  Nonischemic dilated cardiomyopathy  Hypertension  Managed PTA with aspirin, digoxin, diltiazem, lasix prn, irbesartan, metoprolol, spironolactone. Patient refuses statin.   -continue above medications except lasix once doses verified    Diabetes, Type 2  Managed PTA with diet control, glargine and pre-meal aspart. Recent hemoglobin A1c 6.1% from Feb 2020 indicating reasonable glycemic control.   -consistent carb diet (start with clears)  -hold off on insulin for now until urology plan is clear (procedure or not)    Chronic, stable problems:  Gout: Resume PTA uloric when dose verified  Central Sleep Apnea: Resume PTA CPAP per home settings    DVT prophylaxis: Pneumatic Compression Devices and Ambulate every shift  Code Status: Full Code    Disposition: Expected discharge in 2-3  days.    Shahrzad Guillory MD  Text Page    ~~~~~~~~~~~~~~~~~~~~~~~~~~~~~~~~~~~~~~~~~~~~~~~~~~~~~  Primary Care Physician   Digna Jones    Chief Complaint   Blood in urine, blocked mauricio catheter    History is obtained from the patient and medical records.    History of Present Illness   Jt Montgomery is a 74 year old male with multiple co-morbidities including who presents with hematuria. His mauricio was changed about 2 weeks ago. He noted clots in his mauricio bag 1 night ago. He hoped that it would go away on its own but it did not. He was hospitalized in December 2019 with sepsis associated with pyelonephritis, acute on chronic kidney failure, obstructive uropathy. Sent to TCU at [place] with mauricio catheter in place with intention to maintain indwelling mauricio catheter until prostate surgery can be arranged. Follows with Dr. Mac in urology clinic. Takes proscar but recently stopped taking flomax. He denies fevers, chills, night sweats. No cough, shortness of breath, headaches. 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 Case reviewed with Dr. Acosta from the Emergency Department. Labs and available imaging reviewed. Pertinent results include: INR 2.97. Creatinine at baseline.     Past Medical History    I have reviewed this patient's medical history and updated it with pertinent information if needed.   Past Medical History:   Diagnosis Date     Atrial fibrillation (H)      CAD (coronary artery disease)     MI with RONEL to dRCA April 2011     Central Sleep Apnea with Pat Villanueva breathing 9/14/2010    Also TOMMY with CPAP     CKD (chronic kidney disease) stage 3, GFR 30-59 ml/min (H)      Dilated cardiomyopathy (H)     nonischemic (possibly related to h/o a.fib with RVR) EF 30-40% March 2013     DM2 (diabetes mellitus, type 2) (H)     Goal HgbA1c < 7%     Gout     uric acid level correlates; April 2014 h/o +knee aspirate     Hernia, abdominal      Hypertension     Goal <140/90     Pulmonary hypertension (H)      mild per echo 3/2013     Spider veins      Past Surgical History   I have reviewed this patient's surgical history and updated it with pertinent information if needed.  Past Surgical History:   Procedure Laterality Date     CORONARY ANGIOGRAPHY ADULT ORDER  2011    distal circ-RONEL     HERNIA REPAIR  11/20/10    incarcinated     SUSPEND HYOID, GENIOGLOSSAL ADVANCEMENT       Prior to Admission Medications   Prior to Admission Medications   Prescriptions Last Dose Informant Patient Reported? Taking?   Coenzyme Q10 (COQ10) 100 MG CAPS  Self Yes No   Sig: Take 200 mg by mouth daily    Continuous Blood Gluc Sensor (FREESTYLE RINKU 14 DAY SENSOR) MISC   No No   Sig: AS DIRECTED EVERY 14 DAYS   Green Tea, Camillia sinensis, (GREEN TEA EXTRACT PO)  Self Yes No   Sig: Take by mouth 2 times daily Doesn't always take regularly   Insulin Glargine (LANTUS SOLOSTAR SC)   Yes No   Sig: Inject 20 Units Subcutaneous At Bedtime   MELATONIN PO  Self Yes No   Sig: Take 0.5 mg by mouth At Bedtime    STATIN NOT PRESCRIBED (INTENTIONAL)   Yes No   Sig: Statin not prescribed intentionally due to Other pt refusal (This option does not exclude patient from measure)   UNABLE TO FIND  Self Yes No   Sig: MEDICATION NAME: Super Beet Juice   UNABLE TO FIND  Self Yes No   Sig: MEDICATION NAME: Udos Oil - combination of omega 3, 6, 9.  Taking 2-3 capsules daily   aspirin EC 81 MG EC tablet  Self No No   Sig: Take 1 tablet by mouth daily.   digoxin (LANOXIN) 125 MCG tablet  Self No No   Sig: Take 1 tablet (125 mcg) by mouth daily   diltiazem ER COATED BEADS (CARTIA XT) 300 MG 24 hr capsule  Nursing Home No No   Sig: Take 1 capsule (300 mg) by mouth daily   febuxostat (ULORIC) 40 MG TABS  Self Yes No   Sig: Take 40 mg by mouth daily   finasteride (PROSCAR) 5 MG tablet   No No   Sig: Take 1 tablet (5 mg) by mouth daily   furosemide (LASIX) 20 MG tablet   No No   Sig: Take 1 tablet (20 mg) by mouth daily   Patient taking differently: Take 20 mg by  mouth daily Patient takes as needed   insulin aspart (NOVOLOG FLEXPEN) 100 UNIT/ML pen   No No   Sig: Inject 5 Units Subcutaneous 3 times daily (with meals) Only if pre-meal blood sugar is over 150mg/dL   Patient not taking: Reported on 3/19/2020   insulin pen needle (BD GERMÁN U/F) 32G X 4 MM miscellaneous   No No   Sig: USE FOUR TIMES DAILY AS DIRECTED   irbesartan (AVAPRO) 300 MG tablet   No No   Sig: Take 0.5 tablets (150 mg) by mouth At Bedtime   lactobacillus rhamnosus, GG, (CULTURELL) capsule   Yes No   Sig: Take 1 capsule by mouth daily   magnesium oxide 200 MG TABS  Self Yes No   Sig: Take 2 tablets by mouth At Bedtime    metoprolol succinate ER (TOPROL-XL) 50 MG 24 hr tablet  Nursing Home No No   Sig: Take 1 tablet (50 mg) by mouth 2 times daily   order for DME  Self No No   Sig: Equipment being ordered: Compression stockings   predniSONE (DELTASONE) 20 MG tablet  Self No No   Sig: TAKE 1 TABLET(20 MG) BY MOUTH DAILY AS NEEDED FOR GOUT FLARE   spironolactone (ALDACTONE) 25 MG tablet   No No   Sig: Take 0.5 tablets (12.5 mg) by mouth daily   tamsulosin (FLOMAX) 0.4 MG capsule   No No   Sig: Take 1 capsule (0.4 mg) by mouth every evening   vitamin D3 (CHOLECALCIFEROL) 2000 units tablet  Self Yes No   Sig: Take 2 tablets by mouth daily    warfarin ANTICOAGULANT (COUMADIN) 5 MG tablet   No No   Sig: Take 1 1/2 tabs on Sundays and Wednesdays and take 1 tab on all other days or as directed by INR clinic      Facility-Administered Medications: None     Allergies   Allergies   Allergen Reactions     Corn Dextrin      All corn products - gets tired an ornery       Social History   I have reviewed this patient's social history and updated it with pertinent information if needed. Jt PINO Valentina  reports that he quit smoking about 48 years ago. His smoking use included cigarettes. He has a 10.50 pack-year smoking history. He has never used smokeless tobacco. He reports that he does not drink alcohol or use  drugs.    Family History   I have reviewed this patient's family history and updated it with pertinent information if needed.   Family History   Problem Relation Age of Onset     Hypertension Mother      Coronary Artery Disease Mother      Coronary Artery Disease Father      Hypertension Father      Diabetes Sister      Cerebrovascular Disease Sister      Review of Systems   The 10 point Review of Systems is negative other than noted in the HPI or here.    Physical Exam   Temp: 95.9  F (35.5  C) Temp src: Oral BP: 128/68 Pulse: 54 Heart Rate: 78 Resp: 18 SpO2: 98 % O2 Device: None (Room air)    Vital Signs with Ranges  Temp:  [95.9  F (35.5  C)-96  F (35.6  C)] 95.9  F (35.5  C)  Pulse:  [54-78] 54  Heart Rate:  [78] 78  Resp:  [18-22] 18  BP: (128-165)/(68-97) 128/68  SpO2:  [98 %] 98 %  245 lbs 0 oz    Constitutional: Alert, NAD, pleasant and interactive  Eyes: PERRL, sclerae anicteric  HEENT: mmm, atraumatic  Respiratory: Lungs CTAB, no wheezes or crackles  Cardiovascular: RRR, no murmurs  no LE edema  GI: obese, soft, non-tender, nondistended  Skin/Integument: warm, dry, no acute rashes  Genitourinary: mauricio catheter in place, urine in bag with bloody appearance  Musc: CHANG, normal limb strength x 4  Neuro: AOx3, no focal deficits, no tremors  Psych: not anxious, not confused      Data   Data reviewed today:  I personally reviewed no images or EKG's today.  Recent Labs   Lab 05/09/20  0051   WBC 8.3   HGB 14.5   MCV 98      INR 2.97*      POTASSIUM 3.8   CHLORIDE 104   CO2 23   BUN 23   CR 0.98   ANIONGAP 8   HARSHA 8.6   *     Imaging:  No results found for this or any previous visit (from the past 24 hour(s)).

## 2020-05-09 NOTE — PROGRESS NOTES
Chart review history updated.  Still having hematuria on CBI currently.  Denies any chest pain fever chills nausea vomiting headache dizziness lightheadedness at this time.  Medication reconciliation done and his home medication started.  Urology is consulted awaiting evaluation.  Continue to hold Coumadin at this time.  Diet low consistent carb diet.

## 2020-05-09 NOTE — PHARMACY-ADMISSION MEDICATION HISTORY
Pharmacy Medication History  Admission medication history interview status for the 5/8/2020  admission is complete. See EPIC admission navigator for prior to admission medications     Medication history sources: Patient and Surescripts  Medication history source reliability: Good  Adherence assessment: Moderate    Significant changes made to the medication list:  - Deleted: furosemide 20 mg, green tea extract, super beet juice, udos oil  - He reports that he does not use insulin aspart even he has it.       Additional medication history information:   No    Medication reconciliation completed by provider prior to medication history? No    Time spent in this activity: 30 mintues      Prior to Admission medications    Medication Sig Last Dose Taking? Auth Provider   aspirin EC 81 MG EC tablet Take 1 tablet by mouth daily. 5/8/2020 at AM Yes Ravi Saldana DO   Coenzyme Q10 (COQ10) 100 MG CAPS Take 200 mg by mouth daily  5/8/2020 at AM Yes Reported, Patient   digoxin (LANOXIN) 125 MCG tablet Take 1 tablet (125 mcg) by mouth daily 5/8/2020 at AM Yes Danette Paulino PA   diltiazem ER COATED BEADS (CARTIA XT) 300 MG 24 hr capsule Take 1 capsule (300 mg) by mouth daily 5/7/2020 at PM Yes Danette Paulino PA   febuxostat (ULORIC) 40 MG TABS Take 40 mg by mouth daily 5/8/2020 at AM Yes Reported, Patient   finasteride (PROSCAR) 5 MG tablet Take 1 tablet (5 mg) by mouth daily 5/8/2020 at AM Yes Digna Jones MD   Insulin Glargine (LANTUS SOLOSTAR SC) Inject 20 Units Subcutaneous At Bedtime 5/7/2020 at PM Yes Reported, Patient   irbesartan (AVAPRO) 300 MG tablet Take 0.5 tablets (150 mg) by mouth At Bedtime 5/7/2020 at PM Yes Ephraim Castillo MD   magnesium oxide 200 MG TABS Take 2 tablets by mouth At Bedtime  5/7/2020 at PM Yes Reported, Patient   melatonin 1 MG TABS tablet Take 0.5 mg by mouth nightly as needed for sleep 5/7/2020 at PM Yes Unknown, Entered By History   metoprolol succinate ER (TOPROL-XL) 50 MG 24 hr  tablet Take 1 tablet (50 mg) by mouth 2 times daily 5/8/2020 at AM Yes Digna Jones MD   predniSONE (DELTASONE) 20 MG tablet TAKE 1 TABLET(20 MG) BY MOUTH DAILY AS NEEDED FOR GOUT FLARE  at PRN Yes Adolfo Zambrano MD   spironolactone (ALDACTONE) 25 MG tablet Take 0.5 tablets (12.5 mg) by mouth daily 5/8/2020 at AM Yes Ephraim Castillo MD   tamsulosin (FLOMAX) 0.4 MG capsule Take 1 capsule (0.4 mg) by mouth every evening Past Week at Unknown time Yes Digna Jones MD   vitamin D3 (CHOLECALCIFEROL) 2000 units tablet Take 2 tablets by mouth daily  5/8/2020 at AM Yes Reported, Patient   warfarin ANTICOAGULANT (COUMADIN) 5 MG tablet Take 5 mg by mouth daily Take 1 1/2 tabs on Sundays and Wednesdays and take 1 tab on all other days or as directed by INR clinic 5/7/2020 at PM Yes Unknown, Entered By History   Continuous Blood Gluc Sensor (SenexxSTYLE RINKU 14 DAY SENSOR) MISC AS DIRECTED EVERY 14 DAYS   Digna Jones MD   insulin aspart (NOVOLOG FLEXPEN) 100 UNIT/ML pen Inject 5 Units Subcutaneous 3 times daily (with meals) Only if pre-meal blood sugar is over 150mg/dL  Patient not taking: Reported on 3/19/2020   Digna Jones MD   insulin pen needle (BD GERMÁN U/F) 32G X 4 MM miscellaneous USE FOUR TIMES DAILY AS DIRECTED   Digna Jones MD   order for DME Equipment being ordered: Compression stockings   Digna Jones MD

## 2020-05-09 NOTE — PLAN OF CARE
A&Ox4.  VSS, RA.  Denies pain.  Mauricio in place with CBI running at a low-moderate rate; pink colored urine Hand irrigation of mauricio due to blockage catheter, broke up moderate amount of clots.  PIV SL. Ind, ambulated halls today, up ind to bathroom, BM x 1 today.  Tolerating mod carb diet. Blood sugar monitored, corrected per sliding scale insulin.   Discharge pending progress of mauricio irrigation.

## 2020-05-09 NOTE — ED TRIAGE NOTES
Patient reports his urinary catheter is not draining. It last drained normally sometime yesterday. Blood noted in leg beg. Catheter last changed about 2 weeks ago. Pt reports he is taking warfarin.

## 2020-05-09 NOTE — CONSULTS
[facility] Consultation [facility]    Jt Montgomery MRN# [id number]   Age: 74 year old YOB: 1945     Date of Admission:  5/8/2020    Reason for consult: Hematuria       Requesting physician: Dr. Guillory                History of Present Illness:   74 year old patient of Dr. Pitt who then switched to Dr. Mac in February. He has history of enlarged prostate, hematuria and chronic Mauricio catheter.  CT scan and cystoscopy in recent months otherwise negative.  He has been managed with finasteride and tamsulosin but recently stopped tamsulosin.  TRUS in clinic shows 101 gm prostate and he previously discussed option of TURP with Dr. Mac in recent months, but plan has been to continue chronic Mauricio for time being.  He is now admitted, further details below.  Nursing reports that copious clot was irrigated in the ER last night, but this AM the urine has been clearing up on CBI and not required further hand irrigation.  Hb 12.9 this AM (was 14.5 last night). CR 0.98.    FROM ADMISSION NOTE: Jt Montgomery is a 74 year old male with multiple co-morbidities including who presents with hematuria. His mauricio was changed about 2 weeks ago. He noted clots in his mauricio bag 1 night ago. He hoped that it would go away on its own but it did not. He was hospitalized in December 2019 with sepsis associated with pyelonephritis, acute on chronic kidney failure, obstructive uropathy. Sent to TCU at [place] with mauricio catheter in place with intention to maintain indwelling mauricio catheter until prostate surgery can be arranged. Follows with Dr. Mac in urology clinic. Takes proscar but recently stopped taking flomax. He denies fevers, chills, night sweats. No cough, shortness of breath, headaches. 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 Case reviewed with Dr. Acosta from the Emergency Department. Labs and available imaging reviewed. Pertinent results include: INR 2.97. Creatinine at baseline.            Past Medical History:     Past Medical History:   Diagnosis Date     Atrial fibrillation (H)      CAD (coronary artery disease)     MI with RONEL to dRCA 2011     Central Sleep Apnea with Pat Villanueva breathing 2010    Also TOMMY with CPAP     CKD (chronic kidney disease) stage 3, GFR 30-59 ml/min (H)      Dilated cardiomyopathy (H)     nonischemic (possibly related to h/o a.fib with RVR) EF 30-40% 2013     DM2 (diabetes mellitus, type 2) (H)     Goal HgbA1c < 7%     Gout     uric acid level correlates; 2014 h/o +knee aspirate     Hernia, abdominal      Hypertension     Goal <140/90     Pulmonary hypertension (H)     mild per echo 3/2013     Spider veins              Past Surgical History:     Past Surgical History:   Procedure Laterality Date     CORONARY ANGIOGRAPHY ADULT ORDER      distal circ-RONEL     HERNIA REPAIR  11/20/10    incarcinated     SUSPEND HYOID, GENIOGLOSSAL ADVANCEMENT               Social History:     Social History     Socioeconomic History     Marital status:      Spouse name: Not on file     Number of children: Not on file     Years of education: Not on file     Highest education level: Not on file   Occupational History     Not on file   Social Needs     Financial resource strain: Not on file     Food insecurity     Worry: Not on file     Inability: Not on file     Transportation needs     Medical: Not on file     Non-medical: Not on file   Tobacco Use     Smoking status: Former Smoker     Packs/day: 1.50     Years: 7.00     Pack years: 10.50     Types: Cigarettes     Last attempt to quit: 1972     Years since quittin.3     Smokeless tobacco: Never Used     Tobacco comment: quit in       Started at around age 18-20   Substance and Sexual Activity     Alcohol use: No     Alcohol/week: 0.0 standard drinks     Comment: 74   recovering     Drug use: No     Sexual activity: Not Currently     Partners: Female   Lifestyle     Physical activity      Days per week: Not on file     Minutes per session: Not on file     Stress: Not on file   Relationships     Social connections     Talks on phone: Not on file     Gets together: Not on file     Attends Yarsanism service: Not on file     Active member of club or organization: Not on file     Attends meetings of clubs or organizations: Not on file     Relationship status: Not on file     Intimate partner violence     Fear of current or ex partner: Not on file     Emotionally abused: Not on file     Physically abused: Not on file     Forced sexual activity: Not on file   Other Topics Concern      Service Not Asked     Blood Transfusions Not Asked     Caffeine Concern No     Occupational Exposure Not Asked     Hobby Hazards Not Asked     Sleep Concern Yes     Comment: sleep apnea, wears bi-pap     Stress Concern Not Asked     Weight Concern Not Asked     Special Diet No     Comment: low carb diet     Back Care Not Asked     Exercise No     Comment: walking     Bike Helmet Not Asked     Seat Belt Yes     Self-Exams Not Asked     Parent/sibling w/ CABG, MI or angioplasty before 65F 55M? Not Asked   Social History Narrative     Not on file             Family History:     Family History   Problem Relation Age of Onset     Hypertension Mother      Coronary Artery Disease Mother      Coronary Artery Disease Father      Hypertension Father      Diabetes Sister      Cerebrovascular Disease Sister              Immunizations:     Immunization History   Administered Date(s) Administered     Influenza (High Dose) 3 valent vaccine 10/01/2014, 01/02/2017     Pneumococcal 23 valent 09/12/2012             Allergies:     Allergies   Allergen Reactions     Corn Dextrin      All corn products - gets tired an ornery             Medications:     Current Facility-Administered Medications   Medication     acetaminophen (TYLENOL) Suppository 650 mg     acetaminophen (TYLENOL) tablet 650 mg     glucose gel 15-30 g    Or     dextrose  "50 % injection 25-50 mL    Or     glucagon injection 1 mg     digoxin (LANOXIN) tablet 125 mcg     diltiazem ER COATED BEADS (CARDIZEM CD/CARTIA XT) 24 hr capsule 300 mg     febuxostat (ULORIC) tablet 40 mg     finasteride (PROSCAR) tablet 5 mg     HYDROcodone-acetaminophen (NORCO) 5-325 MG per tablet 1-2 tablet     insulin aspart (NovoLOG) injection (RAPID ACTING)     insulin aspart (NovoLOG) injection (RAPID ACTING)     insulin aspart (NovoLOG) injection (RAPID ACTING)     insulin glargine (LANTUS PEN) injection 20 Units     magnesium oxide TABS 2 tablet     melatonin tablet 1 mg     metoprolol succinate ER (TOPROL-XL) 24 hr tablet 50 mg     naloxone (NARCAN) injection 0.1-0.4 mg     ondansetron (ZOFRAN-ODT) ODT tab 4 mg    Or     ondansetron (ZOFRAN) injection 4 mg     sodium chloride 0.9% (bag) irrigation     spironolactone (ALDACTONE) half-tab 12.5 mg     tamsulosin (FLOMAX) capsule 0.4 mg             Review of Systems:   Comprehensive review of systems from the Admission note dated 5/9/20 at [facility] was reviewed with no changes except per HPI.     Examination:  /61 (BP Location: Right arm)   Pulse 66   Temp 97.7  F (36.5  C) (Oral)   Resp 16   Ht 1.854 m (6' 1\")   Wt 111.1 kg (245 lb)   SpO2 95%   BMI 32.32 kg/m    General: Alert and oriented, no distress  HEENT: Face symmetric, mucous membranes moist and pink  Eyes: No scleral icterus  Neck: Symmetric  Chest wall: Symmetric  Respiratory: Breathing unlabored, no audible wheezing  Cardiac: Extremities warm and well perfused, no edema  Abdomen: soft, non tender, non distended; no rebound, guarding or peritoneal signs  Back: No CVA or flank tenderness  : 20 Fr 3-way Cash in place; at bedside I hand irrigated 2 L water and removed several dozen medium and large clots until the urine totally cleared; now set back up on slow CBI that is running clear  Extremities: No evidence of deformities or trauma  Neuro:Grossly non " focal  Pysch: Normal mood and affect  Skin: No evident rashes or lesions            Data:     Lab Results   Component Value Date    WBC 8.3 05/09/2020     Lab Results   Component Value Date    RBC 4.15 05/09/2020     Lab Results   Component Value Date    HGB 14.5 05/09/2020     Lab Results   Component Value Date    HCT 40.7 05/09/2020     Lab Results   Component Value Date     05/09/2020     Creatinine   Date Value Ref Range Status   05/09/2020 0.98 0.66 - 1.25 mg/dL Final   ]  Lab Results   Component Value Date    BUN 23 05/09/2020       Imaging:  CT ABDOMEN/PELVIS WITHOUT CONTRAST December 17, 2019 1:56 PM      HISTORY: Urinary retention, renal failure, urine infection.     TECHNIQUE: No IV contrast material. Radiation dose for this scan was  reduced using automated exposure control, adjustment of the mA and/or  kV according to patient size, or iterative reconstruction technique.     COMPARISON: 11/19/2010.     FINDINGS: The heart is enlarged. There is diffuse low-attenuation of  the lobular liver. Several calcified gallstones are present. Spleen is  normal in size. The unenhanced pancreas and adrenal glands are  unremarkable.     There is marked bilateral perinephric infiltration. Calcifications  along the right renal artery are noted. There is a nonobstructing  calcification in the left renal collecting system that measures 6 mm.  Mild prominence of the ureters and collecting systems noted.     The appendix is normal. No bowel obstruction or ascites. No free  intraperitoneal air.     Urinary bladder is decompressed by a catheter. There is apparent  marked urinary bladder wall thickening that appears irregular.     Severe nonaneurysmal aortic and visceral branch atherosclerosis. No  abdominal or retroperitoneal lymphadenopathy. No pelvic adenopathy or  free fluid.     No lytic or blastic bone lesions.                                                                      IMPRESSION:  1. Marked bilateral  perinephric infiltration with prominent collecting  systems and ureters have the appearance of either recently resolved  obstruction or infection.  2. Abnormal appearance of the urinary bladder with bladder wall  thickening and prostate enlargement. Tumor cannot be excluded.  3. Cirrhotic appearing, fatty infiltrated liver.  4. Cholelithiasis.  5. Severe atherosclerosis.  6. Cardiomegaly.     JANET BRICE MD    Impression:  Hematuria with clot retention, improving  Chronic urinary retention due to BPH  Multiple medical comorbidities    Plan:  I irrigated several dozen clots and CBI running clear; nursing instructed to taped down to keep urine light pink and hand irrigate prn  Continue to hold warfarin  Ideally he will discharge in 1-2 days; I advised he call Dr. Mac on Monday ; Ideally he would have TURP in the coming weeks while he is off anticoagulation and hopefully will help prevent re-admission for similar issues in coming months.    40 minutes direct patient contact time for consult, clot irrigation and counseling      Janet Rodas MD  [facility] ([facility])  [phone]

## 2020-05-09 NOTE — ED NOTES
Catheter not draining. Hand irrigated returns large clots. CBI's restarted flushing catheter without difficulty

## 2020-05-09 NOTE — ED PROVIDER NOTES
History     Chief Complaint:    Catheter Problem and Hematuria      The history is provided by the patient.      Jt Montgomery is a 74 year old male on Coumadin with a personal history of atrial fibrillation who presents with hematuria and catheter drainage problems. The patient states that his catheter was last draining normally some time yesterday, but noted the appearance of blood in the evening. When he woke up after a nap earlier today he noted that his catheter bag was full and tinted red. His catheter was last changed approximately 2 weeks ago, and he has not had this issue before.     Allergies:  No Known Drug Allergies    Medications:    Aspirin 81 mg  CoQ10  Lanoxin  Cartia XT  Uloric  Proscar  Lasix  Avapro  Toprol XL  Deltasone  Aldactone  Flomax  Coumadin  Lopressor  Lanoxin    Past Medical History:    Physical deconditioning  Severe sepsis  Acute pyelonephritis  Obstructive uropathy  Acute on chronic renal failure  Indirect hyperbilirubinemia  Facial cellulitis  Anemia  Mixed hyperlipidemia   Diabetes mellitus type 2  Pulmonary hypertension  Morbid obesity  Chronic kidney disease  Atrial fibrillation  Coronary artery disease  Gout  Dilated cardiomyopathy  Obstructive sleep apnea  Hypertension  Alcohol dependence in remission  Abdominal hernia  Spider veins  Atherosclerotic heart disease of native coronary artery without angina pectoris  Metabolic disorder  Osteoarthritis  Polyneuropathy    Past Surgical History:    Coronary angiography  Hernia repair  Suspend hyoid, genioglossal advancement    Family History:    Mother: Hypertension, Coronary artery disease  Father: Hypertension, Coronary artery disease  Sister: Diabetes, Cerebrovascular disease    Social History:  The patient was not accompanied to the ED.  Smoking Status: Former smoker  Smokeless Tobacco: Never used  Alcohol Use: No  Drug Use: No  Marital Status:      Review of Systems   Constitutional: Negative for fever.   Respiratory:  "Negative for shortness of breath.    Cardiovascular: Negative for chest pain.   Gastrointestinal: Positive for abdominal pain. Negative for vomiting.   Genitourinary: Positive for difficulty urinating and hematuria.   All other systems reviewed and are negative.      Physical Exam     Patient Vitals for the past 24 hrs:   BP Temp Temp src Pulse Heart Rate Resp SpO2 Height Weight   05/09/20 0300 128/68 95.9  F (35.5  C) Oral 54 -- 18 98 % -- --   05/08/20 2341 (!) 165/97 96  F (35.6  C) Temporal 78 78 22 98 % 1.854 m (6' 1\") 111.1 kg (245 lb)       Physical Exam  General: Appears well-developed and well-nourished.   Head: No signs of trauma.   CV: Normal rate and regular rhythm.    Resp: Effort normal and breath sounds normal. No respiratory distress.   GI: Soft. There is mild suprapubic tenderness.  No rebound or guarding.  Normal bowel sounds.  No CVA tenderness.  :  Cash catheter with gross hematuria and minimal output.  MSK: Normal range of motion. no edema. No Calf tenderness.  Neuro: The patient is alert and oriented.  Speech normal.  GCS 15  Skin: Skin is warm and dry. No rash noted.   Psych: normal mood and affect. behavior is normal.       Emergency Department Course     Laboratory:  Laboratory findings were communicated with the patient who voiced understanding of the findings.    CBC: WBC 8.3, HGB 14.5,   BMP: Glucose 174 (H), o/w WNL (Creatinine 0.98)  INR: 2.97 (H)     Procedures  None.    Emergency Department Course:  Past medical records, nursing notes, and vitals reviewed.    8639 I performed an exam of the patient as documented above.     IV was inserted and blood was drawn for laboratory testing, results above.    0148 I rechecked the patient and discussed the results of his workup thus far.     Findings and plan explained to the Patient who consents to admission. Discussed the patient with Dr. Guillory, who will admit the patient to an observation bed for further monitoring, evaluation, and " treatment.    I personally reviewed the laboratory results with the Patient and answered all related questions prior to admission.     Impression & Plan     Medical Decision Making:  Jt Montgomery is a 74-year-old gentleman who presents due to Cash catheter not draining. He has had a Cash catheter over the last few months and today noticed some blood in the in the catheter and then this evening has been unable to urinate with a full feeling. Catheter was changed for a three-way and bladder irrigation was done. There were significant clots and blood that had to be removed manually. We were able to get the bladder flowing some, but the significant blood clots continued. Given this, and concern for continued bleeding and for the possibility of re-obstruction, patient was admitted for continued monitoring and likely urology consultation in the morning. He remained stable through his time in the ER.    Diagnosis:  No diagnosis found.    Disposition:  Admitted to Dr. Guillory.      Scribe Disclosure:  I, Ximena Adan, am serving as a scribe at 11:59 PM on 5/8/2020 to document services personally performed by Kalen Acosta MD based on my observations and the provider's statements to me.     Ximena Adan  5/8/2020    EMERGENCY DEPARTMENT       Kalen Acosta MD  05/09/20 0586

## 2020-05-10 LAB
ANION GAP SERPL CALCULATED.3IONS-SCNC: 7 MMOL/L (ref 3–14)
BASOPHILS # BLD AUTO: 0 10E9/L (ref 0–0.2)
BASOPHILS NFR BLD AUTO: 0.4 %
BUN SERPL-MCNC: 27 MG/DL (ref 7–30)
CALCIUM SERPL-MCNC: 8.3 MG/DL (ref 8.5–10.1)
CHLORIDE SERPL-SCNC: 105 MMOL/L (ref 94–109)
CO2 SERPL-SCNC: 25 MMOL/L (ref 20–32)
CREAT SERPL-MCNC: 1.19 MG/DL (ref 0.66–1.25)
DIFFERENTIAL METHOD BLD: ABNORMAL
EOSINOPHIL # BLD AUTO: 0.1 10E9/L (ref 0–0.7)
EOSINOPHIL NFR BLD AUTO: 1.9 %
ERYTHROCYTE [DISTWIDTH] IN BLOOD BY AUTOMATED COUNT: 12.7 % (ref 10–15)
GFR SERPL CREATININE-BSD FRML MDRD: 60 ML/MIN/{1.73_M2}
GLUCOSE BLDC GLUCOMTR-MCNC: 143 MG/DL (ref 70–99)
GLUCOSE BLDC GLUCOMTR-MCNC: 148 MG/DL (ref 70–99)
GLUCOSE BLDC GLUCOMTR-MCNC: 153 MG/DL (ref 70–99)
GLUCOSE BLDC GLUCOMTR-MCNC: 156 MG/DL (ref 70–99)
GLUCOSE BLDC GLUCOMTR-MCNC: 161 MG/DL (ref 70–99)
GLUCOSE BLDC GLUCOMTR-MCNC: 173 MG/DL (ref 70–99)
GLUCOSE SERPL-MCNC: 191 MG/DL (ref 70–99)
HCT VFR BLD AUTO: 34.4 % (ref 40–53)
HGB BLD-MCNC: 11.8 G/DL (ref 13.3–17.7)
IMM GRANULOCYTES # BLD: 0 10E9/L (ref 0–0.4)
IMM GRANULOCYTES NFR BLD: 0.3 %
INR PPP: 2.22 (ref 0.86–1.14)
LYMPHOCYTES # BLD AUTO: 1.6 10E9/L (ref 0.8–5.3)
LYMPHOCYTES NFR BLD AUTO: 24.2 %
MCH RBC QN AUTO: 34.6 PG (ref 26.5–33)
MCHC RBC AUTO-ENTMCNC: 34.3 G/DL (ref 31.5–36.5)
MCV RBC AUTO: 101 FL (ref 78–100)
MONOCYTES # BLD AUTO: 0.6 10E9/L (ref 0–1.3)
MONOCYTES NFR BLD AUTO: 8.3 %
NEUTROPHILS # BLD AUTO: 4.4 10E9/L (ref 1.6–8.3)
NEUTROPHILS NFR BLD AUTO: 64.9 %
NRBC # BLD AUTO: 0 10*3/UL
NRBC BLD AUTO-RTO: 0 /100
PLATELET # BLD AUTO: 161 10E9/L (ref 150–450)
POTASSIUM SERPL-SCNC: 4.2 MMOL/L (ref 3.4–5.3)
RBC # BLD AUTO: 3.41 10E12/L (ref 4.4–5.9)
SODIUM SERPL-SCNC: 137 MMOL/L (ref 133–144)
WBC # BLD AUTO: 6.7 10E9/L (ref 4–11)

## 2020-05-10 PROCEDURE — 25000128 H RX IP 250 OP 636: Performed by: INTERNAL MEDICINE

## 2020-05-10 PROCEDURE — 99207 ZZC CDG-CODE CATEGORY CHANGED: CPT | Performed by: INTERNAL MEDICINE

## 2020-05-10 PROCEDURE — G0378 HOSPITAL OBSERVATION PER HR: HCPCS

## 2020-05-10 PROCEDURE — 96372 THER/PROPH/DIAG INJ SC/IM: CPT

## 2020-05-10 PROCEDURE — 99225 ZZC SUBSEQUENT OBSERVATION CARE,LEVEL II: CPT | Performed by: INTERNAL MEDICINE

## 2020-05-10 PROCEDURE — 25000125 ZZHC RX 250: Performed by: INTERNAL MEDICINE

## 2020-05-10 PROCEDURE — 85610 PROTHROMBIN TIME: CPT | Performed by: INTERNAL MEDICINE

## 2020-05-10 PROCEDURE — 25800025 ZZH RX 258: Performed by: INTERNAL MEDICINE

## 2020-05-10 PROCEDURE — 36415 COLL VENOUS BLD VENIPUNCTURE: CPT | Performed by: INTERNAL MEDICINE

## 2020-05-10 PROCEDURE — 25000131 ZZH RX MED GY IP 250 OP 636 PS 637: Performed by: INTERNAL MEDICINE

## 2020-05-10 PROCEDURE — 80048 BASIC METABOLIC PNL TOTAL CA: CPT | Performed by: INTERNAL MEDICINE

## 2020-05-10 PROCEDURE — 85025 COMPLETE CBC W/AUTO DIFF WBC: CPT | Performed by: INTERNAL MEDICINE

## 2020-05-10 PROCEDURE — 00000146 ZZHCL STATISTIC GLUCOSE BY METER IP

## 2020-05-10 PROCEDURE — 25000132 ZZH RX MED GY IP 250 OP 250 PS 637: Performed by: INTERNAL MEDICINE

## 2020-05-10 RX ORDER — PHYTONADIONE 5 MG/1
2.5 TABLET ORAL ONCE
Status: DISCONTINUED | OUTPATIENT
Start: 2020-05-10 | End: 2020-05-10 | Stop reason: ALTCHOICE

## 2020-05-10 RX ADMIN — INSULIN ASPART 1 UNITS: 100 INJECTION, SOLUTION INTRAVENOUS; SUBCUTANEOUS at 18:04

## 2020-05-10 RX ADMIN — SPIRONOLACTONE 12.5 MG: 25 TABLET ORAL at 07:36

## 2020-05-10 RX ADMIN — SODIUM CHLORIDE 6000 ML: 900 IRRIGANT IRRIGATION at 09:05

## 2020-05-10 RX ADMIN — Medication 400 MG: at 22:12

## 2020-05-10 RX ADMIN — FINASTERIDE 5 MG: 5 TABLET, FILM COATED ORAL at 07:37

## 2020-05-10 RX ADMIN — SODIUM CHLORIDE 6000 ML: 900 IRRIGANT IRRIGATION at 22:20

## 2020-05-10 RX ADMIN — SODIUM CHLORIDE 6000 ML: 900 IRRIGANT IRRIGATION at 10:47

## 2020-05-10 RX ADMIN — METOPROLOL SUCCINATE 50 MG: 50 TABLET, EXTENDED RELEASE ORAL at 16:00

## 2020-05-10 RX ADMIN — SODIUM CHLORIDE 6000 ML: 900 IRRIGANT IRRIGATION at 03:57

## 2020-05-10 RX ADMIN — SODIUM CHLORIDE 6000 ML: 900 IRRIGANT IRRIGATION at 20:40

## 2020-05-10 RX ADMIN — SODIUM CHLORIDE 6000 ML: 900 IRRIGANT IRRIGATION at 12:54

## 2020-05-10 RX ADMIN — DIGOXIN 125 MCG: 125 TABLET ORAL at 07:36

## 2020-05-10 RX ADMIN — SODIUM CHLORIDE 6000 ML: 900 IRRIGANT IRRIGATION at 17:48

## 2020-05-10 RX ADMIN — INSULIN ASPART 1 UNITS: 100 INJECTION, SOLUTION INTRAVENOUS; SUBCUTANEOUS at 07:34

## 2020-05-10 RX ADMIN — INSULIN GLARGINE 20 UNITS: 100 INJECTION, SOLUTION SUBCUTANEOUS at 22:12

## 2020-05-10 RX ADMIN — PHYTONADIONE 2.5 MG: 10 INJECTION, EMULSION INTRAMUSCULAR; INTRAVENOUS; SUBCUTANEOUS at 10:09

## 2020-05-10 RX ADMIN — FEBUXOSTAT 40 MG: 40 TABLET ORAL at 07:36

## 2020-05-10 RX ADMIN — INSULIN ASPART 1 UNITS: 100 INJECTION, SOLUTION INTRAVENOUS; SUBCUTANEOUS at 12:16

## 2020-05-10 RX ADMIN — SODIUM CHLORIDE 6000 ML: 900 IRRIGANT IRRIGATION at 01:21

## 2020-05-10 RX ADMIN — METOPROLOL SUCCINATE 50 MG: 50 TABLET, EXTENDED RELEASE ORAL at 07:36

## 2020-05-10 RX ADMIN — SODIUM CHLORIDE 6000 ML: 900 IRRIGANT IRRIGATION at 06:42

## 2020-05-10 RX ADMIN — SODIUM CHLORIDE 6000 ML: 900 IRRIGANT IRRIGATION at 16:01

## 2020-05-10 RX ADMIN — SODIUM CHLORIDE 6000 ML: 900 IRRIGANT IRRIGATION at 23:52

## 2020-05-10 RX ADMIN — TAMSULOSIN HYDROCHLORIDE 0.4 MG: 0.4 CAPSULE ORAL at 20:35

## 2020-05-10 RX ADMIN — DILTIAZEM HYDROCHLORIDE 300 MG: 300 CAPSULE, COATED, EXTENDED RELEASE ORAL at 20:39

## 2020-05-10 NOTE — PROGRESS NOTES
[facility]    Hospitalist Progress Note    Brief Summary:  Jt Montgomery is a pleasant 74 year old male with multiple co-morbidities including chronic atrial fibrillation on coumadin, CAD, NSTEMI s/p ORNEL placement, nonischemic dilated cardiomyopathy, hypertension, diabetes, central sleep apnea, obesity, gout, BPH who was admitted on 5/8/2020 for hematuria and intermittent mauricio obstruction due to clots.     Assessment & Plan           BPH with gross hematuria indwelling Mauricio catheter  Obstructed Mauricio catheter secondary blood clot    Patient has history of BPH and urinary retention has chronic Mauricio catheter since December 2019.  Now presented with urinary obstruction second to blood clots and gross hematuria.  Three-way catheter was passed and started on CBI.  Urology was consulted.  Patient continued to have blood clots requiring manual irrigation.  Now the return from the CBI slight pink.  Continue to hold Coumadin at this time.  Appreciate input from the urology, patient will benefit from TURP.  Keep the CBI till hematuria resolved.  Continue to hold aspirin and Coumadin at this time.  Will check INR, if is still more than 2 we can give him a small dose of vitamin K.       Chronic atrial fibrillation on coumadin  CAD, NSTEMI s/p RONEL placement  Nonischemic dilated cardiomyopathy  Hypertension  Managed PTA with aspirin, digoxin, diltiazem, lasix prn, irbesartan, metoprolol, spironolactone. Patient refuses statin.   Those medications were restarted except for aspirin on 5/9/2020.     Diabetes, Type 2  Managed PTA with diet control, glargine and pre-meal aspart. Recent hemoglobin A1c 6.1% from Feb 2020 indicating reasonable glycemic control.   Blood sugars in good control at this time, I will continue with the Lantus 20 units at night, keep no signs concerning for correction hypoglycemia protocol.  -consistent carb diet     Acute blood loss anemia:  This is secondary to gross hematuria, dropped  hemoglobin from 14.5-11.8 this morning.  No need for blood transfusion at this time.  Continue to monitor     Chronic, stable problems:  Gout:  Continue PTA uloric  Central Sleep Apnea: Resume PTA CPAP per home settings       DVT Prophylaxis: Pneumatic Compression Devices  Code Status: Full Code     Discussed with the nursing staff during care with the patient.    Disposition: Expected discharge in 1 to 2 days once hematuria improve, CBI discontinued    Ephraim Castillo MD  Text Page  (7am - 6pm)    Interval History   No active complaint this morning, denies any chest pain fever chills nausea vomiting abdominal pain dysuria major constipation diarrhea at this time.  Have troubles with blood clot overnight requiring manual irrigation.  Wanted to have has corn allergy removed as he is not allergic to that.    -Data reviewed today: I reviewed all new labs and imaging results over the last 24 hours. I personally reviewed no images or EKG's today.    Physical Exam   Temp: 95.5  F (35.3  C) Temp src: Oral BP: 100/43 Pulse: 73 Heart Rate: 64 Resp: 16 SpO2: 98 % O2 Device: None (Room air)    Vitals:    05/08/20 2341   Weight: 111.1 kg (245 lb)     Vital Signs with Ranges  Temp:  [95.5  F (35.3  C)-98.8  F (37.1  C)] 95.5  F (35.3  C)  Pulse:  [53-73] 73  Heart Rate:  [52-76] 64  Resp:  [16] 16  BP: ()/(43-75) 100/43  SpO2:  [92 %-98 %] 98 %  I/O last 3 completed shifts:  In: 480 [P.O.:480]  Out: -4119     Constitutional: awake, alert, cooperative, no apparent distress, and appears stated age  Eyes: Lids and lashes normal, pupils equal, round and reactive to light, extra ocular muscles intact, sclera clear, conjunctiva normal  Respiratory: No increased work of breathing, good air exchange, clear to auscultation bilaterally, no crackles or wheezing  Cardiovascular: Normal apical impulse, regular rate and rhythm, normal S1 and S2, no S3 or S4, and no murmur noted  GI: No scars, normal bowel sounds, soft, non-distended,  non-tender, no masses palpated, no hepatosplenomegally  Skin: no bruising or bleeding  Musculoskeletal: no lower extremity pitting edema present  Neurologic: No focal deficit    Medications     sodium chloride 0.9% (bag)         digoxin  125 mcg Oral Daily     diltiazem ER COATED BEADS  300 mg Oral Daily     febuxostat  40 mg Oral Daily     finasteride  5 mg Oral Daily     insulin aspart  1-7 Units Subcutaneous TID AC     insulin aspart  1-5 Units Subcutaneous At Bedtime     insulin glargine  20 Units Subcutaneous At Bedtime     magnesium oxide  400 mg Oral At Bedtime     metoprolol succinate ER  50 mg Oral BID     spironolactone  12.5 mg Oral Daily     tamsulosin  0.4 mg Oral QPM       Data   Recent Labs   Lab 05/10/20  0827 05/09/20  0958 05/09/20  0051   WBC 6.7  --  8.3   HGB 11.8* 12.9* 14.5   *  --  98     --  184   INR  --   --  2.97*   NA  --   --  135   POTASSIUM  --   --  3.8   CHLORIDE  --   --  104   CO2  --   --  23   BUN  --   --  23   CR  --   --  0.98   ANIONGAP  --   --  8   HARSHA  --   --  8.6   GLC  --   --  174*       No results found for this or any previous visit (from the past 24 hour(s)).

## 2020-05-10 NOTE — PLAN OF CARE
A&Ox4.  VSS, RA.  Denies pain.  Tolerating mod carb diet without problems; BG checks.  Cash in place with CBI, moderate-fast rate; no clots noted this shift.  PIV SL.  Independent.  Discharge pending progress.

## 2020-05-10 NOTE — PLAN OF CARE
A&Ox4. VSS. Denies pain. Mod carb diet; tolerating well. BS checks QID. BM x1 this evening. CBI in place; moderate-fast rate. Hand irrigated x2 this shift; moderate amount of clots. PIV is SL. Up independently. Discharge pending.

## 2020-05-10 NOTE — PLAN OF CARE
A/O x 4.  BP soft at times.  Home CPAP delivered for overnight.  CBI running moderate/fast.  Output cherry colored after ambulation, otherwise pink.  Hand irrigated x 2 this am, moderate amount of clots removed.  Up independently.  Mod carb diet, blood sugar monitored and corrected per sliding scale insulin. Wean CBI as able, possible discharge tomorrow, will need follow up with urology.

## 2020-05-10 NOTE — PROGRESS NOTES
"Urology Note    Subjective:  Had some trouble with clots overnight. He denies abdominal pain currently    Objective:    /43   Pulse 73   Temp 95.5  F (35.3  C) (Oral)   Resp 16   Ht 1.854 m (6' 1\")   Wt 111.1 kg (245 lb)   SpO2 98%   BMI 32.32 kg/m        Intake/Output Summary (Last 24 hours) at 5/10/2020 0833  Last data filed at 5/10/2020 0819  Gross per 24 hour   Intake 480 ml   Output -3275 ml   Net 3755 ml       Labs:    ROUTINE IP LABS (Last four results)  BMP  Recent Labs   Lab 05/09/20  0051      POTASSIUM 3.8   CHLORIDE 104   HARSHA 8.6   CO2 23   BUN 23   CR 0.98   *     CBC  Recent Labs   Lab 05/09/20  0958 05/09/20  0051   WBC  --  8.3   RBC  --  4.15*   HGB 12.9* 14.5   HCT  --  40.7   MCV  --  98   MCH  --  34.9*   MCHC  --  35.6   RDW  --  12.3   PLT  --  184     INR  Recent Labs   Lab 05/09/20  0051   INR 2.97*         Exam:    Gen: A&O x3  Ab: soft, NT, ND  : + mauricio catheter - urine (light pink - moderate CBI)  Ext: no calve tenderness      Assessment/Plan:     1.  Hematuria - improved    - hand irrigate several clots this AM    - continue CBI (wean-off as tolerated)    - hand irrigate every shift and prn    - hold Warfarin    - following    2.  BPH / Retention    - continue mauricio catheter    - continue Finasteride and Flomax    - F/U with Dr. Mac after discharge (may benefit from TURP)    Woody Meadows MD    "

## 2020-05-11 ENCOUNTER — APPOINTMENT (OUTPATIENT)
Dept: PHYSICAL THERAPY | Facility: CLINIC | Age: 75
DRG: 666 | End: 2020-05-11
Attending: INTERNAL MEDICINE
Payer: COMMERCIAL

## 2020-05-11 LAB
ALBUMIN SERPL-MCNC: 2.9 G/DL (ref 3.4–5)
ANION GAP SERPL CALCULATED.3IONS-SCNC: 7 MMOL/L (ref 3–14)
BASOPHILS # BLD AUTO: 0 10E9/L (ref 0–0.2)
BASOPHILS NFR BLD AUTO: 0.4 %
BUN SERPL-MCNC: 33 MG/DL (ref 7–30)
CALCIUM SERPL-MCNC: 8.1 MG/DL (ref 8.5–10.1)
CHLORIDE SERPL-SCNC: 106 MMOL/L (ref 94–109)
CO2 SERPL-SCNC: 23 MMOL/L (ref 20–32)
CREAT SERPL-MCNC: 1.17 MG/DL (ref 0.66–1.25)
DIFFERENTIAL METHOD BLD: ABNORMAL
EOSINOPHIL # BLD AUTO: 0.2 10E9/L (ref 0–0.7)
EOSINOPHIL NFR BLD AUTO: 2.4 %
ERYTHROCYTE [DISTWIDTH] IN BLOOD BY AUTOMATED COUNT: 12.9 % (ref 10–15)
GFR SERPL CREATININE-BSD FRML MDRD: 61 ML/MIN/{1.73_M2}
GLUCOSE BLDC GLUCOMTR-MCNC: 141 MG/DL (ref 70–99)
GLUCOSE BLDC GLUCOMTR-MCNC: 141 MG/DL (ref 70–99)
GLUCOSE BLDC GLUCOMTR-MCNC: 154 MG/DL (ref 70–99)
GLUCOSE BLDC GLUCOMTR-MCNC: 156 MG/DL (ref 70–99)
GLUCOSE BLDC GLUCOMTR-MCNC: 159 MG/DL (ref 70–99)
GLUCOSE SERPL-MCNC: 166 MG/DL (ref 70–99)
HCT VFR BLD AUTO: 33.3 % (ref 40–53)
HGB BLD-MCNC: 11.2 G/DL (ref 13.3–17.7)
IMM GRANULOCYTES # BLD: 0 10E9/L (ref 0–0.4)
IMM GRANULOCYTES NFR BLD: 0.1 %
INR PPP: 1.6 (ref 0.86–1.14)
LYMPHOCYTES # BLD AUTO: 1.5 10E9/L (ref 0.8–5.3)
LYMPHOCYTES NFR BLD AUTO: 20.9 %
MCH RBC QN AUTO: 34.4 PG (ref 26.5–33)
MCHC RBC AUTO-ENTMCNC: 33.6 G/DL (ref 31.5–36.5)
MCV RBC AUTO: 102 FL (ref 78–100)
MONOCYTES # BLD AUTO: 0.6 10E9/L (ref 0–1.3)
MONOCYTES NFR BLD AUTO: 9 %
NEUTROPHILS # BLD AUTO: 4.7 10E9/L (ref 1.6–8.3)
NEUTROPHILS NFR BLD AUTO: 67.2 %
NRBC # BLD AUTO: 0 10*3/UL
NRBC BLD AUTO-RTO: 0 /100
PHOSPHATE SERPL-MCNC: 3.1 MG/DL (ref 2.5–4.5)
PLATELET # BLD AUTO: 158 10E9/L (ref 150–450)
POTASSIUM SERPL-SCNC: 4.1 MMOL/L (ref 3.4–5.3)
RBC # BLD AUTO: 3.26 10E12/L (ref 4.4–5.9)
SODIUM SERPL-SCNC: 136 MMOL/L (ref 133–144)
WBC # BLD AUTO: 7 10E9/L (ref 4–11)

## 2020-05-11 PROCEDURE — 00000146 ZZHCL STATISTIC GLUCOSE BY METER IP

## 2020-05-11 PROCEDURE — 96372 THER/PROPH/DIAG INJ SC/IM: CPT

## 2020-05-11 PROCEDURE — 25000132 ZZH RX MED GY IP 250 OP 250 PS 637: Performed by: INTERNAL MEDICINE

## 2020-05-11 PROCEDURE — 25800025 ZZH RX 258: Performed by: INTERNAL MEDICINE

## 2020-05-11 PROCEDURE — G0378 HOSPITAL OBSERVATION PER HR: HCPCS

## 2020-05-11 PROCEDURE — 25000131 ZZH RX MED GY IP 250 OP 636 PS 637: Performed by: INTERNAL MEDICINE

## 2020-05-11 PROCEDURE — 12000000 ZZH R&B MED SURG/OB

## 2020-05-11 PROCEDURE — 80069 RENAL FUNCTION PANEL: CPT | Performed by: INTERNAL MEDICINE

## 2020-05-11 PROCEDURE — 85025 COMPLETE CBC W/AUTO DIFF WBC: CPT | Performed by: INTERNAL MEDICINE

## 2020-05-11 PROCEDURE — 99232 SBSQ HOSP IP/OBS MODERATE 35: CPT | Performed by: INTERNAL MEDICINE

## 2020-05-11 PROCEDURE — 97161 PT EVAL LOW COMPLEX 20 MIN: CPT | Mod: GP | Performed by: PHYSICAL THERAPIST

## 2020-05-11 PROCEDURE — 36415 COLL VENOUS BLD VENIPUNCTURE: CPT | Performed by: INTERNAL MEDICINE

## 2020-05-11 PROCEDURE — 85610 PROTHROMBIN TIME: CPT | Performed by: INTERNAL MEDICINE

## 2020-05-11 RX ORDER — SPIRONOLACTONE 25 MG
12.5 TABLET ORAL DAILY
Status: DISCONTINUED | OUTPATIENT
Start: 2020-05-12 | End: 2020-05-16 | Stop reason: HOSPADM

## 2020-05-11 RX ADMIN — SODIUM CHLORIDE 6000 ML: 900 IRRIGANT IRRIGATION at 20:10

## 2020-05-11 RX ADMIN — TAMSULOSIN HYDROCHLORIDE 0.4 MG: 0.4 CAPSULE ORAL at 20:10

## 2020-05-11 RX ADMIN — INSULIN GLARGINE 20 UNITS: 100 INJECTION, SOLUTION SUBCUTANEOUS at 21:34

## 2020-05-11 RX ADMIN — SODIUM CHLORIDE 6000 ML: 900 IRRIGANT IRRIGATION at 01:37

## 2020-05-11 RX ADMIN — SODIUM CHLORIDE 6000 ML: 900 IRRIGANT IRRIGATION at 06:05

## 2020-05-11 RX ADMIN — SODIUM CHLORIDE 6000 ML: 900 IRRIGANT IRRIGATION at 08:22

## 2020-05-11 RX ADMIN — FEBUXOSTAT 40 MG: 40 TABLET ORAL at 09:16

## 2020-05-11 RX ADMIN — INSULIN ASPART 1 UNITS: 100 INJECTION, SOLUTION INTRAVENOUS; SUBCUTANEOUS at 17:35

## 2020-05-11 RX ADMIN — DILTIAZEM HYDROCHLORIDE 300 MG: 300 CAPSULE, COATED, EXTENDED RELEASE ORAL at 20:10

## 2020-05-11 RX ADMIN — INSULIN ASPART 1 UNITS: 100 INJECTION, SOLUTION INTRAVENOUS; SUBCUTANEOUS at 12:58

## 2020-05-11 RX ADMIN — FINASTERIDE 5 MG: 5 TABLET, FILM COATED ORAL at 09:17

## 2020-05-11 RX ADMIN — INSULIN ASPART 1 UNITS: 100 INJECTION, SOLUTION INTRAVENOUS; SUBCUTANEOUS at 07:11

## 2020-05-11 RX ADMIN — METOPROLOL SUCCINATE 50 MG: 50 TABLET, EXTENDED RELEASE ORAL at 09:16

## 2020-05-11 RX ADMIN — SODIUM CHLORIDE 3000 ML: 900 IRRIGANT IRRIGATION at 02:27

## 2020-05-11 RX ADMIN — SODIUM CHLORIDE 6000 ML: 900 IRRIGANT IRRIGATION at 14:52

## 2020-05-11 RX ADMIN — METOPROLOL SUCCINATE 50 MG: 50 TABLET, EXTENDED RELEASE ORAL at 16:39

## 2020-05-11 RX ADMIN — SODIUM CHLORIDE 6000 ML: 900 IRRIGANT IRRIGATION at 04:02

## 2020-05-11 RX ADMIN — Medication 400 MG: at 21:34

## 2020-05-11 RX ADMIN — DIGOXIN 125 MCG: 125 TABLET ORAL at 09:16

## 2020-05-11 ASSESSMENT — ACTIVITIES OF DAILY LIVING (ADL)
ADLS_ACUITY_SCORE: 14

## 2020-05-11 NOTE — PROGRESS NOTES
[facility]    Hospitalist Progress Note    Brief Summary:  Jt Montgomery is a pleasant 74 year old male with multiple co-morbidities including chronic atrial fibrillation on coumadin, CAD, NSTEMI s/p RONEL placement, nonischemic dilated cardiomyopathy, hypertension, diabetes, central sleep apnea, obesity, gout, BPH who was admitted on 5/8/2020 for hematuria and intermittent mauricio obstruction due to clots.     Assessment & Plan           BPH with gross hematuria indwelling Mauricio catheter  Obstructed Mauricio catheter secondary blood clot    Patient has history of BPH and urinary retention has chronic Mauricio catheter since December 2019.  Now presented with urinary obstruction second to blood clots and gross hematuria.  Three-way catheter was passed and started on CBI.  Urology was consulted.  Patient is still having blood clots, and pinkish urine return from the three-way Mauricio catheter, CBI still continuing.  Continue to hold Coumadin at this time.  Appreciate input from the urology, patient will benefit from TURP.  Keep the CBI till hematuria resolved.  Continue to hold aspirin and Coumadin at this time.  INR is now improved to 1.60.  At this time I will try to wean him off from the CBI and see if he has active bleeding after that or not.  Once no active bleeding CBI can be discontinued and patient will be ready to discharge.  Urology is following closely       Chronic atrial fibrillation on coumadin  CAD, NSTEMI s/p ROENL placement  Nonischemic dilated cardiomyopathy  Hypertension  Managed PTA with aspirin, digoxin, diltiazem, lasix prn, irbesartan, metoprolol, spironolactone. Patient refuses statin.   Those medications were restarted except for aspirin on 5/9/2020.  Continue to hold Coumadin at this time as well.  Blood pressure on the lower side continue hold irbesartan at this time, hold spironolactone today..     Diabetes, Type 2  Managed PTA with diet control, glargine and pre-meal aspart. Recent  hemoglobin A1c 6.1% from Feb 2020 indicating reasonable glycemic control.   Blood sugars in good control at this time, I will continue with the Lantus 20 units at night, keep no sliding scale insulin for correction and hypoglycemia protocol.  -consistent carb diet     Acute blood loss anemia:  This is secondary to gross hematuria, dropped hemoglobin from 14.5-11.2 this morning.  No need for blood transfusion at this time.  Continue to monitor     Chronic, stable problems:  Gout:  Continue PTA uloric  Central Sleep Apnea: Resume PTA CPAP per home settings       DVT Prophylaxis: Pneumatic Compression Devices  Code Status: Full Code     Discussed with the nursing staff during care with the patient.    Disposition: Expected discharge possibly tomorrow if we wean him off from the CBI and no more bleeding.    Ephraim Castillo MD  Text Page  (7am - 6pm)    Interval History   Still need to manually irrigate multiple times and have clots extracted, CBI still going on and have pinkish urine returns.  No gross hematuria though.  No fever chills chest pain shortness orthopnea PND palpitation at this time.    No other significant event overnight    -Data reviewed today: I reviewed all new labs and imaging results over the last 24 hours. I personally reviewed no images or EKG's today.    Physical Exam   Temp: 98.2  F (36.8  C) Temp src: Oral BP: 110/57   Heart Rate: 71 Resp: 16 SpO2: 95 % O2 Device: None (Room air)    Vitals:    05/08/20 2341   Weight: 111.1 kg (245 lb)     Vital Signs with Ranges  Temp:  [96  F (35.6  C)-98.2  F (36.8  C)] 98.2  F (36.8  C)  Heart Rate:  [59-94] 71  Resp:  [16] 16  BP: ()/(48-65) 110/57  SpO2:  [94 %-98 %] 95 %  I/O last 3 completed shifts:  In: 500 [P.O.:500]  Out: 20039 [Urine:81359; Other:100]    Constitutional: Conscious, awake and alert, in no acute distress   Eyes: Lids and lashes normal, pupils equal, round and reactive to light, extra ocular muscles intact, sclera clear, conjunctiva  normal  Respiratory: No increased work of breathing, good air exchange, clear to auscultation bilaterally, no crackles or wheezing  Cardiovascular: Normal apical impulse, regular rate and rhythm, normal S1 and S2, no S3 or S4, and no murmur noted  GI: No scars, normal bowel sounds, soft, non-distended, non-tender, no masses palpated, no hepatosplenomegally  Skin: no bruising or bleeding  Musculoskeletal: no lower extremity pitting edema present  Neurologic: No focal deficit    Medications     sodium chloride 0.9% (bag)         digoxin  125 mcg Oral Daily     diltiazem ER COATED BEADS  300 mg Oral Daily     febuxostat  40 mg Oral Daily     finasteride  5 mg Oral Daily     insulin aspart  1-7 Units Subcutaneous TID AC     insulin aspart  1-5 Units Subcutaneous At Bedtime     insulin glargine  20 Units Subcutaneous At Bedtime     magnesium oxide  400 mg Oral At Bedtime     metoprolol succinate ER  50 mg Oral BID     spironolactone  12.5 mg Oral Daily     tamsulosin  0.4 mg Oral QPM       Data   Recent Labs   Lab 05/11/20  0656 05/10/20  0827 05/09/20  0958 05/09/20  0051   WBC 7.0 6.7  --  8.3   HGB 11.2* 11.8* 12.9* 14.5   * 101*  --  98    161  --  184   INR 1.60* 2.22*  --  2.97*    137  --  135   POTASSIUM 4.1 4.2  --  3.8   CHLORIDE 106 105  --  104   CO2 23 25  --  23   BUN 33* 27  --  23   CR 1.17 1.19  --  0.98   ANIONGAP 7 7  --  8   HARSHA 8.1* 8.3*  --  8.6   * 191*  --  174*   ALBUMIN 2.9*  --   --   --        No results found for this or any previous visit (from the past 24 hour(s)).

## 2020-05-11 NOTE — PLAN OF CARE
Discharge Planner OT   Patient plan for discharge: home  Current status: order received. Per discussion with PT, pt with no acute OT needs as he is currently at baseline function with ADL's. Will complete OT orders as pt has no needs.   Barriers to return to prior living situation: defer to PT   Recommendations for discharge: defer to PT   Rationale for recommendations: no acute OT needs. Will sign off.        Entered by: Sigrid Medrano 05/11/2020 2:08 PM

## 2020-05-11 NOTE — PLAN OF CARE
[id number]: A&Ox4, very anxious regarding discharge plans. VSS on room air. Denies pain. Up SBA, ambulating well. Cash CBI clamped at 1530 per urology order - drained good jeferson urine until 1730, patient sat up to eat dinner and urine output turned dark merlot color with multiple clots present - monitored for 30 mins with no improvement. Hand irrigated x1 for minimal small clots. CBI started at moderate rate, urine cleared to cherry color output. Tolerating diet, BG covered with sliding scale insulin. Will continue to monitor CBI and urine output.     1855: patient CBI stopped flowing, hand irrigated for multiple small clots, CBI re-started at fast rate with pink/clear urine return.

## 2020-05-11 NOTE — PROGRESS NOTES
Per bedside RN request to meet with patient to discuss discharge plan, met with patient.  Per patient, he has concerns about going home and not being able to void with his mauricio catheter in place due to blood clots re accumulating.  Discussed with patient that bedside RN is attempting to wean off CBI and patient would be discharged when he has no active bleeding once the CBI is off.  Informed patient that when he is off the CBI and ready for discharge would meet with him to discuss home care RN for ongoing support for mauricio catheter.

## 2020-05-11 NOTE — PLAN OF CARE
Pt is A&Ox4. VSS ex soft BP. Denies pain. Up ind. CBI running at mod/fast rate, attempted to wean but started clotting off. Irrigated x3 w/ lots of clots. Mod carb diet w/ BS checks. CBI runs clear if pt is laying still, but becomes very red and clots w/ movement. Plan to wean CBI as able, discharge pending. Slept between cares.

## 2020-05-11 NOTE — PLAN OF CARE
Discharge Planner PT   Patient plan for discharge: return home  Current status: PT: Order received; 1x evaluation completed for disposition planning; At baseline patient lives alone in a condo; no use of an assistive device; has 12 steps and bilateral handrails. Denies recent falls. On eval patient endorses being mildly weaker than baseline; able to perform bed mobility, tx, gait, stairs independently; no LOB; gait speed reported to be slower than baseline; no LOB; encouraged patient to do a walking program; nurse updated; No current PT needs identified that can't be met by a walking program until discharge. No OT needs identified per PT screening/conversation with patient  Barriers to return to prior living situation: none anticipated currently  Recommendations for discharge: Home  Rationale for recommendations: Patient slightly weaker than baseline but still independent with functional mobility; anticipate patient will be able to return home with no PT needs; encourage a walking program until discharge.       Entered by: Colette Rico 05/11/2020 11:51 AM

## 2020-05-11 NOTE — PROGRESS NOTES
05/11/20 1100   Quick Adds   Type of Visit Initial PT Evaluation   Living Environment   Lives With alone   Living Arrangements condominium   Home Accessibility stairs to enter home   Number of Stairs, Main Entrance other (see comments)  (12)   Stair Railings, Main Entrance railings on both sides of stairs   Living Environment Comment lives with his cat   Self-Care   Usual Activity Tolerance good   Current Activity Tolerance moderate   Equipment Currently Used at Home none   Activity/Exercise/Self-Care Comment normally independent with all mobility and cares   Functional Level Prior   Ambulation 0-->independent   Transferring 0-->independent   Toileting 0-->independent   Bathing 0-->independent   Communication 0-->understands/communicates without difficulty   Swallowing 0-->swallows foods/liquids without difficulty   Cognition 0 - no cognition issues reported   Fall history within last six months no   Which of the above functional risks had a recent onset or change? none   Prior Functional Level Comment patient independent with all mobility and cares   General Information   Onset of Illness/Injury or Date of Surgery - Date 05/08/20   Referring Physician Ephraim Castillo MD    Patient/Family Goals Statement return home   Pertinent History of Current Problem (include personal factors and/or comorbidities that impact the POC) per chart: Jt Montgomery is a pleasant 74 year old male with multiple co-morbidities including chronic atrial fibrillation on coumadin, CAD, NSTEMI s/p RONEL placement, nonischemic dilated cardiomyopathy, hypertension, diabetes, central sleep apnea, obesity, gout, BPH who was admitted on 5/8/2020 for hematuria and intermittent mauricio obstruction due to clots; consulted secondary to weakness; see medical record for further information   Precautions/Limitations no known precautions/limitations   General Observations patient in bed; agreeable to PT eval   General Info Comments OK to work with  "patient per nurse   Cognitive Status Examination   Orientation orientation to person, place and time   Level of Consciousness alert   Follows Commands and Answers Questions 100% of the time   Personal Safety and Judgment intact   Memory intact   Pain Assessment   Patient Currently in Pain No   Posture    Posture Not impaired   Range of Motion (ROM)   ROM Quick Adds No deficits were identified   Strength   Strength Comments mild functional weakness; not impairing mobility; below baseline   Bed Mobility   Bed Mobility Comments independent   Transfer Skills   Transfer Comments independent   Gait   Gait Comments able to ambulate in hallways > 300 feet with no LOB; slower gait speed and reports some generalized weakness from inactivity but still functional; able to go up and down stairs with rails   Balance   Balance Comments intact   General Therapy Interventions   Intervention Comments eval only   Clinical Impression   Criteria for Skilled Therapeutic Intervention no;evaluation only;no problems identified which require skilled intervention   Clinical Presentation Rationale clinical judgement   Clinical Decision Making (Complexity) Low complexity   Therapy Frequency Other (see comments)  (eval only)   Predicted Duration of Therapy Intervention (days/wks)   (eval only)   Anticipated Discharge Disposition Home   Risk & Benefits of therapy have been explained Yes   Patient, Family & other staff in agreement with plan of care Yes   [facility] TM \"6 Clicks\"   2016, Trustees of [facility], under license to [employer].  All rights reserved.   6 Clicks Short Forms Basic Mobility Inpatient Short Form   [facility]  \"6 Clicks\" V.2 Basic Mobility Inpatient Short Form   1. Turning from your back to your side while in a flat bed without using bedrails? 4 - None   2. Moving from lying on your back to sitting on the side of a flat bed without using bedrails? 4 - None   3. Moving to and from a bed to " a chair (including a wheelchair)? 4 - None   4. Standing up from a chair using your arms (e.g., wheelchair, or bedside chair)? 4 - None   5. To walk in hospital room? 4 - None   6. Climbing 3-5 steps with a railing? 4 - None   Basic Mobility Raw Score (Score out of 24.Lower scores equate to lower levels of function) 24   Total Evaluation Time   Total Evaluation Time (Minutes) 20

## 2020-05-11 NOTE — PROGRESS NOTES
[facility]    Urology Progress Note     Assessment & Plan  Jt Montgomery is a 74 year old male with gross hematuria, large prostate. Hematuria improved after hand irrigation this morning. On finasteride. On warfarin for chronic a-fib.    Plan:    Stop CBI tonight. Resume if needed.     If remains clear overnight can discharge tomorrow with catheter    Would like to hold warfarin until after TURP if OK with medicine/cardiology     Will need TURP in near future    Waldemar Ray PA-C 5/11/2020 3:25 PM  [facility]  Mon-Fri, 7am - 4pm  Office: [phone]      Interval History   Hand irrigated by Dr. Mac this AM and urine cleared up. CBI running clear all afternoon. No pain or distention.     Physical Exam   Temp: 96.4  F (35.8  C) Temp src: Oral BP: 91/41   Heart Rate: 60 Resp: 16 SpO2: 95 % O2 Device: None (Room air)    Vitals:    05/08/20 2341   Weight: 111.1 kg (245 lb)     Vital Signs with Ranges  Temp:  [96  F (35.6  C)-98.2  F (36.8  C)] 96.4  F (35.8  C)  Heart Rate:  [59-94] 60  Resp:  [16] 16  BP: ()/(41-65) 91/41  SpO2:  [94 %-96 %] 95 %  I/O last 3 completed shifts:  In: 500 [P.O.:500]  Out: 90755 [Urine:94988; Other:100]    General: Patient awake, alert, NAD, sitting up in bed  Head: Normocephalic, atraumatic  Eyes: No icterus  Neck: Symmetric  Respiratory: Breathing unlabored  Cardiac: Skin well-perfused  GI:  no SP tenderness, no CVA tenderness  Genitourinary: Cash in place draining clear urine with CBI at slow rate  Skin:No visible rashes   Neuropsychiatric: A&O x 3, responding appropriately      Medications     sodium chloride 0.9% (bag)         digoxin  125 mcg Oral Daily     diltiazem ER COATED BEADS  300 mg Oral Daily     febuxostat  40 mg Oral Daily     finasteride  5 mg Oral Daily     insulin aspart  1-7 Units Subcutaneous TID AC     insulin aspart  1-5 Units Subcutaneous At Bedtime     insulin glargine  20 Units Subcutaneous At Bedtime     magnesium  oxide  400 mg Oral At Bedtime     metoprolol succinate ER  50 mg Oral BID     [START ON 5/12/2020] spironolactone  12.5 mg Oral Daily     tamsulosin  0.4 mg Oral QPM       Data   Results for orders placed or performed during the hospital encounter of 05/08/20 (from the past 24 hour(s))   Glucose by meter   Result Value Ref Range    Glucose 161 (H) 70 - 99 mg/dL   Glucose by meter   Result Value Ref Range    Glucose 156 (H) 70 - 99 mg/dL   Glucose by meter   Result Value Ref Range    Glucose 141 (H) 70 - 99 mg/dL   CBC with platelets differential   Result Value Ref Range    WBC 7.0 4.0 - 11.0 10e9/L    RBC Count 3.26 (L) 4.4 - 5.9 10e12/L    Hemoglobin 11.2 (L) 13.3 - 17.7 g/dL    Hematocrit 33.3 (L) 40.0 - 53.0 %     (H) 78 - 100 fl    MCH 34.4 (H) 26.5 - 33.0 pg    MCHC 33.6 31.5 - 36.5 g/dL    RDW 12.9 10.0 - 15.0 %    Platelet Count 158 150 - 450 10e9/L    Diff Method Automated Method     % Neutrophils 67.2 %    % Lymphocytes 20.9 %    % Monocytes 9.0 %    % Eosinophils 2.4 %    % Basophils 0.4 %    % Immature Granulocytes 0.1 %    Nucleated RBCs 0 0 /100    Absolute Neutrophil 4.7 1.6 - 8.3 10e9/L    Absolute Lymphocytes 1.5 0.8 - 5.3 10e9/L    Absolute Monocytes 0.6 0.0 - 1.3 10e9/L    Absolute Eosinophils 0.2 0.0 - 0.7 10e9/L    Absolute Basophils 0.0 0.0 - 0.2 10e9/L    Abs Immature Granulocytes 0.0 0 - 0.4 10e9/L    Absolute Nucleated RBC 0.0    Renal panel   Result Value Ref Range    Sodium 136 133 - 144 mmol/L    Potassium 4.1 3.4 - 5.3 mmol/L    Chloride 106 94 - 109 mmol/L    Carbon Dioxide 23 20 - 32 mmol/L    Anion Gap 7 3 - 14 mmol/L    Glucose 166 (H) 70 - 99 mg/dL    Urea Nitrogen 33 (H) 7 - 30 mg/dL    Creatinine 1.17 0.66 - 1.25 mg/dL    GFR Estimate 61 >60 mL/min/[1.73_m2]    GFR Estimate If Black 71 >60 mL/min/[1.73_m2]    Calcium 8.1 (L) 8.5 - 10.1 mg/dL    Phosphorus 3.1 2.5 - 4.5 mg/dL    Albumin 2.9 (L) 3.4 - 5.0 g/dL   INR   Result Value Ref Range    INR 1.60 (H) 0.86 - 1.14    Glucose by meter   Result Value Ref Range    Glucose 159 (H) 70 - 99 mg/dL   Glucose by meter   Result Value Ref Range    Glucose 141 (H) 70 - 99 mg/dL

## 2020-05-11 NOTE — PROGRESS NOTES
UROLOGY   RESTING COMFORTABLY, V.S.S, IRRIGATED NUMEROUS OLD CLOTS , URINE NOW CLEAR, NO ACTIVE BLEEDING. HB 11.2. COMFORTABLE WITH LEOS. CALOS DIET.   A- HEMATURIA      RETENTION      LARGE PROSTATE      WARFARIN STOPPED  P- POSSIBLE HOME LATER TODAY OR AM WITH LEOS.       WILL PLAN PROCEDURE 1-2 WEEKS .       PA TO SEE AND ASSESS.       NURSES TO IRRIGATE WHEN NEEDED.

## 2020-05-12 LAB
BASOPHILS # BLD AUTO: 0.1 10E9/L (ref 0–0.2)
BASOPHILS NFR BLD AUTO: 0.7 %
DIFFERENTIAL METHOD BLD: ABNORMAL
EOSINOPHIL # BLD AUTO: 0.2 10E9/L (ref 0–0.7)
EOSINOPHIL NFR BLD AUTO: 2.8 %
ERYTHROCYTE [DISTWIDTH] IN BLOOD BY AUTOMATED COUNT: 13 % (ref 10–15)
GLUCOSE BLDC GLUCOMTR-MCNC: 110 MG/DL (ref 70–99)
GLUCOSE BLDC GLUCOMTR-MCNC: 122 MG/DL (ref 70–99)
GLUCOSE BLDC GLUCOMTR-MCNC: 131 MG/DL (ref 70–99)
GLUCOSE BLDC GLUCOMTR-MCNC: 143 MG/DL (ref 70–99)
GLUCOSE BLDC GLUCOMTR-MCNC: 160 MG/DL (ref 70–99)
HCT VFR BLD AUTO: 33.6 % (ref 40–53)
HGB BLD-MCNC: 11.5 G/DL (ref 13.3–17.7)
IMM GRANULOCYTES # BLD: 0 10E9/L (ref 0–0.4)
IMM GRANULOCYTES NFR BLD: 0.3 %
INR PPP: 1.33 (ref 0.86–1.14)
LYMPHOCYTES # BLD AUTO: 1.9 10E9/L (ref 0.8–5.3)
LYMPHOCYTES NFR BLD AUTO: 27.5 %
MCH RBC QN AUTO: 35.2 PG (ref 26.5–33)
MCHC RBC AUTO-ENTMCNC: 34.2 G/DL (ref 31.5–36.5)
MCV RBC AUTO: 103 FL (ref 78–100)
MONOCYTES # BLD AUTO: 0.6 10E9/L (ref 0–1.3)
MONOCYTES NFR BLD AUTO: 8.3 %
NEUTROPHILS # BLD AUTO: 4.3 10E9/L (ref 1.6–8.3)
NEUTROPHILS NFR BLD AUTO: 60.4 %
NRBC # BLD AUTO: 0 10*3/UL
NRBC BLD AUTO-RTO: 0 /100
PLATELET # BLD AUTO: 171 10E9/L (ref 150–450)
RBC # BLD AUTO: 3.27 10E12/L (ref 4.4–5.9)
WBC # BLD AUTO: 7 10E9/L (ref 4–11)

## 2020-05-12 PROCEDURE — 00000146 ZZHCL STATISTIC GLUCOSE BY METER IP

## 2020-05-12 PROCEDURE — 85025 COMPLETE CBC W/AUTO DIFF WBC: CPT | Performed by: INTERNAL MEDICINE

## 2020-05-12 PROCEDURE — 25000132 ZZH RX MED GY IP 250 OP 250 PS 637: Performed by: INTERNAL MEDICINE

## 2020-05-12 PROCEDURE — 85610 PROTHROMBIN TIME: CPT | Performed by: INTERNAL MEDICINE

## 2020-05-12 PROCEDURE — 25800025 ZZH RX 258: Performed by: INTERNAL MEDICINE

## 2020-05-12 PROCEDURE — 99232 SBSQ HOSP IP/OBS MODERATE 35: CPT | Performed by: INTERNAL MEDICINE

## 2020-05-12 PROCEDURE — 25000131 ZZH RX MED GY IP 250 OP 636 PS 637: Performed by: INTERNAL MEDICINE

## 2020-05-12 PROCEDURE — 36415 COLL VENOUS BLD VENIPUNCTURE: CPT | Performed by: INTERNAL MEDICINE

## 2020-05-12 PROCEDURE — 12000000 ZZH R&B MED SURG/OB

## 2020-05-12 RX ADMIN — TAMSULOSIN HYDROCHLORIDE 0.4 MG: 0.4 CAPSULE ORAL at 20:21

## 2020-05-12 RX ADMIN — FEBUXOSTAT 40 MG: 40 TABLET ORAL at 08:29

## 2020-05-12 RX ADMIN — FINASTERIDE 5 MG: 5 TABLET, FILM COATED ORAL at 08:29

## 2020-05-12 RX ADMIN — SODIUM CHLORIDE 6000 ML: 900 IRRIGANT IRRIGATION at 03:40

## 2020-05-12 RX ADMIN — SPIRONOLACTONE 12.5 MG: 25 TABLET ORAL at 08:29

## 2020-05-12 RX ADMIN — INSULIN ASPART 1 UNITS: 100 INJECTION, SOLUTION INTRAVENOUS; SUBCUTANEOUS at 08:30

## 2020-05-12 RX ADMIN — DILTIAZEM HYDROCHLORIDE 300 MG: 300 CAPSULE, COATED, EXTENDED RELEASE ORAL at 20:21

## 2020-05-12 RX ADMIN — METOPROLOL SUCCINATE 50 MG: 50 TABLET, EXTENDED RELEASE ORAL at 08:29

## 2020-05-12 RX ADMIN — Medication 400 MG: at 22:28

## 2020-05-12 RX ADMIN — DIGOXIN 125 MCG: 125 TABLET ORAL at 08:29

## 2020-05-12 RX ADMIN — SODIUM CHLORIDE 6000 ML: 900 IRRIGANT IRRIGATION at 23:15

## 2020-05-12 RX ADMIN — INSULIN GLARGINE 20 UNITS: 100 INJECTION, SOLUTION SUBCUTANEOUS at 22:29

## 2020-05-12 RX ADMIN — INSULIN ASPART 1 UNITS: 100 INJECTION, SOLUTION INTRAVENOUS; SUBCUTANEOUS at 13:19

## 2020-05-12 ASSESSMENT — ACTIVITIES OF DAILY LIVING (ADL)
ADLS_ACUITY_SCORE: 14
ADLS_ACUITY_SCORE: 15
ADLS_ACUITY_SCORE: 14
ADLS_ACUITY_SCORE: 15

## 2020-05-12 NOTE — PROGRESS NOTES
[facility]    Urology Progress Note     Assessment & Plan  Jt Montgomery is a 74 year old male with gross hematuria, large prostate and catheter-dependant urinary retention. Has been on and off CBI for several days now. Urine still dark brown/inés today without obstruction. Hgb stable. CBI restarted    Plan:    Cont CBI tonight    Hold warfarin if able    If unable to wean CBI by tomorrow with try to expedite TURP     Waldemar Ray PA-C 5/12/2020 3:39 PM  [facility]  Mon-Fri, 7am - 4pm  Office: [phone]      Interval History   CBI clamped this AM. Urine remained clear all day until afternoon when it suddenly darkened. No clots or obstruction/pain. CBI restarted and urine cleared up immediately.    Physical Exam   Temp: 97.3  F (36.3  C) Temp src: Oral BP: 113/66 Pulse: 63 Heart Rate: 55 Resp: 16 SpO2: 92 % O2 Device: None (Room air)    Vitals:    05/08/20 2341   Weight: 111.1 kg (245 lb)     Vital Signs with Ranges  Temp:  [95.5  F (35.3  C)-97.3  F (36.3  C)] 97.3  F (36.3  C)  Pulse:  [63] 63  Heart Rate:  [55-73] 55  Resp:  [16-18] 16  BP: (101-119)/(57-76) 113/66  SpO2:  [92 %-97 %] 92 %  I/O last 3 completed shifts:  In: 480 [P.O.:480]  Out: 5425 [Urine:5425]    General: Patient awake, alert, NAD, lying in bed  Head: Normocephalic, atraumatic  Eyes: No icterus  Neck: Symmetric  Respiratory: Breathing unlabored  Cardiac: Skin well-perfused  GI: Soft, NT, non-distended, no SP tenderness, no CVA tenderness  Genitourinary: Cash in place draining dark brown urine, No penoscrotal edema  Skin: No visible rashes   Extremities: No LE edema, no calf pain  Neurologic: No focal deficits  Neuropsychiatric: A&O x 3, responding appropriately      Medications     sodium chloride 0.9% (bag)         digoxin  125 mcg Oral Daily     diltiazem ER COATED BEADS  300 mg Oral Daily     febuxostat  40 mg Oral Daily     finasteride  5 mg Oral Daily     insulin aspart  1-7 Units Subcutaneous TID AC      insulin aspart  1-5 Units Subcutaneous At Bedtime     insulin glargine  20 Units Subcutaneous At Bedtime     magnesium oxide  400 mg Oral At Bedtime     metoprolol succinate ER  50 mg Oral BID     sodium chloride (PF)  3 mL Intracatheter Q8H     spironolactone  12.5 mg Oral Daily     tamsulosin  0.4 mg Oral QPM       Data   Results for orders placed or performed during the hospital encounter of 05/08/20 (from the past 24 hour(s))   Glucose by meter   Result Value Ref Range    Glucose 154 (H) 70 - 99 mg/dL   Glucose by meter   Result Value Ref Range    Glucose 156 (H) 70 - 99 mg/dL   Glucose by meter   Result Value Ref Range    Glucose 122 (H) 70 - 99 mg/dL   CBC with platelets differential   Result Value Ref Range    WBC 7.0 4.0 - 11.0 10e9/L    RBC Count 3.27 (L) 4.4 - 5.9 10e12/L    Hemoglobin 11.5 (L) 13.3 - 17.7 g/dL    Hematocrit 33.6 (L) 40.0 - 53.0 %     (H) 78 - 100 fl    MCH 35.2 (H) 26.5 - 33.0 pg    MCHC 34.2 31.5 - 36.5 g/dL    RDW 13.0 10.0 - 15.0 %    Platelet Count 171 150 - 450 10e9/L    Diff Method Automated Method     % Neutrophils 60.4 %    % Lymphocytes 27.5 %    % Monocytes 8.3 %    % Eosinophils 2.8 %    % Basophils 0.7 %    % Immature Granulocytes 0.3 %    Nucleated RBCs 0 0 /100    Absolute Neutrophil 4.3 1.6 - 8.3 10e9/L    Absolute Lymphocytes 1.9 0.8 - 5.3 10e9/L    Absolute Monocytes 0.6 0.0 - 1.3 10e9/L    Absolute Eosinophils 0.2 0.0 - 0.7 10e9/L    Absolute Basophils 0.1 0.0 - 0.2 10e9/L    Abs Immature Granulocytes 0.0 0 - 0.4 10e9/L    Absolute Nucleated RBC 0.0    INR   Result Value Ref Range    INR 1.33 (H) 0.86 - 1.14   Glucose by meter   Result Value Ref Range    Glucose 160 (H) 70 - 99 mg/dL   Glucose by meter   Result Value Ref Range    Glucose 143 (H) 70 - 99 mg/dL

## 2020-05-12 NOTE — PLAN OF CARE
[id number]: Pt very anxious overnight, needing frequent reassurance and planning if mauricio clots off at home. VSS on RA, softer BPs. Denied pain. CBI light pink  most of night, now yellow clear UOP, running moderately fast. Hand irrigated x1 d/t patient discomfort/obstruction but no clots present. Mod carb diet, only lantus insulin given. Discharge pending CBI status and no more bleeding.

## 2020-05-12 NOTE — PROGRESS NOTES
Chief complaint:   Chief Complaint   Patient presents with   • Sore Throat     Has had sore throat on and off for several months.   Patient states throat feels full, swollen, and full of white pustules.        Vitals:  Visit Vitals   • /84   • Pulse 78   • Temp 98 °F (36.7 °C) (Oral)   • Resp 16   • Ht 6' (1.829 m)   • Wt 93.6 kg   • BMI 28 kg/m2       HISTORY OF PRESENT ILLNESS     HPI Comments: Wilfrid Philippe is a 37 year old male that presents with sore throat, swollen glands.  Symptoms started last night with fever.  Has had issues with his tonsils in the past - last saw ENT 4 years ago.  Was told he should have his tonsils taken out.       Other significant problems:  Patient Active Problem List    Diagnosis Date Noted   • Lead exposure 11/08/2016     Priority: Low       PAST MEDICAL, FAMILY AND SOCIAL HISTORY     Medications:  Current Outpatient Prescriptions   Medication   • ibuprofen (MOTRIN) 200 MG tablet   • acetaminophen (TYLENOL) 325 MG tablet     No current facility-administered medications for this visit.        Allergies:  ALLERGIES:  No Known Allergies    Past Medical  History/Surgeries:  History reviewed. No pertinent past medical history.    History reviewed. No pertinent past surgical history.    Family History:  History reviewed. No pertinent family history.    Social History:  Social History   Substance Use Topics   • Smoking status: Former Smoker     Packs/day: 0.50     Years: 15.00     Types: Cigarettes     Start date: 1/1/1995   • Smokeless tobacco: Not on file      Comment: cut down to 3 cig./day   • Alcohol use Yes       REVIEW OF SYSTEMS     Review of Systems   All other systems reviewed and are negative.      PHYSICAL EXAM     Physical Exam   Constitutional: He is oriented to person, place, and time. He appears well-developed and well-nourished. No distress.   HENT:   Head: Normocephalic and atraumatic.   Right Ear: External ear normal.   Left Ear: External ear normal.   Nose: Nose  [facility]    Hospitalist Progress Note    Brief Summary:  Jt Montgomery is a pleasant 74 year old male with multiple co-morbidities including chronic atrial fibrillation on coumadin, CAD, NSTEMI s/p RONEL placement, nonischemic dilated cardiomyopathy, hypertension, diabetes, central sleep apnea, obesity, gout, BPH who was admitted on 5/8/2020 for hematuria and intermittent mauricio obstruction due to clots.     Assessment & Plan           BPH with gross hematuria indwelling Mauricio catheter  Obstructed Mauricio catheter secondary blood clot    Patient has history of BPH and urinary retention has chronic Mauricio catheter since December 2019.  Now presented with urinary obstruction second to blood clots and gross hematuria.  Three-way catheter was passed and started on CBI.  Urology was consulted.  Patient is still having blood clots, and pinkish urine return from the three-way Mauricio catheter, CBI still continuing.  Continue to hold Coumadin at this time.  Appreciate input from the urology, patient will benefit from TURP.  Keep the CBI till hematuria resolved.  Continue to hold aspirin and Coumadin at this time.  INR is now improved to 1.33.    We did stop the CBI this morning, but the patient started having hematuria again have to restart the CBI.  Will continue on CBI this evening.  Urology is following if unable to wean him off CBI they are planning to expedite TURP       Chronic atrial fibrillation on coumadin  CAD, NSTEMI s/p RONEL placement  Nonischemic dilated cardiomyopathy  Hypertension  Managed PTA with aspirin, digoxin, diltiazem, lasix prn, irbesartan, metoprolol, spironolactone. Patient refuses statin.   Those medications were restarted except for aspirin on 5/9/2020.  Continue to hold Coumadin at this time as well.  Blood pressure reasonably controlled continue hold irbesartan at this time,      Diabetes, Type 2  Managed PTA with diet control, glargine and pre-meal aspart. Recent hemoglobin A1c  6.1% from Feb 2020 indicating reasonable glycemic control.   Blood sugars in good control at this time, I will continue with the Lantus 20 units at night, keep no sliding scale insulin for correction and hypoglycemia protocol.  -consistent carb diet     Acute blood loss anemia:  This is secondary to gross hematuria, dropped hemoglobin from 14.5-11.2 this morning.  No need for blood transfusion at this time.  Continue to monitor     Chronic, stable problems:  Gout:  Continue PTA uloric  Central Sleep Apnea: Resume PTA CPAP per home settings       DVT Prophylaxis: Pneumatic Compression Devices  Code Status: Full Code     Discussed with the nursing staff during care with the patient.    Disposition: Expected discharge once we know from the CBI no active bleeding.    Ephraim Castillo MD  Text Page  (7am - 6pm)    Interval History   No active complaint this morning, CBI stopped this morning.  Denies any chest pain fever chills nausea vomiting dizziness or lightheadedness at this time.    No other significant event overnight    -Data reviewed today: I reviewed all new labs and imaging results over the last 24 hours. I personally reviewed no images or EKG's today.    Physical Exam   Temp: 97.3  F (36.3  C) Temp src: Oral BP: 113/66 Pulse: 63 Heart Rate: 55 Resp: 16 SpO2: 92 % O2 Device: None (Room air)    Vitals:    05/08/20 2341   Weight: 111.1 kg (245 lb)     Vital Signs with Ranges  Temp:  [95.5  F (35.3  C)-97.3  F (36.3  C)] 97.3  F (36.3  C)  Pulse:  [63] 63  Heart Rate:  [55-73] 55  Resp:  [16-18] 16  BP: (101-119)/(57-76) 113/66  SpO2:  [92 %-97 %] 92 %  I/O last 3 completed shifts:  In: 480 [P.O.:480]  Out: 5425 [Urine:5425]    Constitutional: Conscious, awake and alert, in no acute distress   Eyes: Lids and lashes normal, pupils equal, round and reactive to light, extra ocular muscles intact, sclera clear, conjunctiva normal  Respiratory: No increased work of breathing, good air exchange, clear to auscultation  normal.   Mouth/Throat: Oropharyngeal exudate present.   Clear rhinorrhea.  Posterior pharynx erythematous with exudate.   Eyes: Conjunctivae and EOM are normal. Pupils are equal, round, and reactive to light.   Neck: Normal range of motion. Neck supple. No tracheal deviation present.   Cardiovascular: Normal rate, regular rhythm and normal heart sounds.  Exam reveals no gallop and no friction rub.    No murmur heard.  Pulmonary/Chest: Effort normal and breath sounds normal. No respiratory distress. He has no wheezes. He has no rales.   Abdominal: Soft. Bowel sounds are normal. He exhibits no mass. There is no tenderness.   Lymphadenopathy:     He has no cervical adenopathy.   Neurological: He is alert and oriented to person, place, and time.   Skin: Skin is warm and dry. No rash noted. He is not diaphoretic.   Nursing note and vitals reviewed.      ASSESSMENT/PLAN     (J03.90) Tonsillitis  (primary encounter diagnosis)  Comment:   Plan: amoxicillin-clavulanate (AUGMENTIN) 500-125 MG         per tablet          Gargle warm salt water  Ibuprofen  New tooth brush in 3 days    Patient Instructions   Interested in decreasing your wait time in the Walk-in/Urgent Care Clinic?  Same day reservations can now be made on line.  Go to [email or web address] to see the wait times at each [facility]gent [facility] and to make a reservation at the site that is most convenient for you. We will do our best to honor your reservation time but please understand that wait times are subject to change once you arrive at the clinic.        Thank you for visiting the [facility] Walk-In, [address].    It is difficult to recognize all elements of any illness or injury in a single visit. The examination, treatment and x-rays received are on a preliminary basis only. A radiologist will also review your x-rays for final reading. Call your primary care provider if you have questions or problems before your next appointment. If you are  bilaterally, no crackles or wheezing  Cardiovascular: Normal apical impulse, regular rate and rhythm, normal S1 and S2, no S3 or S4, and no murmur noted  GI: No scars, normal bowel sounds, soft, non-distended, non-tender, no masses palpated, no hepatosplenomegally  Skin: no bruising or bleeding  Musculoskeletal: no lower extremity pitting edema present  Neurologic: No focal deficit    Medications     sodium chloride 0.9% (bag)         digoxin  125 mcg Oral Daily     diltiazem ER COATED BEADS  300 mg Oral Daily     febuxostat  40 mg Oral Daily     finasteride  5 mg Oral Daily     insulin aspart  1-7 Units Subcutaneous TID AC     insulin aspart  1-5 Units Subcutaneous At Bedtime     insulin glargine  20 Units Subcutaneous At Bedtime     magnesium oxide  400 mg Oral At Bedtime     metoprolol succinate ER  50 mg Oral BID     sodium chloride (PF)  3 mL Intracatheter Q8H     spironolactone  12.5 mg Oral Daily     tamsulosin  0.4 mg Oral QPM       Data   Recent Labs   Lab 05/12/20  0649 05/11/20  0656 05/10/20  0827  05/09/20  0051   WBC 7.0 7.0 6.7  --  8.3   HGB 11.5* 11.2* 11.8*   < > 14.5   * 102* 101*  --  98    158 161  --  184   INR 1.33* 1.60* 2.22*  --  2.97*   NA  --  136 137  --  135   POTASSIUM  --  4.1 4.2  --  3.8   CHLORIDE  --  106 105  --  104   CO2  --  23 25  --  23   BUN  --  33* 27  --  23   CR  --  1.17 1.19  --  0.98   ANIONGAP  --  7 7  --  8   HARSHA  --  8.1* 8.3*  --  8.6   GLC  --  166* 191*  --  174*   ALBUMIN  --  2.9*  --   --   --     < > = values in this interval not displayed.       No results found for this or any previous visit (from the past 24 hour(s)).   unable to  reach your Primary Care Provider (PCP), please call or return to the Walk-In Clinic.  If symptoms worsen or do not resolve please follow-up with your Primary Care Provider (PCP), Walk-In or the nearest  Emergency Room for emergency symptoms.  If you are unsure of whom to follow up with, call [phone] and ask to speak with your PCP, the Walk-In nurse or the on-call Provider if you are calling after hours.      If you are referred to a specialist or scheduled for a test, our Referrals department will call you with your appointment date and time within 3 business days. If you have not heard from them in this time frame, please call [phone] and ask for the Referrals department.     Test results: Unless otherwise instructed, you should be notified of test results within one week. Please call our office if you do not hear from us within this time frame at [phone].     Hours:  Monday through Friday: 7:00 am to 7:00 pm  Saturday: 7:00 am to 3:00 pm  Sunday: 7:00 am to 1:00 pm.    The Walk-in is open 365 days a year!  Holiday hours may vary, please call [phone] on Holidays.    Thank you again for visiting [facility].  Carolina Torres NP    Help us to grow our quality of service!  We want to improve - and you can help!  You may receive a survey in the mail.  This is your opportunity to tell us what excellent service you received and where we could use improvement.  We value your input!

## 2020-05-12 NOTE — PROGRESS NOTES
[facility]    Urology Brief Note     Urine darkened again last night and CBI restarted. Clear this AM.    Plan:    Stop CBI this AM    Will recheck this PM and possible discharge later today    Will coordinate CHRISTOPHER Ray PA-C 5/12/2020 7:17 AM  [facility]  Mon-Fri, 7am - 4pm  Office: [phone]

## 2020-05-13 ENCOUNTER — TELEPHONE (OUTPATIENT)
Dept: PHARMACY | Facility: CLINIC | Age: 75
End: 2020-05-13

## 2020-05-13 LAB
ALBUMIN SERPL-MCNC: 3 G/DL (ref 3.4–5)
ANION GAP SERPL CALCULATED.3IONS-SCNC: 5 MMOL/L (ref 3–14)
BASOPHILS # BLD AUTO: 0.1 10E9/L (ref 0–0.2)
BASOPHILS NFR BLD AUTO: 0.8 %
BUN SERPL-MCNC: 23 MG/DL (ref 7–30)
CALCIUM SERPL-MCNC: 8.3 MG/DL (ref 8.5–10.1)
CHLORIDE SERPL-SCNC: 108 MMOL/L (ref 94–109)
CO2 SERPL-SCNC: 25 MMOL/L (ref 20–32)
CREAT SERPL-MCNC: 1.12 MG/DL (ref 0.66–1.25)
DIFFERENTIAL METHOD BLD: ABNORMAL
EOSINOPHIL # BLD AUTO: 0.2 10E9/L (ref 0–0.7)
EOSINOPHIL NFR BLD AUTO: 3 %
ERYTHROCYTE [DISTWIDTH] IN BLOOD BY AUTOMATED COUNT: 13.2 % (ref 10–15)
GFR SERPL CREATININE-BSD FRML MDRD: 65 ML/MIN/{1.73_M2}
GLUCOSE BLDC GLUCOMTR-MCNC: 120 MG/DL (ref 70–99)
GLUCOSE BLDC GLUCOMTR-MCNC: 124 MG/DL (ref 70–99)
GLUCOSE BLDC GLUCOMTR-MCNC: 125 MG/DL (ref 70–99)
GLUCOSE BLDC GLUCOMTR-MCNC: 137 MG/DL (ref 70–99)
GLUCOSE BLDC GLUCOMTR-MCNC: 156 MG/DL (ref 70–99)
GLUCOSE SERPL-MCNC: 138 MG/DL (ref 70–99)
HCT VFR BLD AUTO: 31.1 % (ref 40–53)
HGB BLD-MCNC: 10.5 G/DL (ref 13.3–17.7)
IMM GRANULOCYTES # BLD: 0 10E9/L (ref 0–0.4)
IMM GRANULOCYTES NFR BLD: 0.3 %
LYMPHOCYTES # BLD AUTO: 1.6 10E9/L (ref 0.8–5.3)
LYMPHOCYTES NFR BLD AUTO: 26.3 %
MCH RBC QN AUTO: 34.9 PG (ref 26.5–33)
MCHC RBC AUTO-ENTMCNC: 33.8 G/DL (ref 31.5–36.5)
MCV RBC AUTO: 103 FL (ref 78–100)
MONOCYTES # BLD AUTO: 0.5 10E9/L (ref 0–1.3)
MONOCYTES NFR BLD AUTO: 8.2 %
NEUTROPHILS # BLD AUTO: 3.6 10E9/L (ref 1.6–8.3)
NEUTROPHILS NFR BLD AUTO: 61.4 %
NRBC # BLD AUTO: 0 10*3/UL
NRBC BLD AUTO-RTO: 0 /100
PHOSPHATE SERPL-MCNC: 3.1 MG/DL (ref 2.5–4.5)
PLATELET # BLD AUTO: 166 10E9/L (ref 150–450)
POTASSIUM SERPL-SCNC: 4.2 MMOL/L (ref 3.4–5.3)
RBC # BLD AUTO: 3.01 10E12/L (ref 4.4–5.9)
SARS-COV-2 PCR COMMENT: NORMAL
SARS-COV-2 RNA SPEC QL NAA+PROBE: NEGATIVE
SARS-COV-2 RNA SPEC QL NAA+PROBE: NORMAL
SODIUM SERPL-SCNC: 138 MMOL/L (ref 133–144)
SPECIMEN SOURCE: NORMAL
SPECIMEN SOURCE: NORMAL
WBC # BLD AUTO: 5.9 10E9/L (ref 4–11)

## 2020-05-13 PROCEDURE — 12000000 ZZH R&B MED SURG/OB

## 2020-05-13 PROCEDURE — 25800025 ZZH RX 258: Performed by: INTERNAL MEDICINE

## 2020-05-13 PROCEDURE — 80069 RENAL FUNCTION PANEL: CPT | Performed by: INTERNAL MEDICINE

## 2020-05-13 PROCEDURE — 87635 SARS-COV-2 COVID-19 AMP PRB: CPT | Performed by: INTERNAL MEDICINE

## 2020-05-13 PROCEDURE — 25000131 ZZH RX MED GY IP 250 OP 636 PS 637: Performed by: INTERNAL MEDICINE

## 2020-05-13 PROCEDURE — 00000146 ZZHCL STATISTIC GLUCOSE BY METER IP

## 2020-05-13 PROCEDURE — 85025 COMPLETE CBC W/AUTO DIFF WBC: CPT | Performed by: INTERNAL MEDICINE

## 2020-05-13 PROCEDURE — 99232 SBSQ HOSP IP/OBS MODERATE 35: CPT | Performed by: INTERNAL MEDICINE

## 2020-05-13 PROCEDURE — 25000132 ZZH RX MED GY IP 250 OP 250 PS 637: Performed by: INTERNAL MEDICINE

## 2020-05-13 PROCEDURE — 36415 COLL VENOUS BLD VENIPUNCTURE: CPT | Performed by: INTERNAL MEDICINE

## 2020-05-13 RX ADMIN — DILTIAZEM HYDROCHLORIDE 300 MG: 300 CAPSULE, COATED, EXTENDED RELEASE ORAL at 20:47

## 2020-05-13 RX ADMIN — SPIRONOLACTONE 12.5 MG: 25 TABLET ORAL at 08:20

## 2020-05-13 RX ADMIN — FINASTERIDE 5 MG: 5 TABLET, FILM COATED ORAL at 08:20

## 2020-05-13 RX ADMIN — INSULIN GLARGINE 20 UNITS: 100 INJECTION, SOLUTION SUBCUTANEOUS at 22:51

## 2020-05-13 RX ADMIN — SODIUM CHLORIDE 6000 ML: 900 IRRIGANT IRRIGATION at 23:22

## 2020-05-13 RX ADMIN — SODIUM CHLORIDE 6000 ML: 900 IRRIGANT IRRIGATION at 14:38

## 2020-05-13 RX ADMIN — METOPROLOL SUCCINATE 50 MG: 50 TABLET, EXTENDED RELEASE ORAL at 15:36

## 2020-05-13 RX ADMIN — METOPROLOL SUCCINATE 50 MG: 50 TABLET, EXTENDED RELEASE ORAL at 08:20

## 2020-05-13 RX ADMIN — Medication 400 MG: at 22:51

## 2020-05-13 RX ADMIN — FEBUXOSTAT 40 MG: 40 TABLET ORAL at 08:20

## 2020-05-13 RX ADMIN — SODIUM CHLORIDE 6000 ML: 900 IRRIGANT IRRIGATION at 20:51

## 2020-05-13 RX ADMIN — SODIUM CHLORIDE 6000 ML: 900 IRRIGANT IRRIGATION at 05:26

## 2020-05-13 RX ADMIN — DIGOXIN 125 MCG: 125 TABLET ORAL at 08:20

## 2020-05-13 RX ADMIN — TAMSULOSIN HYDROCHLORIDE 0.4 MG: 0.4 CAPSULE ORAL at 20:47

## 2020-05-13 ASSESSMENT — ACTIVITIES OF DAILY LIVING (ADL)
ADLS_ACUITY_SCORE: 14

## 2020-05-13 NOTE — PLAN OF CARE
Pt much more calm overnight. Denied pain. VSS on RA, softer BPs. Tolerating diet- no need for short acting insulin. CBI moderate rate with cherry red UOP now, pale pink most of night. Pt ambulated in halls and urine turned cherry red then needed to be hand irrigated at 2300 due to patient discomfort with large clot removed. Irrigated again at 0600 with lots of small clots, nonobstructive. Plan to continue to monitor CBI per urology recommendations.

## 2020-05-13 NOTE — PROGRESS NOTES
[facility]    Hospitalist Progress Note    Brief Summary:  Jt Montgomery is a pleasant 74 year old male with multiple co-morbidities including chronic atrial fibrillation on coumadin, CAD, NSTEMI s/p RONEL placement, nonischemic dilated cardiomyopathy, hypertension, diabetes, central sleep apnea, obesity, gout, BPH who was admitted on 5/8/2020 for hematuria and intermittent mauricio obstruction due to clots.     Assessment & Plan           BPH with gross hematuria indwelling Mauricio catheter  Obstructed Mauricio catheter secondary blood clot    Patient has history of BPH and urinary retention has chronic Mauricio catheter since December 2019.  Now presented with urinary obstruction second to blood clots and gross hematuria.  Three-way catheter was passed and started on CBI.  Urology was consulted.  Patient is still having blood clots, and pinkish urine return from the three-way Mauricio catheter, CBI still continuing.  Continue to hold Coumadin at this time.  Appreciate input from the urology, patient will benefit from TURP.  Keep the CBI till hematuria resolved.  Continue to hold aspirin and Coumadin at this time.  INR is now improved to 1.33.    Continue to have significant hematuria, with drop in his hemoglobin, despite of Coumadin aspirin and INR 1.3 yesterday 5/12/2020, and unable to wean him off from the CBI.  He does get clots easily and requiring manual irrigation.  I think the patient will benefit from TURP during this admission.  Urology is following.         Chronic atrial fibrillation on coumadin  CAD, NSTEMI s/p RONEL placement  Nonischemic dilated cardiomyopathy  Hypertension  Managed PTA with aspirin, digoxin, diltiazem, lasix prn, irbesartan, metoprolol, spironolactone. Patient refuses statin.   Patient is back on his medication except for irbesartan and aspirin.  Continue to hold Coumadin at this time as well.  Patient is reasonable control at this time, slightly on the lower side, will continue  with Cardizem and continue to hold irbesartan at this time.     Diabetes, Type 2  Managed PTA with diet control, glargine and pre-meal aspart. Recent hemoglobin A1c 6.1% from Feb 2020 indicating reasonable glycemic control.   Diabetes is in good control, I will continue with the Lantus 20 units at night, keep him on sliding scale insulin for correction and hypoglycemia protocol.  -consistent carb diet       Acute blood loss anemia:  This is secondary to gross hematuria, hemoglobin is slowly trending down, from 14.5 to now 10.5.   No need for blood transfusion at this time.  Continue to monitor      Chronic, stable problems:  Gout:  Continue PTA uloric  Central Sleep Apnea: Resume PTA CPAP per home settings       DVT Prophylaxis: Pneumatic Compression Devices  Code Status: Full Code     Discussed with the nursing staff during care with the patient.    Disposition: Expected discharge once weaned off from the CBI no active bleeding or posterior    Addendum:  Planning to doing Surgery on Friday 5/15/2020, will order COVID 19 testing.       Ephraim Castillo MD  Text Page  (7am - 6pm)    Interval History   This morning feeling overall better, continue to have hematuria despite CBI and manual irrigation.  Denies any chest pain shortness of breath fever chills nausea vomiting abdominal pain dysuria constipation or diarrhea at this time.    No other significant event overnight    -Data reviewed today: I reviewed all new labs and imaging results over the last 24 hours. I personally reviewed no images or EKG's today.    Physical Exam   Temp: 96.4  F (35.8  C) Temp src: Oral BP: 116/68 Pulse: 78 Heart Rate: 75 Resp: 16 SpO2: 95 % O2 Device: None (Room air)    Vitals:    05/08/20 2341   Weight: 111.1 kg (245 lb)     Vital Signs with Ranges  Temp:  [95.7  F (35.4  C)-97.3  F (36.3  C)] 96.4  F (35.8  C)  Pulse:  [68-78] 78  Heart Rate:  [55-75] 75  Resp:  [16] 16  BP: ()/(55-68) 116/68  SpO2:  [92 %-97 %] 95 %  I/O last 3  completed shifts:  In: 480 [P.O.:480]  Out: 3325 [Urine:3325]    Constitutional: Conscious, awake and alert, in no acute distress   Eyes: Lids and lashes normal, pupils equal, round and reactive to light, extra ocular muscles intact, sclera clear, conjunctiva normal  Respiratory: No increased work of breathing, good air exchange, clear to auscultation bilaterally, no crackles or wheezing  Cardiovascular: Normal apical impulse, regular rate and rhythm, normal S1 and S2, no S3 or S4, and no murmur noted  GI: No scars, normal bowel sounds, soft, non-distended, non-tender, no masses palpated, no hepatosplenomegally  Skin: no bruising or bleeding  Musculoskeletal: no lower extremity pitting edema present  Neurologic: No focal deficit    Medications     sodium chloride 0.9% (bag)         digoxin  125 mcg Oral Daily     diltiazem ER COATED BEADS  300 mg Oral Daily     febuxostat  40 mg Oral Daily     finasteride  5 mg Oral Daily     insulin aspart  1-7 Units Subcutaneous TID AC     insulin aspart  1-5 Units Subcutaneous At Bedtime     insulin glargine  20 Units Subcutaneous At Bedtime     magnesium oxide  400 mg Oral At Bedtime     metoprolol succinate ER  50 mg Oral BID     sodium chloride (PF)  3 mL Intracatheter Q8H     spironolactone  12.5 mg Oral Daily     tamsulosin  0.4 mg Oral QPM       Data   Recent Labs   Lab 05/13/20  0700 05/12/20  0649 05/11/20  0656 05/10/20  0827   WBC 5.9 7.0 7.0 6.7   HGB 10.5* 11.5* 11.2* 11.8*   * 103* 102* 101*    171 158 161   INR  --  1.33* 1.60* 2.22*     --  136 137   POTASSIUM 4.2  --  4.1 4.2   CHLORIDE 108  --  106 105   CO2 25  --  23 25   BUN 23  --  33* 27   CR 1.12  --  1.17 1.19   ANIONGAP 5  --  7 7   HARSHA 8.3*  --  8.1* 8.3*   *  --  166* 191*   ALBUMIN 3.0*  --  2.9*  --        No results found for this or any previous visit (from the past 24 hour(s)).

## 2020-05-13 NOTE — PLAN OF CARE
[id number]:  A/O x 4, forgetful.  Urine jeferson/pink.  No clots noted, CBI running at moderate/slow rate.  Up independently.  Good appetite. Blood sugars monitored, corrected per sliding scale insulin. Probable TURP later this week.  Pre-procedure COVID swab done, results pending.

## 2020-05-13 NOTE — TELEPHONE ENCOUNTER
Received phone call from Jamal.  We are scheduled for MTM f/up visit via phone tomorrow.  He's currently admitted at [facility].  He requests that we call next week to check in to see if he's home/re-schedule our visit.  We will do so.    Nathaly Hughes, PharmD, [facility]  Medication Therapy Management Provider  Pager: [phone]

## 2020-05-13 NOTE — PLAN OF CARE
CBI slow to moderate rate to keep urine light brownish color. Irrigated x 1 today by urology, few small clots. Cash has remained patent. Does get dark inés color when irrigation slowed. Up ad yogesh. Good appetite. Probable TURP later this week.

## 2020-05-13 NOTE — PROGRESS NOTES
[facility]    Urology Progress Note     Assessment & Plan  Jt Montgomery is a 74 year old male with gross hematuria, large prostate and catheter-dependant urinary retention. Has been on and off CBI for several days now. Urine still dark brown/inés today without obstruction but required several hand irrigations overnight as he was bleeding and forming clots. Hgb 10.5. CBI restarted    Plan:    Cont CBI. Wean as able.    Hold warfarin    Will attempt to expedite TURP procedure. Looking at Friday for this.    Waldemar Ray PA-C 5/13/2020 11:24 AM  [facility]  Mon-Fri, 7am - 4pm  Office: [phone]      Interval History   Required one hand irrigation last night and and one yesterday to keep catheter flowing. Feeling fine now and catheter flowing freely with inés urine.    Physical Exam   Temp: 96.4  F (35.8  C) Temp src: Oral BP: 116/68 Pulse: 78 Heart Rate: 75 Resp: 16 SpO2: 95 % O2 Device: None (Room air)    Vitals:    05/08/20 2341   Weight: 111.1 kg (245 lb)     Vital Signs with Ranges  Temp:  [95.7  F (35.4  C)-97.3  F (36.3  C)] 96.4  F (35.8  C)  Pulse:  [68-78] 78  Heart Rate:  [55-75] 75  Resp:  [16] 16  BP: ()/(55-68) 116/68  SpO2:  [92 %-97 %] 95 %  I/O last 3 completed shifts:  In: 480 [P.O.:480]  Out: 3325 [Urine:3325]    General: Patient awake, alert, NAD, lying in bed  Head: Normocephalic, atraumatic  Eyes: No icterus  Neck: Symmetric  Respiratory: Breathing unlabored  Cardiac: Skin well-perfused  GI: Soft, NT, non-distended, no SP tenderness,  Genitourinary: Cash in place draining inés urine urine, No penoscrotal edema  Skin:No visible rashes   Neurologic: No focal deficits  Neuropsychiatric: A&O x 3, responding appropriately      PROCEDURE:  I irrigated his catheter with 150cc NS with a 60cc cath tip syringe. The catheter irrigated easily and was able to remove a few small dark clots. UOP was clear/pink. CBI was restarted and urine returned clear/inés at a  slow rate.    Medications     sodium chloride 0.9% (bag)         digoxin  125 mcg Oral Daily     diltiazem ER COATED BEADS  300 mg Oral Daily     febuxostat  40 mg Oral Daily     finasteride  5 mg Oral Daily     insulin aspart  1-7 Units Subcutaneous TID AC     insulin aspart  1-5 Units Subcutaneous At Bedtime     insulin glargine  20 Units Subcutaneous At Bedtime     magnesium oxide  400 mg Oral At Bedtime     metoprolol succinate ER  50 mg Oral BID     sodium chloride (PF)  3 mL Intracatheter Q8H     spironolactone  12.5 mg Oral Daily     tamsulosin  0.4 mg Oral QPM       Data   Results for orders placed or performed during the hospital encounter of 05/08/20 (from the past 24 hour(s))   Glucose by meter   Result Value Ref Range    Glucose 143 (H) 70 - 99 mg/dL   Glucose by meter   Result Value Ref Range    Glucose 110 (H) 70 - 99 mg/dL   Glucose by meter   Result Value Ref Range    Glucose 131 (H) 70 - 99 mg/dL   Glucose by meter   Result Value Ref Range    Glucose 125 (H) 70 - 99 mg/dL   CBC with platelets differential   Result Value Ref Range    WBC 5.9 4.0 - 11.0 10e9/L    RBC Count 3.01 (L) 4.4 - 5.9 10e12/L    Hemoglobin 10.5 (L) 13.3 - 17.7 g/dL    Hematocrit 31.1 (L) 40.0 - 53.0 %     (H) 78 - 100 fl    MCH 34.9 (H) 26.5 - 33.0 pg    MCHC 33.8 31.5 - 36.5 g/dL    RDW 13.2 10.0 - 15.0 %    Platelet Count 166 150 - 450 10e9/L    Diff Method Automated Method     % Neutrophils 61.4 %    % Lymphocytes 26.3 %    % Monocytes 8.2 %    % Eosinophils 3.0 %    % Basophils 0.8 %    % Immature Granulocytes 0.3 %    Nucleated RBCs 0 0 /100    Absolute Neutrophil 3.6 1.6 - 8.3 10e9/L    Absolute Lymphocytes 1.6 0.8 - 5.3 10e9/L    Absolute Monocytes 0.5 0.0 - 1.3 10e9/L    Absolute Eosinophils 0.2 0.0 - 0.7 10e9/L    Absolute Basophils 0.1 0.0 - 0.2 10e9/L    Abs Immature Granulocytes 0.0 0 - 0.4 10e9/L    Absolute Nucleated RBC 0.0    Renal panel   Result Value Ref Range    Sodium 138 133 - 144 mmol/L    Potassium  4.2 3.4 - 5.3 mmol/L    Chloride 108 94 - 109 mmol/L    Carbon Dioxide 25 20 - 32 mmol/L    Anion Gap 5 3 - 14 mmol/L    Glucose 138 (H) 70 - 99 mg/dL    Urea Nitrogen 23 7 - 30 mg/dL    Creatinine 1.12 0.66 - 1.25 mg/dL    GFR Estimate 65 >60 mL/min/[1.73_m2]    GFR Estimate If Black 75 >60 mL/min/[1.73_m2]    Calcium 8.3 (L) 8.5 - 10.1 mg/dL    Phosphorus 3.1 2.5 - 4.5 mg/dL    Albumin 3.0 (L) 3.4 - 5.0 g/dL   Glucose by meter   Result Value Ref Range    Glucose 137 (H) 70 - 99 mg/dL

## 2020-05-14 LAB
GLUCOSE BLDC GLUCOMTR-MCNC: 114 MG/DL (ref 70–99)
GLUCOSE BLDC GLUCOMTR-MCNC: 115 MG/DL (ref 70–99)
GLUCOSE BLDC GLUCOMTR-MCNC: 126 MG/DL (ref 70–99)
GLUCOSE BLDC GLUCOMTR-MCNC: 137 MG/DL (ref 70–99)
GLUCOSE BLDC GLUCOMTR-MCNC: 164 MG/DL (ref 70–99)

## 2020-05-14 PROCEDURE — 25000132 ZZH RX MED GY IP 250 OP 250 PS 637: Performed by: INTERNAL MEDICINE

## 2020-05-14 PROCEDURE — 12000000 ZZH R&B MED SURG/OB

## 2020-05-14 PROCEDURE — 25000131 ZZH RX MED GY IP 250 OP 636 PS 637: Performed by: HOSPITALIST

## 2020-05-14 PROCEDURE — 25800025 ZZH RX 258: Performed by: INTERNAL MEDICINE

## 2020-05-14 PROCEDURE — 99233 SBSQ HOSP IP/OBS HIGH 50: CPT | Performed by: HOSPITALIST

## 2020-05-14 PROCEDURE — 00000146 ZZHCL STATISTIC GLUCOSE BY METER IP

## 2020-05-14 RX ADMIN — DIGOXIN 125 MCG: 125 TABLET ORAL at 08:21

## 2020-05-14 RX ADMIN — FEBUXOSTAT 40 MG: 40 TABLET ORAL at 08:22

## 2020-05-14 RX ADMIN — DILTIAZEM HYDROCHLORIDE 300 MG: 300 CAPSULE, COATED, EXTENDED RELEASE ORAL at 21:59

## 2020-05-14 RX ADMIN — SODIUM CHLORIDE 6000 ML: 900 IRRIGANT IRRIGATION at 22:32

## 2020-05-14 RX ADMIN — TAMSULOSIN HYDROCHLORIDE 0.4 MG: 0.4 CAPSULE ORAL at 21:59

## 2020-05-14 RX ADMIN — METOPROLOL SUCCINATE 50 MG: 50 TABLET, EXTENDED RELEASE ORAL at 17:18

## 2020-05-14 RX ADMIN — SODIUM CHLORIDE 6000 ML: 900 IRRIGANT IRRIGATION at 06:47

## 2020-05-14 RX ADMIN — SPIRONOLACTONE 12.5 MG: 25 TABLET ORAL at 08:21

## 2020-05-14 RX ADMIN — SODIUM CHLORIDE 6000 ML: 900 IRRIGANT IRRIGATION at 04:03

## 2020-05-14 RX ADMIN — METOPROLOL SUCCINATE 50 MG: 50 TABLET, EXTENDED RELEASE ORAL at 08:21

## 2020-05-14 RX ADMIN — Medication 400 MG: at 21:59

## 2020-05-14 RX ADMIN — SODIUM CHLORIDE 6000 ML: 900 IRRIGANT IRRIGATION at 01:28

## 2020-05-14 RX ADMIN — INSULIN GLARGINE 10 UNITS: 100 INJECTION, SOLUTION SUBCUTANEOUS at 22:07

## 2020-05-14 RX ADMIN — FINASTERIDE 5 MG: 5 TABLET, FILM COATED ORAL at 08:21

## 2020-05-14 RX ADMIN — SODIUM CHLORIDE 6000 ML: 900 IRRIGANT IRRIGATION at 11:01

## 2020-05-14 ASSESSMENT — ACTIVITIES OF DAILY LIVING (ADL)
ADLS_ACUITY_SCORE: 14
ADLS_ACUITY_SCORE: 16
ADLS_ACUITY_SCORE: 16
ADLS_ACUITY_SCORE: 14
ADLS_ACUITY_SCORE: 16
ADLS_ACUITY_SCORE: 14

## 2020-05-14 NOTE — PROGRESS NOTES
[facility]    Urology Progress Note     Assessment & Plan  Jt Montgomery is a 74 year old male with gross hematuria, large prostate and catheter-dependant urinary retention. Has had intermittent clot obstruction for several days now. Urine still dark brown/inés today without obstruction but required several hand irrigations overnight as he was bleeding and forming clots. Hgb 10.5. CBI restarted     Plan    Cont CBI. Wean as able.    Hold warfarin    Scheduled for TUPR tomorrow 9am    NPO after midnight    Waldemar Ray PA-C 5/14/2020 9:22 AM  [facility]  Mon-Fri, 7am - 4pm  Office: [phone]      Interval History   Again had gross hematuria and clots last night requiring hand irrigation    Physical Exam   Temp: 96.9  F (36.1  C) Temp src: Oral BP: 114/69 Pulse: 57 Heart Rate: 65 Resp: 16 SpO2: 95 % O2 Device: None (Room air)    Vitals:    05/08/20 2341   Weight: 111.1 kg (245 lb)     Vital Signs with Ranges  Temp:  [96.9  F (36.1  C)-98.3  F (36.8  C)] 96.9  F (36.1  C)  Pulse:  [57-62] 57  Heart Rate:  [60-65] 65  Resp:  [16] 16  BP: ()/(51-82) 114/69  SpO2:  [95 %-96 %] 95 %  I/O last 3 completed shifts:  In: 380 [P.O.:380]  Out: 3250 [Urine:3250]    General: Patient awake, alert, NAD, lying in bed  Head: Normocephalic, atraumatic  Eyes: No icterus  Neck: Symmetric  Respiratory: Breathing unlabored  Cardiac: Skin well-perfused  GI:  No SP tenderness, no CVA tenderness  Genitourinary: Cash in place draining clear jeferson urine  Skin:No visible rashes   Neurologic: No focal deficits  Neuropsychiatric: A&O x 3, responding appropriately      Medications     sodium chloride 0.9% (bag)         digoxin  125 mcg Oral Daily     diltiazem ER COATED BEADS  300 mg Oral Daily     febuxostat  40 mg Oral Daily     finasteride  5 mg Oral Daily     insulin aspart  1-7 Units Subcutaneous TID AC     insulin aspart  1-5 Units Subcutaneous At Bedtime     insulin glargine  20 Units  Subcutaneous At Bedtime     magnesium oxide  400 mg Oral At Bedtime     metoprolol succinate ER  50 mg Oral BID     sodium chloride (PF)  3 mL Intracatheter Q8H     spironolactone  12.5 mg Oral Daily     tamsulosin  0.4 mg Oral QPM       Data   Results for orders placed or performed during the hospital encounter of 05/08/20 (from the past 24 hour(s))   Glucose by meter   Result Value Ref Range    Glucose 137 (H) 70 - 99 mg/dL   Glucose by meter   Result Value Ref Range    Glucose 124 (H) 70 - 99 mg/dL   Asymptomatic COVID-19 Virus (Coronavirus) by PCR Nasopharyngeal    Specimen: Nasopharyngeal   Result Value Ref Range    COVID-19 Virus PCR to [facility] - Source Nasopharyngeal     COVID-19 Virus PCR to [facility] - Result       Test received-See reflex to IDDL test SARS CoV2 (COVID-19) Virus RT-PCR   SARS-CoV-2 COVID-19 Virus (Coronavirus) RT-PCR Nasopharyngeal    Specimen: Nasopharyngeal   Result Value Ref Range    SARS-CoV-2 Virus Specimen Source Nasopharyngeal     SARS-CoV-2 PCR Result NEGATIVE     SARS-CoV-2 PCR Comment       This automated, real-time RT-PCR assay by [employer] on the [employer] has   been given Emergency Use Authorization (EUA) for the in vitro qualitative detection of RNA   from the SARS-CoV2 virus in nasopharyngeal swabs in viral transport medium from patients   with signs and symptoms of infection who are suspected of COVID-19. The performance is   unknown in asymptomatic patients.     Glucose by meter   Result Value Ref Range    Glucose 120 (H) 70 - 99 mg/dL   Glucose by meter   Result Value Ref Range    Glucose 156 (H) 70 - 99 mg/dL   Glucose by meter   Result Value Ref Range    Glucose 114 (H) 70 - 99 mg/dL   Glucose by meter   Result Value Ref Range    Glucose 126 (H) 70 - 99 mg/dL

## 2020-05-14 NOTE — PLAN OF CARE
[id number]: PT A&O- forgetful and anxious at times. Denies pain. Irrigated mauricio x2 d/t clots. CBI running at mod rate. Up ind. BS checks. Mod carb diet. COVID neg for procedure. Plan for probable TURP Friday. Plan to irrigate at 0000 and 0600.

## 2020-05-14 NOTE — PROGRESS NOTES
[facility]    Medicine Progress Note - Hospitalist Service       Date of Admission:  5/8/2020  Assessment & Plan   Jt Montgomery is a 74 year old male admitted on 5/8/2020.  Multiple co-morbidities including chronic atrial fibrillation on coumadin, CAD, NSTEMI s/p RONEL placement, nonischemic dilated cardiomyopathy, hypertension, diabetes, central sleep apnea, obesity, gout, BPH who was admitted on 5/8/2020 for hematuria and intermittent mauricio obstruction due to clots.            BPH with gross hematuria, chronic indwelling Mauricio catheter  Obstructed Mauricio catheter secondary blood clot  Patient has history of BPH and urinary retention has chronic Mauricio catheter since December 2019.  Now presented with urinary obstruction second to blood clots and gross hematuria, which has persisted despite CBI and holding coumadin and aspirin.    - plan for TURP tomorrow  - INR in AM  - continue CBI  - continue PTA finasteride and flomax  - urology recs appreciated     Chronic atrial fibrillation on coumadin  CAD, NSTEMI s/p RONEL placement 4/2011  Nonischemic dilated cardiomyopathy  Hypertension  Managed PTA with aspirin, digoxin, diltiazem, lasix prn, irbesartan, metoprolol, spironolactone. Patient refuses statin.     - continue PTA digoxin, diltiazem, metoprolol, spironolactone  - holding PTA coumadin, aspirin, irbesartan  - nuc stress test 5/2018 negative for ischemia, most recent TTE 11/2016 with EF 50-55% with mild-moderate MR; he is high risk for post-operative cardiorespiratory complications but is currently without any signs or symptoms of CHF or exertional chest pain/pressure; suspect he is as optimized as possible for procedure and is cleared to proceed     Diabetes, Type 2  Managed PTA with diet control, glargine and pre-meal aspart. Recent hemoglobin A1c 6.1% from Feb 2020 indicating reasonable glycemic control.   - decrease lantus to 10 units at bedtime as will be NPO tonight  - continue medium  ssi     Acute blood loss anemia:  This is secondary to gross hematuria, hemoglobin is slowly trending down, from 14.5.     - repeat hgb in AM  - check B12 and folate due to macrocytosis     Chronic, stable problems:  Gout:  Continue PTA uloric  Central Sleep Apnea: Resume PTA CPAP per home settings           Diet: Consistent Carbohydrate Diet 8332-5841 Calories: Moderate Consistent CHO (4-6 CHO units/meal)  NPO per Anesthesia Guidelines for Procedure/Surgery Except for: Meds    DVT Prophylaxis: Pneumatic Compression Devices  Cash Catheter: in place, indication: Gross Hematuria  Code Status: Full Code           Disposition Plan   Expected discharge: 2 - 3 days, recommended to prior living arrangement once hgb stable, hematuria resolved, cleared by urology.  Entered: Woody Cazares MD 05/14/2020, 11:20 AM       The patient's care was discussed with the Bedside Nurse and Patient.    Woody Cazares MD  Hospitalist Service  [facility]    ______________________________________________________________________    Interval History   Reports some occasional orthostatic lightheadedness but this has been chronic and stable.  Denies any exertional chest pain/pressure, though does not perform much exercise - really his only activity is light household work.  Denies any lower extremity edema or orthopnea.  Reports ongoing hematuria.     Data reviewed today: I reviewed all medications, new labs and imaging results over the last 24 hours. I personally reviewed no images or EKG's today.    Physical Exam   Vital Signs: Temp: 96.9  F (36.1  C) Temp src: Oral BP: 114/69 Pulse: 57 Heart Rate: 65 Resp: 16 SpO2: 95 % O2 Device: None (Room air)    Weight: 245 lbs 0 oz  General Appearance: Well nourished male in NAD  Respiratory: lungs CTAB, no wheezes or crackles, no tachypnea, no peripheral edema  Cardiovascular: irregular rhythm, normal s1/s2 without murmur  GI: abdomen soft, nontender, normal bowel sounds  Other: Alert  and appropriate, cranial nerves grossly intact     Data   Recent Labs   Lab 05/13/20  0700 05/12/20  0649 05/11/20  0656 05/10/20  0827   WBC 5.9 7.0 7.0 6.7   HGB 10.5* 11.5* 11.2* 11.8*   * 103* 102* 101*    171 158 161   INR  --  1.33* 1.60* 2.22*     --  136 137   POTASSIUM 4.2  --  4.1 4.2   CHLORIDE 108  --  106 105   CO2 25  --  23 25   BUN 23  --  33* 27   CR 1.12  --  1.17 1.19   ANIONGAP 5  --  7 7   HARSHA 8.3*  --  8.1* 8.3*   *  --  166* 191*   ALBUMIN 3.0*  --  2.9*  --

## 2020-05-14 NOTE — PLAN OF CARE
Pt is A/Ox4. VSS on RA. Moves independently. Denies pain. Mod carb diet, denies nausea. Cash in place w/ CBI at moderate rate, irrigated x2 w/ no clots present, watermelon to peach color. L PIV SL. Plan for probable TURP on Friday, 5/15.

## 2020-05-15 ENCOUNTER — ANESTHESIA (OUTPATIENT)
Dept: SURGERY | Facility: CLINIC | Age: 75
DRG: 666 | End: 2020-05-15
Payer: COMMERCIAL

## 2020-05-15 ENCOUNTER — ANESTHESIA EVENT (OUTPATIENT)
Dept: SURGERY | Facility: CLINIC | Age: 75
DRG: 666 | End: 2020-05-15
Payer: COMMERCIAL

## 2020-05-15 LAB
ANION GAP SERPL CALCULATED.3IONS-SCNC: 6 MMOL/L (ref 3–14)
BUN SERPL-MCNC: 20 MG/DL (ref 7–30)
CALCIUM SERPL-MCNC: 8.8 MG/DL (ref 8.5–10.1)
CHLORIDE SERPL-SCNC: 107 MMOL/L (ref 94–109)
CO2 SERPL-SCNC: 24 MMOL/L (ref 20–32)
CREAT SERPL-MCNC: 1.2 MG/DL (ref 0.66–1.25)
FOLATE SERPL-MCNC: 33.7 NG/ML
GFR SERPL CREATININE-BSD FRML MDRD: 59 ML/MIN/{1.73_M2}
GLUCOSE BLDC GLUCOMTR-MCNC: 135 MG/DL (ref 70–99)
GLUCOSE BLDC GLUCOMTR-MCNC: 141 MG/DL (ref 70–99)
GLUCOSE BLDC GLUCOMTR-MCNC: 157 MG/DL (ref 70–99)
GLUCOSE BLDC GLUCOMTR-MCNC: 187 MG/DL (ref 70–99)
GLUCOSE BLDC GLUCOMTR-MCNC: 200 MG/DL (ref 70–99)
GLUCOSE SERPL-MCNC: 146 MG/DL (ref 70–99)
HGB BLD-MCNC: 10.8 G/DL (ref 13.3–17.7)
INR PPP: 1.14 (ref 0.86–1.14)
POTASSIUM SERPL-SCNC: 4.3 MMOL/L (ref 3.4–5.3)
SODIUM SERPL-SCNC: 137 MMOL/L (ref 133–144)
VIT B12 SERPL-MCNC: 1195 PG/ML (ref 193–986)

## 2020-05-15 PROCEDURE — 25000128 H RX IP 250 OP 636: Performed by: INTERNAL MEDICINE

## 2020-05-15 PROCEDURE — 25800030 ZZH RX IP 258 OP 636: Performed by: NURSE ANESTHETIST, CERTIFIED REGISTERED

## 2020-05-15 PROCEDURE — 25800025 ZZH RX 258: Performed by: UROLOGY

## 2020-05-15 PROCEDURE — 85610 PROTHROMBIN TIME: CPT | Performed by: HOSPITALIST

## 2020-05-15 PROCEDURE — 25000125 ZZHC RX 250: Performed by: UROLOGY

## 2020-05-15 PROCEDURE — 25000125 ZZHC RX 250: Performed by: NURSE ANESTHETIST, CERTIFIED REGISTERED

## 2020-05-15 PROCEDURE — 88305 TISSUE EXAM BY PATHOLOGIST: CPT | Mod: 26 | Performed by: UROLOGY

## 2020-05-15 PROCEDURE — 93005 ELECTROCARDIOGRAM TRACING: CPT

## 2020-05-15 PROCEDURE — 25800030 ZZH RX IP 258 OP 636: Performed by: ANESTHESIOLOGY

## 2020-05-15 PROCEDURE — 25000128 H RX IP 250 OP 636: Performed by: NURSE ANESTHETIST, CERTIFIED REGISTERED

## 2020-05-15 PROCEDURE — 36000056 ZZH SURGERY LEVEL 3 1ST 30 MIN: Performed by: UROLOGY

## 2020-05-15 PROCEDURE — 25000566 ZZH SEVOFLURANE, EA 15 MIN: Performed by: UROLOGY

## 2020-05-15 PROCEDURE — 88312 SPECIAL STAINS GROUP 1: CPT | Mod: 26 | Performed by: UROLOGY

## 2020-05-15 PROCEDURE — 25000128 H RX IP 250 OP 636: Performed by: PHYSICIAN ASSISTANT

## 2020-05-15 PROCEDURE — 0VB08ZZ EXCISION OF PROSTATE, VIA NATURAL OR ARTIFICIAL OPENING ENDOSCOPIC: ICD-10-PCS | Performed by: UROLOGY

## 2020-05-15 PROCEDURE — 37000009 ZZH ANESTHESIA TECHNICAL FEE, EACH ADDTL 15 MIN: Performed by: UROLOGY

## 2020-05-15 PROCEDURE — 93010 ELECTROCARDIOGRAM REPORT: CPT | Mod: 76 | Performed by: INTERNAL MEDICINE

## 2020-05-15 PROCEDURE — 25800030 ZZH RX IP 258 OP 636: Performed by: UROLOGY

## 2020-05-15 PROCEDURE — 82607 VITAMIN B-12: CPT | Performed by: HOSPITALIST

## 2020-05-15 PROCEDURE — 25000131 ZZH RX MED GY IP 250 OP 636 PS 637: Performed by: HOSPITALIST

## 2020-05-15 PROCEDURE — 85018 HEMOGLOBIN: CPT | Performed by: HOSPITALIST

## 2020-05-15 PROCEDURE — 82746 ASSAY OF FOLIC ACID SERUM: CPT | Performed by: HOSPITALIST

## 2020-05-15 PROCEDURE — 00000146 ZZHCL STATISTIC GLUCOSE BY METER IP

## 2020-05-15 PROCEDURE — 25000125 ZZHC RX 250: Performed by: ANESTHESIOLOGY

## 2020-05-15 PROCEDURE — 88305 TISSUE EXAM BY PATHOLOGIST: CPT | Performed by: UROLOGY

## 2020-05-15 PROCEDURE — 12000000 ZZH R&B MED SURG/OB

## 2020-05-15 PROCEDURE — 36000058 ZZH SURGERY LEVEL 3 EA 15 ADDTL MIN: Performed by: UROLOGY

## 2020-05-15 PROCEDURE — 25000132 ZZH RX MED GY IP 250 OP 250 PS 637: Performed by: INTERNAL MEDICINE

## 2020-05-15 PROCEDURE — 36415 COLL VENOUS BLD VENIPUNCTURE: CPT | Performed by: HOSPITALIST

## 2020-05-15 PROCEDURE — 71000013 ZZH RECOVERY PHASE 1 LEVEL 1 EA ADDTL HR: Performed by: UROLOGY

## 2020-05-15 PROCEDURE — 40000170 ZZH STATISTIC PRE-PROCEDURE ASSESSMENT II: Performed by: UROLOGY

## 2020-05-15 PROCEDURE — 71000012 ZZH RECOVERY PHASE 1 LEVEL 1 FIRST HR: Performed by: UROLOGY

## 2020-05-15 PROCEDURE — 80048 BASIC METABOLIC PNL TOTAL CA: CPT | Performed by: HOSPITALIST

## 2020-05-15 PROCEDURE — 37000008 ZZH ANESTHESIA TECHNICAL FEE, 1ST 30 MIN: Performed by: UROLOGY

## 2020-05-15 PROCEDURE — 88312 SPECIAL STAINS GROUP 1: CPT | Performed by: UROLOGY

## 2020-05-15 PROCEDURE — 99232 SBSQ HOSP IP/OBS MODERATE 35: CPT | Performed by: HOSPITALIST

## 2020-05-15 PROCEDURE — 27210794 ZZH OR GENERAL SUPPLY STERILE: Performed by: UROLOGY

## 2020-05-15 RX ORDER — SODIUM CHLORIDE, SODIUM LACTATE, POTASSIUM CHLORIDE, CALCIUM CHLORIDE 600; 310; 30; 20 MG/100ML; MG/100ML; MG/100ML; MG/100ML
INJECTION, SOLUTION INTRAVENOUS CONTINUOUS
Status: DISCONTINUED | OUTPATIENT
Start: 2020-05-15 | End: 2020-05-15 | Stop reason: HOSPADM

## 2020-05-15 RX ORDER — LABETALOL HYDROCHLORIDE 5 MG/ML
10 INJECTION, SOLUTION INTRAVENOUS
Status: DISCONTINUED | OUTPATIENT
Start: 2020-05-15 | End: 2020-05-15 | Stop reason: HOSPADM

## 2020-05-15 RX ORDER — LIDOCAINE 40 MG/G
CREAM TOPICAL
Status: DISCONTINUED | OUTPATIENT
Start: 2020-05-15 | End: 2020-05-16 | Stop reason: HOSPADM

## 2020-05-15 RX ORDER — ONDANSETRON 2 MG/ML
4 INJECTION INTRAMUSCULAR; INTRAVENOUS EVERY 30 MIN PRN
Status: DISCONTINUED | OUTPATIENT
Start: 2020-05-15 | End: 2020-05-15 | Stop reason: HOSPADM

## 2020-05-15 RX ORDER — FENTANYL CITRATE 50 UG/ML
INJECTION, SOLUTION INTRAMUSCULAR; INTRAVENOUS PRN
Status: DISCONTINUED | OUTPATIENT
Start: 2020-05-15 | End: 2020-05-15

## 2020-05-15 RX ORDER — ONDANSETRON 4 MG/1
4 TABLET, ORALLY DISINTEGRATING ORAL EVERY 30 MIN PRN
Status: DISCONTINUED | OUTPATIENT
Start: 2020-05-15 | End: 2020-05-15 | Stop reason: HOSPADM

## 2020-05-15 RX ORDER — HYDROMORPHONE HYDROCHLORIDE 1 MG/ML
.3-.5 INJECTION, SOLUTION INTRAMUSCULAR; INTRAVENOUS; SUBCUTANEOUS EVERY 5 MIN PRN
Status: DISCONTINUED | OUTPATIENT
Start: 2020-05-15 | End: 2020-05-15 | Stop reason: HOSPADM

## 2020-05-15 RX ORDER — HYDRALAZINE HYDROCHLORIDE 20 MG/ML
2.5-5 INJECTION INTRAMUSCULAR; INTRAVENOUS EVERY 10 MIN PRN
Status: DISCONTINUED | OUTPATIENT
Start: 2020-05-15 | End: 2020-05-15 | Stop reason: HOSPADM

## 2020-05-15 RX ORDER — CEFAZOLIN SODIUM 2 G/100ML
2 INJECTION, SOLUTION INTRAVENOUS
Status: COMPLETED | OUTPATIENT
Start: 2020-05-15 | End: 2020-05-15

## 2020-05-15 RX ORDER — NEOMYCIN/BACITRACIN/POLYMYXINB 3.5-400-5K
OINTMENT (GRAM) TOPICAL 4 TIMES DAILY PRN
Status: DISCONTINUED | OUTPATIENT
Start: 2020-05-15 | End: 2020-05-16 | Stop reason: HOSPADM

## 2020-05-15 RX ORDER — DEXTROSE MONOHYDRATE, SODIUM CHLORIDE, AND POTASSIUM CHLORIDE 50; 1.49; 4.5 G/1000ML; G/1000ML; G/1000ML
INJECTION, SOLUTION INTRAVENOUS CONTINUOUS
Status: DISCONTINUED | OUTPATIENT
Start: 2020-05-15 | End: 2020-05-16 | Stop reason: HOSPADM

## 2020-05-15 RX ORDER — NEOSTIGMINE METHYLSULFATE 1 MG/ML
VIAL (ML) INJECTION PRN
Status: DISCONTINUED | OUTPATIENT
Start: 2020-05-15 | End: 2020-05-15

## 2020-05-15 RX ORDER — ONDANSETRON 2 MG/ML
4 INJECTION INTRAMUSCULAR; INTRAVENOUS EVERY 6 HOURS PRN
Status: DISCONTINUED | OUTPATIENT
Start: 2020-05-15 | End: 2020-05-16 | Stop reason: HOSPADM

## 2020-05-15 RX ORDER — GLYCOPYRROLATE 0.2 MG/ML
INJECTION, SOLUTION INTRAMUSCULAR; INTRAVENOUS PRN
Status: DISCONTINUED | OUTPATIENT
Start: 2020-05-15 | End: 2020-05-15

## 2020-05-15 RX ORDER — LIDOCAINE HYDROCHLORIDE 20 MG/ML
INJECTION, SOLUTION INFILTRATION; PERINEURAL PRN
Status: DISCONTINUED | OUTPATIENT
Start: 2020-05-15 | End: 2020-05-15

## 2020-05-15 RX ORDER — ATROPA BELLADONNA AND OPIUM 16.2; 3 MG/1; MG/1
30 SUPPOSITORY RECTAL 3 TIMES DAILY PRN
Status: DISCONTINUED | OUTPATIENT
Start: 2020-05-15 | End: 2020-05-16 | Stop reason: HOSPADM

## 2020-05-15 RX ORDER — FENTANYL CITRATE 50 UG/ML
25-50 INJECTION, SOLUTION INTRAMUSCULAR; INTRAVENOUS
Status: DISCONTINUED | OUTPATIENT
Start: 2020-05-15 | End: 2020-05-15 | Stop reason: HOSPADM

## 2020-05-15 RX ORDER — HYDROCODONE BITARTRATE AND ACETAMINOPHEN 5; 325 MG/1; MG/1
1-2 TABLET ORAL EVERY 4 HOURS PRN
Status: DISCONTINUED | OUTPATIENT
Start: 2020-05-15 | End: 2020-05-16 | Stop reason: HOSPADM

## 2020-05-15 RX ORDER — ATROPA BELLADONNA AND OPIUM 16.2; 3 MG/1; MG/1
SUPPOSITORY RECTAL PRN
Status: DISCONTINUED | OUTPATIENT
Start: 2020-05-15 | End: 2020-05-15 | Stop reason: HOSPADM

## 2020-05-15 RX ORDER — NALOXONE HYDROCHLORIDE 0.4 MG/ML
.1-.4 INJECTION, SOLUTION INTRAMUSCULAR; INTRAVENOUS; SUBCUTANEOUS
Status: DISCONTINUED | OUTPATIENT
Start: 2020-05-15 | End: 2020-05-15

## 2020-05-15 RX ORDER — CEFAZOLIN SODIUM 1 G/3ML
1 INJECTION, POWDER, FOR SOLUTION INTRAMUSCULAR; INTRAVENOUS SEE ADMIN INSTRUCTIONS
Status: DISCONTINUED | OUTPATIENT
Start: 2020-05-15 | End: 2020-05-15 | Stop reason: HOSPADM

## 2020-05-15 RX ORDER — ONDANSETRON 4 MG/1
4 TABLET, ORALLY DISINTEGRATING ORAL EVERY 6 HOURS PRN
Status: DISCONTINUED | OUTPATIENT
Start: 2020-05-15 | End: 2020-05-16 | Stop reason: HOSPADM

## 2020-05-15 RX ORDER — DEXAMETHASONE SODIUM PHOSPHATE 4 MG/ML
INJECTION, SOLUTION INTRA-ARTICULAR; INTRALESIONAL; INTRAMUSCULAR; INTRAVENOUS; SOFT TISSUE PRN
Status: DISCONTINUED | OUTPATIENT
Start: 2020-05-15 | End: 2020-05-15

## 2020-05-15 RX ORDER — GLYCINE 1.5 G/100ML
IRRIGANT IRRIGATION PRN
Status: DISCONTINUED | OUTPATIENT
Start: 2020-05-15 | End: 2020-05-15 | Stop reason: HOSPADM

## 2020-05-15 RX ORDER — PROPOFOL 10 MG/ML
INJECTION, EMULSION INTRAVENOUS PRN
Status: DISCONTINUED | OUTPATIENT
Start: 2020-05-15 | End: 2020-05-15

## 2020-05-15 RX ORDER — NALOXONE HYDROCHLORIDE 0.4 MG/ML
.1-.4 INJECTION, SOLUTION INTRAMUSCULAR; INTRAVENOUS; SUBCUTANEOUS
Status: DISCONTINUED | OUTPATIENT
Start: 2020-05-15 | End: 2020-05-16 | Stop reason: HOSPADM

## 2020-05-15 RX ADMIN — FINASTERIDE 5 MG: 5 TABLET, FILM COATED ORAL at 12:50

## 2020-05-15 RX ADMIN — LIDOCAINE HYDROCHLORIDE 100 MG: 20 INJECTION, SOLUTION INFILTRATION; PERINEURAL at 09:09

## 2020-05-15 RX ADMIN — LIDOCAINE HYDROCHLORIDE 0.2 ML: 10 INJECTION, SOLUTION EPIDURAL; INFILTRATION; INTRACAUDAL; PERINEURAL at 09:01

## 2020-05-15 RX ADMIN — NEOSTIGMINE METHYLSULFATE 3 MG: 1 INJECTION, SOLUTION INTRAVENOUS at 10:15

## 2020-05-15 RX ADMIN — PHENYLEPHRINE HYDROCHLORIDE 50 MCG: 10 INJECTION INTRAVENOUS at 09:23

## 2020-05-15 RX ADMIN — CEFAZOLIN SODIUM 2 G: 2 INJECTION, SOLUTION INTRAVENOUS at 09:16

## 2020-05-15 RX ADMIN — PHENYLEPHRINE HYDROCHLORIDE 100 MCG: 10 INJECTION INTRAVENOUS at 09:37

## 2020-05-15 RX ADMIN — ONDANSETRON 4 MG: 2 INJECTION INTRAMUSCULAR; INTRAVENOUS at 09:13

## 2020-05-15 RX ADMIN — Medication 400 MG: at 20:53

## 2020-05-15 RX ADMIN — DEXAMETHASONE SODIUM PHOSPHATE 4 MG: 4 INJECTION, SOLUTION INTRA-ARTICULAR; INTRALESIONAL; INTRAMUSCULAR; INTRAVENOUS; SOFT TISSUE at 09:13

## 2020-05-15 RX ADMIN — PHENYLEPHRINE HYDROCHLORIDE 0.25 MCG/KG/MIN: 10 INJECTION INTRAVENOUS at 09:17

## 2020-05-15 RX ADMIN — PROPOFOL 40 MG: 10 INJECTION, EMULSION INTRAVENOUS at 10:05

## 2020-05-15 RX ADMIN — DILTIAZEM HYDROCHLORIDE 300 MG: 300 CAPSULE, COATED, EXTENDED RELEASE ORAL at 20:52

## 2020-05-15 RX ADMIN — POTASSIUM CHLORIDE, DEXTROSE MONOHYDRATE AND SODIUM CHLORIDE: 150; 5; 450 INJECTION, SOLUTION INTRAVENOUS at 12:49

## 2020-05-15 RX ADMIN — FEBUXOSTAT 40 MG: 40 TABLET ORAL at 12:50

## 2020-05-15 RX ADMIN — MIDAZOLAM 1 MG: 1 INJECTION INTRAMUSCULAR; INTRAVENOUS at 09:14

## 2020-05-15 RX ADMIN — GLYCOPYRROLATE 0.6 MG: 0.2 INJECTION, SOLUTION INTRAMUSCULAR; INTRAVENOUS at 10:15

## 2020-05-15 RX ADMIN — SODIUM CHLORIDE, POTASSIUM CHLORIDE, SODIUM LACTATE AND CALCIUM CHLORIDE: 600; 310; 30; 20 INJECTION, SOLUTION INTRAVENOUS at 10:08

## 2020-05-15 RX ADMIN — TAMSULOSIN HYDROCHLORIDE 0.4 MG: 0.4 CAPSULE ORAL at 20:52

## 2020-05-15 RX ADMIN — DIGOXIN 125 MCG: 125 TABLET ORAL at 12:51

## 2020-05-15 RX ADMIN — PHENYLEPHRINE HYDROCHLORIDE 200 MCG: 10 INJECTION INTRAVENOUS at 10:05

## 2020-05-15 RX ADMIN — METOPROLOL SUCCINATE 50 MG: 50 TABLET, EXTENDED RELEASE ORAL at 16:14

## 2020-05-15 RX ADMIN — PHENYLEPHRINE HYDROCHLORIDE 50 MCG: 10 INJECTION INTRAVENOUS at 09:19

## 2020-05-15 RX ADMIN — SODIUM CHLORIDE 6000 ML: 900 IRRIGANT IRRIGATION at 16:15

## 2020-05-15 RX ADMIN — ROCURONIUM BROMIDE 20 MG: 10 INJECTION INTRAVENOUS at 09:31

## 2020-05-15 RX ADMIN — SPIRONOLACTONE 12.5 MG: 25 TABLET ORAL at 12:50

## 2020-05-15 RX ADMIN — PHENYLEPHRINE HYDROCHLORIDE 100 MCG: 10 INJECTION INTRAVENOUS at 09:33

## 2020-05-15 RX ADMIN — PHENYLEPHRINE HYDROCHLORIDE 50 MCG: 10 INJECTION INTRAVENOUS at 09:26

## 2020-05-15 RX ADMIN — PHENYLEPHRINE HYDROCHLORIDE 100 MCG: 10 INJECTION INTRAVENOUS at 09:31

## 2020-05-15 RX ADMIN — PROPOFOL 160 MG: 10 INJECTION, EMULSION INTRAVENOUS at 09:11

## 2020-05-15 RX ADMIN — INSULIN GLARGINE 20 UNITS: 100 INJECTION, SOLUTION SUBCUTANEOUS at 21:17

## 2020-05-15 RX ADMIN — FENTANYL CITRATE 25 MCG: 50 INJECTION, SOLUTION INTRAMUSCULAR; INTRAVENOUS at 10:09

## 2020-05-15 RX ADMIN — INSULIN ASPART 1 UNITS: 100 INJECTION, SOLUTION INTRAVENOUS; SUBCUTANEOUS at 07:52

## 2020-05-15 RX ADMIN — PHENYLEPHRINE HYDROCHLORIDE 50 MCG: 10 INJECTION INTRAVENOUS at 09:28

## 2020-05-15 RX ADMIN — FENTANYL CITRATE 25 MCG: 50 INJECTION, SOLUTION INTRAMUSCULAR; INTRAVENOUS at 09:59

## 2020-05-15 RX ADMIN — SODIUM CHLORIDE, POTASSIUM CHLORIDE, SODIUM LACTATE AND CALCIUM CHLORIDE: 600; 310; 30; 20 INJECTION, SOLUTION INTRAVENOUS at 08:48

## 2020-05-15 RX ADMIN — SUCCINYLCHOLINE CHLORIDE 100 MG: 20 INJECTION, SOLUTION INTRAMUSCULAR; INTRAVENOUS; PARENTERAL at 09:11

## 2020-05-15 RX ADMIN — INSULIN ASPART 1 UNITS: 100 INJECTION, SOLUTION INTRAVENOUS; SUBCUTANEOUS at 16:17

## 2020-05-15 RX ADMIN — FENTANYL CITRATE 50 MCG: 50 INJECTION, SOLUTION INTRAMUSCULAR; INTRAVENOUS at 09:09

## 2020-05-15 RX ADMIN — POTASSIUM CHLORIDE, DEXTROSE MONOHYDRATE AND SODIUM CHLORIDE: 150; 5; 450 INJECTION, SOLUTION INTRAVENOUS at 22:19

## 2020-05-15 RX ADMIN — SODIUM CHLORIDE 3000 ML: 900 IRRIGANT IRRIGATION at 13:05

## 2020-05-15 ASSESSMENT — ACTIVITIES OF DAILY LIVING (ADL)
ADLS_ACUITY_SCORE: 14
ADLS_ACUITY_SCORE: 14
ADLS_ACUITY_SCORE: 15
ADLS_ACUITY_SCORE: 14
ADLS_ACUITY_SCORE: 14

## 2020-05-15 ASSESSMENT — ENCOUNTER SYMPTOMS: DYSRHYTHMIAS: 1

## 2020-05-15 ASSESSMENT — LIFESTYLE VARIABLES: TOBACCO_USE: 1

## 2020-05-15 NOTE — ANESTHESIA POSTPROCEDURE EVALUATION
Patient: Jt Montgomery    Procedure(s):  CYSTOSCOPY, WITH TRANSURETHRAL RESECTION PROSTATE    Diagnosis:Urinary retention [R33.9]  Diagnosis Additional Information: No value filed.    Anesthesia Type:  General    Note:  Anesthesia Post Evaluation    Patient location during evaluation: PACU  Patient participation: Able to fully participate in evaluation  Level of consciousness: awake  Pain management: adequate  Airway patency: patent  Cardiovascular status: acceptable  Respiratory status: acceptable  Hydration status: acceptable  PONV: none             Last vitals:  Vitals:    05/15/20 1032 05/15/20 1040 05/15/20 1050   BP: (!) 152/84 (!) 149/87 (!) 149/82   Pulse: 60 57 61   Resp: 17 12 18   Temp:      SpO2: 94% 92% 95%         Electronically Signed By: Hoang Mcgill MD  May 15, 2020  11:00 AM

## 2020-05-15 NOTE — PLAN OF CARE
1419-2841: A/Ox4. VSS on RA. Home CPAP for sleep. Capno WDL. Denies pain. CBI running moderate, output is pink. BGM before meals. Arvind BLE. Full liquid diet, ADAT - no flatus yet. BS +. Ambulated in halls x1, SBA. R PIV infusing D5 1/2 NS + KCl 100ml/hr. Continue to wean CBI as able.

## 2020-05-15 NOTE — ANESTHESIA PREPROCEDURE EVALUATION
Anesthesia Pre-Procedure Evaluation    Patient: Jt Montgomery   MRN: [id number] : 1945          Preoperative Diagnosis: Urinary retention [R33.9]    Procedure(s):  CYSTOSCOPY, WITH TRANSURETHRAL RESECTION PROSTATE    Past Medical History:   Diagnosis Date     Atrial fibrillation (H)      CAD (coronary artery disease)     MI with RONEL to dRCA 2011     Central Sleep Apnea with Pat Villanueva breathing 2010    Also TOMMY with CPAP     CKD (chronic kidney disease) stage 3, GFR 30-59 ml/min (H)      Dilated cardiomyopathy (H)     nonischemic (possibly related to h/o a.fib with RVR) EF 30-40% 2013     DM2 (diabetes mellitus, type 2) (H)     Goal HgbA1c < 7%     Gout     uric acid level correlates; 2014 h/o +knee aspirate     Hernia, abdominal      Hypertension     Goal <140/90     Pulmonary hypertension (H)     mild per echo 3/2013     Spider veins      Past Surgical History:   Procedure Laterality Date     CORONARY ANGIOGRAPHY ADULT ORDER      distal circ-RONEL     HERNIA REPAIR  11/20/10    incarcinated     SUSPEND HYOID, GENIOGLOSSAL ADVANCEMENT         Anesthesia Evaluation     . Pt has had prior anesthetic. Type: General    No history of anesthetic complications          ROS/MED HX    ENT/Pulmonary:     (+)sleep apnea, tobacco use, Past use uses CPAP , . .    Neurologic:     (+)neuropathy - Peripheral neuropathy,     Cardiovascular: Comment: Dilated cardiomyopathy    (+) Dyslipidemia, hypertension--CAD, --stent,Drug Eluting Stent .. Taking blood thinners : Instructions Given to patient: Coumadin. CHF . . :. dysrhythmias (On digoxin) a-fib and PVCs, . pulmonary hypertension, Previous cardiac testing Echodate:16results:Left Ventricle  The left ventricle is borderline dilated. There is mild concentric left ventricular hypertrophy. The visual ejection fraction is estimated at 50-55%. There is severe apical wall hypokinesis. The basal inferior wall appears severely hypokinetic. The  mid to basal inferolateral wall in the apical images appears at least moderately hypokinetic. The basal lateral wall also appears severely hypokinetic. and is best seen in the subcostal views. There are regional wall motion abnormalities as specified.     Right Ventricle  The right ventricle is normal in size and function.     Atria  The left atrium is severely dilated. The right atrium is mild to moderately  dilated. There is no color Doppler evidence of an atrial shunt.     Mitral Valve  There is mild to moderate (1-2+) mitral regurgitation.     Tricuspid Valve  There is mild (1+) tricuspid regurgitation. The right ventricular systolic pressure is approximated at 28.4 mmHg plus the right atrial pressure.     Aortic Valve  The aortic valve is trileaflet. There is mild trileaflet aortic sclerosis. There is trace aortic regurgitation. No hemodynamically significant valvular aortic stenosis.     Pulmonic Valve  There is trace pulmonic valvular regurgitation.    Vessels  Mild aortic root dilatation. The ascending aorta is Mildly dilated. The IVC is normal in size and reactivity with respiration, suggesting normal central venous pressure.     Pericardium  There is no pericardial effusion.     Rhythm  The rhythm was atrial fibrillation.Stress Testdate:5/22/18 results:1. Myocardial perfusion imaging using single isotope technique  demonstrated fixed basal inferior wall defect most likely consistent  with attenuation artifact. No ischemia appreciated in present study.   2. Gated images not performed.  3. No prior study for comparison.ECG reviewed date:12/18/19 results:A-fib with PVCs date: results:          METS/Exercise Tolerance:     Hematologic:     (+) Anemia, -      Musculoskeletal:         GI/Hepatic:         Renal/Genitourinary:     (+) chronic renal disease, type: CRI, BPH,       Endo:     (+) type II DM Chronic steroid usage for Other Date most recently used: Gout,Obesity (Class 2), .   (-) Type I DM  "  Psychiatric:         Infectious Disease:   (+) Other Infectious Disease Severe sepsis      Malignancy:         Other:                                 Lab Results   Component Value Date    WBC 5.9 05/13/2020    HGB 10.8 (L) 05/15/2020    HCT 31.1 (L) 05/13/2020     05/13/2020    .3 (H) 04/13/2014    SED 58 (H) 04/12/2014     05/15/2020    POTASSIUM 4.3 05/15/2020    CHLORIDE 107 05/15/2020    CO2 PENDING 05/15/2020    BUN PENDING 05/15/2020    CR PENDING 05/15/2020    GLC PENDING 05/15/2020    HARSHA PENDING 05/15/2020    PHOS 3.1 05/13/2020    MAG 2.4 (H) 12/18/2019    ALBUMIN 3.0 (L) 05/13/2020    PROTTOTAL 6.1 (L) 12/18/2019    ALT 26 12/18/2019    AST 33 12/18/2019    ALKPHOS 60 12/18/2019    BILITOTAL 3.9 (H) 12/18/2019    LIPASE 60 (L) 12/17/2019    PTT 33 04/15/2011    INR 1.14 05/15/2020    TSH 1.94 10/08/2019       Preop Vitals  BP Readings from Last 3 Encounters:   05/15/20 98/61   03/12/20 114/58   02/19/20 120/68    Pulse Readings from Last 3 Encounters:   05/15/20 60   02/19/20 81   01/16/20 50      Resp Readings from Last 3 Encounters:   05/15/20 16   01/08/20 18   01/07/20 16    SpO2 Readings from Last 3 Encounters:   05/15/20 96%   02/19/20 97%   01/16/20 95%      Temp Readings from Last 1 Encounters:   05/15/20 36.2  C (97.1  F) (Oral)    Ht Readings from Last 1 Encounters:   05/08/20 1.854 m (6' 1\")      Wt Readings from Last 1 Encounters:   05/08/20 111.1 kg (245 lb)    Estimated body mass index is 32.32 kg/m  as calculated from the following:    Height as of this encounter: 1.854 m (6' 1\").    Weight as of this encounter: 111.1 kg (245 lb).       Anesthesia Plan      History & Physical Review  History and physical reviewed and following examination; no interval change.    ASA Status:  3 .    NPO Status:  > 8 hours    Plan for General with Intravenous and Propofol induction. Maintenance will be Balanced.    PONV prophylaxis:  Ondansetron (or other 5HT-3)         Postoperative " Care  Postoperative pain management:  IV analgesics and Multi-modal analgesia.      Consents  Anesthetic plan, risks, benefits and alternatives discussed with:  Patient..                 Hoang Mcgill MD

## 2020-05-15 NOTE — PLAN OF CARE
A/O x 4.  VSS on RA.  Denies pain.  Mod carb diet, good appetite, blood sugars monitored, no sliding scale insulin needed.  CBI running at moderate rate, intermittent clotting, hand irrigated x 2, moderate amount of clots removed.  Plan for TURP tomorrow.

## 2020-05-15 NOTE — PLAN OF CARE
A/O x 4.  VSS on RA.  Denies pain. NPO blood sugars monitored, no sliding scale insulin needed. CBI running at moderate rate, hand irrigated at 0000 and 0600, moderate amount of clots removed at 0600. Clear UOP for most of night, turned red in morning.  Plan for TURP today at 9:05 w/ Dr. Bowles, EKG to happen before.

## 2020-05-15 NOTE — OP NOTE
[facility] Urology Brief Operative Note    Pre-operative diagnosis: Urinary retention [R33.9]   Post-operative diagnosis: Same   Procedure: Procedure(s):  CYSTOSCOPY, WITH TRANSURETHRAL RESECTION PROSTATE   Surgeon: Tr Bowles MD   Assistant(s): None   Anesthesia: General endotracheal anesthesia   Estimated blood loss: * No values recorded between 5/15/2020  9:31 AM and 5/15/2020 10:29 AM *   Total IV fluids: (See anesthesia record)   Blood transfusion: No transfusion was given during surgery   Total urine output: (See anesthesia record)   Drains: None   Specimens: Prostate shavings   Implants: None   Findings: Large prostate   Complications: None   Condition: Stable   Comments: See dictated operative report for full details

## 2020-05-15 NOTE — ANESTHESIA CARE TRANSFER NOTE
Patient: Jt Montgomery    Procedure(s):  CYSTOSCOPY, WITH TRANSURETHRAL RESECTION PROSTATE    Diagnosis: Urinary retention [R33.9]  Diagnosis Additional Information: No value filed.    Anesthesia Type:   General     Note:  Airway :Nasal Cannula  Patient transferred to:PACU  Handoff Report: Identifed the Patient, Identified the Reponsible Provider, Reviewed the pertinent medical history, Discussed the surgical course, Reviewed Intra-OP anesthesia mangement and issues during anesthesia, Set expectations for post-procedure period and Allowed opportunity for questions and acknowledgement of understanding      Vitals: (Last set prior to Anesthesia Care Transfer)    CRNA VITALS  5/15/2020 0959 - 5/15/2020 1035      5/15/2020             Pulse:  51    SpO2:  97 %    Resp Rate (set):  10                Electronically Signed By: RADHA Zeng CRNA  May 15, 2020  10:35 AM

## 2020-05-15 NOTE — PROGRESS NOTES
[facility]    Medicine Progress Note - Hospitalist Service       Date of Admission:  5/8/2020  Assessment & Plan   Jt Montgomery is a 74 year old male admitted on 5/8/2020.  Multiple co-morbidities including chronic atrial fibrillation on coumadin, CAD, NSTEMI s/p RONEL placement, nonischemic dilated cardiomyopathy, hypertension, diabetes, central sleep apnea, obesity, gout, BPH who was admitted on 5/8/2020 for hematuria and intermittent mauricio obstruction due to clots.            BPH with gross hematuria, chronic indwelling Mauricio catheter  Obstructed Mauricio catheter secondary blood clot  S/p TURP 5/15  Patient has history of BPH and urinary retention has chronic Mauricio catheter since December 2019.  Now presented with urinary obstruction second to blood clots and gross hematuria, which has persisted despite CBI and holding coumadin and aspirin.    - continue CBI  - continue PTA finasteride and flomax  - urology recs appreciated     Chronic atrial fibrillation on coumadin  CAD, NSTEMI s/p RONEL placement 4/2011  Nonischemic dilated cardiomyopathy  Hypertension  Managed PTA with aspirin, digoxin, diltiazem, lasix prn, irbesartan, metoprolol, spironolactone. Patient refuses statin.     - continue PTA digoxin, diltiazem, metoprolol, spironolactone  - holding PTA coumadin, aspirin, irbesartan  - reporting mild dyspnea vs throat discomfort following surgery; EKG obtained and shows nonspecific ST and T-wave changes which appear unchanged from prior EKG's; will monitor symptoms at this time, have low suspicion for ACS based on symptoms     Diabetes, Type 2  Managed PTA with diet control, glargine and pre-meal aspart. Recent hemoglobin A1c 6.1% from Feb 2020 indicating reasonable glycemic control.   - resume lantus 20 units at bedtime   - continue medium ssi     Acute blood loss anemia:  This is secondary to gross hematuria, hemoglobin is slowly trending down, from 14.5.   B12 and folate wnl.   - hgb  stabilizing 10-11 g/dL     Chronic, stable problems:  Gout:  Continue PTA uloric  Central Sleep Apnea: Resume PTA CPAP per home settings           Diet: Advance Diet as Tolerated: Full Liquid Diet    DVT Prophylaxis: Pneumatic Compression Devices  Cash Catheter: in place, indication: /GI/GYN Pelvic Procedure, Retention;Gross Hematuria  Code Status: Full Code           Disposition Plan   Expected discharge: 1-2 days, recommended to prior living arrangement once hgb stable, hematuria resolved, cleared by urology.  Entered: Woody Cazares MD 05/15/2020, 2:10 PM       The patient's care was discussed with the Bedside Nurse and Patient.    Woody Cazares MD  Hospitalist Service  [facility]    ______________________________________________________________________    Interval History   Reports some mild shortness of breath but when asked further about this he states it is more of a congested feeling in his throat.  Denies any chest pain/pressure, lightheadedness or other complaints.  Reports he felt very confused when initially coming out of anesthesia, now improved.     Data reviewed today: I reviewed all medications, new labs and imaging results over the last 24 hours. I personally reviewed no images or EKG's today.    Physical Exam   Vital Signs: Temp: 97.4  F (36.3  C) Temp src: Oral BP: 131/71 Pulse: 88 Heart Rate: 95 Resp: 18 SpO2: 96 % O2 Device: None (Room air) Oxygen Delivery: 3 LPM  Weight: 245 lbs 0 oz     General Appearance: Well nourished male in NAD, lying in bed  Respiratory: lungs CTAB, no wheezes or crackles, no tachypnea, no peripheral edema  Cardiovascular: irregular rhythm, normal s1/s2 without murmur  GI: abdomen soft, nontender, normal bowel sounds  Other: Alert and appropriate, cranial nerves grossly intact     Data   Recent Labs   Lab 05/15/20  0747 05/13/20  0700 05/12/20  0649 05/11/20  0656   WBC  --  5.9 7.0 7.0   HGB 10.8* 10.5* 11.5* 11.2*   MCV  --  103* 103* 102*   PLT  --   166 171 158   INR 1.14  --  1.33* 1.60*    138  --  136   POTASSIUM 4.3 4.2  --  4.1   CHLORIDE 107 108  --  106   CO2 24 25  --  23   BUN 20 23  --  33*   CR 1.20 1.12  --  1.17   ANIONGAP 6 5  --  7   HARSHA 8.8 8.3*  --  8.1*   * 138*  --  166*   ALBUMIN  --  3.0*  --  2.9*

## 2020-05-15 NOTE — OP NOTE
Procedure Date: 05/15/2020      PREOPERATIVE DIAGNOSIS:  Benign prostatic hypertrophy with urinary retention.      POSTOPERATIVE DIAGNOSIS:  Benign prostatic hypertrophy with urinary retention.      PROCEDURE:  TURP.      SURGEON:  Gracia Mckeon MD      PREOPERATIVE STATUS:  Pal Escudero is a 74-year-old male with known BPH.  The patient is known to have a fairly large prostate gland measuring around 100 grams.  The patient had been advised to undergo a surgical procedure, but had not gotten around to it.  He was now admitted with urinary retention.  The patient now wishes to proceed with surgery.  I did discuss TURP.  The patient does realize given the size of his prostate, he may need staged TURP.      DESCRIPTION OF PROCEDURE:  The patient was identified and brought to the operating room.  After adequate general anesthesia, he was placed in the dorsal lithotomy position and prepped and draped in the usual sterile fashion.  A timeout was undertaken.  I then passed a 28-Paraguayan continuous flow resectoscope sheath.  Inspection confirmed the presence of a large trilobar enlarged prostate gland.  The bladder showed no stones.  There were a few retained blood clots and tiny calcifications.  Both ureteric orifices were visualized.  I then proceeded to perform resection of the prostate starting with the median lobe.  At the end of the resection, a significant amount of the prostatic tissue had been resected.  The prostatic shavings were irrigated out of the bladder and sent to pathology.  Good hemostasis was obtained.  At the end of the resection, the verumontanum and both ureteric orifices were noted to be intact.  I then placed a 22 [place]ayan 3-way Cash catheter and continuous bladder irrigation was started.  The patient tolerated the procedure well and was sent to the recovery room in stable condition.         GRACIA MCKEON MD             D: 05/15/2020   T: 05/15/2020   MT: ECTOR      Name:     PAL ESCUDERO   MRN:       [id number]        Account:        [id number]   :      1945           Procedure Date: 05/15/2020      Document: [id number]       cc: Tr Bowles MD

## 2020-05-15 NOTE — PROGRESS NOTES
"BRIEF NUTRITION ASSESSMENT      REASON FOR ASSESSMENT:  Jt Montgomery is a 74 year old male assessed by Registered Dietitian for LOS    NUTRITION HISTORY:  Spoke with pt this morning via phone  Pt tells me that he has been trying to follow a plant based diet  Eats a little fish  Appetite has been good  No food allergies/intolerances    CURRENT DIET AND INTAKE:  Diet:  Moderate CHO              Pt currently NPO for TURP  States that he \"loves\" the food and is very impressed with the menu  Has been eating 100% of his meals  Ordering 3 meals per day    ANTHROPOMETRICS:  Height: 6' 1\"  Weight:(5/8) 111.1 kg /  245 lbs 0 oz  Body mass index is 32.32 kg/m .   Weight Status: Obesity Grade I BMI 30-34.9  IBW:  83.6 kg  %IBW: 133%  Weight History:   Wt Readings from Last 10 Encounters:   05/08/20 111.1 kg (245 lb)   03/12/20 116.6 kg (257 lb)   02/20/20 115.1 kg (253 lb 12.8 oz)   02/19/20 115.3 kg (254 lb 3.2 oz)   01/16/20 112 kg (247 lb)   01/09/20 112 kg (247 lb)   01/08/20 109.4 kg (241 lb 3.2 oz)   01/07/20 109 kg (240 lb 3.2 oz)   01/06/20 109.3 kg (241 lb)   01/03/20 109.3 kg (241 lb)         LABS:  Labs noted    MALNUTRITION:  Patient does not meet two of the following criteria necessary for diagnosing malnutrition.     % Weight Loss:  None noted  % Intake:  No decreased intake noted  Subcutaneous Fat Loss:  Do not anticipate any fat loss (wt has been stable)  Muscle Loss:  Do not anticipate any acute muscle loss (wt has been stable, eating well)  Fluid Retention:  None noted    NUTRITION INTERVENTION:  Nutrition Diagnosis:  No nutrition diagnosis at this time.    Implementation:  Nutrition Education ---> Per Provider order if indicated    FOLLOW UP/MONITORING:   Will re-evaluate in 7 - 10 days, or sooner, if re-consulted.          "

## 2020-05-15 NOTE — PLAN OF CARE
A&Ox4. VSS on RA. Denies pain, N/V. Up independently. Full liquid diet- ADAT. TURP procedure completed this AM. CBI running at fast rate, clear pink/red output; no clots noted since TURP. BLE [place] and dry. Uses a CPAP @ night. Sliding scale coverage, BS intact. EKG ordered. PIV infusing D5 1/2 NS + K @ 100. Continue to monitor pt.

## 2020-05-16 VITALS
OXYGEN SATURATION: 97 % | HEART RATE: 67 BPM | BODY MASS INDEX: 32.47 KG/M2 | WEIGHT: 245 LBS | HEIGHT: 73 IN | SYSTOLIC BLOOD PRESSURE: 117 MMHG | TEMPERATURE: 97 F | DIASTOLIC BLOOD PRESSURE: 61 MMHG | RESPIRATION RATE: 16 BRPM

## 2020-05-16 LAB
CREAT SERPL-MCNC: 1.13 MG/DL (ref 0.66–1.25)
GFR SERPL CREATININE-BSD FRML MDRD: 64 ML/MIN/{1.73_M2}
GLUCOSE BLDC GLUCOMTR-MCNC: 147 MG/DL (ref 70–99)
GLUCOSE BLDC GLUCOMTR-MCNC: 200 MG/DL (ref 70–99)
GLUCOSE BLDC GLUCOMTR-MCNC: 202 MG/DL (ref 70–99)
HGB BLD-MCNC: 10.8 G/DL (ref 13.3–17.7)

## 2020-05-16 PROCEDURE — 25800030 ZZH RX IP 258 OP 636: Performed by: UROLOGY

## 2020-05-16 PROCEDURE — 25000132 ZZH RX MED GY IP 250 OP 250 PS 637: Performed by: INTERNAL MEDICINE

## 2020-05-16 PROCEDURE — 82565 ASSAY OF CREATININE: CPT | Performed by: HOSPITALIST

## 2020-05-16 PROCEDURE — 25800025 ZZH RX 258: Performed by: UROLOGY

## 2020-05-16 PROCEDURE — 36415 COLL VENOUS BLD VENIPUNCTURE: CPT | Performed by: HOSPITALIST

## 2020-05-16 PROCEDURE — 00000146 ZZHCL STATISTIC GLUCOSE BY METER IP

## 2020-05-16 PROCEDURE — 85018 HEMOGLOBIN: CPT | Performed by: HOSPITALIST

## 2020-05-16 PROCEDURE — 25800025 ZZH RX 258: Performed by: INTERNAL MEDICINE

## 2020-05-16 PROCEDURE — 99239 HOSP IP/OBS DSCHRG MGMT >30: CPT | Performed by: HOSPITALIST

## 2020-05-16 RX ORDER — POLYETHYLENE GLYCOL 3350 17 G/17G
17 POWDER, FOR SOLUTION ORAL 2 TIMES DAILY PRN
Status: DISCONTINUED | OUTPATIENT
Start: 2020-05-16 | End: 2020-05-16 | Stop reason: HOSPADM

## 2020-05-16 RX ADMIN — INSULIN ASPART 2 UNITS: 100 INJECTION, SOLUTION INTRAVENOUS; SUBCUTANEOUS at 12:04

## 2020-05-16 RX ADMIN — FINASTERIDE 5 MG: 5 TABLET, FILM COATED ORAL at 08:07

## 2020-05-16 RX ADMIN — POTASSIUM CHLORIDE, DEXTROSE MONOHYDRATE AND SODIUM CHLORIDE: 150; 5; 450 INJECTION, SOLUTION INTRAVENOUS at 08:06

## 2020-05-16 RX ADMIN — INSULIN ASPART 1 UNITS: 100 INJECTION, SOLUTION INTRAVENOUS; SUBCUTANEOUS at 09:13

## 2020-05-16 RX ADMIN — FEBUXOSTAT 40 MG: 40 TABLET ORAL at 08:07

## 2020-05-16 RX ADMIN — SPIRONOLACTONE 12.5 MG: 25 TABLET ORAL at 08:07

## 2020-05-16 RX ADMIN — SODIUM CHLORIDE 3000 ML: 900 IRRIGANT IRRIGATION at 05:17

## 2020-05-16 RX ADMIN — METOPROLOL SUCCINATE 50 MG: 50 TABLET, EXTENDED RELEASE ORAL at 08:07

## 2020-05-16 RX ADMIN — DIGOXIN 125 MCG: 125 TABLET ORAL at 08:07

## 2020-05-16 ASSESSMENT — ACTIVITIES OF DAILY LIVING (ADL)
ADLS_ACUITY_SCORE: 15
ADLS_ACUITY_SCORE: 14
ADLS_ACUITY_SCORE: 14
ADLS_ACUITY_SCORE: 15

## 2020-05-16 NOTE — PROVIDER NOTIFICATION
MD Notification    Notified Person: MD    Notified Person Name: Dr. Cazares    Notification Date/Time: 5/15 4740    Notification Interaction: Phone call    Purpose of Notification: FYI 2nd EKG completed and results showing new septal infarct    Orders Received: Continue to monitor for any new symptoms

## 2020-05-16 NOTE — DISCHARGE SUMMARY
[facility]  Hospitalist Discharge Summary      Date of Admission:  5/8/2020  Date of Discharge:  5/16/2020  Discharging Provider: Woody Cazares MD      Discharge Diagnoses   BPH with chronic indwelling mauricio s/p TURP  Gross hematuria with mauricio catheter obstruction  Chronic A-fib  Hx CAD s/p PCI  Nonischemic cardiomyopathy  HTN  DM II  Acute blood loss anemia  Gout  Central sleep apnea    Follow-ups Needed After Discharge   Follow-up Appointments     Follow-up and recommended labs and tests       Follow up with primary care provider, Digna Jones, within 7 days for   hospital follow- up.  No follow up labs or test are needed.  Discuss   resumption of aspirin or warfarin.  Follow up with Urology next week for voiding trial.             Unresulted Labs Ordered in the Past 30 Days of this Admission     Date and Time Order Name Status Description    5/15/2020 1013 Surgical pathology exam In process       These results will be followed up by: Urology    Discharge Disposition   Discharged to home  Condition at discharge: Stable      Hospital Course   Jt Montgomery is a 74 year old male admitted on 5/8/2020.  Multiple co-morbidities including chronic atrial fibrillation on coumadin, CAD, NSTEMI s/p RONEL placement, nonischemic dilated cardiomyopathy, hypertension, diabetes, central sleep apnea, obesity, gout, BPH who was admitted on 5/8/2020 for hematuria and intermittent mauricio obstruction due to clots.         BPH with chronic indwelling Mauricio catheter s/p TURP 5/15  Obstructed Mauricio catheter secondary blood clot  Patient has history of BPH and urinary retention has chronic Mauricio catheter since December 2019.  Now presented with urinary obstruction second to blood clots and gross hematuria, which persisted despite CBI and holding coumadin and aspirin.  Underwent uncomplicated TURP as noted.  Will discharge on finasteride and flomax with follow up in Urology clinic the following week for voiding trial.       Chronic atrial fibrillation on coumadin  CAD, NSTEMI s/p RONEL placement 4/2011  Nonischemic dilated cardiomyopathy  Hypertension  Managed PTA with aspirin, digoxin, diltiazem, lasix prn, irbesartan, metoprolol, spironolactone. Patient refuses statin.  His aspirin and warfarin have been held due to hematuria (Urology recommending holding at least 3 additional days at discharge).  Recommend follow up with PCP to discuss gradual resumption.  Will otherwise be continued on PTA regimen.      Diabetes, Type 2  Continue PTA glargine and pre-meal aspart. Recent hemoglobin A1c 6.1% from Feb 2020.     Acute blood loss anemia:  This is secondary to gross hematuria, admission hgb of 14.5 stabilized 10-11 g/dl.   B12 and folate wnl.      Gout:  Continue PTA uloric    Central Sleep Apnea: Resume PTA CPAP per home settings    Consultations This Hospital Stay   CARE TRANSITION RN/SW IP CONSULT  UROLOGY IP CONSULT  PHYSICAL THERAPY ADULT IP CONSULT  OCCUPATIONAL THERAPY ADULT IP CONSULT    Code Status   Full Code    Time Spent on this Encounter   I, Woody Cazares MD, personally saw the patient today and spent greater than 30 minutes discharging this patient.       Woody Cazares MD  [facility]  ______________________________________________________________________    Physical Exam   Vital Signs: Temp: 97  F (36.1  C) Temp src: Oral BP: 117/61 Pulse: 67 Heart Rate: 63 Resp: 16 SpO2: 97 % O2 Device: None (Room air) Oxygen Delivery: 3 LPM  Weight: 245 lbs 0 oz  General Appearance: Well nourished male in NAD  Respiratory: lungs CTAB, no wheezes or crackles, no tachypnea  Cardiovascular: RRR, normal s1/s2 without murmur  GI: abdomen soft, normal bowel sounds  Other: Alert and appropriate, cranial nerves grossly intact        Primary Care Physician   Digna Jones    Discharge Orders      Reason for your hospital stay    You were admitted for bleeding from the bladder and underwent TURP.     Follow-up and recommended  labs and tests     Follow up with primary care provider, Digna Jones, within 7 days for hospital follow- up.  No follow up labs or test are needed.  Discuss resumption of aspirin or warfarin.  Follow up with Urology next week for voiding trial.     Activity    Your activity upon discharge: activity as tolerated     Discharge Instructions    Hold your aspirin and warfarin until seen in follow up with either your primary care provider or urologist.     Tubes and drains    You are going home with the following tubes or drains: mauricio catheter.  Tube cares per hospital or home care instructions     Full Code     Diet    Follow this diet upon discharge: Moderate consistent carbohydrate (4790-9600 santa / 4-6 CHO units per meal)       Significant Results and Procedures   Most Recent 3 CBC's:  Recent Labs   Lab Test 05/16/20  0648 05/15/20  0747 05/13/20  0700 05/12/20  0649 05/11/20  0656   WBC  --   --  5.9 7.0 7.0   HGB 10.8* 10.8* 10.5* 11.5* 11.2*   MCV  --   --  103* 103* 102*   PLT  --   --  166 171 158     Most Recent 3 BMP's:  Recent Labs   Lab Test 05/16/20  0648 05/15/20  0747 05/13/20  0700 05/11/20  0656   NA  --  137 138 136   POTASSIUM  --  4.3 4.2 4.1   CHLORIDE  --  107 108 106   CO2  --  24 25 23   BUN  --  20 23 33*   CR 1.13 1.20 1.12 1.17   ANIONGAP  --  6 5 7   SANTA  --  8.8 8.3* 8.1*   GLC  --  146* 138* 166*     Most Recent 6 Bacteria Isolates From Any Culture (See EPIC Reports for Culture Details):  Recent Labs   Lab Test 12/18/19  1357 12/18/19  1349 12/17/19  1249 12/17/19  1234 12/17/19  1222 10/05/19  0915   CULT No growth No growth Cultured on the 1st day of incubation:  Escherichia coli  This isolate does not meet the criteria of an ESBL . A different resistance   mechanism may be present.  *  Critical Value/Significant Value, preliminary result only, called to and read back by  PASCALE [facility]RELLANA RN [facility]252 12.18.19 CF    (Note)  POSITIVE for E.COLI by [employer] multiplex nucleic acid  test. Final  identification and antimicrobial susceptibility testing will be  verified by standard methods. Verigene test will not distinguish  E.coli from Shigella species including S.dysenteriae, S.flexneri,  S.boydii, and S.sonnei. Specimens containing Shigella species or  E.coli will be reported as Positive for E.coli.    Specimen tested with Verigene multiplex, gram-negative blood culture  nucleic acid test for the following targets: Acinetobacter sp.,  Citrobacter sp., Enterobacter sp., Proteus sp., E. coli, K.  pneumoniae/oxytoca, P. aeruginosa, and the following resistance  markers: CTXM, KPC, NDM, VIM, IMP and OXA.    Critical Value/Significant Value called to and read back by BRIAN HOUSER RN 12/18/2019 @ 0715   Cultured on the 1st day of incubation:  Escherichia coli  Susceptibility testing done on previous specimen  *  Critical Value/Significant Value, preliminary result only, called to and read back by  Brian Houser RN at 0344 on 12.18.19 by .   >100,000 colonies/mL  Escherichia coli  This isolate does not meet the criteria of an ESBL . A different resistance   mechanism may be present.  * >100,000 colonies/mL  Escherichia coli  This isolate does not meet the criteria of an ESBL . A different resistance   mechanism may be present.  *  10,000 to 50,000 colonies/mL  mixed urogenital preethi     ,   Results for orders placed or performed during the hospital encounter of 12/17/19   POC US ABDOMEN LIMITED    Impression    ED Bedside Limited Ultrasound  Body area scanned: suprapubic abd  Performed by: Femi Ortiz MD  Indication: urinary retention/difficulty, eval for bladder fullness  Findings/Interpretation: significant volume of urine in bladder, c/w urinary retention  Key images were digitally archived in the [employer] radiology system.       CT Abdomen Pelvis w/o Contrast    Narrative    CT ABDOMEN/PELVIS WITHOUT CONTRAST December 17, 2019 1:56 PM     HISTORY: Urinary retention, renal  failure, urine infection.    TECHNIQUE: No IV contrast material. Radiation dose for this scan was  reduced using automated exposure control, adjustment of the mA and/or  kV according to patient size, or iterative reconstruction technique.    COMPARISON: 11/19/2010.    FINDINGS: The heart is enlarged. There is diffuse low-attenuation of  the lobular liver. Several calcified gallstones are present. Spleen is  normal in size. The unenhanced pancreas and adrenal glands are  unremarkable.    There is marked bilateral perinephric infiltration. Calcifications  along the right renal artery are noted. There is a nonobstructing  calcification in the left renal collecting system that measures 6 mm.  Mild prominence of the ureters and collecting systems noted.    The appendix is normal. No bowel obstruction or ascites. No free  intraperitoneal air.    Urinary bladder is decompressed by a catheter. There is apparent  marked urinary bladder wall thickening that appears irregular.    Severe nonaneurysmal aortic and visceral branch atherosclerosis. No  abdominal or retroperitoneal lymphadenopathy. No pelvic adenopathy or  free fluid.    No lytic or blastic bone lesions.      Impression    IMPRESSION:  1. Marked bilateral perinephric infiltration with prominent collecting  systems and ureters have the appearance of either recently resolved  obstruction or infection.  2. Abnormal appearance of the urinary bladder with bladder wall  thickening and prostate enlargement. Tumor cannot be excluded.  3. Cirrhotic appearing, fatty infiltrated liver.  4. Cholelithiasis.  5. Severe atherosclerosis.  6. Cardiomegaly.    JANET BRICE MD   US Abdomen Limited    Narrative    RIGHT UPPER QUADRANT ULTRASOUND 12/17/2019 4:55 PM    HISTORY:  Elevated bilirubin, liver.    COMPARISON: None.    FINDINGS:    Gallbladder: There are numerous shadowing gallstones. Small amount of  free fluid adjacent to the gallbladder. No gallbladder wall thickening        Bile ducts:   CHD is normal diameter.  No intrahepatic biliary  dilatation.    Liver:  The liver is enlarged, measuring 20.8 cm in length. There is  diffuse increased hepatic echogenicity and nodularity is noted of the  liver surface.     Pancreas: Normal.     Right kidney:  Mild to moderate hydronephrosis and proximal  hydroureter. No renal stones demonstrated.       Impression    IMPRESSION:    1. Enlarged, fatty infiltrated, possibly cirrhotic liver.  2. Trace fluid adjacent to the gallbladder is likely related to liver  disease.  3. Mild to moderate right hydronephrosis.    JANET BRICE MD       Discharge Medications   Current Discharge Medication List      CONTINUE these medications which have NOT CHANGED    Details   Coenzyme Q10 (COQ10) 100 MG CAPS Take 200 mg by mouth daily     Associated Diagnoses: Uncontrolled diabetes mellitus with complications (H)      digoxin (LANOXIN) 125 MCG tablet Take 1 tablet (125 mcg) by mouth daily  Qty: 90 tablet, Refills: 3    Comments: Do not fill until pt calls      diltiazem ER COATED BEADS (CARTIA XT) 300 MG 24 hr capsule Take 1 capsule (300 mg) by mouth daily  Qty: 90 capsule, Refills: 3    Comments: Do not fill until pt calls  Associated Diagnoses: Atrial fibrillation, unspecified type (H)      febuxostat (ULORIC) 40 MG TABS Take 40 mg by mouth daily      finasteride (PROSCAR) 5 MG tablet Take 1 tablet (5 mg) by mouth daily  Qty: 90 tablet, Refills: 3    Associated Diagnoses: Benign prostatic hyperplasia with urinary obstruction      Insulin Glargine (LANTUS SOLOSTAR SC) Inject 20 Units Subcutaneous At Bedtime      irbesartan (AVAPRO) 300 MG tablet Take 0.5 tablets (150 mg) by mouth At Bedtime  Qty: 90 tablet, Refills: 2    Associated Diagnoses: Essential hypertension      magnesium oxide 200 MG TABS Take 2 tablets by mouth At Bedtime       melatonin 1 MG TABS tablet Take 0.5 mg by mouth nightly as needed for sleep      metoprolol succinate ER (TOPROL-XL) 50 MG  24 hr tablet Take 1 tablet (50 mg) by mouth 2 times daily  Qty: 180 tablet, Refills: 3    Associated Diagnoses: Coronary artery disease involving native coronary artery of native heart with angina pectoris (H)      predniSONE (DELTASONE) 20 MG tablet TAKE 1 TABLET(20 MG) BY MOUTH DAILY AS NEEDED FOR GOUT FLARE  Qty: 10 tablet, Refills: 0    Associated Diagnoses: Chronic gout due to renal impairment of multiple sites without tophus      spironolactone (ALDACTONE) 25 MG tablet Take 0.5 tablets (12.5 mg) by mouth daily  Qty: 90 tablet, Refills: 3    Associated Diagnoses: Atrial fibrillation, unspecified type (H)      tamsulosin (FLOMAX) 0.4 MG capsule Take 1 capsule (0.4 mg) by mouth every evening  Qty: 90 capsule, Refills: 3    Associated Diagnoses: Benign prostatic hyperplasia with urinary obstruction      vitamin D3 (CHOLECALCIFEROL) 2000 units tablet Take 2 tablets by mouth daily       Continuous Blood Gluc Sensor (FREESTYLE RINKU 14 DAY SENSOR) MISC AS DIRECTED EVERY 14 DAYS  Qty: 100 each, Refills: 3    Associated Diagnoses: Type 2 diabetes mellitus with diabetic neuropathy, with long-term current use of insulin (H)      insulin pen needle (BD GERMÁN U/F) 32G X 4 MM miscellaneous USE FOUR TIMES DAILY AS DIRECTED  Qty: 200 each, Refills: 1    Associated Diagnoses: Type 2 diabetes mellitus with diabetic neuropathy, with long-term current use of insulin (H)      order for DME Equipment being ordered: Compression stockings  Qty: 1 each, Refills: 3    Associated Diagnoses: Edema of both legs         STOP taking these medications       aspirin EC 81 MG EC tablet Comments:   Reason for Stopping:         insulin aspart (NOVOLOG FLEXPEN) 100 UNIT/ML pen Comments:   Reason for Stopping:         warfarin ANTICOAGULANT (COUMADIN) 5 MG tablet Comments:   Reason for Stopping:             Allergies   No Known Allergies

## 2020-05-16 NOTE — PLAN OF CARE
7P-11P: POD 0 TURP. A&Ox4. VSS on RA, capno WDL, CPAP at HS. Denies pain. CBI running at slow/moderate rate, urine light pink color. BS active, not passing flatus. Full liquid diet, tolerating well. BG checks AC/HS, covered with sliding scale insulin and lantus.  PIV infusing D5 1/2 NS 20K at 100mL/hr. Up with SBA. Will continue to monitor.

## 2020-05-16 NOTE — PLAN OF CARE
POD 1 TURP. A/Ox4. VSS on RA. CPAP at HS. Denies pain. Full liquid diet, AAT. SBA. Cash in place w/ CBI at slow/mod rate, output is light pink w/ no clotting noted. BS hypoactive, not yet passing flatus. BG checks. R PIV infusing D5 1/2 NS +K @ 100. Discharge pending resolution of hematuria and clearance by urology.

## 2020-05-16 NOTE — PROGRESS NOTES
Patient discharged at 2:30 PM to home.  IV was discontinued. Pain at time of discharge was 0/10. Belongings returned to patient.  Discharge instructions and medications reviewed with patient.  Patient verbalized understanding and all questions were answered.  At time of discharge, patient condition was stable and left the unit via wheelchair escorted by nursing assistant.

## 2020-05-16 NOTE — PROGRESS NOTES
Urology     POD # 1 s/p TURP for urinary retention.    Feels well. Denies significant pain.      Abdomen is soft, without sp tenderness/fullness.     3-way mauricio to slow drip and returns are lite pink.   Hgb is stable.     Impression: S/P TURP for chronic retention     Plan: Home today WITH mauricio. Discussed with Anastasia tinajero nurse who will cap 3-way.      Trial of voiding through our office. Pt will call Monday to arrange for mid wk     HOLD on all anticoagulation for at least 3 more days. Restart slowly.     Should stay on both finasteride and an alpha blocker.        Pricilla

## 2020-05-18 ENCOUNTER — TELEPHONE (OUTPATIENT)
Dept: FAMILY MEDICINE | Facility: CLINIC | Age: 75
End: 2020-05-18

## 2020-05-18 NOTE — TELEPHONE ENCOUNTER
Chief Complaint: Malfunction Of Cash Catheter, Initial Encounter (H), Obstructive Uropathy,  SAT 16-MAY-2020  0 / 2    [phone] (home)

## 2020-05-18 NOTE — TELEPHONE ENCOUNTER
Pt called back during all-staff meeting. Please reach out to Pt    Pt ph: [phone] detailed VM requested if missed

## 2020-05-18 NOTE — TELEPHONE ENCOUNTER
"Hospital/TCU/ED for chronic condition Discharge Protocol    \"Hi, my name is Minda Bond RN, a registered nurse, and I am calling from [facility].  I am calling to follow up and see how things are going for you after your recent emergency visit/hospital/TCU stay.\"    Tell me how you are doing now that you are home?\" patient got home on Saturday.  Patient is feeling tired and having a feeling to urinate, but cannot.        Discharge Instructions    \"Let's review your discharge instructions.  What is/are the follow-up recommendations?  Pt. Response: patient to follow up with Urology    \"Has an appointment with your primary care provider been scheduled?\"   Yes. (confirm)    \"When you see the provider, I would recommend that you bring your medications with you.\"    Medications    \"Tell me what changed about your medicines when you discharged?\"    Changes to chronic meds?    0-1    \"What questions do you have about your medications?\"    None     New diagnoses of heart failure, COPD, diabetes, or MI?    No          On warfarin: \"Were you given any recommendations for follow-up with the anticoagulation clinic?\" Yes - need to schedule/reschedule Anticoagulation clinic appointment    Post Discharge Medication Reconciliation Status: discharge medications reconciled, continue medications without change.    Was MTM referral placed (*Make sure to put transitions as reason for referral)?   No    Call Summary    \"What questions or concerns do you have about your recent visit and your follow-up care?\"     none    \"If you have questions or things don't continue to improve, we encourage you contact us through the main clinic number (give number).  Even if the clinic is not open, triage nurses are available 24/7 to help you.     We would like you to know that our clinic has extended hours (provide information).  We also have urgent care (provide details on closest location and hours/contact info)\"      \"Thank you for your " "time and take care!\"    MIREYA GonsalezN, RN  [employer] [facility]         "

## 2020-05-18 NOTE — TELEPHONE ENCOUNTER
ED / Discharge Outreach Protocol    Patient Contact    Attempt # 1    Was call answered?  No.  Left message on voicemail with information to call me back.    MIREYA GonsalezN, RN  [employer] [facility]

## 2020-05-19 LAB — COPATH REPORT: NORMAL

## 2020-05-20 ENCOUNTER — TELEPHONE (OUTPATIENT)
Dept: PHARMACY | Facility: CLINIC | Age: 75
End: 2020-05-20

## 2020-05-20 ENCOUNTER — VIRTUAL VISIT (OUTPATIENT)
Dept: FAMILY MEDICINE | Facility: CLINIC | Age: 75
End: 2020-05-20
Payer: COMMERCIAL

## 2020-05-20 DIAGNOSIS — N13.8 BENIGN PROSTATIC HYPERPLASIA WITH URINARY OBSTRUCTION: ICD-10-CM

## 2020-05-20 DIAGNOSIS — I25.119 CORONARY ARTERY DISEASE INVOLVING NATIVE CORONARY ARTERY OF NATIVE HEART WITH ANGINA PECTORIS (H): ICD-10-CM

## 2020-05-20 DIAGNOSIS — I48.0 PAROXYSMAL ATRIAL FIBRILLATION (H): Chronic | ICD-10-CM

## 2020-05-20 DIAGNOSIS — N40.1 BENIGN PROSTATIC HYPERPLASIA WITH URINARY OBSTRUCTION: ICD-10-CM

## 2020-05-20 DIAGNOSIS — R31.0 GROSS HEMATURIA: Primary | ICD-10-CM

## 2020-05-20 DIAGNOSIS — Z79.4 TYPE 2 DIABETES MELLITUS WITH DIABETIC NEUROPATHY, WITH LONG-TERM CURRENT USE OF INSULIN (H): Chronic | ICD-10-CM

## 2020-05-20 DIAGNOSIS — N13.9 UROPATHY, OBSTRUCTIVE: ICD-10-CM

## 2020-05-20 DIAGNOSIS — E11.40 TYPE 2 DIABETES MELLITUS WITH DIABETIC NEUROPATHY, WITH LONG-TERM CURRENT USE OF INSULIN (H): Chronic | ICD-10-CM

## 2020-05-20 PROCEDURE — 99214 OFFICE O/P EST MOD 30 MIN: CPT | Mod: 95 | Performed by: INTERNAL MEDICINE

## 2020-05-20 RX ORDER — METOPROLOL SUCCINATE 50 MG/1
50 TABLET, EXTENDED RELEASE ORAL
Qty: 180 TABLET | Refills: 3 | Status: SHIPPED | OUTPATIENT
Start: 2020-05-20 | End: 2020-12-30

## 2020-05-20 NOTE — TELEPHONE ENCOUNTER
This patient is due for MTM follow-up. I called the patient to schedule an appointment. Appointment scheduled for 6/2 at 11:30 am.    Charlene Matt, PharmD  PGY1 Medication Therapy Management Resident   [phone]

## 2020-05-20 NOTE — PROGRESS NOTES
"Jt Montgomery is a 74 year old male who is being evaluated via a billable telephone visit.      The patient has been notified of following:     \"This telephone visit will be conducted via a call between you and your physician/provider. We have found that certain health care needs can be provided without the need for a physical exam.  This service lets us provide the care you need with a short phone conversation.  If a prescription is necessary we can send it directly to your pharmacy.  If lab work is needed we can place an order for that and you can then stop by our lab to have the test done at a later time.    Telephone visits are billed at different rates depending on your insurance coverage. During this emergency period, for some insurers they may be billed the same as an in-person visit.  Please reach out to your insurance provider with any questions.    If during the course of the call the physician/provider feels a telephone visit is not appropriate, you will not be charged for this service.\"    Patient has given verbal consent for Telephone visit?  Yes    What phone number would you like to be contacted at? [phone]    How would you like to obtain your AVS? Helene       Chief Complaint:         Post hospital discharge follow up on multiple concerns including BPH with urinary retention, Type 2 Diabetes       HPI    Hospital Follow-up Visit:    Hospital/Nursing Home/IP Rehab Facility: [facility]  Date of Admission: 05/08/2020  Date of Discharge: 05/16/2020  Reason(s) for Admission:     BPH with chronic indwelling mauricio s/p TURP  Gross hematuria with mauricio catheter obstruction  Chronic A-fib  Hx CAD s/p PCI  Nonischemic cardiomyopathy  HTN  DM II  Acute blood loss anemia  Gout  Central sleep apnea         Was your hospitalization related to COVID-19? No   Problems taking medications regularly:  None  Medication changes since discharge: None  Problems adhering to non-medication therapy:  " None    Summary of hospitalization:  [facility] discharge summary reviewed  Diagnostic Tests/Treatments reviewed.  Follow up needed: urology clinic  Other Healthcare Providers Involved in Patient s Care:         MTM  Update since discharge: improved.       Post Discharge Medication Reconciliation: discharge medications reconciled and changed, per note/orders (see AVS).  Plan of care communicated with patient                        HPI:   Patient Jt Montgomery is a very pleasant 74 year old male with history of BPH, Type 2 Diabetes, hypertension, hyperlipidemia today for telephone evaluation of multiple concerns including post hospital discharge follow up of gross hematuria with blood clots in the urine causing obstruction in the setting of BPH with urinary obstruction. Regarding his gross hematuria and BPH s/p recent urology surgery, the patient still has an indwelling urinary catheter in place at this time. He is scheduled for follow up with the outpatient [facility] clinic tomorrow. He still reports some small blood clots in the urine at this time, therefore the previously held aspirin and warfarin are still not ready to be restarted at this time. He is compliant with his chronic BPH Flomax and finasteride medication therapy. Regarding his chronic Type 2 Diabetes. the patient's diabetes are well controlled at this time on his current diabetes medication regimen including insulin therapy. Patient denies any flank pain, fever or chills symptoms at this time.    Current Medications:     Current Outpatient Medications   Medication Sig Dispense Refill     Coenzyme Q10 (COQ10) 100 MG CAPS Take 200 mg by mouth daily        Continuous Blood Gluc Sensor (FREESTYLE RINKU 14 DAY SENSOR) MISC AS DIRECTED EVERY 14 DAYS 100 each 3     digoxin (LANOXIN) 125 MCG tablet Take 1 tablet (125 mcg) by mouth daily 90 tablet 3     diltiazem ER COATED BEADS (CARTIA XT) 300 MG 24 hr capsule Take 1 capsule (300 mg) by  mouth daily 90 capsule 3     febuxostat (ULORIC) 40 MG TABS Take 40 mg by mouth daily       finasteride (PROSCAR) 5 MG tablet Take 1 tablet (5 mg) by mouth daily 90 tablet 3     Insulin Glargine (LANTUS SOLOSTAR SC) Inject 20 Units Subcutaneous At Bedtime       insulin pen needle (BD GERMÁN U/F) 32G X 4 MM miscellaneous USE FOUR TIMES DAILY AS DIRECTED 200 each 1     irbesartan (AVAPRO) 300 MG tablet Take 0.5 tablets (150 mg) by mouth At Bedtime 90 tablet 2     magnesium oxide 200 MG TABS Take 2 tablets by mouth At Bedtime        melatonin 1 MG TABS tablet Take 0.5 mg by mouth nightly as needed for sleep       metoprolol succinate ER (TOPROL-XL) 50 MG 24 hr tablet Take 1 tablet (50 mg) by mouth 2 times daily 180 tablet 3     order for DME Equipment being ordered: Compression stockings 1 each 3     predniSONE (DELTASONE) 20 MG tablet TAKE 1 TABLET(20 MG) BY MOUTH DAILY AS NEEDED FOR GOUT FLARE 10 tablet 0     spironolactone (ALDACTONE) 25 MG tablet Take 0.5 tablets (12.5 mg) by mouth daily 90 tablet 3     tamsulosin (FLOMAX) 0.4 MG capsule Take 1 capsule (0.4 mg) by mouth every evening 90 capsule 3     vitamin D3 (CHOLECALCIFEROL) 2000 units tablet Take 2 tablets by mouth daily            Allergies:      Allergies   Allergen Reactions     No Known Allergies             Past Medical History:     Past Medical History:   Diagnosis Date     Atrial fibrillation (H)      CAD (coronary artery disease)     MI with RONEL to dRCA April 2011     Central Sleep Apnea with Pat Villanueva breathing 9/14/2010    Also TOMMY with CPAP     CKD (chronic kidney disease) stage 3, GFR 30-59 ml/min (H)      Dilated cardiomyopathy (H)     nonischemic (possibly related to h/o a.fib with RVR) EF 30-40% March 2013     DM2 (diabetes mellitus, type 2) (H)     Goal HgbA1c < 7%     Gout     uric acid level correlates; April 2014 h/o +knee aspirate     Hernia, abdominal      Hypertension     Goal <140/90     Pulmonary hypertension (H)     mild per echo  3/2013     Spider veins          Past Surgical History:     Past Surgical History:   Procedure Laterality Date     CORONARY ANGIOGRAPHY ADULT ORDER      distal circ-RONEL     CYSTOSCOPY, TRANSURETHRAL RESECTION (TUR) PROSTATE, COMBINED N/A 5/15/2020    Procedure: CYSTOSCOPY, WITH TRANSURETHRAL RESECTION PROSTATE;  Surgeon: Tr Bowles MD;  Location: [facility] OR     HERNIA REPAIR  11/20/10    incarcinated     SUSPEND HYOID, GENIOGLOSSAL ADVANCEMENT           Family Medical History:     Family History   Problem Relation Age of Onset     Hypertension Mother      Coronary Artery Disease Mother      Coronary Artery Disease Father      Hypertension Father      Diabetes Sister      Cerebrovascular Disease Sister          Social History:     Social History     Socioeconomic History     Marital status:      Spouse name: Not on file     Number of children: Not on file     Years of education: Not on file     Highest education level: Not on file   Occupational History     Not on file   Social Needs     Financial resource strain: Not on file     Food insecurity     Worry: Not on file     Inability: Not on file     Transportation needs     Medical: Not on file     Non-medical: Not on file   Tobacco Use     Smoking status: Former Smoker     Packs/day: 1.50     Years: 7.00     Pack years: 10.50     Types: Cigarettes     Last attempt to quit: 1972     Years since quittin.4     Smokeless tobacco: Never Used     Tobacco comment: quit in       Started at around age 18-19   Substance and Sexual Activity     Alcohol use: No     Alcohol/week: 0.0 standard drinks     Comment: 74   recovering     Drug use: No     Sexual activity: Not Currently     Partners: Female   Lifestyle     Physical activity     Days per week: Not on file     Minutes per session: Not on file     Stress: Not on file   Relationships     Social connections     Talks on phone: Not on file     Gets together: Not on file     Attends [employer]  service: Not on file     Active member of club or organization: Not on file     Attends meetings of clubs or organizations: Not on file     Relationship status: Not on file     Intimate partner violence     Fear of current or ex partner: Not on file     Emotionally abused: Not on file     Physically abused: Not on file     Forced sexual activity: Not on file   Other Topics Concern      Service Not Asked     Blood Transfusions Not Asked     Caffeine Concern No     Occupational Exposure Not Asked     Hobby Hazards Not Asked     Sleep Concern Yes     Comment: sleep apnea, wears bi-pap     Stress Concern Not Asked     Weight Concern Not Asked     Special Diet No     Comment: low carb diet     Back Care Not Asked     Exercise No     Comment: walking     Bike Helmet Not Asked     Seat Belt Yes     Self-Exams Not Asked     Parent/sibling w/ CABG, MI or angioplasty before 65F 55M? Not Asked   Social History Narrative     Not on file           Review of System:     Constitutional: Negative for fever or chills  Skin: Negative for rashes  Ears/Nose/Throat: Negative for nasal congestion, sore throat  Respiratory: No shortness of breath, dyspnea on exertion, cough, or hemoptysis  Cardiovascular: Negative for chest pain  Gastrointestinal: Negative for nausea, vomiting  Genitourinary: positive for gross hematuria in the setting of chronic BPH with urinary retention requiring urology surgery  Musculoskeletal: Negative for myalgias  Neurologic: Negative for headaches  Psychiatric: Negative for depression, anxiety  Hematologic/Lymphatic/Immunologic: Negative  Endocrine: Negative for recent hypoglycemia events  Behavioral: Negative for tobacco use       Physical Exam:   There were no vitals taken for this visit.          Diagnostic Test Results:         Lab Results   Component Value Date    A1C 6.1 02/19/2020    A1C 6.2 11/14/2019    A1C 7.6 10/08/2019    A1C 6.5 03/06/2019    A1C 6.1 09/06/2018         ASSESSMENT/PLAN:      (R31.0) Gross hematuria  (N40.1,  N13.8) Benign prostatic hyperplasia with urinary obstruction (primary encounter diagnosis)  (N13.9) Uropathy, obstructive  (primary encounter diagnosis)  (I48.0) Paroxysmal atrial fibrillation (H)  Comment: post hospital discharge follow up on multiple concerns including recent hospitalization for gross hematuria and obstructive uropathy due to blood clots in the setting of chronic BPH with urinary retention. Regarding his gross hematuria and BPH s/p recent urology surgery, the patient still has an indwelling urinary catheter in place at this time. He is scheduled for follow up with the outpatient [facility] clinic tomorrow. He still reports some small blood clots in the urine at this time, therefore the previously held aspirin and warfarin are still not ready to be restarted at this time. He is compliant with his chronic BPH Flomax and finasteride medication therapy. Regarding his chronic Type 2 Diabetes. the patient's diabetes are well controlled at this time on his current diabetes medication regimen including insulin therapy. Patient denies any flank pain, fever or chills symptoms at this time.   Plan: continue current Flomax and finasteride medications today. Continue outpatient urology clinic follow up going forward where his next appointment is scheduled for tomorrow at the [facility] clinic in [place].      (E11.40,  Z79.4) Type 2 diabetes mellitus with diabetic neuropathy, with long-term current use of insulin (H)  Comment: Regarding his chronic Type 2 diabetes mellitus the patient is compliant with his diabetes medication therapy including long-term current use of insulin therapy. He denies any recent hypoglycemia events.  Plan: continue current diabetes medication therapy      (I25.119) Coronary artery disease involving native coronary artery of native heart with angina pectoris (H)  Comment: no chest pains, patient is due for a refill of his Metoprolol  cardiac medication at this time.  Plan: metoprolol succinate ER (TOPROL-XL) 50 MG 24 hr        tablet    Follow Up Plan:     Patient is instructed to return to Internal Medicine clinic for follow-up visit in 1 month.        Phone call duration:  30 minutes    Digna Jones MD

## 2020-05-21 ENCOUNTER — TELEPHONE (OUTPATIENT)
Dept: CARDIOLOGY | Facility: CLINIC | Age: 75
End: 2020-05-21

## 2020-05-21 NOTE — TELEPHONE ENCOUNTER
"Pt calling to report that he went to the urology office where is mauricio catheter was removed and he had a voiding test. He states that he didn't pass any clots today and only had fibrous material in the catheter (no known blood or clots). PMD's note yesterday stated that \"He still reports some small blood clots in the urine at this time, therefore the previously held aspirin and warfarin are still not ready to be restarted at this time.\" Advised that the INR clinic can't make the determination of when he can resume ASA and Warfarin. Advised that he should contact the urology clinic that he was at today to ask if they will allow him to resume ASA and Warfarin. Pt will call us back after he speaks with them. Antoni  "

## 2020-05-22 LAB
INTERPRETATION ECG - MUSE: NORMAL
INTERPRETATION ECG - MUSE: NORMAL

## 2020-05-22 NOTE — TELEPHONE ENCOUNTER
Pt left a voicemail message that he heard back from Dr Bowles's office (urologic surgeon) regarding anticoagulation. Dr Bowles asked him to continue to hold Warfarin until the OV with him on 5/27 but he will resume taking ASA 81 mg daily.  Pt will update us after the appt. Antoni

## 2020-05-27 ENCOUNTER — TELEPHONE (OUTPATIENT)
Dept: SLEEP MEDICINE | Facility: CLINIC | Age: 75
End: 2020-05-27

## 2020-05-27 NOTE — TELEPHONE ENCOUNTER
Pt called 5/26/20 asking if he had appt schedule with us. I clld pt, LMV stating no appt is made. Last seen 7/2/19, medicare guideline requires annual appts to renew cpap supplies. I told pt to call us and schedule an appt for cpap follow-up if he is in need.

## 2020-05-28 VITALS — BODY MASS INDEX: 32.07 KG/M2 | HEIGHT: 73 IN | WEIGHT: 242 LBS

## 2020-05-28 RX ORDER — WARFARIN SODIUM 5 MG/1
5 TABLET ORAL
COMMUNITY
Start: 2020-04-28 | End: 2020-11-18

## 2020-05-28 ASSESSMENT — MIFFLIN-ST. JEOR: SCORE: 1896.58

## 2020-05-28 NOTE — PROGRESS NOTES
"Jt Montgomery is a 74 year old male who is being evaluated via a billable video visit.      The patient has been notified of following:     \"This video visit will be conducted via a call between you and your physician/provider. We have found that certain health care needs can be provided without the need for an in-person physical exam.  This service lets us provide the care you need with a video conversation.  If a prescription is necessary we can send it directly to your pharmacy.  If lab work is needed we can place an order for that and you can then stop by our lab to have the test done at a later time.    Video visits are billed at different rates depending on your insurance coverage.  Please reach out to your insurance provider with any questions.    If during the course of the call the physician/provider feels a video visit is not appropriate, you will not be charged for this service.\"    Patient has given verbal consent for Video visit? Yes    How would you like to obtain your AVS? Mail a copy    Patient would like the video invitation sent by: Send to e-mail at: [email or web address]    Will anyone else be joining your video visit? No      Video-Visit Details    Type of service:  Video Visit    Patient refused to download EATON neela, states he was never told he needed to do this.  Also not willing for video call with other service.  Entirety of visit performed over telephone.    Video Start Time: 3:30pm  Video End Time: 3:55pm    Originating Location (pt. Location): Home    Distant Location (provider location):  [place] [facility]     Annual follow of severe sleep apnea, appearing to be predominantly obstructive in nature following improvement / normalization of LV function.    Pertinent PMHx of DM II, morbid obesity, HTN, pulmonary HTN, CAD, atrial fibrillation with non-ischemic HFrEF but with largely recovered LVEF (50-55% as 11/2016).    LOV on 7/2/2019 with Dr. Alanis.  Doing well with " "CPAP auto-titrate 11-15 cm H2O, AHI 8.5 (~40% complex apnea, ~60% obstructive apnea / hypopnea).  Pressure 95th%ile 14.3 cm H2O.  Plan to continue CPAP on auto 11-15, consider changing to set pressure 13 or 14 if AHI increased > 10 and predominantly centrals.    Today, he presents for virtual visit for annual follow-up.  Main complication to his health was severe urinary retention from BPH requiring placement of catheter in ~2019, developed severe hematuria with clots, hospitalized with TURP on 2020.  Now that he is back home and recovering, he feels he is sleeping better and overall he \"loves\" his CPAP.  Wears for overnight sleep and naps.      Reviewed CPAP download on auto-titrate 11-15 cm H2O over past 30 and 60 days.  Seen to have elevated AHI in ~20's while hospitalized, but due to increase central apnea index and presumed secondary to opiate medications inpatient.  Now that home and off opiates, AHI has been < 8 over the past 4-5 days.  Pressure 95th%ile 14 cm H2O.    Other pertinent cardiac testin2016 - TTE.  LVEF 50-55%, MR 1-2.  2018 - Lexiscan, no evidence of ischemia, no estimate of LVEF documented.    Prior Sleep Testin2010 Polysomnography at [facility] - AHI 64.5/hr; RDI 65.6/hr; PHUC 34/hr with Cheyne-Villanueva.    2011 Polysomnography at [facility] - Titration for Adaptive Servo-Ventilation - Ineffective.  3/23/2018 PSG at  - Repeat diagnostic - AHI 80.7, CAHI 11.7. didn't sleep well.  Noted that LVEF had improved to 50-55% by time of this study.    A/P:  1.)  Complex sleep apnea, though appearing now to be predominantly TOMMY following near normalization of LVEF.   - Appears well controlled on current settings of CPAP auto-titrate 11-15 cm H2O with AHI < 8 now at home and off opiates following TURP.   - Continue on current settings.    25 minutes spent on phone, including review of complex medical history.    Nestor Adler MD, MD        "

## 2020-05-29 ENCOUNTER — VIRTUAL VISIT (OUTPATIENT)
Dept: SLEEP MEDICINE | Facility: CLINIC | Age: 75
End: 2020-05-29
Payer: COMMERCIAL

## 2020-05-29 ENCOUNTER — ANTICOAGULATION THERAPY VISIT (OUTPATIENT)
Dept: CARDIOLOGY | Facility: CLINIC | Age: 75
End: 2020-05-29

## 2020-05-29 DIAGNOSIS — Z79.01 LONG TERM CURRENT USE OF ANTICOAGULANTS WITH INR GOAL OF 2.0-3.0: Primary | ICD-10-CM

## 2020-05-29 DIAGNOSIS — I48.0 PAROXYSMAL ATRIAL FIBRILLATION (H): ICD-10-CM

## 2020-05-29 DIAGNOSIS — G47.31 CENTRAL SLEEP APNEA: ICD-10-CM

## 2020-05-29 DIAGNOSIS — G47.33 OSA (OBSTRUCTIVE SLEEP APNEA): ICD-10-CM

## 2020-05-29 PROCEDURE — 99213 OFFICE O/P EST LOW 20 MIN: CPT | Mod: 95 | Performed by: FAMILY MEDICINE

## 2020-05-29 PROCEDURE — 99207 ZZC NO CHARGE LOS: CPT

## 2020-05-29 NOTE — PROGRESS NOTES
ANTICOAGULATION FOLLOW-UP CLINIC VISIT    Patient Name:  Jt Montgomery  Date:  5/29/2020  Contact Type:  Telephone    SUBJECTIVE:  Patient Findings     Positives:   Missed doses (warfarin held since surgery), Hospital admission (hospitalized 5/8-5/16 for BPH and hematuria, 5/15 TURP done)        Clinical Outcomes     Negatives:   Major bleeding event, Thromboembolic event, Anticoagulation-related hospital admission, Anticoagulation-related ED visit, Anticoagulation-related fatality           OBJECTIVE    No results for input(s): INR, [id number], [id number], 2AGN, F2 in the last 168 hours.    ASSESSMENT / PLAN  No question data found.  Anticoagulation Summary  As of 5/29/2020    INR goal:   2.0-3.0   TTR:   67.0 % (11.4 mo)   INR used for dosing:   No new INR was available at the time of this encounter.   Warfarin maintenance plan:   7.5 mg (5 mg x 1.5) every Sun, Wed; 5 mg (5 mg x 1) all other days   Full warfarin instructions:   7.5 mg every Sun, Wed; 5 mg all other days   Weekly warfarin total:   40 mg   No change documented:   Marlena Hong, RN   Plan last modified:   Paola Roberson RN (3/19/2020)   Next INR check:   6/8/2020   Priority:   Maintenance   Target end date:   Indefinite    Indications    Atrial fibrillation (AFIB) on Coumadin [I48.91]  Long term current use of anticoagulants with INR goal of 2.0-3.0 (Resolved) [Z79.01]  Paroxysmal atrial fibrillation (H) [I48.0]  Long term current use of anticoagulants with INR goal of 2.0-3.0 [Z79.01]             Anticoagulation Episode Summary     INR check location:       Preferred lab:       Send INR reminders to:   [facility] HEART INR NURSE    Comments:         Anticoagulation Care Providers     Provider Role Specialty Phone number    [place] Lottie Perla DO Referring Cardiology [phone]            See the Encounter Report to view Anticoagulation Flowsheet and Dosing Calendar (Go to Encounters tab in chart review, and find the Anticoagulation  Therapy Visit)    No INR done today. Pt calling to report that the Urology surgeon, Dr Bowles, authorized that he can resume taking Warfarin today. Pt had a TURP on 5/15 so has been holding warfarin since before surgery. He no longer has a mauricio catheter in and denies bleeding issues. No change in meds. Will resume his usual dosing of 7.5 mg SuW and 5 mg all other days with recheck in 10 days.  INR clinic referral renewal submitted for signature.  Has the patient previously taken warfarin? yes  If yes, for what indication? Afib    Does the patient have any of the following indications for a higher range of 2.5-3.5:    Mitral position mechanical valve? no    Anum-Shiley, Ball and Cage or Monoleaflet valve (regardless of position) no    Other (if yes, please explain) no    Marlena Hong RN

## 2020-06-02 ENCOUNTER — TRANSFERRED RECORDS (OUTPATIENT)
Dept: HEALTH INFORMATION MANAGEMENT | Facility: CLINIC | Age: 75
End: 2020-06-02

## 2020-06-09 ENCOUNTER — ANTICOAGULATION THERAPY VISIT (OUTPATIENT)
Dept: CARDIOLOGY | Facility: CLINIC | Age: 75
End: 2020-06-09
Payer: COMMERCIAL

## 2020-06-09 DIAGNOSIS — I48.0 PAROXYSMAL ATRIAL FIBRILLATION (H): ICD-10-CM

## 2020-06-09 DIAGNOSIS — Z79.01 LONG TERM CURRENT USE OF ANTICOAGULANTS WITH INR GOAL OF 2.0-3.0: ICD-10-CM

## 2020-06-09 LAB
CAPILLARY BLOOD COLLECTION: NORMAL
INR PPP: 2.2 (ref 0.86–1.14)

## 2020-06-09 PROCEDURE — 99207 ZZC NO CHARGE LOS: CPT

## 2020-06-09 PROCEDURE — 85610 PROTHROMBIN TIME: CPT | Performed by: INTERNAL MEDICINE

## 2020-06-09 PROCEDURE — 36416 COLLJ CAPILLARY BLOOD SPEC: CPT | Performed by: INTERNAL MEDICINE

## 2020-06-09 NOTE — PROGRESS NOTES
ANTICOAGULATION FOLLOW-UP CLINIC VISIT    Patient Name:  Jt Montgomery  Date:  2020  Contact Type:  Telephone    SUBJECTIVE:  Patient Findings         Clinical Outcomes     Negatives:   Major bleeding event, Thromboembolic event, Anticoagulation-related hospital admission, Anticoagulation-related ED visit, Anticoagulation-related fatality           OBJECTIVE    Recent labs: (last 7 days)     20  1344   INR 2.20*       ASSESSMENT / PLAN  INR assessment THER    Recheck INR In: 2 WEEKS    INR Location Outside lab      Anticoagulation Summary  As of 2020    INR goal:   2.0-3.0   TTR:   69.2 % (11 mo)   INR used for dosin.20 (2020)   Warfarin maintenance plan:   7.5 mg (5 mg x 1.5) every Sun, Wed; 5 mg (5 mg x 1) all other days   Full warfarin instructions:   7.5 mg every Sun, Wed; 5 mg all other days   Weekly warfarin total:   40 mg   No change documented:   Marlena Hong RN   Plan last modified:   Paola Roberson RN (3/19/2020)   Next INR check:   2020   Priority:   Maintenance   Target end date:   Indefinite    Indications    Atrial fibrillation (AFIB) on Coumadin [I48.91]  Long term current use of anticoagulants with INR goal of 2.0-3.0 (Resolved) [Z79.01]  Paroxysmal atrial fibrillation (H) [I48.0]  Long term current use of anticoagulants with INR goal of 2.0-3.0 [Z79.01]             Anticoagulation Episode Summary     INR check location:       Preferred lab:       Send INR reminders to:   [facility] HEART INR NURSE    Comments:         Anticoagulation Care Providers     Provider Role Specialty Phone number    Patricia Lottie Perla DO Referring Cardiology [phone]            See the Encounter Report to view Anticoagulation Flowsheet and Dosing Calendar (Go to Encounters tab in chart review, and find the Anticoagulation Therapy Visit)    INR 2.20 Pt held warfarin pre and post TURP procedure. He resumed warfarin when urology approved resuming it on 20. Left message  for pt that I will call him back later today.  2:45 pm Spoke with pt. No change in other meds or diet. Denies abnormal bleeding or bruising. Will continue current dosing of 7.5 mg SuW and 5 mg all other days with recheck in 2 weeks.     Marlena Hong RN

## 2020-06-10 ENCOUNTER — TELEPHONE (OUTPATIENT)
Dept: PHARMACY | Facility: CLINIC | Age: 75
End: 2020-06-10

## 2020-06-10 NOTE — TELEPHONE ENCOUNTER
"Jamal paged me and LVM stating he needs to cancel our 2 pm visit today due to family issues. He states \"I am not doing well\" and requests to reschedule. I called him back and LVM with the clinic number and my pager number for him to reschedule.     Charlene Matt, PharmD  PGY1 Medication Therapy Management Resident   [phone]    "

## 2020-06-23 ENCOUNTER — ANTICOAGULATION THERAPY VISIT (OUTPATIENT)
Dept: CARDIOLOGY | Facility: CLINIC | Age: 75
End: 2020-06-23

## 2020-06-23 DIAGNOSIS — I48.0 PAROXYSMAL ATRIAL FIBRILLATION (H): ICD-10-CM

## 2020-06-23 DIAGNOSIS — Z79.01 LONG TERM CURRENT USE OF ANTICOAGULANTS WITH INR GOAL OF 2.0-3.0: ICD-10-CM

## 2020-06-23 LAB — INR PPP: 2.3 (ref 0.86–1.14)

## 2020-06-23 PROCEDURE — 36416 COLLJ CAPILLARY BLOOD SPEC: CPT | Performed by: INTERNAL MEDICINE

## 2020-06-23 PROCEDURE — 85610 PROTHROMBIN TIME: CPT | Performed by: INTERNAL MEDICINE

## 2020-06-23 NOTE — PROGRESS NOTES
ANTICOAGULATION FOLLOW-UP CLINIC VISIT    Patient Name:  Jt Montgomery  Date:  2020  Contact Type:  Telephone    SUBJECTIVE:         OBJECTIVE    Recent labs: (last 7 days)     20  1522   INR 2.30*       ASSESSMENT / PLAN  INR assessment THER    Recheck INR In: 4 WEEKS    INR Location Outside lab      Anticoagulation Summary  As of 2020    INR goal:   2.0-3.0   TTR:   69.2 % (11 mo)   INR used for dosin.30 (2020)   Warfarin maintenance plan:   7.5 mg (5 mg x 1.5) every Sun, Wed; 5 mg (5 mg x 1) all other days   Full warfarin instructions:   7.5 mg every Sun, Wed; 5 mg all other days   Weekly warfarin total:   40 mg   Plan last modified:   Paola Roberson RN (3/19/2020)   Next INR check:      Priority:   Maintenance   Target end date:   Indefinite    Indications    Atrial fibrillation (AFIB) on Coumadin [I48.91]  Long term current use of anticoagulants with INR goal of 2.0-3.0 (Resolved) [Z79.01]  Paroxysmal atrial fibrillation (H) [I48.0]  Long term current use of anticoagulants with INR goal of 2.0-3.0 [Z79.01]             Anticoagulation Episode Summary     INR check location:       Preferred lab:       Send INR reminders to:   [facility] HEART INR NURSE    Comments:         Anticoagulation Care Providers     Provider Role Specialty Phone number    Lottie Gomez DO Referring Cardiology [phone]            See the Encounter Report to view Anticoagulation Flowsheet and Dosing Calendar (Go to Encounters tab in chart review, and find the Anticoagulation Therapy Visit)    INR 2.30 No change in meds or diet (greens/veggies most days). Rare pink tinge on incontinence pad (pt had TURP 5/15/20). Denies other bleeding issues.   Will continue current dosing of 7.5 mg SuW and 5 mg all other days with recheck in 4 weeks.    Marlena Hong RN

## 2020-07-01 DIAGNOSIS — M1A.39X0 CHRONIC GOUT DUE TO RENAL IMPAIRMENT OF MULTIPLE SITES WITHOUT TOPHUS: Chronic | ICD-10-CM

## 2020-07-01 RX ORDER — PREDNISONE 20 MG/1
TABLET ORAL
Qty: 10 TABLET | Refills: 0 | Status: SHIPPED | OUTPATIENT
Start: 2020-07-01 | End: 2020-11-23

## 2020-07-01 NOTE — TELEPHONE ENCOUNTER
Spoke with Pt    S: Gout attack, requesting RX Refill of Prednisone     Onset: 2 days ago   Location: Wrist and Foot/ankle   Description: Pain and weakness in wrist     Pt believes he may has eaten some beef pepperoni in the last couple of days, which is a major gout trigger for him.     Denies redness  Denies swelling     Pt states that these are very consistent with his previous gout attacks     To PCP: Would you like VV (can you fit in today/tomorrow?)     Rx is pended, if needed.     Thank you,   Maria A BRIGHT RN

## 2020-07-01 NOTE — TELEPHONE ENCOUNTER
predniSONE (DELTASONE) 20 MG tablet      Last Written Prescription Date:  7/11/2019  Last Fill Quantity: 10 tablet,  # refills: 0   Last office visit: 5/20/2020 with prescribing provider:  Karen   Future Office Visit:      Routing refill request to provider for review/approval because:  Drug not on the [facility], [facility] or [facility] refill protocol or controlled substance

## 2020-07-04 DIAGNOSIS — E11.40 TYPE 2 DIABETES MELLITUS WITH DIABETIC NEUROPATHY, WITH LONG-TERM CURRENT USE OF INSULIN (H): Chronic | ICD-10-CM

## 2020-07-04 DIAGNOSIS — Z79.4 TYPE 2 DIABETES MELLITUS WITH DIABETIC NEUROPATHY, WITH LONG-TERM CURRENT USE OF INSULIN (H): Chronic | ICD-10-CM

## 2020-07-06 NOTE — TELEPHONE ENCOUNTER
Medication refilled one time.  Pharmacy instructed to notify patient to call and schedule a visit: either in clinic or virtual as appropriate.   Lucrecia LLANOS RN,BSN

## 2020-07-20 ENCOUNTER — TELEPHONE (OUTPATIENT)
Dept: PHARMACY | Facility: CLINIC | Age: 75
End: 2020-07-20

## 2020-07-20 NOTE — TELEPHONE ENCOUNTER
MTM referral from: F/up outreach    MTM referral outreach attempt #1 on July 20, 2020 at 11:41 AM      Outcome: Left Message    Mikaela StroudD, [facility]  Medication Therapy Management Provider  Pager: [phone]

## 2020-07-22 ENCOUNTER — ANTICOAGULATION THERAPY VISIT (OUTPATIENT)
Dept: CARDIOLOGY | Facility: CLINIC | Age: 75
End: 2020-07-22
Payer: COMMERCIAL

## 2020-07-22 DIAGNOSIS — Z79.01 LONG TERM CURRENT USE OF ANTICOAGULANTS WITH INR GOAL OF 2.0-3.0: ICD-10-CM

## 2020-07-22 DIAGNOSIS — I48.0 PAROXYSMAL ATRIAL FIBRILLATION (H): ICD-10-CM

## 2020-07-22 LAB — INR PPP: 3.6 (ref 0.86–1.14)

## 2020-07-22 PROCEDURE — 85610 PROTHROMBIN TIME: CPT | Performed by: INTERNAL MEDICINE

## 2020-07-22 PROCEDURE — 36416 COLLJ CAPILLARY BLOOD SPEC: CPT | Performed by: INTERNAL MEDICINE

## 2020-07-22 PROCEDURE — 99207 ZZC NO CHARGE NURSE ONLY: CPT

## 2020-07-22 NOTE — PROGRESS NOTES
ANTICOAGULATION FOLLOW-UP CLINIC VISIT    Patient Name:  Jt Montgomery  Date:  7/22/2020  Contact Type:  Telephone    SUBJECTIVE:  Patient Findings     Positives:   Change in medications (Started taking a new garlic supplement 2-3tabs/day), Change in diet/appetite (Eats iceberg salad daily, has had broccoli 2x/wk)             OBJECTIVE    Recent labs: (last 7 days)     07/22/20  1412   INR 3.60*       ASSESSMENT / PLAN  INR assessment SUPRA    Recheck INR In: 2 WEEKS    INR Location Outside lab      Anticoagulation Summary  As of 7/22/2020    INR goal:   2.0-3.0   TTR:   65.2 % (11 mo)   INR used for dosing:   3.60! (7/22/2020)   Warfarin maintenance plan:   7.5 mg (5 mg x 1.5) every Sun, Wed; 5 mg (5 mg x 1) all other days   Full warfarin instructions:   7/22: 5 mg; Otherwise 7.5 mg every Sun, Wed; 5 mg all other days   Weekly warfarin total:   40 mg   Plan last modified:   Paola Roberson RN (3/19/2020)   Next INR check:   8/5/2020   Priority:   Maintenance   Target end date:   Indefinite    Indications    Atrial fibrillation (AFIB) on Coumadin [I48.91]  Long term current use of anticoagulants with INR goal of 2.0-3.0 (Resolved) [Z79.01]  Paroxysmal atrial fibrillation (H) [I48.0]  Long term current use of anticoagulants with INR goal of 2.0-3.0 [Z79.01]             Anticoagulation Episode Summary     INR check location:       Preferred lab:       Send INR reminders to:   [facility] HEART INR NURSE    Comments:         Anticoagulation Care Providers     Provider Role Specialty Phone number    Patricia Lottie Perla DO Referring Cardiology [phone]            See the Encounter Report to view Anticoagulation Flowsheet and Dosing Calendar (Go to Encounters tab in chart review, and find the Anticoagulation Therapy Visit)    INR 3.60 today at outside clinic. Spoke to patient, no abnormal bleeding/bruising. Pt states he started a garlic supplement recently to help boost his immune system. Per [employer],  garlic supplements can raise the INR. No other med changes. No changes to diet, eats iceberg salads daily, will also have broccoli a few times a week. No ETOH use. Pt states he will stop his garlic supplement moving forward so will hold 2.5mg today and then resume his usual dosing of 7.5mg Sun/Weds and 5mg all other days. Pt will also eat a small serving of broccoli tonight and then resume his normal green intake. Recheck in 2wks.     Tori Rico RN

## 2020-08-04 ENCOUNTER — ANTICOAGULATION THERAPY VISIT (OUTPATIENT)
Dept: CARDIOLOGY | Facility: CLINIC | Age: 75
End: 2020-08-04
Payer: COMMERCIAL

## 2020-08-04 DIAGNOSIS — Z79.01 LONG TERM CURRENT USE OF ANTICOAGULANTS WITH INR GOAL OF 2.0-3.0: ICD-10-CM

## 2020-08-04 DIAGNOSIS — I48.0 PAROXYSMAL ATRIAL FIBRILLATION (H): ICD-10-CM

## 2020-08-04 LAB
CAPILLARY BLOOD COLLECTION: NORMAL
INR PPP: 3 (ref 0.86–1.14)

## 2020-08-04 PROCEDURE — 99207 ZZC NO CHARGE NURSE ONLY: CPT | Performed by: INTERNAL MEDICINE

## 2020-08-04 PROCEDURE — 36416 COLLJ CAPILLARY BLOOD SPEC: CPT | Performed by: INTERNAL MEDICINE

## 2020-08-04 PROCEDURE — 85610 PROTHROMBIN TIME: CPT | Performed by: INTERNAL MEDICINE

## 2020-08-04 NOTE — PROGRESS NOTES
ANTICOAGULATION FOLLOW-UP CLINIC VISIT    Patient Name:  Jt Montgomery  Date:  8/4/2020  Contact Type:  Telephone    SUBJECTIVE:  Patient Findings         Clinical Outcomes     Negatives:   Major bleeding event, Thromboembolic event, Anticoagulation-related hospital admission, Anticoagulation-related ED visit, Anticoagulation-related fatality           OBJECTIVE    Recent labs: (last 7 days)     08/04/20  1357   INR 3.00*       ASSESSMENT / PLAN  INR assessment THER    Recheck INR In: 3 WEEKS    INR Location Outside lab      Anticoagulation Summary  As of 8/4/2020    INR goal:   2.0-3.0   TTR:   61.2 % (11 mo)   INR used for dosing:   3.00 (8/4/2020)   Warfarin maintenance plan:   7.5 mg (5 mg x 1.5) every Sun, Wed; 5 mg (5 mg x 1) all other days   Full warfarin instructions:   7.5 mg every Sun, Wed; 5 mg all other days   Weekly warfarin total:   40 mg   No change documented:   Paola Roberson RN   Plan last modified:   Paola Roberson RN (3/19/2020)   Next INR check:   8/25/2020   Priority:   Maintenance   Target end date:   Indefinite    Indications    Atrial fibrillation (AFIB) on Coumadin [I48.91]  Long term current use of anticoagulants with INR goal of 2.0-3.0 (Resolved) [Z79.01]  Paroxysmal atrial fibrillation (H) [I48.0]  Long term current use of anticoagulants with INR goal of 2.0-3.0 [Z79.01]             Anticoagulation Episode Summary     INR check location:       Preferred lab:       Send INR reminders to:   [facility] HEART INR NURSE    Comments:         Anticoagulation Care Providers     Provider Role Specialty Phone number    Patricia Lottie Perla DO Referring Cardiology [phone]            See the Encounter Report to view Anticoagulation Flowsheet and Dosing Calendar (Go to Encounters tab in chart review, and find the Anticoagulation Therapy Visit)    INR 3.0.  Called and spoke to the patient.  He quickly stopped the garlic supplement when we called last time when INR was 3.6.   Continue same schedule and recheck in 3 weeks.  He will increase his greens.  He has some frozen broccoli on hand.  Brian Roberson RN

## 2020-08-17 ENCOUNTER — ALLIED HEALTH/NURSE VISIT (OUTPATIENT)
Dept: PHARMACY | Facility: CLINIC | Age: 75
End: 2020-08-17
Payer: COMMERCIAL

## 2020-08-17 DIAGNOSIS — E78.2 MIXED HYPERLIPIDEMIA DUE TO TYPE 2 DIABETES MELLITUS (H): ICD-10-CM

## 2020-08-17 DIAGNOSIS — G47.00 INSOMNIA, UNSPECIFIED TYPE: ICD-10-CM

## 2020-08-17 DIAGNOSIS — M1A.39X0 CHRONIC GOUT DUE TO RENAL IMPAIRMENT OF MULTIPLE SITES WITHOUT TOPHUS: ICD-10-CM

## 2020-08-17 DIAGNOSIS — E11.40 TYPE 2 DIABETES MELLITUS WITH DIABETIC NEUROPATHY, WITH LONG-TERM CURRENT USE OF INSULIN (H): Primary | ICD-10-CM

## 2020-08-17 DIAGNOSIS — I48.91 ATRIAL FIBRILLATION, UNSPECIFIED TYPE (H): ICD-10-CM

## 2020-08-17 DIAGNOSIS — N40.1 BENIGN PROSTATIC HYPERPLASIA WITH URINARY OBSTRUCTION: ICD-10-CM

## 2020-08-17 DIAGNOSIS — I10 ESSENTIAL HYPERTENSION: ICD-10-CM

## 2020-08-17 DIAGNOSIS — Z79.4 TYPE 2 DIABETES MELLITUS WITH DIABETIC NEUROPATHY, WITH LONG-TERM CURRENT USE OF INSULIN (H): Primary | ICD-10-CM

## 2020-08-17 DIAGNOSIS — I42.0 DILATED CARDIOMYOPATHY (H): ICD-10-CM

## 2020-08-17 DIAGNOSIS — N13.8 BENIGN PROSTATIC HYPERPLASIA WITH URINARY OBSTRUCTION: ICD-10-CM

## 2020-08-17 DIAGNOSIS — I25.10 CORONARY ARTERY DISEASE INVOLVING NATIVE CORONARY ARTERY OF NATIVE HEART WITHOUT ANGINA PECTORIS: ICD-10-CM

## 2020-08-17 DIAGNOSIS — Z78.9 TAKES DIETARY SUPPLEMENTS: ICD-10-CM

## 2020-08-17 DIAGNOSIS — E11.69 MIXED HYPERLIPIDEMIA DUE TO TYPE 2 DIABETES MELLITUS (H): ICD-10-CM

## 2020-08-17 PROCEDURE — 99606 MTMS BY PHARM EST 15 MIN: CPT | Performed by: PHARMACIST

## 2020-08-17 PROCEDURE — 99607 MTMS BY PHARM ADDL 15 MIN: CPT | Performed by: PHARMACIST

## 2020-08-17 RX ORDER — FAMOTIDINE 20 MG
3000 TABLET ORAL DAILY
COMMUNITY

## 2020-08-17 RX ORDER — FUROSEMIDE 20 MG
20 TABLET ORAL DAILY PRN
COMMUNITY
End: 2021-02-25

## 2020-08-17 RX ORDER — ASPIRIN 81 MG/1
81 TABLET ORAL DAILY
COMMUNITY
End: 2021-04-29

## 2020-08-17 NOTE — PROGRESS NOTES
MTM ENCOUNTER  SUBJECTIVE/OBJECTIVE:                           Jt Montgomery is a 74 year old male called for a follow-up visit. He was referred to me from Dr. Jones.  Today's visit is a follow-up MTM visit from 4/23/20.     Patient consented to a telehealth visit: yes  Telemedicine Visit Details  Type of service:  Telephone visit  Start Time: 3:05 PM  End Time: 3:48 PM  Originating Location (pt. Location): Home  Distant Location (provider location):  [facility] MTM  Mode of Communication:  Telephone    Chief Complaint: DM f/up.    Tobacco:  reports that he quit smoking about 48 years ago. His smoking use included cigarettes. He has a 10.50 pack-year smoking history. He has never used smokeless tobacco.  Alcohol: history of alcohol dependence - he continues going to AA online (will be sober 47 years this fall)    Medication Adherence/Access: no issues reported    Diabetes: Jamal is currently using Lantus 20 units daily.  He's no longer using Novolog.  Pt is not experiencing side effects.   SMBG: CGM, but hasn't been downloading data so he's writing down the AM and HS readings.  Date FBG/ 2hours post HS   8/1 138 -   8/2 147 139   8/3 129 164   8/4 142 170   8/5 169 205   8/6 223 201   8/7 144 163   8/8 145 151   8/9 149 161   8/10 180 236   8/11 144 197   8/12 158 200   8/13 159 143   8/14 - 112   8/15 154 164   8/16 104 130   8/17 105 118 (mid-day)   Patient is not experiencing hypoglycemia. Frequency of hypoglycemia? rarely. Symptoms of low blood sugar? none.   Recent symptoms of high blood sugar? none  Eye exam: due  Foot exam: due  ACEi/ARB: Yes: Irbesartan.   Urine Albumin:   Lab Results   Component Value Date    UMALCR 35.95 (H) 10/08/2019   ]  Aspirin: Taking 81mg daily and denies side effects  Diet/Exercise: He reports he was eating [employer] just about daily (usually in the afternoon).  Had also been eating salads from [employer] and [employer] bean burritos.  He's still trying to eat  plant based as much as possible.  Typical breakfast - oatmeal with blueberries + tumeric + flaxseed.  Dinner - lentil soup, peanut butter + bread.  In the last few days, he's started getting concerned about food preparation and COVID-19 so he purchased a VitaMix  instead to prepare food and he's stopped eating the fast foods.     Lab Results   Component Value Date    A1C 6.1 02/19/2020    A1C 6.2 11/14/2019    A1C 7.6 10/08/2019    A1C 6.5 03/06/2019    A1C 6.1 09/06/2018     GFR Estimate   Date Value Ref Range Status   05/16/2020 64 >60 mL/min/[1.73_m2] Final     Comment:     Non  GFR Calc  Starting 12/18/2018, serum creatinine based estimated GFR (eGFR) will be   calculated using the Chronic Kidney Disease Epidemiology Collaboration   (CKD-EPI) equation.        Hypertension/CAD/A. Fib/HFrEF: Currently taking ASA 81mg daily, digoxin 125mcg daily, diltiazem ER 300mg daily, furosemide 20mg daily PRN (no recent use), irbesartan 150mg daily, metoprolol succinate ER 50mg BID and warfarin as directed. Patient does not self-monitor BP. Patient reports no current medication side effects.    BP Readings from Last 3 Encounters:   05/16/20 117/61   03/12/20 114/58   02/19/20 120/68      INR   Date Value Ref Range Status   08/04/2020 3.00 (H) 0.86 - 1.14 Final       Hyperlipidemia: Currently taking no medications.  He continues to refuse statin therapy.  The 10-year ASCVD risk score ([place] RICH Jr., et al., 2013) is: 44.5%    Values used to calculate the score:      Age: 74 years      Sex: Male      Is Non- : No      Diabetic: Yes      Tobacco smoker: No      Systolic Blood Pressure: 117 mmHg      Is BP treated: Yes      HDL Cholesterol: 24 mg/dL      Total Cholesterol: 164 mg/dL     LDL Cholesterol Direct   Date Value Ref Range Status   11/22/2019 67 <100 mg/dL Final     Comment:     Desirable:       <100 mg/dl       Gout: Currently taking Uloric 40mg daily. Pt is not experiencing  any medication side effects. He also has prednisone available to use if needed for gout flares (feels pain/ache in his arm or back), no recent use.  Uric Acid   Date Value Ref Range Status   02/21/2018 7.4 (H) 3.5 - 7.2 mg/dL Final       Insomnia:  Current medications include: melatonin 0.5mg HS. Pt states this works very well for his sleep and reports no side effects.    BPH:  Pt had TURP in May and reports urinary sx are much improved.  He's taking finasteride 5mg daily along with tamsulosin 0.4mg daily.  He denies side effects of therapy.     Supplements:  Current supplements include CoQ10, probiotic, magnesium oxide, daily MVI, eye vitamin, Vitamin C and Vitamin D.  He finds these effective and prefers to continue taking.  He denies side effects.   Vitamin D Deficiency Screening Results:  Lab Results   Component Value Date    VITDT 34 11/28/2018    VITDT 38 10/01/2014     Today's Vitals: There were no vitals taken for this visit. - telephone visit due to COVID-19 pandemic    ASSESSMENT:                            Medication Adherence: good, no issues identified    Type 2 Diabetes: Improved. Patient is meeting A1c goal of < 8%. Self monitoring of blood glucose is not at goal of fasting  mg/dL and post prandial < 180 mg/dL overall, but BG have been at goal since he stopped eating fast food 2 days ago.  He feels he can continue to do this; if he's able to then he likely won't need insulin adjustments.  Would benefit from sharing [employer] data so I can also review.    Hypertension/CAD/A. Fib/HFrEF: Stable.  BP is at goal <140/90mmHg.    Hyperlipidemia: Unimproved. Pt is not on moderate-high intensity statin which is indicated based on 2019 ACC/AHA guidelines for lipid management.       Gout: Stable. Pt's last uric acid level was not below goal at <6mg/dL, but pt is satisfied with current therapy and does not want to make any changes to his medications at this time.     Insomnia:  Stable.    BPH:   Stable.    Supplements: Stable. It is questionable how much benefit he is getting from his supplements, but he would like to continue taking them and they likely aren't harmful.    PLAN:                            1.  Sent pt email link to share Freestyle Umair data.  2.  Continue current insulin regimen for now - encouraged pt to continue to avoid fast food.    I spent 43 minutes with this patient today. A copy of the visit note was provided to the patient's primary care provider.    Will follow up in 10 days, appt scheduled.    The patient declined a summary of these recommendations.     Nathaly Hughes, PharmD, [facility]  Medication Therapy Management Provider  Pager: [phone]

## 2020-08-25 ENCOUNTER — ANTICOAGULATION THERAPY VISIT (OUTPATIENT)
Dept: CARDIOLOGY | Facility: CLINIC | Age: 75
End: 2020-08-25
Payer: COMMERCIAL

## 2020-08-25 DIAGNOSIS — I48.91 ATRIAL FIBRILLATION, UNSPECIFIED TYPE (H): Primary | ICD-10-CM

## 2020-08-25 DIAGNOSIS — Z79.01 LONG TERM CURRENT USE OF ANTICOAGULANTS WITH INR GOAL OF 2.0-3.0: ICD-10-CM

## 2020-08-25 DIAGNOSIS — I48.0 PAROXYSMAL ATRIAL FIBRILLATION (H): ICD-10-CM

## 2020-08-25 LAB
CAPILLARY BLOOD COLLECTION: NORMAL
INR PPP: 2.5 (ref 0.86–1.14)

## 2020-08-25 PROCEDURE — 99207 ZZC NO CHARGE LOS: CPT

## 2020-08-25 PROCEDURE — 85610 PROTHROMBIN TIME: CPT | Performed by: INTERNAL MEDICINE

## 2020-08-25 PROCEDURE — 36416 COLLJ CAPILLARY BLOOD SPEC: CPT | Performed by: INTERNAL MEDICINE

## 2020-08-25 NOTE — PROGRESS NOTES
ANTICOAGULATION FOLLOW-UP CLINIC VISIT    Patient Name:  Jt Montgomery  Date:  2020  Contact Type:  Telephone    SUBJECTIVE:  Patient Findings     Positives:   Change in diet/appetite    Comments:   Drank veggie/fruit smoothie almost daily this past week         Clinical Outcomes     Negatives:   Major bleeding event, Thromboembolic event, Anticoagulation-related hospital admission, Anticoagulation-related ED visit, Anticoagulation-related fatality    Comments:   Drank veggie/fruit smoothie almost daily this past week            OBJECTIVE    Recent labs: (last 7 days)     20  1123   INR 2.50*       ASSESSMENT / PLAN  INR assessment THER    Recheck INR In: 2 WEEKS    INR Location Outside lab      Anticoagulation Summary  As of 2020    INR goal:   2.0-3.0   TTR:   61.2 % (11 mo)   INR used for dosin.50 (2020)   Warfarin maintenance plan:   7.5 mg (5 mg x 1.5) every Sun, Wed; 5 mg (5 mg x 1) all other days   Full warfarin instructions:   7.5 mg every Sun, Wed; 5 mg all other days   Weekly warfarin total:   40 mg   No change documented:   Paola Roberson RN   Plan last modified:   Paola Roberson RN (3/19/2020)   Next INR check:   2020   Priority:   Maintenance   Target end date:   Indefinite    Indications    Atrial fibrillation (AFIB) on Coumadin [I48.91]  Long term current use of anticoagulants with INR goal of 2.0-3.0 (Resolved) [Z79.01]  Paroxysmal atrial fibrillation (H) [I48.0]  Long term current use of anticoagulants with INR goal of 2.0-3.0 [Z79.01]             Anticoagulation Episode Summary     INR check location:       Preferred lab:       Send INR reminders to:   [facility] HEART INR NURSE    Comments:         Anticoagulation Care Providers     Provider Role Specialty Phone number    [place] Lottie Perla DO Referring Cardiology [phone]            See the Encounter Report to view Anticoagulation Flowsheet and Dosing Calendar (Go to Encounters tab in chart  review, and find the Anticoagulation Therapy Visit)    INR 2.50.  Called and spoke to the patient.  He just got a new  and has been making smoothies almost daily this past week about 5x.  The smoothies include: kale, spinach, celery, cucumbers, carrots, apples, protein powder.  We will continue the same dosing schedule and recheck in 2 weeks.  Told him to be consistent with the kale/spinach and if he makes the smoothie more then 5x weekly then use a little less dark greens.  Brian Roberson, RN

## 2020-08-26 RX ORDER — DIGOXIN 125 MCG
TABLET ORAL
Qty: 90 TABLET | Refills: 1 | Status: SHIPPED | OUTPATIENT
Start: 2020-08-26 | End: 2021-03-15

## 2020-08-27 ENCOUNTER — ALLIED HEALTH/NURSE VISIT (OUTPATIENT)
Dept: PHARMACY | Facility: CLINIC | Age: 75
End: 2020-08-27
Payer: COMMERCIAL

## 2020-08-27 DIAGNOSIS — Z79.4 TYPE 2 DIABETES MELLITUS WITH DIABETIC NEUROPATHY, WITH LONG-TERM CURRENT USE OF INSULIN (H): Primary | ICD-10-CM

## 2020-08-27 DIAGNOSIS — E11.40 TYPE 2 DIABETES MELLITUS WITH DIABETIC NEUROPATHY, WITH LONG-TERM CURRENT USE OF INSULIN (H): Primary | ICD-10-CM

## 2020-08-27 PROCEDURE — 99607 MTMS BY PHARM ADDL 15 MIN: CPT | Performed by: PHARMACIST

## 2020-08-27 PROCEDURE — 99606 MTMS BY PHARM EST 15 MIN: CPT | Performed by: PHARMACIST

## 2020-08-27 NOTE — PROGRESS NOTES
MTM ENCOUNTER  SUBJECTIVE/OBJECTIVE:                           Jt Montgomery is a 74 year old male called for a follow-up visit. He was referred to me from Dr. Jones.  Today's visit is a follow-up MTM visit from 8/17/20.     Patient consented to a telehealth visit: yes  Telemedicine Visit Details  Type of service:  Telephone visit  Start Time: 2:05 PM  End Time: 2:32 PM  Originating Location (pt. Location): Home  Distant Location (provider location):  [facility] MTM  Mode of Communication:  Telephone    Chief Complaint: DM f/up    Tobacco:  reports that he quit smoking about 48 years ago. His smoking use included cigarettes. He has a 10.50 pack-year smoking history. He has never used smokeless tobacco.  Alcohol: history of alcohol dependence - will reach 47 years of sobriety in November    Medication Adherence/Access: no issues reportexed    Diabetes: Jamal is currently using Lantus 20 units daily.  He's no longer using Novolog.  Pt is not experiencing side effects.   SMBG: CGM, but hasn't been downloading data so he's writing down the AM and HS readings.  He tried setting up [employer] to share data with me but had a hard time doing so.  Date FBG/ 2hours post HS (2-3 hours post-prandial)   8/18 99 132   8/19 118 173   8/20 123 175   8/21 151 168   8/22 138 165   8/23 145 164   8/24 140 176   8/25 172 170   8/26 141 180   8/27 176    Patient is not experiencing hypoglycemia. Frequency of hypoglycemia? rarely. Symptoms of low blood sugar? none.   Recent symptoms of high blood sugar? none  Eye exam: due  Foot exam: due  ACEi/ARB: Yes: Irbesartan.   Urine Albumin:   Lab Results   Component Value Date    UMALCR 35.95 (H) 10/08/2019   ]  Aspirin: Taking 81mg daily and denies side effects  Diet/Exercise: At our last visit, he had indicated he wasn't going to be eating fast food as much.  He has been getting takeout some, but not as often.  He continues using his [employer]itaMix, which he's enjoyed using.  He's mostly  putting vegetables in this, some apples but limited quantities.    Lab Results   Component Value Date    A1C 6.1 02/19/2020    A1C 6.2 11/14/2019    A1C 7.6 10/08/2019    A1C 6.5 03/06/2019    A1C 6.1 09/06/2018     GFR Estimate   Date Value Ref Range Status   05/16/2020 64 >60 mL/min/[1.73_m2] Final     Comment:     Non  GFR Calc  Starting 12/18/2018, serum creatinine based estimated GFR (eGFR) will be   calculated using the Chronic Kidney Disease Epidemiology Collaboration   (CKD-EPI) equation.        Today's Vitals: There were no vitals taken for this visit. - telephone visit due to COVID-19 pandemic    ASSESSMENT:                            Medication Adherence: good, no issues identified    Type 2 Diabetes: Needs Improvement. Patient is meeting A1c goal of < 8%. Self monitoring of blood glucose is not at goal of fasting  mg/dL and post prandial < 180 mg/dL consistently.  May benefit from increasing Lantus dose slightly and continuing to work on diet changes.    PLAN:                            1.  Increased Lantus to 22 units daily.  2.  Pt to continue working on diet changes.    I spent 27 minutes with this patient today. All changes were made via collaborative practice agreement with Dr. Jones. A copy of the visit note was provided to the patient's primary care provider.    Will follow up in 3 weeks, appt scheduled.    The patient declined a summary of these recommendations.     Nathaly Hughes, PharmD, BCACP  Medication Therapy Management Provider  Pager: [phone]

## 2020-09-08 ENCOUNTER — ANTICOAGULATION THERAPY VISIT (OUTPATIENT)
Dept: CARDIOLOGY | Facility: CLINIC | Age: 75
End: 2020-09-08
Payer: COMMERCIAL

## 2020-09-08 DIAGNOSIS — I48.0 PAROXYSMAL ATRIAL FIBRILLATION (H): ICD-10-CM

## 2020-09-08 DIAGNOSIS — Z79.01 LONG TERM CURRENT USE OF ANTICOAGULANTS WITH INR GOAL OF 2.0-3.0: ICD-10-CM

## 2020-09-08 LAB
CAPILLARY BLOOD COLLECTION: NORMAL
INR PPP: 1.8 (ref 0.86–1.14)

## 2020-09-08 PROCEDURE — 36416 COLLJ CAPILLARY BLOOD SPEC: CPT | Performed by: INTERNAL MEDICINE

## 2020-09-08 PROCEDURE — 99207 ZZC NO CHARGE NURSE ONLY: CPT

## 2020-09-08 PROCEDURE — 85610 PROTHROMBIN TIME: CPT | Performed by: INTERNAL MEDICINE

## 2020-09-08 NOTE — PROGRESS NOTES
ANTICOAGULATION FOLLOW-UP CLINIC VISIT    Patient Name:  Jt Montgomery  Date:  2020  Contact Type:  Telephone    SUBJECTIVE:         OBJECTIVE    Recent labs: (last 7 days)     20  1316   INR 1.80*       ASSESSMENT / PLAN  INR assessment SUB    Recheck INR In: 2 WEEKS    INR Location Outside lab      Anticoagulation Summary  As of 2020    INR goal:   2.0-3.0   TTR:   60.0 % (11 mo)   INR used for dosin.80! (2020)   Warfarin maintenance plan:   7.5 mg (5 mg x 1.5) every Sun, Wed; 5 mg (5 mg x 1) all other days   Full warfarin instructions:   7.5 mg every Sun, Wed; 5 mg all other days   Weekly warfarin total:   40 mg   Plan last modified:   Paola Roberson RN (3/19/2020)   Next INR check:      Priority:   Maintenance   Target end date:   Indefinite    Indications    Atrial fibrillation (AFIB) on Coumadin [I48.91]  Long term current use of anticoagulants with INR goal of 2.0-3.0 (Resolved) [Z79.01]  Paroxysmal atrial fibrillation (H) [I48.0]  Long term current use of anticoagulants with INR goal of 2.0-3.0 [Z79.01]             Anticoagulation Episode Summary     INR check location:       Preferred lab:       Send INR reminders to:   [facility] HEART INR NURSE    Comments:         Anticoagulation Care Providers     Provider Role Specialty Phone number    Lottie Gomez DO Referring Cardiology [phone]            See the Encounter Report to view Anticoagulation Flowsheet and Dosing Calendar (Go to Encounters tab in chart review, and find the Anticoagulation Therapy Visit)    INR 1.80 He has been drinking 4-5 protein shakes a week (with spinach, kale, celery and cucumbers in it). No change in meds. Denies abnormal bleeding or bruising. He plans to continue to drink the protein shakes so will increase dosing to 7.5 mg SuTuTh and 5 mg all other days with recheck in 2 weeks.Dosage adjustment made based on physician directed care plan.    Marlena Hong RN

## 2020-09-11 ENCOUNTER — VIRTUAL VISIT (OUTPATIENT)
Dept: FAMILY MEDICINE | Facility: CLINIC | Age: 75
End: 2020-09-11
Payer: COMMERCIAL

## 2020-09-11 DIAGNOSIS — E11.69 MIXED HYPERLIPIDEMIA DUE TO TYPE 2 DIABETES MELLITUS (H): ICD-10-CM

## 2020-09-11 DIAGNOSIS — E11.40 TYPE 2 DIABETES MELLITUS WITH DIABETIC NEUROPATHY, WITH LONG-TERM CURRENT USE OF INSULIN (H): Chronic | ICD-10-CM

## 2020-09-11 DIAGNOSIS — E78.2 MIXED HYPERLIPIDEMIA DUE TO TYPE 2 DIABETES MELLITUS (H): ICD-10-CM

## 2020-09-11 DIAGNOSIS — Z79.01 LONG TERM CURRENT USE OF ANTICOAGULANTS WITH INR GOAL OF 2.0-3.0: ICD-10-CM

## 2020-09-11 DIAGNOSIS — N13.8 BENIGN PROSTATIC HYPERPLASIA WITH URINARY OBSTRUCTION: ICD-10-CM

## 2020-09-11 DIAGNOSIS — I48.0 PAROXYSMAL ATRIAL FIBRILLATION (H): Primary | ICD-10-CM

## 2020-09-11 DIAGNOSIS — I48.91 ATRIAL FIBRILLATION, UNSPECIFIED TYPE (H): ICD-10-CM

## 2020-09-11 DIAGNOSIS — N40.1 BENIGN PROSTATIC HYPERPLASIA WITH URINARY OBSTRUCTION: ICD-10-CM

## 2020-09-11 DIAGNOSIS — Z79.4 TYPE 2 DIABETES MELLITUS WITH DIABETIC NEUROPATHY, WITH LONG-TERM CURRENT USE OF INSULIN (H): Chronic | ICD-10-CM

## 2020-09-11 DIAGNOSIS — E66.01 MORBID OBESITY (H): ICD-10-CM

## 2020-09-11 DIAGNOSIS — G47.33 OSA (OBSTRUCTIVE SLEEP APNEA): Chronic | ICD-10-CM

## 2020-09-11 PROCEDURE — 99214 OFFICE O/P EST MOD 30 MIN: CPT | Mod: 95 | Performed by: INTERNAL MEDICINE

## 2020-09-11 RX ORDER — DILTIAZEM HYDROCHLORIDE 300 MG/1
300 CAPSULE, COATED, EXTENDED RELEASE ORAL DAILY
Qty: 90 CAPSULE | Refills: 0 | Status: SHIPPED | OUTPATIENT
Start: 2020-09-11 | End: 2020-12-14

## 2020-09-11 NOTE — PROGRESS NOTES
"Jt Montgomery is a 74 year old male who is being evaluated via a billable telephone visit.      The patient has been notified of following:     \"This telephone visit will be conducted via a call between you and your physician/provider. We have found that certain health care needs can be provided without the need for a physical exam.  This service lets us provide the care you need with a short phone conversation.  If a prescription is necessary we can send it directly to your pharmacy.  If lab work is needed we can place an order for that and you can then stop by our lab to have the test done at a later time.    Telephone visits are billed at different rates depending on your insurance coverage. During this emergency period, for some insurers they may be billed the same as an in-person visit.  Please reach out to your insurance provider with any questions.    If during the course of the call the physician/provider feels a telephone visit is not appropriate, you will not be charged for this service.\"    Patient has given verbal consent for Telephone visit?  Yes    What phone number would you like to be contacted at? [phone]    How would you like to obtain your AVS? Mail a copy    Subjective     Jt Montgomery is a 74 year old male who presents via phone visit today for the following health issues:    HPI       Chief Complaint:       follow up on multiple concerns including CAD, paroxysmal atrial fibrillation, BPH with urinary retention, Type 2 Diabetes, hyperlipidemia      HPI:   Patient Jt Montgomery is a very pleasant 74 year old male with history of BPH, Type 2 Diabetes, hypertension, hyperlipidemia today for telephone evaluation of multiple concerns including CAD, paroxysmal atrial fibrillation, BPH with urinary retention, Type 2 Diabetes, hyperlipidemia. Regarding his chronic BPH, the patient is compliant with his chronic BPH Flomax and finasteride medication therapy. Regarding his chronic Type 2 " Diabetes. the patient's diabetes are well controlled at this time on his current diabetes medication regimen including insulin therapy. Patient denies any flank pain, fever or chills symptoms at this time. Patient needs to follow up with cardiology clinic for his CAD, paroxysmal atrial fibrillation. He is also interested in a nutrition clinic referral to help with weight loss in the setting of chronic morbid obesity.     Current Medications:     Current Outpatient Medications   Medication Sig Dispense Refill     aspirin 81 MG EC tablet Take 81 mg by mouth daily       Coenzyme Q10 (COQ10) 100 MG CAPS Take 200 mg by mouth daily        Continuous Blood Gluc Sensor (FREESTYLE RINKU 14 DAY SENSOR) MISC AS DIRECTED EVERY 14 DAYS 100 each 3     digoxin (LANOXIN) 125 MCG tablet TAKE 1 TABLET(125 MCG) BY MOUTH DAILY 90 tablet 1     diltiazem ER COATED BEADS (CARTIA XT) 300 MG 24 hr capsule Take 1 capsule (300 mg) by mouth daily 90 capsule 0     febuxostat (ULORIC) 40 MG TABS Take 40 mg by mouth daily       finasteride (PROSCAR) 5 MG tablet Take 1 tablet (5 mg) by mouth daily 90 tablet 3     furosemide (LASIX) 20 MG tablet Take 20 mg by mouth daily as needed       Insulin Glargine (LANTUS SOLOSTAR SC) Inject 22 Units Subcutaneous At Bedtime       insulin pen needle (BD GERMÁN U/F) 32G X 4 MM miscellaneous USE FOUR TIMES DAILY AS DIRECTED 200 each 1     irbesartan (AVAPRO) 300 MG tablet Take 0.5 tablets (150 mg) by mouth At Bedtime 90 tablet 2     Lactobacillus (PROBIOTIC ACIDOPHILUS PO) Take 1 capsule by mouth daily       magnesium oxide 200 MG TABS Take 1-2 tablets by mouth daily        melatonin 1 MG TABS tablet Take 0.5 mg by mouth nightly as needed for sleep       metoprolol succinate ER (TOPROL-XL) 50 MG 24 hr tablet Take 1 tablet (50 mg) by mouth 2 times daily 180 tablet 3     Multiple Vitamins-Minerals (MULTIVITAMIN ADULT PO) Take 1 tablet by mouth daily       order for DME Equipment being ordered: Compression stockings  1 each 3     predniSONE (DELTASONE) 20 MG tablet TAKE 1 TABLET(20 MG) BY MOUTH DAILY AS NEEDED GOUT FLARE 10 tablet 0     tamsulosin (FLOMAX) 0.4 MG capsule Take 1 capsule (0.4 mg) by mouth every evening 90 capsule 3     UNABLE TO FIND MEDICATION NAME: CarotoMax with lutein       vitamin B complex with vitamin C (VITAMIN  B COMPLEX) tablet Take 1 tablet by mouth daily       Vitamin D, Cholecalciferol, 25 MCG (1000 UT) CAPS Take 3,000 Units by mouth daily       warfarin ANTICOAGULANT (COUMADIN) 5 MG tablet Take 5 mg by mouth As directed by INR clinic           Allergies:      Allergies   Allergen Reactions     No Known Allergies             Past Medical History:     Past Medical History:   Diagnosis Date     Atrial fibrillation (H)      CAD (coronary artery disease)     MI with RONEL to dRCA April 2011     Central Sleep Apnea with Pat Villanueva breathing 9/14/2010    Also TOMMY with CPAP     CKD (chronic kidney disease) stage 3, GFR 30-59 ml/min (H)      Dilated cardiomyopathy (H)     nonischemic (possibly related to h/o a.fib with RVR) EF 30-40% March 2013     DM2 (diabetes mellitus, type 2) (H)     Goal HgbA1c < 7%     Gout     uric acid level correlates; April 2014 h/o +knee aspirate     Hernia, abdominal      Hypertension     Goal <140/90     Pulmonary hypertension (H)     mild per echo 3/2013     Spider veins          Past Surgical History:     Past Surgical History:   Procedure Laterality Date     CORONARY ANGIOGRAPHY ADULT ORDER  2011    distal circ-RONEL     CYSTOSCOPY, TRANSURETHRAL RESECTION (TUR) PROSTATE, COMBINED N/A 5/15/2020    Procedure: CYSTOSCOPY, WITH TRANSURETHRAL RESECTION PROSTATE;  Surgeon: Tr Bowles MD;  Location: [facility] OR     HERNIA REPAIR  11/20/10    incarcinated     SUSPEND HYOID, GENIOGLOSSAL ADVANCEMENT           Family Medical History:     Family History   Problem Relation Age of Onset     Hypertension Mother      Coronary Artery Disease Mother      Coronary Artery Disease Father       Hypertension Father      Diabetes Sister      Cerebrovascular Disease Sister          Social History:     Social History     Socioeconomic History     Marital status:      Spouse name: Not on file     Number of children: Not on file     Years of education: Not on file     Highest education level: Not on file   Occupational History     Not on file   Social Needs     Financial resource strain: Not on file     Food insecurity     Worry: Not on file     Inability: Not on file     Transportation needs     Medical: Not on file     Non-medical: Not on file   Tobacco Use     Smoking status: Former Smoker     Packs/day: 1.50     Years: 7.00     Pack years: 10.50     Types: Cigarettes     Last attempt to quit: 1972     Years since quittin.7     Smokeless tobacco: Never Used     Tobacco comment: quit in       Started at around age 18-20   Substance and Sexual Activity     Alcohol use: No     Alcohol/week: 0.0 standard drinks     Comment: 74   recovering     Drug use: No     Sexual activity: Not Currently     Partners: Female   Lifestyle     Physical activity     Days per week: Not on file     Minutes per session: Not on file     Stress: Not on file   Relationships     Social connections     Talks on phone: Not on file     Gets together: Not on file     Attends Latter day service: Not on file     Active member of club or organization: Not on file     Attends meetings of clubs or organizations: Not on file     Relationship status: Not on file     Intimate partner violence     Fear of current or ex partner: Not on file     Emotionally abused: Not on file     Physically abused: Not on file     Forced sexual activity: Not on file   Other Topics Concern      Service Not Asked     Blood Transfusions Not Asked     Caffeine Concern No     Occupational Exposure Not Asked     Hobby Hazards Not Asked     Sleep Concern Yes     Comment: sleep apnea, wears bi-pap     Stress Concern Not Asked     Weight  Concern Not Asked     Special Diet No     Comment: low carb diet     Back Care Not Asked     Exercise No     Comment: walking     Bike Helmet Not Asked     Seat Belt Yes     Self-Exams Not Asked     Parent/sibling w/ CABG, MI or angioplasty before 65F 55M? Not Asked   Social History Narrative     Not on file           Review of System:     Constitutional: Negative for fever or chills  Skin: Negative for rashes  Ears/Nose/Throat: Negative for nasal congestion, sore throat  Respiratory: No shortness of breath, dyspnea on exertion, cough, or hemoptysis  Cardiovascular: Negative for chest pain  Gastrointestinal: Negative for nausea, vomiting  Genitourinary: positive for chronic BPH with urinary retention requiring urology surgery recently  Musculoskeletal: Negative for myalgias  Neurologic: Negative for headaches  Psychiatric: Negative for depression, anxiety  Hematologic/Lymphatic/Immunologic: Negative  Endocrine: Negative for recent hypoglycemia events  Behavioral: Negative for tobacco use       Physical Exam:   There were no vitals taken for this visit.    RESP: no cough over the phone  NEURO: Alert & Oriented x 3 over the phone  PSYCH: mentation appears normal over the phone            Diagnostic Test Results:     Recent Labs   Lab Test 11/22/19  1623 03/06/19  0951  04/16/13  0000 09/10/12  0625   CHOL 164 162   < >  --  129   HDL 24* 26*   < > 38* 18*   LDL Cannot estimate LDL when triglyceride exceeds 400 mg/dL  67 Cannot estimate LDL when triglyceride exceeds 400 mg/dL  86   < > DELETED 72   TRIG 843* 523*   < > 555* 194*   CHOLHDLRATIO  --   --   --  4.7* 7.0*    < > = values in this interval not displayed.       Lab Results   Component Value Date    A1C 6.1 02/19/2020    A1C 6.2 11/14/2019    A1C 7.6 10/08/2019    A1C 6.5 03/06/2019    A1C 6.1 09/06/2018       ASSESSMENT/PLAN:   (I25.119) Coronary artery disease involving native coronary artery of native heart with angina pectoris (H)  (I48.0) Paroxysmal  atrial fibrillation (H)  (primary encounter diagnosis)  (Z79.01) Long term current use of anticoagulants with INR goal of 2.0-3.0  Comment: no chest pains, patient is compliant with his cardiac medications including Metoprolol at this time.  Plan: CARDIOLOGY EVAL ADULT REFERRAL      (E66.01) Morbid obesity (H)  Comment: He is also interested in a nutrition clinic referral to help with weight loss in the setting of chronic morbid obesity.   Plan: NUTRITION REFERRAL      (E11.69,  E78.2) Mixed hyperlipidemia due to type 2 diabetes mellitus (H)  Comment: stable.  Plan: continue current therapy      (G47.33) TOMMY (obstructive sleep apnea)  Comment:stable  Plan: continue current CPAP therapy      (N40.1,  N13.8) Benign prostatic hyperplasia with urinary obstruction  Comment: chronic BPH symptoms stable.  Plan: continue current Flomax and finasteride medications, continue outpatient urology clinic follow up going forward.      (E11.40,  Z79.4) Type 2 diabetes mellitus with diabetic neuropathy, with long-term current use of insulin (H)  Comment: Regarding his chronic Type 2 diabetes mellitus the patient is compliant with his diabetes medication therapy including long-term current use of insulin therapy. He denies any recent hypoglycemia events.  Plan: continue current diabetes medication therapy      Follow Up Plan:     Patient is instructed to return to Internal Medicine clinic for follow-up visit in 1 month.        Phone call duration:  25 minutes    Digna Jones MD

## 2020-09-17 ENCOUNTER — ALLIED HEALTH/NURSE VISIT (OUTPATIENT)
Dept: PHARMACY | Facility: CLINIC | Age: 75
End: 2020-09-17
Payer: COMMERCIAL

## 2020-09-17 DIAGNOSIS — E16.2 HYPOGLYCEMIA: Primary | ICD-10-CM

## 2020-09-17 DIAGNOSIS — E11.40 TYPE 2 DIABETES MELLITUS WITH DIABETIC NEUROPATHY, WITH LONG-TERM CURRENT USE OF INSULIN (H): ICD-10-CM

## 2020-09-17 DIAGNOSIS — Z79.4 TYPE 2 DIABETES MELLITUS WITH DIABETIC NEUROPATHY, WITH LONG-TERM CURRENT USE OF INSULIN (H): ICD-10-CM

## 2020-09-17 PROCEDURE — 99606 MTMS BY PHARM EST 15 MIN: CPT | Performed by: PHARMACIST

## 2020-09-17 PROCEDURE — 99607 MTMS BY PHARM ADDL 15 MIN: CPT | Performed by: PHARMACIST

## 2020-09-17 RX ORDER — LANCETS
EACH MISCELLANEOUS
Qty: 100 EACH | Refills: 6 | Status: SHIPPED | OUTPATIENT
Start: 2020-09-17 | End: 2021-11-17

## 2020-09-17 NOTE — PROGRESS NOTES
MTM ENCOUNTER  SUBJECTIVE/OBJECTIVE:                           Jt Montgomery is a 74 year old male called for a follow-up visit. He was referred to me from Dr. Jones.  Today's visit is a follow-up MTM visit from 8/27/20.     Patient consented to a telehealth visit: yes  Telemedicine Visit Details  Type of service:  Telephone visit  Start Time: 2:03 PM  End Time: 2:39 PM  Originating Location (pt. Location): Home  Distant Location (provider location):  [facility] MTM  Mode of Communication:  Telephone    Chief Complaint:  He had an episode of hypoglycemia on 9/15 and was unsure how to handle it, wishes to discuss this today.    Tobacco: He reports that he quit smoking about 48 years ago. His smoking use included cigarettes. He has a 10.50 pack-year smoking history. He has never used smokeless tobacco.  Alcohol: history of alcohol dependence - will reach 47 years of sobriety in November    Medication Adherence/Access: no issues reported    Hypoglycemia:  Pt had an episode of hypoglycemia (BG in the 60s) on 9/15 in the afternoon.  He's unsure if he had anything for lunch.  He realized he doesn't have much at home to treat hypoglycemia.  This scared him, so he wishes to discus further.  He cannot find his traditional glucometer.    Diabetes: Jamal is currently using Lantus 22 units daily, he reduced to 15 units and then took 18 units last night.  Pt is not experiencing side effects.   SMBG: CGM, but hasn't been downloading data so he's writing down the AM and HS readings.  He tried setting up [employer] to share data with me but had a hard time doing so.  Date FBG/ 2hours post HS (2-3 hours post-prandial)   9/1/20 116 111   9/2/20 107 140   9/3/20 117 188   9/4/20 136 230   9/5/20 159 148   9/6/20 135 138   9/7/20 135 31729   9/8/20 131 136   9/9/20 125 129   9/10/20 118 159   9/11/20 119 141   9/12/20 109 75 (ate snack)   9/13/20 114 129   9/14/20 72 104   9/15/20 61 221 - took 15 units of Lantus   9/16/20  184 176 - took 18 units of Lantus   9/17/20 130    Patient is not experiencing hypoglycemia regularly, just had the one episode noted above. Frequency of hypoglycemia? rarely. Symptoms of low blood sugar? none.   Recent symptoms of high blood sugar? none  Eye exam: due  Foot exam: due  ACEi/ARB: Yes: Irbesartan.   Urine Albumin:   Lab Results   Component Value Date    UMALCR 35.95 (H) 10/08/2019   ]  Aspirin: Taking 81mg daily and denies side effects  Diet/Exercise:  Pt in process of scheduling appointment to meet with private dietician to discuss plant based diet in more detail.  He's been eating more salads for dinner and attributes lower BG readings to this.  He's also been trying to reduce oil intake.    Lab Results   Component Value Date    A1C 6.1 02/19/2020    A1C 6.2 11/14/2019    A1C 7.6 10/08/2019    A1C 6.5 03/06/2019    A1C 6.1 09/06/2018     GFR Estimate   Date Value Ref Range Status   05/16/2020 64 >60 mL/min/[1.73_m2] Final     Comment:     Non  GFR Calc  Starting 12/18/2018, serum creatinine based estimated GFR (eGFR) will be   calculated using the Chronic Kidney Disease Epidemiology Collaboration   (CKD-EPI) equation.       Today's Vitals: There were no vitals taken for this visit. - telephone visit due to COVID-19 pandemic    ASSESSMENT:                            Medication Adherence: No issues identified    Hypoglycemia: Needed additional education re: treatment of hypoglycemia and need for traditional SMBG when receiving reading indicating hypoglycemia on CGM.  He needs new Rx for glucometer.    Type 2 Diabetes: Patient is meeting A1c goal of < 8%. Self monitoring of blood glucose is at goal of fasting  mg/dL and post prandial < 180 mg/dL, he's been having more episodes of hypoglycemia recently, would benefit from staying on reduced Lantus dose.  Plan in place to see dietician in 2 weeks.  Due for maintenance labs (A1c, BMP, lipids, digoxin and microalbumin).    PLAN:                             1.  Reviewed treatment of hypoglycemia and rule of 15.  Rx sent to his pharmacy for traditional glucometer so he has one available if needed.  2.  Continue Lantus at dose of 18 units daily for now.  3.  Future orders placed for A1c, BMP, lipids, microalbumin and digoxin levels to be checked along with INR on 9/22.    I spent 36 minutes with this patient today. All changes were made via collaborative practice agreement with Dr. Jones. A copy of the visit note was provided to the patient's primary care provider.    Will follow up in 1 week, appt scheduled.    The patient declined a summary of these recommendations.     Nathaly Hughes, PharmD, [facility]  Medication Therapy Management Provider  Pager: [phone]

## 2020-09-22 ENCOUNTER — ANTICOAGULATION THERAPY VISIT (OUTPATIENT)
Dept: CARDIOLOGY | Facility: CLINIC | Age: 75
End: 2020-09-22
Payer: COMMERCIAL

## 2020-09-22 DIAGNOSIS — Z79.01 LONG TERM CURRENT USE OF ANTICOAGULANTS WITH INR GOAL OF 2.0-3.0: ICD-10-CM

## 2020-09-22 DIAGNOSIS — Z79.4 TYPE 2 DIABETES MELLITUS WITH DIABETIC NEUROPATHY, WITH LONG-TERM CURRENT USE OF INSULIN (H): ICD-10-CM

## 2020-09-22 DIAGNOSIS — I48.0 PAROXYSMAL ATRIAL FIBRILLATION (H): ICD-10-CM

## 2020-09-22 DIAGNOSIS — E11.40 TYPE 2 DIABETES MELLITUS WITH DIABETIC NEUROPATHY, WITH LONG-TERM CURRENT USE OF INSULIN (H): ICD-10-CM

## 2020-09-22 LAB
DIGOXIN SERPL-MCNC: 0.6 UG/L (ref 0.5–2)
HBA1C MFR BLD: 7 % (ref 0–5.6)
INR PPP: 2.3 (ref 0.86–1.14)

## 2020-09-22 PROCEDURE — 82043 UR ALBUMIN QUANTITATIVE: CPT | Performed by: INTERNAL MEDICINE

## 2020-09-22 PROCEDURE — 83036 HEMOGLOBIN GLYCOSYLATED A1C: CPT | Performed by: INTERNAL MEDICINE

## 2020-09-22 PROCEDURE — 99207 ZZC NO CHARGE NURSE ONLY: CPT

## 2020-09-22 PROCEDURE — 80061 LIPID PANEL: CPT | Performed by: INTERNAL MEDICINE

## 2020-09-22 PROCEDURE — 80048 BASIC METABOLIC PNL TOTAL CA: CPT | Performed by: INTERNAL MEDICINE

## 2020-09-22 PROCEDURE — 85610 PROTHROMBIN TIME: CPT | Performed by: INTERNAL MEDICINE

## 2020-09-22 PROCEDURE — 80162 ASSAY OF DIGOXIN TOTAL: CPT | Performed by: INTERNAL MEDICINE

## 2020-09-22 PROCEDURE — 83721 ASSAY OF BLOOD LIPOPROTEIN: CPT | Performed by: INTERNAL MEDICINE

## 2020-09-22 PROCEDURE — 36415 COLL VENOUS BLD VENIPUNCTURE: CPT | Performed by: INTERNAL MEDICINE

## 2020-09-22 NOTE — PROGRESS NOTES
ANTICOAGULATION FOLLOW-UP CLINIC VISIT    Patient Name:  Jt Montgomery  Date:  2020  Contact Type:  Telephone    SUBJECTIVE:  Patient Findings     Positives:   Change in diet/appetite (Going to be working with a nutritionist through PCP for weight loss, more plant-based diet; eating protein shakes most days, spinach/kale salads daily; celery/pea pods/peeled cucumbers in smoothies daily)        Clinical Outcomes     Negatives:   Major bleeding event, Thromboembolic event, Anticoagulation-related hospital admission, Anticoagulation-related ED visit, Anticoagulation-related fatality           OBJECTIVE    Recent labs: (last 7 days)     20  1248   INR 2.30*       ASSESSMENT / PLAN  INR assessment THER    Recheck INR In: 3 WEEKS    INR Location Outside lab      Anticoagulation Summary  As of 2020    INR goal:   2.0-3.0   TTR:   58.3 % (11 mo)   INR used for dosin.30 (2020)   Warfarin maintenance plan:   7.5 mg (5 mg x 1.5) every Sun, Tue, Thu; 5 mg (5 mg x 1) all other days   Full warfarin instructions:   7.5 mg every Sun, Tue, Thu; 5 mg all other days   Weekly warfarin total:   42.5 mg   No change documented:   Tori Rico RN   Plan last modified:   Marlena Hong RN (2020)   Next INR check:   10/13/2020   Target end date:   Indefinite    Indications    Atrial fibrillation (AFIB) on Coumadin [I48.91]  Long term current use of anticoagulants with INR goal of 2.0-3.0 (Resolved) [Z79.01]  Paroxysmal atrial fibrillation (H) [I48.0]  Long term current use of anticoagulants with INR goal of 2.0-3.0 [Z79.01]             Anticoagulation Episode Summary     INR check location:       Preferred lab:       Send INR reminders to:   [facility] HEART INR NURSE    Comments:         Anticoagulation Care Providers     Provider Role Specialty Phone number    Lottie Gomez DO Referring Cardiology [phone]            See the Encounter Report to view Anticoagulation Flowsheet and Dosing  Calendar (Go to Encounters tab in chart review, and find the Anticoagulation Therapy Visit)    INR 2.30 today at outside clinic, back in range after increasing maintenance dosing by 2.5mg overall at last check. Spoke to patient, no abnormal bleeding/bruising. No changes to meds. He is going to be seeing a nutritionist on 10/6 to discuss a plant-based diet. He is already eating a spinach/kale salad most days, as well as a smoothie with pea pods/celery/peeled cucumber daily. He also adds protein powder to this. Pt states he has been eating greens like this for the past month, not new or increased. Will continue on current warfarin maintenance dosing of 7.5mg Sun/Tues/Thurs and 5mg all other days. Recheck in 3wks. May need to titrate dosing further depending on directions given to patient by nutritionist for diet, will monitor closely.     Tori Rico RN

## 2020-09-23 LAB
ANION GAP SERPL CALCULATED.3IONS-SCNC: 7 MMOL/L (ref 3–14)
BUN SERPL-MCNC: 16 MG/DL (ref 7–30)
CALCIUM SERPL-MCNC: 8.7 MG/DL (ref 8.5–10.1)
CHLORIDE SERPL-SCNC: 103 MMOL/L (ref 94–109)
CHOLEST SERPL-MCNC: 123 MG/DL
CO2 SERPL-SCNC: 24 MMOL/L (ref 20–32)
CREAT SERPL-MCNC: 1.02 MG/DL (ref 0.66–1.25)
CREAT UR-MCNC: 58 MG/DL
GFR SERPL CREATININE-BSD FRML MDRD: 72 ML/MIN/{1.73_M2}
GLUCOSE SERPL-MCNC: 200 MG/DL (ref 70–99)
HDLC SERPL-MCNC: 28 MG/DL
LDLC SERPL CALC-MCNC: ABNORMAL MG/DL
LDLC SERPL DIRECT ASSAY-MCNC: 50 MG/DL
MICROALBUMIN UR-MCNC: 183 MG/L
MICROALBUMIN/CREAT UR: 317.16 MG/G CR (ref 0–17)
NONHDLC SERPL-MCNC: 95 MG/DL
POTASSIUM SERPL-SCNC: 4.3 MMOL/L (ref 3.4–5.3)
SODIUM SERPL-SCNC: 134 MMOL/L (ref 133–144)
TRIGL SERPL-MCNC: 560 MG/DL

## 2020-09-24 ENCOUNTER — VIRTUAL VISIT (OUTPATIENT)
Dept: PHARMACY | Facility: CLINIC | Age: 75
End: 2020-09-24
Payer: COMMERCIAL

## 2020-09-24 DIAGNOSIS — Z79.4 TYPE 2 DIABETES MELLITUS WITH DIABETIC NEUROPATHY, WITH LONG-TERM CURRENT USE OF INSULIN (H): Primary | ICD-10-CM

## 2020-09-24 DIAGNOSIS — E11.40 TYPE 2 DIABETES MELLITUS WITH DIABETIC NEUROPATHY, WITH LONG-TERM CURRENT USE OF INSULIN (H): Primary | ICD-10-CM

## 2020-09-24 PROCEDURE — 99606 MTMS BY PHARM EST 15 MIN: CPT | Mod: TEL | Performed by: PHARMACIST

## 2020-09-24 PROCEDURE — 99607 MTMS BY PHARM ADDL 15 MIN: CPT | Mod: TEL | Performed by: PHARMACIST

## 2020-09-24 NOTE — PROGRESS NOTES
MTM ENCOUNTER  SUBJECTIVE/OBJECTIVE:                           Jt Montgomery is a 74 year old male called for a follow-up visit. He was referred to me from Dr. Jones.  Today's visit is a follow-up MTM visit from 9/17/2020.     Patient consented to a telehealth visit: yes  Telemedicine Visit Details  Type of service:  Telephone visit  Start Time: 1:00 PM  End Time: 1:34 PM  Originating Location (pt. Location): Home  Distant Location (provider location):  [facility] MTM  Mode of Communication:  Telephone    Chief Complaint: Called to f/up on diabetes management and lab results from earlier this week.    Tobacco: He reports that he quit smoking about 48 years ago. His smoking use included cigarettes. He has a 10.50 pack-year smoking history. He has never used smokeless tobacco.  Alcohol: history of alcohol dependence - will reach 47 years of sobriety in November    Medication Adherence/Access: no issues reported    Diabetes: Jamal is currently using Lantus 18 units daily.  Pt is not experiencing side effects.   SMBG: CGM, but hasn't been downloading data so he's writing down the AM and HS readings.  He tried setting up [employer] to share data with me but had a hard time doing so.  Pt did have a gout flare, so did take prednisone on 9/18.  Date FBG/ 2hours post HS (2-3 hours post-prandial)   9/18/2020 173 289   9/19/2020 183 233   9/20/2020 141 211   9/21/2020 174 178   9/22/2020 162 178   9/23/2020 150 213   9/24/2020 172    Patient has not experienced any further episodes of hypoglycemia. Frequency of hypoglycemia? rarely. Symptoms of low blood sugar? none.   Recent symptoms of high blood sugar? none  Eye exam: due  Foot exam: due  ACEi/ARB: Yes: Irbesartan.   Urine Albumin:   Lab Results   Component Value Date    UMALCR 317.16 (H) 09/22/2020   Aspirin: Taking 81mg daily and denies side effects  Diet/Exercise:  Jamal bought some pre-made popcorn which he snacked on 9/19.    Lab Results   Component Value Date     A1C 7.0 09/22/2020    A1C 6.1 02/19/2020    A1C 6.2 11/14/2019    A1C 7.6 10/08/2019    A1C 6.5 03/06/2019     GFR Estimate   Date Value Ref Range Status   09/22/2020 72 >60 mL/min/[1.73_m2] Final     Comment:     Non  GFR Calc  Starting 12/18/2018, serum creatinine based estimated GFR (eGFR) will be   calculated using the Chronic Kidney Disease Epidemiology Collaboration   (CKD-EPI) equation.       Today's Vitals: There were no vitals taken for this visit. - telephone visit due to COVID-19 pandemic    ASSESSMENT:                            Medication Adherence: No issues identified    Type 2 Diabetes: Patient is not meeting A1c goal of < 8%. Self monitoring of blood glucose is not at goal of fasting  mg/dL and post prandial < 180 mg/dL.  Would benefit from increasing Lantus dose.    PLAN:                            1.  Lab results reviewed during visit today.  2.  Increased Lantus to 20 units daily.    I spent 34 minutes with this patient today. All changes were made via collaborative practice agreement with Dr. Jones. A copy of the visit note was provided to the patient's primary care provider.    Will follow up in 1 week, appt scheduled.    The patient declined a summary of these recommendations.     Nathaly Hughes, PharmD, [facility]  Medication Therapy Management Provider  Pager: [phone]

## 2020-10-06 ENCOUNTER — ALLIED HEALTH/NURSE VISIT (OUTPATIENT)
Dept: NUTRITION | Facility: CLINIC | Age: 75
End: 2020-10-06
Attending: INTERNAL MEDICINE
Payer: COMMERCIAL

## 2020-10-06 DIAGNOSIS — E78.2 MIXED HYPERLIPIDEMIA DUE TO TYPE 2 DIABETES MELLITUS (H): ICD-10-CM

## 2020-10-06 DIAGNOSIS — E11.69 MIXED HYPERLIPIDEMIA DUE TO TYPE 2 DIABETES MELLITUS (H): ICD-10-CM

## 2020-10-06 DIAGNOSIS — E11.40 TYPE 2 DIABETES MELLITUS WITH DIABETIC NEUROPATHY (H): Primary | Chronic | ICD-10-CM

## 2020-10-06 DIAGNOSIS — E66.01 MORBID OBESITY (H): ICD-10-CM

## 2020-10-06 PROCEDURE — 99207 PR NO CHARGE LOS: CPT

## 2020-10-06 PROCEDURE — G0108 DIAB MANAGE TRN  PER INDIV: HCPCS

## 2020-10-06 NOTE — PROGRESS NOTES
"PHONE Medical Nutrition Therapy for Diabetes  Patient verbally consented to the telephone visit service today: yes  Visit Type:Initial assessment and intervention    Jt Montgomery presents today for MNT and education related to type 2 diabetes.   He is accompanied by self.   ~ Has been doing a mostly plant-based meal plan since about February 2020, this is a change from heavily meat-based meal plan. He has done lots of reading on the topic including Janet, Kem, Alida Okeefe, and Keshawn among others  ~ Jamal reports ~45# loss since initiating the change, has gained a few pounds back     ASSESSMENT:   Patient comments/concerns relating to nutrition:   \"your role is to help me refine this\"  I asked: Are you satisfied?What is it that we are trying to accomplish through refinement?  ~ Jamal says reduce TG, spilling the protein in urine, says nails are brittle and he would like that to stop, and talk about supplements he takes- do you get what you need through food? He thinks except B12, and Omega 3  1) Want to talk about last lab tests of 9/22- says TG was in 843 and they have dropped to 560, voices goal 150 (in general he knows about family history- dad had MI in mid 40's - was a smoker, overworked, overweight, mom with COPD, sister with T1D as child)  ~ he says labs were NOT fasting (next time fasting)     LDL 50   HDL is up to 28  A1C 7.0%  ~ he wonders if the Impossible burgers had impact on TG- perhaps, but more important is that  triglycerides are impacted by fasting so next time will check in the morning    2) I need salad dressing that is low or no oil - recommended [employer] brand in the produce section    3) Question about nut butters- he uses cashew or walnut butter, has low PB intake- Reviewed choose <3 g total fat/serving, <5 g per entree, recommendation for plant-based eating as treatment for T2D is for no added fats or oils    4) Other questions about hummus, olives, popcorn- caution with " "hummus and oil, choose low-fat popcorn which is a whole grain, high fiber option    5) He is meeting with a new cardiologist soon   ~ Jamal says he has been frustrated with some doctors, had a stent and was encouraged to take statins but he is hesitant, likes to work on an academic level but feels he was dismissed.   He needs to be able to talk about a plant-based meal plan with providers.   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  Most of the time eats 3 meals  Breakfast 7-8 AM: rolled oats with fruit (berries), a little low-oil hummus with almond milk and Himmalayan pink salt, tumeric, ground flax  ~ feels satisfied and not hungry  Snack- apple with a few walnuts  Lunch: weekend stopped at [facility] for bean burritos, (sometimes burrito supreme but it has chicken and cheese); Next day he went to [employer] and got food watch a football game and had green salad and small pizza (black olives, mushroom, onions, and dairy cheese)  Snack: blueberries  Dinner: Neda's lentil soup with rice cakes, hummus, nut butter Snacks: later in day (6-8 PM) maybe a smoothie: celery, carrots, cucumbers, greens, protein powder (Shaklee) with flax OR moo goo gai pan with no chicken, white rice and wonton soup  ~ says he was eating a lot (5/week) of Impossible burgers but has stopped due to high fat content  ~ he feels the protein shake helps to lower BG   Has around: apples, nuts  Beverages: Pop (Diet) <1/day and Water or sparking water all/day    Misses meals? rarely  Eats out: typically once a day, not usually twice in a day - [employer] soup and salad, [employer] [place] salad, Asian place    Food allergies/intolerances: not assessed    Diet is high in: fat (unsaturated), fiber, fruits, sodium and vegetables  Diet is low in: need further assessment    EXERCISE: he has just started using a \"rebounder\" a few times/day, " has been walking during the summer    SOCIO/ECONOMIC:   Lives with: self and has supportive family - ex wife    BLOOD GLUCOSE MONITORING:  Patient glucose self monitoring as follows: Uses Freestyle Umair CGM, numbers not assessed today, he is also working with MTM.     Patient's most recent   Lab Results   Component Value Date    A1C 7.0 09/22/2020    is meeting goal of <7.0    MEDICATIONS:  Current Outpatient Medications   Medication     aspirin 81 MG EC tablet     blood glucose (NO BRAND SPECIFIED) test strip     blood glucose monitoring (NO BRAND SPECIFIED) meter device kit     Coenzyme Q10 (COQ10) 100 MG CAPS     Continuous Blood Gluc Sensor (FREESTYLE UMAIR 14 DAY SENSOR) MISC     digoxin (LANOXIN) 125 MCG tablet     diltiazem ER COATED BEADS (CARTIA XT) 300 MG 24 hr capsule     febuxostat (ULORIC) 40 MG TABS     finasteride (PROSCAR) 5 MG tablet     furosemide (LASIX) 20 MG tablet     Insulin Glargine (LANTUS SOLOSTAR SC)     insulin pen needle (BD GERMÁN U/F) 32G X 4 MM miscellaneous     irbesartan (AVAPRO) 300 MG tablet     Lactobacillus (PROBIOTIC ACIDOPHILUS PO)     magnesium oxide 200 MG TABS     melatonin 1 MG TABS tablet     metoprolol succinate ER (TOPROL-XL) 50 MG 24 hr tablet     Multiple Vitamins-Minerals (MULTIVITAMIN ADULT PO)     order for DME     predniSONE (DELTASONE) 20 MG tablet     tamsulosin (FLOMAX) 0.4 MG capsule     thin (NO BRAND SPECIFIED) lancets     UNABLE TO FIND     vitamin B complex with vitamin C (VITAMIN  B COMPLEX) tablet     Vitamin D, Cholecalciferol, 25 MCG (1000 UT) CAPS     warfarin ANTICOAGULANT (COUMADIN) 5 MG tablet     No current facility-administered medications for this visit.      Taking diabetes medications?   yes:     Diabetes Medication(s)     Insulin       Insulin Glargine (LANTUS SOLOSTAR SC)    Inject 20 Units Subcutaneous At Bedtime           LABS:  Lab Results   Component Value Date    A1C 7.0 09/22/2020     Lab Results   Component Value Date      09/22/2020     Lab Results   Component Value Date    LDL  09/22/2020     Cannot estimate LDL when triglyceride exceeds 400 mg/dL    LDL 50 09/22/2020     HDL Cholesterol   Date Value Ref Range Status   09/22/2020 28 (L) >39 mg/dL Final   ]  GFR Estimate   Date Value Ref Range Status   09/22/2020 72 >60 mL/min/[1.73_m2] Final     Comment:     Non  GFR Calc  Starting 12/18/2018, serum creatinine based estimated GFR (eGFR) will be   calculated using the Chronic Kidney Disease Epidemiology Collaboration   (CKD-EPI) equation.       GFR Estimate If Black   Date Value Ref Range Status   09/22/2020 84 >60 mL/min/[1.73_m2] Final     Comment:      GFR Calc  Starting 12/18/2018, serum creatinine based estimated GFR (eGFR) will be   calculated using the Chronic Kidney Disease Epidemiology Collaboration   (CKD-EPI) equation.       Lab Results   Component Value Date    CR 1.02 09/22/2020     No results found for: MICROALBUMIN    ANTHROPOMETRICS:  Vitals: There were no vitals taken for this visit.  There is no height or weight on file to calculate BMI.      Wt Readings from Last 5 Encounters:   05/28/20 109.8 kg (242 lb)   05/08/20 111.1 kg (245 lb)   03/12/20 116.6 kg (257 lb)   02/20/20 115.1 kg (253 lb 12.8 oz)   02/19/20 115.3 kg (254 lb 3.2 oz)       Weight Change: loss of 45# since February by report    ESTIMATED KCAL REQUIREMENTS:  Not assessed on low-fat plant based meal plan    NUTRITION DIAGNOSIS: Food- and nutrition-related knowledge deficit related to dietary changes and need for additional information as evidenced by questions asked and needs reported    NUTRITION INTERVENTION:  Education given to support: weight reduction, fat modification and heart healthy diet  Education Materials Provided: Did not provide today    Next appointment:  ~ send PCRM resources  ~ discuss supplements: D, choline  ~ ideas for dining out      PATIENT'S BEHAVIOR CHANGE GOALS:   See Patient Instructions for  patient stated behavior change goals. AVS was printed and given to patient at today's appointment.    MONITOR / EVALUATE:  RD will monitor/evaluate:  Food and nutrition knowledge / skills  Food / Beverage / Nutrient intake   Progress toward meeting stated nutrition-related goals  Weight change    FOLLOW-UP:  Follow-up appointment scheduled on 10/20.   Goals:  1) eliminate or minimize use of nuts, nut butters  2) look for items with <3 g fat/serving  3) call scheduling to get number to sign up for natalie Stnaley RD, LD, CDE    Time spent in minutes: 65  Encounter: Individual

## 2020-10-08 ENCOUNTER — VIRTUAL VISIT (OUTPATIENT)
Dept: PHARMACY | Facility: CLINIC | Age: 75
End: 2020-10-08
Payer: COMMERCIAL

## 2020-10-08 DIAGNOSIS — Z79.4 TYPE 2 DIABETES MELLITUS WITH DIABETIC NEUROPATHY, WITH LONG-TERM CURRENT USE OF INSULIN (H): Primary | ICD-10-CM

## 2020-10-08 DIAGNOSIS — E11.40 TYPE 2 DIABETES MELLITUS WITH DIABETIC NEUROPATHY, WITH LONG-TERM CURRENT USE OF INSULIN (H): Primary | ICD-10-CM

## 2020-10-08 PROCEDURE — 99606 MTMS BY PHARM EST 15 MIN: CPT | Mod: TEL | Performed by: PHARMACIST

## 2020-10-08 PROCEDURE — 99607 MTMS BY PHARM ADDL 15 MIN: CPT | Mod: TEL | Performed by: PHARMACIST

## 2020-10-08 NOTE — PROGRESS NOTES
MTM ENCOUNTER  SUBJECTIVE/OBJECTIVE:                           Jt Montgomery is a 74 year old male called for a follow-up visit. He was referred to me from Dr. Jones.  Today's visit is a follow-up MTM visit from 9/24/20.     Patient consented to a telehealth visit: yes  Telemedicine Visit Details  Type of service:  Telephone visit  Start Time: 2:06 PM  End Time: 2:33 PM  Originating Location (pt. Location): Home  Distant Location (provider location):  [facility] [place]  Mode of Communication:  Telephone    Chief Complaint: DM f/up.    Tobacco: He reports that he quit smoking about 48 years ago. His smoking use included cigarettes. He has a 10.50 pack-year smoking history. He has never used smokeless tobacco.  Alcohol: history of alcohol dependence - will reach 47 years of sobriety in November    Medication Adherence/Access: no issues reported    Diabetes: Jamal is currently using Lantus 20 units daily.  Pt is not experiencing side effects.   SMBG: CGM, but hasn't been downloading data so he's writing down the AM and HS readings.  He tried setting up [employer] to share data with me but had a hard time doing so.    Date FBG/ 2hours post HS (2-3 hours post-prandial)   10/1 155 189   10/2 140 216   10/3 187 140   10/4 141 142   10/5 133 174   10/6 139 110   10/7 151 163   10/8 138    Patient has not experienced any further episodes of hypoglycemia. Frequency of hypoglycemia? rarely. Symptoms of low blood sugar? none.   Recent symptoms of high blood sugar? none  Eye exam: due  Foot exam: due  ACEi/ARB: Yes: Irbesartan.   Urine Albumin:   Lab Results   Component Value Date    UMALCR 317.16 (H) 09/22/2020   Aspirin: Taking 81mg daily and denies side effects  Diet/Exercise:  Jamal had a visit with Dee Stanley RD, CDE to discuss his plant based diet further and he found this very helpful.  He's been exercising some since our last visit and feels this is going well.    Lab Results   Component Value Date    A1C 7.0  09/22/2020    A1C 6.1 02/19/2020    A1C 6.2 11/14/2019    A1C 7.6 10/08/2019    A1C 6.5 03/06/2019     GFR Estimate   Date Value Ref Range Status   09/22/2020 72 >60 mL/min/[1.73_m2] Final     Comment:     Non  GFR Calc  Starting 12/18/2018, serum creatinine based estimated GFR (eGFR) will be   calculated using the Chronic Kidney Disease Epidemiology Collaboration   (CKD-EPI) equation.       Today's Vitals: There were no vitals taken for this visit. - telephone visit due to COVID-19 pandemic    ASSESSMENT:                            Medication Adherence: No issues identified    Type 2 Diabetes: Patient is meeting A1c goal of < 8%. Self monitoring of blood glucose is not at goal of fasting  mg/dL and post prandial < 180 mg/dL.  Would benefit from increasing Lantus dose.  He previously had a few episodes of hypoglycemia with taking 22 units daily, will need to monitor closely for this.  He's more aware of hypoglycemia and how to treat now.    PLAN:                            1.  Increased Lantus to 22 units daily.  2.  Future considerations - pt would like to have TG checked in early November    I spent 27 minutes with this patient today. All changes were made via collaborative practice agreement with Dr. Jones. A copy of the visit note was provided to the patient's primary care provider.    Will follow up in 2 weeks, appt scheduled.    The patient was given a summary of these recommendations.     Nathaly Hughes, PharmD, [facility]  Medication Therapy Management Provider  Pager: [phone]

## 2020-10-13 ENCOUNTER — ANTICOAGULATION THERAPY VISIT (OUTPATIENT)
Dept: CARDIOLOGY | Facility: CLINIC | Age: 75
End: 2020-10-13
Payer: COMMERCIAL

## 2020-10-13 DIAGNOSIS — I48.0 PAROXYSMAL ATRIAL FIBRILLATION (H): ICD-10-CM

## 2020-10-13 DIAGNOSIS — Z79.01 LONG TERM CURRENT USE OF ANTICOAGULANTS WITH INR GOAL OF 2.0-3.0: ICD-10-CM

## 2020-10-13 LAB
CAPILLARY BLOOD COLLECTION: NORMAL
INR PPP: 2.2 (ref 0.86–1.14)

## 2020-10-13 PROCEDURE — 36416 COLLJ CAPILLARY BLOOD SPEC: CPT | Performed by: INTERNAL MEDICINE

## 2020-10-13 PROCEDURE — 99207 PR NO CHARGE NURSE ONLY: CPT | Performed by: INTERNAL MEDICINE

## 2020-10-13 PROCEDURE — 85610 PROTHROMBIN TIME: CPT | Performed by: INTERNAL MEDICINE

## 2020-10-13 NOTE — PROGRESS NOTES
ANTICOAGULATION FOLLOW-UP CLINIC VISIT    Patient Name:  Jt Montgomery  Date:  10/13/2020  Contact Type:  Telephone    SUBJECTIVE:  Patient Findings         Clinical Outcomes     Negatives:  Major bleeding event, Thromboembolic event, Anticoagulation-related hospital admission, Anticoagulation-related ED visit, Anticoagulation-related fatality           OBJECTIVE    Recent labs: (last 7 days)     10/13/20  1310   INR 2.20*       ASSESSMENT / PLAN  INR assessment THER    Recheck INR In: 4 WEEKS    INR Location Outside lab      Anticoagulation Summary  As of 10/13/2020    INR goal:  2.0-3.0   TTR:  58.3 % (11 mo)   INR used for dosin.20 (10/13/2020)   Warfarin maintenance plan:  7.5 mg (5 mg x 1.5) every Sun, Tue, Thu; 5 mg (5 mg x 1) all other days   Full warfarin instructions:  7.5 mg every Sun, Tue, Thu; 5 mg all other days   Weekly warfarin total:  42.5 mg   No change documented:  Marlena Hong RN   Plan last modified:  Marlena Hong RN (2020)   Next INR check:  11/10/2020   Target end date:  Indefinite    Indications    Atrial fibrillation (AFIB) on Coumadin [I48.91]  Long term current use of anticoagulants with INR goal of 2.0-3.0 (Resolved) [Z79.01]  Paroxysmal atrial fibrillation (H) [I48.0]  Long term current use of anticoagulants with INR goal of 2.0-3.0 [Z79.01]             Anticoagulation Episode Summary     INR check location:      Preferred lab:      Send INR reminders to:  [facility] HEART INR NURSE    Comments:        Anticoagulation Care Providers     Provider Role Specialty Phone number    DangelomikLottie DO Referring Cardiology [phone]            See the Encounter Report to view Anticoagulation Flowsheet and Dosing Calendar (Go to Encounters tab in chart review, and find the Anticoagulation Therapy Visit)    INR 2.20 He met with a nutritionist last week. Continues to eat daily dark greens/veggies along with his protein shakes. He is eating a plant based diet in hopes of  lowering his lipid levels. No change in other meds. Denies abnormal bleeding or bruising. Will continue current dosing of 7.5 mg SuTuTh and 5 mg all other days with recheck in 4 weeks.     Marlena Hong RN

## 2020-10-16 ENCOUNTER — OFFICE VISIT (OUTPATIENT)
Dept: CARDIOLOGY | Facility: CLINIC | Age: 75
End: 2020-10-16
Payer: COMMERCIAL

## 2020-10-16 VITALS
HEIGHT: 71 IN | SYSTOLIC BLOOD PRESSURE: 144 MMHG | BODY MASS INDEX: 36.26 KG/M2 | WEIGHT: 259 LBS | DIASTOLIC BLOOD PRESSURE: 72 MMHG | HEART RATE: 55 BPM

## 2020-10-16 DIAGNOSIS — I10 ESSENTIAL HYPERTENSION: ICD-10-CM

## 2020-10-16 DIAGNOSIS — I48.21 PERMANENT ATRIAL FIBRILLATION (H): Primary | ICD-10-CM

## 2020-10-16 PROCEDURE — 99204 OFFICE O/P NEW MOD 45 MIN: CPT | Performed by: INTERNAL MEDICINE

## 2020-10-16 ASSESSMENT — MIFFLIN-ST. JEOR: SCORE: 1941.95

## 2020-10-16 NOTE — PROGRESS NOTES
HPI and Plan:   See dictation    No orders of the defined types were placed in this encounter.      No orders of the defined types were placed in this encounter.      Encounter Diagnoses   Name Primary?     Permanent atrial fibrillation (H) Yes     Essential hypertension        CURRENT MEDICATIONS:  Current Outpatient Medications   Medication Sig Dispense Refill     aspirin 81 MG EC tablet Take 81 mg by mouth daily       Coenzyme Q10 (COQ10) 100 MG CAPS Take 200 mg by mouth daily        digoxin (LANOXIN) 125 MCG tablet TAKE 1 TABLET(125 MCG) BY MOUTH DAILY 90 tablet 1     diltiazem ER COATED BEADS (CARTIA XT) 300 MG 24 hr capsule Take 1 capsule (300 mg) by mouth daily 90 capsule 0     febuxostat (ULORIC) 40 MG TABS Take 40 mg by mouth daily       finasteride (PROSCAR) 5 MG tablet Take 1 tablet (5 mg) by mouth daily 90 tablet 3     furosemide (LASIX) 20 MG tablet Take 20 mg by mouth daily as needed       Insulin Glargine (LANTUS SOLOSTAR SC) Inject 22 Units Subcutaneous At Bedtime        irbesartan (AVAPRO) 300 MG tablet Take 0.5 tablets (150 mg) by mouth At Bedtime 90 tablet 2     Lactobacillus (PROBIOTIC ACIDOPHILUS PO) Take 1 capsule by mouth daily       magnesium oxide 200 MG TABS Take 1-2 tablets by mouth daily        melatonin 1 MG TABS tablet Take 0.5 mg by mouth nightly as needed for sleep       metoprolol succinate ER (TOPROL-XL) 50 MG 24 hr tablet Take 1 tablet (50 mg) by mouth 2 times daily 180 tablet 3     Multiple Vitamins-Minerals (MULTIVITAMIN ADULT PO) Take 1 tablet by mouth daily       tamsulosin (FLOMAX) 0.4 MG capsule Take 1 capsule (0.4 mg) by mouth every evening 90 capsule 3     vitamin B complex with vitamin C (VITAMIN  B COMPLEX) tablet Take 1 tablet by mouth daily       Vitamin D, Cholecalciferol, 25 MCG (1000 UT) CAPS Take 3,000 Units by mouth daily       warfarin ANTICOAGULANT (COUMADIN) 5 MG tablet Take 5 mg by mouth As directed by INR clinic       blood glucose (NO BRAND SPECIFIED) test  strip Use to test blood sugar 1 time daily or as directed. To accompany: Blood Glucose Monitor Brands: per insurance. 100 strip 6     blood glucose monitoring (NO BRAND SPECIFIED) meter device kit Use to test blood sugar 1 time daily or as directed. Preferred blood glucose meter OR supplies to accompany: Blood Glucose Monitor Brands: per insurance. 1 kit 0     Continuous Blood Gluc Sensor (FREESTYLE RINKU 14 DAY SENSOR) MISC AS DIRECTED EVERY 14 DAYS 100 each 3     insulin pen needle (BD GERMÁN U/F) 32G X 4 MM miscellaneous USE FOUR TIMES DAILY AS DIRECTED 200 each 1     order for DME Equipment being ordered: Compression stockings 1 each 3     predniSONE (DELTASONE) 20 MG tablet TAKE 1 TABLET(20 MG) BY MOUTH DAILY AS NEEDED GOUT FLARE (Patient not taking: Reported on 10/8/2020) 10 tablet 0     thin (NO BRAND SPECIFIED) lancets Use with lanceting device. To accompany: Blood Glucose Monitor Brands: per insurance. 100 each 6     UNABLE TO FIND MEDICATION NAME: CarotoMax with lutein         ALLERGIES     Allergies   Allergen Reactions     No Known Allergies        PAST MEDICAL HISTORY:  Past Medical History:   Diagnosis Date     Atrial fibrillation (H)      CAD (coronary artery disease)     MI with RONEL to dRCA April 2011     Central Sleep Apnea with Pat Villanueva breathing 9/14/2010    Also TOMMY with CPAP     CKD (chronic kidney disease) stage 3, GFR 30-59 ml/min      Dilated cardiomyopathy (H)     nonischemic (possibly related to h/o a.fib with RVR) EF 30-40% March 2013     DM2 (diabetes mellitus, type 2) (H)     Goal HgbA1c < 7%     Gout     uric acid level correlates; April 2014 h/o +knee aspirate     Hernia, abdominal      Hypertension     Goal <140/90     Pulmonary hypertension (H)     mild per echo 3/2013     Spider veins        PAST SURGICAL HISTORY:  Past Surgical History:   Procedure Laterality Date     CORONARY ANGIOGRAPHY ADULT ORDER  2011    distal circ-RONEL     CYSTOSCOPY, TRANSURETHRAL RESECTION (TUR)  PROSTATE, COMBINED N/A 5/15/2020    Procedure: CYSTOSCOPY, WITH TRANSURETHRAL RESECTION PROSTATE;  Surgeon: Tr Bowles MD;  Location: [facility] OR     HERNIA REPAIR  11/20/10    incarcinated     SUSPEND HYOID, GENIOGLOSSAL ADVANCEMENT         FAMILY HISTORY:  Family History   Problem Relation Age of Onset     Hypertension Mother      Coronary Artery Disease Mother      Coronary Artery Disease Father      Hypertension Father      Diabetes Sister      Cerebrovascular Disease Sister        SOCIAL HISTORY:  Social History     Socioeconomic History     Marital status:      Spouse name: None     Number of children: None     Years of education: None     Highest education level: None   Occupational History     None   Social Needs     Financial resource strain: None     Food insecurity     Worry: None     Inability: None     Transportation needs     Medical: None     Non-medical: None   Tobacco Use     Smoking status: Former Smoker     Packs/day: 1.50     Years: 7.00     Pack years: 10.50     Types: Cigarettes     Quit date: 1972     Years since quittin.8     Smokeless tobacco: Never Used     Tobacco comment: quit in       Started at around age 18-19   Substance and Sexual Activity     Alcohol use: No     Alcohol/week: 0.0 standard drinks     Comment: 74   recovering     Drug use: No     Sexual activity: Not Currently     Partners: Female   Lifestyle     Physical activity     Days per week: None     Minutes per session: None     Stress: None   Relationships     Social connections     Talks on phone: None     Gets together: None     Attends Yazdanism service: None     Active member of club or organization: None     Attends meetings of clubs or organizations: None     Relationship status: None     Intimate partner violence     Fear of current or ex partner: None     Emotionally abused: None     Physically abused: None     Forced sexual activity: None   Other Topics Concern      Service Not  "Asked     Blood Transfusions Not Asked     Caffeine Concern No     Occupational Exposure Not Asked     Hobby Hazards Not Asked     Sleep Concern Yes     Comment: sleep apnea, wears bi-pap     Stress Concern Not Asked     Weight Concern Not Asked     Special Diet No     Comment: low carb diet     Back Care Not Asked     Exercise No     Comment: walking     Bike Helmet Not Asked     Seat Belt Yes     Self-Exams Not Asked     Parent/sibling w/ CABG, MI or angioplasty before 65F 55M? Not Asked   Social History Narrative     None       Review of Systems:  Skin:  Negative     Eyes:  Positive for glasses  ENT:  Negative    Respiratory:  Positive for dyspnea on exertion;sleep apnea(only after eating too much oil)  Cardiovascular:    Positive for;edema  Gastroenterology: Negative    Genitourinary:  Positive for nocturia;urinary frequency;prostate problem  Musculoskeletal:  Positive for joint stiffness(left hand fingers)  Neurologic:  Positive for numbness or tingling of feet  Psychiatric:  Negative sleep disturbances  Heme/Lymph/Imm:  Negative    Endocrine:  Positive for diabetes    Physical Exam:  Vitals: BP (!) 144/72   Pulse 55   Ht 1.803 m (5' 11\")   Wt 117.5 kg (259 lb)   BMI 36.12 kg/m      Constitutional:  cooperative, alert and oriented, well developed, well nourished, in no acute distress obese      Skin:  warm and dry to the touch, no apparent skin lesions or masses noted          Head:  normocephalic, no masses or lesions        Eyes:           Lymph:      ENT:  no pallor or cyanosis, dentition good        Neck:  carotid pulses are full and equal bilaterally JVP 8-10      Respiratory:  clear to auscultation;normal symmetry         Cardiac: normal S1 and S2;no S3 or S4;apical impulse not displaced irregularly irregular rhythm   no presence of murmur          pulses full and equal                                        GI:  abdomen soft;no bruits obese      Extremities and Muscular Skeletal:  no deformities, " clubbing, cyanosis, erythema observed stasis pigmentation bilateral LE edema;pitting;1+          Neurological:  no gross motor deficits        Psych:  Alert and Oriented x 3        Recent Lab Results:  LIPID RESULTS:  Lab Results   Component Value Date    CHOL 123 09/22/2020    HDL 28 (L) 09/22/2020    LDL  09/22/2020     Cannot estimate LDL when triglyceride exceeds 400 mg/dL    LDL 50 09/22/2020    TRIG 560 (H) 09/22/2020    CHOLHDLRATIO 4.7 (H) 04/16/2013       LIVER ENZYME RESULTS:  Lab Results   Component Value Date    AST 33 12/18/2019    ALT 26 12/18/2019       CBC RESULTS:  Lab Results   Component Value Date    WBC 5.9 05/13/2020    RBC 3.01 (L) 05/13/2020    HGB 10.8 (L) 05/16/2020    HCT 31.1 (L) 05/13/2020     (H) 05/13/2020    MCH 34.9 (H) 05/13/2020    MCHC 33.8 05/13/2020    RDW 13.2 05/13/2020     05/13/2020       BMP RESULTS:  Lab Results   Component Value Date     09/22/2020    POTASSIUM 4.3 09/22/2020    CHLORIDE 103 09/22/2020    CO2 24 09/22/2020    ANIONGAP 7 09/22/2020     (H) 09/22/2020    BUN 16 09/22/2020    CR 1.02 09/22/2020    GFRESTIMATED 72 09/22/2020    GFRESTBLACK 84 09/22/2020    HARSHA 8.7 09/22/2020        A1C RESULTS:  Lab Results   Component Value Date    A1C 7.0 (H) 09/22/2020       INR RESULTS:  Lab Results   Component Value Date    INR 2.20 (H) 10/13/2020    INR 2.30 (H) 09/22/2020           CC  Digna Jones MD  [id number] [place] [address]

## 2020-10-16 NOTE — LETTER
10/16/2020    Digna Jones MD  [address] [facility] [address]  [facility] [address]    RE: Jt Montgomery       Dear Colleague,    I had the pleasure of seeing Jt Montgomery in the [facility].    HPI and Plan:   See dictation    No orders of the defined types were placed in this encounter.      No orders of the defined types were placed in this encounter.      Encounter Diagnoses   Name Primary?     Permanent atrial fibrillation (H) Yes     Essential hypertension        CURRENT MEDICATIONS:  Current Outpatient Medications   Medication Sig Dispense Refill     aspirin 81 MG EC tablet Take 81 mg by mouth daily       Coenzyme Q10 (COQ10) 100 MG CAPS Take 200 mg by mouth daily        digoxin (LANOXIN) 125 MCG tablet TAKE 1 TABLET(125 MCG) BY MOUTH DAILY 90 tablet 1     diltiazem ER COATED BEADS (CARTIA XT) 300 MG 24 hr capsule Take 1 capsule (300 mg) by mouth daily 90 capsule 0     febuxostat (ULORIC) 40 MG TABS Take 40 mg by mouth daily       finasteride (PROSCAR) 5 MG tablet Take 1 tablet (5 mg) by mouth daily 90 tablet 3     furosemide (LASIX) 20 MG tablet Take 20 mg by mouth daily as needed       Insulin Glargine (LANTUS SOLOSTAR SC) Inject 22 Units Subcutaneous At Bedtime        irbesartan (AVAPRO) 300 MG tablet Take 0.5 tablets (150 mg) by mouth At Bedtime 90 tablet 2     Lactobacillus (PROBIOTIC ACIDOPHILUS PO) Take 1 capsule by mouth daily       magnesium oxide 200 MG TABS Take 1-2 tablets by mouth daily        melatonin 1 MG TABS tablet Take 0.5 mg by mouth nightly as needed for sleep       metoprolol succinate ER (TOPROL-XL) 50 MG 24 hr tablet Take 1 tablet (50 mg) by mouth 2 times daily 180 tablet 3     Multiple Vitamins-Minerals (MULTIVITAMIN ADULT PO) Take 1 tablet by mouth daily       tamsulosin (FLOMAX) 0.4 MG capsule Take 1 capsule (0.4 mg) by mouth every evening 90 capsule 3     vitamin B complex with vitamin C (VITAMIN  B COMPLEX) tablet Take 1 tablet by mouth daily        Vitamin D, Cholecalciferol, 25 MCG (1000 UT) CAPS Take 3,000 Units by mouth daily       warfarin ANTICOAGULANT (COUMADIN) 5 MG tablet Take 5 mg by mouth As directed by INR clinic       blood glucose (NO BRAND SPECIFIED) test strip Use to test blood sugar 1 time daily or as directed. To accompany: Blood Glucose Monitor Brands: per insurance. 100 strip 6     blood glucose monitoring (NO BRAND SPECIFIED) meter device kit Use to test blood sugar 1 time daily or as directed. Preferred blood glucose meter OR supplies to accompany: Blood Glucose Monitor Brands: per insurance. 1 kit 0     Continuous Blood Gluc Sensor (FREESTYLE RINKU 14 DAY SENSOR) MISC AS DIRECTED EVERY 14 DAYS 100 each 3     insulin pen needle (BD GERMÁN U/F) 32G X 4 MM miscellaneous USE FOUR TIMES DAILY AS DIRECTED 200 each 1     order for DME Equipment being ordered: Compression stockings 1 each 3     predniSONE (DELTASONE) 20 MG tablet TAKE 1 TABLET(20 MG) BY MOUTH DAILY AS NEEDED GOUT FLARE (Patient not taking: Reported on 10/8/2020) 10 tablet 0     thin (NO BRAND SPECIFIED) lancets Use with lanceting device. To accompany: Blood Glucose Monitor Brands: per insurance. 100 each 6     UNABLE TO FIND MEDICATION NAME: CarotoMax with lutein         ALLERGIES     Allergies   Allergen Reactions     No Known Allergies        PAST MEDICAL HISTORY:  Past Medical History:   Diagnosis Date     Atrial fibrillation (H)      CAD (coronary artery disease)     MI with RONEL to dRCA April 2011     Central Sleep Apnea with Pat Villanueva breathing 9/14/2010    Also TOMMY with CPAP     CKD (chronic kidney disease) stage 3, GFR 30-59 ml/min      Dilated cardiomyopathy (H)     nonischemic (possibly related to h/o a.fib with RVR) EF 30-40% March 2013     DM2 (diabetes mellitus, type 2) (H)     Goal HgbA1c < 7%     Gout     uric acid level correlates; April 2014 h/o +knee aspirate     Hernia, abdominal      Hypertension     Goal <140/90     Pulmonary hypertension (H)     mild per  echo 3/2013     Spider veins        PAST SURGICAL HISTORY:  Past Surgical History:   Procedure Laterality Date     CORONARY ANGIOGRAPHY ADULT ORDER      distal circ-RONEL     CYSTOSCOPY, TRANSURETHRAL RESECTION (TUR) PROSTATE, COMBINED N/A 5/15/2020    Procedure: CYSTOSCOPY, WITH TRANSURETHRAL RESECTION PROSTATE;  Surgeon: Tr Bowles MD;  Location: [facility] OR     HERNIA REPAIR  11/20/10    incarcinated     SUSPEND HYOID, GENIOGLOSSAL ADVANCEMENT         FAMILY HISTORY:  Family History   Problem Relation Age of Onset     Hypertension Mother      Coronary Artery Disease Mother      Coronary Artery Disease Father      Hypertension Father      Diabetes Sister      Cerebrovascular Disease Sister        SOCIAL HISTORY:  Social History     Socioeconomic History     Marital status:      Spouse name: None     Number of children: None     Years of education: None     Highest education level: None   Occupational History     None   Social Needs     Financial resource strain: None     Food insecurity     Worry: None     Inability: None     Transportation needs     Medical: None     Non-medical: None   Tobacco Use     Smoking status: Former Smoker     Packs/day: 1.50     Years: 7.00     Pack years: 10.50     Types: Cigarettes     Quit date: 1972     Years since quittin.8     Smokeless tobacco: Never Used     Tobacco comment: quit in       Started at around age 18-19   Substance and Sexual Activity     Alcohol use: No     Alcohol/week: 0.0 standard drinks     Comment: 74   recovering     Drug use: No     Sexual activity: Not Currently     Partners: Female   Lifestyle     Physical activity     Days per week: None     Minutes per session: None     Stress: None   Relationships     Social connections     Talks on phone: None     Gets together: None     Attends Sabianism service: None     Active member of club or organization: None     Attends meetings of clubs or organizations: None     Relationship  "status: None     Intimate partner violence     Fear of current or ex partner: None     Emotionally abused: None     Physically abused: None     Forced sexual activity: None   Other Topics Concern      Service Not Asked     Blood Transfusions Not Asked     Caffeine Concern No     Occupational Exposure Not Asked     Hobby Hazards Not Asked     Sleep Concern Yes     Comment: sleep apnea, wears bi-pap     Stress Concern Not Asked     Weight Concern Not Asked     Special Diet No     Comment: low carb diet     Back Care Not Asked     Exercise No     Comment: walking     Bike Helmet Not Asked     Seat Belt Yes     Self-Exams Not Asked     Parent/sibling w/ CABG, MI or angioplasty before 65F 55M? Not Asked   Social History Narrative     None       Review of Systems:  Skin:  Negative     Eyes:  Positive for glasses  ENT:  Negative    Respiratory:  Positive for dyspnea on exertion;sleep apnea(only after eating too much oil)  Cardiovascular:    Positive for;edema  Gastroenterology: Negative    Genitourinary:  Positive for nocturia;urinary frequency;prostate problem  Musculoskeletal:  Positive for joint stiffness(left hand fingers)  Neurologic:  Positive for numbness or tingling of feet  Psychiatric:  Negative sleep disturbances  Heme/Lymph/Imm:  Negative    Endocrine:  Positive for diabetes    Physical Exam:  Vitals: BP (!) 144/72   Pulse 55   Ht 1.803 m (5' 11\")   Wt 117.5 kg (259 lb)   BMI 36.12 kg/m      Constitutional:  cooperative, alert and oriented, well developed, well nourished, in no acute distress obese      Skin:  warm and dry to the touch, no apparent skin lesions or masses noted          Head:  normocephalic, no masses or lesions        Eyes:           Lymph:      ENT:  no pallor or cyanosis, dentition good        Neck:  carotid pulses are full and equal bilaterally JVP 8-10      Respiratory:  clear to auscultation;normal symmetry         Cardiac: normal S1 and S2;no S3 or S4;apical impulse not " displaced irregularly irregular rhythm   no presence of murmur          pulses full and equal                                        GI:  abdomen soft;no bruits obese      Extremities and Muscular Skeletal:  no deformities, clubbing, cyanosis, erythema observed stasis pigmentation bilateral LE edema;pitting;1+          Neurological:  no gross motor deficits        Psych:  Alert and Oriented x 3        Recent Lab Results:  LIPID RESULTS:  Lab Results   Component Value Date    CHOL 123 09/22/2020    HDL 28 (L) 09/22/2020    LDL  09/22/2020     Cannot estimate LDL when triglyceride exceeds 400 mg/dL    LDL 50 09/22/2020    TRIG 560 (H) 09/22/2020    CHOLHDLRATIO 4.7 (H) 04/16/2013       LIVER ENZYME RESULTS:  Lab Results   Component Value Date    AST 33 12/18/2019    ALT 26 12/18/2019       CBC RESULTS:  Lab Results   Component Value Date    WBC 5.9 05/13/2020    RBC 3.01 (L) 05/13/2020    HGB 10.8 (L) 05/16/2020    HCT 31.1 (L) 05/13/2020     (H) 05/13/2020    MCH 34.9 (H) 05/13/2020    MCHC 33.8 05/13/2020    RDW 13.2 05/13/2020     05/13/2020       BMP RESULTS:  Lab Results   Component Value Date     09/22/2020    POTASSIUM 4.3 09/22/2020    CHLORIDE 103 09/22/2020    CO2 24 09/22/2020    ANIONGAP 7 09/22/2020     (H) 09/22/2020    BUN 16 09/22/2020    CR 1.02 09/22/2020    GFRESTIMATED 72 09/22/2020    GFRESTBLACK 84 09/22/2020    HARSHA 8.7 09/22/2020        A1C RESULTS:  Lab Results   Component Value Date    A1C 7.0 (H) 09/22/2020       INR RESULTS:  Lab Results   Component Value Date    INR 2.20 (H) 10/13/2020    INR 2.30 (H) 09/22/2020           CC  Digna Jones MD  [address] [place] [address]                    Thank you for allowing me to participate in the care of your patient.      Sincerely,     DR MANI SON MD     [facility]    cc:   Digna Jones MD  [address] [place] [address]

## 2020-10-16 NOTE — PROGRESS NOTES
Service Date: 10/16/2020      It is a pleasure for me to see this very pleasant 74-year-old retired  for followup of cardiomyopathy, coronary artery disease, as well as multiple cardiac risk factors.      This gentleman has seen several of my partners in the past.  In 2011, he presented with an ejection fraction of 29%.  Angiography revealed a tight distal circumflex stenosis and this was angioplastied and stented.  Multiple cardiac risk factors include hypertension, diabetes, obstructive sleep apnea, obesity and it was thought that his cardiomyopathy is nonischemic in origin.  He has chronic lower limb edema as well.      In the year since we last saw him, I am very happy to see that he has lost at least 20 pounds in weight.  He tells me that he has embarked on a vegetarian diet.  He feels well.  He denies worsening shortness of breath, PND, orthopnea or chest discomfort.  Initial blood pressure was just a little high, but when I rechecked it, he was normotensive.      PHYSICAL EXAMINATION:  He has normal JVP, unremarkable heart sounds, clear chest.  Heart rhythm is regular, and EKG today confirms the presence of atrial fibrillation, which I believe is likely permanent now.  In any case, he is well anticoagulated and is tolerating warfarin well without evidence of bleeding.      He does have evidence of bilateral ankle edema, at least 1-2+, with evidence of venous stasis.      His EKG shows atrial fibrillation with diffuse ST and T-wave changes.  Occasional PVCs are also seen.      Of more concern is his lipid profile.  He has triglyceride levels of 560.  It has been as high as 800.  His LDL is excellent at 50, though HDL is persistently low at 28.  HbA1C is 7.0.      IMPRESSION:   1.  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   2.  Nonischemic cardiomyopathy, significant recovery since 2011.  Most recent echocardiogram in 2016 shows an ejection fraction of 50%-55%.   No evidence of CHF at this time  3.  Coronary artery disease. With remote history of distal circumflex stenting.  4.  Atrial fibrillation, likely permanent, rate controlled and on warfarin.  5.  Diabetes mellitus, mildly suboptimal control.   6.  Hypertension, reasonable control.   7.  Obesity, improving.   8.  Obstructive sleep apnea, regular use of CPAP.  9.  Lower limb edema from venous insufficiency.     We had a nice discussion about the use of statins.  As he does have a history of coronary artery disease with stenting, I informed him that it is a class 1 indication according to our society guidelines for him to be on a statin indefinitely even with excellent LDL levels.  We discussed the pleiotropic effects of statins.  In addition, he has very high triglyceride levels.  I think a statin would help to decrease that as well.  He will read about this.  In the meantime, he will continue to work on hygienic measures for weight loss.  I provisionally arranged to see him again in 6 months' time for further followup.         MANI SON MD, FACC             D: 10/16/2020   T: 10/16/2020   MT: maribell      Name:     PAL ESCUDERO   MRN:      [id number]        Account:      [id number]   :      1945           Service Date: 10/16/2020      Document: [id number]

## 2020-10-16 NOTE — LETTER
10/16/2020      Digna Jones MD  [address] [facility] [address]  [facility] [address]      RE: Jt PINO Valentina       Dear Colleague,    I had the pleasure of seeing Jt Montgomery in the [facility].    Service Date: 10/16/2020      It is a pleasure for me to see this very pleasant 74-year-old retired  for followup of cardiomyopathy, coronary artery disease, as well as multiple cardiac risk factors.      This gentleman has seen several of my partners in the past.  In 2011, he presented with an ejection fraction of 29%.  Angiography revealed a tight distal circumflex stenosis and this was angioplastied and stented.  Multiple cardiac risk factors include hypertension, diabetes, obstructive sleep apnea, obesity and it was thought that his cardiomyopathy is nonischemic in origin.  He has chronic lower limb edema as well.      In the year since we last saw him, I am very happy to see that he has lost at least 20 pounds in weight.  He tells me that he has embarked on a vegetarian diet.  He feels well.  He denies worsening shortness of breath, PND, orthopnea or chest discomfort.  Initial blood pressure was just a little high, but when I rechecked it, he was normotensive.      PHYSICAL EXAMINATION:  He has normal JVP, unremarkable heart sounds, clear chest.  Heart rhythm is regular, and EKG today confirms the presence of atrial fibrillation, which I believe is likely permanent now.  In any case, he is well anticoagulated and is tolerating warfarin well without evidence of bleeding.      He does have evidence of bilateral ankle edema, at least 1-2+, with evidence of venous stasis.      His EKG shows atrial fibrillation with diffuse ST and T-wave changes.  Occasional PVCs are also seen.      Of more concern is his lipid profile.  He has triglyceride levels of 560.  It has been as high as 800.  His LDL is excellent at 50, though HDL is persistently low at 28.  HbA1C is 7.0.       IMPRESSION:   1.  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   2.  Nonischemic cardiomyopathy, significant recovery since .  Most recent echocardiogram in 2016 shows an ejection fraction of 50%-55%.  No evidence of CHF at this time  3.  Coronary artery disease. With remote history of distal circumflex stenting.  4.  Atrial fibrillation, likely permanent, rate controlled and on warfarin.  5.  Diabetes mellitus, mildly suboptimal control.   6.  Hypertension, reasonable control.   7.  Obesity, improving.   8.  Obstructive sleep apnea, regular use of CPAP.  9.  Lower limb edema from venous insufficiency.     We had a nice discussion about the use of statins.  As he does have a history of coronary artery disease with stenting, I informed him that it is a class 1 indication according to our society guidelines for him to be on a statin indefinitely even with excellent LDL levels.  We discussed the pleiotropic effects of statins.  In addition, he has very high triglyceride levels.  I think a statin would help to decrease that as well.  He will read about this.  In the meantime, he will continue to work on hygienic measures for weight loss.  I provisionally arranged to see him again in 6 months' time for further followup.         MANI SON MD, [facility]             D: 10/16/2020   T: 10/16/2020   MT: maribell      Name:     PAL ESCUDERO   MRN:      [id number]        Account:      [id number]   :      1945           Service Date: 10/16/2020      Document: [id number]          Outpatient Encounter Medications as of 10/16/2020   Medication Sig Dispense Refill     aspirin 81 MG EC tablet Take 81 mg by mouth daily       Coenzyme Q10 (COQ10) 100 MG CAPS Take 200 mg by mouth daily        digoxin (LANOXIN) 125 MCG tablet TAKE 1 TABLET(125 MCG) BY MOUTH DAILY 90 tablet 1     diltiazem ER COATED BEADS (CARTIA XT) 300 MG 24 hr capsule Take 1 capsule (300 mg) by mouth daily 90 capsule 0      febuxostat (ULORIC) 40 MG TABS Take 40 mg by mouth daily       finasteride (PROSCAR) 5 MG tablet Take 1 tablet (5 mg) by mouth daily 90 tablet 3     furosemide (LASIX) 20 MG tablet Take 20 mg by mouth daily as needed       Insulin Glargine (LANTUS SOLOSTAR SC) Inject 22 Units Subcutaneous At Bedtime        irbesartan (AVAPRO) 300 MG tablet Take 0.5 tablets (150 mg) by mouth At Bedtime 90 tablet 2     Lactobacillus (PROBIOTIC ACIDOPHILUS PO) Take 1 capsule by mouth daily       magnesium oxide 200 MG TABS Take 1-2 tablets by mouth daily        melatonin 1 MG TABS tablet Take 0.5 mg by mouth nightly as needed for sleep       metoprolol succinate ER (TOPROL-XL) 50 MG 24 hr tablet Take 1 tablet (50 mg) by mouth 2 times daily 180 tablet 3     Multiple Vitamins-Minerals (MULTIVITAMIN ADULT PO) Take 1 tablet by mouth daily       tamsulosin (FLOMAX) 0.4 MG capsule Take 1 capsule (0.4 mg) by mouth every evening 90 capsule 3     vitamin B complex with vitamin C (VITAMIN  B COMPLEX) tablet Take 1 tablet by mouth daily       Vitamin D, Cholecalciferol, 25 MCG (1000 UT) CAPS Take 3,000 Units by mouth daily       warfarin ANTICOAGULANT (COUMADIN) 5 MG tablet Take 5 mg by mouth As directed by INR clinic       blood glucose (NO BRAND SPECIFIED) test strip Use to test blood sugar 1 time daily or as directed. To accompany: Blood Glucose Monitor Brands: per insurance. 100 strip 6     blood glucose monitoring (NO BRAND SPECIFIED) meter device kit Use to test blood sugar 1 time daily or as directed. Preferred blood glucose meter OR supplies to accompany: Blood Glucose Monitor Brands: per insurance. 1 kit 0     Continuous Blood Gluc Sensor (FREESTYLE RINKU 14 DAY SENSOR) MISC AS DIRECTED EVERY 14 DAYS 100 each 3     insulin pen needle (BD GERMÁN U/F) 32G X 4 MM miscellaneous USE FOUR TIMES DAILY AS DIRECTED 200 each 1     order for DME Equipment being ordered: Compression stockings 1 each 3     predniSONE (DELTASONE) 20 MG tablet TAKE 1  TABLET(20 MG) BY MOUTH DAILY AS NEEDED GOUT FLARE (Patient not taking: Reported on 10/8/2020) 10 tablet 0     thin (NO BRAND SPECIFIED) lancets Use with lanceting device. To accompany: Blood Glucose Monitor Brands: per insurance. 100 each 6     UNABLE TO FIND MEDICATION NAME: CarotoMax with lutein       No facility-administered encounter medications on file as of 10/16/2020.        Again, thank you for allowing me to participate in the care of your patient.      Sincerely,    DR MANI SON MD     [facility]

## 2020-10-20 ENCOUNTER — ALLIED HEALTH/NURSE VISIT (OUTPATIENT)
Dept: EDUCATION SERVICES | Facility: CLINIC | Age: 75
End: 2020-10-20
Payer: COMMERCIAL

## 2020-10-20 DIAGNOSIS — E11.40 TYPE 2 DIABETES MELLITUS WITH DIABETIC NEUROPATHY (H): Primary | Chronic | ICD-10-CM

## 2020-10-20 PROCEDURE — G0108 DIAB MANAGE TRN  PER INDIV: HCPCS | Mod: 95

## 2020-10-20 NOTE — PROGRESS NOTES
"PHONE Medical Nutrition Therapy for Diabetes  Patient verbally consented to the telephone visit service today: yes    Visit Type:Reassessment and intervention- he is using a plant-based meal plan in treatment for T2D  Jt PINO Allisonariel presents today for MNT and education related to type 2 diabetes and weight management as well as hyperlipidemia support  He is accompanied by self on phone.     ASSESSMENT:   Patient comments/concerns relating to nutrition: he has questions and wants to be sure he is in track with plant-based eating, getting the right nutrition.   ~ Jamal gives me an update about a dear friend who is very ill and in the hospital- they aren't sure if it is COVID, he was not responding to calls or contact so they went to investigate and found him quite ill  He has other updates and questions:  1)  He had a cardiology appointment, says \"I\"m signed up under him to get my pills\", plans for follow-up there in 6 months, says they talked about A1C and lipids  ~ re: A1C 7.0% (cardiology said good to reduce it) Agree- reviewed ADA and A Endo  Goals <7% and <6.5% as most protective  ~ HDL, was told it likely will not go up much, agree- exercise may help a few points but it is not very responsive to dietary change  ~ statin? Doc wants him to seriously consider it, he has admittedly been resistant: advised talk to MTM at appointment tomorrow, but my understanding is that research supports the use for additional protection with diabetes  Does plant-based help with arterial lining? He has heard this- Discussed heart healthy benefits of plant-based= anti-inflammatory, mineral rich, low fat/cholesterol   2) I asked, what nutrition needs do you have today? What clarifications are necessary?  ~ he has not tried recipes yet though he has many resources: Keshawn Asencio, [employer] and others  ~ support groups? Not at [facility], reviewed [employer] pros and cons  He goes to AA and will celebrate 47 years of sobriety next " "month, says he likes support and peers. He is aware of one through [facility] he thinks.  ~ offered [facility] local plant-based group, compassion based but they have vegan gatherings, events- he accepted info and web site    NUTRITION HISTORY:  He has eliminated the black olives and the nut butter he was using in attempt to reduce fat.   ~ \"I have not done my research on salad dressings\"- has not been to the store yet  Eats out:  frequently     Br= oats, blueberries, almond milk, tumeric, salt, some hummus instead of margarine- \"I'm not worried about this meal\"    Sn= apple or banana    Maggie= \"I can put together some salads\"- wants ideas: simply stated- choose balance at every meal: plant- protein, produce, whole grain or whole food starch: power bowls, salad, wraps, stir vega etc.    Din= same message -balanced meals, but nice to use recipes to keep it interesting and varied    Previous diet education:  Yes - last visit      EXERCISE: not assessed    SOCIO/ECONOMIC:   Lives with: self    BLOOD GLUCOSE MONITORING:  Not assessed    Patient's most recent   Lab Results   Component Value Date    A1C 7.0 09/22/2020    is not meeting goal of <7.0    MEDICATIONS:  Current Outpatient Medications   Medication     aspirin 81 MG EC tablet     blood glucose (NO BRAND SPECIFIED) test strip     blood glucose monitoring (NO BRAND SPECIFIED) meter device kit     Coenzyme Q10 (COQ10) 100 MG CAPS     Continuous Blood Gluc Sensor (FREESTYLE RINKU 14 DAY SENSOR) MISC     digoxin (LANOXIN) 125 MCG tablet     diltiazem ER COATED BEADS (CARTIA XT) 300 MG 24 hr capsule     febuxostat (ULORIC) 40 MG TABS     finasteride (PROSCAR) 5 MG tablet     furosemide (LASIX) 20 MG tablet     Insulin Glargine (LANTUS SOLOSTAR SC)     insulin pen needle (BD GERMÁN U/F) 32G X 4 MM miscellaneous     irbesartan (AVAPRO) 300 MG tablet     Lactobacillus (PROBIOTIC ACIDOPHILUS PO)     magnesium oxide 200 MG TABS     melatonin 1 MG TABS tablet     metoprolol " succinate ER (TOPROL-XL) 50 MG 24 hr tablet     Multiple Vitamins-Minerals (MULTIVITAMIN ADULT PO)     order for DME     predniSONE (DELTASONE) 20 MG tablet     tamsulosin (FLOMAX) 0.4 MG capsule     thin (NO BRAND SPECIFIED) lancets     UNABLE TO FIND     vitamin B complex with vitamin C (VITAMIN  B COMPLEX) tablet     Vitamin D, Cholecalciferol, 25 MCG (1000 UT) CAPS     warfarin ANTICOAGULANT (COUMADIN) 5 MG tablet     No current facility-administered medications for this visit.        LABS:  Lab Results   Component Value Date    A1C 7.0 09/22/2020     Lab Results   Component Value Date     09/22/2020     Lab Results   Component Value Date    LDL  09/22/2020     Cannot estimate LDL when triglyceride exceeds 400 mg/dL    LDL 50 09/22/2020     HDL Cholesterol   Date Value Ref Range Status   09/22/2020 28 (L) >39 mg/dL Final   ]  GFR Estimate   Date Value Ref Range Status   09/22/2020 72 >60 mL/min/[1.73_m2] Final     Comment:     Non  GFR Calc  Starting 12/18/2018, serum creatinine based estimated GFR (eGFR) will be   calculated using the Chronic Kidney Disease Epidemiology Collaboration   (CKD-EPI) equation.       GFR Estimate If Black   Date Value Ref Range Status   09/22/2020 84 >60 mL/min/[1.73_m2] Final     Comment:      GFR Calc  Starting 12/18/2018, serum creatinine based estimated GFR (eGFR) will be   calculated using the Chronic Kidney Disease Epidemiology Collaboration   (CKD-EPI) equation.       Lab Results   Component Value Date    CR 1.02 09/22/2020     No results found for: MICROALBUMIN    ANTHROPOMETRICS:  Vitals: There were no vitals taken for this visit.  There is no height or weight on file to calculate BMI.      Wt Readings from Last 5 Encounters:   10/16/20 117.5 kg (259 lb)   05/28/20 109.8 kg (242 lb)   05/08/20 111.1 kg (245 lb)   03/12/20 116.6 kg (257 lb)   02/20/20 115.1 kg (253 lb 12.8 oz)       Weight Change: has lost 45# since initiating  "plant-based in Feb    ESTIMATED KCAL REQUIREMENTS:  Not assessed    NUTRITION DIAGNOSIS: Food- and nutrition-related knowledge deficit related to dietary changes and need for additional information as evidenced by questions     NUTRITION INTERVENTION:  Nutrition Prescription: Fluid Intake: <3 g per serving  Education given to support: general nutrition guidelines, weight reduction, fat modification, heart healthy diet and T2D    Education Materials Provided: PCRM graphic, recipes for success, and healthy meal ideas links    Supplements- B12 (takes a B complex) recommend liquid or sub-lingual, vit D rec 4000 international unit(s)/day (takes 2x 2000 IU D3 daily), Omega 3 (takes daily), takes multi vit, could consider choline    Brittle finger nails- not unusual to see change in nails and hair with change to plant protein. Doesn't mean problems, just a change. He appears to get adequate protein and iron from legumes.     PATIENT'S BEHAVIOR CHANGE GOALS:   See Patient Instructions for patient stated behavior change goals. AVS was printed and given to patient at today's appointment.    MONITOR / EVALUATE:  RD will monitor/evaluate:  Beliefs and attitudes related to food  Blood Glucose / A1c  Food and nutrition knowledge / skills  Food / Beverage / Nutrient intake     FOLLOW-UP:  Call RD with questions/concerns.   Follow-up appointment scheduled in 1 month.   Emailed with his verbal permission:  Jose Berry,   You're doing so well! I'll attach just a few plant-based resources here that you might like to check out. They are PCRM materials. The \"Rx for type 2 diabetes\" blue [place] graphic really sums things up, that might be a good starting place.   Thanks Jamal! Talk to you next month ??  Dee Stanley RD, LD, CDE  Time spent in minutes: 66 minutes  Encounter: Individual    "

## 2020-10-21 ENCOUNTER — VIRTUAL VISIT (OUTPATIENT)
Dept: PHARMACY | Facility: CLINIC | Age: 75
End: 2020-10-21
Payer: COMMERCIAL

## 2020-10-21 DIAGNOSIS — E78.2 MIXED HYPERLIPIDEMIA DUE TO TYPE 2 DIABETES MELLITUS (H): ICD-10-CM

## 2020-10-21 DIAGNOSIS — Z79.4 TYPE 2 DIABETES MELLITUS WITH DIABETIC NEUROPATHY, WITH LONG-TERM CURRENT USE OF INSULIN (H): Primary | ICD-10-CM

## 2020-10-21 DIAGNOSIS — E11.69 MIXED HYPERLIPIDEMIA DUE TO TYPE 2 DIABETES MELLITUS (H): ICD-10-CM

## 2020-10-21 DIAGNOSIS — E11.40 TYPE 2 DIABETES MELLITUS WITH DIABETIC NEUROPATHY, WITH LONG-TERM CURRENT USE OF INSULIN (H): Primary | ICD-10-CM

## 2020-10-21 PROCEDURE — 99606 MTMS BY PHARM EST 15 MIN: CPT | Mod: TEL | Performed by: PHARMACIST

## 2020-10-21 PROCEDURE — 99607 MTMS BY PHARM ADDL 15 MIN: CPT | Mod: TEL | Performed by: PHARMACIST

## 2020-10-21 NOTE — PROGRESS NOTES
MTM ENCOUNTER  SUBJECTIVE/OBJECTIVE:                           Jt Montgomery is a 74 year old male called for a follow-up visit. He was referred to me from Dr. Jones.  Today's visit is a follow-up MTM visit from 10/8     Chief Complaint: DM f/up    Tobacco: He reports that he quit smoking about 48 years ago. His smoking use included cigarettes. He has a 10.50 pack-year smoking history. He has never used smokeless tobacco.  Alcohol:  history of alcohol dependence - will reach 47 years of sobriety in November    Medication Adherence/Access: no issues reported    Diabetes: Currently using Lantus 22 units daily.  Patient is not experiencing side effects.  Blood sugar monitoring: Continuous Glucose Monitor - he's writing down the AM and HS readings as he's been unable to download Osito data to share with me  Ranges (from patient's glucose log): See below  AM: 138, 123, 114, 160, 119, 107, 132, 105, 127, 147, 183, 180, 144, 130 (11/14 at goal)        , 172, 124, 185, 221, 218, 148, 140, 164, 118, 160, 168, 176 (10/13 at goal)  Symptoms of low blood sugar? none  Symptoms of high blood sugar? none  Eye exam: due  Foot exam: due  Diet/Exercise: He continues working closely with Dee Stanley RD, CDE focusing on plant based eating.  He's been adding in exercise as he's able.  Aspirin: Taking 81mg daily and denies side effects  Statin: No - patient declines  ACEi/ARB: Yes: irbesartan 150mg daily.   Urine Albumin:   Lab Results   Component Value Date    UMALCR 317.16 (H) 09/22/2020      Lab Results   Component Value Date    A1C 7.0 09/22/2020    A1C 6.1 02/19/2020    A1C 6.2 11/14/2019    A1C 7.6 10/08/2019    A1C 6.5 03/06/2019     Hyperlipidemia: Jamal saw Dr. Roy on 10/16.  It was again recommended he start statin therapy and he'd like to continue thinking about this.      Recent Labs   Lab Test 09/22/20  1252 11/22/19  1623 04/16/13  0000 04/16/13  0000 09/10/12  0625   CHOL 123 164   < >  --  129   HDL 28* 24*   < >  38* 18*   LDL Cannot estimate LDL when triglyceride exceeds 400 mg/dL  50 Cannot estimate LDL when triglyceride exceeds 400 mg/dL  67   < > DELETED 72   TRIG 560* 843*   < > 555* 194*   CHOLHDLRATIO  --   --   --  4.7* 7.0*    < > = values in this interval not displayed.     Today's Vitals: There were no vitals taken for this visit.    ASSESSMENT:                            Medication Adherence: No issues identified    Type 2 Diabetes: Stable. Patient is meeting A1c goal of < 8%. Self monitoring of blood glucose is at goal of fasting  mg/dL and post prandial < 180 mg/dL.     Hyperlipidemia: Patient is not on high intensity statin which is indicated based on 2019 ACC/AHA guidelines for lipid management.       PLAN:                            1.  Continue current medication regimen.    I spent 30 minutes with this patient today. A copy of the visit note was provided to the patient's primary care provider.    Will follow up in 2 weeks, appt scheduled.    The patient declined a summary of these recommendations.     Nathaly Hughes PharmD, BCACP  Medication Therapy Management Provider  Pager: [phone]     Liv Farley PharmD  Medication Therapy Management Resident  Pager: [phone]     Patient consented to a telehealth visit: yes  Telemedicine Visit Details  Type of service:  Telephone visit  Start Time: 2:11 PM  End Time: 2:41 PM  Originating Location (pt. Location): Home  Distant Location (provider location):  [facility]  Mode of Communication:  Telephone

## 2020-11-09 ENCOUNTER — VIRTUAL VISIT (OUTPATIENT)
Dept: PHARMACY | Facility: CLINIC | Age: 75
End: 2020-11-09
Payer: COMMERCIAL

## 2020-11-09 DIAGNOSIS — Z79.4 TYPE 2 DIABETES MELLITUS WITH DIABETIC NEUROPATHY, WITH LONG-TERM CURRENT USE OF INSULIN (H): Primary | ICD-10-CM

## 2020-11-09 DIAGNOSIS — E11.40 TYPE 2 DIABETES MELLITUS WITH DIABETIC NEUROPATHY, WITH LONG-TERM CURRENT USE OF INSULIN (H): Primary | ICD-10-CM

## 2020-11-09 PROCEDURE — 99607 MTMS BY PHARM ADDL 15 MIN: CPT | Mod: TEL | Performed by: PHARMACIST

## 2020-11-09 PROCEDURE — 99606 MTMS BY PHARM EST 15 MIN: CPT | Mod: TEL | Performed by: PHARMACIST

## 2020-11-09 NOTE — PROGRESS NOTES
"MTM ENCOUNTER  SUBJECTIVE/OBJECTIVE:                           Jt Montgomery is a 75 year old male called for a follow-up visit. He was referred to me from Dr. Jones.  Today's visit is a follow-up MTM visit from 10/21/20.      Reason for visit: diabetes follow-up.    Allergies/ADRs: Reviewed in chart  Tobacco: He reports that he quit smoking about 48 years ago. His smoking use included cigarettes. He has a 10.50 pack-year smoking history. He has never used smokeless tobacco.  Alcohol: history of alcohol dependence - will reach 47 years of sobriety this month    Medication Adherence/Access: no issues reported    Type 2 Diabetes:  Currently taking Lantus 22 units daily. Patient is not experiencing side effects.  Blood sugar monitoring: Continuous Glucose Monitor. Ranges (from patient's glucose log):   Date FBG/ 2hours post HS (time from dinner varies, generally 2-3 hours)   10/22 130 163   10/23 140 157   10/24 154 106   10/25 125 77   10/26 106 110   10/27 70 192   10/28 197 162   10/29 174 152   10/30 143 145   10/31 132 157   11/1 142 -   11/2 155 147   11/3 106 118   11/4 108 112   11/5 132 114   11/6 93 114   11/7 102 111   11/8 62 136   11/9 118       Symptoms of low blood sugar? None, but he's captured some lower BG readings on his Umair  Symptoms of high blood sugar? none  Eye exam: due  Foot exam: due  Diet/Exercise: He continues working closely with Dee Stanley RD, CDE focusing on plant based eating.  He's been adding in exercise as he's able.  He says \"oh, I'm cheating a little.\"    Aspirin: Taking 81mg daily and denies side effects  Statin: No - patient declines  ACEi/ARB: Yes: irbesartan 150mg daily.   Urine Albumin:   Lab Results   Component Value Date    UMALCR 317.16 (H) 09/22/2020      Lab Results   Component Value Date    A1C 7.0 09/22/2020    A1C 6.1 02/19/2020    A1C 6.2 11/14/2019    A1C 7.6 10/08/2019    A1C 6.5 03/06/2019     ASSESSMENT:                            Medication Adherence: No " issues identified    Type 2 Diabetes: Patient is meeting A1c goal of < 8%. Self monitoring of blood glucose is at goal of fasting  mg/dL and post prandial < 180 mg/dL the majority of the time.  He's had a few episodes of hypoglycemia, will need to continue to monitor.    PLAN:                            1.  Continue current medication regimen.  Will monitor BG closely for hypoglycemia.    I spent 32 minutes with this patient today. A copy of the visit note was provided to the patient's primary care provider.    Will follow up in 2 weeks, appt scheduled.    The patient declined a summary of these recommendations.     Mikaela StroudD, BCACP  Medication Therapy Management Provider  Pager: [phone]     Liv Farley PharmD  Medication Therapy Management Resident  Pager: [phone]     Patient consented to a telehealth visit: yes  Telemedicine Visit Details  Type of service:  Telephone visit  Start Time: 2:09 PM  End Time: 2:41 PM  Originating Location (patient location): Home  Distant Location (provider location):  [facility]  Mode of Communication:  Telephone

## 2020-11-12 ENCOUNTER — ANTICOAGULATION THERAPY VISIT (OUTPATIENT)
Dept: CARDIOLOGY | Facility: CLINIC | Age: 75
End: 2020-11-12
Payer: COMMERCIAL

## 2020-11-12 DIAGNOSIS — I48.0 PAROXYSMAL ATRIAL FIBRILLATION (H): ICD-10-CM

## 2020-11-12 DIAGNOSIS — Z79.01 LONG TERM CURRENT USE OF ANTICOAGULANTS WITH INR GOAL OF 2.0-3.0: ICD-10-CM

## 2020-11-12 LAB
CAPILLARY BLOOD COLLECTION: NORMAL
INR PPP: 2.4 (ref 0.86–1.14)

## 2020-11-12 PROCEDURE — 36416 COLLJ CAPILLARY BLOOD SPEC: CPT | Performed by: INTERNAL MEDICINE

## 2020-11-12 PROCEDURE — 85610 PROTHROMBIN TIME: CPT | Performed by: INTERNAL MEDICINE

## 2020-11-12 PROCEDURE — 99207 PR NO CHARGE NURSE ONLY: CPT

## 2020-11-17 ENCOUNTER — ALLIED HEALTH/NURSE VISIT (OUTPATIENT)
Dept: EDUCATION SERVICES | Facility: CLINIC | Age: 75
End: 2020-11-17
Payer: COMMERCIAL

## 2020-11-17 DIAGNOSIS — Z79.4 TYPE 2 DIABETES MELLITUS WITH DIABETIC NEUROPATHY, WITH LONG-TERM CURRENT USE OF INSULIN (H): Primary | Chronic | ICD-10-CM

## 2020-11-17 DIAGNOSIS — E11.69 MIXED HYPERLIPIDEMIA DUE TO TYPE 2 DIABETES MELLITUS (H): ICD-10-CM

## 2020-11-17 DIAGNOSIS — E11.40 TYPE 2 DIABETES MELLITUS WITH DIABETIC NEUROPATHY, WITH LONG-TERM CURRENT USE OF INSULIN (H): Primary | Chronic | ICD-10-CM

## 2020-11-17 DIAGNOSIS — E66.01 MORBID OBESITY (H): ICD-10-CM

## 2020-11-17 DIAGNOSIS — E78.2 MIXED HYPERLIPIDEMIA DUE TO TYPE 2 DIABETES MELLITUS (H): ICD-10-CM

## 2020-11-17 PROCEDURE — 97802 MEDICAL NUTRITION INDIV IN: CPT

## 2020-11-17 NOTE — PROGRESS NOTES
"PHONE Medical Nutrition Therapy for Diabetes  Visit Type:Reassessment and intervention    Jt Montgomery presents today for MNT and education related to type 2 diabetes.   He is accompanied by self.     ASSESSMENT:   Patient comments/concerns relating to nutrition:   Couple quick items I want to get on the table:  ~ had a meeting with Nathaly (MTM) and intern recently: says he gets so many conflicting messages from health professionals (says counting calories came up and he laughed and said \"don't even go there\")  ~ still disappointed with cardiac care only wanting to do give meds  ~ asks about hospital stay, why would they give 1 unit insulin? Described correction scale    NUTRITION HISTORY:  He is very interested in plant-based eating, familiar with the PCR and Keshawn information and research. Says he is not \"rigidly\" doing LF vegan, does eat some meat, but is working to \"move in that direction\". Understands thebenefits.   \"The concrete goal I want to work on getting a couple meals that look good to me\" - hopes to get a short list of meals that will work  Breakfast is fine with oatmeal  ~hasn't been using Vitamix as much but wants to get back to that  ~ is back to the [employer] a couple times a week     Eats out:  frequently     Previous diet education:  Yes     EXERCISE: not assessed    SOCIO/ECONOMIC:   Lives with: self, has a strong social network  ~ concerned now with his ex-wife and her spouse and his son tested covid  ~ he has a good relationship with his ex and she is very supportive of his healthy efforts    BLOOD GLUCOSE MONITORING:  Patient glucose self monitoring as follows: Umair.      Patient's most recent   Lab Results   Component Value Date    A1C 7.0 09/22/2020    is meeting goal of <8.0    MEDICATIONS:  Current Outpatient Medications   Medication     aspirin 81 MG EC tablet     blood glucose (NO BRAND SPECIFIED) test strip     blood glucose monitoring (NO BRAND SPECIFIED) meter device " kit     Coenzyme Q10 (COQ10) 100 MG CAPS     Continuous Blood Gluc Sensor (FREESTYLE RINKU 14 DAY SENSOR) MISC     digoxin (LANOXIN) 125 MCG tablet     diltiazem ER COATED BEADS (CARTIA XT) 300 MG 24 hr capsule     febuxostat (ULORIC) 40 MG TABS     finasteride (PROSCAR) 5 MG tablet     furosemide (LASIX) 20 MG tablet     Insulin Glargine (LANTUS SOLOSTAR SC)     insulin pen needle (BD GERMÁN U/F) 32G X 4 MM miscellaneous     irbesartan (AVAPRO) 300 MG tablet     Lactobacillus (PROBIOTIC ACIDOPHILUS PO)     magnesium oxide 200 MG TABS     melatonin 1 MG TABS tablet     metoprolol succinate ER (TOPROL-XL) 50 MG 24 hr tablet     Multiple Vitamins-Minerals (MULTIVITAMIN ADULT PO)     order for DME     predniSONE (DELTASONE) 20 MG tablet     tamsulosin (FLOMAX) 0.4 MG capsule     thin (NO BRAND SPECIFIED) lancets     UNABLE TO FIND     vitamin B complex with vitamin C (VITAMIN  B COMPLEX) tablet     Vitamin D, Cholecalciferol, 25 MCG (1000 UT) CAPS     warfarin ANTICOAGULANT (COUMADIN) 5 MG tablet     No current facility-administered medications for this visit.        LABS:  Lab Results   Component Value Date    A1C 7.0 09/22/2020     Lab Results   Component Value Date     09/22/2020     Lab Results   Component Value Date    LDL  09/22/2020     Cannot estimate LDL when triglyceride exceeds 400 mg/dL    LDL 50 09/22/2020     HDL Cholesterol   Date Value Ref Range Status   09/22/2020 28 (L) >39 mg/dL Final   ]  GFR Estimate   Date Value Ref Range Status   09/22/2020 72 >60 mL/min/[1.73_m2] Final     Comment:     Non  GFR Calc  Starting 12/18/2018, serum creatinine based estimated GFR (eGFR) will be   calculated using the Chronic Kidney Disease Epidemiology Collaboration   (CKD-EPI) equation.       GFR Estimate If Black   Date Value Ref Range Status   09/22/2020 84 >60 mL/min/[1.73_m2] Final     Comment:      GFR Calc  Starting 12/18/2018, serum creatinine based estimated GFR (eGFR)  will be   calculated using the Chronic Kidney Disease Epidemiology Collaboration   (CKD-EPI) equation.       Lab Results   Component Value Date    CR 1.02 09/22/2020     No results found for: MICROALBUMIN    ANTHROPOMETRICS:  Vitals: There were no vitals taken for this visit.  There is no height or weight on file to calculate BMI.      Wt Readings from Last 5 Encounters:   10/16/20 117.5 kg (259 lb)   05/28/20 109.8 kg (242 lb)   05/08/20 111.1 kg (245 lb)   03/12/20 116.6 kg (257 lb)   02/20/20 115.1 kg (253 lb 12.8 oz)     NUTRITION DIAGNOSIS: Food- and nutrition-related knowledge deficit related to need for additional info as he transitions to plant-based meal plan as evidenced by questions asked, seeking information     NUTRITION INTERVENTION:  Education given to support: weight reduction, fat modification, fiber and plant-based research    PATIENT'S BEHAVIOR CHANGE GOALS:   1) before next visit: he will look for 3 recipes that he can incorporate into his meal plan: in [employer] cookbook and PCRM cookbook and make them before next appointment    MONITOR / EVALUATE:  RD will monitor/evaluate:  Beliefs and attitudes related to food  Food and nutrition knowledge / skills  Food / Beverage / Nutrient intake   Progress toward meeting stated nutrition-related goals    FOLLOW-UP:  Follow-up appointment scheduled on 12/22.     Genie Stanley RD, LD, CDE    Time spent in minutes: 40  Encounter: Individual

## 2020-11-18 DIAGNOSIS — I48.0 PAROXYSMAL ATRIAL FIBRILLATION (H): Primary | ICD-10-CM

## 2020-11-18 DIAGNOSIS — Z79.01 LONG TERM CURRENT USE OF ANTICOAGULANTS WITH INR GOAL OF 2.0-3.0: ICD-10-CM

## 2020-11-18 RX ORDER — WARFARIN SODIUM 5 MG/1
TABLET ORAL
Qty: 115 TABLET | Refills: 1 | Status: SHIPPED | OUTPATIENT
Start: 2020-11-18 | End: 2021-06-04

## 2020-11-18 NOTE — TELEPHONE ENCOUNTER
Warfarin Rx refilled as requested. Pt now seeing Dr Roy. INR clinic referral renewal order submitted for Dr Roy to review and sign.  Has the patient previously taken warfarin? yes  If yes, for what indication? Afib    Does the patient have any of the following indications for a higher range of 2.5-3.5:    Mitral position mechanical valve? no    Anum-Shiley, Ball and Cage or Monoleaflet valve (regardless of position) no    Other (if yes, please explain) no  Antoni

## 2020-11-19 DIAGNOSIS — I10 ESSENTIAL HYPERTENSION: ICD-10-CM

## 2020-11-19 RX ORDER — IRBESARTAN 300 MG/1
150 TABLET ORAL AT BEDTIME
Qty: 45 TABLET | Refills: 1 | Status: SHIPPED | OUTPATIENT
Start: 2020-11-19 | End: 2022-06-24 | Stop reason: DRUGHIGH

## 2020-11-20 DIAGNOSIS — M1A.39X0 CHRONIC GOUT DUE TO RENAL IMPAIRMENT OF MULTIPLE SITES WITHOUT TOPHUS: Chronic | ICD-10-CM

## 2020-11-20 NOTE — TELEPHONE ENCOUNTER
Routing refill request to provider for review/approval because:  Medication is reported/historical    Patient not taking: Reported on 10/8/2020

## 2020-11-23 RX ORDER — PREDNISONE 20 MG/1
TABLET ORAL
Qty: 10 TABLET | Refills: 0 | Status: SHIPPED | OUTPATIENT
Start: 2020-11-23 | End: 2021-09-29

## 2020-11-23 NOTE — TELEPHONE ENCOUNTER
Routing refill request to provider for review/approval because:  Drug not on the [facility] refill protocol     Please review and authorize if appropriate.    Thank you,   Allen CORTEZ RN

## 2020-12-01 ENCOUNTER — TRANSFERRED RECORDS (OUTPATIENT)
Dept: MULTI SPECIALTY CLINIC | Facility: CLINIC | Age: 75
End: 2020-12-01

## 2020-12-01 LAB — RETINOPATHY: NORMAL

## 2020-12-10 ENCOUNTER — ANTICOAGULATION THERAPY VISIT (OUTPATIENT)
Dept: CARDIOLOGY | Facility: CLINIC | Age: 75
End: 2020-12-10
Payer: COMMERCIAL

## 2020-12-10 DIAGNOSIS — Z79.01 LONG TERM CURRENT USE OF ANTICOAGULANTS WITH INR GOAL OF 2.0-3.0: ICD-10-CM

## 2020-12-10 DIAGNOSIS — I48.0 PAROXYSMAL ATRIAL FIBRILLATION (H): ICD-10-CM

## 2020-12-10 LAB
CAPILLARY BLOOD COLLECTION: NORMAL
INR PPP: 2.6 (ref 0.86–1.14)

## 2020-12-10 PROCEDURE — 99207 PR NO CHARGE NURSE ONLY: CPT

## 2020-12-10 PROCEDURE — 85610 PROTHROMBIN TIME: CPT | Performed by: INTERNAL MEDICINE

## 2020-12-10 PROCEDURE — 36416 COLLJ CAPILLARY BLOOD SPEC: CPT | Performed by: INTERNAL MEDICINE

## 2020-12-10 NOTE — PROGRESS NOTES
ANTICOAGULATION FOLLOW-UP CLINIC VISIT    Patient Name:  Jt Montgomery  Date:  12/10/2020  Contact Type:  Telephone    SUBJECTIVE:  Patient Findings         Clinical Outcomes     Negatives:  Major bleeding event, Thromboembolic event, Anticoagulation-related hospital admission, Anticoagulation-related ED visit, Anticoagulation-related fatality           OBJECTIVE    Recent labs: (last 7 days)     12/10/20  1104   INR 2.60*       ASSESSMENT / PLAN  INR assessment THER    Recheck INR In: 4 WEEKS    INR Location Outside lab      Anticoagulation Summary  As of 12/10/2020    INR goal:  2.0-3.0   TTR:  66.2 % (11 mo)   INR used for dosin.60 (12/10/2020)   Warfarin maintenance plan:  7.5 mg (5 mg x 1.5) every Sun, Tue, Thu; 5 mg (5 mg x 1) all other days   Full warfarin instructions:  7.5 mg every Sun, Tue, Thu; 5 mg all other days   Weekly warfarin total:  42.5 mg   No change documented:  Paola Roberson RN   Plan last modified:  Marlena Hong RN (2020)   Next INR check:  2021   Target end date:  Indefinite    Indications    Atrial fibrillation (AFIB) on Coumadin [I48.91]  Long term current use of anticoagulants with INR goal of 2.0-3.0 (Resolved) [Z79.01]  Paroxysmal atrial fibrillation (H) [I48.0]  Long term current use of anticoagulants with INR goal of 2.0-3.0 [Z79.01]             Anticoagulation Episode Summary     INR check location:      Preferred lab:      Send INR reminders to:  [facility] HEART INR NURSE    Comments:        Anticoagulation Care Providers     Provider Role Specialty Phone number    Ip, Geanro Garrett MD Referring Cardiovascular Disease [phone]            See the Encounter Report to view Anticoagulation Flowsheet and Dosing Calendar (Go to Encounters tab in chart review, and find the Anticoagulation Therapy Visit)    INR 2.6.  Called and spoke to the patient.  NO changes.  Continue same schedule and recheck in 4 weeks.  Brian Roberson, RN

## 2020-12-14 DIAGNOSIS — I48.91 ATRIAL FIBRILLATION, UNSPECIFIED TYPE (H): ICD-10-CM

## 2020-12-14 RX ORDER — DILTIAZEM HYDROCHLORIDE 300 MG/1
300 CAPSULE, COATED, EXTENDED RELEASE ORAL DAILY
Qty: 90 CAPSULE | Refills: 3 | Status: SHIPPED | OUTPATIENT
Start: 2020-12-14 | End: 2021-06-08

## 2020-12-24 ENCOUNTER — VIRTUAL VISIT (OUTPATIENT)
Dept: PHARMACY | Facility: CLINIC | Age: 75
End: 2020-12-24
Payer: COMMERCIAL

## 2020-12-24 DIAGNOSIS — Z79.4 TYPE 2 DIABETES MELLITUS WITH DIABETIC NEUROPATHY, WITH LONG-TERM CURRENT USE OF INSULIN (H): Primary | ICD-10-CM

## 2020-12-24 DIAGNOSIS — E11.40 TYPE 2 DIABETES MELLITUS WITH DIABETIC NEUROPATHY, WITH LONG-TERM CURRENT USE OF INSULIN (H): Primary | ICD-10-CM

## 2020-12-24 PROCEDURE — 99606 MTMS BY PHARM EST 15 MIN: CPT | Mod: TEL | Performed by: PHARMACIST

## 2020-12-24 PROCEDURE — 99607 MTMS BY PHARM ADDL 15 MIN: CPT | Mod: TEL | Performed by: PHARMACIST

## 2020-12-24 NOTE — PROGRESS NOTES
MTM ENCOUNTER  SUBJECTIVE/OBJECTIVE:                           Jt Montgomery is a 75 year old male called for a follow-up visit. He was referred to me from Dr. Jones.  Today's visit is a follow-up MTM visit from 11/9/20.     Reason for visit: Diabetes follow-up.  He's had a few episodes of hypoglycemia and wonders what he should do in those cases    Tobacco: He reports that he quit smoking about 49 years ago. His smoking use included cigarettes. He has a 10.50 pack-year smoking history. He has never used smokeless tobacco.  Alcohol: history of alcohol dependence - sober for 47 years    Medication Adherence/Access: no issues reported    Type 2 Diabetes:  Currently taking Lantus 22 units daily. Patient is not experiencing side effects.  Patient does not tolerate metformin.  Blood sugar monitoring: Continuous Glucose Monitor. Ranges (from patient's glucose log):   Date FBG/ 2hours post HS (time from dinner varies, generally 2-3 hours)   12/1 159 160   12/2 170 159   12/3 149 -   12/4 167 159   12/5 154 183   12/6 134 152   12/7 115 136   12/8 116 167   12/9 112 -   12/10 176 -   12/11 163 196   12/12 - 257   12/13 154 153   12/14 146 148   12/15 133 149   12/16 129 193   12/17 131 174   12/18 117 55    12/19 54 56 (took 15 units of Lantus)   12/20 126 100   12/21 74 60   12/22 96 46 (didn't take any Lantus)   12/23     12/24 171       Symptoms of low blood sugar? He's been treating hypoglycemia with orange juice  Symptoms of high blood sugar? none  Eye exam: due  Foot exam: due  Diet/Exercise: He continues working closely with Dee Stanley RD, CDE focusing on plant based eating.  He feels he's been eating similarly, no changes.  Aspirin: Taking 81mg daily and denies side effects  Statin: No - patient declines  ACEi/ARB: Yes: irbesartan 150mg daily.   Urine Albumin:   Lab Results   Component Value Date    UMALCR 317.16 (H) 09/22/2020       Lab Results   Component Value Date    A1C 7.0 09/22/2020    A1C 6.1  02/19/2020    A1C 6.2 11/14/2019    A1C 7.6 10/08/2019    A1C 6.5 03/06/2019     Today's Vitals: There were no vitals taken for this visit.    ASSESSMENT:                            Medication Adherence: No issues identified    Type 2 Diabetes: Patient is meeting A1c goal of < 8%. Self monitoring of blood glucose is not at goal of fasting  mg/dL and post prandial < 180 mg/dL.  Blood sugar are highly variable - interestingly he was quite high early in the month, pretty much at goal in the middle of the month and now low at the end of the month, without any diet changes according to his report.  Will reduce Lantus at this time, he may need to self-titrate back up depending on blood sugar.  Should use traditional SMBG when he's having hypoglycemia.    PLAN:                            1.  Reduced Lantus to 15 units daily.  If blood sugar are high again - increase Lantus by 2 units every 3 days until fasting blood sugar are <150mg/dL.  Patient to not exceed 22 units daily.  2.  Educated Jamal he should be using traditional SMBG when he's getting readings indicating hypoglycemia on his Umair.    I spent 43 minutes with this patient today. All changes were made via collaborative practice agreement with Dr. Jones. A copy of the visit note was provided to the patient's primary care provider.    Will follow up in 10 days, appt scheduled.    The patient declined a summary of these recommendations.     Nathaly Hughes, PharmD, [facility]  Medication Therapy Management Provider  Pager: [phone]     Patient consented to a telehealth visit: yes  Telemedicine Visit Details  Type of service:  Telephone visit  Start Time: 11:00 AM  End Time: 11:43 AM  Originating Location (patient location): Home  Distant Location (provider location):  [facility]  Mode of Communication:  Telephone      Medication Therapy Recommendations  Type 2 diabetes mellitus with diabetic neuropathy (H)    Current Medication: Insulin Glargine  (LANTUS SOLOSTAR SC)   Rationale: Dose too high - Dosage too high - Safety   Recommendation: Decrease Dose - Reduced Lantus to 15 units daily   Status: Accepted per CPA

## 2020-12-29 DIAGNOSIS — I25.119 CORONARY ARTERY DISEASE INVOLVING NATIVE CORONARY ARTERY OF NATIVE HEART WITH ANGINA PECTORIS (H): ICD-10-CM

## 2020-12-30 RX ORDER — METOPROLOL SUCCINATE 50 MG/1
TABLET, EXTENDED RELEASE ORAL
Qty: 180 TABLET | Refills: 3 | Status: SHIPPED | OUTPATIENT
Start: 2020-12-30 | End: 2021-07-29 | Stop reason: DRUGHIGH

## 2020-12-30 NOTE — TELEPHONE ENCOUNTER
"Requested Prescriptions   Pending Prescriptions Disp Refills     metoprolol succinate ER (TOPROL-XL) 50 MG 24 hr tablet [Pharmacy Med Name: METOPROLOL ER SUCCINATE 50MG TABS] 180 tablet 3     Sig: TAKE 1 TABLET BY MOUTH TWICE DAILY       Beta-Blockers Protocol Failed - 12/29/2020  9:44 PM        Failed - Blood pressure under 140/90 in past 12 months     BP Readings from Last 3 Encounters:   10/16/20 (!) 144/72   05/16/20 117/61   03/12/20 114/58                 Passed - Patient is age 6 or older        Passed - Recent (12 mo) or future (30 days) visit within the authorizing provider's specialty     Patient has had an office visit with the authorizing provider or a provider within the authorizing providers department within the previous 12 mos or has a future within next 30 days. See \"Patient Info\" tab in inbasket, or \"Choose Columns\" in Meds & Orders section of the refill encounter.              Passed - Medication is active on med list           Routing refill request to provider for review/approval because:  BP high     Franci RIOS RN    "

## 2021-01-05 ENCOUNTER — VIRTUAL VISIT (OUTPATIENT)
Dept: PHARMACY | Facility: CLINIC | Age: 76
End: 2021-01-05
Payer: COMMERCIAL

## 2021-01-05 DIAGNOSIS — M1A.39X0 CHRONIC GOUT DUE TO RENAL IMPAIRMENT OF MULTIPLE SITES WITHOUT TOPHUS: ICD-10-CM

## 2021-01-05 DIAGNOSIS — E11.40 TYPE 2 DIABETES MELLITUS WITH DIABETIC NEUROPATHY, WITH LONG-TERM CURRENT USE OF INSULIN (H): Primary | ICD-10-CM

## 2021-01-05 DIAGNOSIS — I42.0 DILATED CARDIOMYOPATHY (H): ICD-10-CM

## 2021-01-05 DIAGNOSIS — N13.8 BENIGN PROSTATIC HYPERPLASIA WITH URINARY OBSTRUCTION: ICD-10-CM

## 2021-01-05 DIAGNOSIS — Z79.4 TYPE 2 DIABETES MELLITUS WITH DIABETIC NEUROPATHY, WITH LONG-TERM CURRENT USE OF INSULIN (H): Primary | ICD-10-CM

## 2021-01-05 DIAGNOSIS — G47.00 INSOMNIA, UNSPECIFIED TYPE: ICD-10-CM

## 2021-01-05 DIAGNOSIS — I25.10 CORONARY ARTERY DISEASE INVOLVING NATIVE CORONARY ARTERY OF NATIVE HEART WITHOUT ANGINA PECTORIS: ICD-10-CM

## 2021-01-05 DIAGNOSIS — N40.1 BENIGN PROSTATIC HYPERPLASIA WITH URINARY OBSTRUCTION: ICD-10-CM

## 2021-01-05 DIAGNOSIS — I10 ESSENTIAL HYPERTENSION: ICD-10-CM

## 2021-01-05 DIAGNOSIS — E78.2 MIXED HYPERLIPIDEMIA DUE TO TYPE 2 DIABETES MELLITUS (H): ICD-10-CM

## 2021-01-05 DIAGNOSIS — I48.91 ATRIAL FIBRILLATION, UNSPECIFIED TYPE (H): ICD-10-CM

## 2021-01-05 DIAGNOSIS — Z78.9 TAKES DIETARY SUPPLEMENTS: ICD-10-CM

## 2021-01-05 DIAGNOSIS — E11.69 MIXED HYPERLIPIDEMIA DUE TO TYPE 2 DIABETES MELLITUS (H): ICD-10-CM

## 2021-01-05 PROCEDURE — 99605 MTMS BY PHARM NP 15 MIN: CPT | Mod: TEL | Performed by: PHARMACIST

## 2021-01-05 RX ORDER — MULTIVIT WITH MINERALS/LUTEIN
1000 TABLET ORAL DAILY PRN
COMMUNITY

## 2021-01-05 NOTE — LETTER
"        Date: 2021    Jt Montgomery  [address]  [place] [address]    Dear Mr. Montgomery,    Thank you for talking with me on 2021 about your health and medications. Medicare s MTM (Medication Therapy Management) program helps you understand your medications and use them safely.      This letter includes an action plan (Medication Action Plan) and medication list (Personal Medication List). The action plan has steps you should take to help you get the best results from your medications. The medication list will help you keep track of your medications and how to use them the right way.       Have your action plan and medication list with you when you talk with your doctors, pharmacists, and other healthcare providers in your care team.     Ask your doctors, pharmacists, and other healthcare providers to update the action plan and medication list at every visit.     Take your medication list with you if you go to the hospital or emergency room.     Give a copy of the action plan and medication list to your family or caregivers.     If you want to talk about this letter or any of the papers with it, please call   [phone].We look forward to working with you, your doctors, and other healthcare providers to help you stay healthy through the [facility] MTM program.    Sincerely,  Nathaly Hughes [facility]    Enclosed: Medication Action Plan and Personal Medication List    MEDICATION ACTION PLAN FOR Jt Montgomery,  1945     This action plan will help you get the best results from your medications if you:   1. Read \"What we talked about.\"   2. Take the steps listed in the \"What I need to do\" boxes.   3. Fill in \"What I did and when I did it.\"   4. Fill in \"My follow-up plan\" and \"Questions I want to ask.\"     Have this action plan with you when you talk with your doctors, pharmacists, and other healthcare providers in your care team. Share this with your family or caregivers too.  DATE " PREPARED: 2021  What we talked about: What my medicines are for, how to know if my medicines are working, made sure my medicines are safe for me and reviewed how to take my medicines.                                                    What I need to do: Take my medicines every day  What I did and when I did it:                                              My follow-up plan:                 Questions I want to ask:              If you have any questions about your action plan, call Nathaly Hughes [facility], Phone: [phone] , Monday-Friday 8-4:30pm.           PERSONAL MEDICATION LIST FOR Jt Montgomery,  1945     This medication list was made for you after we talked. We also used information from your doctor's chart.      Use blank rows to add new medications. Then fill in the dates you started using them.    Cross out medications when you no longer use them. Then write the date and why you stopped using them.    Ask your doctors, pharmacists, and other healthcare providers to update this list at every visit. Keep this list up-to-date with:       Prescription medications    Over the counter drugs     Herbals    Vitamins    Minerals      If you go to the hospital or emergency room, take this list with you. Share this with your family or caregivers too.     DATE PREPARED: 2021  Allergies or side effects: No known allergies     Medication:  ASPIRIN EC 81 MG PO TBEC      How I use it:  Take 81 mg by mouth daily      Why I use it:  CAD    Prescriber:  Patient Reported      Date I started using it:       Date I stopped using it:         Why I stopped using it:            Medication:  BLOOD GLUCOSE MONITORING SUPPL KIT      How I use it:  Use to test blood sugar 1 time daily or as directed. Preferred blood glucose meter OR supplies to accompany: Blood Glucose Monitor Brands: per insurance.      Why I use it: Type 2 diabetes mellitus with diabetic neuropathy, with long-term current use of insulin (H)     Prescriber:  Digna Jones MD      Date I started using it:       Date I stopped using it:         Why I stopped using it:            Medication:  CAYENNE PEPPER PO      How I use it:  Take by mouth as needed      Why I use it:  Vitamin    Prescriber:  Patient Reported      Date I started using it:       Date I stopped using it:         Why I stopped using it:            Medication:  COQ10 100 MG PO CAPS      How I use it:  Take 200 mg by mouth daily       Why I use it: Vitamin    Prescriber:  Patient Reported      Date I started using it:       Date I stopped using it:         Why I stopped using it:            Medication:  DIGOXIN 125 MCG PO TABS      How I use it:  TAKE 1 TABLET(125 MCG) BY MOUTH DAILY      Why I use it: Atrial fibrillation, unspecified type (H)    Prescriber:  Digna Jones MD      Date I started using it:       Date I stopped using it:         Why I stopped using it:            Medication:  DILTIAZEM HCL ER COATED BEADS 300 MG PO CP24      How I use it:  Take 1 capsule (300 mg) by mouth daily      Why I use it: Atrial fibrillation, unspecified type (H)    Prescriber:  Genaro Roy MD      Date I started using it:       Date I stopped using it:         Why I stopped using it:            Medication:  FEBUXOSTAT 40 MG PO TABS      How I use it:  Take 40 mg by mouth daily      Why I use it:  Gout    Prescriber:  Patient Reported      Date I started using it:       Date I stopped using it:         Why I stopped using it:            Medication:  FINASTERIDE 5 MG PO TABS      How I use it:  Take 1 tablet (5 mg) by mouth daily      Why I use it: Benign prostatic hyperplasia with urinary obstruction    Prescriber:  Digna Jones MD      Date I started using it:       Date I stopped using it:         Why I stopped using it:            Medication:  FREESTYLE RINKU 14 DAY SENSOR MISC      How I use it:  AS DIRECTED EVERY 14 DAYS      Why I use it: Type 2 diabetes mellitus with diabetic  neuropathy, with long-term current use of insulin (H)    Prescriber:  Digna Jones MD      Date I started using it:       Date I stopped using it:         Why I stopped using it:            Medication:  FUROSEMIDE 20 MG PO TABS      How I use it:  Take 20 mg by mouth daily as needed      Why I use it:  Heart failure    Prescriber:  Patient Reported      Date I started using it:       Date I stopped using it:         Why I stopped using it:            Medication:  GLUCOSE BLOOD VI STRP      How I use it:  Use to test blood sugar 1 time daily or as directed. To accompany: Blood Glucose Monitor Brands: per insurance.      Why I use it: Type 2 diabetes mellitus with diabetic neuropathy, with long-term current use of insulin (H)    Prescriber:  Digna Jones MD      Date I started using it:       Date I stopped using it:         Why I stopped using it:            Medication:  INSULIN PEN NEEDLE 32G X 4 MM MISC      How I use it:  USE FOUR TIMES DAILY AS DIRECTED      Why I use it: Type 2 diabetes mellitus with diabetic neuropathy, with long-term current use of insulin (H)    Prescriber:  Digna Jones MD      Date I started using it:       Date I stopped using it:         Why I stopped using it:            Medication:  IRBESARTAN 300 MG PO TABS      How I use it:  Take 0.5 tablets (150 mg) by mouth At Bedtime      Why I use it: Essential hypertension    Prescriber:  Genaro Roy MD      Date I started using it:       Date I stopped using it:         Why I stopped using it:            Medication:  LANCETS THIN MISC      How I use it:  Use with lanceting device. To accompany: Blood Glucose Monitor Brands: per insurance.      Why I use it: Type 2 diabetes mellitus with diabetic neuropathy, with long-term current use of insulin (H)    Prescriber:  Digna Jones MD      Date I started using it:       Date I stopped using it:         Why I stopped using it:            Medication:  LANTUS SOLOSTAR SC       How I use it:  Inject 15 Units Subcutaneous At Bedtime       Why I use it:  Diabetes    Prescriber:  Patient Reported      Date I started using it:       Date I stopped using it:         Why I stopped using it:            Medication:  MAGNESIUM OXIDE 200 MG PO TABS      How I use it:  Take 1-2 tablets by mouth daily       Why I use it:  Vitamin    Prescriber:  Patient Reported      Date I started using it:       Date I stopped using it:         Why I stopped using it:            Medication:  MELATONIN 1 MG PO TABS      How I use it:  Take 3 mg by mouth nightly as needed for sleep       Why I use it:  Vitamin    Prescriber:  Entered By History Unknown      Date I started using it:       Date I stopped using it:         Why I stopped using it:            Medication:  METOPROLOL SUCCINATE ER 50 MG PO TB24      How I use it:  TAKE 1 TABLET BY MOUTH TWICE DAILY      Why I use it: Coronary artery disease involving native coronary artery of native heart with angina pectoris (H)    Prescriber:  Digna Jones MD      Date I started using it:       Date I stopped using it:         Why I stopped using it:            Medication:  MULTIVITAMIN ADULT PO      How I use it:  Take 1 tablet by mouth daily      Why I use it:  Vitamin    Prescriber:  Patient Reported      Date I started using it:       Date I stopped using it:         Why I stopped using it:            Medication:  ORDER FOR DME LOCAL PRINT ONLY      How I use it:  Equipment being ordered: Compression stockings      Why I use it: Edema of both legs    Prescriber:  Digna Jones MD      Date I started using it:       Date I stopped using it:         Why I stopped using it:            Medication:  PREDNISONE 20 MG PO TABS      How I use it:  TAKE 1 TABLET(20 MG) BY MOUTH DAILY AS NEEDED GOUT FLARE      Why I use it: Chronic gout due to renal impairment of multiple sites without tophus    Prescriber:  Digna Jones MD      Date I started using it:        Date I stopped using it:         Why I stopped using it:            Medication:  PROBIOTIC ACIDOPHILUS PO      How I use it:  Take 1 capsule by mouth daily      Why I use it:  Vitamin     Prescriber:  Patient Reported      Date I started using it:       Date I stopped using it:         Why I stopped using it:            Medication:  STRESS FORMULA PO TABS      How I use it:  Take 1 tablet by mouth daily      Why I use it:  Vitamin    Prescriber:  Patient Reported      Date I started using it:       Date I stopped using it:         Why I stopped using it:            Medication:  TAMSULOSIN HCL 0.4 MG PO CAPS      How I use it:  Take 1 capsule (0.4 mg) by mouth every evening      Why I use it: Benign prostatic hyperplasia with urinary obstruction    Prescriber:  Digna Jones MD      Date I started using it:       Date I stopped using it:         Why I stopped using it:            Medication:  UNABLE TO FIND      How I use it:  MEDICATION NAME: CarotoMax with lutein - takes sporadically      Why I use it:  Vitamin     Prescriber:  Patient Reported      Date I started using it:       Date I stopped using it:         Why I stopped using it:            Medication:  VITAMIN C 1000 MG PO TABS      How I use it:  Take 2,000-4,000 mg by mouth daily      Why I use it:  Vitamin    Prescriber:  Patient Reported      Date I started using it:       Date I stopped using it:         Why I stopped using it:            Medication:  VITAMIN D (CHOLECALCIFEROL) 25 MCG (1000 UT) PO CAPS      How I use it:  Take 3,000 Units by mouth daily      Why I use it:  Vitamin    Prescriber:  Patient Reported      Date I started using it:       Date I stopped using it:         Why I stopped using it:            Medication:  WARFARIN SODIUM 5 MG PO TABS      How I use it:  Take 1 1/2 tabs on Sundays, Tuesdays and Thursdays and 1 tab on all other days or as directed by INR clinic      Why I use it: Paroxysmal atrial fibrillation (H); Long  term current use of anticoagulants with INR goal of 2.0-3.0    Prescriber:  Genaro Roy MD      Date I started using it:       Date I stopped using it:         Why I stopped using it:            Medication:  ZINC 15 MG PO CAPS      How I use it:  Take 15 mg by mouth daily      Why I use it:  Vitamin    Prescriber:  Patient Reported      Date I started using it:       Date I stopped using it:         Why I stopped using it:            Medication:         How I use it:         Why I use it:      Prescriber:         Date I started using it:       Date I stopped using it:         Why I stopped using it:            Medication:         How I use it:         Why I use it:      Prescriber:         Date I started using it:       Date I stopped using it:         Why I stopped using it:            Medication:         How I use it:         Why I use it:      Prescriber:         Date I started using it:       Date I stopped using it:         Why I stopped using it:              Other Information:     If you have any questions about your medication list, call Nathaly Hughes [facility], Phone: [phone] , Monday-Friday 8-4:30pm.    According to the Paperwork Reduction Act of 1995, no persons are required to respond to a collection of information unless it displays a valid OMB control number. The valid OMB number for this information collection is [id number]. The time required to complete this information collection is estimated to average 40 minutes per response, including the time to review instructions, searching existing data resources, gather the data needed, and complete and review the information collection. If you have any comments concerning the accuracy of the time estimate(s) or suggestions for improving this form, please write to: CMS, Attn: VIVIAN Reports Clearance Officer, [address] [place] [id number].

## 2021-01-05 NOTE — PROGRESS NOTES
Medication Therapy Management (MTM) Encounter    ASSESSMENT/PLAN:                            Medication Adherence/Access: No issues identified    Type 2 Diabetes: Patient is meeting A1c goal of < 8%. Self monitoring of blood glucose is not at goal of fasting  mg/dL and post prandial < 180 mg/dL.  Blood sugars have overall been running high, but he had an episode of hypoglycemia this AM with no apparent reason.  He is also asymptomatic.  Will continue current medication regimen for now, if blood sugar remain high next week will need to increase basal insulin.    Hypertension/CAD/A. Fib/HFrEF: Stable.  BP is at goal <140/90mmHg.    Hyperlipidemia: Unimproved. Pt is not on moderate-high intensity statin which is indicated based on 2019 ACC/AHA guidelines for lipid management.  Patient refuses statin therapy.    Gout: Stable. Pt's last uric acid level was not below goal at <6mg/dL, but pt is satisfied with current therapy and does not want to make any changes to his medications at this time.     Insomnia:  Stable.    BPH:  Stable.    Supplements: Stable. It is questionable how much benefit he is getting from his supplements, but he would like to continue taking them and they likely aren't harmful.    PLAN:   1.  Continue current medication regimen.    Will follow up in 1 week, appt scheduled.    SUBJECTIVE/OBJECTIVE:                           Jt Montgomery is a 75 year old male called for a follow-up visit. He was referred to me from Dr. Jones.  Today's visit is a follow-up MTM visit from 12/24/20     Reason for visit: DM f/up.    Tobacco: He reports that he quit smoking about 49 years ago. His smoking use included cigarettes. He has a 10.50 pack-year smoking history. He has never used smokeless tobacco.  Alcohol: history of alcohol dependence - sober for 47 years    Medication Adherence/Access: no issues reported    Type 2 Diabetes:  Currently taking Lantus 15 units daily. Patient is not experiencing side  effects.  Patient does not tolerate metformin.  Blood sugar monitoring: Continuous Glucose Monitor. Ranges (from patient's glucose log):   Date FBG/ 2hours post HS (time from dinner varies, generally 2-3 hours)   12/25 174 145   12/26 148 182   12/27 217 173   12/28 154 164   12/29 186 190   12/30 152 202   12/31 218 217   1/1 191 169   1/2 137 180   1/3 155 165   1/4 133 238   1/5 69      Symptoms of low blood sugar? He's been treating hypoglycemia with orange juice, he is asymptomatic with episodes of hypoglycemia  Symptoms of high blood sugar? none  Eye exam: due  Foot exam: due  Diet/Exercise: He continues working closely with Dee Stanley RD, CDE focusing on plant based eating.  He feels he's been eating similarly, no changes.  Aspirin: Taking 81mg daily and denies side effects  Statin: No - patient declines  ACEi/ARB: Yes: irbesartan 150mg daily.   Urine Albumin:   Lab Results   Component Value Date    UMALCR 317.16 (H) 09/22/2020      Lab Results   Component Value Date    A1C 7.0 09/22/2020    A1C 6.1 02/19/2020    A1C 6.2 11/14/2019    A1C 7.6 10/08/2019    A1C 6.5 03/06/2019     Hypertension/CAD/A. Fib/HFrEF: Currently taking ASA 81mg daily, digoxin 125mcg daily, diltiazem ER 300mg daily, furosemide 20mg daily as needed (taking most days), irbesartan 150mg daily, metoprolol succinate ER 50mg twice daily and warfarin as directed. Patient does not self-monitor BP. Patient reports no current medication side effects.  Last ECHO 11/2/16 indicated EF of 50-55%, improved from 20% in 2012.  BP Readings from Last 3 Encounters:   10/16/20 (!) 144/72   05/16/20 117/61   03/12/20 114/58      INR   Date Value Ref Range Status   12/10/2020 2.60 (H) 0.86 - 1.14 Final     Comment:     This test is intended for monitoring Coumadin therapy.  Results are not   accurate in patients with prolonged INR due to factor deficiency.       Lab Results   Component Value Date    DIGOXIN 0.6 09/22/2020        Hyperlipidemia: Currently  taking no medications.  He continues to refuse statin therapy.  The ASCVD Risk score (Charlotte RICH Navarro., et al., 2013) failed to calculate for the following reasons:    The valid total cholesterol range is 130 to 320 mg/dL     Recent Labs   Lab Test 09/22/20  1252 11/22/19  1623 04/16/13  0000 04/16/13  0000   CHOL 123 164   < >  --    HDL 28* 24*   < > 38*   LDL Cannot estimate LDL when triglyceride exceeds 400 mg/dL  50 Cannot estimate LDL when triglyceride exceeds 400 mg/dL  67   < > DELETED   TRIG 560* 843*   < > 555*   CHOLHDLRATIO  --   --   --  4.7*    < > = values in this interval not displayed.        Gout: Currently taking Uloric 40mg daily. Pt is not experiencing any medication side effects. He also has prednisone available to use if needed for gout flares (feels pain/ache in his arm or back), no recent use.  Uric Acid   Date Value Ref Range Status   02/21/2018 7.4 (H) 3.5 - 7.2 mg/dL Final        Insomnia:  Current medications include: melatonin 3mg at bedtime. Pt states this works very well for his sleep and reports no side effects.    BPH:  He's taking finasteride 5mg daily along with tamsulosin 0.4mg daily.  He denies side effects of therapy.  Had TURP and reports symptoms are stable.      Supplements:  Current supplements include cayenne, CoQ10, probiotic, magnesium oxide, daily MVI, eye vitamin, super beet powder, vitamin B complex,  Vitamin C, Vitamin D and zinc.  He finds these effective and prefers to continue taking.  He denies side effects.   Vitamin D Deficiency Screening Results:  Lab Results   Component Value Date    VITDT 34 11/28/2018    VITDT 38 10/01/2014      Today's Vitals: There were no vitals taken for this visit.    Medicare Part D topics discussed:Medications reviewed-no issues identified or changes needed    I spent 30 minutes with this patient today. A copy of the visit note was provided to the patient's primary care provider.    The patient was mailed a summary of these  recommendations.     Nathaly Hughes PharmD, [facility]  Medication Therapy Management Provider  Pager: [phone]     Patient consented to a telehealth visit: yes  Telemedicine Visit Details  Type of service:  Telephone visit  Start Time: 11:30 AM  End Time: 12:00 PM  Originating Location (patient location): Home  Distant Location (provider location):  [facility]  Mode of Communication:  Telephone      Medication Therapy Recommendations  No medication therapy recommendations to display

## 2021-01-09 DIAGNOSIS — E11.40 TYPE 2 DIABETES MELLITUS WITH DIABETIC NEUROPATHY, WITH LONG-TERM CURRENT USE OF INSULIN (H): Chronic | ICD-10-CM

## 2021-01-09 DIAGNOSIS — Z79.4 TYPE 2 DIABETES MELLITUS WITH DIABETIC NEUROPATHY, WITH LONG-TERM CURRENT USE OF INSULIN (H): Chronic | ICD-10-CM

## 2021-01-09 NOTE — TELEPHONE ENCOUNTER
freestyle sanford 14 day sensor    Summary: AS DIRECTED EVERY 14 DAYS, Disp-100 each,R-3, E-Prescribe   Start: 12/27/2019  Ord/Sold: 12/27/2019

## 2021-01-11 RX ORDER — FLASH GLUCOSE SENSOR
KIT MISCELLANEOUS
Qty: 100 EACH | Refills: 3 | Status: SHIPPED | OUTPATIENT
Start: 2021-01-11 | End: 2021-03-30

## 2021-01-13 ENCOUNTER — VIRTUAL VISIT (OUTPATIENT)
Dept: PHARMACY | Facility: CLINIC | Age: 76
End: 2021-01-13
Payer: COMMERCIAL

## 2021-01-13 DIAGNOSIS — Z79.4 TYPE 2 DIABETES MELLITUS WITH DIABETIC NEUROPATHY, WITH LONG-TERM CURRENT USE OF INSULIN (H): Primary | ICD-10-CM

## 2021-01-13 DIAGNOSIS — E11.40 TYPE 2 DIABETES MELLITUS WITH DIABETIC NEUROPATHY, WITH LONG-TERM CURRENT USE OF INSULIN (H): Primary | ICD-10-CM

## 2021-01-13 DIAGNOSIS — G47.00 INSOMNIA, UNSPECIFIED TYPE: ICD-10-CM

## 2021-01-13 DIAGNOSIS — Z71.85 VACCINE COUNSELING: ICD-10-CM

## 2021-01-13 PROCEDURE — 99607 MTMS BY PHARM ADDL 15 MIN: CPT | Mod: TEL | Performed by: PHARMACIST

## 2021-01-13 PROCEDURE — 99606 MTMS BY PHARM EST 15 MIN: CPT | Mod: TEL | Performed by: PHARMACIST

## 2021-01-13 NOTE — Clinical Note
This patient says he saw [facility] on December 1st. Can you please update his health maintenance? Thank you!

## 2021-01-13 NOTE — PATIENT INSTRUCTIONS
Recommendations from today's MTM visit:                                                       1. Increase Lantus to 22 units daily.     2. Talk to your pharmacy about getting the influenza vaccine, Tdap vaccine, and Shingrix.     It was great to speak with you today.  I value your experience and would be very thankful for your time with providing feedback on our clinic survey. You may receive a survey via email or text message in the next few days.     Next MTM visit: Jan 20th at 1:30 pm     To schedule another MTM appointment, please call the clinic directly or you may call the MTM scheduling line at [phone] or toll-free at [phone].     My Clinical Pharmacist's contact information:                                                      It was a pleasure talking with you today!  Please feel free to contact me with any questions or concerns you have.      Nathaly Hughes PharmD, [facility]  Medication Therapy Management Provider  Pager: [phone]     Liv Farley PharmD  Medication Therapy Management Resident  Pager: [phone]

## 2021-01-13 NOTE — PROGRESS NOTES
Medication Therapy Management (MTM) Encounter    ASSESSMENT:                            Medication Adherence/Access: No issues identified    Type 2 Diabetes: Needs improvement. Patient is not meeting SMBG goals of  mg/dL fasting and <180 mg/dL post prandial. Since he is no longer experiencing lows, would benefit from increasing Lantus back to his former dose of 22 units, which he had been on for a while.     Immunizations: He is due for influenza, Shingrix, and tetanus booster vaccinations.    Insomnia: He may benefit from trying to stick to a routine and avoiding naps during the day.     PLAN:                            1. Increase Lantus to 22 units daily.     2. Talk to your pharmacy about getting the influenza vaccine, Tdap vaccine, and Shingrix.     Follow-up: 1 week - Jan 20th at 1:30pm     SUBJECTIVE/OBJECTIVE:                           Jt Montgomery is a 75 year old male called for a follow-up visit. He was referred to me from Dr. Jones.  Today's visit is a follow-up MTM visit from 1/5/21.      Reason for visit: DM f/up.    Allergies/ADRs: Reviewed in chart  Tobacco: He reports that he quit smoking about 49 years ago. His smoking use included cigarettes. He has a 10.50 pack-year smoking history. He has never used smokeless tobacco.  Alcohol: history of alcohol dependence - sober for 47 years    Medication Adherence/Access: no issues reported    Type 2 Diabetes:  Currently taking Lantus 20 units daily (has been gradually increasing this since our last visit). He thinks he has been on the 20 units since around the 11th. Patient is not experiencing side effects.  Blood sugar monitoring: Continuous Glucose Monitor. Ranges (from patient's glucose log):   Date FBG (mg/dL) HS (time varies from dinner varies, generally 2-3 hours) (mg/dL)   1/6 167  130   1/7 200 188   1/8 194 187   1/9 180 260 (he thinks he may have had a large dinner)   1/10 195 179   1/11 167 178   1/12 177 170   1/13 169 ---     Symptoms  of low blood sugar? None. This has improved since last MTM visit. He says he is very scared of this happening because he has seen his sister lose consciousness from hypoglycemia. He does have orange juice on hand for emergencies. In the past, he has been asymptomatic even with low readings.   Symptoms of high blood sugar? none  Eye exam: He says he had an eye exam [facility] on December 1st.   Foot exam: due  Aspirin: Taking 81mg daily and denies side effects  Statin: No - patient declines  ACEi/ARB: Yes: irbesartan.   Urine Albumin:   Lab Results   Component Value Date    UMALCR 317.16 (H) 09/22/2020      Lab Results   Component Value Date    A1C 7.0 09/22/2020    A1C 6.1 02/19/2020    A1C 6.2 11/14/2019    A1C 7.6 10/08/2019    A1C 6.5 03/06/2019     Immunizations: Has not received influenza vaccine this Fall, has not received Shingrix or tdap according to [facility]. Did receive [id number] since turning 66.     Insomnia:  He says he has not been sleeping well, and he has been feeling groggy. He says he does typically take naps during the day. He has been using melatonin 3 mg for this.     ----------------  I spent 19 minutes with this patient today. All changes were made via collaborative practice agreement with Dr. Jones. A copy of the visit note was provided to the patient's primary care provider.    The patient declined a summary of these recommendations.     Nathaly Hughes PharmD, [facility]  Medication Therapy Management Provider  Pager: [phone]     Liv Farley PharmD  Medication Therapy Management Resident  Pager: [phone]    Telemedicine Visit Details  Type of service:  Telephone visit  Start Time: 1:38 PM  End Time: 1:57 PM  Originating Location (patient location): Home  Distant Location (provider location):  [facility]      Medication Therapy Recommendations  Type 2 diabetes mellitus with diabetic neuropathy (H)    Current Medication: Insulin Glargine (LANTUS SOLOSTAR SC)    Rationale: Dose too low - Dosage too low - Effectiveness   Recommendation: Increase Dose - Increase from 20 units to 22 units   Status: Accepted per CPA         Vaccine counseling    Rationale: Preventive therapy - Needs additional medication therapy - Indication   Recommendation: Provide Education - He is due for influenza, Shingrix, and tetanus booster vaccinations. Recommended he speak with his pharmacy   Status: Patient Agreed - Adherence/Education

## 2021-01-14 ENCOUNTER — TELEPHONE (OUTPATIENT)
Dept: CARDIOLOGY | Facility: CLINIC | Age: 76
End: 2021-01-14

## 2021-01-14 NOTE — TELEPHONE ENCOUNTER
Left message asking pt to reschedule the cancelled INR appt (he can either call the INR clinic or the [place] to schedule the lab at the [place] lab). Antoni

## 2021-01-18 ENCOUNTER — ANTICOAGULATION THERAPY VISIT (OUTPATIENT)
Dept: CARDIOLOGY | Facility: CLINIC | Age: 76
End: 2021-01-18
Payer: COMMERCIAL

## 2021-01-18 DIAGNOSIS — Z79.01 LONG TERM CURRENT USE OF ANTICOAGULANTS WITH INR GOAL OF 2.0-3.0: ICD-10-CM

## 2021-01-18 DIAGNOSIS — I48.0 PAROXYSMAL ATRIAL FIBRILLATION (H): ICD-10-CM

## 2021-01-18 LAB
CAPILLARY BLOOD COLLECTION: NORMAL
INR PPP: 2.3 (ref 0.86–1.14)

## 2021-01-18 PROCEDURE — 36416 COLLJ CAPILLARY BLOOD SPEC: CPT | Performed by: INTERNAL MEDICINE

## 2021-01-18 PROCEDURE — 85610 PROTHROMBIN TIME: CPT | Performed by: INTERNAL MEDICINE

## 2021-01-18 PROCEDURE — 99207 PR NO CHARGE NURSE ONLY: CPT | Performed by: INTERNAL MEDICINE

## 2021-01-18 NOTE — PROGRESS NOTES
ANTICOAGULATION FOLLOW-UP CLINIC VISIT    Patient Name:  Jt Montgomery  Date:  2021  Contact Type:  Telephone    SUBJECTIVE:  Patient Findings         Clinical Outcomes     Negatives:  Major bleeding event, Thromboembolic event, Anticoagulation-related hospital admission, Anticoagulation-related ED visit, Anticoagulation-related fatality           OBJECTIVE    Recent labs: (last 7 days)     21  1312   INR 2.30*       ASSESSMENT / PLAN  INR assessment THER    Recheck INR In: 4 WEEKS    INR Location Outside lab      Anticoagulation Summary  As of 2021    INR goal:  2.0-3.0   TTR:  72.0 % (11.2 mo)   INR used for dosin.30 (2021)   Warfarin maintenance plan:  7.5 mg (5 mg x 1.5) every Sun, Tue, Thu; 5 mg (5 mg x 1) all other days   Full warfarin instructions:  7.5 mg every Sun, Tue, Thu; 5 mg all other days   Weekly warfarin total:  42.5 mg   No change documented:  Paola Roberson RN   Plan last modified:  Marlena Hong RN (2020)   Next INR check:  2/15/2021   Target end date:  Indefinite    Indications    Atrial fibrillation (AFIB) on Coumadin [I48.91]  Long term current use of anticoagulants with INR goal of 2.0-3.0 (Resolved) [Z79.01]  Paroxysmal atrial fibrillation (H) [I48.0]  Long term current use of anticoagulants with INR goal of 2.0-3.0 [Z79.01]             Anticoagulation Episode Summary     INR check location:      Preferred lab:      Send INR reminders to:  [facility] HEART INR NURSE    Comments:        Anticoagulation Care Providers     Provider Role Specialty Phone number    Ip, Genaro Garrett MD Referring Cardiovascular Disease [phone]            See the Encounter Report to view Anticoagulation Flowsheet and Dosing Calendar (Go to Encounters tab in chart review, and find the Anticoagulation Therapy Visit)    INR 2.30.  Called and spoke to the patient.  No changes.  Continue same schedule and recheck in 4 weeks.  Brian Roberson RN

## 2021-01-20 ENCOUNTER — VIRTUAL VISIT (OUTPATIENT)
Dept: PHARMACY | Facility: CLINIC | Age: 76
End: 2021-01-20
Payer: COMMERCIAL

## 2021-01-20 DIAGNOSIS — Z71.85 VACCINE COUNSELING: ICD-10-CM

## 2021-01-20 DIAGNOSIS — I10 ESSENTIAL HYPERTENSION: ICD-10-CM

## 2021-01-20 DIAGNOSIS — I42.0 DILATED CARDIOMYOPATHY (H): ICD-10-CM

## 2021-01-20 DIAGNOSIS — I48.91 ATRIAL FIBRILLATION, UNSPECIFIED TYPE (H): ICD-10-CM

## 2021-01-20 DIAGNOSIS — E11.40 TYPE 2 DIABETES MELLITUS WITH DIABETIC NEUROPATHY, WITH LONG-TERM CURRENT USE OF INSULIN (H): Primary | ICD-10-CM

## 2021-01-20 DIAGNOSIS — I25.10 CORONARY ARTERY DISEASE INVOLVING NATIVE CORONARY ARTERY OF NATIVE HEART WITHOUT ANGINA PECTORIS: ICD-10-CM

## 2021-01-20 DIAGNOSIS — Z79.4 TYPE 2 DIABETES MELLITUS WITH DIABETIC NEUROPATHY, WITH LONG-TERM CURRENT USE OF INSULIN (H): Primary | ICD-10-CM

## 2021-01-20 PROCEDURE — 99607 MTMS BY PHARM ADDL 15 MIN: CPT | Mod: TEL | Performed by: PHARMACIST

## 2021-01-20 PROCEDURE — 99606 MTMS BY PHARM EST 15 MIN: CPT | Mod: TEL | Performed by: PHARMACIST

## 2021-01-20 NOTE — PROGRESS NOTES
Medication Therapy Management (MTM) Encounter    ASSESSMENT:                            Medication Adherence/Access: No issues identified    Type 2 Diabetes: Improving. Since the stress of his son's surgery is behind him, hopefully his diet and SMBG sugars will become a bit more regular. Will continue to monitor.     Immunizations: He is due for influenza, Shingrix, and tetanus booster vaccinations. He says he has been meaning to go to the pharmacy for these, but has not done so yet.     Hypertension/CAD/A. Fib/HFrEF:  He may benefit from taking furosemide since his blood pressure has been slightly higher than usual, and he is having a bit of edema. Having his son's surgery behind him will likely help with the blood pressure as well. Will continue to monitor.     PLAN:                            1. Go to your pharmacy to receive the influenza, Shingrix, and tetanus booster vaccinations.    2. Take furosemide 20 mg once daily for a few days until your swelling goes down. Keep check your blood pressure daily. We would like it to be <130/80 mmHg.     Follow-up: Feb 3rd at 1:30 pm     SUBJECTIVE/OBJECTIVE:                          Jt Montgomery is a 75 year old male called for a follow-up visit. He was referred to me from Dr. Jones.  Today's visit is a follow-up MTM visit from 1/13/21.      Reason for visit: Diabetes follow up. His son is home from surgery now, and it went relatively well.     Allergies/ADRs: Reviewed in chart  Tobacco: He reports that he quit smoking about 49 years ago. His smoking use included cigarettes. He has a 10.50 pack-year smoking history. He has never used smokeless tobacco.  Alcohol: history of alcohol dependence - sober for 47 years    Medication Adherence/Access: no issues reported    Type 2 Diabetes:  Currently taking Lantus 22 units daily. Patient is not experiencing side effects.  Blood sugar monitoring: Continuous Glucose Monitor. Ranges (from patient's glucose log):   Date FBG (mg/dL)  HS (time varies from dinner varies, generally 2-3 hours) (mg/dL)   1/14 174 141   1/15 133 205   1/16 164 149   1/17 122 141   1/18 128 98   1/19 106 124. Then in the middle of the night it was 83 so he had OJ and a snack. He was not having symptoms of low blood sugar.    1/20 168 ----      Symptoms of low blood sugar? None.   Symptoms of high blood sugar? None.   Eye exam: up to date  Foot exam: due  Diet/Exercise: He has been eating less lately (his appetite is decreased), and trying to eat more salads. He has been having a bit more meat than usual. He did have a fish sandwich recently.   Aspirin: Taking 81mg daily and denies side effects  Statin: No - patient declines  ACEi/ARB: Yes: irbesartan.  Urine Albumin:   Lab Results   Component Value Date    UMALCR 317.16 (H) 09/22/2020      Lab Results   Component Value Date    A1C 7.0 09/22/2020    A1C 6.1 02/19/2020    A1C 6.2 11/14/2019    A1C 7.6 10/08/2019    A1C 6.5 03/06/2019     Immunizations: Has not received influenza vaccine this Fall, has not received Shingrix or tdap according to [facility]. Did receive [id number] since turning 66.     Hypertension/CAD/A. Fib/HFrEF: Currently taking ASA 81mg daily, digoxin 125mcg daily, diltiazem ER 300mg daily, irbesartan 150mg daily, metoprolol succinate ER 50mg twice daily and warfarin as directed. He has not been taking furosemide lately but he does have some mild swelling in his ankles. Patient does self-monitor blood pressure. Home BP monitoring in range of 150's systolic over 80's diastolic. Patient reports no current medication side effects. He has been trying to watch the salt in his diet. He has also been stressed due to his son's recent surgery.     BP Readings from Last 3 Encounters:   10/16/20 (!) 144/72   05/16/20 117/61   03/12/20 114/58     ----------------    I spent 21 minutes with this patient today. A copy of the visit note was provided to the patient's primary care provider.    The patient declined a summary  of these recommendations.     Nathaly Hughes PharmD, BCACP  Medication Therapy Management Provider  Pager: [phone]     Liv Farley PharmD  Medication Therapy Management Resident  Pager: [phone]    Telemedicine Visit Details  Type of service:  Telephone visit  Start Time: 1:39 PM  End Time: 2:00 PM  Originating Location (patient location): [place]  Distant Location (provider location):  [facility]        Medication Therapy Recommendations  HTN (hypertension)    Current Medication: furosemide (LASIX) 20 MG tablet   Rationale: Patient forgets to take - Adherence - Adherence   Recommendation: Provide Education - Reminded patient that he should take furosemide for swelling/increased BP   Status: Patient Agreed - Adherence/Education

## 2021-01-20 NOTE — PATIENT INSTRUCTIONS
Recommendations from today's MTM visit:                                                       1. Go to your pharmacy to receive the influenza, Shingrix, and tetanus booster vaccinations.    2. Take furosemide 20 mg once daily for a few days until your swelling goes down. Keep check your blood pressure daily. We would like it to be <130/80 mmHg.     It was great to speak with you today.  I value your experience and would be very thankful for your time with providing feedback on our clinic survey. You may receive a survey via email or text message in the next few days.     Next MTM visit: Feb 3rd at 1:30 pm    To schedule another MTM appointment, please call the clinic directly or you may call the MTM scheduling line at [phone] or toll-free at [phone].     My Clinical Pharmacist's contact information:                                                      It was a pleasure talking with you today!  Please feel free to contact me with any questions or concerns you have.      Liv Farley, Rogelio  [facility] Pharmacy Resident  Pager: [phone]

## 2021-02-09 ENCOUNTER — VIRTUAL VISIT (OUTPATIENT)
Dept: PHARMACY | Facility: CLINIC | Age: 76
End: 2021-02-09
Payer: COMMERCIAL

## 2021-02-09 DIAGNOSIS — E11.40 TYPE 2 DIABETES MELLITUS WITH DIABETIC NEUROPATHY, WITH LONG-TERM CURRENT USE OF INSULIN (H): Primary | ICD-10-CM

## 2021-02-09 DIAGNOSIS — Z71.85 VACCINE COUNSELING: ICD-10-CM

## 2021-02-09 DIAGNOSIS — I10 ESSENTIAL HYPERTENSION: ICD-10-CM

## 2021-02-09 DIAGNOSIS — Z79.4 TYPE 2 DIABETES MELLITUS WITH DIABETIC NEUROPATHY, WITH LONG-TERM CURRENT USE OF INSULIN (H): Primary | ICD-10-CM

## 2021-02-09 DIAGNOSIS — I48.91 ATRIAL FIBRILLATION, UNSPECIFIED TYPE (H): ICD-10-CM

## 2021-02-09 DIAGNOSIS — I25.10 CORONARY ARTERY DISEASE INVOLVING NATIVE CORONARY ARTERY OF NATIVE HEART WITHOUT ANGINA PECTORIS: ICD-10-CM

## 2021-02-09 DIAGNOSIS — I42.0 DILATED CARDIOMYOPATHY (H): ICD-10-CM

## 2021-02-09 PROCEDURE — 99606 MTMS BY PHARM EST 15 MIN: CPT | Mod: TEL | Performed by: PHARMACIST

## 2021-02-09 NOTE — PROGRESS NOTES
Medication Therapy Management (MTM) Encounter    ASSESSMENT:                            Medication Adherence/Access: No issues identified    Type 2 Diabetes: Improved.  Blood sugar remain slightly high, but he's had hypoglycemia with higher doses of insulin so will continue current medication regimen.    Immunizations:  Patient continues to plan to go to pharmacy for immunizations.    Hypertension/CAD/A. Fib/HFrEF:  Stable.    PLAN:                            1.  Continue current medication regimen.    Follow-up: 2 weeks, appt scheduled    SUBJECTIVE/OBJECTIVE:                          Jt Montgomery is a 75 year old male called for a follow-up visit. He was referred to me from Dr. Jones.  Today's visit is a follow-up MTM visit from 1/20/2021     Reason for visit: F/up on DM.    Tobacco: He reports that he quit smoking about 49 years ago. His smoking use included cigarettes. He has a 10.50 pack-year smoking history. He has never used smokeless tobacco.  Alcohol: history of alcohol dependence    Medication Adherence/Access: no issues reported    Type 2 Diabetes:  Currently taking Lantus 22 units daily. Patient is not experiencing side effects.  Blood sugar monitoring: Continuous Glucose Monitor. Ranges (from patient's glucose log).  He does note that some of these fasting readings are not truly fasting as he sometimes eats an apple or banana in the middle of the night (unclear how often this is happening)  Date FBG (mg/dL) HS (time varies from dinner varies, generally 2-3 hours) (mg/dL)   1/21 191 172   1/22 146 190   1/23 144 156   1/24 157 122   1/25 143 155   1/26 172 151   1/27 138 207   1/28 145 195   1/29 162 157   1/30 144 175   1/31 189 165   2/1 159 205   2/2 129 171   2/3 140 169   2/4 177 175   2/5 136 164   2/6 154 133   2/7 121 187   2/8 141 169   2/9 131    Symptoms of low blood sugar? None.   Symptoms of high blood sugar? None.   Eye exam: up to date  Foot exam: due  Diet/Exercise: He's frustrated that  he's gained a few pounds.  He is eating more meat (mostly along with salads).    Aspirin: Taking 81mg daily and denies side effects  Statin: No - patient declines  ACEi/ARB: Yes: irbesartan.  Urine Albumin:   Lab Results   Component Value Date    UMALCR 317.16 (H) 09/22/2020      Lab Results   Component Value Date    A1C 7.0 09/22/2020    A1C 6.1 02/19/2020    A1C 6.2 11/14/2019    A1C 7.6 10/08/2019    A1C 6.5 03/06/2019     Immunizations: Has not yet gone in for additional vaccines as we discussed at our last visit.    Hypertension/CAD/A. Fib/HFrEF: Currently taking ASA 81mg daily, digoxin 125mcg daily, diltiazem ER 300mg daily, irbesartan 150mg daily, metoprolol succinate ER 50mg twice daily and warfarin as directed. He has been taking furosemide more often and is also wearing compression stockings, and reports that edema is improving. Patient does self-monitor blood pressure. Home BP monitoring in range of 120's systolic over 70's diastolic. Patient reports no current medication side effects.     BP Readings from Last 3 Encounters:   10/16/20 (!) 144/72   05/16/20 117/61   03/12/20 114/58      Today's Vitals: There were no vitals taken for this visit.  ----------------    I spent 26 minutes with this patient today. A copy of the visit note was provided to the patient's primary care provider.    The patient was mailed a summary of these recommendations.     Nathaly Hughes, PharmD, [facility]  Medication Therapy Management Provider  Pager: [phone]     Telemedicine Visit Details  Type of service:  Telephone visit  Start Time: 2:33 PM  End Time: 2:59 PM  Originating Location (patient location): Home  Distant Location (provider location):  [facility] MTM      Medication Therapy Recommendations  No medication therapy recommendations to display

## 2021-02-09 NOTE — PATIENT INSTRUCTIONS
Recommendations from today's MTM visit:                                                    MTM (medication therapy management) is a service provided by a clinical pharmacist designed to help you get the most of out of your medicines.   Today we reviewed what your medicines are for, how to know if they are working, that your medicines are safe and how to make your medicine regimen as easy as possible.      1.  Continue current medication regimen    It was great to speak with you today.  I value your experience and would be very thankful for your time with providing feedback on our clinic survey. You may receive a survey via email or text message in the next few days.     Next MTM visit: Tuesday, February 23rd at 1:30 pm - via telephone    To schedule another MTM appointment, please call the clinic directly or you may call the MTM scheduling line at [phone] or toll-free at [phone].     My Clinical Pharmacist's contact information:                                                      It was a pleasure talking with you today!  Please feel free to contact me with any questions or concerns you have.      Nathaly Hughes PharmD, [facility]  Medication Therapy Management Provider  Pager: [phone]     Liv Farley PharmD  Medication Therapy Management Resident  Pager: [phone]

## 2021-02-15 ENCOUNTER — ANTICOAGULATION THERAPY VISIT (OUTPATIENT)
Dept: CARDIOLOGY | Facility: CLINIC | Age: 76
End: 2021-02-15

## 2021-02-15 DIAGNOSIS — Z79.01 LONG TERM CURRENT USE OF ANTICOAGULANTS WITH INR GOAL OF 2.0-3.0: ICD-10-CM

## 2021-02-15 DIAGNOSIS — I48.0 PAROXYSMAL ATRIAL FIBRILLATION (H): ICD-10-CM

## 2021-02-15 LAB
CAPILLARY BLOOD COLLECTION: NORMAL
INR PPP: 2.3 (ref 0.86–1.14)

## 2021-02-15 PROCEDURE — 99207 PR NO CHARGE NURSE ONLY: CPT

## 2021-02-15 PROCEDURE — 85610 PROTHROMBIN TIME: CPT | Performed by: INTERNAL MEDICINE

## 2021-02-15 PROCEDURE — 36416 COLLJ CAPILLARY BLOOD SPEC: CPT | Performed by: INTERNAL MEDICINE

## 2021-02-15 NOTE — PROGRESS NOTES
ANTICOAGULATION FOLLOW-UP CLINIC VISIT    Patient Name:  Jt Montgomery  Date:  2/15/2021  Contact Type:  Telephone    SUBJECTIVE:  Patient Findings         Clinical Outcomes     Negatives:  Major bleeding event, Thromboembolic event, Anticoagulation-related hospital admission, Anticoagulation-related ED visit, Anticoagulation-related fatality           OBJECTIVE    Recent labs: (last 7 days)     02/15/21  1314   INR 2.30*       ASSESSMENT / PLAN  INR assessment THER    Recheck INR In: 4 WEEKS    INR Location Outside lab      Anticoagulation Summary  As of 2/15/2021    INR goal:  2.0-3.0   TTR:  76.7 % (11.2 mo)   INR used for dosin.30 (2/15/2021)   Warfarin maintenance plan:  7.5 mg (5 mg x 1.5) every Sun, Tue, Thu; 5 mg (5 mg x 1) all other days   Full warfarin instructions:  7.5 mg every Sun, Tue, Thu; 5 mg all other days   Weekly warfarin total:  42.5 mg   No change documented:  Paola Roberson RN   Plan last modified:  Marlena Hong RN (2020)   Next INR check:  3/15/2021   Target end date:  Indefinite    Indications    Atrial fibrillation (AFIB) on Coumadin [I48.91]  Long term current use of anticoagulants with INR goal of 2.0-3.0 (Resolved) [Z79.01]  Paroxysmal atrial fibrillation (H) [I48.0]  Long term current use of anticoagulants with INR goal of 2.0-3.0 [Z79.01]             Anticoagulation Episode Summary     INR check location:      Preferred lab:      Send INR reminders to:  [facility] HEART INR NURSE    Comments:        Anticoagulation Care Providers     Provider Role Specialty Phone number    Ip, Genaro Garrett MD Referring Cardiovascular Disease [phone]            See the Encounter Report to view Anticoagulation Flowsheet and Dosing Calendar (Go to Encounters tab in chart review, and find the Anticoagulation Therapy Visit)    INR 2.3.  Called and spoke to the patient.  NO changes.  Continue same schedule and recheck in 4 weeks.  Brian Roberson RN

## 2021-02-17 ENCOUNTER — TELEPHONE (OUTPATIENT)
Dept: FAMILY MEDICINE | Facility: CLINIC | Age: 76
End: 2021-02-17

## 2021-02-17 DIAGNOSIS — N40.1 BENIGN PROSTATIC HYPERPLASIA WITH URINARY OBSTRUCTION: ICD-10-CM

## 2021-02-17 DIAGNOSIS — N13.8 BENIGN PROSTATIC HYPERPLASIA WITH URINARY OBSTRUCTION: ICD-10-CM

## 2021-02-17 NOTE — TELEPHONE ENCOUNTER
Prior Authorization Retail Medication Request    Medication/Dose: Freestyle Umair 14 day sensor  ICD code (if different than what is on RX):  E11.40, Z79.4  Previously Tried and Failed:  None  Rationale:  Patient checking blood sugars once daily and is using Lantus once daily    Insurance Name:  [employer]  Insurance ID:  [id number]      Pharmacy Information (if different than what is on RX)  Name:  [id number]  Phone:  [phone]

## 2021-02-18 NOTE — TELEPHONE ENCOUNTER
Finasteride 5 mg tablets    Summary: Take 1 tablet (5 mg) by mouth daily, Disp-90 tablet,R-3, E-Prescribe   Dose, Route, Frequency: 5 mg, Oral, DAILY  Start: 1/16/2020  Ord/Sold: 1/16/2020

## 2021-02-19 RX ORDER — FINASTERIDE 5 MG/1
5 TABLET, FILM COATED ORAL DAILY
Qty: 90 TABLET | Refills: 2 | Status: SHIPPED | OUTPATIENT
Start: 2021-02-19 | End: 2021-12-01

## 2021-02-20 NOTE — TELEPHONE ENCOUNTER
Central Prior Authorization Team   Phone: [phone]    PA Initiation    Medication: Freestyle Umair 14 day sensor  Insurance Company: [employer] - Phone [phone] Fax [phone]  Pharmacy Filling the Rx: [facility] #[id number] - [place], [place] - [facility] AT [facility] [address]  Filling Pharmacy Phone: [phone]  Filling Pharmacy Fax: [phone]  Start Date: 2/20/2021

## 2021-02-22 NOTE — TELEPHONE ENCOUNTER
PRIOR AUTHORIZATION DENIED    Medication: Freestyle Umair 14 day sensor-DENIED    Denial Date: 2/22/2021    Denial Rational: PATIENT DID NOT MEET CRITERIA -         Appeal Information:  IF YOU WOULD LIKE TO APPAL PLEASE SUPPLY PA TEAM WITH A LETTER OF MEDICAL NECESSITY WITH CLINICAL REASON.

## 2021-02-22 NOTE — TELEPHONE ENCOUNTER
BD GERMÁN U/F 32G X 4 MM insulin pen         Last Written Prescription Date: 3/24/2017  Last Fill Quantity: 100,  # refills: 3   Last Office Visit with [facility], [facility] or [facility] prescribing provider: 1/2/2017                                               
Prescription approved per [facility] Refill Protocol.  Shazia Ruelas RN      
No indicators present

## 2021-02-23 ENCOUNTER — VIRTUAL VISIT (OUTPATIENT)
Dept: PHARMACY | Facility: CLINIC | Age: 76
End: 2021-02-23
Payer: COMMERCIAL

## 2021-02-23 DIAGNOSIS — E11.40 TYPE 2 DIABETES MELLITUS WITH DIABETIC NEUROPATHY, WITH LONG-TERM CURRENT USE OF INSULIN (H): Primary | ICD-10-CM

## 2021-02-23 DIAGNOSIS — Z79.4 TYPE 2 DIABETES MELLITUS WITH DIABETIC NEUROPATHY, WITH LONG-TERM CURRENT USE OF INSULIN (H): Primary | ICD-10-CM

## 2021-02-23 DIAGNOSIS — R21 RASH: ICD-10-CM

## 2021-02-23 PROCEDURE — 99606 MTMS BY PHARM EST 15 MIN: CPT | Mod: TEL | Performed by: PHARMACIST

## 2021-02-23 NOTE — PROGRESS NOTES
Medication Therapy Management (MTM) Encounter    ASSESSMENT:                            Medication Adherence/Access: No issues identified    Type 2 Diabetes: Patient is meeting A1c goal of < 8%. Self monitoring of blood glucose is at goal of fasting  mg/dL and post prandial < 180 mg/dL.     Rash: Needs further evaluation    PLAN:                            1.  MTM will look further into denial of PA for Freestyle Umair  2.  Assisted patient in scheduling appt with primary care physician on Thursday, 2/25 to have rash evaluated further    Follow-up: Via phone in 1-2 days after looking into PA for Freestyle Umair    SUBJECTIVE/OBJECTIVE:                          Jt Montgomery is a 75 year old male called for a follow-up visit. He was referred to me from Dr. Jones.  Today's visit is a follow-up MTM visit from 2/9/21.     Reason for visit: Routine f/up.    Tobacco: He reports that he quit smoking about 49 years ago. His smoking use included cigarettes. He has a 10.50 pack-year smoking history. He has never used smokeless tobacco.  Alcohol: history of alcohol dependence    Medication Adherence/Access: no issues reported    Type 2 Diabetes:  Currently taking Lantus 22 units daily. Patient is not experiencing side effects.  Blood sugar monitoring: Continuous Glucose Monitor - but he received notification from [employer] that this won't be covered in 2021. Ranges (from patient's glucose log).  He has not been sleeping very well - sometimes has a snack and goes back to bed prior to checking blood sugar so fasting readings are not always fasting  Date FBG (mg/dL) HS (time varies from dinner varies, generally 2-3 hours) (mg/dL)   2/10 155 135   2/11 - -   2/12 124 154   2/13 159 151   2/14 151 173   2/15 155 173   2/16 - 110   2/17 144 155   2/18 119 168   2/19 167 123   2/20 120 122   2/21 156 121   2/22 143 153   2/23 147      Symptoms of low blood sugar? None.   Symptoms of high blood sugar? None.   Eye exam: up  to date  Foot exam: due  Diet/Exercise: Not discussed in detail today  Aspirin: Taking 81mg daily and denies side effects  Statin: No - patient declines  ACEi/ARB: Yes: irbesartan.  Urine Albumin:   Lab Results   Component Value Date    UMALCR 317.16 (H) 09/22/2020      Lab Results   Component Value Date    A1C 7.0 09/22/2020    A1C 6.1 02/19/2020    A1C 6.2 11/14/2019    A1C 7.6 10/08/2019    A1C 6.5 03/06/2019     Rash:  Patient c/o rash on his abdomen - he reports there are fluid filled sores.  He has not had this evaluated.      Today's Vitals: There were no vitals taken for this visit.  ----------------    I spent 20 minutes with this patient today. A copy of the visit note was provided to the patient's primary care provider.    The patient was mailed summary of these recommendations.     Mikaela StroudD, BCACP  Medication Therapy Management Provider  Pager: [phone]     Telemedicine Visit Details  Type of service:  Telephone visit  Start Time: 1:37 PM  End Time: 1:57 PM  Originating Location (patient location): Home  Distant Location (provider location):  [facility] MTM      Medication Therapy Recommendations  No medication therapy recommendations to display

## 2021-02-24 ENCOUNTER — TELEPHONE (OUTPATIENT)
Dept: PHARMACY | Facility: CLINIC | Age: 76
End: 2021-02-24

## 2021-02-24 ENCOUNTER — MEDICAL CORRESPONDENCE (OUTPATIENT)
Dept: HEALTH INFORMATION MANAGEMENT | Facility: CLINIC | Age: 76
End: 2021-02-24

## 2021-02-24 NOTE — LETTER
2021      To Whom It May Concern,      I'm writing to appeal the prior authorization denial for Jt Montgomery ( 47) for the Freestyle Umair Device.  Per the prior authorization denial - patient needs to be monitoring blood sugars 3 or more times per day.  Patient actually is testing several times per day, but had not been reporting this data to us.  Please see updated log of multiple blood sugars per day.    Please let us know if you have any additional questions or concerns.    Sincerely,    Nathaly Hughes, PharmD, [facility]  Medication Therapy Management Provider  Pager: [phone]

## 2021-02-24 NOTE — PATIENT INSTRUCTIONS
Recommendations from today's MTM visit:                                                    MTM (medication therapy management) is a service provided by a clinical pharmacist designed to help you get the most of out of your medicines.   Today we reviewed what your medicines are for, how to know if they are working, that your medicines are safe and how to make your medicine regimen as easy as possible.      1.  Continue current medication regimen.    It was great to speak with you today.  I value your experience and would be very thankful for your time with providing feedback on our clinic survey. You may receive a survey via email or text message in the next few days.     Next MTM visit: Follow-up with Dr. Jones on Thursday, 2/25 to have your rash evaluated.  I'll call you and let you know what I find out about the Umair    To schedule another MTM appointment, please call the clinic directly or you may call the MTM scheduling line at [phone] or toll-free at [phone].     My Clinical Pharmacist's contact information:                                                      It was a pleasure talking with you today!  Please feel free to contact me with any questions or concerns you have.      Nathaly Hughes, Rogelio, [facility]  Medication Therapy Management Provider  Pager: [phone]     Liv Farley PharmD  Medication Therapy Management Resident  Pager: [phone]

## 2021-02-24 NOTE — TELEPHONE ENCOUNTER
Called Jamal to f/up from our visit yesterday.  Per review of the PA denial for Freestyle Umair patient needs to be checking blood sugar at least 3 times per day.  Jamal indicates he has been scanning his blood sugar multiple times per day, has just been reporting the 2 readings per day to me.      He is seeing Dr. Jones tomorrow, so will bring more detailed blood sugar log to that visit and will have medical staff scan/fax to me.  I can then submit the appeal for Umair coverage.    Nathaly Hughes, PharmD, [facility]  Medication Therapy Management Provider  Pager: [phone]

## 2021-02-25 ENCOUNTER — OFFICE VISIT (OUTPATIENT)
Dept: FAMILY MEDICINE | Facility: CLINIC | Age: 76
End: 2021-02-25
Payer: COMMERCIAL

## 2021-02-25 VITALS
HEART RATE: 85 BPM | BODY MASS INDEX: 37.38 KG/M2 | HEIGHT: 71 IN | WEIGHT: 267 LBS | OXYGEN SATURATION: 97 % | DIASTOLIC BLOOD PRESSURE: 101 MMHG | SYSTOLIC BLOOD PRESSURE: 170 MMHG | TEMPERATURE: 97.7 F

## 2021-02-25 DIAGNOSIS — Z79.4 TYPE 2 DIABETES MELLITUS WITH DIABETIC NEUROPATHY, WITH LONG-TERM CURRENT USE OF INSULIN (H): Chronic | ICD-10-CM

## 2021-02-25 DIAGNOSIS — I10 ESSENTIAL HYPERTENSION: Chronic | ICD-10-CM

## 2021-02-25 DIAGNOSIS — R60.0 BILATERAL LEG EDEMA: ICD-10-CM

## 2021-02-25 DIAGNOSIS — E78.2 MIXED HYPERLIPIDEMIA DUE TO TYPE 2 DIABETES MELLITUS (H): ICD-10-CM

## 2021-02-25 DIAGNOSIS — R21 RASH: Primary | ICD-10-CM

## 2021-02-25 DIAGNOSIS — E11.40 TYPE 2 DIABETES MELLITUS WITH DIABETIC NEUROPATHY, WITH LONG-TERM CURRENT USE OF INSULIN (H): Chronic | ICD-10-CM

## 2021-02-25 DIAGNOSIS — B02.8 HERPES ZOSTER WITH COMPLICATION: ICD-10-CM

## 2021-02-25 DIAGNOSIS — E11.69 MIXED HYPERLIPIDEMIA DUE TO TYPE 2 DIABETES MELLITUS (H): ICD-10-CM

## 2021-02-25 PROCEDURE — 99214 OFFICE O/P EST MOD 30 MIN: CPT | Performed by: INTERNAL MEDICINE

## 2021-02-25 RX ORDER — FUROSEMIDE 20 MG
20 TABLET ORAL DAILY PRN
Qty: 90 TABLET | Refills: 3 | Status: SHIPPED | OUTPATIENT
Start: 2021-02-25 | End: 2021-04-19

## 2021-02-25 RX ORDER — TRIAMCINOLONE ACETONIDE 1 MG/G
CREAM TOPICAL 2 TIMES DAILY PRN
Qty: 30 G | Refills: 3 | Status: SHIPPED | OUTPATIENT
Start: 2021-02-25

## 2021-02-25 RX ORDER — VALACYCLOVIR HYDROCHLORIDE 1 G/1
1000 TABLET, FILM COATED ORAL 3 TIMES DAILY
Qty: 14 TABLET | Refills: 1 | Status: SHIPPED | OUTPATIENT
Start: 2021-02-25 | End: 2021-02-26

## 2021-02-25 ASSESSMENT — MIFFLIN-ST. JEOR: SCORE: 1973.23

## 2021-02-25 NOTE — PROGRESS NOTES
Columba Berry is a 75 year old who presents for the following health issues    HPI        Chief Complaint:       Follow up on multiple concerns including Rash concerns, Type 2 Diabetes       HPI:   Patient Jt Montgomery is a very pleasant 74 year old male with history of BPH, Type 2 Diabetes, hypertension, hyperlipidemia who presents to Internal Medicine clinic today for follow up on multiple concerns including recent rashes concerns, Type 2 Diabetes. no fever or chills symptoms at this time. His chronic BPH symptoms are improved with current Flomax and finasteride medication therapy. Regarding his chronic Type 2 diabetes mellitus the patient is compliant with his diabetes medication therapy including long-term current use of insulin therapy. He denies any recent hypoglycemia events. He is due for a repeat A1c lab today for monitoring of diabetes control.      Current Medications:     Current Outpatient Medications   Medication Sig Dispense Refill     aspirin 81 MG EC tablet Take 81 mg by mouth daily       blood glucose (NO BRAND SPECIFIED) test strip Use to test blood sugar 1 time daily or as directed. To accompany: Blood Glucose Monitor Brands: per insurance. 100 strip 6     blood glucose monitoring (NO BRAND SPECIFIED) meter device kit Use to test blood sugar 1 time daily or as directed. Preferred blood glucose meter OR supplies to accompany: Blood Glucose Monitor Brands: per insurance. 1 kit 0     Capsicum, Cayenne, (CAYENNE PEPPER PO) Take by mouth as needed       Coenzyme Q10 (COQ10) 100 MG CAPS Take 200 mg by mouth daily        Continuous Blood Gluc Sensor (FREESTYLE RINKU 14 DAY SENSOR) MISC AS DIRECTED EVERY 14 DAYS 100 each 3     digoxin (LANOXIN) 125 MCG tablet TAKE 1 TABLET(125 MCG) BY MOUTH DAILY 90 tablet 1     diltiazem ER COATED BEADS (CARTIA XT) 300 MG 24 hr capsule Take 1 capsule (300 mg) by mouth daily 90 capsule 3     febuxostat (ULORIC) 40 MG TABS Take 40 mg by mouth daily        finasteride (PROSCAR) 5 MG tablet Take 1 tablet (5 mg) by mouth daily 90 tablet 2     furosemide (LASIX) 20 MG tablet Take 1 tablet (20 mg) by mouth daily as needed (leg edema) 90 tablet 3     Insulin Glargine (LANTUS SOLOSTAR SC) Inject 22 Units Subcutaneous At Bedtime        insulin pen needle (BD GERMÁN U/F) 32G X 4 MM miscellaneous USE FOUR TIMES DAILY AS DIRECTED 200 each 1     irbesartan (AVAPRO) 300 MG tablet Take 0.5 tablets (150 mg) by mouth At Bedtime 45 tablet 1     Lactobacillus (PROBIOTIC ACIDOPHILUS PO) Take 1 capsule by mouth daily       magnesium oxide 200 MG TABS Take 1-2 tablets by mouth daily        melatonin 1 MG TABS tablet Take 4 mg by mouth nightly as needed for sleep        metoprolol succinate ER (TOPROL-XL) 50 MG 24 hr tablet TAKE 1 TABLET BY MOUTH TWICE DAILY 180 tablet 3     Multiple Vitamins-Minerals (MULTIVITAMIN ADULT PO) Take 1 tablet by mouth daily       order for DME Equipment being ordered: Compression stockings 1 each 3     predniSONE (DELTASONE) 20 MG tablet TAKE 1 TABLET(20 MG) BY MOUTH DAILY AS NEEDED GOUT FLARE 10 tablet 0     STATIN NOT PRESCRIBED (INTENTIONAL) Statin not prescribed intentionally due to Other patient declines (This option does not exclude patient from measure)       tamsulosin (FLOMAX) 0.4 MG capsule Take 1 capsule (0.4 mg) by mouth every evening 90 capsule 3     thin (NO BRAND SPECIFIED) lancets Use with lanceting device. To accompany: Blood Glucose Monitor Brands: per insurance. 100 each 6     triamcinolone (KENALOG) 0.1 % external cream Apply topically 2 times daily as needed (rash) 30 g 3     UNABLE TO FIND MEDICATION NAME: Super beet powder daily       UNABLE TO FIND MEDICATION NAME: CarotoMax with lutein - takes sporadically       vitamin B complex with vitamin C (VITAMIN  B COMPLEX) tablet Take 1 tablet by mouth daily       vitamin C (ASCORBIC ACID) 1000 MG TABS Take 2,000-4,000 mg by mouth daily       Vitamin D, Cholecalciferol, 25 MCG (1000 UT) CAPS  Take 3,000 Units by mouth daily       warfarin ANTICOAGULANT (COUMADIN) 5 MG tablet Take 1 1/2 tabs on Sundays, Tuesdays and Thursdays and 1 tab on all other days or as directed by INR clinic 115 tablet 1     Zinc 15 MG CAPS Take 15 mg by mouth daily       valACYclovir (VALTREX) 1000 mg tablet Take 1 tablet (1,000 mg) by mouth 3 times daily Quantity correction per V.O. (TID/#21) 21 tablet 1         Allergies:      Allergies   Allergen Reactions     No Known Allergies             Past Medical History:     Past Medical History:   Diagnosis Date     Atrial fibrillation (H)      CAD (coronary artery disease)     MI with RONEL to dRCA April 2011     Central Sleep Apnea with Pat Villanueva breathing 9/14/2010    Also TOMMY with CPAP     CKD (chronic kidney disease) stage 3, GFR 30-59 ml/min      Dilated cardiomyopathy (H)     nonischemic (possibly related to h/o a.fib with RVR) EF 30-40% March 2013     DM2 (diabetes mellitus, type 2) (H)     Goal HgbA1c < 7%     Gout     uric acid level correlates; April 2014 h/o +knee aspirate     Hernia, abdominal      Hypertension     Goal <140/90     Pulmonary hypertension (H)     mild per echo 3/2013     Spider veins          Past Surgical History:     Past Surgical History:   Procedure Laterality Date     CORONARY ANGIOGRAPHY ADULT ORDER  2011    distal circ-RONEL     CYSTOSCOPY, TRANSURETHRAL RESECTION (TUR) PROSTATE, COMBINED N/A 5/15/2020    Procedure: CYSTOSCOPY, WITH TRANSURETHRAL RESECTION PROSTATE;  Surgeon: Tr Bowles MD;  Location: [facility] OR     HERNIA REPAIR  11/20/10    incarcinated     SUSPEND HYOID, GENIOGLOSSAL ADVANCEMENT           Family Medical History:     Family History   Problem Relation Age of Onset     Hypertension Mother      Coronary Artery Disease Mother      Coronary Artery Disease Father      Hypertension Father      Diabetes Sister      Cerebrovascular Disease Sister          Social History:     Social History     Socioeconomic History     Marital  status:      Spouse name: Not on file     Number of children: Not on file     Years of education: Not on file     Highest education level: Not on file   Occupational History     Not on file   Social Needs     Financial resource strain: Not on file     Food insecurity     Worry: Not on file     Inability: Not on file     Transportation needs     Medical: Not on file     Non-medical: Not on file   Tobacco Use     Smoking status: Former Smoker     Packs/day: 1.50     Years: 7.00     Pack years: 10.50     Types: Cigarettes     Quit date: 1972     Years since quittin.1     Smokeless tobacco: Never Used     Tobacco comment: quit in       Started at around age [age]   Substance and Sexual Activity     Alcohol use: No     Alcohol/week: 0.0 standard drinks     Comment: 74   recovering     Drug use: No     Sexual activity: Not Currently     Partners: Female   Lifestyle     Physical activity     Days per week: Not on file     Minutes per session: Not on file     Stress: Not on file   Relationships     Social connections     Talks on phone: Not on file     Gets together: Not on file     Attends Advent service: Not on file     Active member of club or organization: Not on file     Attends meetings of clubs or organizations: Not on file     Relationship status: Not on file     Intimate partner violence     Fear of current or ex partner: Not on file     Emotionally abused: Not on file     Physically abused: Not on file     Forced sexual activity: Not on file   Other Topics Concern      Service Not Asked     Blood Transfusions Not Asked     Caffeine Concern No     Occupational Exposure Not Asked     Hobby Hazards Not Asked     Sleep Concern Yes     Comment: sleep apnea, wears bi-pap     Stress Concern Not Asked     Weight Concern Not Asked     Special Diet No     Comment: low carb diet     Back Care Not Asked     Exercise No     Comment: walking     Bike Helmet Not Asked     Seat Belt Yes      "Self-Exams Not Asked     Parent/sibling w/ CABG, MI or angioplasty before 65F 55M? Not Asked   Social History Narrative     Not on file           Review of System:     Constitutional: Negative for fever or chills  Skin: positive for rashes  Ears/Nose/Throat: Negative for nasal congestion, sore throat  Respiratory: No shortness of breath, dyspnea on exertion, cough, or hemoptysis  Cardiovascular: Negative for chest pain  Gastrointestinal: Negative for nausea, vomiting  Genitourinary: Negative for dysuria, hematuria, positive for chronic BPH with urinary retention  Musculoskeletal: Negative for myalgias  Neurologic: Negative for headaches  Psychiatric: Negative for depression, anxiety  Hematologic/Lymphatic/Immunologic: positive for chronic bilateral leg edema  Endocrine: Negative for recent hypoglycemia events  Behavioral: Negative for tobacco use       Physical Exam:   BP (!) 170/101 (BP Location: Right arm, Patient Position: Sitting, Cuff Size: Adult Regular)   Pulse 85   Temp 97.7  F (36.5  C) (Temporal)   Ht 1.803 m (5' 11\")   Wt 121.1 kg (267 lb)   SpO2 97%   BMI 37.24 kg/m      GENERAL: chronically ill appearing older male, alert and no acute distress  EYES: eyes grossly normal to inspection, and conjunctivae and sclerae normal  HENT: Normocephalic atraumatic. Nose and mouth without ulcers or lesions  NECK: supple  RESP: lungs clear to auscultation   CV: regular rate and rhythm, normal S1 S2  LYMPH: mild chronic bilateral lower legs peripheral edema symptoms present  ABDOMEN: nondistended  MS: no gross musculoskeletal defects noted  SKIN: erythematous rashes of the right lateral abdominal wall noted concerning for shingles  NEURO: Alert & Oriented x 3.   PSYCH: mentation appears normal, affect normal        Diagnostic Test Results:     Diagnostic Test Results:    Lab Results   Component Value Date    A1C 6.2 11/14/2019    A1C 7.6 10/08/2019    A1C 6.5 03/06/2019    A1C 6.1 09/06/2018    A1C 6.3 02/09/2018 "         ASSESSMENT/PLAN:     (B02.8) Herpes zoster with complication  (R21) Rash  (primary encounter diagnosis)  Comment: recent skin rash symptoms concerning for shingles  Plan: triamcinolone (KENALOG) 0.1 % external cream,    (R60.0) Bilateral leg edema  Comment: stable. Patient is due for a refill of Lasix medication.  Plan: furosemide (LASIX) 20 MG tablet            (E11.40,  Z79.4) Type 2 diabetes mellitus with diabetic neuropathy, with long-term current use of insulin (H)  Comment: stable. No recent hypoglycemia events.   Plan: continue current therapy      (E11.69,  E78.2) Mixed hyperlipidemia due to type 2 diabetes mellitus (H)  Comment: stable on diet and exercise treatment  Plan: continue current diet and exercise treatment going forward.    Follow Up Plan:     Patient is instructed to return to Internal Medicine clinic for follow-up visit in 1 month.        Digna Jones MD  Internal Medicine  [facility]

## 2021-02-26 DIAGNOSIS — R21 RASH: ICD-10-CM

## 2021-02-26 RX ORDER — VALACYCLOVIR HYDROCHLORIDE 1 G/1
1000 TABLET, FILM COATED ORAL 3 TIMES DAILY
Qty: 21 TABLET | Refills: 1
Start: 2021-02-26 | End: 2021-03-15

## 2021-02-26 NOTE — TELEPHONE ENCOUNTER
Medication Appeal Initiation    We have initiated an appeal for the requested medication:  Medication: Freestyle Umair 14 day sensor-APPEAL INITIATED  Appeal Start Date:  2/26/2021  Insurance Company: [employer] - Phone [phone] Fax [phone]  Comments:  APPEAL LETTER FAXED.

## 2021-02-26 NOTE — TELEPHONE ENCOUNTER
"Routing to PA team to appeal Freestyle Umair PA denial - please see appeal letter written in \"letters\" section today as well as detailed blood sugar log scanned into Epic from 2/24/21.    Please let me know if anything additional is needed for the appeal.    Nathaly Hughes, PharmD, [facility]  Medication Therapy Management Provider  Pager: [phone]     "

## 2021-02-27 ENCOUNTER — TELEPHONE (OUTPATIENT)
Dept: FAMILY MEDICINE | Facility: CLINIC | Age: 76
End: 2021-02-27

## 2021-02-27 NOTE — TELEPHONE ENCOUNTER
Reason for call:  Other   Patient called regarding (reason for call): call back  Additional comments: Mireille from [facility] called about the decision on the appeal for Free style Umair.  She states the letter has been sent     Phone number to reach patient:  Other phone number:  [phone]    Best Time:  anytime    Can we leave a detailed message on this number?  YES    Travel screening: Not Applicable

## 2021-03-04 ENCOUNTER — TELEPHONE (OUTPATIENT)
Dept: FAMILY MEDICINE | Facility: CLINIC | Age: 76
End: 2021-03-04

## 2021-03-04 ENCOUNTER — TELEPHONE (OUTPATIENT)
Dept: PHARMACY | Facility: CLINIC | Age: 76
End: 2021-03-04

## 2021-03-04 ENCOUNTER — OFFICE VISIT (OUTPATIENT)
Dept: FAMILY MEDICINE | Facility: CLINIC | Age: 76
End: 2021-03-04
Payer: COMMERCIAL

## 2021-03-04 VITALS
HEIGHT: 71 IN | HEART RATE: 82 BPM | DIASTOLIC BLOOD PRESSURE: 91 MMHG | TEMPERATURE: 97.7 F | BODY MASS INDEX: 37.38 KG/M2 | WEIGHT: 267 LBS | SYSTOLIC BLOOD PRESSURE: 136 MMHG | OXYGEN SATURATION: 96 %

## 2021-03-04 DIAGNOSIS — Z79.4 TYPE 2 DIABETES MELLITUS WITH DIABETIC NEUROPATHY, WITH LONG-TERM CURRENT USE OF INSULIN (H): Chronic | ICD-10-CM

## 2021-03-04 DIAGNOSIS — E11.40 TYPE 2 DIABETES MELLITUS WITH DIABETIC NEUROPATHY, WITH LONG-TERM CURRENT USE OF INSULIN (H): Chronic | ICD-10-CM

## 2021-03-04 DIAGNOSIS — Z79.4 TYPE 2 DIABETES MELLITUS WITH DIABETIC NEUROPATHY, WITH LONG-TERM CURRENT USE OF INSULIN (H): ICD-10-CM

## 2021-03-04 DIAGNOSIS — B02.8 HERPES ZOSTER WITH COMPLICATION: ICD-10-CM

## 2021-03-04 DIAGNOSIS — E11.40 TYPE 2 DIABETES MELLITUS WITH DIABETIC NEUROPATHY, WITH LONG-TERM CURRENT USE OF INSULIN (H): ICD-10-CM

## 2021-03-04 DIAGNOSIS — R60.0 BILATERAL LEG EDEMA: ICD-10-CM

## 2021-03-04 DIAGNOSIS — E11.69 MIXED HYPERLIPIDEMIA DUE TO TYPE 2 DIABETES MELLITUS (H): ICD-10-CM

## 2021-03-04 DIAGNOSIS — E78.2 MIXED HYPERLIPIDEMIA DUE TO TYPE 2 DIABETES MELLITUS (H): ICD-10-CM

## 2021-03-04 DIAGNOSIS — R21 RASH: Primary | ICD-10-CM

## 2021-03-04 PROCEDURE — 99214 OFFICE O/P EST MOD 30 MIN: CPT | Performed by: INTERNAL MEDICINE

## 2021-03-04 ASSESSMENT — MIFFLIN-ST. JEOR: SCORE: 1973.23

## 2021-03-04 NOTE — PROGRESS NOTES
Subjective   Jamal is a 75 year old who presents for the following health issues    HPI     Chief Complaint   Patient presents with     RECHECK        Chief Complaint:       Follow up on multiple concerns including shingles rash concerns, Type 2 Diabetes       HPI:   Patient Jt Montgomery is a very pleasant 74 year old male with history of BPH, Type 2 Diabetes, hypertension, hyperlipidemia who presents to Internal Medicine clinic today for follow up on multiple concerns including recent rashes concerning for shingles, Type 2 Diabetes. no fever or chills symptoms at this time. His chronic BPH symptoms are improved with current Flomax and finasteride medication therapy. Regarding his chronic Type 2 diabetes mellitus the patient is compliant with his diabetes medication therapy including long-term current use of insulin therapy. He denies any recent hypoglycemia events. He is due for a repeat A1c lab today for monitoring of diabetes control. Shingles rashes symptoms have improved.      Current Medications:     Current Outpatient Medications   Medication Sig Dispense Refill     aspirin 81 MG EC tablet Take 81 mg by mouth daily       Capsicum, Cayenne, (CAYENNE PEPPER PO) Take by mouth as needed       Coenzyme Q10 (COQ10) 100 MG CAPS Take 200 mg by mouth daily        Continuous Blood Gluc Sensor (FREESTYLE RINKU 14 DAY SENSOR) MISC AS DIRECTED EVERY 14 DAYS 100 each 3     digoxin (LANOXIN) 125 MCG tablet TAKE 1 TABLET(125 MCG) BY MOUTH DAILY 90 tablet 1     diltiazem ER COATED BEADS (CARTIA XT) 300 MG 24 hr capsule Take 1 capsule (300 mg) by mouth daily 90 capsule 3     febuxostat (ULORIC) 40 MG TABS Take 40 mg by mouth daily       finasteride (PROSCAR) 5 MG tablet Take 1 tablet (5 mg) by mouth daily 90 tablet 2     furosemide (LASIX) 20 MG tablet Take 1 tablet (20 mg) by mouth daily as needed (leg edema) 90 tablet 3     Insulin Glargine (LANTUS SOLOSTAR SC) Inject 22 Units Subcutaneous At Bedtime        insulin pen  needle (BD GERMÁN U/F) 32G X 4 MM miscellaneous USE FOUR TIMES DAILY AS DIRECTED 200 each 1     irbesartan (AVAPRO) 300 MG tablet Take 0.5 tablets (150 mg) by mouth At Bedtime 45 tablet 1     Lactobacillus (PROBIOTIC ACIDOPHILUS PO) Take 1 capsule by mouth daily       magnesium oxide 200 MG TABS Take 1-2 tablets by mouth daily        melatonin 1 MG TABS tablet Take 4 mg by mouth nightly as needed for sleep        metoprolol succinate ER (TOPROL-XL) 50 MG 24 hr tablet TAKE 1 TABLET BY MOUTH TWICE DAILY 180 tablet 3     Multiple Vitamins-Minerals (MULTIVITAMIN ADULT PO) Take 1 tablet by mouth daily       order for DME Equipment being ordered: Compression stockings 1 each 3     predniSONE (DELTASONE) 20 MG tablet TAKE 1 TABLET(20 MG) BY MOUTH DAILY AS NEEDED GOUT FLARE 10 tablet 0     STATIN NOT PRESCRIBED (INTENTIONAL) Statin not prescribed intentionally due to Other patient declines (This option does not exclude patient from measure)       tamsulosin (FLOMAX) 0.4 MG capsule Take 1 capsule (0.4 mg) by mouth every evening 90 capsule 3     thin (NO BRAND SPECIFIED) lancets Use with lanceting device. To accompany: Blood Glucose Monitor Brands: per insurance. 100 each 6     triamcinolone (KENALOG) 0.1 % external cream Apply topically 2 times daily as needed (rash) 30 g 3     UNABLE TO FIND MEDICATION NAME: Super beet powder daily       UNABLE TO FIND MEDICATION NAME: CarotoMax with lutein - takes sporadically       valACYclovir (VALTREX) 1000 mg tablet Take 1 tablet (1,000 mg) by mouth 3 times daily Quantity correction per V.O. (TID/#21) 21 tablet 1     vitamin B complex with vitamin C (VITAMIN  B COMPLEX) tablet Take 1 tablet by mouth daily       vitamin C (ASCORBIC ACID) 1000 MG TABS Take 2,000-4,000 mg by mouth daily       Vitamin D, Cholecalciferol, 25 MCG (1000 UT) CAPS Take 3,000 Units by mouth daily       warfarin ANTICOAGULANT (COUMADIN) 5 MG tablet Take 1 1/2 tabs on Sundays, Tuesdays and Thursdays and 1 tab  on all other days or as directed by INR clinic 115 tablet 1     Zinc 15 MG CAPS Take 15 mg by mouth daily       blood glucose (NO BRAND SPECIFIED) lancets standard Use to test blood sugar 1 times daily or as directed.  Dispense per insurance covered brand 100 each 0     blood glucose (NO BRAND SPECIFIED) test strip Use to test blood sugar 1 time daily or as directed. To accompany: Blood Glucose Monitor Brands: per insurance. 100 strip 6     blood glucose monitoring (NO BRAND SPECIFIED) meter device kit Use to test blood sugar 1 time daily or as directed. Preferred blood glucose meter OR supplies to accompany: Blood Glucose Monitor Brands: per insurance. 1 kit 0         Allergies:      Allergies   Allergen Reactions     No Known Allergies             Past Medical History:     Past Medical History:   Diagnosis Date     Atrial fibrillation (H)      CAD (coronary artery disease)     MI with RONEL to dRCA April 2011     Central Sleep Apnea with Pat Villanueva breathing 9/14/2010    Also TOMMY with CPAP     CKD (chronic kidney disease) stage 3, GFR 30-59 ml/min      Dilated cardiomyopathy (H)     nonischemic (possibly related to h/o a.fib with RVR) EF 30-40% March 2013     DM2 (diabetes mellitus, type 2) (H)     Goal HgbA1c < 7%     Gout     uric acid level correlates; April 2014 h/o +knee aspirate     Hernia, abdominal      Hypertension     Goal <140/90     Pulmonary hypertension (H)     mild per echo 3/2013     Spider veins          Past Surgical History:     Past Surgical History:   Procedure Laterality Date     CORONARY ANGIOGRAPHY ADULT ORDER  2011    distal circ-RONEL     CYSTOSCOPY, TRANSURETHRAL RESECTION (TUR) PROSTATE, COMBINED N/A 5/15/2020    Procedure: CYSTOSCOPY, WITH TRANSURETHRAL RESECTION PROSTATE;  Surgeon: Tr Bowles MD;  Location: [facility] OR     HERNIA REPAIR  11/20/10    incarcinated     SUSPEND HYOID, GENIOGLOSSAL ADVANCEMENT           Family Medical History:     Family History   Problem Relation Age of  Onset     Hypertension Mother      Coronary Artery Disease Mother      Coronary Artery Disease Father      Hypertension Father      Diabetes Sister      Cerebrovascular Disease Sister          Social History:     Social History     Socioeconomic History     Marital status:      Spouse name: Not on file     Number of children: Not on file     Years of education: Not on file     Highest education level: Not on file   Occupational History     Not on file   Social Needs     Financial resource strain: Not on file     Food insecurity     Worry: Not on file     Inability: Not on file     Transportation needs     Medical: Not on file     Non-medical: Not on file   Tobacco Use     Smoking status: Former Smoker     Packs/day: 1.50     Years: 7.00     Pack years: 10.50     Types: Cigarettes     Quit date: 1972     Years since quittin.2     Smokeless tobacco: Never Used     Tobacco comment: quit in       Started at around age [age]   Substance and Sexual Activity     Alcohol use: No     Alcohol/week: 0.0 standard drinks     Comment: 74   recovering     Drug use: No     Sexual activity: Not Currently     Partners: Female   Lifestyle     Physical activity     Days per week: Not on file     Minutes per session: Not on file     Stress: Not on file   Relationships     Social connections     Talks on phone: Not on file     Gets together: Not on file     Attends Shinto service: Not on file     Active member of club or organization: Not on file     Attends meetings of clubs or organizations: Not on file     Relationship status: Not on file     Intimate partner violence     Fear of current or ex partner: Not on file     Emotionally abused: Not on file     Physically abused: Not on file     Forced sexual activity: Not on file   Other Topics Concern      Service Not Asked     Blood Transfusions Not Asked     Caffeine Concern No     Occupational Exposure Not Asked     Hobby Hazards Not Asked     Sleep  "Concern Yes     Comment: sleep apnea, wears bi-pap     Stress Concern Not Asked     Weight Concern Not Asked     Special Diet No     Comment: low carb diet     Back Care Not Asked     Exercise No     Comment: walking     Bike Helmet Not Asked     Seat Belt Yes     Self-Exams Not Asked     Parent/sibling w/ CABG, MI or angioplasty before 65F 55M? Not Asked   Social History Narrative     Not on file           Review of System:     Constitutional: Negative for fever or chills  Skin: positive for improvement in recent shingles rashes  Ears/Nose/Throat: Negative for nasal congestion, sore throat  Respiratory: No shortness of breath, dyspnea on exertion, cough, or hemoptysis  Cardiovascular: Negative for chest pain  Gastrointestinal: Negative for nausea, vomiting  Genitourinary: Negative for dysuria, hematuria, positive for chronic BPH with urinary retention  Musculoskeletal: Negative for myalgias  Neurologic: Negative for headaches  Psychiatric: Negative for depression, anxiety  Hematologic/Lymphatic/Immunologic: positive for chronic bilateral leg edema  Endocrine: Negative for recent hypoglycemia events  Behavioral: Negative for tobacco use       Physical Exam:   BP (!) 136/91 (BP Location: Right arm, Patient Position: Sitting, Cuff Size: Adult Large)   Pulse 82   Temp 97.7  F (36.5  C) (Temporal)   Ht 1.803 m (5' 11\")   Wt 121.1 kg (267 lb)   SpO2 96%   BMI 37.24 kg/m      GENERAL: chronically ill appearing older male, alert and no acute distress  EYES: eyes grossly normal to inspection, and conjunctivae and sclerae normal  HENT: Normocephalic atraumatic. Nose and mouth without ulcers or lesions  NECK: supple  RESP: lungs clear to auscultation   CV: regular rate and rhythm, normal S1 S2  LYMPH: mild chronic bilateral lower legs peripheral edema symptoms present  ABDOMEN: nondistended  MS: no gross musculoskeletal defects noted  SKIN: improvement noted in recent erythematous rashes of the right lateral abdominal " wall concerning for shingles  NEURO: Alert & Oriented x 3.   PSYCH: mentation appears normal, affect normal        Diagnostic Test Results:     Diagnostic Test Results:    Lab Results   Component Value Date    A1C 6.2 11/14/2019    A1C 7.6 10/08/2019    A1C 6.5 03/06/2019    A1C 6.1 09/06/2018    A1C 6.3 02/09/2018       ASSESSMENT/PLAN:     (B02.8) Herpes zoster with complication  (R21) Rash  (primary encounter diagnosis)  Comment: follow up on recent skin rash symptoms concerning for shingles, improved from prior baseline  Plan: continue Valtrex, triamcinolone (KENALOG) 0.1 % external cream      (R60.0) Bilateral leg edema  Comment: stable.   Plan: continue furosemide (LASIX) 20 MG tablet            (E11.40,  Z79.4) Type 2 diabetes mellitus with diabetic neuropathy, with long-term current use of insulin (H)  Comment: stable. No recent hypoglycemia events.   Plan: continue current therapy      (E11.69,  E78.2) Mixed hyperlipidemia due to type 2 diabetes mellitus (H)  Comment: stable on diet and exercise treatment  Plan: continue current diet and exercise treatment going forward.      Follow Up Plan:     Patient is instructed to return to Internal Medicine clinic for follow-up visit in 1 month.        Digna Jones MD  Internal Medicine  [facility]

## 2021-03-04 NOTE — TELEPHONE ENCOUNTER
Received voicemail from Jamal saying he is having a hard time with his new SMBG meter. His Umair is no longer covered (he thinks the appeal was denied, but this is unclear), so he needs to go back to SMBG. The pharmacy gave him a OneTouch Ultra 2, and this was too complicated to set up. He called the pharmacy and they told him they could dispense a different one (however he says it's a OneTouch so now he isn't sure if it is actually different). He is going back to the pharmacy tomorrow to get the different SMBG meter and to have them show him how to set it up. He denies symptoms of hypo/hyperglycemia.     Patient to call MTM again if he continues to have issues with his meter. In person MTM appointments are available, so he could come to [facility] for device teaching if needed.     MTM will check in with patient next week.     Liv Farley PharmD  [facility] Pharmacy Resident  Pager: [phone]    I have read the note as written by Dr. Farley and agree with the plan.  Appeal for Umair is still pending - we have not yet heard back.  Nathaly Hughes PharmD, [facility]  Medication Therapy Management Provider  Pager: [phone]

## 2021-03-04 NOTE — TELEPHONE ENCOUNTER
Pt spoke to pharmacist and just wants to go back to fingerstick bg testing as will be cheaper/covered overall    Reordered test strips/meter/lancets for pt who tests once a day. Orders already active on chart.  Ivette Dillard, MARCELLA  [facility] RN Triage Team

## 2021-03-05 ENCOUNTER — ANTICOAGULATION THERAPY VISIT (OUTPATIENT)
Dept: CARDIOLOGY | Facility: CLINIC | Age: 76
End: 2021-03-05
Payer: COMMERCIAL

## 2021-03-05 ENCOUNTER — TELEPHONE (OUTPATIENT)
Dept: CARDIOLOGY | Facility: CLINIC | Age: 76
End: 2021-03-05

## 2021-03-05 DIAGNOSIS — Z79.01 LONG TERM CURRENT USE OF ANTICOAGULANTS WITH INR GOAL OF 2.0-3.0: ICD-10-CM

## 2021-03-05 DIAGNOSIS — I48.0 PAROXYSMAL ATRIAL FIBRILLATION (H): ICD-10-CM

## 2021-03-05 LAB
CAPILLARY BLOOD COLLECTION: NORMAL
INR PPP: 3.1 (ref 0.86–1.14)

## 2021-03-05 PROCEDURE — 85610 PROTHROMBIN TIME: CPT | Performed by: INTERNAL MEDICINE

## 2021-03-05 PROCEDURE — 36416 COLLJ CAPILLARY BLOOD SPEC: CPT | Performed by: INTERNAL MEDICINE

## 2021-03-05 PROCEDURE — 99207 PR NO CHARGE NURSE ONLY: CPT

## 2021-03-05 NOTE — PROGRESS NOTES
ANTICOAGULATION FOLLOW-UP CLINIC VISIT    Patient Name:  Jt Montgomery  Date:  3/5/2021  Contact Type:  Telephone    SUBJECTIVE:  Patient Findings     Positives:  Change in medications (taking Tylenol on occasion (1 tab 1-2x/day), Valacyclovir for shingles)        Clinical Outcomes     Negatives:  Major bleeding event, Thromboembolic event, Anticoagulation-related hospital admission, Anticoagulation-related ED visit, Anticoagulation-related fatality           OBJECTIVE    Recent labs: (last 7 days)     03/05/21  1430   INR 3.10*       ASSESSMENT / PLAN  INR assessment SUPRA    Recheck INR In: 10 DAYS    INR Location Outside lab      Anticoagulation Summary  As of 3/5/2021    INR goal:  2.0-3.0   TTR:  81.4 % (11.2 mo)   INR used for dosing:  3.10 (3/5/2021)   Warfarin maintenance plan:  7.5 mg (5 mg x 1.5) every Sun, Tue, Thu; 5 mg (5 mg x 1) all other days   Full warfarin instructions:  3/5: 2.5 mg; Otherwise 7.5 mg every Sun, Tue, Thu; 5 mg all other days   Weekly warfarin total:  42.5 mg   Plan last modified:  Marlena Hong RN (9/8/2020)   Next INR check:  3/15/2021   Target end date:  Indefinite    Indications    Atrial fibrillation (AFIB) on Coumadin [I48.91]  Long term current use of anticoagulants with INR goal of 2.0-3.0 (Resolved) [Z79.01]  Paroxysmal atrial fibrillation (H) [I48.0]  Long term current use of anticoagulants with INR goal of 2.0-3.0 [Z79.01]             Anticoagulation Episode Summary     INR check location:      Preferred lab:      Send INR reminders to:  [facility] HEART INR NURSE    Comments:        Anticoagulation Care Providers     Provider Role Specialty Phone number    Ip, Genaro Reggie Garrett MD Referring Cardiovascular Disease [phone]            See the Encounter Report to view Anticoagulation Flowsheet and Dosing Calendar (Go to Encounters tab in chart review, and find the Anticoagulation Therapy Visit)    INR 3.10 He was started on Valacyclovir last week for shingles on his chest.  Per [employer], there isn't any known interaction with warfarin. Has been taking Tylenol 1 tab 1-2x/day recently. He has noted some blood on tissues when blowing his nose. No change in diet. Will decrease dosing today only to 2.5 mg then resume usual dosing of 7.5 mg SuTuTh and 5 mg all other days with recheck as previously scheduled in 10 days. Dosage adjustment made based on physician directed care plan.    Marlena Hong RN

## 2021-03-05 NOTE — TELEPHONE ENCOUNTER
Pt calling to report that he was started on Valacyclovir last week for shingles on his chest. Per [employer], there isn't any known interaction with warfarin. He has noted some blood on tissues when blowing his nose. Scheduled for an INR appt later today. Antoni

## 2021-03-06 ENCOUNTER — HOSPITAL ENCOUNTER (EMERGENCY)
Facility: CLINIC | Age: 76
Discharge: HOME | End: 2021-03-06
Attending: EMERGENCY MEDICINE | Admitting: EMERGENCY MEDICINE
Payer: COMMERCIAL

## 2021-03-06 ENCOUNTER — NURSE TRIAGE (OUTPATIENT)
Dept: NURSING | Facility: CLINIC | Age: 76
End: 2021-03-06

## 2021-03-06 ENCOUNTER — IMMUNIZATION (OUTPATIENT)
Dept: NURSING | Facility: CLINIC | Age: 76
End: 2021-03-06
Payer: COMMERCIAL

## 2021-03-06 VITALS
RESPIRATION RATE: 18 BRPM | HEART RATE: 54 BPM | SYSTOLIC BLOOD PRESSURE: 124 MMHG | WEIGHT: 260 LBS | OXYGEN SATURATION: 95 % | DIASTOLIC BLOOD PRESSURE: 110 MMHG | BODY MASS INDEX: 36.26 KG/M2 | TEMPERATURE: 98.3 F

## 2021-03-06 DIAGNOSIS — Z79.01 LONG TERM CURRENT USE OF ANTICOAGULANT THERAPY: ICD-10-CM

## 2021-03-06 DIAGNOSIS — R04.0 EPISTAXIS: ICD-10-CM

## 2021-03-06 LAB
BASOPHILS # BLD AUTO: 0.1 10E9/L (ref 0–0.2)
BASOPHILS NFR BLD AUTO: 0.9 %
DIFFERENTIAL METHOD BLD: ABNORMAL
EOSINOPHIL # BLD AUTO: 0.1 10E9/L (ref 0–0.7)
EOSINOPHIL NFR BLD AUTO: 1.6 %
ERYTHROCYTE [DISTWIDTH] IN BLOOD BY AUTOMATED COUNT: 15.6 % (ref 10–15)
HCT VFR BLD AUTO: 41.3 % (ref 40–53)
HGB BLD-MCNC: 13.6 G/DL (ref 13.3–17.7)
IMM GRANULOCYTES # BLD: 0 10E9/L (ref 0–0.4)
IMM GRANULOCYTES NFR BLD: 0.4 %
INR PPP: 2.37 (ref 0.86–1.14)
LYMPHOCYTES # BLD AUTO: 1 10E9/L (ref 0.8–5.3)
LYMPHOCYTES NFR BLD AUTO: 18.2 %
MCH RBC QN AUTO: 30.6 PG (ref 26.5–33)
MCHC RBC AUTO-ENTMCNC: 32.9 G/DL (ref 31.5–36.5)
MCV RBC AUTO: 93 FL (ref 78–100)
MONOCYTES # BLD AUTO: 0.6 10E9/L (ref 0–1.3)
MONOCYTES NFR BLD AUTO: 9.8 %
NEUTROPHILS # BLD AUTO: 3.9 10E9/L (ref 1.6–8.3)
NEUTROPHILS NFR BLD AUTO: 69.1 %
NRBC # BLD AUTO: 0 10*3/UL
NRBC BLD AUTO-RTO: 0 /100
PLATELET # BLD AUTO: 153 10E9/L (ref 150–450)
RBC # BLD AUTO: 4.44 10E12/L (ref 4.4–5.9)
WBC # BLD AUTO: 5.6 10E9/L (ref 4–11)

## 2021-03-06 PROCEDURE — 85610 PROTHROMBIN TIME: CPT | Performed by: EMERGENCY MEDICINE

## 2021-03-06 PROCEDURE — 250N000013 HC RX MED GY IP 250 OP 250 PS 637: Performed by: EMERGENCY MEDICINE

## 2021-03-06 PROCEDURE — 99283 EMERGENCY DEPT VISIT LOW MDM: CPT

## 2021-03-06 PROCEDURE — 91303 PR COVID VAC JANSSEN AD26 0.5ML: CPT

## 2021-03-06 PROCEDURE — 0031A PR COVID VAC JANSSEN AD26 0.5ML: CPT

## 2021-03-06 PROCEDURE — 85025 COMPLETE CBC W/AUTO DIFF WBC: CPT | Performed by: EMERGENCY MEDICINE

## 2021-03-06 PROCEDURE — 250N000009 HC RX 250: Performed by: EMERGENCY MEDICINE

## 2021-03-06 PROCEDURE — 30901 CONTROL OF NOSEBLEED: CPT

## 2021-03-06 RX ORDER — ACETAMINOPHEN 500 MG
1000 TABLET ORAL ONCE
Status: COMPLETED | OUTPATIENT
Start: 2021-03-06 | End: 2021-03-06

## 2021-03-06 RX ORDER — TRANEXAMIC ACID 100 MG/ML
500 INJECTION, SOLUTION INTRAVENOUS ONCE
Status: COMPLETED | OUTPATIENT
Start: 2021-03-06 | End: 2021-03-06

## 2021-03-06 RX ORDER — HYDROCODONE BITARTRATE AND ACETAMINOPHEN 5; 325 MG/1; MG/1
.5-1 TABLET ORAL EVERY 6 HOURS PRN
Qty: 8 TABLET | Refills: 0 | Status: SHIPPED | OUTPATIENT
Start: 2021-03-06 | End: 2022-01-06

## 2021-03-06 RX ADMIN — TRANEXAMIC ACID 500 MG: 100 INJECTION, SOLUTION INTRAVENOUS at 18:13

## 2021-03-06 RX ADMIN — ACETAMINOPHEN 1000 MG: 500 TABLET, FILM COATED ORAL at 19:22

## 2021-03-06 RX ADMIN — AMOXICILLIN AND CLAVULANATE POTASSIUM 1 TABLET: 875; 125 TABLET, FILM COATED ORAL at 19:22

## 2021-03-06 NOTE — TELEPHONE ENCOUNTER
Patient calling in stating 1 week ago he developed Shingles. Started an new medication valacyclovir.   Seen on 3/4/21 in clinic no notes available in the system to view.   INR yesterday was 3.1 on valacyclovir. Was told to reduce the warfarin. 2.5 mg on Friday night. Then resume normal schedule.   Patient wears CPAP with humidity at night.   Blood in the nostrils when he woke up.   He stated his nose started bleeding again this morning. He stuff a tissue up his nose. Reviewed best practice per RN protocol on how to stop his nose bleed. Advised patient on home care suggestions and advised to CALL BACK IF:   * Nosebleeding lasts longer than 30 minutes with using direct pressure   * Lightheadedness or weakness occurs   * Nosebleeds become worse   * You become worse.  Patient agreed to plan of care.   Terri Christian RN on 3/6/2021 at 8:43 AM    Additional Information    Negative: Fainted or too weak to stand following large blood loss    Negative: Sounds like a life-threatening emergency to the triager    Taking Coumadin (warfarin) or other strong blood thinner, or known bleeding disorder (e.g., thrombocytopenia)    Negative: [1] Has nasal packing AND [2] fever > 100.5 F (38.1 C)    Negative: [1] Large amount of blood has been lost (e.g., 1 cup or 240 ml) AND [2] bleeding now controlled (stopped)    Negative: [1] Skin bruises or bleeding gums AND [2] not caused by an injury    Negative: [1] Bleeding recurs 3 or more times in 24 hours AND [2] direct pressure applied correctly    Negative: Patient sounds very sick or weak to the triager    Negative: [1] Bleeding present > 30 minutes AND [2] using correct method of direct pressure    Negative: [1] Bleeding now AND [2] second call after being instructed in correct technique of direct pressure    Negative: Dizziness or lightheadedness    Negative: Pale skin (pallor) of new onset or worsening    Negative: [1] Has nasal packing (inserted by health care provider to control  bleeding) AND [2] new rash    Negative: [1] Has nasal packing AND [2] now bleeding around the packing (Exception: few drops or ooze)    Negative: Nosebleed followed a nose injury    Protocols used: NOSEBLEED-A-

## 2021-03-06 NOTE — ED NOTES
Bed: ED25  Expected date:   Expected time:   Means of arrival:   Comments:  432  75 M epistaxis/on thinners/no covid  1749

## 2021-03-06 NOTE — ED TRIAGE NOTES
Pt had a bloody nose this AM, got it to stop after about an hour. Woke to a bloody nose this afternoon and couldn't get it to stope, EMS has nose clamped and bleeding controlled. Pt on coumadin.

## 2021-03-07 NOTE — ED PROVIDER NOTES
History   Chief Complaint:  Epistaxis     HPI   Jt Montgomery is a 75 year old male with history of Type 2 diabetes, atrial fibrillation, sepsis who is currently taking Coumadin who presents with Epistaxis. The patient states he was at home when he experienced a bloody nose this morning but he got it to stop after about 1 hour. Later today he awoke and could not get his nose to stop bleeding. He arrived via EMS to the ER to be evaluated. Upon arrival to the ER he states he has seen blood in his nose about the last week after starting shingles medication. Today however he got a bloody nose he couldn't stop. He notes his INR is usually 2-3 and recently had his dose decreased just for yesterday. He also notes he received his Cory and Cory Covid vaccine today.     Review of Systems   HENT: Positive for nosebleeds.    All other systems reviewed and are negative.      Allergies:  The patient has no known allergies.       Medications:  Aspirin  Coenzyme Q10  Lanoxin  Cartia  Uloric  Proscar  Lantus  Avapro  Probiotic  Magnesium oxide  Melatonin  Toprol  Deltasone  Flomax  Valtrex  Vitamin B  Vitamin C  Vitamin D  Warfarin  Zinc    Past Medical History:    Atrial fibrillation   CORONARY ARTERY DISEASE  CKD  Dilated cardiomyopathy  DM2  Gout  Hernia  Hypertension  Spider veins   Hematuria  Physical deconditioning  Sepsis  Pyelonephritis  Obstructive uropathy  Facial cellulitis  Obesity  Alcohol dependence     Past Surgical History:    Coronary angiography  Cystoscopy  Hernia repair  Suspend hyoid    Family History:    Hypertension  CORONARY ARTERY DISEASE  Diabetes  Cerebrovascular disease    Social History:  Arrives via EMS  Comes from home    Physical Exam     Patient Vitals for the past 24 hrs:   BP Temp Temp src Pulse Resp SpO2 Weight   03/06/21 1752 (!) 123/92 98.3  F (36.8  C) Oral 102 18 95 % 117.9 kg (260 lb)       Physical Exam  Resp:               Non-labored  Neuro:             Alert and  cooperative  MSkel:             Moving all extremities  Skin:                No rash  ENT:                Tissues in nostrils, blood on R.  R nasal septum w/several punctate areas of potential recent bleeding.  No active bleeding when tissue removed.    Emergency Department Course     Laboratory:  CBC: WBC 5.6, HGB 13.6,      INR: 2.37 (H)    [facility]    -Epistaxis Mgmt    Date/Time: 3/6/2021 6:54 PM  Performed by: Paola Erwin MD  Authorized by: Paola Erwin MD     PROCEDURE DETAILS     Treatment site:  R anterior    Treatment method:  Anterior pack ([place])      Emergency Department Course:  Reviewed:  I reviewed nursing notes, vitals, past medical history, and care everywhere  Assessments:  6:00 PM I completed initial assessment and exam  1925 I reassessed the patient and discussed results of the workup thus far    Interventions:  1854 Tylenol 1000 mg PO  1854 Augmentin 875-125 mg PO  1803 Cyklokapron 500 mg NS    Disposition:  The patient was discharged to home.       Impression & Plan     Medical Decision Making:  The patient presents for evaluation of Epistaxis while on blood thinners. INR was checked and is in the therapeutic range, hemoglobin is not low, and transfusion is not indicated. Topical tranexamic acid and pledgets were attempted. Cautery was attempted but the nose continued to bleed, suspect higher location than septum. Merocel packing was placed and after a period of observation no further bleeding was noted. He will be on Augmentin prophylaxis which should not affect his INR levels and will need follow up with ENT in 5 days time. He was having significant amount of discomfort and a small amount of Vicodin was given. He can also use tylenol.     Diagnosis:    ICD-10-CM    1. Epistaxis  R04.0    2. Long term current use of anticoagulant therapy  Z79.01        Discharge Medications:  New Prescriptions    AMOXICILLIN-CLAVULANATE (AUGMENTIN)  875-125 MG TABLET    Take 1 tablet by mouth 2 times daily for 5 days    HYDROCODONE-ACETAMINOPHEN (NORCO) 5-325 MG TABLET    Take 0.5-1 tablets by mouth every 6 hours as needed for moderate to severe pain       Scribe Disclosure:  I, Joe Duong, am serving as a scribe at 6:00 PM on 3/6/2021 to document services personally performed by Paola Erwin MD based on my observations and the provider's statements to me.          Paola Erwin MD  03/1945

## 2021-03-08 ENCOUNTER — TELEPHONE (OUTPATIENT)
Dept: CARDIOLOGY | Facility: CLINIC | Age: 76
End: 2021-03-08

## 2021-03-08 NOTE — TELEPHONE ENCOUNTER
Pt was seen in ER on 3/6 for recurrent nosebleed (3/6 INR was 2.37 with no change in warfarin dosing). Nasal packing in place so he is taking Augmentin for 5 days with ENT follow up this week (he will call them to schedule the appt). No further nosebleeds. He has taken Tylenol twice for discomfort. Scheduled INR appt for 3/10 because of taking Augmentin. Antoni

## 2021-03-09 NOTE — TELEPHONE ENCOUNTER
THE APPEAL HAS BEEN SUBMITTED TO [employer], WHICH IS A THIRD PARTY, TO REVIEW.  [facility] APPEALS AUTOMATICALLY SENDS THE APPEAL TO SARABJIT IF THEY UPHOLD THE ORIGINAL DENIAL.  SARABJIT WILL LET THE PATIENT AND/OR US KNOW IF ANYTHING ELSE WILL BE NEEDED.      MEDICATION APPEAL DENIED    Medication: Freestyle Umair 14 day sensor-APPEAL DENIED    Denial Date: 3/1/2021    Denial Rational: CRITERIA WAS NOT MET.  NOT USING RAPID ACTING INSULIN AND INJECTING INSULIN 3 OR MORE TIMES DAILY.        Second Level Appeal Information:      Second level appeals will be managed by the clinic staff and provider. Please contact the [facility] Prior Authorization Team if additional information about the denial is needed.

## 2021-03-10 ENCOUNTER — TELEPHONE (OUTPATIENT)
Dept: CARDIOLOGY | Facility: CLINIC | Age: 76
End: 2021-03-10

## 2021-03-10 NOTE — TELEPHONE ENCOUNTER
Appeal was denied - see separate encounter regarding this.    Mikaela StroudD, [facility]  Medication Therapy Management Provider  Pager: [phone] I

## 2021-03-10 NOTE — TELEPHONE ENCOUNTER
Appeal was denied for Umair.  Patient notified.  Appt scheduled for 3/15 for him to come into clinic to review how to use his OneTouch Ultra 2 glucometer.    Nathaly Hughes, PharmD, [facility]  Medication Therapy Management Provider  Pager: [phone]

## 2021-03-10 NOTE — TELEPHONE ENCOUNTER
Pt missed INR appt today. He was to have INR checked because of taking an antibiotic while he has nasal packing in place after nosebleeds last week. Spoke with pt. He didn't fill the Rx for Augmentin but still has the nasal packing in place (ENT appt tomorrow). He will fill the Augmentin Rx today and review with ENT tomorrow about how long to take the antibiotic (5 day Rx). He already rescheduled his INR appt to Monday 3/15. Antoni

## 2021-03-10 NOTE — TELEPHONE ENCOUNTER
Patient has been notified.    Nathaly Hughes PharmD, [facility]  Medication Therapy Management Provider  Pager: [phone]

## 2021-03-10 NOTE — TELEPHONE ENCOUNTER
Appeal has been received and filed in encounter from 02/17/21.    Fredis Simon CMA on 3/10/2021 at 10:22 AM

## 2021-03-15 ENCOUNTER — OFFICE VISIT (OUTPATIENT)
Dept: PHARMACY | Facility: CLINIC | Age: 76
End: 2021-03-15
Payer: COMMERCIAL

## 2021-03-15 ENCOUNTER — ANTICOAGULATION THERAPY VISIT (OUTPATIENT)
Dept: CARDIOLOGY | Facility: CLINIC | Age: 76
End: 2021-03-15

## 2021-03-15 VITALS
BODY MASS INDEX: 36.68 KG/M2 | WEIGHT: 263 LBS | HEART RATE: 87 BPM | DIASTOLIC BLOOD PRESSURE: 74 MMHG | SYSTOLIC BLOOD PRESSURE: 129 MMHG

## 2021-03-15 DIAGNOSIS — Z79.01 LONG TERM CURRENT USE OF ANTICOAGULANTS WITH INR GOAL OF 2.0-3.0: ICD-10-CM

## 2021-03-15 DIAGNOSIS — Z79.4 TYPE 2 DIABETES MELLITUS WITH DIABETIC NEUROPATHY, WITH LONG-TERM CURRENT USE OF INSULIN (H): Primary | ICD-10-CM

## 2021-03-15 DIAGNOSIS — R21 RASH: ICD-10-CM

## 2021-03-15 DIAGNOSIS — I48.91 ATRIAL FIBRILLATION, UNSPECIFIED TYPE (H): ICD-10-CM

## 2021-03-15 DIAGNOSIS — E11.40 TYPE 2 DIABETES MELLITUS WITH DIABETIC NEUROPATHY, WITH LONG-TERM CURRENT USE OF INSULIN (H): Primary | ICD-10-CM

## 2021-03-15 DIAGNOSIS — I48.0 PAROXYSMAL ATRIAL FIBRILLATION (H): ICD-10-CM

## 2021-03-15 LAB — INR PPP: 2.5 (ref 0.86–1.14)

## 2021-03-15 PROCEDURE — 36416 COLLJ CAPILLARY BLOOD SPEC: CPT | Performed by: INTERNAL MEDICINE

## 2021-03-15 PROCEDURE — 99207 PR NO CHARGE LOS: CPT | Performed by: PHARMACIST

## 2021-03-15 PROCEDURE — 85610 PROTHROMBIN TIME: CPT | Performed by: INTERNAL MEDICINE

## 2021-03-15 RX ORDER — VALACYCLOVIR HYDROCHLORIDE 1 G/1
1000 TABLET, FILM COATED ORAL 3 TIMES DAILY
Qty: 21 TABLET | Refills: 1 | Status: SHIPPED | OUTPATIENT
Start: 2021-03-15 | End: 2021-09-09

## 2021-03-15 RX ORDER — DIGOXIN 125 MCG
TABLET ORAL
Qty: 90 TABLET | Refills: 1 | Status: SHIPPED | OUTPATIENT
Start: 2021-03-15 | End: 2021-04-29

## 2021-03-15 NOTE — PROGRESS NOTES
Clinical Pharmacy Consult:                                                    Jt Montgomery is a 75 year old male coming in for a clinical pharmacist consult.  He was referred to me from Dr. Jones.     Reason for Consult: Glucometer teaching    Discussion: Patient received OneTouch Ultra 2 glucometer - needs teaching on how to use this.  Post-prandial blood sugar during our visit today was 152mg/dL.    Plan:  1. Reviewed glucometer technique.  He was able to complete test during our visit today.  2. Reviewed goal blood sugar readings of fasting 80-130mg/dL and post-prandial <180mg/dL.     Nathaly Hughes, PharmD, BCACP  Medication Therapy Management Provider  Pager: [phone]

## 2021-03-15 NOTE — PROGRESS NOTES
ANTICOAGULATION FOLLOW-UP CLINIC VISIT    Patient Name:  Jt Montgomery  Date:  3/15/2021  Contact Type:  Telephone    SUBJECTIVE:  Patient Findings     Positives:  Change in medications (augmentin stopped last week)        Clinical Outcomes     Negatives:  Major bleeding event, Thromboembolic event, Anticoagulation-related hospital admission, Anticoagulation-related ED visit, Anticoagulation-related fatality           OBJECTIVE    Recent labs: (last 7 days)     03/15/21  1140   INR 2.50*       ASSESSMENT / PLAN  INR assessment THER    Recheck INR In: 3 WEEKS    INR Location Outside lab      Anticoagulation Summary  As of 3/15/2021    INR goal:  2.0-3.0   TTR:  84.4 % (11.2 mo)   INR used for dosin.50 (3/15/2021)   Warfarin maintenance plan:  7.5 mg (5 mg x 1.5) every Sun, Tue, Thu; 5 mg (5 mg x 1) all other days   Full warfarin instructions:  7.5 mg every Sun, Tue, Thu; 5 mg all other days   Weekly warfarin total:  42.5 mg   No change documented:  Marlena Hong RN   Plan last modified:  Marlena Hong RN (2020)   Next INR check:  2021   Target end date:  Indefinite    Indications    Atrial fibrillation (AFIB) on Coumadin [I48.91]  Long term current use of anticoagulants with INR goal of 2.0-3.0 (Resolved) [Z79.01]  Paroxysmal atrial fibrillation (H) [I48.0]  Long term current use of anticoagulants with INR goal of 2.0-3.0 [Z79.01]             Anticoagulation Episode Summary     INR check location:      Preferred lab:      Send INR reminders to:  [facility] HEART INR NURSE    Comments:        Anticoagulation Care Providers     Provider Role Specialty Phone number    Manuela, Genaro Garrett MD Referring Cardiovascular Disease [phone]            See the Encounter Report to view Anticoagulation Flowsheet and Dosing Calendar (Go to Encounters tab in chart review, and find the Anticoagulation Therapy Visit)    INR 2.50 Pt not available by phone yet. Will call him later today.  1:25pm Spoke with pt. He had  OV with ENT and nasal packing was removed so ENT stopped the prophylactic antibiotic. No further bleeding issues. No change in other meds or diet. Will continue current dosing of 7.5 mg SuTuTh and 5 mg all other days with recheck in 3 weeks.   Marlena Hong RN

## 2021-03-15 NOTE — TELEPHONE ENCOUNTER
Patient continues to have some rash/itching from Shinglex.  He's requesting refill of Valtrex and digoxin.  Routing to primary care physician to advise.  Rx pended.    Nathaly Hughes, PharmD, [facility]  Medication Therapy Management Provider  Pager: [phone]

## 2021-03-15 NOTE — PATIENT INSTRUCTIONS
Recommendations from today's MTM visit:                                                    MTM (medication therapy management) is a service provided by a clinical pharmacist designed to help you get the most of out of your medicines.   Today we reviewed what your medicines are for, how to know if they are working, that your medicines are safe and how to make your medicine regimen as easy as possible.      1.  Please test your blood sugars once a day, rotating the time of the day.  The best times to test your blood sugar is fasting/pre-meal or 2-hours after a meal.    2.  The goals for your blood sugars would be 90-150mg/dL fasting or pre-meal and <180mg/dL if taken 2-hours after a meal.    It was great to speak with you today.  I value your experience and would be very thankful for your time with providing feedback on our clinic survey. You may receive a survey via email or text message in the next few days.     Next MTM visit: Tuesday, March 30th at 11:30am via TELEPHONE    To schedule another MTM appointment, please call the clinic directly or you may call the MTM scheduling line at [phone] or toll-free at [phone].     My Clinical Pharmacist's contact information:                                                      It was a pleasure talking with you today!  Please feel free to contact me with any questions or concerns you have.      Nathaly Hughes PharmD, [facility]  Medication Therapy Management Provider  Pager: [phone]     Liv Farley PharmD  Medication Therapy Management Resident  Pager: [phone]

## 2021-03-29 DIAGNOSIS — E11.40 TYPE 2 DIABETES MELLITUS WITH DIABETIC NEUROPATHY, WITH LONG-TERM CURRENT USE OF INSULIN (H): Primary | ICD-10-CM

## 2021-03-29 DIAGNOSIS — Z79.4 TYPE 2 DIABETES MELLITUS WITH DIABETIC NEUROPATHY, WITH LONG-TERM CURRENT USE OF INSULIN (H): Primary | ICD-10-CM

## 2021-03-30 ENCOUNTER — VIRTUAL VISIT (OUTPATIENT)
Dept: PHARMACY | Facility: CLINIC | Age: 76
End: 2021-03-30
Payer: COMMERCIAL

## 2021-03-30 DIAGNOSIS — R21 RASH: ICD-10-CM

## 2021-03-30 DIAGNOSIS — Z79.4 TYPE 2 DIABETES MELLITUS WITH DIABETIC NEUROPATHY, WITH LONG-TERM CURRENT USE OF INSULIN (H): Primary | ICD-10-CM

## 2021-03-30 DIAGNOSIS — E11.40 TYPE 2 DIABETES MELLITUS WITH DIABETIC NEUROPATHY, WITH LONG-TERM CURRENT USE OF INSULIN (H): Primary | ICD-10-CM

## 2021-03-30 PROCEDURE — 99607 MTMS BY PHARM ADDL 15 MIN: CPT | Mod: TEL | Performed by: PHARMACIST

## 2021-03-30 PROCEDURE — 99606 MTMS BY PHARM EST 15 MIN: CPT | Mod: TEL | Performed by: PHARMACIST

## 2021-03-30 NOTE — PROGRESS NOTES
Medication Therapy Management (MTM) Encounter    ASSESSMENT:                            Medication Adherence/Access: No issues identified    Type 2 Diabetes: Patient is meeting A1c goal of < 8%, although due for re-check (also due for BMP). Self monitoring of blood glucose is at goal of fasting  mg/dL and post prandial < 180 mg/dL.  Will need to continue with SMBG before more fully assessing blood sugar control.    Rash: Improving.    PLAN:                            1.  Continue current medication regimen.  2.  Patient to start testing blood sugar more often  3.  Future order added for A1c and BMP to be checked with next INR.    Follow-up: 2 weeks, appt scheduled    SUBJECTIVE/OBJECTIVE:                          Jt Montgomery is a 75 year old male called for a follow-up visit. He was referred to me from Dr. Jones.  Today's visit is a follow-up MTM visit from 2/23/2021 and consult on 3/15/21.     Reason for visit: Routine f/up.    Tobacco: He reports that he quit smoking about 49 years ago. His smoking use included cigarettes. He has a 10.50 pack-year smoking history. He has never used smokeless tobacco.  Alcohol: history of alcohol dependence    Medication Adherence/Access: no issues reported    Type 2 Diabetes:  Currently taking Lantus 22 units daily. Patient is not experiencing side effects.  Blood sugar monitoring: Just started testing again in the last 2 days with OneTouch Ultra 2 reading  Date FBG (mg/dL) HS (time varies from dinner varies, generally 2-3 hours) (mg/dL)   3/30/21 142    3/29/21 165 146   Symptoms of low blood sugar? None.   Symptoms of high blood sugar? None.   Eye exam: up to date  Foot exam: due  Diet/Exercise: Not discussed in detail today  Aspirin: Taking 81mg daily and denies side effects  Statin: No - patient declines  ACEi/ARB: Yes: irbesartan.  Urine Albumin:   Lab Results   Component Value Date    UMALCR 317.16 (H) 09/22/2020      Lab Results   Component Value Date    A1C 7.0  09/22/2020    A1C 6.1 02/19/2020    A1C 6.2 11/14/2019    A1C 7.6 10/08/2019    A1C 6.5 03/06/2019     Rash:  Patient did have rash evaluated - was felt to be Shingles.  He reports he still has some fluid filled sores, although these are improving with time.  He continues taking Valtrex.  He has triamcinolone cream available to use which he has been using where he can reach.    Today's Vitals: There were no vitals taken for this visit.  ----------------    I spent 28 minutes with this patient today. A copy of the visit note was provided to the patient's primary care provider.    The patient was mailed a summary of these recommendations.     Mikaela StroudD, [facility]  Medication Therapy Management Provider  Pager: [phone]     Telemedicine Visit Details  Type of service:  Telephone visit  Start Time: 11:32 AM  End Time: 12:00 PM  Originating Location (patient location): Home  Distant Location (provider location):  [facility]      Medication Therapy Recommendations  Type 2 diabetes mellitus with diabetic neuropathy (H)    Current Medication: insulin glargine (LANTUS SOLOSTAR) 100 UNIT/ML pen   Rationale: Medication requires monitoring - Needs additional monitoring - Effectiveness   Recommendation: Order Lab - Ordered A1c and BMP   Status: Accepted per CPA

## 2021-04-05 ENCOUNTER — ANTICOAGULATION THERAPY VISIT (OUTPATIENT)
Dept: CARDIOLOGY | Facility: CLINIC | Age: 76
End: 2021-04-05
Payer: COMMERCIAL

## 2021-04-05 DIAGNOSIS — I48.0 PAROXYSMAL ATRIAL FIBRILLATION (H): ICD-10-CM

## 2021-04-05 DIAGNOSIS — Z79.01 LONG TERM CURRENT USE OF ANTICOAGULANTS WITH INR GOAL OF 2.0-3.0: ICD-10-CM

## 2021-04-05 LAB
CAPILLARY BLOOD COLLECTION: NORMAL
INR PPP: 2.6 (ref 0.86–1.14)

## 2021-04-05 PROCEDURE — 99207 PR NO CHARGE NURSE ONLY: CPT | Performed by: INTERNAL MEDICINE

## 2021-04-05 PROCEDURE — 85610 PROTHROMBIN TIME: CPT | Performed by: INTERNAL MEDICINE

## 2021-04-05 PROCEDURE — 36416 COLLJ CAPILLARY BLOOD SPEC: CPT | Performed by: INTERNAL MEDICINE

## 2021-04-05 NOTE — PROGRESS NOTES
ANTICOAGULATION FOLLOW-UP CLINIC VISIT    Patient Name:  Jt Montgomery  Date:  2021  Contact Type:  Telephone    SUBJECTIVE:  Patient Findings     Positives:  Change in medications (Taking valtrex for shingles ), Change in diet/appetite (Cutting out meat more in diet, eating more green vegetables ie cucumber/lettuce)        Clinical Outcomes     Negatives:  Major bleeding event, Thromboembolic event, Anticoagulation-related hospital admission, Anticoagulation-related ED visit, Anticoagulation-related fatality           OBJECTIVE    Recent labs: (last 7 days)     21  1114   INR 2.60*       ASSESSMENT / PLAN  INR assessment THER    Recheck INR In: 3 WEEKS    INR Location Outside lab      Anticoagulation Summary  As of 2021    INR goal:  2.0-3.0   TTR:  88.4 % (11.2 mo)   INR used for dosin.60 (2021)   Warfarin maintenance plan:  7.5 mg (5 mg x 1.5) every Sun, Tue, Thu; 5 mg (5 mg x 1) all other days   Full warfarin instructions:  7.5 mg every Sun, Tue, Thu; 5 mg all other days   Weekly warfarin total:  42.5 mg   No change documented:  Tori Rico RN   Plan last modified:  Marlena Hong, MARCELLA (2020)   Next INR check:  2021   Target end date:  Indefinite    Indications    Atrial fibrillation (AFIB) on Coumadin [I48.91]  Long term current use of anticoagulants with INR goal of 2.0-3.0 (Resolved) [Z79.01]  Paroxysmal atrial fibrillation (H) [I48.0]  Long term current use of anticoagulants with INR goal of 2.0-3.0 [Z79.01]             Anticoagulation Episode Summary     INR check location:      Preferred lab:      Send INR reminders to:  [facility] HEART INR NURSE    Comments:        Anticoagulation Care Providers     Provider Role Specialty Phone number    Ip, Genaro Garrett MD Referring Cardiovascular Disease [phone]            See the Encounter Report to view Anticoagulation Flowsheet and Dosing Calendar (Go to Encounters tab in chart review, and find the Anticoagulation Therapy  Visit)    INR 2.6 today at outside clinic. Spoke to patient, no abnormal bleeding/bruising and no more nosebleeds. He has been using vaseline in his nares at night to keep them moist. He has a mild case of shingles currently and is taking valtrex (no interaction with warfarin per [employer]). No other med changes. He has not needed any pain medicine ie tylenol for this shingles. He is working on cutting back on his meat intake and is adding more vegetables, mainly carrots/cucumber/lettuce. Will continue on current warfarin maintenance dosing of 7.5mg Sun/Tues/Thurs and 5mg all other days. Recheck in 2-3wks to monitor effects of dietary changes.     Tori Rico RN

## 2021-04-09 ENCOUNTER — NURSE TRIAGE (OUTPATIENT)
Dept: NURSING | Facility: CLINIC | Age: 76
End: 2021-04-09

## 2021-04-09 NOTE — TELEPHONE ENCOUNTER
"Patient complains of intermittent shortness of breath for past couple of weeks.  Also has mild cough that produces slight phlegm and occasional dizziness.  Connected to scheduling for office visit; patient will consider going to /[facility] if no same day appointment available.     Additional Information    Negative: Breathing stopped and hasn't returned    Negative: Choking on something    Negative: SEVERE difficulty breathing (e.g., struggling for each breath, speaks in single words, pulse > 120)    Negative: Bluish (or gray) lips or face    Negative: Difficult to awaken or acting confused (e.g., disoriented, slurred speech)    Negative: Passed out (i.e., fainted, collapsed and was not responding)    Negative: Wheezing started suddenly after medicine, an allergic food, or bee sting    Negative: Stridor    Negative: Slow, shallow and weak breathing    Negative: Sounds like a life-threatening emergency to the triager    Negative: Chest pain    Negative: Wheezing (high pitched whistling sound) and previous asthma attacks or use of asthma medicines    Negative: Difficulty breathing and only present when coughing    Negative: Difficulty breathing and only from stuffy or runny nose    Negative: MODERATE difficulty breathing (e.g., speaks in phrases, SOB even at rest, pulse 100-120) of new onset or worse than normal    Negative: Wheezing can be heard across the room    Negative: Drooling or spitting out saliva (because can't swallow)    Negative: Any history of prior \"blood clot\" in leg or lungs    Negative: Recent illness requiring prolonged bedrest (i.e., immobilization)    Negative: Hip or leg fracture in past 2 months (e.g., or had cast on leg or ankle)    Negative: Major surgery in the past month    Negative: Recent long-distance travel with prolonged time in car, bus, plane, or train (i.e., within past 2 weeks; 6 or  more hours duration)    Negative: Extra heart beats OR irregular heart beating   (i.e., " "\"palpitations\")    Negative: Fever > 103 F (39.4 C)    Negative: Fever > 101 F (38.3 C) and over 60 years of age    Negative: Fever > 100.0 F (37.8 C) and bedridden (e.g., nursing home patient, stroke, chronic illness, recovering from surgery)    Negative: Fever > 100.0 F (37.8 C) and diabetes mellitus or weak immune system (e.g., HIV positive, cancer chemo, splenectomy, organ transplant, chronic steroids)    Negative: Periods where breathing stops and then resumes normally and bedridden (e.g., nursing home patient, CVA)    Negative: Pregnant or postpartum (from 0 to 6 weeks after delivery)    Negative: Patient sounds very sick or weak to the triager    Negative: MILD difficulty breathing (e.g., minimal/no SOB at rest, SOB with walking, pulse < 100) of new onset or worse than normal    Negative: Longstanding difficulty breathing (e.g., CHF, COPD, emphysema) and worse than normal    Negative: Longstanding difficulty breathing and not responding to usual therapy    Negative: Continuous (nonstop) coughing    Negative: Patient wants to be seen    MODERATE longstanding difficulty breathing (e.g., speaks in phrases, SOB even at rest, pulse 100-120) and SAME as normal    Protocols used: BREATHING DIFFICULTY-A-OH      "

## 2021-04-10 ENCOUNTER — HOSPITAL ENCOUNTER (EMERGENCY)
Facility: CLINIC | Age: 76
Discharge: HOME | End: 2021-04-10
Attending: EMERGENCY MEDICINE | Admitting: EMERGENCY MEDICINE
Payer: COMMERCIAL

## 2021-04-10 ENCOUNTER — APPOINTMENT (OUTPATIENT)
Dept: CT IMAGING | Facility: CLINIC | Age: 76
End: 2021-04-10
Attending: EMERGENCY MEDICINE
Payer: COMMERCIAL

## 2021-04-10 ENCOUNTER — APPOINTMENT (OUTPATIENT)
Dept: ULTRASOUND IMAGING | Facility: CLINIC | Age: 76
End: 2021-04-10
Attending: EMERGENCY MEDICINE
Payer: COMMERCIAL

## 2021-04-10 ENCOUNTER — ANCILLARY PROCEDURE (OUTPATIENT)
Dept: GENERAL RADIOLOGY | Facility: CLINIC | Age: 76
End: 2021-04-10
Attending: PREVENTIVE MEDICINE
Payer: COMMERCIAL

## 2021-04-10 ENCOUNTER — OFFICE VISIT (OUTPATIENT)
Dept: URGENT CARE | Facility: URGENT CARE | Age: 76
End: 2021-04-10
Payer: COMMERCIAL

## 2021-04-10 VITALS
SYSTOLIC BLOOD PRESSURE: 142 MMHG | DIASTOLIC BLOOD PRESSURE: 70 MMHG | HEART RATE: 89 BPM | TEMPERATURE: 98.1 F | RESPIRATION RATE: 18 BRPM | BODY MASS INDEX: 35.65 KG/M2 | OXYGEN SATURATION: 99 % | HEIGHT: 73 IN | WEIGHT: 269 LBS

## 2021-04-10 VITALS
WEIGHT: 269 LBS | OXYGEN SATURATION: 96 % | TEMPERATURE: 97.2 F | SYSTOLIC BLOOD PRESSURE: 141 MMHG | HEART RATE: 44 BPM | BODY MASS INDEX: 37.52 KG/M2 | DIASTOLIC BLOOD PRESSURE: 92 MMHG

## 2021-04-10 DIAGNOSIS — I49.9 CARDIAC ARRHYTHMIA, UNSPECIFIED CARDIAC ARRHYTHMIA TYPE: ICD-10-CM

## 2021-04-10 DIAGNOSIS — I50.813 ACUTE ON CHRONIC RIGHT-SIDED CONGESTIVE HEART FAILURE (H): ICD-10-CM

## 2021-04-10 DIAGNOSIS — R06.02 SOB (SHORTNESS OF BREATH): Primary | ICD-10-CM

## 2021-04-10 LAB
ALBUMIN SERPL-MCNC: 3.7 G/DL (ref 3.4–5)
ALP SERPL-CCNC: 99 U/L (ref 40–150)
ALT SERPL W P-5'-P-CCNC: 28 U/L (ref 0–70)
ANION GAP SERPL CALCULATED.3IONS-SCNC: 6 MMOL/L (ref 3–14)
AST SERPL W P-5'-P-CCNC: 26 U/L (ref 0–45)
BASOPHILS # BLD AUTO: 0.1 10E9/L (ref 0–0.2)
BASOPHILS NFR BLD AUTO: 0.9 %
BILIRUB SERPL-MCNC: 2.4 MG/DL (ref 0.2–1.3)
BUN SERPL-MCNC: 22 MG/DL (ref 7–30)
CALCIUM SERPL-MCNC: 8.8 MG/DL (ref 8.5–10.1)
CHLORIDE SERPL-SCNC: 107 MMOL/L (ref 94–109)
CO2 SERPL-SCNC: 26 MMOL/L (ref 20–32)
CREAT SERPL-MCNC: 1.3 MG/DL (ref 0.66–1.25)
D DIMER PPP FEU-MCNC: 0.7 UG/ML FEU (ref 0–0.5)
DIFFERENTIAL METHOD BLD: ABNORMAL
EOSINOPHIL # BLD AUTO: 0.2 10E9/L (ref 0–0.7)
EOSINOPHIL NFR BLD AUTO: 2.6 %
ERYTHROCYTE [DISTWIDTH] IN BLOOD BY AUTOMATED COUNT: 17.2 % (ref 10–15)
GFR SERPL CREATININE-BSD FRML MDRD: 54 ML/MIN/{1.73_M2}
GLUCOSE SERPL-MCNC: 123 MG/DL (ref 70–99)
HCT VFR BLD AUTO: 40.7 % (ref 40–53)
HGB BLD-MCNC: 13.4 G/DL (ref 13.3–17.7)
INR PPP: 2.36 (ref 0.86–1.14)
LYMPHOCYTES # BLD AUTO: 0.9 10E9/L (ref 0.8–5.3)
LYMPHOCYTES NFR BLD AUTO: 15.5 %
MCH RBC QN AUTO: 31.7 PG (ref 26.5–33)
MCHC RBC AUTO-ENTMCNC: 32.9 G/DL (ref 31.5–36.5)
MCV RBC AUTO: 96 FL (ref 78–100)
MONOCYTES # BLD AUTO: 0.7 10E9/L (ref 0–1.3)
MONOCYTES NFR BLD AUTO: 11.8 %
NEUTROPHILS # BLD AUTO: 3.9 10E9/L (ref 1.6–8.3)
NEUTROPHILS NFR BLD AUTO: 69.2 %
NT-PROBNP SERPL-MCNC: 5271 PG/ML (ref 0–125)
PLATELET # BLD AUTO: 128 10E9/L (ref 150–450)
POTASSIUM SERPL-SCNC: 4.2 MMOL/L (ref 3.4–5.3)
PROT SERPL-MCNC: 7 G/DL (ref 6.8–8.8)
RBC # BLD AUTO: 4.23 10E12/L (ref 4.4–5.9)
SODIUM SERPL-SCNC: 139 MMOL/L (ref 133–144)
TROPONIN I SERPL-MCNC: <0.015 UG/L (ref 0–0.04)
WBC # BLD AUTO: 5.7 10E9/L (ref 4–11)

## 2021-04-10 PROCEDURE — 250N000009 HC RX 250: Performed by: EMERGENCY MEDICINE

## 2021-04-10 PROCEDURE — 71046 X-RAY EXAM CHEST 2 VIEWS: CPT | Performed by: RADIOLOGY

## 2021-04-10 PROCEDURE — 93971 EXTREMITY STUDY: CPT | Mod: LT

## 2021-04-10 PROCEDURE — 85610 PROTHROMBIN TIME: CPT | Performed by: PREVENTIVE MEDICINE

## 2021-04-10 PROCEDURE — 93000 ELECTROCARDIOGRAM COMPLETE: CPT

## 2021-04-10 PROCEDURE — 250N000011 HC RX IP 250 OP 636: Performed by: EMERGENCY MEDICINE

## 2021-04-10 PROCEDURE — 80053 COMPREHEN METABOLIC PANEL: CPT | Performed by: PREVENTIVE MEDICINE

## 2021-04-10 PROCEDURE — U0005 INFEC AGEN DETEC AMPLI PROBE: HCPCS | Performed by: PREVENTIVE MEDICINE

## 2021-04-10 PROCEDURE — 36415 COLL VENOUS BLD VENIPUNCTURE: CPT | Performed by: PREVENTIVE MEDICINE

## 2021-04-10 PROCEDURE — 99207 PR CONSULT E&M CHANGED TO INITIAL LEVEL: CPT | Performed by: HOSPITALIST

## 2021-04-10 PROCEDURE — 99215 OFFICE O/P EST HI 40 MIN: CPT | Mod: 25

## 2021-04-10 PROCEDURE — 99223 1ST HOSP IP/OBS HIGH 75: CPT | Performed by: HOSPITALIST

## 2021-04-10 PROCEDURE — 93005 ELECTROCARDIOGRAM TRACING: CPT

## 2021-04-10 PROCEDURE — 71275 CT ANGIOGRAPHY CHEST: CPT

## 2021-04-10 PROCEDURE — 85025 COMPLETE CBC W/AUTO DIFF WBC: CPT | Performed by: PREVENTIVE MEDICINE

## 2021-04-10 PROCEDURE — 84484 ASSAY OF TROPONIN QUANT: CPT | Performed by: PREVENTIVE MEDICINE

## 2021-04-10 PROCEDURE — 99285 EMERGENCY DEPT VISIT HI MDM: CPT | Mod: 25

## 2021-04-10 PROCEDURE — 83880 ASSAY OF NATRIURETIC PEPTIDE: CPT | Performed by: PREVENTIVE MEDICINE

## 2021-04-10 PROCEDURE — U0003 INFECTIOUS AGENT DETECTION BY NUCLEIC ACID (DNA OR RNA); SEVERE ACUTE RESPIRATORY SYNDROME CORONAVIRUS 2 (SARS-COV-2) (CORONAVIRUS DISEASE [COVID-19]), AMPLIFIED PROBE TECHNIQUE, MAKING USE OF HIGH THROUGHPUT TECHNOLOGIES AS DESCRIBED BY CMS-2020-01-R: HCPCS | Performed by: PREVENTIVE MEDICINE

## 2021-04-10 PROCEDURE — 85379 FIBRIN DEGRADATION QUANT: CPT | Performed by: PREVENTIVE MEDICINE

## 2021-04-10 RX ORDER — IOPAMIDOL 755 MG/ML
83 INJECTION, SOLUTION INTRAVASCULAR ONCE
Status: COMPLETED | OUTPATIENT
Start: 2021-04-10 | End: 2021-04-10

## 2021-04-10 RX ADMIN — SODIUM CHLORIDE 100 ML: 9 INJECTION, SOLUTION INTRAVENOUS at 19:07

## 2021-04-10 RX ADMIN — IOPAMIDOL 83 ML: 755 INJECTION, SOLUTION INTRAVENOUS at 19:06

## 2021-04-10 ASSESSMENT — ENCOUNTER SYMPTOMS
DIZZINESS: 1
SHORTNESS OF BREATH: 1
COUGH: 0
CHILLS: 0
FEVER: 0

## 2021-04-10 ASSESSMENT — MIFFLIN-ST. JEOR: SCORE: 2014.06

## 2021-04-10 NOTE — ED TRIAGE NOTES
Pt presents from  after having a work up for SOB and bilateral leg swelling and abd distention for the past 2-3 weeks. Pt has not been taking his diuretic as prescribed due to his kidney doctor's instructions. Pt had blood work, covid swab and CXR performed at . Pt's work up revealed an elevated BNP and ddimer so sent here for further eval

## 2021-04-10 NOTE — PROGRESS NOTES
Assessment & Plan     1. SOB (shortness of breath)  - CBC with platelets and differential  - Symptomatic COVID-19 Virus (Coronavirus) by PCR  - Comprehensive metabolic panel  - Troponin I  - D dimer, quantitative  - BNP-N terminal pro  - INR  - XR Chest 2 Views  - EKG 12-lead complete w/read - Clinics  CXR and labs look fine except elevated bnp  ddimer normal as age adjusted  ?related to distension of abdomen/pulmonary hypertension/hepatopulmonary syndrome/ischemia/arrythmia, other  Doubt left sided chf, pneumonia, reactive airways, pe with current workup.  Would benefit from further evaluation.     2.  Dizziness  ?sinus pauses contributing.  Some sizeable pauses on auscultation and ekg today.  Will refer to ED for further evaluation.    3.  B leg swelling (L>R) - suspect either r sided heart failure, may also be cirrhosis with portal hypertension.  Doubt dvt given normal age adjusted ddimer and on warfarin with therapeutic inr.     Called and referred patient to [facility] ED for further evaluation today.    43 minutes spent on the date of the encounter doing chart review, review of test results, interpretation of tests, patient visit and documentation         No follow-ups on file.    Donovan Ray MD  [facility] URGENT [facility]ARE    Subjective     Jt Montgomery is a 75 year old year old male who presents to clinic today for the following health issues:    Patient presents with:  Urgent Care  Shortness of Breath: x 3 weeks  Fatigue: x 3 weeks      This is a 74 yo man who has been feeling more sob over the past 2 weeks.  Has also had some swelling of his legs, L>R.  On coumadin for afib with normal inr.  No cp.  Some lightheadedness.  No fever or chills.  Got the Cory [employer] Cory COVID vaccine last year.  No syncope.  Weights have been stable at home per patient.  No orthopnea.  ?PND last night.  Sleeps with a cpap.  No feeling heart going faster or slower.     Patient Active Problem List    Diagnosis     HTN (hypertension); goal <140/90     Alcohol dependence in remission (H)     TOMMY (obstructive sleep apnea)     Central Sleep Apnea with Cheyne Villanueva breathing     Dilated cardiomyopathy, nonischemic EF 30-40% in March 2013     S/p angioplasty with stent w/ RONEL in distal RCA     Anticoagulation management encounter     Gout     Type 2 diabetes mellitus with diabetic neuropathy; goal HgbA1c < 7%     CAD (coronary artery disease)     Atrial fibrillation (AFIB) on Coumadin     CKD (chronic kidney disease) stage 3, GFR 30-59 ml/min     Morbid obesity (H)     Pulmonary hypertension (H)     Mixed hyperlipidemia due to type 2 diabetes mellitus (H)     Anemia, unspecified type     Facial cellulitis     Severe sepsis (H)     Acute pyelonephritis     Obstructive uropathy     Acute on chronic renal failure (H)     Indirect hyperbilirubinemia     Physical deconditioning     Hematuria     Benign prostatic hyperplasia with urinary obstruction     Paroxysmal atrial fibrillation (H)     Long term current use of anticoagulants with INR goal of 2.0-3.0       Current Outpatient Medications   Medication     aspirin 81 MG EC tablet     blood glucose (NO BRAND SPECIFIED) lancets standard     blood glucose (NO BRAND SPECIFIED) test strip     blood glucose monitoring (NO BRAND SPECIFIED) meter device kit     Capsicum, Cayenne, (CAYENNE PEPPER PO)     Coenzyme Q10 (COQ10) 100 MG CAPS     digoxin (LANOXIN) 125 MCG tablet     diltiazem ER COATED BEADS (CARTIA XT) 300 MG 24 hr capsule     febuxostat (ULORIC) 40 MG TABS     finasteride (PROSCAR) 5 MG tablet     furosemide (LASIX) 20 MG tablet     insulin glargine (LANTUS SOLOSTAR) 100 UNIT/ML pen     insulin pen needle (BD GERMÁN U/F) 32G X 4 MM miscellaneous     irbesartan (AVAPRO) 300 MG tablet     Lactobacillus (PROBIOTIC ACIDOPHILUS PO)     magnesium oxide 200 MG TABS     melatonin 1 MG TABS tablet     metoprolol succinate ER (TOPROL-XL) 50 MG 24 hr tablet     Multiple  Vitamins-Minerals (MULTIVITAMIN ADULT PO)     order for DME     STATIN NOT PRESCRIBED (INTENTIONAL)     tamsulosin (FLOMAX) 0.4 MG capsule     thin (NO BRAND SPECIFIED) lancets     triamcinolone (KENALOG) 0.1 % external cream     UNABLE TO FIND     UNABLE TO FIND     valACYclovir (VALTREX) 1000 mg tablet     vitamin B complex with vitamin C (VITAMIN  B COMPLEX) tablet     vitamin C (ASCORBIC ACID) 1000 MG TABS     Vitamin D, Cholecalciferol, 25 MCG (1000 UT) CAPS     warfarin ANTICOAGULANT (COUMADIN) 5 MG tablet     Zinc 15 MG CAPS     HYDROcodone-acetaminophen (NORCO) 5-325 MG tablet     predniSONE (DELTASONE) 20 MG tablet     No current facility-administered medications for this visit.        Past Medical History:   Diagnosis Date     Atrial fibrillation (H)      CAD (coronary artery disease)     MI with RONEL to dRCA April 2011     Central Sleep Apnea with Pat Villanueva breathing 9/14/2010    Also TOMMY with CPAP     CKD (chronic kidney disease) stage 3, GFR 30-59 ml/min      Dilated cardiomyopathy (H)     nonischemic (possibly related to h/o a.fib with RVR) EF 30-40% March 2013     DM2 (diabetes mellitus, type 2) (H)     Goal HgbA1c < 7%     Gout     uric acid level correlates; April 2014 h/o +knee aspirate     Hernia, abdominal      Hypertension     Goal <140/90     Pulmonary hypertension (H)     mild per echo 3/2013     Spider veins        Social History   reports that he quit smoking about 49 years ago. His smoking use included cigarettes. He has a 10.50 pack-year smoking history. He has never used smokeless tobacco. He reports that he does not drink alcohol or use drugs.    Family History   Problem Relation Age of Onset     Hypertension Mother      Coronary Artery Disease Mother      Coronary Artery Disease Father      Hypertension Father      Diabetes Sister      Cerebrovascular Disease Sister        Review of Systems  Constitutional, HEENT, cardiovascular, pulmonary, GI, , musculoskeletal, neuro, skin,  endocrine and psych systems are negative, except as otherwise noted.      Objective    BP (!) 141/92 (BP Location: Right arm, Patient Position: Sitting, Cuff Size: Adult Regular)   Pulse (!) 44   Temp 97.2  F (36.2  C) (Tympanic)   Wt 122 kg (269 lb)   SpO2 96%   BMI 37.52 kg/m    Physical Exam   GENERAL: healthy, alert and no distress  EYES: Eyes grossly normal to inspection, PERRL and conjunctivae and sclerae normal  HENT: ear canals and TM's normal, nose and mouth without ulcers or lesions  NECK: no adenopathy, no asymmetry, masses, or scars and thyroid normal to palpation, jvp 10  RESP: lungs clear to auscultation - no rales, rhonchi or wheezes  CV: regular rate and rhythm, normal S1 S2, no S3 or S4, no murmur, click or rub, no peripheral edema and peripheral pulses strong  ABDOMEN: soft, nontender, no hepatosplenomegaly, no masses and bowel sounds normal, distended, no obvious fluid wave.  MS: no gross musculoskeletal defects noted, no edema  SKIN: no suspicious lesions or rashes  NEURO: Normal strength and tone, mentation intact and speech normal  PSYCH: mentation appears normal, affect normal/bright  EXTREMITIES:  Warm, well perfused, 2+ r, 3+ l edema    Results for orders placed or performed in visit on 04/10/21 (from the past 24 hour(s))   XR Chest 2 Views    Narrative    XR CHEST TWO VIEWS   4/10/2021 11:21 AM     HISTORY: SOB (shortness of breath)    COMPARISON: Chest x-ray 4/12/2014.      Impression    IMPRESSION: Two views of the chest. Right lung base atelectasis is  noted. Lungs are otherwise clear. Heart appears mildly enlarged but is  unchanged. No pneumothorax. No significant pleural fluid. Posterior  vertebral body fusion abnormality is stable and likely chronic at T1.  No interval change.    LIV AYALA MD   CBC with platelets and differential   Result Value Ref Range    WBC 5.7 4.0 - 11.0 10e9/L    RBC Count 4.23 (L) 4.4 - 5.9 10e12/L    Hemoglobin 13.4 13.3 - 17.7 g/dL    Hematocrit 40.7  40.0 - 53.0 %    MCV 96 78 - 100 fl    MCH 31.7 26.5 - 33.0 pg    MCHC 32.9 31.5 - 36.5 g/dL    RDW 17.2 (H) 10.0 - 15.0 %    Platelet Count 128 (L) 150 - 450 10e9/L    % Neutrophils 69.2 %    % Lymphocytes 15.5 %    % Monocytes 11.8 %    % Eosinophils 2.6 %    % Basophils 0.9 %    Absolute Neutrophil 3.9 1.6 - 8.3 10e9/L    Absolute Lymphocytes 0.9 0.8 - 5.3 10e9/L    Absolute Monocytes 0.7 0.0 - 1.3 10e9/L    Absolute Eosinophils 0.2 0.0 - 0.7 10e9/L    Absolute Basophils 0.1 0.0 - 0.2 10e9/L    Diff Method Automated Method    Comprehensive metabolic panel   Result Value Ref Range    Sodium 139 133 - 144 mmol/L    Potassium 4.2 3.4 - 5.3 mmol/L    Chloride 107 94 - 109 mmol/L    Carbon Dioxide 26 20 - 32 mmol/L    Anion Gap 6 3 - 14 mmol/L    Glucose 123 (H) 70 - 99 mg/dL    Urea Nitrogen 22 7 - 30 mg/dL    Creatinine 1.30 (H) 0.66 - 1.25 mg/dL    GFR Estimate 54 (L) >60 mL/min/[1.73_m2]    GFR Estimate If Black 62 >60 mL/min/[1.73_m2]    Calcium 8.8 8.5 - 10.1 mg/dL    Bilirubin Total 2.4 (H) 0.2 - 1.3 mg/dL    Albumin 3.7 3.4 - 5.0 g/dL    Protein Total 7.0 6.8 - 8.8 g/dL    Alkaline Phosphatase 99 40 - 150 U/L    ALT 28 0 - 70 U/L    AST 26 0 - 45 U/L   Troponin I   Result Value Ref Range    Troponin I ES <0.015 0.000 - 0.045 ug/L   D dimer, quantitative   Result Value Ref Range    D Dimer 0.7 (H) 0.0 - 0.50 ug/ml FEU   BNP-N terminal pro   Result Value Ref Range    N-Terminal Pro Bnp 5,271 (H) 0 - 125 pg/mL   INR   Result Value Ref Range    INR 2.36 (H) 0.86 - 1.14     EKG - rate 83, afib, ivcd, nl qt, st depression and t wave inversion in v5 and v6, unchanged from last year, multifocal pvc's, conduction pauses on rhythm strip

## 2021-04-10 NOTE — PATIENT INSTRUCTIONS
1. SOB (shortness of breath)  - CBC with platelets and differential  - Symptomatic COVID-19 Virus (Coronavirus) by PCR  - Comprehensive metabolic panel  - Troponin I  - D dimer, quantitative  - BNP-N terminal pro  - INR  - XR Chest 2 Views  - EKG 12-lead complete w/read - Clinics  CXR and labs look fine except elevated bnp  ddimer normal as age adjusted  ?related to distension of abdomen/pulmonary hypertension/hepatopulmonary syndrome/ischemia/arrythmia, other  Doubt left sided chf, pneumonia, reactive airways, pe with current workup.  Would benefit from further evaluation.     2.  Dizziness  ?sinus pauses contributing.  Some sizeable pauses on auscultation and ekg today.  Will refer to ED for further evaluation.    3.  B leg swelling (L>R) - suspect either r sided heart failure, may also be cirrhosis with portal hypertension.  Doubt dvt given normal age adjusted ddimer and on warfarin with therapeutic inr.     Called and referred patient to [facility] ED for further evaluation today.

## 2021-04-10 NOTE — ED PROVIDER NOTES
History   Chief Complaint:  Shortness of Breath and Leg Swelling     The history is provided by the patient and medical records.      Jt Montgomery is a 75 year old male with history of atrial fibrillation on Coumadin, sleep apnea, dilated cardiomyopathy, type 2 diabetes, CKD who presents with shortness of breath.  The patient presented to the urgent care today for symptoms of dizziness as well as shortness of breath.  Symptoms have been ongoing for about 3 weeks.  He feels dizzy primarily when bending over, but does not notice any orthostasis or vertigo.  He is also had increased shortness of breath, which he primarily notices his dyspnea on exertion however this seems to be intermittent.  He denies any orthopnea or paroxysmal nocturnal dyspnea.  He is able to sleep quite well and is back with CPAP, but not on his right side.  He denies any jasmeet pain in his chest, but does note very mild pressure.  He has not had any cough, fevers or chills.  He does have chronic leg swelling, though lately he has noticed that his left leg is more swollen than the right.  He has completed his 2 dose Covid vaccine series recently.    Laboratory from [facility] on 04/10/21:  CBC: WBC 5.7, HGB 13.4, (L)   CMP: Urea Nitrogen 123(H), Creatinine 1.30(H), GFR 54(L) o/w WNL  Troponin (Collected: 1213): <0.015  D Dimer quantitative: 0.7(H)  INR: 2.36(H)  BNP: 5,271(H)    Symptomatic Influenza A/B & SARS-CoV2 (COVID19) Virus PCR Multiplex: In process    Review of Systems   Constitutional: Negative for chills and fever.   Respiratory: Positive for shortness of breath. Negative for cough.    Cardiovascular: Positive for leg swelling. Negative for chest pain.        Chest pressure   Neurological: Positive for dizziness.   All other systems reviewed and are negative.    Allergies:  No known drug allergies    Medications:  Aspirin, 81 mg  Coenzyme  "Q10  Lanoxin  Cartia  Uloric  Proscar  Lantus  Avapro  Probiotic  Magnesium oxide  Melatonin  Toprol-XL  Deltasone  Flomax  Valtrex  Vitamin B  Vitamin C  Vitamin D  Warfarin   Zinc     Past Medical History:    Atrial fibrillation    CAD    Central Sleep Apnea with Pat Villanueva breathing   CKD stage 3  Dilated cardiomyopathy    Type II diabetes   Gout   Hernia, abdominal   Hypertension   Pulmonary hypertension    Spider veins  BPH  Severe sepsis  Acute pyelonephritis  Obstructive uropathy   Acute on chronic renal failure  Facial cellulitis  Anemia  Obesity  CAD  Gout    Past Surgical History:    Coronary angiography  Cystoscopy, transurethral resection prostate  Hernia repair  Suspend hyoid, genioglossal advancement     Family History:    Hypertension  Coronary artery disease  Diabetes  Cerebrovascular disease    Social History:  PCP is: Digna Jones. History of smoking.    Physical Exam     Patient Vitals for the past 24 hrs:   BP Temp Temp src Pulse Resp SpO2 Height Weight   04/10/21 1531 (!) 141/78 98.1  F (36.7  C) Oral 50 20 96 % 1.854 m (6' 1\") 122 kg (269 lb)       Physical Exam  Gen: Pleasant, alert.    Eye:   Pupils are equal, round, and reactive.     Sclera non-injected.    ENT:   Moist mucus membranes.     Normal tongue.    Oropharynx without lesions.   Elevated JVP to the angle of the jaw    Cardiac:     Irregularly irregular   No murmurs, gallops, or rubs.    Pulmonary:     Normal work of breathing.   Rales in bilateral bases.    Abdomen:     Normal active bowel sounds.     Abdomen is soft and distended, without focal tenderness.    Musculoskeletal:     Normal movement of all extremities without evidence for deficit.    Extremities:    Left leg swollen compared to the right.  No calf tenderness.    Skin:   Warm and dry. Skin hyperpigmented BLE, L > R    Neurologic:    Non-focal exam without asymmetric weakness or numbness.    Normal tone    Psychiatric:     Normal affect with appropriate " interaction with examiner.    Emergency Department Course     Imaging:  CT Chest Pulmonary Embolism w Contrast  1.  No evidence for pulmonary artery embolism is identified.  2.  Global cardiomegaly.  3.  Increased central venous pressure with reflux of contrast into the inferior vena cava and into the hepatic veins. Additionally, the pulmonary arteries are enlarged likely due to pulmonary artery hypertension. This may be causing right heart strain.  4.  Atherosclerosis including coronary arteries and aorta. There are aortic valve annular calcifications. No definite aortic valve leaflet calcifications are seen. Recommend clinical correlation.  5.  Ectasia of the ascending thoracic aorta.  6.  Moderate- to large-sized right and small left pleural fluid  collections.    US Lower Extremity Venous Duplex Left  1.  No deep venous thrombosis in the left lower extremity.  2.  Slightly increased pulsatility of the waveforms in the deep venous system of the left lower extremity could represent increased central venous pressure which can be associated with congestive heart failure as well as other etiologies.    Emergency Department Course:    Reviewed:  I reviewed nursing notes, vitals, past medical history and care everywhere    Assessments:  1550 I obtained history and examined the patient as noted above.   2008 I rechecked the patient and explained findings.   2208 I rechecked and updated the patient.     Consults:   2015 D/W cardiologist, Dr. Gladin.  2058 D/W hospitalist, Dr. Nayak.    Disposition:  The patient left AMA.    Impression & Plan   CMS Diagnoses: None    Medical Decision Making:  Jt Montgomery is a 75yoM with history of atrial fibrillation on Coumadin, sleep apnea, dilated cardiomyopathy, type 2 diabetes, CKD who presents with shortness of breath.  On exam, the patient has normal oxygen saturations.  He is in no distress in the ED.  He does appear to have signs of right heart failure on exam, with  abdominal distention, lower extremity edema worse than baseline, and JVD.  BN peptide was checked earlier and is elevated.  He also was found to have elevated D-dimer, but CT of the chest and left lower extremity Doppler is negative.  He has had a significant amount of ectopy on the ED, though no sinus pauses as noted during his previous urgent care visit.  I discussed the patient with the on-call cardiologist, Dr. Alex, who reviewed the EKGs.  Given multiple morphologies of conduction abnormalities, as well as reported history of sinus pauses, he recommends telemetry monitoring and cardiology evaluation in the morning.  I discussed this plan with the patient, and made arrangements for telemetry monitoring the hospital.  However, upon hospitalist evaluation, the patient decided that he did not wish to stay in the hospital.  We did a long and detailed discussion about the risks associated with discharge, specifically, undiagnosed malignant cardiac arrhythmia leading to cardiac arrest, and permanent disability or death.  The patient states that during his previous hospital stays, he has not gotten any sleep because the CPAP mask fitted poorly, which led to it beeping every several minutes.  He also is following a strict diet which he finds to be very helpful to maintaining his health, which is primarily vegetarian and plant-based.  He notes that he is tricked about how he takes his medications, and is concerned that he would be off his normal schedule here in the hospital.  He is concerned that cardiology would recommend pacemaker placement, and he does not know if he would be okay with a pacemaker.  The patient has full decision-making capacity, and I believe understands the risks of discharging home without further evaluation.  I have placed cardiology referral as he notes that he does not have good rapport with his current cardiologist.  I have encouraged him to return to the ED if he develops any new or  worsening symptoms such as increased shortness of breath, lightheadedness, syncope, chest pain, or any other concerns.      Covid-19  Jt Montgomery was evaluated during a global COVID-19 pandemic, which necessitated consideration that the patient might be at risk for infection with the SARS-CoV-2 virus that causes COVID-19.   Applicable protocols for evaluation were followed during the patient's care. COVID-19 was considered as part of the patient's evaluation. The plan for testing is: a test was obtained at a previous visit and reviewed & considered today.    Diagnosis:    ICD-10-CM    1. Acute on chronic right-sided congestive heart failure (H)  I50.813    2. Atrial fibrillation, unspecified type (H)  I48.91        Discharge Medications:  New Prescriptions    No medications on file       Scribe Disclosure:  Taylor MOONEY, am serving as a scribe at 4:02 PM on 4/10/2021 to document services personally performed by Pili Turner MD based on my observations and the provider's statements to me.              Pili Turner MD  04/10/21 [id number]

## 2021-04-11 LAB
LABORATORY COMMENT REPORT: NORMAL
SARS-COV-2 RNA RESP QL NAA+PROBE: NEGATIVE
SARS-COV-2 RNA RESP QL NAA+PROBE: NORMAL
SPECIMEN SOURCE: NORMAL
SPECIMEN SOURCE: NORMAL

## 2021-04-11 NOTE — ED NOTES
Answered Patient call light. Patient expressing many concerns regarding being admitted and states he needs to eat. Brought Patient box lunch with approval from RN. Patient still expressing concerns and has questions regarding being admitted. Patient appears upset. RN and MD notified.

## 2021-04-11 NOTE — ED NOTES
"[facility]  ED Nurse Handoff Report    ED Chief complaint: Shortness of Breath and Leg Swelling      ED Diagnosis:   Final diagnoses:   None       Code Status: Full Code    Allergies:   Allergies   Allergen Reactions     No Known Allergies        Patient Story: pt sent here from  due to elevated BNP and d-dimer, pt has not been taking his diuretic.   Focused Assessment:  Pt is a&ox4    Treatments and/or interventions provided:   Patient's response to treatments and/or interventions:     To be done/followed up on inpatient unit:      Does this patient have any cognitive concerns?: none    Activity level - Baseline/Home:  Independent  Activity Level - Current:   Independent    Patient's Preferred language: English   Needed?: No    Isolation: None and COVID r/o and special precautions  Infection: Not Applicable  COVID r/o and special precautions  Patient tested for COVID 19 prior to admission: YES  Bariatric?: No    Vital Signs:   Vitals:    04/10/21 1531   BP: (!) 141/78   Pulse: 50   Resp: 20   Temp: 98.1  F (36.7  C)   TempSrc: Oral   SpO2: 96%   Weight: 122 kg (269 lb)   Height: 1.854 m (6' 1\")       Cardiac Rhythm:     Was the PSS-3 completed:   Yes  What interventions are required if any?               Family Comments: pt does not have family in town but does have a cat he is very concerned about.   OBS brochure/video discussed/provided to patient/family: N/A              Name of person given brochure if not patient:               Relationship to patient:     For the majority of the shift this patient's behavior was Green.   Behavioral interventions performed were .    ED NURSE PHONE NUMBER: [phone]         "

## 2021-04-11 NOTE — DISCHARGE INSTRUCTIONS
On your evaluation today, I was concerned that you appear to have a mild case of right-sided heart failure.  In addition, the conduction through your heart appears to be very abnormal.  We did not notice any long pauses as were seen during your previous urgent care visit today, however, after reviewing your electrocardiogram with the cardiologist, I recommended that she stay in the hospital for monitoring and cardiology evaluation.  Your heart conduction system appears to be diseased and you have frequent abnormal rhythms.  If we are concerned that your heart rhythm could become unstable, we often recommend placement of a pacemaker.  We discussed the risks of a dangerous cardiac arrhythmia that could lead to cardiac arrest and and death or permanent disability.  We have discussed your concerns about staying in the hospital such as lack of nutritious foods, and difficulty sleeping well.  Since you are deciding to return home, I am recommending going on 20 mg of Lasix daily for the next 7 days.  I will also place a cardiology referral to see if you can be paired with a different cardiologist since you do not feel you have a good relationship with your cardiologist.  You can return to the ED at any time if your dizziness, shortness of breath gets worse, or if you develop new symptoms such as syncope, chest pain, or any other concerns.

## 2021-04-11 NOTE — CONSULTS
[facility]    History and Physical  Hospitalist    Jt Montgomery MRN# [id number]   Age: 75 year old YOB: 1945     Date of Admission:  4/10/2021    Primary care provider: Digna Jones          Assessment and Plan:     Jt Montgomery is a 75 year old  male with medical history of atrial fibrillation on Coumadin, dilated cardiomyopathy, obstructive sleep apnea  on CPAP, diabetes mellitus, CKD presented to the ED for evaluation of shortness of breath. Hospitalist team consulted for admission.    Acute on chronic right side congestive heart failure.  Dyspnea on exertion likely multifactorial, heart failure exacerbation, deconditioning.  History of nonischemic dilated cardiomyopathy.  Patient reports ongoing shortness of breath for about 3 weeks now, symptoms progressively worsening.   Afebrile, blood pressure 141/78. Bilateral slightly decreased breath sounds.    Troponin from urgent care undetectable, D-dimer 0.7.  proBNP 5271.  CT chest no pulmonary embolism. Global cardiomegaly, increased central venous pressure with reflux of contrast into the inferior vena cava and into the hepatic vein.  Additionally the pulmonary arteries are enlarged likely due to pulmonary artery hypertension, this may be causing right heart strain.  Moderate to large size right and small left pleural fluid collections.  ED team had discussed with cardiology, plan for admission to inpatient unit for diuresis, cardiology evaluation.  Plan for admission to inpatient unit, telemetry monitoring, echocardiogram, intravenous diuresis and consult cardiology.    Acute on chronic lower extremity edema from heart failure exacerbation  Complaining of leg swelling that has been worsening, left leg worse than right leg.  Bilateral lower extremities pedal edema present, left greater than right with chronic stasis changes.  Ultrasound lower extremity venous duplex left no DVT in the left lower extremity.   Slightly increased pulsatility in the waveforms in the deep venous system of the left lower extremity could represent increased central venous pressure which can be associated with congestive heart failure as well as other etiologies.  Plan for diuresis, liberalization, Ace wraps.    Dizziness.  Was noted to have sinus pauses at urgent care center, not noted be immediate.  Plan to check TSH, electrolytes including magnesium levels, telemetry monitoring and echocardiogram for evaluation of heart function.    Acute on chronic kidney injury likely from volume overload.  Baseline creatinine around 1.1-1.2.  Presented with creatinine of 1.30.  Plan to hold PTA ARB   And start on intravenous diuresis and monitor renal function.    Chronic atrial fibrillation on Coumadin.  Coronary artery disease, NSTEMI status post drug-eluting stent placement 4/2011.  Hypertension.  Plan to restart PTA anticoagulation, digoxin and rate control medication.    Central sleep apnea.  Plan to resume PTA CPAP with home settings.    Diabetes mellitus.  Plan to restart PTA insulin regimen.    Rule Out COVID-19 infection  This patient was evaluated during a global COVID-19 pandemic, which necessitated consideration that the patient might be at risk for infection with the SARS-CoV-2 virus that causes COVID-19. Applicable protocols for evaluation were followed during the patient's care. LOW suspicion for infection.      Plan of care discussed with patient in detail.  Patient reports he does not like the food provided in the hospital, does not have  his CPAP, his cat is alone at home.  At length discussed with patient -would be able to provide some food overnight, CPAP in the hospital.  Discussed need for admission to inpatient unit, risks of leaving AMA discussed.   Patient adamantly declined admission at this time.  Did discuss with the ED physician, bedside RN-further care per ED team at this time.  Please reconsult us back if patient agrees for  admission.    More than 65% of time spent in direct patient care, patient counseling and education, and formalizing plan of care.  Discussed with patient, ED physician and bedside RN.    Serena Nayak MD          Chief Complaint:     History is obtained from the patient and medical records     Jt Montgomery is a 75 year old  male with medical history of atrial fibrillation on Coumadin, dilated cardiomyopathy, obstructive sleep apnea  on CPAP, diabetes mellitus, CKD presented to the ED for evaluation of shortness of breath. Hospitalist team consulted for admission.    Patient reports ongoing shortness of breath for about 3 weeks now, symptoms progressively worsening.  Also reports associated dizziness, worse when bending over.  Denies any vision disturbance.  Denies any ringing sensation in ears.  Denies any difficulty swallowing or speech difficulty.  Denies any orthopnea or PND.  Reports is able to sleep okay at nighttime with CPAP.  Denies any chest pain at this time.  Denies any palpitation.  Denies any cough or fever or chills.  Complaining of leg swelling that has been worsening, left leg worse than right leg.  Denies any abdominal pain.  Denies any diarrhea or constipation.  Denies any tingling or numbness of focal weakness.  Denies any incontinence of bowel or bladder.  Reports lives alone at home with his cat, received 2 doses of Covid vaccine recently.    Afebrile, blood pressure 141/78. Bilateral slightly decreased breath sounds.  Bilateral lower extremities pedal edema present, left greater than right with chronic stasis changes.    CBC: WBC 5.7, HGB 13.4, (L)   CMP: Urea Nitrogen 123(H), Creatinine 1.30(H), GFR 54(L) o/w WNL  Troponin (Collected: 1213): <0.015  D Dimer quantitative: 0.7(H)  INR: 2.36(H)  BNP: 5,271(H)  Symptomatic Influenza A/B & SARS-CoV2 (COVID19) Virus PCR Multiplex: In process  CT chest no pulmonary embolism identified.  CT imaging with global cardiomegaly, increased  central venous pressure with reflux of contrast into the inferior vena cava and into the hepatic vein.  Additionally the pulmonary arteries are enlarged likely due to pulmonary artery hypertension, this may be causing right heart strain.  Moderate to large size right and small left pleural fluid collections.  Ultrasound lower extremity venous duplex left no DVT in the left lower extremity.  Slightly increased pulsatility in the waveforms in the deep venous system of the left lower extremity could represent increased central venous pressure which can be associated with congestive heart failure as well as other etiologies.  ED team had discussed with cardiology, plan for admission to inpatient unit for diuresis, cardiology evaluation.          Review of Systems:     GENERAL:  no fever or chills  EENT: no difficulty swallowing, no hearing difficulty  PULMONARY: see HPI   CARDIAC: no chest pain, no irregular or fast heart beats   GI: No abdominal pain, nausea, vomiting, diarrhea, constipation, black or bloody stools  : No burning/pain with urination  NEURO: No significant headaches, no slurred speech, no loss of consciousness  ENDOCRINE: No excessive thirst  MUSCULOSKELETAL: No new joint pain  SKIN: No skin rashes  PSYCHIATRY no anxiety     Medical History:     Past Medical History:   Diagnosis Date     Atrial fibrillation (H)      CAD (coronary artery disease)     MI with RONEL to dRCA April 2011     Central Sleep Apnea with Pat Villanueva breathing 9/14/2010    Also TOMMY with CPAP     CKD (chronic kidney disease) stage 3, GFR 30-59 ml/min      Dilated cardiomyopathy (H)     nonischemic (possibly related to h/o a.fib with RVR) EF 30-40% March 2013     DM2 (diabetes mellitus, type 2) (H)     Goal HgbA1c < 7%     Gout     uric acid level correlates; April 2014 h/o +knee aspirate     Hernia, abdominal      Hypertension     Goal <140/90     Pulmonary hypertension (H)     mild per echo 3/2013     Spider veins          Surgical History:      Past Surgical History:   Procedure Laterality Date     CORONARY ANGIOGRAPHY ADULT ORDER      distal circ-RONEL     CYSTOSCOPY, TRANSURETHRAL RESECTION (TUR) PROSTATE, COMBINED N/A 5/15/2020    Procedure: CYSTOSCOPY, WITH TRANSURETHRAL RESECTION PROSTATE;  Surgeon: Tr Bowles MD;  Location: [facility] OR     HERNIA REPAIR  11/20/10    incarcinated     SUSPEND HYOID, GENIOGLOSSAL ADVANCEMENT               Social History:      Social History     Tobacco Use     Smoking status: Former Smoker     Packs/day: 1.50     Years: 7.00     Pack years: 10.50     Types: Cigarettes     Quit date: 1972     Years since quittin.3     Smokeless tobacco: Never Used     Tobacco comment: quit in       Started at around age 18-19   Substance Use Topics     Alcohol use: No     Alcohol/week: 0.0 standard drinks     Comment: 74   recovering             Family History:     Family History   Problem Relation Age of Onset     Hypertension Mother      Coronary Artery Disease Mother      Coronary Artery Disease Father      Hypertension Father      Diabetes Sister      Cerebrovascular Disease Sister              Allergies:     Allergies   Allergen Reactions     No Known Allergies              Medications:   Home meds reviewed          Physical Exam      Admission Weight: 122 kg (269 lb)    Vital Signs with Ranges  Temp:  [97.2  F (36.2  C)-98.1  F (36.7  C)] 98.1  F (36.7  C)  Pulse:  [44-50] 50  Resp:  [20] 20  BP: (141)/(78-92) 141/78  SpO2:  [96 %] 96 %    PHYSICAL EXAM  GENERAL: Patient is in no distress. Alert and oriented  HEENT: Oropharynx pink, moist.   HEART: irregular rate and rhythm. S1S2. Systolic murmur + right sternal border.  LUNGS: Bilateral slightly decreased breath sounds.  No wheezing no crackles.  Respirations unlabored  ABDOMEN: Soft, obese, no abdominal tenderness, bowel sounds heard   NEURO: Cranial nerves II-XII intact. Motor and sensory system in normal range  EXTREMITIES:  Chronic stasis changes bilateral lower extremity.  2+ pitting edema right lower extremity.  3+ pitting edema left lower extremity.  Peripheral pulses felt.  SKIN: Warm, dry.   PSYCHIATRY Cooperative         Data:   All new lab and imaging data was reviewed.

## 2021-04-12 ENCOUNTER — TELEPHONE (OUTPATIENT)
Dept: CARDIOLOGY | Facility: CLINIC | Age: 76
End: 2021-04-12

## 2021-04-12 ENCOUNTER — PATIENT OUTREACH (OUTPATIENT)
Dept: CARE COORDINATION | Facility: CLINIC | Age: 76
End: 2021-04-12

## 2021-04-12 ENCOUNTER — TELEPHONE (OUTPATIENT)
Dept: FAMILY MEDICINE | Facility: CLINIC | Age: 76
End: 2021-04-12

## 2021-04-12 DIAGNOSIS — Z71.89 OTHER SPECIFIED COUNSELING: ICD-10-CM

## 2021-04-12 NOTE — TELEPHONE ENCOUNTER
Chief Complaint: Acute On Chronic Right-Sided Congestive Heart Failure (H), SAT 10-APR-2021, 1 / 1    Pt ph: [phone]

## 2021-04-12 NOTE — TELEPHONE ENCOUNTER
INR from 4/10 was 2.36, noted.  Patient was seen by Dr. Ray on 4/10 for SOB and he was sent to the ER for evaluation.  Plan was to admit to inpatient for diuresis and cardiology evaluation but patient decided to go home.  INR was in range and next INR check is scheduled in 2 weeks on 4/26.  Continue same plan.  Brian NARANJO

## 2021-04-12 NOTE — PROGRESS NOTES
Care Coordination Contact  Community Health Worker Initial Outreach    CHW Initial Information Gathering:  Current living arrangement:: I live alone  Type of residence:: Other(condo)  Supplies used at home:: Other, Compression Stockings(C pap)  Equipment Currently Used at Home: walker, standard  Informal Support system:: Friends  No PCP office visit in Past Year: Yes  CHW Additional Questions  MyChart active?: No    Patient accepts CC: Yes. Patient scheduled for assessment with RN on 4/13 at 3:00. Patient noted desire to discuss over all care management he would also like help on finding a new cardiologist .

## 2021-04-13 ENCOUNTER — VIRTUAL VISIT (OUTPATIENT)
Dept: PHARMACY | Facility: CLINIC | Age: 76
End: 2021-04-13
Payer: COMMERCIAL

## 2021-04-13 ENCOUNTER — PATIENT OUTREACH (OUTPATIENT)
Dept: NURSING | Facility: CLINIC | Age: 76
End: 2021-04-13
Attending: INTERNAL MEDICINE
Payer: COMMERCIAL

## 2021-04-13 DIAGNOSIS — E11.40 TYPE 2 DIABETES MELLITUS WITH DIABETIC NEUROPATHY, WITH LONG-TERM CURRENT USE OF INSULIN (H): ICD-10-CM

## 2021-04-13 DIAGNOSIS — Z79.4 TYPE 2 DIABETES MELLITUS WITH DIABETIC NEUROPATHY, WITH LONG-TERM CURRENT USE OF INSULIN (H): ICD-10-CM

## 2021-04-13 DIAGNOSIS — I48.91 ATRIAL FIBRILLATION, UNSPECIFIED TYPE (H): ICD-10-CM

## 2021-04-13 DIAGNOSIS — I25.10 CORONARY ARTERY DISEASE INVOLVING NATIVE CORONARY ARTERY OF NATIVE HEART WITHOUT ANGINA PECTORIS: ICD-10-CM

## 2021-04-13 DIAGNOSIS — I10 ESSENTIAL HYPERTENSION: Primary | ICD-10-CM

## 2021-04-13 DIAGNOSIS — I42.0 DILATED CARDIOMYOPATHY (H): ICD-10-CM

## 2021-04-13 PROCEDURE — 99606 MTMS BY PHARM EST 15 MIN: CPT | Mod: TEL | Performed by: PHARMACIST

## 2021-04-13 ASSESSMENT — ACTIVITIES OF DAILY LIVING (ADL): DEPENDENT_IADLS:: INDEPENDENT

## 2021-04-13 NOTE — PATIENT INSTRUCTIONS
Recommendations from today's MTM visit:                                                    MTM (medication therapy management) is a service provided by a clinical pharmacist designed to help you get the most of out of your medicines.   Today we reviewed what your medicines are for, how to know if they are working, that your medicines are safe and how to make your medicine regimen as easy as possible.      1.  Continue current medication regimen    Next MTM visit: Tuesday, April 20th at 11am    It was great to speak with you today.  I value your experience and would be very thankful for your time with providing feedback on our clinic survey. You may receive a survey via email or text message in the next few days.     To schedule another MTM appointment, please call the clinic directly or you may call the MTM scheduling line at [phone] or toll-free at [phone].     My Clinical Pharmacist's contact information:                                                      Please feel free to contact me with any questions or concerns you have.      Nathaly Hughes PharmD, [facility]  Medication Therapy Management Provider  Pager: [phone]     Liv Farley PharmD  Medication Therapy Management Resident  Pager: [phone]

## 2021-04-13 NOTE — LETTER
[facility]  Complex Care Plan  About Me:    Patient Name:  Jt Montgomery    YOB: 1945  Age:         75 year old   Emerita MRN:    [id number] Telephone Information:  Home Phone [phone]   Mobile [phone]   Home Phone Not on file.       Address:  [address]  [place] [address] Email address:  [email or web address]      Emergency Contact(s)    Name Relationship Lgl Grd Work Phone Home Phone Mobile Phone   1. ORION GERARDO* Friend No  [phone]   2. ABIGAIL RICARDO Friend No  [phone]    3. VICTORIANO VICK* Friend No  [phone]            Primary language:  English     needed? No   [facility] Language Services:  [phone] op. 1  Other communication barriers: Glasses  Preferred Method of Communication:  Phone  Current living arrangement: I live alone  Mobility Status/ Medical Equipment: Independent    Health Maintenance  Health Maintenance Reviewed: Due/Overdue   Health Maintenance Due   Topic Date Due     HF ACTION PLAN  Never done     COLORECTAL CANCER SCREENING  Never done     DTAP/TDAP/TD IMMUNIZATION (1 - Tdap) Never done     ZOSTER IMMUNIZATION (1 of 2) Never done     MEDICARE ANNUAL WELLNESS VISIT  Never done     Pneumococcal Vaccine: Pediatrics (0 to 5 Years) and At-Risk Patients (6 to 64 Years) (2 of 2 - PCV13) 09/12/2013     DIABETIC FOOT EXAM  01/02/2018     INFLUENZA VACCINE (1) 09/01/2020     A1C  03/22/2021     My Access Plan  Medical Emergency 911   Primary Clinic Line [facility] - [phone]   24 Hour Appointment Line [phone] or  [phone]) (toll-free)   24 Hour Nurse Line [phone] (toll-free)   Preferred Urgent Care [facility], [phone]   Preferred Hospital [facility]  [phone]   Preferred Pharmacy [id number] - [place], [place] - [address] [facility]      Behavioral Health Crisis Line The National Suicide Prevention Lifeline at 1-473.217.9258 or 911     My Care Team Members  Patient Care Team       Relationship Specialty Notifications Start End    Digna Jones MD PCP - General Internal Medicine  2/9/18     Phone: [phone] Fax: [phone] [address]  [place] [address]    Nathaly Hughes, [facility] Pharmacist Pharmacist  5/1/19     Phone: [phone] Fax: [phone] [address]  [place] [address]    Artemio Pitt MD MD Urology  12/26/19     Phone: [phone] Fax: [phone]         [address] [facility] [address]    Genie Stanley RD Diabetes Educator Dietitian, Registered  10/6/20     Phone: [phone]          [facility] [address] [facility] [address]    Daphney Farley [facility] Pharmacist Pharmacist  10/21/20     Phone: [phone] Fax: [phone]         [address]  [place] [address]    Nestor Alder MD Assigned Sleep Provider   10/23/20     Phone: [phone] Fax: [phone]         [address] [facility] [address]    Genaro Roy MD Assigned Heart and Vascular Provider   10/23/20     Phone: [phone] Fax: [phone] [address] [place] [address]    Artemio Pitt MD Assigned Surgical Provider   10/23/20     Phone: [phone] Fax: [phone]         [address] [facility] [address]    Digna Jones MD Assigned PCP   12/27/20     Phone: [phone] Fax: [phone] [address]  [place] [address]    Payam Marie, RN Lead Care Coordinator Primary Care - [facility] Admissions 4/13/21     Phone: [phone]                 My Care Plans  Self Management and Treatment Plan  Goals and (Comments)  Goals        Patient Stated      1. Medical (pt-stated)      Goal Statement: I will work to avoid ED visits or hospitalizations within the next 6 months by establishing and following a plan of care for  management of my heart failure.  Date Goal set: 4/13/21  Barriers: limited education about heart failure, history of challenges connecting with cardiology provider  Strengths: motivated to improve overall health and avoid health complications  Date to Achieve By: 10/31/21  Patient expressed understanding of goal: Yes    Action steps to achieve this goal:  1. I will take my medications as prescribed and will notify my clinic of any medication questions or concerns.  2. I will monitor my weight daily and will promptly notify my clinic if I gain 2 or more lbs in a day or 5 or more lbs in a week.  3. I will limit my salt intake to 2,000 mg per day or as advised by my doctor.  4. I will promptly contact my clinic if I experience any shortness of breath, increased swelling, or other symptoms of concern.  5. I will establish care with Cardiology and any other specialty teams that are appropriate for ongoing support.  6. I will continue working with Care Coordination to identify and address any barriers to achieving my goal.        Action Plans on File: none  Advance Care Plans/Directives Type: none on file    Honoring Choices    Advance Care Planning and Health Care Directives  When it comes to decisions about your health care, it s important that your voice is heard. You may not always be able to speak for yourself.    We encourage you to have discussions with your loved ones, cultural or spiritual leaders and health care providers about your goals, values, beliefs and choices.    We are a part of [employer] [place] , supporting and promoting the benefits of advance care planning conversations.    Our goals are to:    Help you make informed decisions about your healthcare choices and ensure that those choices are honored    Offer advance care planning discussions with trained staff    Ensure your choices are clearly defined, documented and available in your medical record    Translate your choices into medical  orders as needed    Why is Advance Care Planning important?    Know what your health care choices are and decide what is right for you    An unexpected illness or injury could leave you unable to participate in important treatment decisions    When choices are left to others to decide that responsibility can be difficult and stressful    By discussing and outlining your choices, your voice is heard in the care you want to receive    How can I learn more?  We offer free classes at multiple locations, days and times. Our trained facilitators will provide information and guide you through a Health Care Directive document. They can also review, notarize and add your document to your medical record.    Call [employer] at [phone] or toll free at [phone] for assistance.       My Medical and Care Information  Problem List   Patient Active Problem List   Diagnosis     HTN (hypertension); goal <140/90     Alcohol dependence in remission (H)     TOMMY (obstructive sleep apnea)     Central Sleep Apnea with Cheyne Villanueva breathing     Dilated cardiomyopathy, nonischemic EF 30-40% in March 2013     S/p angioplasty with stent w/ RONEL in distal RCA     Anticoagulation management encounter     Gout     Type 2 diabetes mellitus with diabetic neuropathy; goal HgbA1c < 7%     CAD (coronary artery disease)     Atrial fibrillation (AFIB) on Coumadin     CKD (chronic kidney disease) stage 3, GFR 30-59 ml/min     Morbid obesity (H)     Pulmonary hypertension (H)     Mixed hyperlipidemia due to type 2 diabetes mellitus (H)     Anemia, unspecified type     Facial cellulitis     Severe sepsis (H)     Acute pyelonephritis     Obstructive uropathy     Acute on chronic renal failure (H)     Indirect hyperbilirubinemia     Physical deconditioning     Hematuria     Benign prostatic hyperplasia with urinary obstruction     Paroxysmal atrial fibrillation (H)     Long term current use of anticoagulants with INR goal of 2.0-3.0      Acute on chronic right-sided congestive heart failure (H)      Current Medications:  Current Outpatient Medications   Medication Instructions     aspirin 81 mg, Oral, DAILY     blood glucose (NO BRAND SPECIFIED) lancets standard Use to test blood sugar 1 times daily or as directed.  Dispense per insurance covered brand     blood glucose (NO BRAND SPECIFIED) test strip Use to test blood sugar 1 time daily or as directed. To accompany: Blood Glucose Monitor Brands: per insurance.     blood glucose monitoring (NO BRAND SPECIFIED) meter device kit Use to test blood sugar 1 time daily or as directed. Preferred blood glucose meter OR supplies to accompany: Blood Glucose Monitor Brands: per insurance.     Capsicum, Cayenne, (CAYENNE PEPPER PO) Oral, PRN     CoQ10 200 mg, Oral, DAILY     digoxin (LANOXIN) 125 MCG tablet TAKE 1 TABLET(125 MCG) BY MOUTH DAILY     diltiazem ER COATED BEADS (CARTIA XT) 300 mg, Oral, DAILY     febuxostat (ULORIC) 40 mg, Oral, DAILY     finasteride (PROSCAR) 5 mg, Oral, DAILY     furosemide (LASIX) 20 mg, Oral, DAILY PRN     HYDROcodone-acetaminophen (NORCO) 5-325 MG tablet 0.5-1 tablets, Oral, EVERY 6 HOURS PRN     insulin glargine (LANTUS SOLOSTAR) 22 Units, Subcutaneous, AT BEDTIME     insulin pen needle (BD GERMÁN U/F) 32G X 4 MM miscellaneous USE FOUR TIMES DAILY AS DIRECTED     irbesartan (AVAPRO) 150 mg, Oral, AT BEDTIME     Lactobacillus (PROBIOTIC ACIDOPHILUS PO) 1 capsule, Oral, DAILY     magnesium oxide 200 MG TABS 1-2 tablets, Oral, DAILY     melatonin 4 mg, Oral, AT BEDTIME PRN     metoprolol succinate ER (TOPROL-XL) 50 MG 24 hr tablet TAKE 1 TABLET BY MOUTH TWICE DAILY     Multiple Vitamins-Minerals (MULTIVITAMIN ADULT PO) 1 tablet, Oral, DAILY     order for DME Equipment being ordered: Compression stockings     predniSONE (DELTASONE) 20 MG tablet TAKE 1 TABLET(20 MG) BY MOUTH DAILY AS NEEDED GOUT FLARE     STATIN NOT PRESCRIBED (INTENTIONAL) Statin not prescribed intentionally  due to Other patient declines (This option does not exclude patient from measure)     tamsulosin (FLOMAX) 0.4 mg, Oral, EVERY EVENING     thin (NO BRAND SPECIFIED) lancets Use with lanceting device. To accompany: Blood Glucose Monitor Brands: per insurance.     triamcinolone (KENALOG) 0.1 % external cream Topical, 2 TIMES DAILY PRN     UNABLE TO FIND MEDICATION NAME: CarotoMax with lutein - takes sporadically     UNABLE TO FIND MEDICATION NAME: Super beet powder daily      valACYclovir (VALTREX) 1,000 mg, Oral, 3 TIMES DAILY, Quantity correction per V.O. (TID/#21)     vitamin B complex with vitamin C (VITAMIN  B COMPLEX) tablet 1 tablet, Oral, DAILY     vitamin C (ASCORBIC ACID) 2,000-4,000 mg, Oral, DAILY     Vitamin D (Cholecalciferol) 3,000 Units, Oral, DAILY     warfarin ANTICOAGULANT (COUMADIN) 5 MG tablet Take 1 1/2 tabs on Sundays, Tuesdays and Thursdays and 1 tab on all other days or as directed by INR clinic     Zinc 15 mg, Oral, DAILY     Care Coordination Start Date: 4/13/2021   Frequency of Care Coordination: Monthly and as needed   Form Last Updated: 04/13/2021

## 2021-04-13 NOTE — PROGRESS NOTES
Clinic Care Coordination Contact    OUTREACH    Referral Information:  Referral Source: ED Follow-Up  Primary Diagnosis: CHF  Chief Complaint   Patient presents with     Clinic Care Coordination - Post Hospital     ED visit - acute on chronic right-sided HF      Universal Utilization: reviewed on this date  Difficulty keeping appointments: No  Compliance Concerns: No  No-Show Concerns: No  No PCP office visit in Past Year: Yes  Utilization    Last refreshed: 4/13/2021  4:36 PM: Hospital Admissions 1           Last refreshed: 4/13/2021  4:36 PM: ED Visits 3           Last refreshed: 4/13/2021  4:36 PM: No Show Count (past year) 4              Current as of: 4/13/2021  4:36 PM          CC RN outreach to patient for ED discharge follow-up; of note, patient left AMA from ED (provider wanted to admit him but patient refused).  CC RN called and spoke with patient; introduced self, discussed role of Care Coordination, and explained reason for call.    Clinical Concerns:  Current Medical Concerns: acute on chronic right-sided CHF; HX atrial fibrillation, TOMMY (CPAP when sleeping), dilated cardiomyopathy, HTN, gout  Current Behavioral Concerns: none noted at this time    Education Provided to patient: reviewed ED AVS, provided patient with CHF-specific education, provided patient with information about Care Coordination      Patient noted a long history of difficulty finding a Cardiology provider that he is agreeable to continue seeing.  He identified many barriers but most prevalent one being that he would like his Cardiology provider to be understanding of his desire to follow a plant-based diet and approach things more holistically.  CC RN discussed this with patient; helped to set some realistic expectations for patient and encouraged him to continue trying to identify a Cardiology provider he feels he is comfortable with.     Clinic Care Coordination CHF Assessment    Discharge:  Hospital summary: [facility]  [facility] ED 4/10/21: SOB, dizziness; acute on chronic right-sided CHF  Day of hospital discharge: 4/10/21  What recommendations were made for follow up after your recent hospitalization? Patient left AMA but was advised to follow up with Cardiology (referral was placed per patient's report of wanting different cardiologist)  Have the follow up appointments been scheduled? No  If not, can I help you set up these appointments? Patient does have PCP appointment scheduled for next week.  After lengthy discussion about patient's need for Cardiology follow-up, patient agreeable to calling Cardiology department back to inquire if he can speak with anyone to help him decide on which Cardiology provider to schedule follow-up with.  CC RN will also inquire about potential to get patient established with [facility] as patient feels this additional support would be helpful to him.    CHF:  Home scale available:  Yes  Home scale weight this morning: not taken  Hospital discharge weight: 269 lbs   Current weight: not taken  Heart Failure Zones sheet on refrigerator or available: has not received HF Action plan yet; will request  Any increased SOB since hospital discharge:  No  Any increased edema since hospital discharge:  No  What number to call for YELLOW zones: [phone]    Symptom Review:   Heart Failure Symptoms  Shortness of breath: Yes  Shortness of breath symptom course: Improved  Waking up at night short of breath?: No  Prop up on pillows or sleep in chair to breathe easier? : No  Trouble walking or talking while short of breath?: Yes  Wheezing or noisy breathing?: No  Cough: No  Increased sputum: No  Fever: No  Chest pain: No  Heartbeat: Regular  Dizzy or Lightheaded: No  Checking weight daily? : No  Does the patient have understanding of Diuretic self-management?: N/A  Diet: 2000 mg of sodium  Appetite: Normal  Swelling: Lower legs, Feet, Ankles (left leg > right leg)  Bloating: None  Urination: Normal  Fatigue:  "No  Weakness (Heaviness in limbs): No  What Heart Failure zone are you currently in?: Green  Overall your CHF symptoms are (GOAL): Stable  How confident are you with the plan we have identified?: 10- Completely confident    Medications:  \"How many new medications are you on since your hospitalization?\"  0 - 1  \"How many of your current medicines changed (dose, timing, name, etc.) while you were in the hospital?\"  0 - 1  \"Do you have questions about your medications?\"  No  For patients on insulin: \"Did you start on insulin in the hospital or did you have your insulin dose changed?\"  No  Is patient on Warfarin?  Yes:   Follow-up INR lab appointment scheduled, will continue follow-up with Anticoagulation Clinic as prior  Is Ejection Fraction <40%: No    When is the first appointment with the CHF clinic: none scheduled at this time    Post-discharge medication reconciliation status: Discharge medications reviewed and reconciled.  Continue medications without change.  Patient had MTM appointment today; he denied any additional medication questions or concerns at this time.    Functional Status:  Dependent ADLs: Independent, Ambulation-walker (walker as needed)  Dependent IADLs: Independent  Bed or wheelchair confined: No  Mobility Status: Independent  Fallen 2 or more times in the past year?: (not assessed)  Any fall with injury in the past year?: (not assessed)    Living Situation:  Current living arrangement: I live alone  Type of residence: Private home - no stairs(condo)    Lifestyle & Psychosocial Needs:  Diet: Low cholesterol, Low saturated fat, No added salt (mostly plant-based)  Inadequate nutrition (GOAL): No  Tube Feeding: No  Inadequate activity/exercise (GOAL): Yes  Significant changes in sleep pattern (GOAL): No  Transportation means: Regular car     Jehovah's witness or spiritual beliefs that impact treatment: No  Mental health DX: No  Mental health management concern (GOAL): No  Chemical Dependency Status: No " Current Concerns  Informal Support system: Friends   Socioeconomic History     Marital status:      Spouse name: Not on file     Number of children: Not on file     Years of education: Not on file     Highest education level: Not on file     Tobacco Use     Smoking status: Former Smoker     Packs/day: 1.50     Years: 7.00     Pack years: 10.50     Types: Cigarettes     Quit date: 1972     Years since quittin.3     Smokeless tobacco: Never Used     Tobacco comment: quit in       Started at around age 18-19   Substance and Sexual Activity     Alcohol use: No     Alcohol/week: 0.0 standard drinks     Comment: 74   recovering     Drug use: No     Sexual activity: Not Currently     Partners: Female     Care Coordinator has reviewed patient's Social Determinants of Health (SDoH) on this date. Upon review, changes were not  made.      Resources and Interventions:  Community Resources: DME, Other (private-pay caregiver as needed)  Supplies used at home: Other (CPAP), Compression Stockings  Equipment Currently Used at Home: walker, standard, grab bar, tub/shower  Employment Status: retired    Advance Care Plan/Directive  Advanced Care Plans/Directives on file: No  Advanced Care Plan/Directive Status: (not discussed this encounter;  will send Honoring Choices information)    Referrals Placed: None     Goals        Patient Stated      1. Medical (pt-stated)      Goal Statement: I will work to avoid ED visits or hospitalizations within the next 6 months by establishing and following a plan of care for management of my heart failure.  Date Goal set: 21  Barriers: limited education about heart failure, history of challenges connecting with cardiology provider  Strengths: motivated to improve overall health and avoid health complications  Date to Achieve By: 10/31/21  Patient expressed understanding of goal: Yes    Action steps to achieve this goal:  1. I will take my medications as prescribed and  will notify my clinic of any medication questions or concerns.  2. I will monitor my weight daily and will promptly notify my clinic if I gain 2 or more lbs in a day or 5 or more lbs in a week.  3. I will limit my salt intake to 2,000 mg per day or as advised by my doctor.  4. I will promptly contact my clinic if I experience any shortness of breath, increased swelling, or other symptoms of concern.  5. I will establish care with Cardiology and any other specialty teams that are appropriate for ongoing support.  6. I will continue working with Care Coordination to identify and address any barriers to achieving my goal.        Patient/Caregiver understanding: Yes    Outreach Frequency: weekly  Future Appointments              In 6 days Digna Jones MD [facility] KEITH Jama    In 1 week Nathaly Hughes [facility] KEITH HILL    In 1 week Rn, [facility]     [facility]        Plan:    CC RN will send patient Care Coordination Introduction letter and Complex Care Plan.    CC RN will inquire with [facility] about process of potentially assisting patient to establish with their team per today's discussion.  In the meantime, patient will call back to Cardiology to work on identifying a provider he is comfortable scheduling follow-up with.    Patient will continue monitoring his symptoms and will promptly report any concerns to clinic as discussed.    Patient will continue medications as prescribed.    Patient will attend upcoming appointments as scheduled.    Patient agreed to contact CC RN with additional questions or concerns.    CC RN will outreach to patient in approximately 1 week to get updates on patient status, assess goal progress, and offer additional support and resources as indicated.    Payam Marie, RN  Clinic Care Coordinator  [facility]  [place], [place],  [facility]  Ph: [phone]

## 2021-04-13 NOTE — LETTER
[facility] CARE COORDINATION  [facility]  [address] [place] [address]    April 13, 2021    Jt Montgomery  [address]  [place] [address]      Dear Jt,    I am a clinic care coordinator who works with Digna Jones MD at [facility] [place] [facility]. I wanted to thank you for spending the time to talk with me.  Below is a description of clinic care coordination and how I can further assist you.      The clinic care coordination team is made up of a registered nurse,  and community health worker who understand the health care system. The goal of clinic care coordination is to help you manage your health and improve access to the health care system in the most efficient manner. The team can assist you in meeting your health care goals by providing education, coordinating services, strengthening the communication among your providers and supporting you with any resource needs.    Please feel free to contact me with any questions or concerns. We are focused on providing you with the highest-quality healthcare experience possible and that all starts with you.     Sincerely,     Payam Marie RN  Clinic Care Coordinator  [facility]  [place], [facility]  Ph: [phone]      Enclosed: I have enclosed a copy of the Complex Care Plan. This has helpful information and goals that we have talked about. Please keep this in an easy to access place to use as needed.

## 2021-04-13 NOTE — PROGRESS NOTES
Medication Therapy Management (MTM) Encounter    ASSESSMENT:                            Medication Adherence/Access: No issues identified    Hypertension/CAD/A. Fib/HFrEF: He's now taking furosemide consistently, due for ED f/up with primary care physician, also needs to establish with a new cardiologist.    Type 2 Diabetes: Patient is meeting A1c goal of < 8%, due for re-check which can be done during primary care physician visit. Self monitoring of blood glucose is at goal of fasting  mg/dL and post prandial < 180 mg/dL.  No changes needed.     PLAN:                            1.  Assisted patient in scheduling f/up visit with Dr. Jones on 4/19 for ED f/up visit.  2.  Continue current medication regimen.    Follow-up: 1 week, appt scheduled    SUBJECTIVE/OBJECTIVE:                          Jt Montgomery is a 75 year old male called for a follow-up visit. He was referred to me from Dr. Jones.  Today's visit is a follow-up MTM visit from 3/30/2021.     Reason for visit: Routine f/up.  Patient was at [facility] ED on 4/10 for shortness of breath/HF exacerbation.    Tobacco: He reports that he quit smoking about 49 years ago. His smoking use included cigarettes. He has a 10.50 pack-year smoking history. He has never used smokeless tobacco.  Alcohol: history of alcohol dependence    Medication Adherence/Access: no issues reported    Hypertension/CAD/A. Fib/HFrEF:  Patient as seen in urgent care on 4/10 and was sent to the ED.  It was felt he should be admitted for telemetry monitoring and cardiology evaluation, which he declined.  He was referred to establish care with a new cardiologist as he doesn't feel he's found a good fit - he did receive a call to schedule yesterday but did not do so as he may consider seeing cardiology in another health system.  No medication changes were made, although Jamal reports he had not been taking furosemide often prior to ED visit and now has resumed regular  administration.    Currently taking ASA 81mg daily, digoxin 125mcg daily, diltiazem ER 300mg daily, irbesartan 150mg daily, metoprolol succinate ER 50mg twice daily and warfarin as directed. He has been taking furosemide more often (once daily) and is also wearing compression stockings, and reports that edema is improving.  He also reports shortness of breath has improved.  Patient does self-monitor blood pressure. Home BP monitoring in range of 120's systolic over 70's diastolic. Patient reports no current medication side effects.   Last ECHO 11/2/16 - EF 50-55%, improved from 30-40% in 2013.  He reports his weight had been increasing at home - is now trending down and clothes are fitting better.  He's also monitoring his diet closer and is eating out less.    Jamal was also referred to care coordination at discharge - he has an RN evaluation later this afternoon.  He does not have a f/up scheduled with Dr. Jones.     BP Readings from Last 3 Encounters:   04/10/21 (!) 142/70   04/10/21 (!) 141/92   03/15/21 129/74      Potassium   Date Value Ref Range Status   04/10/2021 4.2 3.4 - 5.3 mmol/L Final      INR   Date Value Ref Range Status   04/10/2021 2.36 (H) 0.86 - 1.14 Final       Type 2 Diabetes:  Currently taking Lantus 22 units daily. Patient is not experiencing side effects.  Blood sugar monitoring:  Generally testing once daily (AM fasting) - OneTouch Ultra 2   Date FBG (mg/dL) HS (time varies from dinner varies, generally 2-3 hours) (mg/dL)   4/13 118    4/12 125    4/11 122    4/10 109    4/9 -    4/8 142    4/7 139    4/6 124    4/5 128    4/4 141    4/3 136 145   4/2 139    4/1 183    Symptoms of low blood sugar? None.   Symptoms of high blood sugar? None.   Eye exam: up to date  Foot exam: due  Diet/Exercise: No changes other than noted above re: eating out less and watching his diet more closely  Aspirin: Taking 81mg daily and denies side effects  Statin: No - patient declines  ACEi/ARB: Yes:  irbesartan.  Urine Albumin:   Lab Results   Component Value Date    UMALCR 317.16 (H) 09/22/2020      Lab Results   Component Value Date    A1C 7.0 09/22/2020    A1C 6.1 02/19/2020    A1C 6.2 11/14/2019    A1C 7.6 10/08/2019    A1C 6.5 03/06/2019     Today's Vitals: There were no vitals taken for this visit.  ----------------  Post Discharge Medication Reconciliation Status: discharge medications reconciled, continue medications without change.    I spent 53 minutes with this patient today. A copy of the visit note was provided to the patient's primary care provider.    The patient declined a summary of these recommendations.     Mikaela StroudD, [facility]  Medication Therapy Management Provider  Pager: [phone]     Telemedicine Visit Details  Type of service:  Telephone visit  Start Time: 11:32 AM  End Time: 12:25 PM  Originating Location (patient location): [place]  Distant Location (provider location):  [facility] MTM      Medication Therapy Recommendations  No medication therapy recommendations to display

## 2021-04-14 DIAGNOSIS — Z79.4 TYPE 2 DIABETES MELLITUS WITH DIABETIC NEUROPATHY, WITH LONG-TERM CURRENT USE OF INSULIN (H): Chronic | ICD-10-CM

## 2021-04-14 DIAGNOSIS — E11.40 TYPE 2 DIABETES MELLITUS WITH DIABETIC NEUROPATHY, WITH LONG-TERM CURRENT USE OF INSULIN (H): Chronic | ICD-10-CM

## 2021-04-15 RX ORDER — PEN NEEDLE, DIABETIC 32GX 5/32"
NEEDLE, DISPOSABLE MISCELLANEOUS
Qty: 200 EACH | Refills: 1 | Status: SHIPPED | OUTPATIENT
Start: 2021-04-15 | End: 2022-04-11

## 2021-04-19 ENCOUNTER — OFFICE VISIT (OUTPATIENT)
Dept: FAMILY MEDICINE | Facility: CLINIC | Age: 76
End: 2021-04-19
Payer: COMMERCIAL

## 2021-04-19 VITALS
TEMPERATURE: 96.8 F | SYSTOLIC BLOOD PRESSURE: 147 MMHG | HEART RATE: 67 BPM | WEIGHT: 276 LBS | DIASTOLIC BLOOD PRESSURE: 74 MMHG | OXYGEN SATURATION: 96 % | BODY MASS INDEX: 36.58 KG/M2 | HEIGHT: 73 IN

## 2021-04-19 DIAGNOSIS — E66.01 MORBID OBESITY (H): ICD-10-CM

## 2021-04-19 DIAGNOSIS — I42.0 DILATED CARDIOMYOPATHY (H): Chronic | ICD-10-CM

## 2021-04-19 DIAGNOSIS — R06.09 DYSPNEA ON EXERTION: Primary | ICD-10-CM

## 2021-04-19 DIAGNOSIS — R60.0 BILATERAL LEG EDEMA: ICD-10-CM

## 2021-04-19 DIAGNOSIS — E11.40 TYPE 2 DIABETES MELLITUS WITH DIABETIC NEUROPATHY, WITH LONG-TERM CURRENT USE OF INSULIN (H): Chronic | ICD-10-CM

## 2021-04-19 DIAGNOSIS — I51.7 CARDIOMEGALY: ICD-10-CM

## 2021-04-19 DIAGNOSIS — F10.21 ALCOHOL DEPENDENCE IN REMISSION (H): ICD-10-CM

## 2021-04-19 DIAGNOSIS — I50.9 ACUTE ON CHRONIC CONGESTIVE HEART FAILURE, UNSPECIFIED HEART FAILURE TYPE (H): ICD-10-CM

## 2021-04-19 DIAGNOSIS — R63.5 ABNORMAL WEIGHT GAIN: ICD-10-CM

## 2021-04-19 DIAGNOSIS — Z79.4 TYPE 2 DIABETES MELLITUS WITH DIABETIC NEUROPATHY, WITH LONG-TERM CURRENT USE OF INSULIN (H): Chronic | ICD-10-CM

## 2021-04-19 DIAGNOSIS — G47.33 OSA (OBSTRUCTIVE SLEEP APNEA): Chronic | ICD-10-CM

## 2021-04-19 PROCEDURE — 99215 OFFICE O/P EST HI 40 MIN: CPT | Performed by: INTERNAL MEDICINE

## 2021-04-19 RX ORDER — FUROSEMIDE 20 MG
20 TABLET ORAL 2 TIMES DAILY
Qty: 180 TABLET | Refills: 3 | Status: SHIPPED | OUTPATIENT
Start: 2021-04-19 | End: 2021-05-07 | Stop reason: DRUGHIGH

## 2021-04-19 RX ORDER — POTASSIUM CHLORIDE 750 MG/1
10 TABLET, EXTENDED RELEASE ORAL 2 TIMES DAILY
Qty: 60 TABLET | Refills: 3 | Status: SHIPPED | OUTPATIENT
Start: 2021-04-19 | End: 2021-05-07 | Stop reason: DRUGHIGH

## 2021-04-19 RX ORDER — FUROSEMIDE 20 MG
20 TABLET ORAL 2 TIMES DAILY PRN
Qty: 180 TABLET | Refills: 3 | Status: SHIPPED | OUTPATIENT
Start: 2021-04-19 | End: 2021-04-19

## 2021-04-19 RX ORDER — POTASSIUM CHLORIDE 750 MG/1
10 TABLET, EXTENDED RELEASE ORAL 2 TIMES DAILY
Qty: 60 TABLET | Refills: 3 | Status: SHIPPED | OUTPATIENT
Start: 2021-04-19 | End: 2021-04-19

## 2021-04-19 RX ORDER — FUROSEMIDE 20 MG
20 TABLET ORAL 2 TIMES DAILY
Qty: 180 TABLET | Refills: 3 | Status: SHIPPED | OUTPATIENT
Start: 2021-04-19 | End: 2021-04-19

## 2021-04-19 ASSESSMENT — MIFFLIN-ST. JEOR: SCORE: 2045.81

## 2021-04-19 NOTE — PROGRESS NOTES
Columba Berry is a 75 year old who presents for the following health issues    HPI     ED/UC Followup:    Facility:  [facility] Emergency Dept  Date of visit: 04/10/2021  Reason for visit: Acute on chronic right-sided congestive heart failure (H)   Current Status: not well controlled          Chief Complaint:       Follow up on multiple concerns including CHF      HPI:   Patient Jt Montgomery is a very pleasant 74 year old male with history of CHF, Type 2 Diabetes, hypertension, hyperlipidemia who presents to Internal Medicine clinic today for follow up on multiple concerns including recent ER visit for CHF exacerbation. Regarding his current CHF exacerbation symptoms, he has gained about 25 lbs of water weight over 5 months. no fever or chills symptoms at this time. His chronic BPH symptoms are improved with current Flomax and finasteride medication therapy. Regarding his chronic Type 2 diabetes mellitus the patient is compliant with his diabetes medication therapy including long-term current use of insulin therapy. He denies any recent hypoglycemia events. He reports that he is scheduled for follow up with outside ardiology clinic on 4/28/2021.       Current Medications:     Current Outpatient Medications   Medication Sig Dispense Refill     aspirin 81 MG EC tablet Take 81 mg by mouth daily       blood glucose (NO BRAND SPECIFIED) lancets standard Use to test blood sugar 1 times daily or as directed.  Dispense per insurance covered brand 100 each 0     blood glucose (NO BRAND SPECIFIED) test strip Use to test blood sugar 1 time daily or as directed. To accompany: Blood Glucose Monitor Brands: per insurance. 100 strip 6     blood glucose monitoring (NO BRAND SPECIFIED) meter device kit Use to test blood sugar 1 time daily or as directed. Preferred blood glucose meter OR supplies to accompany: Blood Glucose Monitor Brands: per insurance. 1 kit 0     Capsicum, Cayenne, (CAYENNE PEPPER PO)  Take by mouth as needed       Coenzyme Q10 (COQ10) 100 MG CAPS Take 200 mg by mouth daily        digoxin (LANOXIN) 125 MCG tablet TAKE 1 TABLET(125 MCG) BY MOUTH DAILY 90 tablet 1     diltiazem ER COATED BEADS (CARTIA XT) 300 MG 24 hr capsule Take 1 capsule (300 mg) by mouth daily 90 capsule 3     febuxostat (ULORIC) 40 MG TABS Take 40 mg by mouth daily       finasteride (PROSCAR) 5 MG tablet Take 1 tablet (5 mg) by mouth daily 90 tablet 2     furosemide (LASIX) 20 MG tablet Take 1 tablet (20 mg) by mouth 2 times daily 180 tablet 3     HYDROcodone-acetaminophen (NORCO) 5-325 MG tablet Take 0.5-1 tablets by mouth every 6 hours as needed for moderate to severe pain 8 tablet 0     insulin glargine (LANTUS SOLOSTAR) 100 UNIT/ML pen Inject 22 Units Subcutaneous At Bedtime 15 mL 1     insulin pen needle (BD GERMÁN U/F) 32G X 4 MM miscellaneous USE FOUR TIMES DAILY AS DIRECTED 200 each 1     irbesartan (AVAPRO) 300 MG tablet Take 0.5 tablets (150 mg) by mouth At Bedtime 45 tablet 1     Lactobacillus (PROBIOTIC ACIDOPHILUS PO) Take 1 capsule by mouth daily       magnesium oxide 200 MG TABS Take 1-2 tablets by mouth daily        melatonin 1 MG TABS tablet Take 4 mg by mouth nightly as needed for sleep        metoprolol succinate ER (TOPROL-XL) 50 MG 24 hr tablet TAKE 1 TABLET BY MOUTH TWICE DAILY 180 tablet 3     Multiple Vitamins-Minerals (MULTIVITAMIN ADULT PO) Take 1 tablet by mouth daily       order for DME Equipment being ordered: Compression stockings 1 each 3     potassium chloride ER (KLOR-CON M) 10 MEQ CR tablet Take 1 tablet (10 mEq) by mouth 2 times daily 60 tablet 3     predniSONE (DELTASONE) 20 MG tablet TAKE 1 TABLET(20 MG) BY MOUTH DAILY AS NEEDED GOUT FLARE 10 tablet 0     STATIN NOT PRESCRIBED (INTENTIONAL) Statin not prescribed intentionally due to Other patient declines (This option does not exclude patient from measure)       tamsulosin (FLOMAX) 0.4 MG capsule Take 1 capsule (0.4 mg) by mouth every evening  90 capsule 3     thin (NO BRAND SPECIFIED) lancets Use with lanceting device. To accompany: Blood Glucose Monitor Brands: per insurance. 100 each 6     triamcinolone (KENALOG) 0.1 % external cream Apply topically 2 times daily as needed (rash) 30 g 3     UNABLE TO FIND MEDICATION NAME: Super beet powder daily       UNABLE TO FIND MEDICATION NAME: CarotoMax with lutein - takes sporadically       valACYclovir (VALTREX) 1000 mg tablet Take 1 tablet (1,000 mg) by mouth 3 times daily Quantity correction per V.O. (TID/#21) 21 tablet 1     vitamin B complex with vitamin C (VITAMIN  B COMPLEX) tablet Take 1 tablet by mouth daily       vitamin C (ASCORBIC ACID) 1000 MG TABS Take 2,000-4,000 mg by mouth daily       Vitamin D, Cholecalciferol, 25 MCG (1000 UT) CAPS Take 3,000 Units by mouth daily       warfarin ANTICOAGULANT (COUMADIN) 5 MG tablet Take 1 1/2 tabs on Sundays, Tuesdays and Thursdays and 1 tab on all other days or as directed by INR clinic 115 tablet 1     Zinc 15 MG CAPS Take 15 mg by mouth daily           Allergies:      Allergies   Allergen Reactions     No Known Allergies             Past Medical History:     Past Medical History:   Diagnosis Date     Atrial fibrillation (H)      CAD (coronary artery disease)     MI with RONEL to dRCA April 2011     Central Sleep Apnea with Pat Villanueva breathing 9/14/2010    Also TOMMY with CPAP     CKD (chronic kidney disease) stage 3, GFR 30-59 ml/min      Dilated cardiomyopathy (H)     nonischemic (possibly related to h/o a.fib with RVR) EF 30-40% March 2013     DM2 (diabetes mellitus, type 2) (H)     Goal HgbA1c < 7%     Gout     uric acid level correlates; April 2014 h/o +knee aspirate     Hernia, abdominal      Hypertension     Goal <140/90     Pulmonary hypertension (H)     mild per echo 3/2013     Spider veins          Past Surgical History:     Past Surgical History:   Procedure Laterality Date     CORONARY ANGIOGRAPHY ADULT ORDER  2011    distal circ-RONEL      CYSTOSCOPY, TRANSURETHRAL RESECTION (TUR) PROSTATE, COMBINED N/A 5/15/2020    Procedure: CYSTOSCOPY, WITH TRANSURETHRAL RESECTION PROSTATE;  Surgeon: Tr Bowles MD;  Location: [facility] OR     HERNIA REPAIR  11/20/10    incarcinated     SUSPEND HYOID, GENIOGLOSSAL ADVANCEMENT           Family Medical History:     Family History   Problem Relation Age of Onset     Hypertension Mother      Coronary Artery Disease Mother      Coronary Artery Disease Father      Hypertension Father      Diabetes Sister      Cerebrovascular Disease Sister          Social History:     Social History     Socioeconomic History     Marital status:      Spouse name: Not on file     Number of children: Not on file     Years of education: Not on file     Highest education level: Not on file   Occupational History     Not on file   Social Needs     Financial resource strain: Not on file     Food insecurity     Worry: Not on file     Inability: Not on file     Transportation needs     Medical: Not on file     Non-medical: Not on file   Tobacco Use     Smoking status: Former Smoker     Packs/day: 1.50     Years: 7.00     Pack years: 10.50     Types: Cigarettes     Quit date: 1972     Years since quittin.3     Smokeless tobacco: Never Used     Tobacco comment: quit in       Started at around age 18-19   Substance and Sexual Activity     Alcohol use: No     Alcohol/week: 0.0 standard drinks     Comment: 74   recovering     Drug use: No     Sexual activity: Not Currently     Partners: Female   Lifestyle     Physical activity     Days per week: Not on file     Minutes per session: Not on file     Stress: Not on file   Relationships     Social connections     Talks on phone: Not on file     Gets together: Not on file     Attends Jew service: Not on file     Active member of club or organization: Not on file     Attends meetings of clubs or organizations: Not on file     Relationship status: Not on file     Intimate  "partner violence     Fear of current or ex partner: Not on file     Emotionally abused: Not on file     Physically abused: Not on file     Forced sexual activity: Not on file   Other Topics Concern      Service Not Asked     Blood Transfusions Not Asked     Caffeine Concern No     Occupational Exposure Not Asked     Hobby Hazards Not Asked     Sleep Concern Yes     Comment: sleep apnea, wears bi-pap     Stress Concern Not Asked     Weight Concern Not Asked     Special Diet No     Comment: low carb diet     Back Care Not Asked     Exercise No     Comment: walking     Bike Helmet Not Asked     Seat Belt Yes     Self-Exams Not Asked     Parent/sibling w/ CABG, MI or angioplasty before 65F 55M? Not Asked   Social History Narrative     Not on file           Review of System:     Constitutional: Negative for fever or chills, positive for 25 lbs of water weight gains over 5 months  Skin: positive for improvement in recent shingles rashes  Ears/Nose/Throat: Negative for nasal congestion, sore throat  Respiratory: positive for dyspnea on exertion  Cardiovascular: Negative for chest pain, positive for CHF exacerbation due to dilated cardiomyopathy  Gastrointestinal: Negative for nausea, vomiting  Genitourinary: Negative for dysuria, hematuria, positive for chronic BPH with urinary retention  Musculoskeletal: Negative for myalgias  Neurologic: Negative for headaches  Psychiatric: Negative for depression, anxiety  Hematologic/Lymphatic/Immunologic: positive for worsening chronic bilateral leg edema  Endocrine: Negative for recent hypoglycemia events  Behavioral: Negative for tobacco use, positive for chronic alcoholism in remission      Physical Exam:   BP (!) 147/74 (BP Location: Right arm, Patient Position: Sitting, Cuff Size: Adult Large)   Pulse 67   Temp 96.8  F (36  C) (Temporal)   Ht 1.854 m (6' 1\")   Wt 125.2 kg (276 lb)   SpO2 96%   BMI 36.41 kg/m      GENERAL: chronically ill appearing older male, alert " and no acute distress  EYES: eyes grossly normal to inspection, and conjunctivae and sclerae normal  HENT: Normocephalic atraumatic. Nose and mouth without ulcers or lesions  NECK: supple  RESP: dyspnea on exertion symptoms noted  CV: regular rate and rhythm, normal S1 S2  LYMPH: 2+ chronic bilateral lower legs peripheral edema symptoms present  ABDOMEN: nondistended  MS: no gross musculoskeletal defects noted  SKIN: improvement noted in recent erythematous rashes of the right lateral abdominal wall concerning for shingles  NEURO: Alert & Oriented x 3.   PSYCH: mentation appears normal, affect normal        Diagnostic Test Results:     Diagnostic Test Results:    Lab Results   Component Value Date    WBC 5.7 04/10/2021     Lab Results   Component Value Date    RBC 4.23 04/10/2021     Lab Results   Component Value Date    HGB 13.4 04/10/2021     Lab Results   Component Value Date    HCT 40.7 04/10/2021     Lab Results   Component Value Date    MCV 96 04/10/2021     Lab Results   Component Value Date    MCH 31.7 04/10/2021     Lab Results   Component Value Date    MCHC 32.9 04/10/2021     Lab Results   Component Value Date    RDW 17.2 04/10/2021     Lab Results   Component Value Date     04/10/2021     Last Comprehensive Metabolic Panel:  Sodium   Date Value Ref Range Status   04/10/2021 139 133 - 144 mmol/L Final     Potassium   Date Value Ref Range Status   04/10/2021 4.2 3.4 - 5.3 mmol/L Final     Chloride   Date Value Ref Range Status   04/10/2021 107 94 - 109 mmol/L Final     Carbon Dioxide   Date Value Ref Range Status   04/10/2021 26 20 - 32 mmol/L Final     Anion Gap   Date Value Ref Range Status   04/10/2021 6 3 - 14 mmol/L Final     Glucose   Date Value Ref Range Status   04/10/2021 123 (H) 70 - 99 mg/dL Final     Urea Nitrogen   Date Value Ref Range Status   04/10/2021 22 7 - 30 mg/dL Final     Creatinine   Date Value Ref Range Status   04/10/2021 1.30 (H) 0.66 - 1.25 mg/dL Final     GFR Estimate    Date Value Ref Range Status   04/10/2021 54 (L) >60 mL/min/[1.73_m2] Final     Comment:     Non  GFR Calc  Starting 12/18/2018, serum creatinine based estimated GFR (eGFR) will be   calculated using the Chronic Kidney Disease Epidemiology Collaboration   (CKD-EPI) equation.       Calcium   Date Value Ref Range Status   04/10/2021 8.8 8.5 - 10.1 mg/dL Final       Lab Results   Component Value Date    A1C 7.0 09/22/2020    A1C 6.1 02/19/2020    A1C 6.2 11/14/2019    A1C 7.6 10/08/2019    A1C 6.5 03/06/2019       XR CHEST TWO VIEWS   4/10/2021 11:21 AM      HISTORY: SOB (shortness of breath)     COMPARISON: Chest x-ray 4/12/2014.                                                                      IMPRESSION: Two views of the chest. Right lung base atelectasis is  noted. Lungs are otherwise clear. Heart appears mildly enlarged but is  unchanged. No pneumothorax. No significant pleural fluid. Posterior  vertebral body fusion abnormality is stable and likely chronic at T1.  No interval change.     LIV AYALA MD    ASSESSMENT/PLAN:   (R06.00) Dyspnea on exertion   (R60.0) Bilateral leg edema  (I42.0) Dilated cardiomyopathy, nonischemic EF 30-40% in March 2013  (I50.9) Acute on chronic congestive heart failure, unspecified heart failure type (H) (primary encounter diagnosis)  (R63.5) Abnormal weight gain  (I51.7) Cardiomegaly  Comment: acute on chronic CHF exacerbation symptoms with worsening bilateral leg edema, dyspnea on exertion, water weight gains. CXR obtained at the ER reviewed today shows enlarged heart consistent with dilated cardiomyopathy. 25 lbs of water weight gains over the past 5 months. patient angela not want to go back to the ER or hospital at this time. He wants to try outpatient diuretic therapy treatment at this time.   Plan: I have prescribed furosemide (LASIX) 20 MG tablet 2xDay, potassium         chloride ER (KLOR-CON M) 10 MEQ CR tablet 2xDay for diuretic treatment for CHF  exacerbation.       (E66.01) Morbid obesity (H)  (G47.33) TOMMY (obstructive sleep apnea)  Comment: patient is compliant with his CPAP machine therapy  Plan: continue CPAP therapy      (E11.40,  Z79.4) Type 2 diabetes mellitus with diabetic neuropathy, with long-term current use of insulin (H)  Comment: no recent hypoglycemia events  Plan: continue current diabetes medication therapy    (F10.21) Alcohol dependence in remission (H)  Comment: currently in remission  Plan: patient is advised to continue his alcohol cessation efforts going forward.       Follow Up Plan:     Patient is instructed to return to Internal Medicine clinic for follow-up visit in 1 week. Patient is also advised to go to the ER for further evaluation if he develops chest pain, worsening shortness of breath symptoms in the meantime.         Digna Jones MD  Internal Medicine  [facility]

## 2021-04-20 ENCOUNTER — PATIENT OUTREACH (OUTPATIENT)
Dept: NURSING | Facility: CLINIC | Age: 76
End: 2021-04-20
Payer: COMMERCIAL

## 2021-04-20 ASSESSMENT — ACTIVITIES OF DAILY LIVING (ADL): DEPENDENT_IADLS:: INDEPENDENT

## 2021-04-20 NOTE — PROGRESS NOTES
"Clinic Care Coordination Contact    Follow Up Progress Note      Assessment: Clinic Care Coordination Heart Failure Follow Up Assessment    Last contacted date: 21    Home scale available: Yes, weighing self every morning as previously instructed    Home scale weight this mornin lbs; patient noted his weight yesterday at home was 270 lbs so he feels the increase in diuretics by PCP are definitely working to pull fluid off    Following a low sodium diet: Yes, has been adding less salt to his foods and is starting to limit canned food items as he knows these typically contain more salt.    Heart Failure Zones sheet on refrigerator or available: No, patient does not have Heart Failure Action Plan yet; CC RN will request that PCP generate and provide this to patient.    What Heart Failure Zone currently in: Green    Any increased SOB since last contacted: No, improving    Any increased edema since last contacted: No, patient has continued using compression stockings and elevating legs when sitting/lying down and noted his edema is slowly improving.    Is your activity more limited since last contact: No, patient reported he has noticed improved activity tolerance in past day following increase of diuretics, he especially noted that going up the stairs is far less taxing today.    Have you had any chest pain since last contact: No    What number to call for YELLOW zones: either CC [phone] or PCP clinic [phone]; CC RN reviewed extensively with patient when to call CC RN/clinic and when to present to ED    Medications:   o \"How many of your current medicines changed (dose, timing, name, etc.) since last contacted ?\" 2 or more -- patient established with MT, has appointment this week  o \"Do you have questions about your medications?\" No    When is your next appointment with the CHF clinic: N/A; patient not established with [facility].  He has upcoming appointment with new Cardiologist (Dr. Orona " [facility] on 4/28/21.    When is the appointment with PCP: 4/26/21    Care Coordinator following: yes, weekly at present time    Goals addressed this encounter:   Goals Addressed                 This Visit's Progress       Patient Stated      1. Medical (pt-stated)   10%     Goal Statement: I will work to avoid ED visits or hospitalizations within the next 6 months by establishing and following a plan of care for management of my heart failure.  Date Goal set: 4/13/21  Barriers: limited education about heart failure, history of challenges connecting with cardiology provider  Strengths: motivated to improve overall health and avoid health complications  Date to Achieve By: 10/31/21  Patient expressed understanding of goal: Yes    Action steps to achieve this goal:  1. I will take my medications as prescribed and will notify my clinic of any medication questions or concerns.  2. I will monitor my weight daily and will promptly notify my clinic if I gain 2 or more lbs in a day or 5 or more lbs in a week.  3. I will limit my salt intake to 2,000 mg per day or as advised by my doctor.  4. I will promptly contact my clinic if I experience any shortness of breath, increased swelling, or other symptoms of concern.  5. I will establish care with Cardiology and any other specialty teams that are appropriate for ongoing support.  6. I will continue working with Care Coordination to identify and address any barriers to achieving my goal.        Intervention/Education provided during outreach:    CC RN reviewed and discussed with patient goal as above; patient stated understanding, questions answered to patient's satisfaction.    CARRIE RN reviewed low sodium diet using teach back method. Discussed dietary guidelines as located on pages 10-14 in  A Self- help guide for Patients with Heart Failure .  Patient stated understanding and will continue working toward low-sodium diet as discussed.    CC RN provided  encouragement to patient for efforts toward improved CHF management.  CC RN noted his upcoming Cardiology appointment with Dr. Lawton with [facility]; informed patient that he would need to be established with [facility] provided to be connected with [facility], patient stated understanding and will hold off on pursuing [facility] enrollment at this time.     Outreach Frequency: weekly    Plan:    Patient will continue following CHF plan of care as discussed and as per goal above.    Patient will promptly notify CC RN or PCP clinic of any new or worsening symptoms as reviewed.    Patient will attend upcoming appointments as scheduled.    CC RN will outreach to patient in 1 week for scheduled phone visit to get updates on patient status, assess goal progress, and offer additional support and resources as indicated.    Payam Marie, RN  Clinic Care Coordinator  [facility]  [facility] [place] [facility]  Ph: [phone]

## 2021-04-22 ENCOUNTER — VIRTUAL VISIT (OUTPATIENT)
Dept: PHARMACY | Facility: CLINIC | Age: 76
End: 2021-04-22
Payer: COMMERCIAL

## 2021-04-22 ENCOUNTER — TELEPHONE (OUTPATIENT)
Dept: FAMILY MEDICINE | Facility: CLINIC | Age: 76
End: 2021-04-22

## 2021-04-22 DIAGNOSIS — I42.0 DILATED CARDIOMYOPATHY (H): Primary | ICD-10-CM

## 2021-04-22 DIAGNOSIS — I25.10 CORONARY ARTERY DISEASE INVOLVING NATIVE CORONARY ARTERY OF NATIVE HEART WITHOUT ANGINA PECTORIS: ICD-10-CM

## 2021-04-22 DIAGNOSIS — Z79.4 TYPE 2 DIABETES MELLITUS WITH DIABETIC NEUROPATHY, WITH LONG-TERM CURRENT USE OF INSULIN (H): ICD-10-CM

## 2021-04-22 DIAGNOSIS — I10 ESSENTIAL HYPERTENSION: ICD-10-CM

## 2021-04-22 DIAGNOSIS — E11.40 TYPE 2 DIABETES MELLITUS WITH DIABETIC NEUROPATHY, WITH LONG-TERM CURRENT USE OF INSULIN (H): ICD-10-CM

## 2021-04-22 DIAGNOSIS — I48.91 ATRIAL FIBRILLATION, UNSPECIFIED TYPE (H): ICD-10-CM

## 2021-04-22 PROCEDURE — 99606 MTMS BY PHARM EST 15 MIN: CPT | Performed by: PHARMACIST

## 2021-04-22 PROCEDURE — 99607 MTMS BY PHARM ADDL 15 MIN: CPT | Performed by: PHARMACIST

## 2021-04-22 NOTE — TELEPHONE ENCOUNTER
LINETTE for primary care physician - I spoke with Jamal today and he's been taking furosemide 20mg three times daily (vs twice daily as written in your note).  He's also been taking KCl 10mEq three times daily (also vs twice daily in your note).      He is coming to see you again on Monday - I put in orders for A1c and BMP to be checked when he has his INR done that AM as well.    Nathaly Hughes, PharmD, [facility]  Medication Therapy Management Provider  Pager: [phone]

## 2021-04-22 NOTE — PROGRESS NOTES
Medication Therapy Management (MTM) Encounter    ASSESSMENT:                            Medication Adherence/Access: No issues identified    Hypertension/CAD/A. Fib/HFrEF: Improved, weight loss has been significant, edema and shortness of breath have improved.  Diuretic and potassium dosing differs from what is written in Dr. Jones's note and on Rx, will notify primary care physician.  Due for BMP.    Type 2 Diabetes: Patient is meeting A1c goal of < 8%. Self monitoring of blood glucose is at goal of fasting  mg/dL.  Due for A1c check.     PLAN:                            1.  Future orders placed for A1c and BMP to be checked along with INR next week.    Follow-up: Return in 1 week (on 4/29/2021) for at 11:30am via telephone.    SUBJECTIVE/OBJECTIVE:                          Jt Montgomery is a 75 year old male called for a follow-up visit. He was referred to me from Dr. Jones.  Today's visit is a follow-up MTM visit from 4/13/2021.     Reason for visit: Routine f/up.    Tobacco: He reports that he quit smoking about 49 years ago. His smoking use included cigarettes. He has a 10.50 pack-year smoking history. He has never used smokeless tobacco.  Alcohol: history of alcohol dependence    Medication Adherence/Access: no issues reported    Hypertension/CAD/A. Fib/HFrEF:  Currently taking ASA 81mg daily, digoxin 125mcg daily, diltiazem ER 300mg daily, furosemide 20mg three times daily (with corresponding KCl 10mEq three times daily although both this and furosemide were prescribed for twice daily), irbesartan 150mg daily, metoprolol succinate ER 50mg twice daily and warfarin as directed.  He reports shortness of breath and edema has improved and he's lost 15 pounds since increasing diuretics.  Patient does self-monitor blood pressure. Home BP monitoring in range of 110-120's systolic over 60-70's diastolic. Patient reports no current medication side effects.   Last ECHO 11/2/16 - EF 50-55%, improved from 30-40% in  2013.  Jamal has appt scheduled with cardiology at [facility] on 4/28/2021 and f/up with primary care physician on 4/26/2021.     BP Readings from Last 3 Encounters:   04/19/21 (!) 147/74   04/10/21 (!) 142/70   04/10/21 (!) 141/92      Potassium   Date Value Ref Range Status   04/10/2021 4.2 3.4 - 5.3 mmol/L Final      INR   Date Value Ref Range Status   04/10/2021 2.36 (H) 0.86 - 1.14 Final         Type 2 Diabetes:  Currently taking Lantus 22 units daily. Patient is not experiencing side effects.  Blood sugar monitoring:  Generally testing once daily (AM fasting) - OneTouch Ultra 2   Date FBG (mg/dL)   4/22 126   4/21 130   4/20 129   4/19 128   4/18 108   4/17 100   4/16 129   4/15 124   4/14 104   Symptoms of low blood sugar? None.   Symptoms of high blood sugar? None.   Eye exam: up to date  Foot exam: due  Diet/Exercise: No changes other than noted above re: eating out less and watching his diet more closely  Aspirin: Taking 81mg daily and denies side effects  Statin: No - patient declines  ACEi/ARB: Yes: irbesartan.  Urine Albumin:   Lab Results   Component Value Date    UMALCR 317.16 (H) 09/22/2020      Lab Results   Component Value Date    A1C 7.0 09/22/2020    A1C 6.1 02/19/2020    A1C 6.2 11/14/2019    A1C 7.6 10/08/2019    A1C 6.5 03/06/2019     Today's Vitals: There were no vitals taken for this visit.  ----------------    I spent 26 minutes with this patient today. All changes were made via collaborative practice agreement with Dr. Jones. A copy of the visit note was provided to the patient's primary care provider.    The patient declined a summary of these recommendations.     Nathaly Hughes, PharmD, [facility]  Medication Therapy Management Provider  Pager: [phone]     Telemedicine Visit Details  Type of service:  Telephone visit  Start Time: 11:33 AM  End Time: 11:59 AM  Originating Location (patient location): Home  Distant Location (provider location):  [facility]      Medication  Therapy Recommendations  Type 2 diabetes mellitus with diabetic neuropathy (H)    Current Medication: insulin glargine (LANTUS SOLOSTAR) 100 UNIT/ML pen   Rationale: Medication requires monitoring - Needs additional monitoring - Effectiveness   Recommendation: Order Lab - Future orders placed for A1c and BMP to be checked   Status: Accepted per CPA

## 2021-04-22 NOTE — TELEPHONE ENCOUNTER
Reason for call:  Patient reporting a symptom    Symptom or request: diuretic water loss    Duration (how long have symptoms been present): since starting diuretic on 04.19.21    Have you been treated for this before? Yes    Additional comments: Pt was told to report in after starting Lasix, and he went from 179# to 155#    Phone Number patient can be reached at:  Cell number on file:    Telephone Information:   Mobile [phone]     Best Time:  any    Can we leave a detailed message on this number:  YES    Call taken on 4/22/2021 at 9:36 AM by Romy Burton

## 2021-04-26 ENCOUNTER — ANTICOAGULATION THERAPY VISIT (OUTPATIENT)
Dept: CARDIOLOGY | Facility: CLINIC | Age: 76
End: 2021-04-26
Payer: COMMERCIAL

## 2021-04-26 ENCOUNTER — OFFICE VISIT (OUTPATIENT)
Dept: FAMILY MEDICINE | Facility: CLINIC | Age: 76
End: 2021-04-26
Payer: COMMERCIAL

## 2021-04-26 VITALS
OXYGEN SATURATION: 96 % | TEMPERATURE: 97.7 F | BODY MASS INDEX: 32.74 KG/M2 | DIASTOLIC BLOOD PRESSURE: 70 MMHG | SYSTOLIC BLOOD PRESSURE: 126 MMHG | HEART RATE: 58 BPM | HEIGHT: 73 IN | WEIGHT: 247 LBS

## 2021-04-26 DIAGNOSIS — R60.0 BILATERAL LEG EDEMA: ICD-10-CM

## 2021-04-26 DIAGNOSIS — E11.40 TYPE 2 DIABETES MELLITUS WITH DIABETIC NEUROPATHY, WITH LONG-TERM CURRENT USE OF INSULIN (H): ICD-10-CM

## 2021-04-26 DIAGNOSIS — G47.33 OSA (OBSTRUCTIVE SLEEP APNEA): ICD-10-CM

## 2021-04-26 DIAGNOSIS — I50.9 ACUTE ON CHRONIC CONGESTIVE HEART FAILURE, UNSPECIFIED HEART FAILURE TYPE (H): Primary | ICD-10-CM

## 2021-04-26 DIAGNOSIS — Z79.4 TYPE 2 DIABETES MELLITUS WITH DIABETIC NEUROPATHY, WITH LONG-TERM CURRENT USE OF INSULIN (H): ICD-10-CM

## 2021-04-26 DIAGNOSIS — R63.5 ABNORMAL WEIGHT GAIN: ICD-10-CM

## 2021-04-26 DIAGNOSIS — I48.0 PAROXYSMAL ATRIAL FIBRILLATION (H): ICD-10-CM

## 2021-04-26 DIAGNOSIS — E66.01 MORBID OBESITY (H): ICD-10-CM

## 2021-04-26 DIAGNOSIS — Z79.01 LONG TERM CURRENT USE OF ANTICOAGULANTS WITH INR GOAL OF 2.0-3.0: ICD-10-CM

## 2021-04-26 DIAGNOSIS — F10.21 ALCOHOL DEPENDENCE IN REMISSION (H): ICD-10-CM

## 2021-04-26 DIAGNOSIS — I42.0 DILATED CARDIOMYOPATHY (H): ICD-10-CM

## 2021-04-26 DIAGNOSIS — I51.7 CARDIOMEGALY: ICD-10-CM

## 2021-04-26 DIAGNOSIS — R06.09 DYSPNEA ON EXERTION: ICD-10-CM

## 2021-04-26 LAB
ANION GAP SERPL CALCULATED.3IONS-SCNC: 3 MMOL/L (ref 3–14)
BUN SERPL-MCNC: 30 MG/DL (ref 7–30)
CALCIUM SERPL-MCNC: 8.8 MG/DL (ref 8.5–10.1)
CHLORIDE SERPL-SCNC: 105 MMOL/L (ref 94–109)
CO2 SERPL-SCNC: 31 MMOL/L (ref 20–32)
CREAT SERPL-MCNC: 1.19 MG/DL (ref 0.66–1.25)
GFR SERPL CREATININE-BSD FRML MDRD: 60 ML/MIN/{1.73_M2}
GLUCOSE SERPL-MCNC: 190 MG/DL (ref 70–99)
HBA1C MFR BLD: 6.7 % (ref 0–5.6)
INR PPP: 2.72 (ref 0.86–1.14)
POTASSIUM SERPL-SCNC: 4.3 MMOL/L (ref 3.4–5.3)
SODIUM SERPL-SCNC: 139 MMOL/L (ref 133–144)

## 2021-04-26 PROCEDURE — 85610 PROTHROMBIN TIME: CPT | Performed by: INTERNAL MEDICINE

## 2021-04-26 PROCEDURE — 36415 COLL VENOUS BLD VENIPUNCTURE: CPT | Performed by: INTERNAL MEDICINE

## 2021-04-26 PROCEDURE — 83036 HEMOGLOBIN GLYCOSYLATED A1C: CPT | Performed by: INTERNAL MEDICINE

## 2021-04-26 PROCEDURE — 80048 BASIC METABOLIC PNL TOTAL CA: CPT | Performed by: INTERNAL MEDICINE

## 2021-04-26 PROCEDURE — 99207 PR NO CHARGE NURSE ONLY: CPT | Performed by: INTERNAL MEDICINE

## 2021-04-26 PROCEDURE — 99214 OFFICE O/P EST MOD 30 MIN: CPT | Performed by: INTERNAL MEDICINE

## 2021-04-26 ASSESSMENT — MIFFLIN-ST. JEOR: SCORE: 1914.26

## 2021-04-26 NOTE — PROGRESS NOTES
Columba Berry is a 75 year old who presents for the following health issues    HPI        Chief Complaint:       1 week follow up on multiple concerns including CHF, Type 2 Diabetes       HPI:   Patient Jt Montgomery is a very pleasant 75 year old male with history of CHF, Type 2 Diabetes, hypertension, hyperlipidemia who presents to Internal Medicine clinic today for 1 week follow up on multiple concerns including recent CHF exacerbation. Regarding his current CHF exacerbation symptoms, he has gained about 25 lbs of water weight over 5 months. no fever or chills symptoms at this time. His chronic BPH symptoms are improved with current Flomax and finasteride medication therapy. Regarding his chronic Type 2 diabetes mellitus the patient is compliant with his diabetes medication therapy including long-term current use of insulin therapy. He denies any recent hypoglycemia events. He reports that he is scheduled for follow up with outside cardiology clinic on 4/28/2021 at [facility].       Current Medications:     Current Outpatient Medications   Medication Sig Dispense Refill     aspirin 81 MG EC tablet Take 81 mg by mouth daily       blood glucose (NO BRAND SPECIFIED) lancets standard Use to test blood sugar 1 times daily or as directed.  Dispense per insurance covered brand 100 each 0     blood glucose (NO BRAND SPECIFIED) test strip Use to test blood sugar 1 time daily or as directed. To accompany: Blood Glucose Monitor Brands: per insurance. 100 strip 6     blood glucose monitoring (NO BRAND SPECIFIED) meter device kit Use to test blood sugar 1 time daily or as directed. Preferred blood glucose meter OR supplies to accompany: Blood Glucose Monitor Brands: per insurance. 1 kit 0     Capsicum, Cayenne, (CAYENNE PEPPER PO) Take by mouth as needed       Coenzyme Q10 (COQ10) 100 MG CAPS Take 200 mg by mouth daily        digoxin (LANOXIN) 125 MCG tablet TAKE 1 TABLET(125 MCG) BY MOUTH DAILY 90 tablet 1      diltiazem ER COATED BEADS (CARTIA XT) 300 MG 24 hr capsule Take 1 capsule (300 mg) by mouth daily 90 capsule 3     febuxostat (ULORIC) 40 MG TABS Take 40 mg by mouth daily       finasteride (PROSCAR) 5 MG tablet Take 1 tablet (5 mg) by mouth daily 90 tablet 2     furosemide (LASIX) 20 MG tablet Take 1 tablet (20 mg) by mouth 2 times daily (Patient taking differently: Take 20 mg by mouth 3 times daily ) 180 tablet 3     HYDROcodone-acetaminophen (NORCO) 5-325 MG tablet Take 0.5-1 tablets by mouth every 6 hours as needed for moderate to severe pain 8 tablet 0     insulin glargine (LANTUS SOLOSTAR) 100 UNIT/ML pen Inject 22 Units Subcutaneous At Bedtime 15 mL 1     insulin pen needle (BD GERMÁN U/F) 32G X 4 MM miscellaneous USE FOUR TIMES DAILY AS DIRECTED 200 each 1     irbesartan (AVAPRO) 300 MG tablet Take 0.5 tablets (150 mg) by mouth At Bedtime 45 tablet 1     Lactobacillus (PROBIOTIC ACIDOPHILUS PO) Take 1 capsule by mouth daily       magnesium oxide 200 MG TABS Take 1-2 tablets by mouth daily        melatonin 1 MG TABS tablet Take 4 mg by mouth nightly as needed for sleep        metoprolol succinate ER (TOPROL-XL) 50 MG 24 hr tablet TAKE 1 TABLET BY MOUTH TWICE DAILY 180 tablet 3     Multiple Vitamins-Minerals (MULTIVITAMIN ADULT PO) Take 1 tablet by mouth daily       order for DME Equipment being ordered: Compression stockings 1 each 3     potassium chloride ER (KLOR-CON M) 10 MEQ CR tablet Take 1 tablet (10 mEq) by mouth 2 times daily (Patient taking differently: Take 10 mEq by mouth 3 times daily ) 60 tablet 3     predniSONE (DELTASONE) 20 MG tablet TAKE 1 TABLET(20 MG) BY MOUTH DAILY AS NEEDED GOUT FLARE 10 tablet 0     STATIN NOT PRESCRIBED (INTENTIONAL) Statin not prescribed intentionally due to Other patient declines (This option does not exclude patient from measure)       tamsulosin (FLOMAX) 0.4 MG capsule Take 1 capsule (0.4 mg) by mouth every evening 90 capsule 3     thin (NO BRAND SPECIFIED) lancets  Use with lanceting device. To accompany: Blood Glucose Monitor Brands: per insurance. 100 each 6     triamcinolone (KENALOG) 0.1 % external cream Apply topically 2 times daily as needed (rash) 30 g 3     UNABLE TO FIND MEDICATION NAME: Super beet powder daily       UNABLE TO FIND MEDICATION NAME: CarotoMax with lutein - takes sporadically       valACYclovir (VALTREX) 1000 mg tablet Take 1 tablet (1,000 mg) by mouth 3 times daily Quantity correction per V.O. (TID/#21) 21 tablet 1     vitamin B complex with vitamin C (VITAMIN  B COMPLEX) tablet Take 1 tablet by mouth daily       vitamin C (ASCORBIC ACID) 1000 MG TABS Take 2,000-4,000 mg by mouth daily       Vitamin D, Cholecalciferol, 25 MCG (1000 UT) CAPS Take 3,000 Units by mouth daily       warfarin ANTICOAGULANT (COUMADIN) 5 MG tablet Take 1 1/2 tabs on Sundays, Tuesdays and Thursdays and 1 tab on all other days or as directed by INR clinic 115 tablet 1     Zinc 15 MG CAPS Take 15 mg by mouth daily           Allergies:      Allergies   Allergen Reactions     No Known Allergies             Past Medical History:     Past Medical History:   Diagnosis Date     Atrial fibrillation (H)      CAD (coronary artery disease)     MI with RONEL to dRCA April 2011     Central Sleep Apnea with Pat Villanueva breathing 9/14/2010    Also TOMMY with CPAP     CKD (chronic kidney disease) stage 3, GFR 30-59 ml/min      Dilated cardiomyopathy (H)     nonischemic (possibly related to h/o a.fib with RVR) EF 30-40% March 2013     DM2 (diabetes mellitus, type 2) (H)     Goal HgbA1c < 7%     Gout     uric acid level correlates; April 2014 h/o +knee aspirate     Hernia, abdominal      Hypertension     Goal <140/90     Pulmonary hypertension (H)     mild per echo 3/2013     Spider veins          Past Surgical History:     Past Surgical History:   Procedure Laterality Date     CORONARY ANGIOGRAPHY ADULT ORDER  2011    distal circ-RONEL     CYSTOSCOPY, TRANSURETHRAL RESECTION (TUR) PROSTATE,  COMBINED N/A 5/15/2020    Procedure: CYSTOSCOPY, WITH TRANSURETHRAL RESECTION PROSTATE;  Surgeon: Tr Bowles MD;  Location: [facility] OR     HERNIA REPAIR  11/20/10    incarcinated     SUSPEND HYOID, GENIOGLOSSAL ADVANCEMENT           Family Medical History:     Family History   Problem Relation Age of Onset     Hypertension Mother      Coronary Artery Disease Mother      Coronary Artery Disease Father      Hypertension Father      Diabetes Sister      Cerebrovascular Disease Sister          Social History:     Social History     Socioeconomic History     Marital status:      Spouse name: Not on file     Number of children: Not on file     Years of education: Not on file     Highest education level: Not on file   Occupational History     Not on file   Social Needs     Financial resource strain: Not on file     Food insecurity     Worry: Not on file     Inability: Not on file     Transportation needs     Medical: Not on file     Non-medical: Not on file   Tobacco Use     Smoking status: Former Smoker     Packs/day: 1.50     Years: 7.00     Pack years: 10.50     Types: Cigarettes     Quit date: 1972     Years since quittin.3     Smokeless tobacco: Never Used     Tobacco comment: quit in       Started at around age 18-19   Substance and Sexual Activity     Alcohol use: No     Alcohol/week: 0.0 standard drinks     Comment: 74   recovering     Drug use: No     Sexual activity: Not Currently     Partners: Female   Lifestyle     Physical activity     Days per week: Not on file     Minutes per session: Not on file     Stress: Not on file   Relationships     Social connections     Talks on phone: Not on file     Gets together: Not on file     Attends Synagogue service: Not on file     Active member of club or organization: Not on file     Attends meetings of clubs or organizations: Not on file     Relationship status: Not on file     Intimate partner violence     Fear of current or ex partner: Not  "on file     Emotionally abused: Not on file     Physically abused: Not on file     Forced sexual activity: Not on file   Other Topics Concern      Service Not Asked     Blood Transfusions Not Asked     Caffeine Concern No     Occupational Exposure Not Asked     Hobby Hazards Not Asked     Sleep Concern Yes     Comment: sleep apnea, wears bi-pap     Stress Concern Not Asked     Weight Concern Not Asked     Special Diet No     Comment: low carb diet     Back Care Not Asked     Exercise No     Comment: walking     Bike Helmet Not Asked     Seat Belt Yes     Self-Exams Not Asked     Parent/sibling w/ CABG, MI or angioplasty before 65F 55M? Not Asked   Social History Narrative     Not on file           Review of System:     Constitutional: Negative for fever or chills, positive for 25 lbs of water weight gains over 5 months  Skin: positive for improvement in recent shingles rashes  Ears/Nose/Throat: Negative for nasal congestion, sore throat  Respiratory: positive for dyspnea on exertion  Cardiovascular: Negative for chest pain, positive for CHF exacerbation due to dilated cardiomyopathy  Gastrointestinal: Negative for nausea, vomiting  Genitourinary: Negative for dysuria, hematuria, positive for chronic BPH with urinary retention  Musculoskeletal: Negative for myalgias  Neurologic: Negative for headaches  Psychiatric: Negative for depression, anxiety  Hematologic/Lymphatic/Immunologic: positive for worsening chronic bilateral leg edema  Endocrine: Negative for recent hypoglycemia events  Behavioral: Negative for tobacco use, positive for chronic alcoholism in remission      Physical Exam:   /70 (BP Location: Right arm, Patient Position: Sitting, Cuff Size: Adult Regular)   Pulse 58   Temp 97.7  F (36.5  C) (Temporal)   Ht 1.854 m (6' 1\")   Wt 112 kg (247 lb)   SpO2 96%   BMI 32.59 kg/m      GENERAL: chronically ill appearing older male, alert and no acute distress  EYES: eyes grossly normal to " inspection, and conjunctivae and sclerae normal  HENT: Normocephalic atraumatic. Nose and mouth without ulcers or lesions  NECK: supple  RESP: dyspnea on exertion symptoms noted  CV: regular rate and rhythm, normal S1 S2  LYMPH: 2+ chronic bilateral lower legs peripheral edema symptoms present  ABDOMEN: nondistended  MS: no gross musculoskeletal defects noted  SKIN: improvement noted in recent erythematous rashes of the right lateral abdominal wall concerning for shingles  NEURO: Alert & Oriented x 3.   PSYCH: mentation appears normal, affect normal        Diagnostic Test Results:     Diagnostic Test Results:    Lab Results   Component Value Date    WBC 5.7 04/10/2021     Lab Results   Component Value Date    RBC 4.23 04/10/2021     Lab Results   Component Value Date    HGB 13.4 04/10/2021     Lab Results   Component Value Date    HCT 40.7 04/10/2021     Lab Results   Component Value Date    MCV 96 04/10/2021     Lab Results   Component Value Date    MCH 31.7 04/10/2021     Lab Results   Component Value Date    MCHC 32.9 04/10/2021     Lab Results   Component Value Date    RDW 17.2 04/10/2021     Lab Results   Component Value Date     04/10/2021     Last Comprehensive Metabolic Panel:  Sodium   Date Value Ref Range Status   04/26/2021 139 133 - 144 mmol/L Final     Potassium   Date Value Ref Range Status   04/26/2021 4.3 3.4 - 5.3 mmol/L Final     Chloride   Date Value Ref Range Status   04/26/2021 105 94 - 109 mmol/L Final     Carbon Dioxide   Date Value Ref Range Status   04/26/2021 31 20 - 32 mmol/L Final     Anion Gap   Date Value Ref Range Status   04/26/2021 3 3 - 14 mmol/L Final     Glucose   Date Value Ref Range Status   04/26/2021 190 (H) 70 - 99 mg/dL Final     Urea Nitrogen   Date Value Ref Range Status   04/26/2021 30 7 - 30 mg/dL Final     Creatinine   Date Value Ref Range Status   04/26/2021 1.19 0.66 - 1.25 mg/dL Final     GFR Estimate   Date Value Ref Range Status   04/26/2021 60 (L) >60  mL/min/[1.73_m2] Final     Comment:     Non  GFR Calc  Starting 12/18/2018, serum creatinine based estimated GFR (eGFR) will be   calculated using the Chronic Kidney Disease Epidemiology Collaboration   (CKD-EPI) equation.       Calcium   Date Value Ref Range Status   04/26/2021 8.8 8.5 - 10.1 mg/dL Final       Lab Results   Component Value Date    A1C 7.0 09/22/2020    A1C 6.1 02/19/2020    A1C 6.2 11/14/2019    A1C 7.6 10/08/2019    A1C 6.5 03/06/2019       XR CHEST TWO VIEWS   4/10/2021 11:21 AM      HISTORY: SOB (shortness of breath)     COMPARISON: Chest x-ray 4/12/2014.                                                                      IMPRESSION: Two views of the chest. Right lung base atelectasis is  noted. Lungs are otherwise clear. Heart appears mildly enlarged but is  unchanged. No pneumothorax. No significant pleural fluid. Posterior  vertebral body fusion abnormality is stable and likely chronic at T1.  No interval change.     LIV AYALA MD      Last Comprehensive Metabolic Panel:  Sodium   Date Value Ref Range Status   04/26/2021 139 133 - 144 mmol/L Final     Potassium   Date Value Ref Range Status   04/26/2021 4.3 3.4 - 5.3 mmol/L Final     Chloride   Date Value Ref Range Status   04/26/2021 105 94 - 109 mmol/L Final     Carbon Dioxide   Date Value Ref Range Status   04/26/2021 31 20 - 32 mmol/L Final     Anion Gap   Date Value Ref Range Status   04/26/2021 3 3 - 14 mmol/L Final     Glucose   Date Value Ref Range Status   04/26/2021 190 (H) 70 - 99 mg/dL Final     Urea Nitrogen   Date Value Ref Range Status   04/26/2021 30 7 - 30 mg/dL Final     Creatinine   Date Value Ref Range Status   04/26/2021 1.19 0.66 - 1.25 mg/dL Final     GFR Estimate   Date Value Ref Range Status   04/26/2021 60 (L) >60 mL/min/[1.73_m2] Final     Comment:     Non  GFR Calc  Starting 12/18/2018, serum creatinine based estimated GFR (eGFR) will be   calculated using the Chronic Kidney  Disease Epidemiology Collaboration   (CKD-EPI) equation.       Calcium   Date Value Ref Range Status   04/26/2021 8.8 8.5 - 10.1 mg/dL Final         ASSESSMENT/PLAN:   (R06.00) Dyspnea on exertion   (R60.0) Bilateral leg edema  (I42.0) Dilated cardiomyopathy, nonischemic EF 30-40% in March 2013  (I50.9) Acute on chronic congestive heart failure, unspecified heart failure type (H) (primary encounter diagnosis)  (R63.5) Abnormal weight gain  (I51.7) Cardiomegaly  Comment: 1 week follow up of acute on chronic CHF exacerbation symptoms with worsening bilateral leg edema, dyspnea on exertion, water weight gains. CXR obtained at the ER reviewed today shows enlarged heart consistent with dilated cardiomyopathy. 25 lbs of water weight gains over the past 5 months. patient angela not want to go back to the ER or hospital at this time. He wants to try outpatient diuretic therapy treatment at this time. Since last clinic visit, the patient's water weight has decreased over 15 lbs with Lasix  Plan: continue furosemide (LASIX) 20 MG tablet 2xDay, potassium         chloride ER (KLOR-CON M) 10 MEQ CR tablet 2xDay for diuretic treatment for CHF exacerbation.   Check BMP lab today    (E66.01) Morbid obesity (H)  (G47.33) TOMMY (obstructive sleep apnea)  Comment: patient is compliant with his CPAP machine therapy  Plan: continue CPAP therapy      (E11.40,  Z79.4) Type 2 diabetes mellitus with diabetic neuropathy, with long-term current use of insulin (H)  Comment: no recent hypoglycemia events  Plan: continue current diabetes medication therapy  Check A1c lab today    (F10.21) Alcohol dependence in remission (H)  Comment: currently in remission  Plan: patient is advised to continue his alcohol cessation efforts going forward.       Follow Up Plan:     Patient is instructed to return to Internal Medicine clinic for follow-up visit in 1 month.       Digna Jones MD  Internal Medicine  [facility]

## 2021-04-26 NOTE — PROGRESS NOTES
ANTICOAGULATION FOLLOW-UP CLINIC VISIT    Patient Name:  Jt Montgomery  Date:  2021  Contact Type:  Telephone    SUBJECTIVE:  Patient Findings     Positives:  Emergency department visit (4/10 ER for dyspnea and wt gain), Change in medications (took increased dose of Lasix/KCL )        Clinical Outcomes     Negatives:  Major bleeding event, Thromboembolic event, Anticoagulation-related hospital admission, Anticoagulation-related ED visit, Anticoagulation-related fatality           OBJECTIVE    Recent labs: (last 7 days)     21  0916   INR 2.72*       ASSESSMENT / PLAN  INR assessment THER    Recheck INR In: 3 WEEKS    INR Location Outside lab      Anticoagulation Summary  As of 2021    INR goal:  2.0-3.0   TTR:  88.4 % (11.2 mo)   INR used for dosin.72 (2021)   Warfarin maintenance plan:  7.5 mg (5 mg x 1.5) every Sun, Tue, Thu; 5 mg (5 mg x 1) all other days   Full warfarin instructions:  7.5 mg every Sun, Tue, Thu; 5 mg all other days   Weekly warfarin total:  42.5 mg   No change documented:  Marlena Hong RN   Plan last modified:  Marlena Hong RN (2020)   Next INR check:  2021   Target end date:  Indefinite    Indications    Atrial fibrillation (AFIB) on Coumadin [I48.91]  Long term current use of anticoagulants with INR goal of 2.0-3.0 (Resolved) [Z79.01]  Paroxysmal atrial fibrillation (H) [I48.0]  Long term current use of anticoagulants with INR goal of 2.0-3.0 [Z79.01]             Anticoagulation Episode Summary     INR check location:      Preferred lab:      Send INR reminders to:  [facility] HEART INR NURSE    Comments:        Anticoagulation Care Providers     Provider Role Specialty Phone number    Ip, Genaro Garrett MD Referring Cardiovascular Disease [phone]            See the Encounter Report to view Anticoagulation Flowsheet and Dosing Calendar (Go to Encounters tab in chart review, and find the Anticoagulation Therapy Visit)    Venous INR 2.72 (done with  other PMD labs today). Pt was seen in ER on 4/10 for shortness of breath, wt gain and edema. He didn't want to stay overnight so was discharged on higher dose of Lasix. He took Lasix and KCL 3x/day with a 22# weight loss. At today's OV with PMD, Lasix was decreased to 2x/day. He is scheduled to see a cardiologist at [facility] this week. He will let us know if he is transferring cardiology care to that clinic (if so, then his INR management would need to be transferred to another clinic also). No change in diet. Denies abnormal bleeding issues. Will continue current dosing of 7.5 mg SuTuTh and 5 mg all other days with recheck in 3 weeks.    Marlena Hong RN

## 2021-04-26 NOTE — ADDENDUM NOTE
Addended by: MARIANA MORALES on: 4/26/2021 01:46 PM     Modules accepted: Orders, Level of Service, SmartSet

## 2021-04-29 ENCOUNTER — PATIENT OUTREACH (OUTPATIENT)
Dept: NURSING | Facility: CLINIC | Age: 76
End: 2021-04-29
Payer: COMMERCIAL

## 2021-04-29 ENCOUNTER — VIRTUAL VISIT (OUTPATIENT)
Dept: PHARMACY | Facility: CLINIC | Age: 76
End: 2021-04-29
Payer: COMMERCIAL

## 2021-04-29 DIAGNOSIS — I25.10 CORONARY ARTERY DISEASE INVOLVING NATIVE CORONARY ARTERY OF NATIVE HEART WITHOUT ANGINA PECTORIS: ICD-10-CM

## 2021-04-29 DIAGNOSIS — Z79.4 TYPE 2 DIABETES MELLITUS WITH DIABETIC NEUROPATHY, WITH LONG-TERM CURRENT USE OF INSULIN (H): ICD-10-CM

## 2021-04-29 DIAGNOSIS — I42.0 DILATED CARDIOMYOPATHY (H): ICD-10-CM

## 2021-04-29 DIAGNOSIS — I48.91 ATRIAL FIBRILLATION, UNSPECIFIED TYPE (H): ICD-10-CM

## 2021-04-29 DIAGNOSIS — I10 ESSENTIAL HYPERTENSION: Primary | ICD-10-CM

## 2021-04-29 DIAGNOSIS — E11.40 TYPE 2 DIABETES MELLITUS WITH DIABETIC NEUROPATHY, WITH LONG-TERM CURRENT USE OF INSULIN (H): ICD-10-CM

## 2021-04-29 PROCEDURE — 99606 MTMS BY PHARM EST 15 MIN: CPT | Performed by: PHARMACIST

## 2021-04-29 ASSESSMENT — ACTIVITIES OF DAILY LIVING (ADL): DEPENDENT_IADLS:: INDEPENDENT

## 2021-04-29 NOTE — PATIENT INSTRUCTIONS
Recommendations from today's MTM visit:                                                    MTM (medication therapy management) is a service provided by a clinical pharmacist designed to help you get the most of out of your medicines.   Today we reviewed what your medicines are for, how to know if they are working, that your medicines are safe and how to make your medicine regimen as easy as possible.      1.  Continue current medication regimen    Follow-up: Return in 2 weeks (on 5/13/2021) for f/up phone visit.    It was great to speak with you today.  I value your experience and would be very thankful for your time with providing feedback on our clinic survey. You may receive a survey via email or text message in the next few days.     To schedule another MTM appointment, please call the clinic directly or you may call the MTM scheduling line at [phone] or toll-free at [phone].     My Clinical Pharmacist's contact information:                                                      Please feel free to contact me with any questions or concerns you have.      Nathaly Hughes PharmD, [facility]  Medication Therapy Management Provider  Pager: [phone]     Liv Farley PharmD  Medication Therapy Management Resident  Pager: [phone]

## 2021-04-29 NOTE — PROGRESS NOTES
Medication Therapy Management (MTM) Encounter    ASSESSMENT:                            Medication Adherence/Access: No issues identified    Hypertension/CAD/A. Fib/HFrEF:  Plan in place.  Improved.    Type 2 Diabetes: Stable.  A1c is at goal <8% and more aggressive goal <7%.  SMBG is generally at goal as well.    PLAN:                            1.  Continue current medication regimen.    Follow-up: Return in 2 weeks (on 5/13/2021) for f/up phone visit.    SUBJECTIVE/OBJECTIVE:                          Jt Montgomery is a 75 year old male called for a follow-up visit. He was referred to me from Dr. Jones.  Today's visit is a follow-up MTM visit from 4/22/2021.     Reason for visit: Routine f/up.    Tobacco: He reports that he quit smoking about 49 years ago. His smoking use included cigarettes. He has a 10.50 pack-year smoking history. He has never used smokeless tobacco.  Alcohol: history of alcohol dependence    Medication Adherence/Access: no issues reported    Hypertension/CAD/A. Fib/HFrEF:  Patient did see Dr. Adwoa Lawton, cardiology with [facility], yesterday.  He felt this visit went well and felt like it was a good fit.  It was recommended that he discontinue aspirin 81mg and digoxin, so Jamal plans to do so when he next fills his pill boxes. ECHO was done - showing EF of 35-40%, worsened from 50-55% in 2016, but improved from 30-40% in 2013.  Her has cardiology f/up scheduled for next week.      Jamal is currently taking diltiazem ER 300mg daily, furosemide 20mg twice daily (reduced by cardiology), KCl 10mEq twice daily, irbesartan 150mg daily, metoprolol succinate ER 50mg twice daily and warfarin as directed.  He reports shortness of breath and edema has improved and he's lost 15 pounds since increasing diuretics.  Patient does self-monitor blood pressure. Home BP monitoring in range of 110-120's systolic over 60-70's diastolic. Patient reports no current medication side effects.  Renal function was  re-checked on 4/26 and remained stable.     BP Readings from Last 3 Encounters:   04/26/21 126/70   04/19/21 (!) 147/74   04/10/21 (!) 142/70      Potassium   Date Value Ref Range Status   04/26/2021 4.3 3.4 - 5.3 mmol/L Final      INR   Date Value Ref Range Status   04/26/2021 2.72 (H) 0.86 - 1.14 Final           Type 2 Diabetes:  Currently taking Lantus 22 units daily. Patient is not experiencing side effects.  Blood sugar monitoring:  Generally testing once daily (AM fasting) - OneTouch Ultra 2   Date FBG (mg/dL)   4/23 118   4/24 125   4/25 126   4/26 146 (took prednisone)   4/27 143   4/28 143   4/29 189 (splurged on treats last night)   Symptoms of low blood sugar? None.   Symptoms of high blood sugar? None.   Eye exam: up to date  Foot exam: due  Diet/Exercise: No changes  Aspirin: Taking 81mg daily and denies side effects  Statin: No - patient declines  ACEi/ARB: Yes: irbesartan.  Urine Albumin:   Lab Results   Component Value Date    UMALCR 317.16 (H) 09/22/2020      Lab Results   Component Value Date    A1C 6.7 04/26/2021    A1C 7.0 09/22/2020    A1C 6.1 02/19/2020    A1C 6.2 11/14/2019    A1C 7.6 10/08/2019     Today's Vitals: There were no vitals taken for this visit.  ----------------    I spent 15 minutes with this patient today. A copy of the visit note was provided to the patient's primary care provider.    The patient declined a summary of these recommendations.     Nathaly Hughes, PharmD, [facility]  Medication Therapy Management Provider  Pager: [phone]     Telemedicine Visit Details  Type of service:  Telephone visit  Start Time: 11:35 AM  End Time: 11:50 AM  Originating Location (patient location): Home  Distant Location (provider location):  [facility] MTM      Medication Therapy Recommendations  No medication therapy recommendations to display

## 2021-04-29 NOTE — PROGRESS NOTES
"Clinic Care Coordination Contact    Follow Up Progress Note      Assessment:    Patient updated that he had his first visit with Dr. Adwoa Lawton in Cardiology clinic with [facility] and felt the appointment went well and likes the provider.  He had labs, EKG, ECHO, and is wearing a 48-hour event monitor.  He will follow up with Dr. Orona again next week 21 to review results of testing.    Patient reported that because he liked the [facility] Cardiologist, he is considering switching PCP to [facility], as well, and has appointment with Dr. Kalen Antonio next week 21 to meet him and see if he likes him.  Patient reported feeling \"guilty\" about considering leaving his current PCP but feels it may be easier having both PCP and Cardiology in same system.  He also checked and they would have Care Coordination available to him, as well.    Patient feels his heart failure symptoms continue to improve; see CHF assessment below.    Clinic Care Coordination Heart Failure Follow Up Assessment:    Last contacted date: 21    Home scale available: Yes    Home scale weight this mornin lbs    Following a low sodium diet: Yes        Heart Failure Zones sheet on refrigerator or available: patient has still not received HF Action Plan; CC RN will again request from PCP    What Heart Failure Zone currently in: Green    Any increased SOB since last contacted: No    Any increased edema since last contacted: No, patient updated that his swelling continues improving.  He does have swelling still to his feet and some to his left leg.  Patient continues elevating legs at night/at rest and wearing compression stockings during day.    Is your activity more limited since last contact: No    Have you had any chest pain since last contact: No    What number to call for YELLOW zones: Patient is now established with [facility] Cardiology; he can call their Cardiology RN Karla at [phone] during business hours and " "[phone] after-hours; CC RN ensured patient had this information noted for reference.    Medications:   o \"How many of your current medicines changed (dose, timing, name, etc.) since last contacted ?\" 2 or more -- digoxin and ASA discontinued by Dr. Lawton with Cardiology; CC RN updated patient's medication list accordingly  o \"Do you have questions about your medications?\"No    When is your next appointment with the CHF clinic: N/A; patient does have Cardiology follow-up appointment next week 5/5/21    When is the appointment with PCP: Patient does have appointment with [facility] PCP next week 5/6/21 to \"trial\" whether or not he wants to switch his PCP to that system.    Care Coordinator following: yes    Goals addressed this encounter:   Goals Addressed                 This Visit's Progress       Patient Stated      1. Medical (pt-stated)   30%     Goal Statement: I will work to avoid ED visits or hospitalizations within the next 6 months by establishing and following a plan of care for management of my heart failure.  Date Goal set: 4/13/21  Barriers: limited education about heart failure, history of challenges connecting with cardiology provider  Strengths: motivated to improve overall health and avoid health complications  Date to Achieve By: 10/31/21  Patient expressed understanding of goal: Yes    Action steps to achieve this goal:  1. I will take my medications as prescribed and will notify my clinic of any medication questions or concerns.  2. I will monitor my weight daily and will promptly notify my clinic if I gain 2 or more lbs in a day or 5 or more lbs in a week.  3. I will limit my salt intake to 2,000 mg per day or as advised by my doctor.  4. I will promptly contact my clinic if I experience any shortness of breath, increased swelling, or other symptoms of concern.  5. I will establish care with Cardiology and any other specialty teams that are appropriate for ongoing support.  6. I will " continue working with Care Coordination to identify and address any barriers to achieving my goal.        Intervention/Education provided during outreach:    CC RN provided encouragement to patient for continued compliance with CHF plan of care.    CC RN noted patient's upcoming appointments with Cardiology and a PCP at [facility]; let him know his care team will be understanding if he needs to transition systems for continuity of care.    CC RN reviewed CHF education and action steps per Goal above; inquired if patient had any questions or concerns, he denied at at this time.     Outreach Frequency: weekly    Plan:    Patient will continue with plan of care as discussed above.    Patient will contact his Cardiology clinic with any new/worsening CHF symptoms as reviewed.    Patient agreed to contact CC RN with any additional questions, concerns, or needs.    CC RN will outreach to patient in approximately 1 week for scheduled phone visit to get updates on patient status, assess goal progress, and offer additional support and resources as indicated.    Payam Marie, RN  Clinic Care Coordinator  [facility]  [facility]  Ph: [phone]

## 2021-05-07 ENCOUNTER — PATIENT OUTREACH (OUTPATIENT)
Dept: NURSING | Facility: CLINIC | Age: 76
End: 2021-05-07
Payer: COMMERCIAL

## 2021-05-07 RX ORDER — FUROSEMIDE 20 MG
40 TABLET ORAL 2 TIMES DAILY
COMMUNITY
Start: 2021-04-30 | End: 2021-09-09

## 2021-05-07 RX ORDER — POTASSIUM CHLORIDE 750 MG/1
20 TABLET, EXTENDED RELEASE ORAL 2 TIMES DAILY
COMMUNITY
Start: 2021-05-05

## 2021-05-07 ASSESSMENT — ACTIVITIES OF DAILY LIVING (ADL): DEPENDENT_IADLS:: INDEPENDENT

## 2021-05-07 NOTE — PROGRESS NOTES
"Clinic Care Coordination Contact    Follow Up Progress Note      Assessment:    Patient updated that he had a follow-up with Dr. Lawton (Cardiology) this 21 which went well.  He was instructed to follow up with their PA in 3 months and with Dr. Lawton in 6 months.    Patient noted that he rescheduled appointment with Dr. Antonio (potential new PCP) as he was having second-thoughts.  He discussed this further with his Cardiologist and ended up rescheduling appointment with Dr. Antonio to see if he would like to transfer his primary care to him; the appointment is now on 21.    Clinic Care Coordination Heart Failure Follow Up Assessment:    Last contacted date: 21    Home scale available: Yes    Home scale weight this mornin lbs    Following a low sodium diet: Yes        Heart Failure Zones sheet on refrigerator or available: N/A    What Heart Failure Zone currently in: Green    Any increased SOB since last contacted: No    Any increased edema since last contacted: No    Is your activity more limited since last contact: No    Have you had any chest pain since last contact: No    What number to call for YELLOW zones: Cardiology RN Karla at [phone] during business hours and [phone] after-hours     Medications:   o \"How many of your current medicines changed (dose, timing, name, etc.) since last contacted ?\" 2 or more; CC RN updated patient's medication list per changes made by [facility] Cardiology provider -- patient already engaged with [facility], no referral needed  o \"Do you have questions about your medications?\"No    When is your next appointment with the CHF clinic: Next Cardiology appointment 21    When is the appointment with PCP: rescheduled previous appointment with Dr. Antonio ([facility]) to 21, he will determine at that time if he plans to transition his primary care to him    Care Coordinator following: Yes    Goals addressed this encounter:   Goals " Addressed                 This Visit's Progress       Patient Stated      1. Medical (pt-stated)   50%     Goal Statement: I will work to avoid ED visits or hospitalizations within the next 6 months by establishing and following a plan of care for management of my heart failure.  Date Goal set: 4/13/21  Barriers: limited education about heart failure, history of challenges connecting with cardiology provider  Strengths: motivated to improve overall health and avoid health complications  Date to Achieve By: 10/31/21  Patient expressed understanding of goal: Yes    Action steps to achieve this goal:  1. I will take my medications as prescribed and will notify my clinic of any medication questions or concerns.  2. I will monitor my weight daily and will promptly notify my clinic if I gain 2 or more lbs in a day or 5 or more lbs in a week.  3. I will limit my salt intake to 2,000 mg per day or as advised by my doctor.  4. I will promptly contact my clinic if I experience any shortness of breath, increased swelling, or other symptoms of concern.  5. I will establish care with Cardiology and any other specialty teams that are appropriate for ongoing support.  6. I will continue working with Care Coordination to identify and address any barriers to achieving my goal.        Intervention/Education provided during outreach:    CC RN provided encouragement to patient for continued compliance with CHF management per action steps above.    CC RN inquired if patient had any outstanding questions, concerns, or needs at this time; he denied any.     Outreach Frequency: weekly    Plan:    Patient will continue with plan of care as discussed above.    Patient will contact his Cardiology clinic with any new/worsening CHF symptoms as reviewed.    Patient agreed to contact CC RN with any additional questions, concerns, or needs.    CC RN will outreach to patient in approximately 1 week for scheduled phone visit to get updates on  patient status, assess goal progress, and offer additional support and resources as indicated.    Payam Marie, RN  Clinic Care Coordinator  [facility]  [facility] [place] [place] [facility]  Ph: [phone]

## 2021-05-13 ENCOUNTER — VIRTUAL VISIT (OUTPATIENT)
Dept: PHARMACY | Facility: CLINIC | Age: 76
End: 2021-05-13
Payer: COMMERCIAL

## 2021-05-13 ENCOUNTER — PATIENT OUTREACH (OUTPATIENT)
Dept: NURSING | Facility: CLINIC | Age: 76
End: 2021-05-13
Payer: COMMERCIAL

## 2021-05-13 DIAGNOSIS — Z79.4 TYPE 2 DIABETES MELLITUS WITH DIABETIC NEUROPATHY, WITH LONG-TERM CURRENT USE OF INSULIN (H): ICD-10-CM

## 2021-05-13 DIAGNOSIS — E11.40 TYPE 2 DIABETES MELLITUS WITH DIABETIC NEUROPATHY, WITH LONG-TERM CURRENT USE OF INSULIN (H): ICD-10-CM

## 2021-05-13 DIAGNOSIS — I10 ESSENTIAL HYPERTENSION: Primary | ICD-10-CM

## 2021-05-13 DIAGNOSIS — I42.0 DILATED CARDIOMYOPATHY (H): ICD-10-CM

## 2021-05-13 DIAGNOSIS — I48.91 ATRIAL FIBRILLATION, UNSPECIFIED TYPE (H): ICD-10-CM

## 2021-05-13 DIAGNOSIS — I25.10 CORONARY ARTERY DISEASE INVOLVING NATIVE CORONARY ARTERY OF NATIVE HEART WITHOUT ANGINA PECTORIS: ICD-10-CM

## 2021-05-13 PROCEDURE — 99606 MTMS BY PHARM EST 15 MIN: CPT | Performed by: PHARMACIST

## 2021-05-13 ASSESSMENT — ACTIVITIES OF DAILY LIVING (ADL): DEPENDENT_IADLS:: INDEPENDENT

## 2021-05-13 NOTE — PROGRESS NOTES
"Clinic Care Coordination Contact    Follow Up Progress Note    Assessment:    Patient reported he continues to do well since last CC RN outreach.  His BPs have been stable; most recent readings 118/71 and 124/95.  His weight has remained stable and he feels overall his symptom course continues to improve.    Patient continues working on weight loss by way of monitoring his diet and trying to be more active.  He has been using his Rebounder trampoline for some physical activity.    Clinic Care Coordination Heart Failure Follow Up Assessment:    Last contacted date: 21    Home scale available: Yes    Home scale weight this mornin lbs    Following a low sodium diet: Yes        Heart Failure Zones sheet on refrigerator or available: N/A    What Heart Failure Zone currently in: Green    Any increased SOB since last contacted: No    Any increased edema since last contacted: No, continues wearing compression stockings during day, leg elevation at night    Is your activity more limited since last contact: No    Have you had any chest pain since last contact: No    What number to call for YELLOW zones: Cardiology RN Karla at [phone] during business hours and [phone] after-hours     Medications:   o \"How many of your current medicines changed (dose, timing, name, etc.) since last contacted ?\" 0 - 1  o \"Do you have questions about your medications?\"No    When is your next appointment with the CHF clinic: Cardiology follow-up appointment 21; waiting on callback with updates re: results from ZioPatch monitor    When is the appointment with PCP: potential new PCP, has appointment with Dr. Antonio with [facility] 21    Care Coordinator following: yes    Goals addressed this encounter:   Goals Addressed                 This Visit's Progress       Patient Stated      1. Medical (pt-stated)   60%     Goal Statement: I will work to avoid ED visits or hospitalizations within the next 6 months by " establishing and following a plan of care for management of my heart failure.  Date Goal set: 4/13/21  Barriers: limited education about heart failure, history of challenges connecting with cardiology provider  Strengths: motivated to improve overall health and avoid health complications  Date to Achieve By: 10/31/21  Patient expressed understanding of goal: Yes    Action steps to achieve this goal:  1. I will take my medications as prescribed and will notify my clinic of any medication questions or concerns.  2. I will monitor my weight daily and will promptly notify my clinic if I gain 2 or more lbs in a day or 5 or more lbs in a week.  3. I will limit my salt intake to 2,000 mg per day or as advised by my doctor.  4. I will promptly contact my clinic if I experience any shortness of breath, increased swelling, or other symptoms of concern.  5. I will establish care with Cardiology and any other specialty teams that are appropriate for ongoing support.  6. I will continue working with Care Coordination to identify and address any barriers to achieving my goal.        Intervention/Education provided during outreach:    CC RN gave patient kudos for continued compliance with CHF management per action steps above.  CC RN reiterated CHF education to patient.    CC RN reviewed patient's upcoming appointment with potential new PCP Dr. Antonio with [facility]; encouraged him to also inquire about availability for him to establish with a CC, as well; he agreed to do so.    CC RN inquired with patient about any additional questions, concerns, or needs he has at this time, he denied any at present.    Outreach Frequency: 2 weeks    Plan:    Patient will continue with plan of care as discussed above.    Patient will attend upcoming appointments as scheduled.    Patient agreed to contact CC RN/care team with additional questions, concerns, or needs.    CC RN will outreach to patient in approximately 2 weeks to get updates  on patient status, assess goal progress, and offer additional support and resources as indicated.    Payam Marie, RN  Clinic Care Coordinator  [facility]  [facility]  Ph: [phone]

## 2021-05-13 NOTE — PROGRESS NOTES
Medication Therapy Management (MTM) Encounter    ASSESSMENT:                            Medication Adherence/Access: No issues identified    Hypertension/CAD/A. Fib/HFrEF:  Plan in place.  Improved.    Type 2 Diabetes: Stable.  A1c is at goal <8% and more aggressive goal <7%.  Blood sugars are running higher this week, unsure why.  Will continue to monitor closely.    PLAN:                            1.  Continue current medication regimen.    Follow-up: Return in 1 week (on 5/20/2021) for diabetes follow-up at 11am via phone.    SUBJECTIVE/OBJECTIVE:                          Jt Montgomery is a 75 year old male called for a follow-up visit. He was referred to me from Dr. Jones.  Today's visit is a follow-up MTM visit from 4/29/2021     Reason for visit: Routine f/up.    Tobacco: He reports that he quit smoking about 49 years ago. His smoking use included cigarettes. He has a 10.50 pack-year smoking history. He has never used smokeless tobacco.  Alcohol: history of alcohol dependence    Medication Adherence/Access: no issues reported    Hypertension/CAD/A. Fib/HFrEF:  Patient did see Dr. Adwoa Lawton again on 5/5, cardiology with [facility].  He felt this visit went well and felt like it was a good fit.  He has now stopped aspirin and digoxin. ECHO was done - showing EF of 35-40%, worsened from 50-55% in 2016, but improved from 30-40% in 2013.      Jamal is currently taking diltiazem ER 300mg daily, furosemide 40mg twice daily, KCl 20mEq twice daily, irbesartan 150mg daily, metoprolol succinate ER 50mg twice daily and warfarin as directed.  He reports shortness of breath is improved and weight continues to decrease, but not as rapidly (he's down about 3 lbs in the last week).  Patient does self-monitor blood pressure. Home BP monitoring in range of 110-120's systolic over 60-70's diastolic. Patient reports no current medication side effects.  Renal function was re-checked on 4/26 and remained stable.     BP Readings  "from Last 3 Encounters:   04/26/21 126/70   04/19/21 (!) 147/74   04/10/21 (!) 142/70      INR   Date Value Ref Range Status   04/26/2021 2.72 (H) 0.86 - 1.14 Final       Potassium   Date Value Ref Range Status   04/26/2021 4.3 3.4 - 5.3 mmol/L Final            Type 2 Diabetes:  Currently taking Lantus 22 units daily. Patient is not experiencing side effects.  Blood sugar monitoring:  Generally testing once daily (AM fasting) - OneTouch Ultra 2   Date FBG (mg/dL)   4/30 156   5/1 160   5/2 -   5/3 176   5/4 165   5/5 156   5/6 204 (had taken prednisone day before)   5/7 151   5/8 270 (also had prednisone)   5/9 173   5/10 190   5/11 166   5/12 171   5/13 173   Symptoms of low blood sugar? None.   Symptoms of high blood sugar? None.   Eye exam: up to date  Foot exam: due  Diet/Exercise: He's been eating 2 rice cakes with cashew butter at night, feels AM readings are high because of this (1 rice cake = 15g of CHO).  He says \"it feels a little unnecessary\" so he's going to try eliminating this.  Aspirin: No longer taking per cardiology direction given warfarin use  Statin: No - patient declines  ACEi/ARB: Yes: irbesartan.  Urine Albumin:   Lab Results   Component Value Date    UMALCR 317.16 (H) 09/22/2020      Lab Results   Component Value Date    A1C 6.7 04/26/2021    A1C 7.0 09/22/2020    A1C 6.1 02/19/2020    A1C 6.2 11/14/2019    A1C 7.6 10/08/2019     Today's Vitals: There were no vitals taken for this visit.  ----------------    I spent 27 minutes with this patient today. A copy of the visit note was provided to the patient's primary care provider.    The patient declined a summary of these recommendations.     Nathaly Hughes, PharmD, BCACP  Medication Therapy Management Provider  Pager: [phone]     Telemedicine Visit Details  Type of service:  Telephone visit  Start Time: 11:03 AM  End Time: 11:30 AM  Originating Location (patient location): [place]  Distant Location (provider location):  [facility]" [place]      Medication Therapy Recommendations  No medication therapy recommendations to display

## 2021-05-13 NOTE — PATIENT INSTRUCTIONS
Recommendations from today's MTM visit:                                                    MTM (medication therapy management) is a service provided by a clinical pharmacist designed to help you get the most of out of your medicines.   Today we reviewed what your medicines are for, how to know if they are working, that your medicines are safe and how to make your medicine regimen as easy as possible.      1.  Continue current medication regimen    Follow-up: Return in 1 week (on 5/20/2021) for diabetes follow-up at 11am via phone.    It was great to speak with you today.  I value your experience and would be very thankful for your time with providing feedback on our clinic survey. You may receive a survey via email or text message in the next few days.     To schedule another MTM appointment, please call the clinic directly or you may call the MTM scheduling line at [phone] or toll-free at [phone].     My Clinical Pharmacist's contact information:                                                      Please feel free to contact me with any questions or concerns you have.      Nathaly Hughes PharmD, [facility]  Medication Therapy Management Provider  Pager: [phone]     Liv Farley PharmD  Medication Therapy Management Resident  Pager: [phone]

## 2021-05-20 ENCOUNTER — ANTICOAGULATION THERAPY VISIT (OUTPATIENT)
Dept: CARDIOLOGY | Facility: CLINIC | Age: 76
End: 2021-05-20
Payer: COMMERCIAL

## 2021-05-20 ENCOUNTER — VIRTUAL VISIT (OUTPATIENT)
Dept: PHARMACY | Facility: CLINIC | Age: 76
End: 2021-05-20
Payer: COMMERCIAL

## 2021-05-20 DIAGNOSIS — Z79.01 LONG TERM CURRENT USE OF ANTICOAGULANTS WITH INR GOAL OF 2.0-3.0: ICD-10-CM

## 2021-05-20 DIAGNOSIS — I10 ESSENTIAL HYPERTENSION: Primary | ICD-10-CM

## 2021-05-20 DIAGNOSIS — I48.91 ATRIAL FIBRILLATION, UNSPECIFIED TYPE (H): ICD-10-CM

## 2021-05-20 DIAGNOSIS — I48.0 PAROXYSMAL ATRIAL FIBRILLATION (H): ICD-10-CM

## 2021-05-20 DIAGNOSIS — I25.10 CORONARY ARTERY DISEASE INVOLVING NATIVE CORONARY ARTERY OF NATIVE HEART WITHOUT ANGINA PECTORIS: ICD-10-CM

## 2021-05-20 DIAGNOSIS — Z79.4 TYPE 2 DIABETES MELLITUS WITH DIABETIC NEUROPATHY, WITH LONG-TERM CURRENT USE OF INSULIN (H): ICD-10-CM

## 2021-05-20 DIAGNOSIS — E11.40 TYPE 2 DIABETES MELLITUS WITH DIABETIC NEUROPATHY, WITH LONG-TERM CURRENT USE OF INSULIN (H): ICD-10-CM

## 2021-05-20 DIAGNOSIS — I42.0 DILATED CARDIOMYOPATHY (H): ICD-10-CM

## 2021-05-20 LAB
CAPILLARY BLOOD COLLECTION: NORMAL
INR PPP: 2.6 (ref 0.86–1.14)

## 2021-05-20 PROCEDURE — 85610 PROTHROMBIN TIME: CPT | Performed by: INTERNAL MEDICINE

## 2021-05-20 PROCEDURE — 99207 PR NO CHARGE NURSE ONLY: CPT

## 2021-05-20 PROCEDURE — 36416 COLLJ CAPILLARY BLOOD SPEC: CPT | Performed by: INTERNAL MEDICINE

## 2021-05-20 PROCEDURE — 99606 MTMS BY PHARM EST 15 MIN: CPT | Performed by: PHARMACIST

## 2021-05-20 NOTE — PROGRESS NOTES
Medication Therapy Management (MTM) Encounter    ASSESSMENT:                            Medication Adherence/Access: No issues identified    Hypertension/CAD/A. Fib/HFrEF:  Plan in place.  Improved.    Type 2 Diabetes: Stable.  A1c is at goal <8% and more aggressive goal <7%.  Blood sugars continue to run a bit high (although somewhat improved today), unclear why.  Will continue to monitor closely vs adjust insulin as in the past he's run into trouble with hypoglycemia with a slightly higher insulin dose.    PLAN:                            1.  Continue current medication regimen.    Follow-up: Return in about 2 weeks (around 6/3/2021) for Follow-up Medication Review via phone at 11am.    SUBJECTIVE/OBJECTIVE:                          Jt Montgomery is a 75 year old male called for a follow-up visit. He was referred to me from Dr. Jones.  Today's visit is a follow-up MTM visit from 5/13/2021.     Reason for visit: Routine f/up.    Tobacco: He reports that he quit smoking about 49 years ago. His smoking use included cigarettes. He has a 10.50 pack-year smoking history. He has never used smokeless tobacco.  Alcohol: history of alcohol dependence    Medication Adherence/Access: no issues reported    Hypertension/CAD/A. Fib/HFrEF:  Patient did see Dr. Adwoa Lawton again on 5/5, cardiology with [facility].  He felt this visit went well and felt like it was a good fit.  He has now stopped aspirin and digoxin. ECHO was done - showing EF of 35-40%, worsened from 50-55% in 2016, but improved from 30-40% in 2013.      Jamal is currently taking diltiazem ER 300mg daily, furosemide 40mg twice daily, KCl 20mEq twice daily, irbesartan 150mg daily, metoprolol succinate ER 50mg twice daily and warfarin as directed.  He reports shortness of breath is improved and weight continues to decrease.  Patient does self-monitor blood pressure. Home BP monitoring in range of 110-120's systolic over 60-70's diastolic. Patient reports no  current medication side effects.  Renal function was re-checked on 4/26 and remained stable.  BP Readings from Last 3 Encounters:   04/26/21 126/70   04/19/21 (!) 147/74   04/10/21 (!) 142/70      INR   Date Value Ref Range Status   04/26/2021 2.72 (H) 0.86 - 1.14 Final       Potassium   Date Value Ref Range Status   04/26/2021 4.3 3.4 - 5.3 mmol/L Final      Wt Readings from Last 4 Encounters:   04/26/21 247 lb (112 kg)   04/19/21 276 lb (125.2 kg)   04/10/21 269 lb (122 kg)   04/10/21 269 lb (122 kg)            Type 2 Diabetes:  Currently taking Lantus 22 units daily. Patient is not experiencing side effects.  Blood sugar monitoring:  Generally testing once daily (AM fasting) - OneTouch Ultra 2   Date FBG (mg/dL)   5/14 165   5/15 170   5/16 164   5/17 162   5/18 175   5/19 170   5/20 146   Symptoms of low blood sugar? None.   Symptoms of high blood sugar? None.   Eye exam: up to date  Foot exam: due  Diet/Exercise: He did stop eating rice cakes, and feels this is going fine.    Aspirin: No longer taking per cardiology direction given warfarin use  Statin: No - patient declines  ACEi/ARB: Yes: irbesartan.  Urine Albumin:   Lab Results   Component Value Date    UMALCR 317.16 (H) 09/22/2020      Lab Results   Component Value Date    A1C 6.7 04/26/2021    A1C 7.0 09/22/2020    A1C 6.1 02/19/2020    A1C 6.2 11/14/2019    A1C 7.6 10/08/2019     Today's Vitals: There were no vitals taken for this visit.  ----------------    I spent 27 minutes with this patient today. A copy of the visit note was provided to the patient's primary care provider.    The patient declined a summary of these recommendations.     Nathaly Hughes PharmD, BCACP  Medication Therapy Management Provider  Pager: [phone]     Telemedicine Visit Details  Type of service:  Telephone visit  Start Time: 11:00 AM  End Time: 11:27 AM  Originating Location (patient location): Home  Distant Location (provider location):  [facility]       Medication Therapy Recommendations  No medication therapy recommendations to display

## 2021-05-20 NOTE — PATIENT INSTRUCTIONS
Recommendations from today's MTM visit:                                                    MTM (medication therapy management) is a service provided by a clinical pharmacist designed to help you get the most of out of your medicines.   Today we reviewed what your medicines are for, how to know if they are working, that your medicines are safe and how to make your medicine regimen as easy as possible.      1.  Continue current medication regimen    Follow-up: Return in about 2 weeks (around 6/3/2021) for Follow-up Medication Review via phone at 11am.    It was great to speak with you today.  I value your experience and would be very thankful for your time with providing feedback on our clinic survey. You may receive a survey via email or text message in the next few days.     To schedule another MTM appointment, please call the clinic directly or you may call the MTM scheduling line at [phone] or toll-free at [phone].     My Clinical Pharmacist's contact information:                                                      Please feel free to contact me with any questions or concerns you have.      Nathaly Hughes PharmD, [facility]  Medication Therapy Management Provider  Pager: [phone]     Liv Farley PharmD  Medication Therapy Management Resident  Pager: [phone]

## 2021-05-20 NOTE — PROGRESS NOTES
ANTICOAGULATION FOLLOW-UP CLINIC VISIT    Patient Name:  Jt Montgomery  Date:  2021  Contact Type:  Telephone    SUBJECTIVE:         OBJECTIVE    Recent labs: (last 7 days)     21  1257   INR 2.60*       ASSESSMENT / PLAN  No question data found.  Anticoagulation Summary  As of 2021    INR goal:  2.0-3.0   TTR:  88.9 % (11.5 mo)   INR used for dosin.60 (2021)   Warfarin maintenance plan:  7.5 mg (5 mg x 1.5) every Sun, Tue, Thu; 5 mg (5 mg x 1) all other days   Full warfarin instructions:  7.5 mg every Sun, Tue, Thu; 5 mg all other days   Weekly warfarin total:  42.5 mg   No change documented:  Marlena Hong RN   Plan last modified:  Marlena Hong RN (2020)   Next INR check:  2021   Target end date:  Indefinite    Indications    Atrial fibrillation (AFIB) on Coumadin [I48.91]  Long term current use of anticoagulants with INR goal of 2.0-3.0 (Resolved) [Z79.01]  Paroxysmal atrial fibrillation (H) [I48.0]  Long term current use of anticoagulants with INR goal of 2.0-3.0 [Z79.01]             Anticoagulation Episode Summary     INR check location:      Preferred lab:      Send INR reminders to:  [facility] HEART INR NURSE    Comments:        Anticoagulation Care Providers     Provider Role Specialty Phone number    Ip, Genaro Reggie Garrett MD Referring Cardiovascular Disease [phone]            See the Encounter Report to view Anticoagulation Flowsheet and Dosing Calendar (Go to Encounters tab in chart review, and find the Anticoagulation Therapy Visit)    INR 2.60  Still taking Lasix 40 mg twice a day. No change in diet. Denies abnormal bleeding or bruising. Will continue current dosing of 7.5 mg SuTuTh and 5 mg all other days with recheck in 4 weeks. Pt has decided to transfer cardiology care to [facility] but keep his PMD in [facility]. Reviewed that the INR management would need to switch to either [facility] or PMD office. Pt prefers that INR be managed by PMD office.  Will message Pharm D for assistance in getting orders changed from [facility] to [facility]. Dosage adjustment made based on physician directed care plan.    Marlena Hong RN

## 2021-05-24 ENCOUNTER — DOCUMENTATION ONLY (OUTPATIENT)
Dept: FAMILY MEDICINE | Facility: CLINIC | Age: 76
End: 2021-05-24

## 2021-05-24 DIAGNOSIS — I48.0 PAROXYSMAL ATRIAL FIBRILLATION (H): Primary | ICD-10-CM

## 2021-05-24 NOTE — PROGRESS NOTES
Changed INR reminder location to [facility].    Karla Victor, [facility]

## 2021-05-24 NOTE — PROGRESS NOTES
ANTICOAGULATION MANAGEMENT      Jt Montgomery due for annual renewal of referral to anticoagulation monitoring. Order pended for your review and signature.      ANTICOAGULATION SUMMARY      Warfarin indication(s)     Atrial fibrillation    Heart valve present?  NO       Current goal range   INR: 2.0-3.0     Goal appropriate for indication? Yes, INR 2-3 appropriate for hx of DVT, PE, hypercoagulable state, Afib, LVAD, or bileaflet AVR without risk factors     Current duration of therapy Indefinite/long term therapy   Time in Therapeutic Range (TTR)  (Goal > 60%) 88.4 %       Office visit with referring provider's group within last year yes on 4/19/21       Indiana Burton RN

## 2021-05-28 ENCOUNTER — PATIENT OUTREACH (OUTPATIENT)
Dept: NURSING | Facility: CLINIC | Age: 76
End: 2021-05-28
Payer: COMMERCIAL

## 2021-05-28 ASSESSMENT — ACTIVITIES OF DAILY LIVING (ADL): DEPENDENT_IADLS:: INDEPENDENT

## 2021-05-28 NOTE — PROGRESS NOTES
"Clinic Care Coordination Contact    Follow Up Progress Note      Assessment:    Patient reported to be doing well since last CC RN outreach.  He did have appointment with [facility] PCP but doesn't plan to continue seeing him; he would like to stay with current [facility] PCP Dr. Jones.    Patient noted his CHF status has been stable.  He continues checking daily BP; today was 108/69 which is a typical reading for him.  His BG have been around 130.    Patient noted he plans to have a follow-up appointment with PCP Dr. Jones to provide him with updates from past month as well as to get updated prescription for warfarin as he'd like him to manage this prescription.  Patient also will schedule follow-up with Nephrology as his last visit was 2020 and was recommended to have 6-month follow-up at that time.    Clinic Care Coordination Heart Failure Follow Up Assessment:    Last contacted date: 21    Home scale available: Yes    Home scale weight this mornin lbs; patient noted his weight have been staying around 240-241 lbs for the past week or more.    Following a low sodium diet: Yes, although patient does feel he hasn't done as well over the past couple of weeks with being as strict with low-salt guidelines    Heart Failure Zones sheet on refrigerator or available: N/A    What Heart Failure Zone currently in: Green    Any increased SOB since last contacted: No    Any increased edema since last contacted: No, continues to improve.  Patient wearing compression stockings during day and continues elevated legs in bed at night.    Is your activity more limited since last contact: No    Have you had any chest pain since last contact: No    What number to call for YELLOW zones: Cardiology MARCELLA Acosta ph: [phone] during business hours and [phone] after-hours    Medications:   o \"How many of your current medicines changed (dose, timing, name, etc.) since last contacted ?\" 0 - 1  o \"Do you have questions about your " "medications?\" No    When is your next appointment with the CHF clinic: 7/13/21 with [facility]    When is the appointment with PCP: none currently scheduled but patient will call to schedule appointment for follow-up    Care Coordinator following: Yes    Goals addressed this encounter:   Goals Addressed                 This Visit's Progress       Patient Stated      1. Medical (pt-stated)   70%     Goal Statement: I will work to avoid ED visits or hospitalizations within the next 6 months by establishing and following a plan of care for management of my heart failure.  Date Goal set: 4/13/21  Barriers: limited education about heart failure, history of challenges connecting with cardiology provider  Strengths: motivated to improve overall health and avoid health complications  Date to Achieve By: 10/31/21  Patient expressed understanding of goal: Yes    Action steps to achieve this goal:  1. I will take my medications as prescribed and will notify my clinic of any medication questions or concerns.  2. I will monitor my weight daily and will promptly notify my clinic if I gain 2 or more lbs in a day or 5 or more lbs in a week.  3. I will limit my salt intake to 2,000 mg per day or as advised by my doctor.  4. I will promptly contact my clinic if I experience any shortness of breath, increased swelling, or other symptoms of concern.  5. I will establish care with Cardiology and any other specialty teams that are appropriate for ongoing support.  6. I will continue working with Care Coordination to identify and address any barriers to achieving my goal.        Intervention/Education provided during outreach:    CC RN provided encouragement to patient for ongoing compliance with CHF plan of care.  CC RN reiterated importance of compliance with low-salt recommendations; patient stated understanding.    CC RN inquired if patient had outstanding questions or concerns at this time, he denied any.   "   Outreach Frequency: monthly    Plan:    Patient will continue with plan of care as discussed above.    Patient will call to Cardiology clinic with any CHF concerns and knows to notify CC RN if any additional outstanding questions, concerns, or needs.    Patient will schedule follow-up appointments with PCP and Nephrology as discussed.    CC RN will outreach to patient in approximately 1 month to get updates on patient status, assess goal progress, and offer additional support and resources as indicated.    Payam Marie RN  Clinic Care Coordinator  [facility]  [facility]  Ph: [phone]

## 2021-05-28 NOTE — LETTER
[facility] CARE COORDINATION  [facility]  [address] [place] [address]    May 28, 2021        Jt Montgomery  [address]  [place] [address]          Dear Valerie Waters is a Heart Failure Action Plan for your reference; it includes the guidelines for monitoring your Heart Failure as we have discussed.  Please let me know if you have any questions and speak with your Cardiology team to further review additional Heart Failure guidelines and management.    Sincerely,    Payam Marie, RN  Clinic Care Coordinator  [facility]  [facility]  Ph: [phone]                My Heart Failure Action Plan   Name: Jt Montgomery    YOB: 1945   Date: 5/28/2021    My doctor: Digna Jones [address] [address] [phone]  My Diagnosis:    My Exercise Goal: 30 minutes daily  .     My Weight Plan:   Wt Readings from Last 2 Encounters:   04/26/21 112 kg (247 lb)   04/19/21 125.2 kg (276 lb)     Weigh yourself daily using the same scale. If you gain more than 2 pounds in 24 hours or 5 pounds in a week [facility]    My Diet Goal: No added salt and 2,000 mg of sodium    Emergency Room Visits:    Our goal is to improve your quality of life and help you avoid a visit to the emergency room or hospital.  If we work together, we can achieve this goal. But, if you feel you need to call 911 or go to the emergency room, please do so.  If you go to the emergency room, please bring your list of medicines and your daily weight chart with you.       GREEN ZONE     Doing well today    Weight gained is no more than 2 pounds a day or 5 pounds a week.    No swelling in feet, ankles, legs or stomach.    No more swelling than usual.    No more trouble breathing than usual.    No change in  my sleep.    No other problems. Actions:    I am doing fine.  I will take my medicine, follow my diet, see my doctor, exercise, and watch for symptoms.           YELLOW ZONE         Having a bad day or flare up    Weight gain of more than 2 pounds in one day or 5 pounds in one week.    New swelling in ankle, leg, knee or thigh.    Bloating in belly, pants feel tighter.    Swelling in hands or face.    Coughing or trouble breathing while walking or talking.    Harder to breathe last night.    Have trouble sleeping, wake up short of breath.    Much more tired than usual.    Not eating.    Pain in my chest or bad leg cramps.    Feel weak or dizzy. Actions:    I need to take action and call my doctor or nurse today.                 RED ZONE         Need medical care now    Weight gain of 5 pounds overnight.    Chest pain or pressure that does not go away.    Feel less alert.    Wheezing or have trouble breathing when at rest.    Cannot sleep lying down.    Cannot take my water pill.    Pass out or faint. Actions:    I need to call my doctor or nurse now!    Call 911 if I have chest pain or cannot breathe.

## 2021-06-04 DIAGNOSIS — Z79.01 LONG TERM CURRENT USE OF ANTICOAGULANTS WITH INR GOAL OF 2.0-3.0: ICD-10-CM

## 2021-06-04 DIAGNOSIS — I48.0 PAROXYSMAL ATRIAL FIBRILLATION (H): ICD-10-CM

## 2021-06-04 RX ORDER — WARFARIN SODIUM 5 MG/1
TABLET ORAL
Qty: 135 TABLET | Refills: 0 | Status: SHIPPED | OUTPATIENT
Start: 2021-06-04 | End: 2021-09-16

## 2021-06-04 NOTE — TELEPHONE ENCOUNTER
[facility]   Thank you,  Lucrecia LLANOS RN,BSN

## 2021-06-04 NOTE — TELEPHONE ENCOUNTER
Reason for Call:  Medication or medication refill:    Do you use a [facility]?  Name of the pharmacy and phone number for the current request:  [id number] - [facility] [address] AT [address]    Name of the medication requested: warfarin ANTICOAGULANT (COUMADIN) 5 MG tablet     Other request:     Can we leave a detailed message on this number? YES    Phone number patient can be reached at: Cell number on file:    Telephone Information:   Mobile [phone]     Best Time: any    Call taken on 6/4/2021 at 8:09 AM by Romy Burton

## 2021-06-08 ENCOUNTER — VIRTUAL VISIT (OUTPATIENT)
Dept: PHARMACY | Facility: CLINIC | Age: 76
End: 2021-06-08
Payer: COMMERCIAL

## 2021-06-08 DIAGNOSIS — I25.10 CORONARY ARTERY DISEASE INVOLVING NATIVE CORONARY ARTERY OF NATIVE HEART WITHOUT ANGINA PECTORIS: ICD-10-CM

## 2021-06-08 DIAGNOSIS — I48.91 ATRIAL FIBRILLATION, UNSPECIFIED TYPE (H): ICD-10-CM

## 2021-06-08 DIAGNOSIS — Z79.4 TYPE 2 DIABETES MELLITUS WITH DIABETIC NEUROPATHY, WITH LONG-TERM CURRENT USE OF INSULIN (H): ICD-10-CM

## 2021-06-08 DIAGNOSIS — I10 ESSENTIAL HYPERTENSION: Primary | ICD-10-CM

## 2021-06-08 DIAGNOSIS — I42.0 DILATED CARDIOMYOPATHY (H): ICD-10-CM

## 2021-06-08 DIAGNOSIS — E11.40 TYPE 2 DIABETES MELLITUS WITH DIABETIC NEUROPATHY, WITH LONG-TERM CURRENT USE OF INSULIN (H): ICD-10-CM

## 2021-06-08 PROCEDURE — 99606 MTMS BY PHARM EST 15 MIN: CPT | Performed by: PHARMACIST

## 2021-06-08 RX ORDER — DILTIAZEM HYDROCHLORIDE 240 MG/1
240 CAPSULE, EXTENDED RELEASE ORAL DAILY
COMMUNITY
Start: 2021-05-28 | End: 2021-07-29

## 2021-06-08 NOTE — PATIENT INSTRUCTIONS
Recommendations from today's MTM visit:                                                    MTM (medication therapy management) is a service provided by a clinical pharmacist designed to help you get the most of out of your medicines.   Today we reviewed what your medicines are for, how to know if they are working, that your medicines are safe and how to make your medicine regimen as easy as possible.      1.  Continue current medication regimen.    Follow-up: Return in 2 weeks (on 6/22/2021) for at 11am via phone, diabetes follow-up.    It was great to speak with you today.  I value your experience and would be very thankful for your time with providing feedback on our clinic survey. You may receive a survey via email or text message in the next few days.     To schedule another MTM appointment, please call the clinic directly or you may call the MTM scheduling line at [phone] or toll-free at [phone].     My Clinical Pharmacist's contact information:                                                      Please feel free to contact me with any questions or concerns you have.      Nathaly Hughes PharmD, [facility]  Medication Therapy Management Provider  Pager: [phone]     Liv Farley PharmD  Medication Therapy Management Resident  Pager: [phone]

## 2021-06-08 NOTE — PROGRESS NOTES
Medication Therapy Management (MTM) Encounter    ASSESSMENT:                            Medication Adherence/Access: No issues identified    Hypertension/CAD/A. Fib/HFrEF:  Plan in place.  Improved.    Type 2 Diabetes: Stable.  A1c is at goal <8% and more aggressive goal <7%.  Blood sugars are generally at goal fasting <150mg/dL.    PLAN:                            1.  Continue current medication regimen.  2.  Jamal set the goal of working on exercise between now and our next visit.    Follow-up: Return in 2 weeks (on 6/22/2021) for at 11am via phone, diabetes follow-up.    SUBJECTIVE/OBJECTIVE:                          Jt Montgomery is a 75 year old male called for a follow-up visit. He was referred to me from Dr. Jones.  Today's visit is a follow-up MTM visit from 5/20/2021.     Reason for visit: Routine f/up.    Tobacco: He reports that he quit smoking about 49 years ago. His smoking use included cigarettes. He has a 10.50 pack-year smoking history. He has never used smokeless tobacco.  Alcohol: history of alcohol dependence    Medication Adherence/Access: no issues reported    Hypertension/CAD/A. Fib/HFrEF:  Jamal continues taking diltiazem ER 240mg daily (reduced from 300mg by cardiology), furosemide 40mg twice daily, KCl 20mEq twice daily, irbesartan 150mg daily, metoprolol succinate ER 50mg twice daily and warfarin as directed.  He reports shortness of breath is improved and weight has now stabilized around 240lbs.  Patient does self-monitor blood pressure. Home BP monitoring in range of 100-110's systolic over 60-70's diastolic, HR in the 60-90s. Patient reports no current medication side effects including lightheadedness/dizziness or bruising/bleeding.    He's been encouraged to schedule a nuclear stress test - he didn't have a good experience the last time he had this done so has been hesitant to do this again.  He talked with Dr. Lawton (cardiology with [facility]) and they will re-evaluate in a few  months.  BP Readings from Last 3 Encounters:   04/26/21 126/70   04/19/21 (!) 147/74   04/10/21 (!) 142/70      INR   Date Value Ref Range Status   05/20/2021 2.60 (H) 0.86 - 1.14 Final     Comment:     This test is intended for monitoring Coumadin therapy.  Results are not   accurate in patients with prolonged INR due to factor deficiency.         Potassium   Date Value Ref Range Status   04/26/2021 4.3 3.4 - 5.3 mmol/L Final      Wt Readings from Last 4 Encounters:   04/26/21 247 lb (112 kg)   04/19/21 276 lb (125.2 kg)   04/10/21 269 lb (122 kg)   04/10/21 269 lb (122 kg)             Type 2 Diabetes:  Currently taking Lantus 22 units daily. Patient is not experiencing side effects.  Blood sugar monitoring:  Generally testing once daily (AM fasting) - OneTouch Ultra 2   Date FBG (mg/dL)   5/21 145   5/22 101   5/23 139   5/24 126   5/25 129   5/26 139   5/27 144   5/28 135   5/29 140   5/30 110   5/31 115   6/1 137   6/2 177   6/3 111   6/4 106   6/5 129   6/6 128   6/7 148   6/8 125   Symptoms of low blood sugar? None.   Symptoms of high blood sugar? None.   Eye exam: up to date  Foot exam: due  Diet/Exercise: He's re-committed to focusing on vegan diet and exercise.  Aspirin: No longer taking per cardiology direction given warfarin use  Statin: No - patient declines  ACEi/ARB: Yes: irbesartan.  Urine Albumin:   Lab Results   Component Value Date    UMALCR 317.16 (H) 09/22/2020      Lab Results   Component Value Date    A1C 6.7 04/26/2021    A1C 7.0 09/22/2020    A1C 6.1 02/19/2020    A1C 6.2 11/14/2019    A1C 7.6 10/08/2019     Today's Vitals: There were no vitals taken for this visit.  ----------------    I spent 26 minutes with this patient today. A copy of the visit note was provided to the patient's primary care provider.    The patient declined a summary of these recommendations.     Mikaela StroudD, [facility]  Medication Therapy Management Provider  Pager: [phone]     Telemedicine Visit Details  Type  of service:  Telephone visit  Start Time: 10:35 AM  End Time: 11:01 AM  Originating Location (patient location): Home  Distant Location (provider location):  [facility] [place]      Medication Therapy Recommendations  No medication therapy recommendations to display

## 2021-06-17 ENCOUNTER — ANTICOAGULATION THERAPY VISIT (OUTPATIENT)
Dept: NURSING | Facility: CLINIC | Age: 76
End: 2021-06-17

## 2021-06-17 DIAGNOSIS — I48.0 PAROXYSMAL ATRIAL FIBRILLATION (H): ICD-10-CM

## 2021-06-17 DIAGNOSIS — Z79.01 LONG TERM CURRENT USE OF ANTICOAGULANTS WITH INR GOAL OF 2.0-3.0: ICD-10-CM

## 2021-06-17 LAB
CAPILLARY BLOOD COLLECTION: NORMAL
INR PPP: 3 (ref 0.86–1.14)

## 2021-06-17 PROCEDURE — 36416 COLLJ CAPILLARY BLOOD SPEC: CPT | Performed by: INTERNAL MEDICINE

## 2021-06-17 PROCEDURE — 85610 PROTHROMBIN TIME: CPT | Performed by: INTERNAL MEDICINE

## 2021-06-17 NOTE — PROGRESS NOTES
ANTICOAGULATION MANAGEMENT     Patient Name:  Jt Montgomery  Date:  6/17/2021    ASSESSMENT /SUBJECTIVE:    Today's INR result of 3.0 is therapeutic. Goal INR of 2.0-3.0      Warfarin dose taken: Warfarin taken as instructed    Diet: No new diet changes identified    Medication changes/ interactions: No new medications/supplements affecting INR    Previous INR: Therapeutic     S/S of bleeding or thromboembolism: No    New injury or illness: No    Upcoming surgery, procedure or cardioversion: No    Additional findings: patient was being managed by cardiology but has not changed to a different health care facility so now being managed by [facility] and PCP.  He states that he is trying to be consistent with greens, will be consistent for the next 2 weeks and check another INR.       PLAN:    Warfarin Dosing Instructions: Continue your current warfarin dose 7.5mg Tue/Thu/Sun and 5mg AOD    Instructed patient to follow up no later than: 2 weeks  Lab visit scheduled    Education provided: Importance of consistent vitamin K intake, Impact of vitamin K foods on INR, Vitamin K content of foods, Monitoring for bleeding signs and symptoms and Monitoring for clotting signs and symptoms    Telephone call with Jt whom verbalizes understanding and agrees to plan    Instructed to call the Anticoagulation Clinic for any changes, questions or concerns. (#[phone])        Shirley Cevallos RN      OBJECTIVE:  Recent labs: (last 7 days)     06/17/21  1109   INR 3.00*         No question data found.  Anticoagulation Summary  As of 6/17/2021    INR goal:  2.0-3.0   TTR:  89.5 % (1 y)   INR used for dosing:  No new INR was available at the time of this encounter.   Warfarin maintenance plan:  7.5 mg (5 mg x 1.5) every Sun, Tue, Thu; 5 mg (5 mg x 1) all other days   Full warfarin instructions:  7.5 mg every Sun, Tue, Thu; 5 mg all other days   Weekly warfarin total:  42.5 mg   Plan last modified:  Marlena Hong RN (9/8/2020)   Next  INR check:  7/1/2021   Priority:  Maintenance   Target end date:  Indefinite    Indications    Atrial fibrillation (AFIB) on Coumadin [I48.91]  Long term current use of anticoagulants with INR goal of 2.0-3.0 (Resolved) [Z79.01]  Paroxysmal atrial fibrillation (H) [I48.0]  Long term current use of anticoagulants with INR goal of 2.0-3.0 [Z79.01]             Anticoagulation Episode Summary     INR check location:      Preferred lab:      Send INR reminders to:  URVASHI PIMENTEL    Comments:        Anticoagulation Care Providers     Provider Role Specialty Phone number    Genaro Roy MD Referring Cardiovascular Disease [phone]    Digna Jones MD Referring Internal Medicine [phone]

## 2021-06-22 ENCOUNTER — VIRTUAL VISIT (OUTPATIENT)
Dept: PHARMACY | Facility: CLINIC | Age: 76
End: 2021-06-22
Payer: COMMERCIAL

## 2021-06-22 DIAGNOSIS — G47.33 OSA (OBSTRUCTIVE SLEEP APNEA): ICD-10-CM

## 2021-06-22 DIAGNOSIS — G47.31 CENTRAL SLEEP APNEA: ICD-10-CM

## 2021-06-22 DIAGNOSIS — Z79.4 TYPE 2 DIABETES MELLITUS WITH DIABETIC NEUROPATHY, WITH LONG-TERM CURRENT USE OF INSULIN (H): ICD-10-CM

## 2021-06-22 DIAGNOSIS — I48.91 ATRIAL FIBRILLATION, UNSPECIFIED TYPE (H): ICD-10-CM

## 2021-06-22 DIAGNOSIS — I42.0 DILATED CARDIOMYOPATHY (H): ICD-10-CM

## 2021-06-22 DIAGNOSIS — E11.40 TYPE 2 DIABETES MELLITUS WITH DIABETIC NEUROPATHY, WITH LONG-TERM CURRENT USE OF INSULIN (H): ICD-10-CM

## 2021-06-22 DIAGNOSIS — I10 ESSENTIAL HYPERTENSION: Primary | ICD-10-CM

## 2021-06-22 DIAGNOSIS — I25.10 CORONARY ARTERY DISEASE INVOLVING NATIVE CORONARY ARTERY OF NATIVE HEART WITHOUT ANGINA PECTORIS: ICD-10-CM

## 2021-06-22 PROCEDURE — 99606 MTMS BY PHARM EST 15 MIN: CPT | Performed by: PHARMACIST

## 2021-06-22 NOTE — PROGRESS NOTES
Medication Therapy Management (MTM) Encounter    ASSESSMENT:                            Medication Adherence/Access: No issues identified    Hypertension/CAD/A. Fib/HFrEF:  Plan in place.  Improved.    Type 2 Diabetes: Stable.  A1c is at goal <8% and more aggressive goal <7%.  Blood sugars are generally at goal fasting <150mg/dL.  Will be due for A1c re-check next month.    PLAN:                            1.  Continue current medication regimen.    Follow-up: Return in about 3 weeks (around 7/13/2021) for Routine Visit via phone at 11am.    SUBJECTIVE/OBJECTIVE:                          Jt Montgomery is a 75 year old male called for a follow-up visit. He was referred to me from Dr. Jones.  Today's visit is a follow-up MTM visit from 6/8/2021.     Reason for visit: Routine f/up.    Tobacco: He reports that he quit smoking about 49 years ago. His smoking use included cigarettes. He has a 10.50 pack-year smoking history. He has never used smokeless tobacco.  Alcohol: history of alcohol dependence    Medication Adherence/Access: no issues reported    Hypertension/CAD/A. Fib/HFrEF:  Jamal continues taking diltiazem ER 240mg daily, furosemide 40mg twice daily, KCl 20mEq twice daily, irbesartan 150mg daily, metoprolol succinate ER 50mg twice daily and warfarin as directed.  He reports shortness of breath is improved and weight has now stabilized in low 240s (around 242lbs), he's lost about 1 pound in the last week.  Patient does self-monitor blood pressure. Home BP monitoring in range of 100-110's systolic over 60-70's diastolic, HR in the 60-90s. Patient reports no current medication side effects including lightheadedness/dizziness or bruising/bleeding.  BP Readings from Last 3 Encounters:   04/26/21 126/70   04/19/21 (!) 147/74   04/10/21 (!) 142/70      INR   Date Value Ref Range Status   06/17/2021 3.00 (H) 0.86 - 1.14 Final     Comment:     This test is intended for monitoring Coumadin therapy.  Results are not  "  accurate in patients with prolonged INR due to factor deficiency.         Potassium   Date Value Ref Range Status   04/26/2021 4.3 3.4 - 5.3 mmol/L Final      Wt Readings from Last 4 Encounters:   04/26/21 247 lb (112 kg)   04/19/21 276 lb (125.2 kg)   04/10/21 269 lb (122 kg)   04/10/21 269 lb (122 kg)              Type 2 Diabetes:  Currently taking Lantus 22 units daily. Patient is not experiencing side effects.  Blood sugar monitoring:  Generally testing once daily (AM fasting) - OneTouch Ultra 2   Date FBG (mg/dL)   6/9 150   6/10 129   6/11 220 (ate popcorn night before and snack at 2am)   6/12 140   6/13 137   6/14 145   6/15 154   6/16 138   6/17 120   6/18 158   6/19 - (was traveling)   6/20 146   6/21 - (was traveling)   6/22 124   Symptoms of low blood sugar? None.   Symptoms of high blood sugar? None.   Eye exam: up to date  Foot exam: due  Diet/Exercise: He traveled to see family in [place] over the weekend, which he enjoyed.  He acknowledges that he \"splurged\" more on diet during that time.  He has not yet started exercising, this is still a goal of his.  Aspirin: No longer taking per cardiology direction given warfarin use  Statin: No - patient declines  ACEi/ARB: Yes: irbesartan.  Urine Albumin:   Lab Results   Component Value Date    UMALCR 317.16 (H) 09/22/2020      Lab Results   Component Value Date    A1C 6.7 04/26/2021    A1C 7.0 09/22/2020    A1C 6.1 02/19/2020    A1C 6.2 11/14/2019    A1C 7.6 10/08/2019     Today's Vitals: There were no vitals taken for this visit.  ----------------    I spent 26 minutes with this patient today. A copy of the visit note was provided to the patient's primary care provider.    The patient declined a summary of these recommendations.     Nathaly Hughes, PharmD, BCACP  Medication Therapy Management Provider  Pager: [phone]     Telemedicine Visit Details  Type of service:  Telephone visit  Start Time: 11:00 AM  End Time: 11:26 AM  Originating Location (patient " location): Home  Distant Location (provider location):  [facility]      Medication Therapy Recommendations  No medication therapy recommendations to display

## 2021-06-22 NOTE — PATIENT INSTRUCTIONS
Recommendations from today's MTM visit:                                                    MTM (medication therapy management) is a service provided by a clinical pharmacist designed to help you get the most of out of your medicines.   Today we reviewed what your medicines are for, how to know if they are working, that your medicines are safe and how to make your medicine regimen as easy as possible.      1.  Continue current medication regimen    Follow-up: Return in about 3 weeks (around 7/13/2021) for Routine Visit via phone at 11am.    It was great to speak with you today.  I value your experience and would be very thankful for your time with providing feedback on our clinic survey. You may receive a survey via email or text message in the next few days.     To schedule another MTM appointment, please call the clinic directly or you may call the MTM scheduling line at [phone] or toll-free at [phone].     My Clinical Pharmacist's contact information:                                                      Please feel free to contact me with any questions or concerns you have.      Nathaly Hughes PharmD, [facility]  Medication Therapy Management Provider  Pager: [phone]     Liv Farley PharmD  Medication Therapy Management Resident  Pager: [phone]

## 2021-06-25 ENCOUNTER — PATIENT OUTREACH (OUTPATIENT)
Dept: NURSING | Facility: CLINIC | Age: 76
End: 2021-06-25
Payer: COMMERCIAL

## 2021-06-25 ASSESSMENT — ACTIVITIES OF DAILY LIVING (ADL): DEPENDENT_IADLS:: INDEPENDENT

## 2021-06-25 NOTE — PROGRESS NOTES
"Clinic Care Coordination Contact    Follow Up Progress Note     Assessment:    Patient reported to be doing quite well since last CC RN outreach.  See CHF assessment below.    Patient has continued monitoring his BP at home.  He reported recent BP readings: 108/71, 101/62, 103/61, 108/74.  Patient knows to monitor these for any BP above or below normal parameters and denied any current concerns.    Clinic Care Coordination Heart Failure Follow Up Assessment:    Last contacted date: 21    Home scale available: Yes    Home scale weight this mornin lb; patient noted he did eat more salt yesterday (fried chicken) which is why he feels his weight is up today.  He reported his weights have otherwise been in range of 139-143 lbs.    Following a low sodium diet: Yes        Heart Failure Zones sheet on refrigerator or available: Yes    What Heart Failure Zone currently in: Green    Any increased SOB since last contacted: No    Any increased edema since last contacted: No; patient continues wearing compression stockings during the day and keeping legs elevated when sitting/laying down.    Is your activity more limited since last contact: No; patient has been considering incorporating more exercise into daily routine.  He has done some walking and has used his trampoline a couple of times.    Have you had any chest pain since last contact: No    What number to call for YELLOW zones: Cardiology RN Karla ph: [phone] during business hours and [phone] after-hours     Medications:  o \"How many of your current medicines changed (dose, timing, name, etc.) since last contacted ?\" 0 - 1  o \"Do you have questions about your medications?\" No    When is your next appointment with the CHF clinic: 21 with Cardiology PA    When is the appointment with PCP: no future appointments currently scheduled    Care Coordinator following: yes  Goals addressed this encounter:   Goals Addressed                 This Visit's " Progress       Patient Stated      1. Medical (pt-stated)   80%     Goal Statement: I will work to avoid ED visits or hospitalizations within the next 6 months by establishing and following a plan of care for management of my heart failure.  Date Goal set: 4/13/21  Barriers: limited education about heart failure, history of challenges connecting with cardiology provider  Strengths: motivated to improve overall health and avoid health complications  Date to Achieve By: 10/31/21  Patient expressed understanding of goal: Yes    Action steps to achieve this goal:  1. I will take my medications as prescribed and will notify my clinic of any medication questions or concerns.  2. I will monitor my weight daily and will promptly notify my clinic if I gain 2 or more lbs in a day or 5 or more lbs in a week.  3. I will limit my salt intake to 2,000 mg per day or as advised by my doctor.  4. I will promptly contact my clinic if I experience any shortness of breath, increased swelling, or other symptoms of concern.  5. I will establish care with Cardiology and any other specialty teams that are appropriate for ongoing support.  6. I will continue working with Care Coordination to identify and address any barriers to achieving my goal.        Intervention/Education provided during outreach:    CC RN reviewed low-sodium diet guidelines with patient; he stated understanding and indicated he has been compliant with this most of the time.  CC RN encouraged patient to notify his Cardiology team tomorrow if his weight does not decrease from 245 lbs to discuss if any interventions/medication adjustments are indicated; he agreed to do so.  Patient is currently is not engaged in routine exercise but is contemplating incorporating this into his daily routine.  Patient is able to perform ADLS/IADLS without assistance. CC RN discussed with patient benefits to CHF of routine exercise and encouraged starting with increased walking as  tolerated; he plans to try this.  CC RN reviewed patient's Health Maintenance/Care Gaps; encouraged patient to schedule Medicare Annual Wellness Visit with PCP and to review/address other outstanding health maintenance items; he agreed to do so.    Outreach Frequency: monthly    Plan:    Patient will continue with CHF management as discussed above.  Patient will closely monitor his weight and will contact his Cardiology team tomorrow if his weight does not decrease from today's weight of 245 lbs.    Patient agreed to contact CC RN if any additional questions, concerns, or needs arise.    Care Coordination will outreach to patient in approximately 1 month to get updates on patient status, assess goal progress, and offer additional support and resources as indicated.    Payam Marie, RN  Clinic Care Coordinator  [facility]  [facility]  Ph: [phone]

## 2021-07-02 ENCOUNTER — ANTICOAGULATION THERAPY VISIT (OUTPATIENT)
Dept: NURSING | Facility: CLINIC | Age: 76
End: 2021-07-02

## 2021-07-02 DIAGNOSIS — I48.0 PAROXYSMAL ATRIAL FIBRILLATION (H): ICD-10-CM

## 2021-07-02 DIAGNOSIS — I48.0 PAROXYSMAL ATRIAL FIBRILLATION (H): Primary | ICD-10-CM

## 2021-07-02 LAB
CAPILLARY BLOOD COLLECTION: NORMAL
INR PPP: 2.5 (ref 0.86–1.14)

## 2021-07-02 PROCEDURE — 85610 PROTHROMBIN TIME: CPT | Performed by: INTERNAL MEDICINE

## 2021-07-02 PROCEDURE — 36416 COLLJ CAPILLARY BLOOD SPEC: CPT | Performed by: INTERNAL MEDICINE

## 2021-07-02 NOTE — PROGRESS NOTES
ANTICOAGULATION MANAGEMENT     Jt Montgomery 75 year old male is on warfarin with therapeutic INR result. (Goal INR 2.0-3.0)    Recent labs: (last 7 days)     07/02/21  1150   INR 2.50*       ASSESSMENT     Source(s): Patient/Caregiver Call       Warfarin doses taken: Warfarin taken as instructed    Diet: No new diet changes identified    New illness, injury, or hospitalization: No    Medication/supplement changes: None noted    Signs or symptoms of bleeding or clotting: No    Previous INR: Therapeutic last 2(+) visits    Additional findings: None     PLAN     Recommended plan for no diet, medication or health factor changes affecting INR     Dosing Instructions: Continue your current warfarin dose with next INR in 4 weeks       Summary  As of 7/2/2021    Full warfarin instructions:  7.5 mg every Sun, Tue, Thu; 5 mg all other days   Next INR check:  7/30/2021             Telephone call with Jt whom verbalizes understanding and agrees to plan    Lab visit scheduled    Education provided: Contact [phone]  with any changes, questions or concerns.     Plan made per [facility] anticoagulation protocol    Helga Elizabeth RN  Anticoagulation Clinic  7/2/2021    _______________________________________________________________________     Anticoagulation Episode Summary     Current INR goal:  2.0-3.0   TTR:  89.5 % (1 y)   Target end date:  Indefinite   Send INR reminders to:  URVASHI PIMENTEL    Indications    Atrial fibrillation (AFIB) on Coumadin [I48.91]  Long term current use of anticoagulants with INR goal of 2.0-3.0 (Resolved) [Z79.01]  Paroxysmal atrial fibrillation (H) [I48.0]  Long term current use of anticoagulants with INR goal of 2.0-3.0 [Z79.01]           Comments:           Anticoagulation Care Providers     Provider Role Specialty Phone number    Genaro Roy MD Referring Cardiovascular Disease [phone]    Digna Jones MD Referring Internal Medicine [phone]

## 2021-07-13 ENCOUNTER — VIRTUAL VISIT (OUTPATIENT)
Dept: PHARMACY | Facility: CLINIC | Age: 76
End: 2021-07-13
Payer: COMMERCIAL

## 2021-07-13 DIAGNOSIS — Z79.4 TYPE 2 DIABETES MELLITUS WITH DIABETIC NEUROPATHY, WITH LONG-TERM CURRENT USE OF INSULIN (H): ICD-10-CM

## 2021-07-13 DIAGNOSIS — I25.10 CORONARY ARTERY DISEASE INVOLVING NATIVE CORONARY ARTERY OF NATIVE HEART WITHOUT ANGINA PECTORIS: ICD-10-CM

## 2021-07-13 DIAGNOSIS — I10 ESSENTIAL HYPERTENSION: Primary | ICD-10-CM

## 2021-07-13 DIAGNOSIS — I48.91 ATRIAL FIBRILLATION, UNSPECIFIED TYPE (H): ICD-10-CM

## 2021-07-13 DIAGNOSIS — I42.0 DILATED CARDIOMYOPATHY (H): ICD-10-CM

## 2021-07-13 DIAGNOSIS — E11.40 TYPE 2 DIABETES MELLITUS WITH DIABETIC NEUROPATHY, WITH LONG-TERM CURRENT USE OF INSULIN (H): ICD-10-CM

## 2021-07-13 PROCEDURE — 99607 MTMS BY PHARM ADDL 15 MIN: CPT | Performed by: PHARMACIST

## 2021-07-13 PROCEDURE — 99606 MTMS BY PHARM EST 15 MIN: CPT | Performed by: PHARMACIST

## 2021-07-13 NOTE — PATIENT INSTRUCTIONS
Recommendations from today's MTM visit:                                                    MTM (medication therapy management) is a service provided by a clinical pharmacist designed to help you get the most of out of your medicines.   Today we reviewed what your medicines are for, how to know if they are working, that your medicines are safe and how to make your medicine regimen as easy as possible.      1.  Recommended checking blood pressure and blood sugar when feeling lightheaded and foggy headed.    2.  A1c added to be checked along with INR on 7/30    3.  Encouraged patient to talk with cardiology during visit today about intermittent HR readings in the 40s.  Home blood pressure cuff may not be picking up all heart beats due to A. Fib.    4.  Encouraged patient to get back into routine of checking weight daily.    Follow-up: Return in 16 days (on 7/29/2021) for Follow-up Medication Review.    It was great to speak with you today.  I value your experience and would be very thankful for your time with providing feedback on our clinic survey. You may receive a survey via email or text message in the next few days.     To schedule another MTM appointment, please call the clinic directly or you may call the MTM scheduling line at [phone] or toll-free at [phone].     My Clinical Pharmacist's contact information:                                                      Please feel free to contact me with any questions or concerns you have.      Nathaly Hughes PharmD, [facility]  Medication Therapy Management Provider  Pager: [phone]

## 2021-07-13 NOTE — PROGRESS NOTES
Medication Therapy Management (MTM) Encounter    ASSESSMENT:                            Medication Adherence/Access: No issues identified    Hypertension/CAD/A. Fib/HFrEF:  Plan in place to see cardiology today.  He would benefit from checking weight daily.  He's had some intermittent episodes of low heart rate, this could be his blood pressure cuff not picking up all heart beats, but also could be true bradycardia.  May benefit from discussing further with cardiology today.    Type 2 Diabetes: Stable.  A1c is at goal <8% and more aggressive goal <7%.  Blood sugars are generally at goal fasting <150mg/dL.  Due for A1c at the end of the month, he has INR scheduled 7/30, can do A1c at the same time.    PLAN:                            1.  Recommended checking blood pressure and blood sugar when feeling lightheaded and foggy headed.  2.  A1c added to be checked along with INR on 7/30  3.  Encouraged patient to talk with cardiology during visit today about intermittent HR readings in the 40s.  Home blood pressure cuff may not be picking up all heart beats due to A. Fib.  4.  Encouraged patient to get back into routine of checking weight daily.    Follow-up: Return in 16 days (on 7/29/2021) for Follow-up Medication Review.    SUBJECTIVE/OBJECTIVE:                          Jt Montgomery is a 75 year old male called for a follow-up visit. He was referred to me from Dr. Jones.  Today's visit is a follow-up MTM visit from 6/22/21.     Reason for visit: Routine f/up.    Tobacco: He reports that he quit smoking about 49 years ago. His smoking use included cigarettes. He has a 10.50 pack-year smoking history. He has never used smokeless tobacco.  Alcohol: history of alcohol dependence    Medication Adherence/Access: no issues reported    Hypertension/CAD/A. Fib/HFrEF:  Jamal has f/up with PA with cardiology later today.  Jamal continues taking diltiazem ER 240mg daily, furosemide 40mg twice daily, KCl 20mEq twice daily,  "irbesartan 150mg daily, metoprolol succinate ER 50mg twice daily and warfarin as directed.  Weight had been up a bit, but he hasn't checked since 7/3.  He reports shortness of breath and edema are stable.  Patient does self-monitor blood pressure. Home BP monitoring in range of 100-110's systolic over 50-70's diastolic, HR in the 40-80s (mostly in the 60s). Patient has been having some intermittent dizziness and \"foggy feeling.\"  BP Readings from Last 3 Encounters:   04/26/21 126/70   04/19/21 (!) 147/74   04/10/21 (!) 142/70      INR   Date Value Ref Range Status   07/02/2021 2.50 (H) 0.86 - 1.14 Final     Comment:     This test is intended for monitoring Coumadin therapy.  Results are not   accurate in patients with prolonged INR due to factor deficiency.         Potassium   Date Value Ref Range Status   04/26/2021 4.3 3.4 - 5.3 mmol/L Final      Wt Readings from Last 4 Encounters:   04/26/21 247 lb (112 kg)   04/19/21 276 lb (125.2 kg)   04/10/21 269 lb (122 kg)   04/10/21 269 lb (122 kg)               Type 2 Diabetes:  Currently taking Lantus 22 units daily. Patient is not experiencing side effects.  Blood sugar monitoring:  Generally testing once daily (AM fasting) - OneTouch Ultra 2   Date FBG (mg/dL)   6/23 129   6/24 120   6/25 130   6/26 141   6/27 112   6/28 -   6/29 129   6/30 116   7/1 140   7/2 130   7/3 117   7/4 -   7/5 126   7/6 131   7/7 115   7/8 -   7/9 93   7/10 97   7/11 115   7/12 114   7/13 137   Symptoms of low blood sugar? None.   Symptoms of high blood sugar? None.   Eye exam: up to date  Foot exam: due  Diet/Exercise: He's started using his \"rebounder\" and is also trying to walk more.  He's feeling well with this.  Aspirin: No longer taking per cardiology direction given warfarin use  Statin: No - patient declines  ACEi/ARB: Yes: irbesartan.  Urine Albumin:   Lab Results   Component Value Date    UMALCR 317.16 (H) 09/22/2020      Lab Results   Component Value Date    A1C 6.7 04/26/2021    " A1C 7.0 09/22/2020    A1C 6.1 02/19/2020    A1C 6.2 11/14/2019    A1C 7.6 10/08/2019     Today's Vitals: There were no vitals taken for this visit.  ----------------    I spent 39 minutes with this patient today. A copy of the visit note was provided to the patient's primary care provider.    The patient declined a summary of these recommendations.     Nathaly Hughes, PharmD, [facility]  Medication Therapy Management Provider  Pager: [phone]     Telemedicine Visit Details  Type of service:  Telephone visit  Start Time: 9:02 AM  End Time: 9:41 AM  Originating Location (patient location): [place]  Distant Location (provider location):  [facility]      Medication Therapy Recommendations  Dilated cardiomyopathy (H)    Current Medication: furosemide (LASIX) 20 MG tablet   Rationale: Medication requires monitoring - Needs additional monitoring - Effectiveness   Recommendation: Self-Monitoring - Recommended monitoring weight daily   Status: Patient Agreed - Adherence/Education         Type 2 diabetes mellitus with diabetic neuropathy (H)    Current Medication: insulin glargine (LANTUS SOLOSTAR) 100 UNIT/ML pen   Rationale: Medication requires monitoring - Needs additional monitoring - Effectiveness   Recommendation: Order Lab - Ordered A1c to be checked on 7/30   Status: Accepted per CPA

## 2021-07-21 NOTE — TELEPHONE ENCOUNTER
Reason for Call:  Medication or medication refill:    Do you use a [facility]?  Name of the pharmacy and phone number for the current request:  [facility]     Name of the medication requested: Compression stockings     Other request:     Can we leave a detailed message on this number? YES    Phone number patient can be reached at: Home number on file [phone] (home)    Best Time: any    Call taken on 11/20/2018 at 12:49 PM by Marietta Juárez       Azathioprine Counseling:  I discussed with the patient the risks of azathioprine including but not limited to myelosuppression, immunosuppression, hepatotoxicity, lymphoma, and infections.  The patient understands that monitoring is required including baseline LFTs, Creatinine, possible TPMP genotyping and weekly CBCs for the first month and then every 2 weeks thereafter.  The patient verbalized understanding of the proper use and possible adverse effects of azathioprine.  All of the patient's questions and concerns were addressed.

## 2021-07-22 ENCOUNTER — PATIENT OUTREACH (OUTPATIENT)
Dept: CARE COORDINATION | Facility: CLINIC | Age: 76
End: 2021-07-22

## 2021-07-22 NOTE — PROGRESS NOTES
Clinic Care Coordination Contact  [facility]/Voicemail       Clinical Data: Care Coordinator Outreach  Outreach attempted x 1.  Left message on patient's voicemail with call back information and requested return call.  Plan: RN Care Coordinator will try to reach patient again in 10 business days.    Merly Montoya RN Care Coordinator  [facility]  Email: [email or web address]  Phone: [phone]

## 2021-07-22 NOTE — LETTER
[facility] CARE COORDINATION  [address]   [place] [address]    August 5, 2021    Jt Montgomery  [address]  [place] [address]      Dear Jt,    I have been unsuccessful in reaching you since our last contact. At this time the Care Coordination team will make no further attempts to reach you, however this does not change your ability to continue receiving care from your providers at your primary care clinic. If you need additional support from a care coordinator in the future please contact me.    All of us at [facility] are invested in your health and are here to assist you in meeting your goals.     Sincerely,    Merly Montoya RN Care Coordinator  [facility]  Email: [email or web address]  Phone: [phone]

## 2021-07-28 ENCOUNTER — TELEPHONE (OUTPATIENT)
Dept: FAMILY MEDICINE | Facility: CLINIC | Age: 76
End: 2021-07-28

## 2021-07-28 DIAGNOSIS — Z79.4 TYPE 2 DIABETES MELLITUS WITH DIABETIC NEUROPATHY, WITH LONG-TERM CURRENT USE OF INSULIN (H): ICD-10-CM

## 2021-07-28 DIAGNOSIS — E11.40 TYPE 2 DIABETES MELLITUS WITH DIABETIC NEUROPATHY, WITH LONG-TERM CURRENT USE OF INSULIN (H): ICD-10-CM

## 2021-07-28 NOTE — TELEPHONE ENCOUNTER
Pt called, states he had shingles about 6 months ago  Asking if ok to have Shingrix now  Advised yes if lesions are fully healed okay to proceed with them     However recommended checking with insurance to verify coverage if wanting to have done at clinic vs pharmacy    Franci RIOS RN

## 2021-07-29 ENCOUNTER — VIRTUAL VISIT (OUTPATIENT)
Dept: PHARMACY | Facility: CLINIC | Age: 76
End: 2021-07-29
Payer: COMMERCIAL

## 2021-07-29 DIAGNOSIS — I48.91 ATRIAL FIBRILLATION, UNSPECIFIED TYPE (H): ICD-10-CM

## 2021-07-29 DIAGNOSIS — E11.40 TYPE 2 DIABETES MELLITUS WITH DIABETIC NEUROPATHY, WITH LONG-TERM CURRENT USE OF INSULIN (H): ICD-10-CM

## 2021-07-29 DIAGNOSIS — I25.10 CORONARY ARTERY DISEASE INVOLVING NATIVE CORONARY ARTERY OF NATIVE HEART WITHOUT ANGINA PECTORIS: ICD-10-CM

## 2021-07-29 DIAGNOSIS — Z79.4 TYPE 2 DIABETES MELLITUS WITH DIABETIC NEUROPATHY, WITH LONG-TERM CURRENT USE OF INSULIN (H): ICD-10-CM

## 2021-07-29 DIAGNOSIS — I42.0 DILATED CARDIOMYOPATHY (H): ICD-10-CM

## 2021-07-29 DIAGNOSIS — I10 ESSENTIAL HYPERTENSION: Primary | ICD-10-CM

## 2021-07-29 PROCEDURE — 99606 MTMS BY PHARM EST 15 MIN: CPT | Performed by: PHARMACIST

## 2021-07-29 RX ORDER — METOPROLOL SUCCINATE 100 MG/1
100 TABLET, EXTENDED RELEASE ORAL 2 TIMES DAILY
COMMUNITY

## 2021-07-29 NOTE — TELEPHONE ENCOUNTER
Refilled during MTM visit today.  Nathaly Hughes, PharmD, [facility]  Medication Therapy Management Provider  Pager: [phone]

## 2021-07-29 NOTE — PROGRESS NOTES
Medication Therapy Management (MTM) Encounter    ASSESSMENT:                            Medication Adherence/Access: No issues identified    Hypertension/CAD/A. Fib/HFrEF:  Plan in place.  Agree with plan to maximize metoprolol succinate and stop diltiazem given reduction in last LVEF.  He continues to decline statin therapy.    Type 2 Diabetes: Stable.  A1c is at goal <8% and more aggressive goal <7%.  Blood sugars are generally at goal fasting <150mg/dL.  Due for A1c, appt scheduled for tomorrow.    PLAN:                            1.  Continue current medication regimen.    Follow-up: Return in 2 weeks (on 8/12/2021) for Follow-up Medication Review.    SUBJECTIVE/OBJECTIVE:                          Jt Montgomery is a 75 year old male called for a follow-up visit. He was referred to me from Dr. Jones.  Today's visit is a follow-up MTM visit from 7/13/21.     Reason for visit: Routine f/up.    Tobacco: He reports that he quit smoking about 49 years ago. His smoking use included cigarettes. He has a 10.50 pack-year smoking history. He has never used smokeless tobacco.  Alcohol: history of alcohol dependence    Medication Adherence/Access: no issues reported    Hypertension/CAD/A. Fib/HFrEF:  Jmaal has been working closely with cardiology.  He'd like me to have their RN, Karla's phone # ([phone]).  Jamal saw YOJANA Lopez on 7/13 - diltiazem was discontinued and metoprolol succinate was increased to 100mg twice daily (he just implemented these changes yesterday).  He continues taking furosemide 40mg twice daily, KCl 20mEq twice daily, irbesartan 150mg daily, metoprolol succinate ER 100mg twice daily and warfarin as directed.  He's scheduled for Zio patch monitor and plan to f/up with YOJANA Lopez on 9/14.  Stress test was recommended, but Jamal has declined for now.  He's noticed no more foggy feeling.  BP Readings from Last 3 Encounters:   04/26/21 126/70   04/19/21 (!) 147/74   04/10/21 (!) 142/70      INR   Date Value Ref  Range Status   07/02/2021 2.50 (H) 0.86 - 1.14 Final     Comment:     This test is intended for monitoring Coumadin therapy.  Results are not   accurate in patients with prolonged INR due to factor deficiency.         Potassium   Date Value Ref Range Status   04/26/2021 4.3 3.4 - 5.3 mmol/L Final      Home weight today - 241 lbs, reduced from 256lbs on 7/14  Wt Readings from Last 4 Encounters:   04/26/21 247 lb (112 kg)   04/19/21 276 lb (125.2 kg)   04/10/21 269 lb (122 kg)   04/10/21 269 lb (122 kg)            Recent Labs   Lab Test 09/22/20  1252 11/22/19  1623   CHOL 123 164   HDL 28* 24*   LDL Cannot estimate LDL when triglyceride exceeds 400 mg/dL  50 Cannot estimate LDL when triglyceride exceeds 400 mg/dL  67   TRIG 560* 843*     LDL Cholesterol Direct   Date Value Ref Range Status   09/22/2020 50 <100 mg/dL Final     Comment:     Desirable:       <100 mg/dl         Type 2 Diabetes:  Currently taking Lantus 22 units daily. Patient is not experiencing side effects.  Blood sugar monitoring:  Generally testing once daily (AM fasting) - OneTouch Ultra 2   Date FBG (mg/dL)   7/14 118   7/15 108   7/16 115   7/17 126   7/18 122   7/19 111   7/20 107   7/21 123   7/22 113   7/23 126   7/24 120   7/25 174 (had taken 10mg of prednisone)   7/26 116   7/27 131   7/28 127   7/29 111   Symptoms of low blood sugar? None.   Symptoms of high blood sugar? None.   Eye exam: up to date  Foot exam: due  Diet/Exercise: Jamal has been being more mindful about not eating meat and he feels this is going well.  He's pleased with weight loss.  He's also been trying to get more exercise.  Aspirin: No longer taking per cardiology direction given warfarin use  Statin: No - patient declines  ACEi/ARB: Yes: irbesartan.  Urine Albumin:   Lab Results   Component Value Date    UMALCR 317.16 (H) 09/22/2020      Lab Results   Component Value Date    A1C 6.7 04/26/2021    A1C 7.0 09/22/2020    A1C 6.1 02/19/2020    A1C 6.2 11/14/2019    A1C 7.6  10/08/2019     Today's Vitals: There were no vitals taken for this visit.  ----------------    I spent 32 minutes with this patient today. A copy of the visit note was provided to the patient's primary care provider.    The patient declined a summary of these recommendations.     Nathaly Hughes PharmD, [facility]  Medication Therapy Management Provider  Pager: [phone]     Telemedicine Visit Details  Type of service:  Telephone visit  Start Time: 10:35 AM  End Time: 11:07 AM  Originating Location (patient location): [place]  Distant Location (provider location):  [facility]     Medication Therapy Recommendations  No medication therapy recommendations to display

## 2021-07-29 NOTE — PATIENT INSTRUCTIONS
Recommendations from today's MTM visit:                                                    MTM (medication therapy management) is a service provided by a clinical pharmacist designed to help you get the most of out of your medicines.   Today we reviewed what your medicines are for, how to know if they are working, that your medicines are safe and how to make your medicine regimen as easy as possible.      1.  Continue current medication regimen.    Follow-up: Return in 2 weeks (on 8/12/2021) for Follow-up Medication Review.    It was great to speak with you today.  I value your experience and would be very thankful for your time with providing feedback on our clinic survey. You may receive a survey via email or text message in the next few days.     To schedule another MTM appointment, please call the clinic directly or you may call the MTM scheduling line at [phone] or toll-free at [phone].     My Clinical Pharmacist's contact information:                                                      Please feel free to contact me with any questions or concerns you have.      Nathaly Hughes PharmD, [facility]  Medication Therapy Management Provider  Pager: [phone]     Erlinda Dewey, [place]  Medication Therapy Management Resident  Pager: [phone]

## 2021-07-30 ENCOUNTER — LAB (OUTPATIENT)
Dept: LAB | Facility: CLINIC | Age: 76
End: 2021-07-30
Payer: COMMERCIAL

## 2021-07-30 ENCOUNTER — ANTICOAGULATION THERAPY VISIT (OUTPATIENT)
Dept: ANTICOAGULATION | Facility: CLINIC | Age: 76
End: 2021-07-30

## 2021-07-30 DIAGNOSIS — I48.0 PAROXYSMAL ATRIAL FIBRILLATION (H): ICD-10-CM

## 2021-07-30 DIAGNOSIS — I48.91 ATRIAL FIBRILLATION (H): Primary | ICD-10-CM

## 2021-07-30 DIAGNOSIS — Z79.01 LONG TERM CURRENT USE OF ANTICOAGULANTS WITH INR GOAL OF 2.0-3.0: ICD-10-CM

## 2021-07-30 DIAGNOSIS — Z79.4 TYPE 2 DIABETES MELLITUS WITH DIABETIC NEUROPATHY, WITH LONG-TERM CURRENT USE OF INSULIN (H): ICD-10-CM

## 2021-07-30 DIAGNOSIS — E11.40 TYPE 2 DIABETES MELLITUS WITH DIABETIC NEUROPATHY, WITH LONG-TERM CURRENT USE OF INSULIN (H): ICD-10-CM

## 2021-07-30 LAB
HBA1C MFR BLD: 6.1 % (ref 0–5.6)
INR BLD: 3 (ref 0.9–1.1)

## 2021-07-30 PROCEDURE — 83036 HEMOGLOBIN GLYCOSYLATED A1C: CPT

## 2021-07-30 PROCEDURE — 36415 COLL VENOUS BLD VENIPUNCTURE: CPT

## 2021-07-30 PROCEDURE — 85610 PROTHROMBIN TIME: CPT

## 2021-07-30 PROCEDURE — 36416 COLLJ CAPILLARY BLOOD SPEC: CPT

## 2021-07-30 NOTE — PROGRESS NOTES
ANTICOAGULATION MANAGEMENT     Jt Montgomery 75 year old male is on warfarin with therapeutic INR result. (Goal INR 2.0-3.0)    Recent labs: (last 7 days)     07/30/21  1102   INR 3.0*       ASSESSMENT     Source(s): Chart Review and Patient/Caregiver Call       Warfarin doses taken: Warfarin taken as instructed    Diet: Increased greens/vitamin K in diet; ongoing change    New illness, injury, or hospitalization: No    Medication/supplement changes: Metoprolol  dose increased on 7/13/21 No interaction anticipated    diltiazem stopped on 7/13/21 No interaction anticipated    Signs or symptoms of bleeding or clotting: No    Previous INR: Therapeutic last 2(+) visits    Additional findings: None     PLAN     Recommended plan for no diet, medication or health factor changes affecting INR     Dosing Instructions: Continue your current warfarin dose with next INR in 4 weeks       Summary  As of 7/30/2021    Full warfarin instructions:  7.5 mg every Sun, Tue, Thu; 5 mg all other days   Next INR check:  8/27/2021             Telephone call with Jt who verbalizes understanding and agrees to plan    Lab visit scheduled    Education provided: Please call back if any changes to your diet, medications or how you've been taking warfarin, Target INR goal and significance of current INR result, Importance of therapeutic range, Importance of following up for INR monitoring at instructed interval and Importance of taking warfarin as instructed    Plan made per [facility] anticoagulation protocol    Minda Bond RN  Anticoagulation Clinic  7/30/2021    _______________________________________________________________________     Anticoagulation Episode Summary     Current INR goal:  2.0-3.0   TTR:  95.2 % (1 y)   Target end date:  Indefinite   Send INR reminders to:  URVASHI PIMENTEL    Indications    Atrial fibrillation (AFIB) on Coumadin [I48.91]  Long term current use of anticoagulants with INR goal of 2.0-3.0 (Resolved)  [Z79.01]  Paroxysmal atrial fibrillation (H) [I48.0]  Long term current use of anticoagulants with INR goal of 2.0-3.0 [Z79.01]           Comments:           Anticoagulation Care Providers     Provider Role Specialty Phone number    Ip, Genaro Garrett MD Referring Cardiovascular Disease [phone]    Digna Jones MD Referring Internal Medicine [phone]

## 2021-08-05 NOTE — PROGRESS NOTES
Clinic Care Coordination Contact  [facility]/Voicemail       Clinical Data: Care Coordinator Outreach  Outreach attempted x 2.  Left message on patient's voicemail with call back information and requested return call.  Plan: Care Coordinator will send disenrollment letter with care coordinator contact information via mail. Care Coordinator will do no further outreaches at this time.    Merly Montoya RN Care Coordinator  [facility]  Email: [email or web address]  Phone: [phone]

## 2021-08-19 ENCOUNTER — VIRTUAL VISIT (OUTPATIENT)
Dept: PHARMACY | Facility: CLINIC | Age: 76
End: 2021-08-19
Payer: COMMERCIAL

## 2021-08-19 DIAGNOSIS — E11.40 TYPE 2 DIABETES MELLITUS WITH DIABETIC NEUROPATHY, WITH LONG-TERM CURRENT USE OF INSULIN (H): ICD-10-CM

## 2021-08-19 DIAGNOSIS — Z79.4 TYPE 2 DIABETES MELLITUS WITH DIABETIC NEUROPATHY, WITH LONG-TERM CURRENT USE OF INSULIN (H): ICD-10-CM

## 2021-08-19 DIAGNOSIS — I48.91 ATRIAL FIBRILLATION, UNSPECIFIED TYPE (H): ICD-10-CM

## 2021-08-19 DIAGNOSIS — I10 ESSENTIAL HYPERTENSION: Primary | ICD-10-CM

## 2021-08-19 DIAGNOSIS — I25.10 CORONARY ARTERY DISEASE INVOLVING NATIVE CORONARY ARTERY OF NATIVE HEART WITHOUT ANGINA PECTORIS: ICD-10-CM

## 2021-08-19 DIAGNOSIS — I42.0 DILATED CARDIOMYOPATHY (H): ICD-10-CM

## 2021-08-19 PROCEDURE — 99606 MTMS BY PHARM EST 15 MIN: CPT | Performed by: PHARMACIST

## 2021-08-19 NOTE — PROGRESS NOTES
Medication Therapy Management (MTM) Encounter    ASSESSMENT:                            Medication Adherence/Access: No issues identified    Hypertension/CAD/A. Fib/HFrEF:  Stable.  Blood pressure is at goal <130/80mmHg and HR has improved (increased) off diltiazem.    Type 2 Diabetes: Patient is meeting A1c goal of < 7%. Self monitoring of blood glucose is generally at goal of fasting  mg/dL.  Unclear if he truly did have hypoglycemia or if he took insulin twice, but he's developed a new routine to prevent this from happening.    PLAN:                            1.  Continue current medication regimen.    Follow-up: Return in 3 weeks (on 9/9/2021) for Follow-up Medication Review.    SUBJECTIVE/OBJECTIVE:                          Jt Montgomery is a 75 year old male called for a follow-up visit. He was referred to me from Dr. Jones.  Today's visit is a follow-up MTM visit from 7/29/21.     Reason for visit: Routine f/up.    Tobacco: He reports that he quit smoking about 49 years ago. His smoking use included cigarettes. He has a 10.50 pack-year smoking history. He has never used smokeless tobacco.  Alcohol: history of alcohol dependence    Social history:  He's signed a lease to move to [place] in October.  He's in the process of researching new care teams in [place].    Medication Adherence/Access: no issues reported    Hypertension/CAD/A. Fib/HFrEF:  Jamal has been working closely with cardiology.  He'd like me to have their RN, Karla's phone # ([phone]).  He continues taking furosemide 40mg twice daily, KCl 20mEq twice daily, irbesartan 150mg daily, metoprolol succinate ER 100mg twice daily and warfarin as directed.  He's scheduled for Zio patch monitor and plan to f/up with YOJANA Lopze on 9/14.  Stress test was recommended, but Jamal has declined for now.    He reports the following blood pressure (mmHg) and pulse (bpm) readings over the last week:  110/81 79  117/77 60  110/61 73  111/78 89  115/90 74  107/76  65  105/68 64    BP Readings from Last 3 Encounters:   04/26/21 126/70   04/19/21 (!) 147/74   04/10/21 (!) 142/70      INR   Date Value Ref Range Status   07/30/2021 3.0 (H) 0.9 - 1.1 Final   07/02/2021 2.50 (H) 0.86 - 1.14 Final     Comment:     This test is intended for monitoring Coumadin therapy.  Results are not   accurate in patients with prolonged INR due to factor deficiency.         Potassium   Date Value Ref Range Status   04/26/2021 4.3 3.4 - 5.3 mmol/L Final      Home weight reported - 238 lbs  Wt Readings from Last 4 Encounters:   04/26/21 247 lb (112 kg)   04/19/21 276 lb (125.2 kg)   04/10/21 269 lb (122 kg)   04/10/21 269 lb (122 kg)      Recent Labs   Lab Test 09/22/20  1252 11/22/19  1623   CHOL 123 164   HDL 28* 24*   LDL Cannot estimate LDL when triglyceride exceeds 400 mg/dL  50 Cannot estimate LDL when triglyceride exceeds 400 mg/dL  67   TRIG 560* 843*     LDL Cholesterol Direct   Date Value Ref Range Status   09/22/2020 50 <100 mg/dL Final     Comment:     Desirable:       <100 mg/dl      Type 2 Diabetes:  Currently taking Lantus 22 units daily. Patient is not experiencing side effects.  Blood sugar monitoring:  Generally testing once daily (AM fasting) - OneTouch Ultra 2   Date FBG (mg/dL)   7/30 111   7/31 131   8/1 114   8/2 122   8/3 139   8/4 131   8/5 135   8/6 125   8/7 -   8/8 111   8/9 -   8/10 137   8/11 114   8/12 157 (had hypoglycemia overnight, corrected prior to checking blood sugar)   8/13 127   8/14 -   8/15 133   8/16 146   8/17 121   8/18 99   8/19 121   Symptoms of low blood sugar? He had one episode where he felt signs and symptoms of hypoglycemia (did not check) - he drank OJ and felt better.  He thinks he may have accidentally taken his insulin twice.  Symptoms of high blood sugar? None.   Eye exam: up to date  Foot exam: due  Diet/Exercise: No changes, he's happy to see weight in the 230s now  Aspirin: No longer taking per cardiology direction given warfarin  use  Statin: No - patient declines  ACEi/ARB: Yes: irbesartan.  Urine Albumin:   Lab Results   Component Value Date    UMALCR 317.16 (H) 09/22/2020      Lab Results   Component Value Date    A1C 6.1 07/30/2021    A1C 6.7 04/26/2021    A1C 7.0 09/22/2020    A1C 6.1 02/19/2020    A1C 6.2 11/14/2019    A1C 7.6 10/08/2019     Today's Vitals: There were no vitals taken for this visit.  ----------------    I spent 34 minutes with this patient today.  A copy of the visit note was provided to the patient's primary care provider.    The patient declined a summary of these recommendations.     Mikaela StroudD, [facility]  Medication Therapy Management Provider  Pager: [phone]     Telemedicine Visit Details  Type of service:  Telephone visit  Start Time: 11:34 AM  End Time: 12:08 PM  Originating Location (patient location): [place]  Distant Location (provider location):  [facility]     Medication Therapy Recommendations  No medication therapy recommendations to display

## 2021-08-19 NOTE — PATIENT INSTRUCTIONS
Recommendations from today's MTM visit:                                                    MTM (medication therapy management) is a service provided by a clinical pharmacist designed to help you get the most of out of your medicines.   Today we reviewed what your medicines are for, how to know if they are working, that your medicines are safe and how to make your medicine regimen as easy as possible.      Continue current medication regimen    Follow-up: Return in 3 weeks (on 9/9/2021) for Follow-up Medication Review.    It was great to speak with you today.  I value your experience and would be very thankful for your time with providing feedback on our clinic survey. You may receive a survey via email or text message in the next few days.     To schedule another MTM appointment, please call the clinic directly or you may call the MTM scheduling line at [phone] or toll-free at [phone].     My Clinical Pharmacist's contact information:                                                      Please feel free to contact me with any questions or concerns you have.      Nathaly Hughes PharmD, [facility]  Medication Therapy Management Provider  Pager: [phone]     Erlinda Dewey, [place]  Medication Therapy Management Resident  Pager: [phone]

## 2021-09-01 ENCOUNTER — ANTICOAGULATION THERAPY VISIT (OUTPATIENT)
Dept: ANTICOAGULATION | Facility: CLINIC | Age: 76
End: 2021-09-01

## 2021-09-01 ENCOUNTER — LAB (OUTPATIENT)
Dept: LAB | Facility: CLINIC | Age: 76
End: 2021-09-01
Payer: COMMERCIAL

## 2021-09-01 DIAGNOSIS — I48.91 ATRIAL FIBRILLATION (H): Primary | ICD-10-CM

## 2021-09-01 DIAGNOSIS — I48.0 PAROXYSMAL ATRIAL FIBRILLATION (H): ICD-10-CM

## 2021-09-01 DIAGNOSIS — Z79.01 LONG TERM CURRENT USE OF ANTICOAGULANTS WITH INR GOAL OF 2.0-3.0: ICD-10-CM

## 2021-09-01 LAB — INR BLD: 2.4 (ref 0.9–1.1)

## 2021-09-01 PROCEDURE — 36416 COLLJ CAPILLARY BLOOD SPEC: CPT

## 2021-09-01 PROCEDURE — 85610 PROTHROMBIN TIME: CPT

## 2021-09-01 NOTE — PROGRESS NOTES
ANTICOAGULATION MANAGEMENT     Jt Montgomery 75 year old male is on warfarin with therapeutic INR result. (Goal INR 2.0-3.0)    Recent labs: (last 7 days)     09/01/21  1115   INR 2.4*       ASSESSMENT     Source(s): Patient/Caregiver Call       Warfarin doses taken: Warfarin taken as instructed    Diet: No new diet changes identified    New illness, injury, or hospitalization: No    Medication/supplement changes: None noted    Signs or symptoms of bleeding or clotting: No    Previous INR: Therapeutic last 2(+) visits    Additional findings: None     PLAN     Recommended plan for no diet, medication or health factor changes affecting INR     Dosing Instructions: Continue your current warfarin dose with next INR in 4 weeks       Summary  As of 9/1/2021    Full warfarin instructions:  7.5 mg every Sun, Tue, Thu; 5 mg all other days   Next INR check:  9/29/2021             Telephone call with Jt who verbalizes understanding and agrees to plan    Lab visit scheduled    Education provided: Monitoring for bleeding signs and symptoms and Monitoring for clotting signs and symptoms    Plan made per [facility] anticoagulation protocol    Shirley Cevallos RN  Anticoagulation Clinic  9/1/2021    _______________________________________________________________________     Anticoagulation Episode Summary     Current INR goal:  2.0-3.0   TTR:  96.7 % (1 y)   Target end date:  Indefinite   Send INR reminders to:  URVASHI PIMENTEL    Indications    Atrial fibrillation (AFIB) on Coumadin [I48.91]  Long term current use of anticoagulants with INR goal of 2.0-3.0 (Resolved) [Z79.01]  Paroxysmal atrial fibrillation (H) [I48.0]  Long term current use of anticoagulants with INR goal of 2.0-3.0 [Z79.01]           Comments:           Anticoagulation Care Providers     Provider Role Specialty Phone number    Genaro Roy MD Referring Cardiovascular Disease [phone]    Digna Jones MD Referring Internal Medicine [phone]

## 2021-09-01 NOTE — PROGRESS NOTES
Anticoagulation Management    Unable to reach Jamal today.    Today's INR result of 2.4 is therapeutic (goal INR of 2.0-3.0).  Result received from: Clinic Lab    Follow up required to confirm warfarin dose taken and assess for changes    Left message to continue current dose of warfarin 5 mg tonight.      Anticoagulation clinic to follow up    Shirley Cevallos RN

## 2021-09-09 ENCOUNTER — VIRTUAL VISIT (OUTPATIENT)
Dept: PHARMACY | Facility: CLINIC | Age: 76
End: 2021-09-09
Payer: COMMERCIAL

## 2021-09-09 DIAGNOSIS — E11.40 TYPE 2 DIABETES MELLITUS WITH DIABETIC NEUROPATHY, WITH LONG-TERM CURRENT USE OF INSULIN (H): ICD-10-CM

## 2021-09-09 DIAGNOSIS — I10 ESSENTIAL HYPERTENSION: Primary | ICD-10-CM

## 2021-09-09 DIAGNOSIS — I48.91 ATRIAL FIBRILLATION, UNSPECIFIED TYPE (H): ICD-10-CM

## 2021-09-09 DIAGNOSIS — I42.0 DILATED CARDIOMYOPATHY (H): ICD-10-CM

## 2021-09-09 DIAGNOSIS — I25.10 CORONARY ARTERY DISEASE INVOLVING NATIVE CORONARY ARTERY OF NATIVE HEART WITHOUT ANGINA PECTORIS: ICD-10-CM

## 2021-09-09 DIAGNOSIS — Z79.4 TYPE 2 DIABETES MELLITUS WITH DIABETIC NEUROPATHY, WITH LONG-TERM CURRENT USE OF INSULIN (H): ICD-10-CM

## 2021-09-09 PROCEDURE — 99606 MTMS BY PHARM EST 15 MIN: CPT | Performed by: PHARMACIST

## 2021-09-09 RX ORDER — FUROSEMIDE 40 MG
40 TABLET ORAL 2 TIMES DAILY
COMMUNITY
Start: 2021-08-30

## 2021-09-09 NOTE — PROGRESS NOTES
Medication Therapy Management (MTM) Encounter    ASSESSMENT:                            Medication Adherence/Access: No issues identified    Hypertension/CAD/A. Fib/HFrEF:  Plan in place.    Type 2 Diabetes: Patient is meeting A1c goal of < 7%. Self monitoring of blood glucose is mostly at goal of fasting  mg/dL.  No changes needed.    PLAN:                            Continue current medication regimen    Follow-up: Return in 20 days (on 9/29/2021) for Follow-up Medication Review.    SUBJECTIVE/OBJECTIVE:                          Jt Montgomery is a 75 year old male called for a follow-up visit. He was referred to me from Dr. Jones.  Today's visit is a follow-up MTM visit from 8/19/21.     Reason for visit: Routine f/up.    Tobacco: He reports that he quit smoking about 49 years ago. His smoking use included cigarettes. He has a 10.50 pack-year smoking history. He has never used smokeless tobacco.  Alcohol: history of alcohol dependence    Social history:  He's planning to move in November (lease starts in October)    Medication Adherence/Access: no issues reported    Hypertension/CAD/A. Fib/HFrEF:  Jamal continues taking furosemide 40mg twice daily, KCl 20mEq twice daily, irbesartan 150mg daily, metoprolol succinate ER 100mg twice daily and warfarin as directed.  He's scheduled for Zio patch monitor and plan to f/up with YOJANA Lopez on 9/14.  Stress test was recommended, but Jamal has declined for now.    BP Readings from Last 3 Encounters:   04/26/21 126/70   04/19/21 (!) 147/74   04/10/21 (!) 142/70      INR   Date Value Ref Range Status   09/01/2021 2.4 (H) 0.9 - 1.1 Final   07/02/2021 2.50 (H) 0.86 - 1.14 Final     Comment:     This test is intended for monitoring Coumadin therapy.  Results are not   accurate in patients with prolonged INR due to factor deficiency.         Recent Labs   Lab Test 09/22/20  1252 11/22/19  1623   CHOL 123 164   HDL 28* 24*   LDL Cannot estimate LDL when triglyceride exceeds 400  mg/dL  50 Cannot estimate LDL when triglyceride exceeds 400 mg/dL  67   TRIG 560* 843*     GFR Estimate   Date Value Ref Range Status   04/26/2021 60 (L) >60 mL/min/[1.73_m2] Final     Comment:     Non  GFR Calc  Starting 12/18/2018, serum creatinine based estimated GFR (eGFR) will be   calculated using the Chronic Kidney Disease Epidemiology Collaboration   (CKD-EPI) equation.       Type 2 Diabetes:  Currently taking Lantus 22 units daily. Patient is not experiencing side effects.  Blood sugar monitoring:  Generally testing once daily (AM fasting) - OneTouch Ultra 2   Date FBG (mg/dL)   8/20 117   8/21 113   8/22 117   8/23 131   8/24 113   8/25 94   8/26 127   8/27 118   8/28 104   8/29 105   8/30 116   8/31 111   9/1 107   9/2 119   9/3 110   9/4 124   9/5 124   9/6 119   9/7 111   9/8 113   9/9 145 ([place], ate differently)   Symptoms of low blood sugar? None  Symptoms of high blood sugar? None.   Eye exam: up to date  Foot exam: due  Diet/Exercise: No changes  Aspirin: No longer taking per cardiology direction given warfarin use  Statin: No - patient declines  ACEi/ARB: Yes: irbesartan.  Urine Albumin:   Lab Results   Component Value Date    UMALCR 317.16 (H) 09/22/2020      Lab Results   Component Value Date    A1C 6.1 07/30/2021    A1C 6.7 04/26/2021    A1C 7.0 09/22/2020    A1C 6.1 02/19/2020    A1C 6.2 11/14/2019    A1C 7.6 10/08/2019     Today's Vitals: There were no vitals taken for this visit.  ----------------    I spent 22 minutes with this patient today. A copy of the visit note was provided to the patient's primary care provider.    The patient declined a summary of these recommendations.     Nathaly Hughes, PharmD, BCACP  Medication Therapy Management Provider  Pager: [phone]     Telemedicine Visit Details  Type of service:  Telephone visit  Start Time: 11:32 AM  End Time: 11:54 AM  Originating Location (patient location): Home  Distant Location (provider  location):  [facility]     Medication Therapy Recommendations  No medication therapy recommendations to display

## 2021-09-09 NOTE — PATIENT INSTRUCTIONS
Recommendations from today's MTM visit:                                                    MTM (medication therapy management) is a service provided by a clinical pharmacist designed to help you get the most of out of your medicines.   Today we reviewed what your medicines are for, how to know if they are working, that your medicines are safe and how to make your medicine regimen as easy as possible.      Continue current medication regimen    Follow-up: Return in 20 days (on 9/29/2021) for Follow-up Medication Review.    It was great to speak with you today.  I value your experience and would be very thankful for your time with providing feedback on our clinic survey. You may receive a survey via email or text message in the next few days.     To schedule another MTM appointment, please call the clinic directly or you may call the MTM scheduling line at [phone] or toll-free at [phone].     My Clinical Pharmacist's contact information:                                                      Please feel free to contact me with any questions or concerns you have.      Nathaly Hughes PharmD, [facility]  Medication Therapy Management Provider  Pager: [phone]     Erlinda Dewey, [place]  Medication Therapy Management Resident  Pager: [phone]

## 2021-09-15 DIAGNOSIS — Z79.01 LONG TERM CURRENT USE OF ANTICOAGULANTS WITH INR GOAL OF 2.0-3.0: ICD-10-CM

## 2021-09-15 DIAGNOSIS — I48.0 PAROXYSMAL ATRIAL FIBRILLATION (H): ICD-10-CM

## 2021-09-16 RX ORDER — WARFARIN SODIUM 5 MG/1
TABLET ORAL
Qty: 135 TABLET | Refills: 0 | Status: SHIPPED | OUTPATIENT
Start: 2021-09-16 | End: 2021-10-26

## 2021-09-16 NOTE — TELEPHONE ENCOUNTER
[facility]   Thank you,  Lucrecia LLANOS RN,BSN

## 2021-09-26 DIAGNOSIS — M1A.39X0 CHRONIC GOUT DUE TO RENAL IMPAIRMENT OF MULTIPLE SITES WITHOUT TOPHUS: Chronic | ICD-10-CM

## 2021-09-28 NOTE — TELEPHONE ENCOUNTER
Routing refill request to provider for review/approval because:  Drug not on the [facility] refill protocol   Looks like per note -for prn gout?   Ivette Dillard RN

## 2021-09-29 ENCOUNTER — VIRTUAL VISIT (OUTPATIENT)
Dept: PHARMACY | Facility: CLINIC | Age: 76
End: 2021-09-29
Payer: COMMERCIAL

## 2021-09-29 DIAGNOSIS — I10 ESSENTIAL HYPERTENSION: Primary | ICD-10-CM

## 2021-09-29 DIAGNOSIS — I48.91 ATRIAL FIBRILLATION, UNSPECIFIED TYPE (H): ICD-10-CM

## 2021-09-29 DIAGNOSIS — Z79.4 TYPE 2 DIABETES MELLITUS WITH DIABETIC NEUROPATHY, WITH LONG-TERM CURRENT USE OF INSULIN (H): ICD-10-CM

## 2021-09-29 DIAGNOSIS — I25.10 CORONARY ARTERY DISEASE INVOLVING NATIVE CORONARY ARTERY OF NATIVE HEART WITHOUT ANGINA PECTORIS: ICD-10-CM

## 2021-09-29 DIAGNOSIS — E11.40 TYPE 2 DIABETES MELLITUS WITH DIABETIC NEUROPATHY, WITH LONG-TERM CURRENT USE OF INSULIN (H): ICD-10-CM

## 2021-09-29 DIAGNOSIS — I42.0 DILATED CARDIOMYOPATHY (H): ICD-10-CM

## 2021-09-29 PROCEDURE — 99606 MTMS BY PHARM EST 15 MIN: CPT | Performed by: PHARMACIST

## 2021-09-29 RX ORDER — PREDNISONE 20 MG/1
TABLET ORAL
Qty: 10 TABLET | Refills: 0 | Status: SHIPPED | OUTPATIENT
Start: 2021-09-29 | End: 2022-06-03

## 2021-09-29 NOTE — PROGRESS NOTES
Medication Therapy Management (MTM) Encounter    ASSESSMENT:                            Medication Adherence/Access: No issues identified    Hypertension/CAD/A. Fib/HFrEF:  Stable.    Type 2 Diabetes: Patient is meeting A1c goal of < 7%. Self monitoring of blood glucose is mostly at goal of fasting  mg/dL.  No changes needed.    PLAN:                            Continue current medication regimen    Follow-up: Return in 15 days (on 10/14/2021) for Follow-up Medication Review.    SUBJECTIVE/OBJECTIVE:                          Jt Montgomery is a 75 year old male called for a follow-up visit. He was referred to me from Dr. Jones.  Today's visit is a follow-up MTM visit from 9/9/2021.     Reason for visit: Routine follow-up.    Tobacco: He reports that he quit smoking about 49 years ago. His smoking use included cigarettes. He has a 10.50 pack-year smoking history. He has never used smokeless tobacco.  Alcohol: history of alcohol dependence    Medication Adherence/Access: no issues reported    Hypertension/CAD/A. Fib/HFrEF:  Jamal continues taking furosemide 40mg twice daily, KCl 20mEq twice daily, irbesartan 150mg daily, metoprolol succinate ER 100mg twice daily and warfarin as directed.  He denies side effects.  He reports symptoms are stable, he continues losing some weight.  BP Readings from Last 3 Encounters:   04/26/21 126/70   04/19/21 (!) 147/74   04/10/21 (!) 142/70      INR   Date Value Ref Range Status   09/01/2021 2.4 (H) 0.9 - 1.1 Final   07/02/2021 2.50 (H) 0.86 - 1.14 Final     Comment:     This test is intended for monitoring Coumadin therapy.  Results are not   accurate in patients with prolonged INR due to factor deficiency.         Recent Labs   Lab Test 09/22/20  1252 11/22/19  1623 11/22/19  1623   CHOL 123  --  164   HDL 28*  --  24*   LDL Cannot estimate LDL when triglyceride exceeds 400 mg/dL  50   < > Cannot estimate LDL when triglyceride exceeds 400 mg/dL  67   TRIG 560*  --  843*    < >  = values in this interval not displayed.      GFR Estimate   Date Value Ref Range Status   04/26/2021 60 (L) >60 mL/min/[1.73_m2] Final     Comment:     Non  GFR Calc  Starting 12/18/2018, serum creatinine based estimated GFR (eGFR) will be   calculated using the Chronic Kidney Disease Epidemiology Collaboration   (CKD-EPI) equation.       Type 2 Diabetes:  Currently taking Lantus 22 units daily. Patient is not experiencing side effects.  Blood sugar monitoring:  Generally testing once daily (AM fasting) - OneTouch Ultra 2   Date FBG (mg/dL)   9/13 105   9/14 96   9/15 -   9/16 99   9/17 108   9/18 97   9/19 105   9/20 99   9/21 108   9/22 108   9/23 -   9/24 119   9/25 146 (took prednisone night before)   9/26 162 (took prednisone day before); 129 later in day   9/27 161   9/28 148 (may have been non-fasting, he can't remember)   9/29 118   Symptoms of low blood sugar? None  Symptoms of high blood sugar? None.   Eye exam: up to date  Foot exam: due  Diet/Exercise: No changes  Aspirin: No longer taking per cardiology direction given warfarin use  Statin: No - patient declines  ACEi/ARB: Yes: irbesartan.  Urine Albumin:   Lab Results   Component Value Date    UMALCR 317.16 (H) 09/22/2020      Lab Results   Component Value Date    A1C 6.1 07/30/2021    A1C 6.7 04/26/2021    A1C 7.0 09/22/2020    A1C 6.1 02/19/2020    A1C 6.2 11/14/2019    A1C 7.6 10/08/2019     Today's Vitals: There were no vitals taken for this visit.  ----------------    I spent 33 minutes with this patient today. A copy of the visit note was provided to the patient's primary care provider.    The patient declined a summary of these recommendations.     Nathaly Hughes, PharmD, BCACP  Medication Therapy Management Provider  Pager: [phone]     Telemedicine Visit Details  Type of service:  Telephone visit  Start Time: 11:30 AM  End Time: 12:03 PM  Originating Location (patient location): [place]  Distant Location (provider location):  [facility]     Medication Therapy Recommendations  No medication therapy recommendations to display

## 2021-09-29 NOTE — PATIENT INSTRUCTIONS
Recommendations from today's MTM visit:                                                    MTM (medication therapy management) is a service provided by a clinical pharmacist designed to help you get the most of out of your medicines.   Today we reviewed what your medicines are for, how to know if they are working, that your medicines are safe and how to make your medicine regimen as easy as possible.      Continue current medication regimen    Follow-up: Return in 15 days (on 10/14/2021) for Follow-up Medication Review.    It was great to speak with you today.  I value your experience and would be very thankful for your time with providing feedback on our clinic survey. You may receive a survey via email or text message in the next few days.     To schedule another MTM appointment, please call the clinic directly or you may call the MTM scheduling line at [phone] or toll-free at [phone].     My Clinical Pharmacist's contact information:                                                      Please feel free to contact me with any questions or concerns you have.      Nathaly Hughes PharmD, [facility]  Medication Therapy Management Provider  Pager: [phone]     Erlinda Dewey, Rogelio  Medication Therapy Management Resident  Pager: [phone]

## 2021-09-30 ENCOUNTER — TELEPHONE (OUTPATIENT)
Dept: PHARMACY | Facility: CLINIC | Age: 76
End: 2021-09-30

## 2021-09-30 DIAGNOSIS — I42.0 DILATED CARDIOMYOPATHY (H): Primary | ICD-10-CM

## 2021-09-30 NOTE — TELEPHONE ENCOUNTER
Received phone call from Jamal.  He's been experiencing a sensation that his hands are oily and he questions if this could be from potassium (as it's the most recent change he's had).  He's been taking KCl 20mEq twice daily.  I advised I do not think this is from potassium - he'd feel better if we re-checked labs with his next INR, which we can do.  BMP ordered to be done with INR next week.    Nathaly Hughes, PharmD, [facility]  Medication Therapy Management Provider  Pager: [phone]

## 2021-10-06 ENCOUNTER — ANTICOAGULATION THERAPY VISIT (OUTPATIENT)
Dept: NURSING | Facility: CLINIC | Age: 76
End: 2021-10-06

## 2021-10-06 ENCOUNTER — LAB (OUTPATIENT)
Dept: LAB | Facility: CLINIC | Age: 76
End: 2021-10-06
Payer: COMMERCIAL

## 2021-10-06 DIAGNOSIS — I48.0 PAROXYSMAL ATRIAL FIBRILLATION (H): ICD-10-CM

## 2021-10-06 DIAGNOSIS — I42.0 DILATED CARDIOMYOPATHY (H): ICD-10-CM

## 2021-10-06 LAB — INR BLD: 2.9 (ref 0.9–1.1)

## 2021-10-06 PROCEDURE — 36415 COLL VENOUS BLD VENIPUNCTURE: CPT

## 2021-10-06 PROCEDURE — 85610 PROTHROMBIN TIME: CPT

## 2021-10-06 PROCEDURE — 80048 BASIC METABOLIC PNL TOTAL CA: CPT

## 2021-10-06 NOTE — PROGRESS NOTES
Patient is returning the call, he did received the voice mail message and does understand it. He doesn't plan on moving until middle of November and at this time he doesn't have anything set up with any other medical clinic.  Liz Luong   [facility]  Central Scheduler

## 2021-10-06 NOTE — PROGRESS NOTES
ANTICOAGULATION MANAGEMENT     Jt Montgomery 75 year old male is on warfarin with therapeutic INR result. (Goal INR 2.0-3.0)    Recent labs: (last 7 days)     10/06/21  1118   INR 2.9*       ASSESSMENT     Source(s): Chart Review and Patient/Caregiver Call       Warfarin doses taken: Warfarin taken as instructed    Diet: No new diet changes identified    New illness, injury, or hospitalization: No    Medication/supplement changes: None noted    Signs or symptoms of bleeding or clotting: No    Previous INR: Therapeutic last 2(+) visits    Additional findings: Pt plans to move to [place], [place] the end of November. Pt has not established care there yet.     PLAN     Recommended plan for no diet, medication or health factor changes affecting INR     Dosing Instructions: Continue your current warfarin dose with next INR in 4 weeks       Summary  As of 10/6/2021    Full warfarin instructions:  7.5 mg every Sun, Tue, Thu; 5 mg all other days   Next INR check:  11/3/2021             Telephone call with Jt who agrees to plan and repeated back plan correctly    Lab visit scheduled    Education provided: Importance of notifying clinic for changes in medications; a sooner lab recheck maybe needed. and Contact [phone]  with any changes, questions or concerns.     Plan made per [facility] anticoagulation protocol    Helga Elizabeth RN  Anticoagulation Clinic  10/6/2021    _______________________________________________________________________     Anticoagulation Episode Summary     Current INR goal:  2.0-3.0   TTR:  99.3 % (1 y)   Target end date:  Indefinite   Send INR reminders to:  URVASHI PIMENTEL    Indications    Atrial fibrillation (AFIB) on Coumadin [I48.91]  Long term current use of anticoagulants with INR goal of 2.0-3.0 (Resolved) [Z79.01]  Paroxysmal atrial fibrillation (H) [I48.0]  Long term current use of anticoagulants with INR goal of 2.0-3.0 [Z79.01]           Comments:           Anticoagulation Care  Providers     Provider Role Specialty Phone number    Ip, Genaro Garrett MD Referring Cardiovascular Disease [phone]    Digna Jones MD Referring Internal Medicine [phone]

## 2021-10-06 NOTE — PROGRESS NOTES
Anticoagulation Management    Unable to reach Jamal today.    Today's INR result of 2.9 is therapeutic (goal INR of 2.0-3.0).  Result received from: Clinic Lab    Follow up required to assess for changes     Left a voicemail advising pt to continue taking warfarin 7.5 mg on Sundays/Tuesdays/Thursdays and 5 mg all other days. Requested a call back to discuss and to get information as to when he plans to start with Preemption or other medical affiliation    Transfer to [phone]        Anticoagulation clinic to follow up    Helga Elizabeth RN

## 2021-10-07 LAB
ANION GAP SERPL CALCULATED.3IONS-SCNC: 7 MMOL/L (ref 3–14)
BUN SERPL-MCNC: 28 MG/DL (ref 7–30)
CALCIUM SERPL-MCNC: 8.7 MG/DL (ref 8.5–10.1)
CHLORIDE BLD-SCNC: 105 MMOL/L (ref 94–109)
CO2 SERPL-SCNC: 24 MMOL/L (ref 20–32)
CREAT SERPL-MCNC: 1.32 MG/DL (ref 0.66–1.25)
GFR SERPL CREATININE-BSD FRML MDRD: 53 ML/MIN/1.73M2
GLUCOSE BLD-MCNC: 148 MG/DL (ref 70–99)
POTASSIUM BLD-SCNC: 4.4 MMOL/L (ref 3.4–5.3)
SODIUM SERPL-SCNC: 136 MMOL/L (ref 133–144)

## 2021-10-14 ENCOUNTER — VIRTUAL VISIT (OUTPATIENT)
Dept: PHARMACY | Facility: CLINIC | Age: 76
End: 2021-10-14
Payer: COMMERCIAL

## 2021-10-14 DIAGNOSIS — E11.40 TYPE 2 DIABETES MELLITUS WITH DIABETIC NEUROPATHY, WITH LONG-TERM CURRENT USE OF INSULIN (H): ICD-10-CM

## 2021-10-14 DIAGNOSIS — I10 ESSENTIAL HYPERTENSION: Primary | ICD-10-CM

## 2021-10-14 DIAGNOSIS — I42.0 DILATED CARDIOMYOPATHY (H): ICD-10-CM

## 2021-10-14 DIAGNOSIS — I48.91 ATRIAL FIBRILLATION, UNSPECIFIED TYPE (H): ICD-10-CM

## 2021-10-14 DIAGNOSIS — Z79.4 TYPE 2 DIABETES MELLITUS WITH DIABETIC NEUROPATHY, WITH LONG-TERM CURRENT USE OF INSULIN (H): ICD-10-CM

## 2021-10-14 DIAGNOSIS — I25.10 CORONARY ARTERY DISEASE INVOLVING NATIVE CORONARY ARTERY OF NATIVE HEART WITHOUT ANGINA PECTORIS: ICD-10-CM

## 2021-10-14 PROCEDURE — 99606 MTMS BY PHARM EST 15 MIN: CPT | Performed by: PHARMACIST

## 2021-10-14 NOTE — PROGRESS NOTES
Medication Therapy Management (MTM) Encounter    ASSESSMENT:                            Medication Adherence/Access: No issues identified    Hypertension/CAD/A. Fib/HFrEF:  Stable, plan in place to re-check ECHO.    Type 2 Diabetes: Patient is meeting A1c goal of < 7%. Self monitoring of blood glucose is mostly at goal of fasting  mg/dL.  No changes needed.    PLAN:                            Continue current medication regimen.    Follow-up: Return in about 3 weeks (around 11/4/2021) for Follow-up Medication Review.    SUBJECTIVE/OBJECTIVE:                          Jt Montgomery is a 75 year old male called for a follow-up visit. He was referred to me from Dr. Jones.  Today's visit is a follow-up MTM visit from 9/29/2021.     Reason for visit: Routine follow-up.    Tobacco: He reports that he quit smoking about 49 years ago. His smoking use included cigarettes. He has a 10.50 pack-year smoking history. He has never used smokeless tobacco.  Alcohol: history of alcohol dependence    Social history: He's still planning to move to [place], now thinking mid to late November    Medication Adherence/Access: no issues reported    Hypertension/CAD/A. Fib/HFrEF:  Jamal continues taking furosemide 40mg twice daily, KCl 20mEq twice daily, irbesartan 150mg daily, metoprolol succinate ER 100mg twice daily and warfarin as directed.  He denies side effects.  He reports symptoms are stable, he continues losing some weight.  He reports blood pressure on average is 100-120s/60-70s mmHg; HR 50-70s (mostly 70s).  He has ECHO scheduled for tomorrow.  Potassium   Date Value Ref Range Status   10/06/2021 4.4 3.4 - 5.3 mmol/L Final   04/26/2021 4.3 3.4 - 5.3 mmol/L Final      BP Readings from Last 3 Encounters:   04/26/21 126/70   04/19/21 (!) 147/74   04/10/21 (!) 142/70      INR   Date Value Ref Range Status   10/06/2021 2.9 (H) 0.9 - 1.1 Final   07/02/2021 2.50 (H) 0.86 - 1.14 Final     Comment:     This test is intended for  monitoring Coumadin therapy.  Results are not   accurate in patients with prolonged INR due to factor deficiency.         Recent Labs   Lab Test 09/22/20  1252 11/22/19  1623 11/22/19  1623   CHOL 123  --  164   HDL 28*  --  24*   LDL Cannot estimate LDL when triglyceride exceeds 400 mg/dL  50   < > Cannot estimate LDL when triglyceride exceeds 400 mg/dL  67   TRIG 560*  --  843*    < > = values in this interval not displayed.      GFR Estimate   Date Value Ref Range Status   10/06/2021 53 (L) >60 mL/min/1.73m2 Final     Comment:     As of July 11, 2021, eGFR is calculated by the CKD-EPI creatinine equation, without race adjustment. eGFR can be influenced by muscle mass, exercise, and diet. The reported eGFR is an estimation only and is only applicable if the renal function is stable.   04/26/2021 60 (L) >60 mL/min/[1.73_m2] Final     Comment:     Non  GFR Calc  Starting 12/18/2018, serum creatinine based estimated GFR (eGFR) will be   calculated using the Chronic Kidney Disease Epidemiology Collaboration   (CKD-EPI) equation.       Type 2 Diabetes:  Currently taking Lantus 22 units daily. Patient is not experiencing side effects.  Blood sugar monitoring:  Generally testing once daily (AM fasting) - OneTouch Ultra 2   Date FBG (mg/dL)   9/30 142   10/1 110   10/2 109   10/3 107   10/4 102   10/5 145   10/6 -   10/7 107   10/8 109   10/9 101   10/10 138   10/11 108   10/12 96   10/13 84   10/14 100   Symptoms of low blood sugar? None  Symptoms of high blood sugar? None.   Eye exam: up to date  Foot exam: due  Diet/Exercise: No changes  Aspirin: No longer taking per cardiology direction given warfarin use  Statin: No - patient declines  ACEi/ARB: Yes: irbesartan.  Urine Albumin:   Lab Results   Component Value Date    UMALCR 317.16 (H) 09/22/2020      Lab Results   Component Value Date    A1C 6.1 07/30/2021    A1C 6.7 04/26/2021    A1C 7.0 09/22/2020    A1C 6.1 02/19/2020    A1C 6.2 11/14/2019    A1C  7.6 10/08/2019     Today's Vitals: There were no vitals taken for this visit.  ----------------    I spent 33 minutes with this patient today. A copy of the visit note was provided to the patient's primary care provider.    The patient declined a summary of these recommendations.     Nathaly Hughes PharmD, [facility]  Medication Therapy Management Provider  Pager: [phone]     Telemedicine Visit Details  Type of service:  Telephone visit  Start Time: 11:30 AM  End Time: 12:03 PM  Originating Location (patient location): [place]  Distant Location (provider location):  [facility]     Medication Therapy Recommendations  No medication therapy recommendations to display

## 2021-10-14 NOTE — PATIENT INSTRUCTIONS
Recommendations from today's MTM visit:                                                    MTM (medication therapy management) is a service provided by a clinical pharmacist designed to help you get the most of out of your medicines.   Today we reviewed what your medicines are for, how to know if they are working, that your medicines are safe and how to make your medicine regimen as easy as possible.      Continue current medication regimen    Follow-up: Return in about 3 weeks (around 11/4/2021) for Follow-up Medication Review.    It was great to speak with you today.  I value your experience and would be very thankful for your time with providing feedback on our clinic survey. You may receive a survey via email or text message in the next few days.     To schedule another MTM appointment, please call the clinic directly or you may call the MTM scheduling line at [phone] or toll-free at [phone].     My Clinical Pharmacist's contact information:                                                      Please feel free to contact me with any questions or concerns you have.      Nathaly Hughes PharmD, [facility]  Medication Therapy Management Provider  Pager: [phone]     Erlinda Dewey PharmD  Medication Therapy Management Resident  Pager: [phone]

## 2021-10-23 DIAGNOSIS — I48.0 PAROXYSMAL ATRIAL FIBRILLATION (H): ICD-10-CM

## 2021-10-23 DIAGNOSIS — Z79.01 LONG TERM CURRENT USE OF ANTICOAGULANTS WITH INR GOAL OF 2.0-3.0: ICD-10-CM

## 2021-10-26 RX ORDER — WARFARIN SODIUM 5 MG/1
TABLET ORAL
Qty: 135 TABLET | Refills: 1 | Status: SHIPPED | OUTPATIENT
Start: 2021-10-26 | End: 2021-12-31

## 2021-11-03 ENCOUNTER — ANTICOAGULATION THERAPY VISIT (OUTPATIENT)
Dept: NURSING | Facility: CLINIC | Age: 76
End: 2021-11-03

## 2021-11-03 ENCOUNTER — TELEPHONE (OUTPATIENT)
Dept: FAMILY MEDICINE | Facility: CLINIC | Age: 76
End: 2021-11-03

## 2021-11-03 ENCOUNTER — LAB (OUTPATIENT)
Dept: LAB | Facility: CLINIC | Age: 76
End: 2021-11-03
Payer: COMMERCIAL

## 2021-11-03 DIAGNOSIS — I48.0 PAROXYSMAL ATRIAL FIBRILLATION (H): ICD-10-CM

## 2021-11-03 LAB — INR BLD: 4.2 (ref 0.9–1.1)

## 2021-11-03 PROCEDURE — 36416 COLLJ CAPILLARY BLOOD SPEC: CPT

## 2021-11-03 PROCEDURE — 85610 PROTHROMBIN TIME: CPT

## 2021-11-03 NOTE — PROGRESS NOTES
ANTICOAGULATION MANAGEMENT     Jt Montgomery 75 year old male is on warfarin with supratherapeutic INR result. (Goal INR 2.0-3.0)    Recent labs: (last 7 days)     11/03/21  1139   INR 4.2*       ASSESSMENT     Source(s): Chart Review and Patient/Caregiver Call       Warfarin doses taken: Warfarin taken as instructed    Diet: No new diet changes identified    New illness, injury, or hospitalization: No    Medication/supplement changes: None noted    Signs or symptoms of bleeding or clotting: No    Previous INR: Therapeutic last 2(+) visits    Additional findings: None     PLAN     Recommended plan for no diet, medication or health factor changes affecting INR     Dosing Instructions: Hold dose then continue your current warfarin dose with next INR in 10 days       Summary  As of 11/3/2021    Full warfarin instructions:  11/3: Hold; Otherwise 7.5 mg every Sun, Tue, Thu; 5 mg all other days   Next INR check:  11/12/2021             Telephone call with Jt who agrees to plan and repeated back plan correctly    Lab visit scheduled    Education provided: Importance of notifying clinic for changes in medications; a sooner lab recheck maybe needed. and Contact [phone]  with any changes, questions or concerns.     Plan made per [facility] anticoagulation protocol    Helga Elizabeth RN  Anticoagulation Clinic  11/3/2021    _______________________________________________________________________     Anticoagulation Episode Summary     Current INR goal:  2.0-3.0   TTR:  92.3 % (1 y)   Target end date:  Indefinite   Send INR reminders to:  URVASHI PIMENTEL    Indications    Atrial fibrillation (AFIB) on Coumadin [I48.91]  Long term current use of anticoagulants with INR goal of 2.0-3.0 (Resolved) [Z79.01]  Paroxysmal atrial fibrillation (H) [I48.0]  Long term current use of anticoagulants with INR goal of 2.0-3.0 [Z79.01]           Comments:           Anticoagulation Care Providers     Provider Role Specialty Phone number     Ip, Genaro Garrett MD Referring Cardiovascular Disease [phone]    Digna Jones MD Referring Internal Medicine [phone]

## 2021-11-03 NOTE — TELEPHONE ENCOUNTER
Reason for Call:  Other call back    Detailed comments: patient is calling stating had INR done today which he thought was a little higher then usual and was expecting a call, would like a call back to discuss/feedback. Thank you.    Phone Number Patient can be reached at: Home number on file [phone] (home)    Best Time:     Can we leave a detailed message on this number? NO    Call taken on 11/3/2021 at 3:18 PM by Amanda Obregon

## 2021-11-04 ENCOUNTER — VIRTUAL VISIT (OUTPATIENT)
Dept: PHARMACY | Facility: CLINIC | Age: 76
End: 2021-11-04
Payer: COMMERCIAL

## 2021-11-04 DIAGNOSIS — E11.40 TYPE 2 DIABETES MELLITUS WITH DIABETIC NEUROPATHY, WITH LONG-TERM CURRENT USE OF INSULIN (H): ICD-10-CM

## 2021-11-04 DIAGNOSIS — Z23 NEED FOR VACCINATION: ICD-10-CM

## 2021-11-04 DIAGNOSIS — I42.0 DILATED CARDIOMYOPATHY (H): ICD-10-CM

## 2021-11-04 DIAGNOSIS — I25.10 CORONARY ARTERY DISEASE INVOLVING NATIVE CORONARY ARTERY OF NATIVE HEART WITHOUT ANGINA PECTORIS: ICD-10-CM

## 2021-11-04 DIAGNOSIS — I10 ESSENTIAL HYPERTENSION: Primary | ICD-10-CM

## 2021-11-04 DIAGNOSIS — I48.91 ATRIAL FIBRILLATION, UNSPECIFIED TYPE (H): ICD-10-CM

## 2021-11-04 DIAGNOSIS — Z79.4 TYPE 2 DIABETES MELLITUS WITH DIABETIC NEUROPATHY, WITH LONG-TERM CURRENT USE OF INSULIN (H): ICD-10-CM

## 2021-11-04 PROCEDURE — 99607 MTMS BY PHARM ADDL 15 MIN: CPT | Performed by: PHARMACIST

## 2021-11-04 PROCEDURE — 99606 MTMS BY PHARM EST 15 MIN: CPT | Performed by: PHARMACIST

## 2021-11-04 NOTE — PROGRESS NOTES
Medication Therapy Management (MTM) Encounter    ASSESSMENT:                            Medication Adherence/Access: No issues identified    Hypertension/CAD/A. Fib/HFrEF:  Stable, plan in place to re-connect with cardiology.    Type 2 Diabetes: Patient is meeting A1c goal of < 7%. Self monitoring of blood glucose is mostly at goal of fasting  mg/dL.  No changes needed.  Due for A1c re-check - also due for microalbumin and lipids.    Immunizations:  Due for COVID-19 booster, tetanus, influenza, and Shingrix.    PLAN:                            1.  Future order placed for A1c, microalbumin and lipids to be checked along with INR next week.  2.  Recommended COVID-19 booster, teatnus, influenza vaccine and Shingrix.  He will call the pharmacy to schedule.    Follow-up: Return in 2 weeks (on 11/18/2021) for Follow-up Medication Review.    SUBJECTIVE/OBJECTIVE:                          Jt Montgomery is a 75 year old male called for a follow-up visit. He was referred to me from Dr. Jones.  Today's visit is a follow-up MTM visit from 10/14/2021.     Reason for visit: Routine follow-up.    Tobacco: He reports that he quit smoking about 49 years ago. His smoking use included cigarettes. He has a 10.50 pack-year smoking history. He has never used smokeless tobacco.  Alcohol: history of alcohol dependence - is almost to 48 years of sobriety (at the end of this month)    Social history:  He is thinking he will not move to [place], [place] until December    Medication Adherence/Access: no issues reported    Hypertension/CAD/A. Fib/HFrEF:  Jamal continues taking furosemide 40mg twice daily, KCl 20mEq twice daily, irbesartan 150mg daily, metoprolol succinate ER 100mg twice daily and warfarin as directed.  He denies side effects.  He reports symptoms are stable, he continues losing some weight.  He reports blood pressure on average is 100-120s/60-70s mmHg; HR 50-70s (mostly 70s).  He did hold warfarin dose given elevated INR  "yesterday.  He did have ECHO done on 10/15/2021 - EF was improved to 45%, but below baseline.  Mitral valve was also now severely leaky (was \"at least moderate\").  It was recommended for him to see EP and Dr. Lawton due to mitral regurgitation and frequent PVCs and NSVT, or establish with cardiology within 1 month of moving to [place].  Jamal received their VM and plans to call them back today.    Potassium   Date Value Ref Range Status   10/06/2021 4.4 3.4 - 5.3 mmol/L Final   04/26/2021 4.3 3.4 - 5.3 mmol/L Final      BP Readings from Last 3 Encounters:   04/26/21 126/70   04/19/21 (!) 147/74   04/10/21 (!) 142/70      INR   Date Value Ref Range Status   11/03/2021 4.2 (H) 0.9 - 1.1 Final   07/02/2021 2.50 (H) 0.86 - 1.14 Final     Comment:     This test is intended for monitoring Coumadin therapy.  Results are not   accurate in patients with prolonged INR due to factor deficiency.         Recent Labs   Lab Test 09/22/20  1252 11/22/19  1623 11/22/19  1623   CHOL 123  --  164   HDL 28*  --  24*   LDL Cannot estimate LDL when triglyceride exceeds 400 mg/dL  50   < > Cannot estimate LDL when triglyceride exceeds 400 mg/dL  67   TRIG 560*  --  843*    < > = values in this interval not displayed.      GFR Estimate   Date Value Ref Range Status   10/06/2021 53 (L) >60 mL/min/1.73m2 Final     Comment:     As of July 11, 2021, eGFR is calculated by the CKD-EPI creatinine equation, without race adjustment. eGFR can be influenced by muscle mass, exercise, and diet. The reported eGFR is an estimation only and is only applicable if the renal function is stable.   04/26/2021 60 (L) >60 mL/min/[1.73_m2] Final     Comment:     Non  GFR Calc  Starting 12/18/2018, serum creatinine based estimated GFR (eGFR) will be   calculated using the Chronic Kidney Disease Epidemiology Collaboration   (CKD-EPI) equation.       Type 2 Diabetes:  Currently taking Lantus 22 units daily. Patient is not experiencing side effects.  Blood " sugar monitoring:  Generally testing once daily (AM fasting) - OneTouch Ultra 2   Date FBG (mg/dL)   10/16 98   10/17 104   10/18 95   10/19 108   10/20 103   10/21 93   10/22 87   10/23 101   10/24 -   10/25 109   10/26 97   10/27 74   10/28 94   10/29 99   10/30 94   10/31 84   11/1 84   11/2 98   11/3 96   11/4 91   Symptoms of low blood sugar? None  Symptoms of high blood sugar? None.   Eye exam: up to date  Foot exam: due  Diet/Exercise: No changes  Aspirin: No longer taking per cardiology direction given warfarin use  Statin: No - patient declines  ACEi/ARB: Yes: irbesartan.  Urine Albumin:   Lab Results   Component Value Date    UMALCR 317.16 (H) 09/22/2020      Lab Results   Component Value Date    A1C 6.1 07/30/2021    A1C 6.7 04/26/2021    A1C 7.0 09/22/2020    A1C 6.1 02/19/2020    A1C 6.2 11/14/2019    A1C 7.6 10/08/2019     Immunizations:    Most Recent Immunizations   Administered Date(s) Administered     COVID-19,PF,V[place]essa 03/06/2021     Influenza (High Dose) 3 valent vaccine 01/02/2017     Pneumococcal 23 valent 09/12/2012      Today's Vitals: There were no vitals taken for this visit.  ----------------    I spent 27 minutes with this patient today. A copy of the visit note was provided to the patient's primary care provider.    The patient declined a summary of these recommendations.     Nathaly Hughes, PharmD, [facility]  Medication Therapy Management Provider  Pager: [phone]     Telemedicine Visit Details  Type of service:  Telephone visit  Start Time: 11:34 AM  End Time: 12:01 PM  Originating Location (patient location): Home  Distant Location (provider location):  [facility]     Medication Therapy Recommendations  Need for vaccination    Rationale: Preventive therapy - Needs additional medication therapy - Indication   Recommendation: Order Vaccine - COVID-19 MRNA VIRUS VACCINE   Status: Accepted - no CPA Needed         Type 2 diabetes mellitus with diabetic neuropathy (H)     Current Medication: insulin glargine (LANTUS SOLOSTAR) 100 UNIT/ML pen   Rationale: Medication requires monitoring - Needs additional monitoring - Effectiveness   Recommendation: Order Lab   Status: Accepted per CPA

## 2021-11-04 NOTE — PATIENT INSTRUCTIONS
Recommendations from today's MTM visit:                                                    MTM (medication therapy management) is a service provided by a clinical pharmacist designed to help you get the most of out of your medicines.   Today we reviewed what your medicines are for, how to know if they are working, that your medicines are safe and how to make your medicine regimen as easy as possible.      1.  Future order placed for A1c to be checked along with INR next week.  2.  Recommended COVID-19 booster, teatnus, influenza vaccine and Shingrix.  He will call the pharmacy to schedule.    Follow-up: Return in 2 weeks (on 11/18/2021) for Follow-up Medication Review.    It was great to speak with you today.  I value your experience and would be very thankful for your time with providing feedback on our clinic survey. You may receive a survey via email or text message in the next few days.     To schedule another MTM appointment, please call the clinic directly or you may call the MTM scheduling line at [phone] or toll-free at [phone].     My Clinical Pharmacist's contact information:                                                      Please feel free to contact me with any questions or concerns you have.      Nathaly Hughes, PharmD, [facility]  Medication Therapy Management Provider  Pager: [phone]     Erlinda Dewey, PharmD  Medication Therapy Management Resident  Pager: [phone]

## 2021-11-13 DIAGNOSIS — Z79.4 TYPE 2 DIABETES MELLITUS WITH DIABETIC NEUROPATHY, WITH LONG-TERM CURRENT USE OF INSULIN (H): ICD-10-CM

## 2021-11-13 DIAGNOSIS — E11.40 TYPE 2 DIABETES MELLITUS WITH DIABETIC NEUROPATHY, WITH LONG-TERM CURRENT USE OF INSULIN (H): ICD-10-CM

## 2021-11-15 ENCOUNTER — LAB (OUTPATIENT)
Dept: LAB | Facility: CLINIC | Age: 76
End: 2021-11-15
Payer: COMMERCIAL

## 2021-11-15 ENCOUNTER — ANTICOAGULATION THERAPY VISIT (OUTPATIENT)
Dept: ANTICOAGULATION | Facility: CLINIC | Age: 76
End: 2021-11-15
Payer: COMMERCIAL

## 2021-11-15 DIAGNOSIS — Z79.4 TYPE 2 DIABETES MELLITUS WITH DIABETIC NEUROPATHY, WITH LONG-TERM CURRENT USE OF INSULIN (H): ICD-10-CM

## 2021-11-15 DIAGNOSIS — I48.91 ATRIAL FIBRILLATION (H): Primary | ICD-10-CM

## 2021-11-15 DIAGNOSIS — Z79.01 LONG TERM CURRENT USE OF ANTICOAGULANTS WITH INR GOAL OF 2.0-3.0: ICD-10-CM

## 2021-11-15 DIAGNOSIS — I48.0 PAROXYSMAL ATRIAL FIBRILLATION (H): ICD-10-CM

## 2021-11-15 DIAGNOSIS — I25.10 CORONARY ARTERY DISEASE INVOLVING NATIVE CORONARY ARTERY OF NATIVE HEART WITHOUT ANGINA PECTORIS: ICD-10-CM

## 2021-11-15 DIAGNOSIS — E11.40 TYPE 2 DIABETES MELLITUS WITH DIABETIC NEUROPATHY, WITH LONG-TERM CURRENT USE OF INSULIN (H): ICD-10-CM

## 2021-11-15 LAB
HBA1C MFR BLD: 5.7 % (ref 0–5.6)
INR BLD: 3.5 (ref 0.9–1.1)

## 2021-11-15 PROCEDURE — 99207 PR NO CHARGE NURSE ONLY: CPT

## 2021-11-15 PROCEDURE — 80048 BASIC METABOLIC PNL TOTAL CA: CPT

## 2021-11-15 PROCEDURE — 83036 HEMOGLOBIN GLYCOSYLATED A1C: CPT

## 2021-11-15 PROCEDURE — 82043 UR ALBUMIN QUANTITATIVE: CPT

## 2021-11-15 PROCEDURE — 80061 LIPID PANEL: CPT

## 2021-11-15 PROCEDURE — 85610 PROTHROMBIN TIME: CPT

## 2021-11-15 PROCEDURE — 36415 COLL VENOUS BLD VENIPUNCTURE: CPT

## 2021-11-15 PROCEDURE — 36416 COLLJ CAPILLARY BLOOD SPEC: CPT

## 2021-11-15 NOTE — PROGRESS NOTES
ANTICOAGULATION FOLLOW-UP CLINIC VISIT    Patient Name:  Jt Montgomery  Date:  11/15/2021  Contact Type:  Telephone    SUBJECTIVE:         OBJECTIVE    Recent labs: (last 7 days)     11/15/21  1351   INR 3.5*       ASSESSMENT / PLAN  INR assessment SUPRA    Recheck INR In: 2 WEEKS    INR Location Clinic      Anticoagulation Summary  As of 11/15/2021    INR goal:  2.0-3.0   TTR:  89.0 % (1 y)   INR used for dosing:  3.5 (11/15/2021)   Warfarin maintenance plan:  7.5 mg (5 mg x 1.5) every Sun, Thu; 5 mg (5 mg x 1) all other days   Full warfarin instructions:  11/15: 2.5 mg; Otherwise 7.5 mg every Sun, Thu; 5 mg all other days   Weekly warfarin total:  40 mg   Plan last modified:  Indiana Burton RN (11/15/2021)   Next INR check:  11/29/2021   Priority:  Maintenance   Target end date:  Indefinite    Indications    Atrial fibrillation (AFIB) on Coumadin [I48.91]  Long term current use of anticoagulants with INR goal of 2.0-3.0 (Resolved) [Z79.01]  Paroxysmal atrial fibrillation (H) [I48.0]  Long term current use of anticoagulants with INR goal of 2.0-3.0 [Z79.01]             Anticoagulation Episode Summary     INR check location:      Preferred lab:      Send INR reminders to:  URVASHI PIMENTEL    Comments:        Anticoagulation Care Providers     Provider Role Specialty Phone number    Ip, Genaro Garrett MD Referring Cardiovascular Disease [phone]    Digna Jones MD Referring Internal Medicine [phone]            See the Encounter Report to view Anticoagulation Flowsheet and Dosing Calendar (Go to Encounters tab in chart review, and find the Anticoagulation Therapy Visit)        Indiana Burton, MARCELLA            ANTICOAGULATION MANAGEMENT     Jt Montgomery 76 year old male is on warfarin with supratherapeutic INR result. (Goal INR 2.0-3.0)    Recent labs: (last 7 days)     11/15/21  1351   INR 3.5*       ASSESSMENT     Source(s): Chart Review and Patient/Caregiver Call       Warfarin doses  taken: Warfarin taken as instructed    Diet: No new diet changes identified    New illness, injury, or hospitalization: No    Medication/supplement changes: None noted    Signs or symptoms of bleeding or clotting: No    Previous INR: Supratherapeutic    Additional findings: None     PLAN     Recommended plan for no diet, medication or health factor changes affecting INR     Dosing Instructions:  Decrease your warfarin dose (6% change) with next INR in 2 weeks       Summary  As of 11/15/2021    Full warfarin instructions:  11/15: 2.5 mg; Otherwise 7.5 mg every Sun, Thu; 5 mg all other days   Next INR check:  11/29/2021             Telephone call with Jt who verbalizes understanding and agrees to plan    Lab visit scheduled    Education provided: Importance of consistent vitamin K intake    Plan made per [facility] anticoagulation protocol    Indiana Burton RN  Anticoagulation Clinic  11/15/2021    _______________________________________________________________________     Anticoagulation Episode Summary     Current INR goal:  2.0-3.0   TTR:  89.0 % (1 y)   Target end date:  Indefinite   Send INR reminders to:  URVASHI PIMENTEL    Indications    Atrial fibrillation (AFIB) on Coumadin [I48.91]  Long term current use of anticoagulants with INR goal of 2.0-3.0 (Resolved) [Z79.01]  Paroxysmal atrial fibrillation (H) [I48.0]  Long term current use of anticoagulants with INR goal of 2.0-3.0 [Z79.01]           Comments:           Anticoagulation Care Providers     Provider Role Specialty Phone number    Genaro Roy MD Referring Cardiovascular Disease [phone]    Digna Jones MD Referring Internal Medicine [phone]

## 2021-11-16 LAB
ANION GAP SERPL CALCULATED.3IONS-SCNC: 5 MMOL/L (ref 3–14)
BUN SERPL-MCNC: 31 MG/DL (ref 7–30)
CALCIUM SERPL-MCNC: 8.3 MG/DL (ref 8.5–10.1)
CHLORIDE BLD-SCNC: 106 MMOL/L (ref 94–109)
CHOLEST SERPL-MCNC: 119 MG/DL
CO2 SERPL-SCNC: 30 MMOL/L (ref 20–32)
CREAT SERPL-MCNC: 1.38 MG/DL (ref 0.66–1.25)
CREAT UR-MCNC: 149 MG/DL
FASTING STATUS PATIENT QL REPORTED: NO
GFR SERPL CREATININE-BSD FRML MDRD: 50 ML/MIN/1.73M2
GLUCOSE BLD-MCNC: 126 MG/DL (ref 70–99)
HDLC SERPL-MCNC: 17 MG/DL
LDLC SERPL CALC-MCNC: 24 MG/DL
MICROALBUMIN UR-MCNC: 429 MG/L
MICROALBUMIN/CREAT UR: 287.92 MG/G CR (ref 0–17)
NONHDLC SERPL-MCNC: 102 MG/DL
POTASSIUM BLD-SCNC: 4.6 MMOL/L (ref 3.4–5.3)
SODIUM SERPL-SCNC: 141 MMOL/L (ref 133–144)
TRIGL SERPL-MCNC: 388 MG/DL

## 2021-11-16 NOTE — TELEPHONE ENCOUNTER
Routing refill request to provider for review/approval because:  Please review, has had 3 recent visits with pharm, and has another upcomign.  Not sure if pt needs PCP visit now too.   Ivette Dillard RN

## 2021-11-17 RX ORDER — LANCETS 30 GAUGE
EACH MISCELLANEOUS
Qty: 300 EACH | Refills: 11 | Status: SHIPPED | OUTPATIENT
Start: 2021-11-17

## 2021-11-17 RX ORDER — BLOOD SUGAR DIAGNOSTIC
STRIP MISCELLANEOUS
Qty: 100 STRIP | Refills: 6 | Status: SHIPPED | OUTPATIENT
Start: 2021-11-17 | End: 2023-01-20

## 2021-11-18 ENCOUNTER — VIRTUAL VISIT (OUTPATIENT)
Dept: PHARMACY | Facility: CLINIC | Age: 76
End: 2021-11-18
Payer: COMMERCIAL

## 2021-11-18 DIAGNOSIS — I48.91 ATRIAL FIBRILLATION, UNSPECIFIED TYPE (H): ICD-10-CM

## 2021-11-18 DIAGNOSIS — I42.0 DILATED CARDIOMYOPATHY (H): ICD-10-CM

## 2021-11-18 DIAGNOSIS — I10 ESSENTIAL HYPERTENSION: Primary | ICD-10-CM

## 2021-11-18 DIAGNOSIS — E11.40 TYPE 2 DIABETES MELLITUS WITH DIABETIC NEUROPATHY, WITH LONG-TERM CURRENT USE OF INSULIN (H): ICD-10-CM

## 2021-11-18 DIAGNOSIS — Z79.4 TYPE 2 DIABETES MELLITUS WITH DIABETIC NEUROPATHY, WITH LONG-TERM CURRENT USE OF INSULIN (H): ICD-10-CM

## 2021-11-18 DIAGNOSIS — I25.10 CORONARY ARTERY DISEASE INVOLVING NATIVE CORONARY ARTERY OF NATIVE HEART WITHOUT ANGINA PECTORIS: ICD-10-CM

## 2021-11-18 PROCEDURE — 99606 MTMS BY PHARM EST 15 MIN: CPT | Performed by: PHARMACIST

## 2021-11-18 NOTE — PROGRESS NOTES
Medication Therapy Management (MTM) Encounter    ASSESSMENT:                            Medication Adherence/Access: No issues identified    Hypertension/CAD/A. Fib/HFrEF:  Stable, plan in place with cardiology.    Type 2 Diabetes: Patient is meeting A1c goal of < 7%. Self monitoring of blood glucose is mostly at goal of fasting  mg/dL.  No changes needed, will continue to monitor for hypoglycemia.    PLAN:                            Continue current medication regimen    Follow-up: Return in about 2 weeks (around 12/2/2021). per his request    SUBJECTIVE/OBJECTIVE:                          Jt Montgomery is a 76 year old male called for a follow-up visit. He was referred to me from Dr. Jones.  Today's visit is a follow-up MTM visit from 11/4/2021.     Reason for visit: Routine follow-up.    Tobacco: He reports that he quit smoking about 49 years ago. His smoking use included cigarettes. He has a 10.50 pack-year smoking history. He has never used smokeless tobacco.  Alcohol: history of alcohol dependence    Medication Adherence/Access: no issues reported    Hypertension/CAD/A. Fib/HFrEF:  Jamal continues taking furosemide 40mg twice daily, KCl 20mEq twice daily, irbesartan 150mg daily, metoprolol succinate ER 100mg twice daily and warfarin as directed.  He denies side effects.  Warfarin was adjusted slightly following last INR with plan in place to re-check.    Potassium   Date Value Ref Range Status   11/15/2021 4.6 3.4 - 5.3 mmol/L Final   04/26/2021 4.3 3.4 - 5.3 mmol/L Final      BP Readings from Last 3 Encounters:   04/26/21 126/70   04/19/21 (!) 147/74   04/10/21 (!) 142/70      INR   Date Value Ref Range Status   11/15/2021 3.5 (H) 0.9 - 1.1 Final   07/02/2021 2.50 (H) 0.86 - 1.14 Final     Comment:     This test is intended for monitoring Coumadin therapy.  Results are not   accurate in patients with prolonged INR due to factor deficiency.         Recent Labs   Lab Test 11/15/21  1351 09/22/20  1252    CHOL 119 123   HDL 17* 28*   LDL 24 Cannot estimate LDL when triglyceride exceeds 400 mg/dL  50   TRIG 388* 560*     GFR Estimate   Date Value Ref Range Status   11/15/2021 50 (L) >60 mL/min/1.73m2 Final     Comment:     As of July 11, 2021, eGFR is calculated by the CKD-EPI creatinine equation, without race adjustment. eGFR can be influenced by muscle mass, exercise, and diet. The reported eGFR is an estimation only and is only applicable if the renal function is stable.   04/26/2021 60 (L) >60 mL/min/[1.73_m2] Final     Comment:     Non  GFR Calc  Starting 12/18/2018, serum creatinine based estimated GFR (eGFR) will be   calculated using the Chronic Kidney Disease Epidemiology Collaboration   (CKD-EPI) equation.       Type 2 Diabetes:  Currently taking Lantus 22 units daily. Patient is not experiencing side effects.  Blood sugar monitoring:  Generally testing once daily (AM fasting) - OneTouch Ultra 2   Date FBG (mg/dL)   11/5 88   11/6 92   11/7 -   11/8 96   11/9 66   11/10 93   11/11 90   11/12 115   11/13 120    11/14 104   11/15 99   11/16 101   11/17 -   11/18 127   Symptoms of low blood sugar? None.  He doesn't recall having any symptoms with the reading of 66mg/dL on 11/9  Symptoms of high blood sugar? None.   Eye exam: up to date  Foot exam: due  Diet/Exercise: No changes  Aspirin: No longer taking per cardiology direction given warfarin use  Statin: No - patient declines  ACEi/ARB: Yes: irbesartan.  Urine Albumin:   Lab Results   Component Value Date    UMALCR 287.92 (H) 11/15/2021      Lab Results   Component Value Date    A1C 5.7 11/15/2021    A1C 6.1 07/30/2021    A1C 6.7 04/26/2021    A1C 7.0 09/22/2020    A1C 6.1 02/19/2020    A1C 6.2 11/14/2019    A1C 7.6 10/08/2019     Today's Vitals: There were no vitals taken for this visit.  ----------------    I spent 29 minutes with this patient today. A copy of the visit note was provided to the patient's primary care provider.    The  patient declined a summary of these recommendations.     Mikaela StroudD, [facility]  Medication Therapy Management Provider  Pager: [phone]     Telemedicine Visit Details  Type of service:  Telephone visit  Start Time: 11:33 AM  End Time: 12:02 PM  Originating Location (patient location): [place]  Distant Location (provider location):  [facility]     Medication Therapy Recommendations  No medication therapy recommendations to display

## 2021-11-18 NOTE — PATIENT INSTRUCTIONS
Recommendations from today's MTM visit:                                                    MTM (medication therapy management) is a service provided by a clinical pharmacist designed to help you get the most of out of your medicines.   Today we reviewed what your medicines are for, how to know if they are working, that your medicines are safe and how to make your medicine regimen as easy as possible.      Continue current medication regimen    Follow-up: Return in about 2 weeks (around 12/2/2021).    It was great to speak with you today.  I value your experience and would be very thankful for your time with providing feedback on our clinic survey. You may receive a survey via email or text message in the next few days.     To schedule another MTM appointment, please call the clinic directly or you may call the MTM scheduling line at [phone] or toll-free at [phone].     My Clinical Pharmacist's contact information:                                                      Please feel free to contact me with any questions or concerns you have.      Nathaly Hughes PharmD, [facility]  Medication Therapy Management Provider  Pager: [phone]     Erlinda Dewey PharmD  Medication Therapy Management Resident  Pager: [phone]

## 2021-11-22 NOTE — LETTER
5/14/2018      Digna Jones MD  [address] [facility] [address]  [place] [address]      RE: Jt Montgomery       Dear Colleague,    I had the pleasure of seeing Jt Montgomery in the [facility].    Service Date: 05/14/2018      REFERRING PHYSICIAN:  Digna Jones MD.      HISTORY OF PRESENT ILLNESS:  Mr. Montgomery is a pleasant 72-year-old gentleman with a history of  nonischemic cardiomyopathy, one vessel coronary disease with previous circumflex stenting, hypertension, chronic kidney disease, obesity and sleep apnea.  He also has persistent atrial fibrillation and is on Coumadin therapy for CVA prophylaxis.  I have not seen him since 11/2016.  He is here he states for a routine followup visit.  In general, he has done well over the past year to a year and a half.  He is experiencing dyspnea on exertion which has gradually gotten worse over time.  He has some mild lower extremity edema, but otherwise he seems to be doing well.  He denies any symptoms of difficulty breathing at night.  Denies any chest pain symptoms, palpitations or lightheadedness.  His EF has improved upon our last assessment.  His echocardiogram in 11/2016 showed improvement and is now borderline normal with an EF around 50-55%.  He has seen his kidney doctor this January.  His kidney function looks stable.  He continues to take his medications and be compliant with them.  He had several questions today in regards to his heart disease.  Also, we have been trying to get him to do ischemic testing and followup since 2015.  He has been very nervous about doing this.  We did talk about the fact that he may not experience typical symptoms given that he is an insulin-dependent diabetic and his symptoms alerting him to ongoing ischemic heart disease may be vague such as dyspnea on exertion which he is currently experiencing.  He seems more amenable to undergoing a stress test at this time.      PHYSICAL  EXAMINATION:   VITAL SIGNS:  On exam today, his blood pressure is 138/72, pulse of 72, weight 273 and a body mass index of 36.   NECK:  Carotid upstrokes are slightly diminished.  I do not hear bruits.   CARDIOVASCULAR:  Tones are irregular, consistent with persistent atrial fibrillation.  I do not hear a gallop or rub.   LUNGS:  Clear posteriorly.   EXTREMITIES:  He has 1-2+ pitting edema with obvious varicose veins noted in his lower extremities.        LABORATORY STUDIES:  I did review his recent blood work done this past February.  His cholesterol looks a little bit better than previous.  We were able to measure his LDL because his triglycerides have come down to 323.  His LDL is now 75.  HDL is up a bit to 32.  Total cholesterol 172.  His electrolytes look within normal range.  His creatinine was last measured at 1.29.      ASSESSMENT AND PLAN:  This is a 72-year-old male with history of nonischemic cardiomyopathy, one vessel coronary disease with stenting to his circumflex remotely, hypertension, chronic kidney disease, obesity and sleep apnea.  He seems to have mild symptoms of dyspnea on exertion and mild peripheral edema.  Otherwise, he is stable from a cardiac perspective.  In talking with him, he has significantly improved upon his diet, but he still needs to work on consistent aerobic exercise.  Because he is experiencing dyspnea on exertion with his underlying history of coronary disease and risk factors, I will once again recommend ischemic testing.  He seems more agreeable to this now.  Hopefully, we will get this scheduled in the near future.  We reviewed his medication list.  He wondered if there is any medications that he needed to discontinue or if he needs to continue with all of them.  The only one that I have real concern about is ongoing use of digoxin with his underlying renal insufficiency.  He likely probably does not need this for heart rate control and I do not know how much it is  benefitting him from a heart failure perspective.  I would certainly not be opposed to discontinuing this medication at all, especially if he demonstrates any evidence of worsening renal dysfunction.  We will follow up with the results of his stress test once complete.        Please feel free to contact me with any questions you have in regards to his care.      cc:   [place] [address]         BRE NARAYAN DO             D: 2018   T: 2018   MT: SERJIO      Name:     PAL ESCUDERO   MRN:      [id number]        Account:      [id number]   :      1945           Service Date: 2018      Document: [id number]         Outpatient Encounter Prescriptions as of 2018   Medication Sig Dispense Refill     aspirin EC 81 MG EC tablet Take 1 tablet by mouth daily. 90 tablet 1     BD GERMÁN U/F 32G X 4 MM insulin pen needle USE TO TEST 4 TIMES DAILY OR AS DIRECTED 100 each 3     blood glucose monitoring (ACCU-CHEK LUIS PLUS) meter device kit Use to test blood sugar 3 times daily or as directed. 1 kit 0     blood glucose monitoring (ACCU-CHEK LUIS PLUS) test strip Use to test blood sugar 5 times daily or as directed. 400 strip 1     blood glucose monitoring (SOFTCLIX) lancets Use to test blood sugar 5 times daily or as directed. (Patient taking differently: Use to test blood sugar 2 times daily or as directed.) 1 Box prn     Coenzyme Q10 (COQ10) 100 MG CAPS Take 200 mg by mouth daily        diltiazem (CARTIA XT) 300 MG 24 hr capsule Take 1 capsule (300 mg) by mouth daily 90 capsule 0     febuxostat (ULORIC) 40 MG TABS Take 40 mg by mouth daily       fish oil-omega-3 fatty acids 1000 MG capsule Take 2 g by mouth every other day       furosemide (LASIX) 20 MG tablet TAKE 1 TABLET(20 MG) BY MOUTH DAILY (Patient taking differently: 1  tab three times a WEEK  prn) 90 tablet 1     Green Tea, Camillia sinensis, (GREEN  TEA EXTRACT PO) Take by mouth 2 times daily Doesn't always take regularly       HERBALS Nerve by [employer] 2 a day,   Healthy Feet and Nerves  By [employer]  3 a day, Heart Food Caps  (cayenna pepper, garlic, hawthorn, onion & ginger 1-2 a day)       insulin aspart (NOVOLOG FLEXPEN) 100 UNIT/ML injection Inject 15 Units Subcutaneous 3 times daily (with meals) 45 mL 1     insulin glargine (LANTUS SOLOSTAR) 100 UNIT/ML pen ADMINISTER 35 UNITS UNDER THE SKIN DAILY 30 mL 11     insulin pen needle (BD GERMÁN U/F) 32G X 4 MM Use four times daily or as directed. 100 each 3     irbesartan (AVAPRO) 300 MG tablet Take 1 tablet (300 mg) by mouth At Bedtime 90 tablet 2     Magnesium 125 MG CAPS Take by mouth daily       melatonin 1 MG TABS tablet Take 0.5 mg by mouth nightly as needed for sleep       metoprolol succinate (TOPROL-XL) 100 MG 24 hr tablet Take 1 tablet (100 mg) by mouth 2 times daily 180 tablet 0     multivitamin, therapeutic with minerals (MULTI-VITAMIN) TABS tablet Shaklee [employer] Extenstion       ORDER FOR DME Equipment being ordered: True Track Meter from [employer] 1 each 0     Probiotic Product (PROBIOTIC & ACIDOPHILUS EX ST PO) Take 1 capsule by mouth daily       Resveratrol 100 MG CAPS Take 2 capsules by mouth daily Shaklee Vivix       spironolactone (ALDACTONE) 25 MG tablet Take 1 tablet (25 mg) by mouth daily 90 tablet 0     UNABLE TO FIND MEDICATION NAME: Super Beet Juice       warfarin (COUMADIN) 5 MG tablet Take 1 tab on Mondays and Thursdays and take 1 1/2 tabs on all other days or as directed by INR clinic 145 tablet 1     digoxin (LANOXIN) 125 MCG tablet Take 1 tablet (125 mcg) by mouth daily 90 tablet 0     predniSONE (DELTASONE) 20 MG tablet Take 1 tablet (20 mg) by mouth daily as needed Gout flare (Patient not taking: Reported on 5/14/2018) 10 tablet 0     zolpidem (AMBIEN) 5 MG tablet Take tablet by mouth 15 minutes prior to sleep, for Sleep Study (Patient not taking: Reported on 5/14/2018) 1 tablet 0      No facility-administered encounter medications on file as of 5/14/2018.        Again, thank you for allowing me to participate in the care of your patient.      Sincerely,    Lottie Gomez DO     [facility]     Bulb Kate-Pharynx Suction Only Bulb Kate-Pharynx Suction with additional procedures

## 2021-11-23 DIAGNOSIS — Z79.4 TYPE 2 DIABETES MELLITUS WITH DIABETIC NEUROPATHY, WITH LONG-TERM CURRENT USE OF INSULIN (H): ICD-10-CM

## 2021-11-23 DIAGNOSIS — E11.40 TYPE 2 DIABETES MELLITUS WITH DIABETIC NEUROPATHY, WITH LONG-TERM CURRENT USE OF INSULIN (H): ICD-10-CM

## 2021-11-23 RX ORDER — INSULIN GLARGINE 100 [IU]/ML
INJECTION, SOLUTION SUBCUTANEOUS
Qty: 15 ML | Refills: 1 | Status: SHIPPED | OUTPATIENT
Start: 2021-11-23 | End: 2022-04-05

## 2021-11-23 NOTE — TELEPHONE ENCOUNTER
Patient called me about this - Rx approved.    Nathaly Hughes, PharmD, [facility]  Medication Therapy Management Provider  Pager: [phone]

## 2021-11-29 ENCOUNTER — TELEPHONE (OUTPATIENT)
Dept: PHARMACY | Facility: CLINIC | Age: 76
End: 2021-11-29
Payer: COMMERCIAL

## 2021-11-29 NOTE — TELEPHONE ENCOUNTER
Received phone call from Jamal.  He had a question about his COVID-19 booster, which he has scheduled for next week.  His questions were answered, follow-up appt already scheduled for later this week.    Nathaly Hughes, PharmD, [facility]  Medication Therapy Management Provider  Pager: [phone]      Yes

## 2021-12-02 ENCOUNTER — VIRTUAL VISIT (OUTPATIENT)
Dept: PHARMACY | Facility: CLINIC | Age: 76
End: 2021-12-02
Payer: COMMERCIAL

## 2021-12-02 DIAGNOSIS — I10 ESSENTIAL HYPERTENSION: Primary | ICD-10-CM

## 2021-12-02 DIAGNOSIS — I25.10 CORONARY ARTERY DISEASE INVOLVING NATIVE CORONARY ARTERY OF NATIVE HEART WITHOUT ANGINA PECTORIS: ICD-10-CM

## 2021-12-02 DIAGNOSIS — I42.0 DILATED CARDIOMYOPATHY (H): ICD-10-CM

## 2021-12-02 DIAGNOSIS — E11.40 TYPE 2 DIABETES MELLITUS WITH DIABETIC NEUROPATHY, WITH LONG-TERM CURRENT USE OF INSULIN (H): ICD-10-CM

## 2021-12-02 DIAGNOSIS — I48.91 ATRIAL FIBRILLATION, UNSPECIFIED TYPE (H): ICD-10-CM

## 2021-12-02 DIAGNOSIS — Z79.4 TYPE 2 DIABETES MELLITUS WITH DIABETIC NEUROPATHY, WITH LONG-TERM CURRENT USE OF INSULIN (H): ICD-10-CM

## 2021-12-02 PROCEDURE — 99606 MTMS BY PHARM EST 15 MIN: CPT | Performed by: PHARMACIST

## 2021-12-02 NOTE — PROGRESS NOTES
Medication Therapy Management (MTM) Encounter    ASSESSMENT:                            Medication Adherence/Access: No issues identified    Hypertension/CAD/A. Fib/HFrEF:  Stable.  He's resumed accurate furosemide dosing.    Type 2 Diabetes: Patient is meeting A1c goal of < 7%. Self monitoring of blood glucose is at goal of fasting  mg/dL.      PLAN:                            Continue current medication regimen    Follow-up: Return in 3 weeks (on 12/23/2021) for Follow-up Medication Review. per his request    SUBJECTIVE/OBJECTIVE:                          Jt Montgomery is a 76 year old male called for a follow-up visit. He was referred to me from Dr. Jones.  Today's visit is a follow-up MTM visit from 11/18/2021.     Reason for visit: Routine follow-up.    Tobacco: He reports that he quit smoking about 49 years ago. His smoking use included cigarettes. He has a 10.50 pack-year smoking history. He has never used smokeless tobacco.  Alcohol: history of alcohol dependence    Social history:  He's thinking he may officially move around the end of this month    Medication Adherence/Access: no issues reported    Hypertension/CAD/A. Fib/HFrEF:  Jamal continues taking furosemide 40mg twice daily (he has been taking furosemide 20mg twice daily on accident and noticed he was retaining more fluid, has now resumed 40mg twice daily dosing and symptoms have improved), KCl 20mEq twice daily, irbesartan 150mg daily, metoprolol succinate ER 100mg twice daily and warfarin as directed.  He denies side effects.    Potassium   Date Value Ref Range Status   11/15/2021 4.6 3.4 - 5.3 mmol/L Final   04/26/2021 4.3 3.4 - 5.3 mmol/L Final      BP Readings from Last 3 Encounters:   04/26/21 126/70   04/19/21 (!) 147/74   04/10/21 (!) 142/70      INR   Date Value Ref Range Status   11/15/2021 3.5 (H) 0.9 - 1.1 Final   07/02/2021 2.50 (H) 0.86 - 1.14 Final     Comment:     This test is intended for monitoring Coumadin therapy.  Results are  not   accurate in patients with prolonged INR due to factor deficiency.         Recent Labs   Lab Test 11/15/21  1351 09/22/20  1252   CHOL 119 123   HDL 17* 28*   LDL 24 Cannot estimate LDL when triglyceride exceeds 400 mg/dL  50   TRIG 388* 560*     GFR Estimate   Date Value Ref Range Status   11/15/2021 50 (L) >60 mL/min/1.73m2 Final     Comment:     As of July 11, 2021, eGFR is calculated by the CKD-EPI creatinine equation, without race adjustment. eGFR can be influenced by muscle mass, exercise, and diet. The reported eGFR is an estimation only and is only applicable if the renal function is stable.   04/26/2021 60 (L) >60 mL/min/[1.73_m2] Final     Comment:     Non  GFR Calc  Starting 12/18/2018, serum creatinine based estimated GFR (eGFR) will be   calculated using the Chronic Kidney Disease Epidemiology Collaboration   (CKD-EPI) equation.       Type 2 Diabetes:  Currently taking Lantus 22 units daily. Patient is not experiencing side effects.  Blood sugar monitoring:  Generally testing once daily (AM fasting) - OneTouch Ultra 2   Date FBG (mg/dL)   11/19 116   11/20 91   11/21 105   11/22 110   11/23 107   11/24 103   11/25 -   11/26 88   11/27 82   11/28 88   11/29 98   11/30 94   12/1 87   12/2 84   Symptoms of low blood sugar? None.  He doesn't recall having any symptoms with the reading of 66mg/dL on 11/9  Symptoms of high blood sugar? None.   Eye exam: up to date  Foot exam: due  Diet/Exercise: He's been trying to get more exercise, which he's been enjoying  Aspirin: No longer taking per cardiology direction given warfarin use  Statin: No - patient declines and LDL is well controlled without therapy  ACEi/ARB: Yes: irbesartan.  Urine Albumin:   Lab Results   Component Value Date    UMALCR 287.92 (H) 11/15/2021      Lab Results   Component Value Date    A1C 5.7 11/15/2021    A1C 6.1 07/30/2021    A1C 6.7 04/26/2021    A1C 7.0 09/22/2020    A1C 6.1 02/19/2020    A1C 6.2 11/14/2019    A1C  7.6 10/08/2019     Today's Vitals: There were no vitals taken for this visit.  ----------------    I spent 24 minutes with this patient today. A copy of the visit note was provided to the patient's primary care provider.    The patient declined a summary of these recommendations.     Nathaly Hughes PharmD, Psychiatric  Medication Therapy Management Provider  Pager: [phone]     Telemedicine Visit Details  Type of service:  Telephone visit  Start Time: 11:37 AM  End Time: 12:01 PM  Originating Location (patient location): [place]  Distant Location (provider location):  [facility]     Medication Therapy Recommendations  No medication therapy recommendations to display

## 2021-12-02 NOTE — PATIENT INSTRUCTIONS
Recommendations from today's MTM visit:                                                    MTM (medication therapy management) is a service provided by a clinical pharmacist designed to help you get the most of out of your medicines.   Today we reviewed what your medicines are for, how to know if they are working, that your medicines are safe and how to make your medicine regimen as easy as possible.      Continue current medication regimen    Follow-up: Return in 3 weeks (on 12/23/2021) for Follow-up Medication Review.    It was great to speak with you today.  I value your experience and would be very thankful for your time with providing feedback on our clinic survey. You may receive a survey via email or text message in the next few days.     To schedule another MTM appointment, please call the clinic directly or you may call the MTM scheduling line at [phone] or toll-free at [phone].     My Clinical Pharmacist's contact information:                                                      Please feel free to contact me with any questions or concerns you have.      Nathaly Hughes PharmD, [facility]  Medication Therapy Management Provider  Pager: [phone]     Erlinda Dewey, Rogelio  Medication Therapy Management Resident  Pager: [phone]

## 2021-12-14 ENCOUNTER — LAB (OUTPATIENT)
Dept: LAB | Facility: CLINIC | Age: 76
End: 2021-12-14
Payer: COMMERCIAL

## 2021-12-14 ENCOUNTER — ANTICOAGULATION THERAPY VISIT (OUTPATIENT)
Dept: ANTICOAGULATION | Facility: CLINIC | Age: 76
End: 2021-12-14

## 2021-12-14 DIAGNOSIS — I48.0 PAROXYSMAL ATRIAL FIBRILLATION (H): ICD-10-CM

## 2021-12-14 DIAGNOSIS — I48.91 ATRIAL FIBRILLATION (H): Primary | ICD-10-CM

## 2021-12-14 DIAGNOSIS — Z79.01 LONG TERM CURRENT USE OF ANTICOAGULANTS WITH INR GOAL OF 2.0-3.0: ICD-10-CM

## 2021-12-14 LAB — INR BLD: 2.9 (ref 0.9–1.1)

## 2021-12-14 PROCEDURE — 36416 COLLJ CAPILLARY BLOOD SPEC: CPT

## 2021-12-14 PROCEDURE — 85610 PROTHROMBIN TIME: CPT

## 2021-12-14 NOTE — PROGRESS NOTES
ANTICOAGULATION MANAGEMENT     Jt Montgomery 76 year old male is on warfarin with therapeutic INR result. (Goal INR 2.0-3.0)    Recent labs: (last 7 days)     12/14/21  1431   INR 2.9*       ASSESSMENT     Source(s): Chart Review and Patient/Caregiver Call       Warfarin doses taken: Warfarin taken as instructed    Diet: No new diet changes identified    New illness, injury, or hospitalization: No    Medication/supplement changes: None noted    Signs or symptoms of bleeding or clotting: No    Previous INR: Supratherapeutic    Additional findings: None     PLAN     Recommended plan for no diet, medication or health factor changes affecting INR     Dosing Instructions: Continue your current warfarin dose with next INR in 4 weeks       Summary  As of 12/14/2021    Full warfarin instructions:  7.5 mg every Sun, Thu; 5 mg all other days   Next INR check:  1/11/2022             Telephone call with Jt who verbalizes understanding and agrees to plan    Lab visit scheduled    Education provided: Monitoring for bleeding signs and symptoms and Monitoring for clotting signs and symptoms    Plan made per [facility] anticoagulation protocol    Shirley Cevallos RN  Anticoagulation Clinic  12/14/2021    _______________________________________________________________________     Anticoagulation Episode Summary     Current INR goal:  2.0-3.0   TTR:  82.3 % (1 y)   Target end date:  Indefinite   Send INR reminders to:  [facility]    Indications    Atrial fibrillation (AFIB) on Coumadin [I48.91]  Long term current use of anticoagulants with INR goal of 2.0-3.0 (Resolved) [Z79.01]  Paroxysmal atrial fibrillation (H) [I48.0]  Long term current use of anticoagulants with INR goal of 2.0-3.0 [Z79.01]           Comments:           Anticoagulation Care Providers     Provider Role Specialty Phone number    Ip, Genaro Garrett MD Referring Cardiovascular Disease [phone]    Digna Jones MD Referring Internal Medicine  [phone]

## 2021-12-31 DIAGNOSIS — Z79.01 LONG TERM CURRENT USE OF ANTICOAGULANTS WITH INR GOAL OF 2.0-3.0: ICD-10-CM

## 2021-12-31 DIAGNOSIS — I48.0 PAROXYSMAL ATRIAL FIBRILLATION (H): ICD-10-CM

## 2021-12-31 RX ORDER — WARFARIN SODIUM 5 MG/1
TABLET ORAL
Qty: 100 TABLET | Refills: 1 | Status: SHIPPED | OUTPATIENT
Start: 2021-12-31 | End: 2022-06-24

## 2022-01-06 ENCOUNTER — VIRTUAL VISIT (OUTPATIENT)
Dept: PHARMACY | Facility: CLINIC | Age: 77
End: 2022-01-06
Payer: COMMERCIAL

## 2022-01-06 DIAGNOSIS — I42.0 DILATED CARDIOMYOPATHY (H): ICD-10-CM

## 2022-01-06 DIAGNOSIS — I48.91 ATRIAL FIBRILLATION, UNSPECIFIED TYPE (H): ICD-10-CM

## 2022-01-06 DIAGNOSIS — G47.00 INSOMNIA, UNSPECIFIED TYPE: ICD-10-CM

## 2022-01-06 DIAGNOSIS — I10 ESSENTIAL HYPERTENSION: Primary | ICD-10-CM

## 2022-01-06 DIAGNOSIS — E11.40 TYPE 2 DIABETES MELLITUS WITH DIABETIC NEUROPATHY, WITH LONG-TERM CURRENT USE OF INSULIN (H): ICD-10-CM

## 2022-01-06 DIAGNOSIS — N40.1 BENIGN PROSTATIC HYPERPLASIA WITH URINARY OBSTRUCTION: ICD-10-CM

## 2022-01-06 DIAGNOSIS — Z79.4 TYPE 2 DIABETES MELLITUS WITH DIABETIC NEUROPATHY, WITH LONG-TERM CURRENT USE OF INSULIN (H): ICD-10-CM

## 2022-01-06 DIAGNOSIS — E11.69 MIXED HYPERLIPIDEMIA DUE TO TYPE 2 DIABETES MELLITUS (H): ICD-10-CM

## 2022-01-06 DIAGNOSIS — M1A.39X0 CHRONIC GOUT DUE TO RENAL IMPAIRMENT OF MULTIPLE SITES WITHOUT TOPHUS: ICD-10-CM

## 2022-01-06 DIAGNOSIS — E78.2 MIXED HYPERLIPIDEMIA DUE TO TYPE 2 DIABETES MELLITUS (H): ICD-10-CM

## 2022-01-06 DIAGNOSIS — Z78.9 TAKES DIETARY SUPPLEMENTS: ICD-10-CM

## 2022-01-06 DIAGNOSIS — N13.8 BENIGN PROSTATIC HYPERPLASIA WITH URINARY OBSTRUCTION: ICD-10-CM

## 2022-01-06 DIAGNOSIS — I25.10 CORONARY ARTERY DISEASE INVOLVING NATIVE CORONARY ARTERY OF NATIVE HEART WITHOUT ANGINA PECTORIS: ICD-10-CM

## 2022-01-06 PROCEDURE — 99605 MTMS BY PHARM NP 15 MIN: CPT | Performed by: PHARMACIST

## 2022-01-06 RX ORDER — TAMSULOSIN HYDROCHLORIDE 0.4 MG/1
0.4 CAPSULE ORAL EVERY EVENING
Qty: 90 CAPSULE | Refills: 3 | Status: SHIPPED | OUTPATIENT
Start: 2022-01-06 | End: 2022-06-03

## 2022-01-06 NOTE — LETTER
"Recommended To-Do List      Prepared on: 1/11/2022     You can get the best results from your medications by completing the items on this \"To-Do List.\"      Bring your To-Do List when you go to your doctor. And, share it with your family or caregivers.    My To-Do List:  What we talked about: What I should do:   What my medicines are for, how to know if my medicines are working, made sure my medicines are safe for me and reviewed how to take my medicines.    Take my medicines every day                 "

## 2022-01-06 NOTE — LETTER
January 11, 2022  Jt PINO Valentina  [address]  [place] [address]    Dear Mr. Montgomery, [facility]        Thank you for talking with me on Jan 6, 2022 about your health and medications. As a follow-up to our conversation, I have included two documents:      1. Your Recommended To-Do List has steps you should take to get the best results from your medications.  2. Your Medication List will help you keep track of your medications and how to take them.    If you want to talk about these documents, please call Nathaly Hughes PharmD at phone: [phone], Monday-Friday 8-4:30pm.    I look forward to working with you and your doctors to make sure your medications work well for you.    Sincerely,    Nathaly Hughes PharmD

## 2022-01-06 NOTE — PROGRESS NOTES
Medication Therapy Management (MTM) Encounter    ASSESSMENT:                            Medication Adherence/Access: No issues identified    Hypertension/CAD/A. Fib/HFrEF: Stable.    Type 2 Diabetes: Stable. Patient is meeting A1c goal of < 8%. Self monitoring of blood glucose is at goal of fasting  mg/dL and post prandial < 180 mg/dL.     Hyperlipidemia: Stable.  LDL is at goal without statin therapy.    Gout: Stable. Patient is not at goal of uric acid <6mg/dL, may benefit from re-checking with next set of labs.  This was not discussed today.     Insomnia:  Stable.    BPH:  Stable.    Supplements:  It's questionable how much benefit Jamal is getting from his supplements, but he finds them helpful and prefers to continue taking.  There are likely not safety concerns given available data on these current supplements.    PLAN:                            1.  Continue current medication regimen.  2.  Future considerations - re-check uric acid level    Follow-up: Return in 3 weeks (on 1/27/2022) for Follow-up Medication Review.    SUBJECTIVE/OBJECTIVE:                          Jt Montgomery is a 76 year old male called for a follow-up visit. He was referred to me from Dr. Jones.  Today's visit is a follow-up MTM visit from 12/2/21, serving as an initial visit for 2022.     Reason for visit: Routine follow-up.    Tobacco: He reports that he quit smoking about 50 years ago. His smoking use included cigarettes. He has a 10.50 pack-year smoking history. He has never used smokeless tobacco.  Alcohol: history of alcohol dependence    Social history:  He's still planning to move to [place] - he's now thinking February    Medication Adherence/Access: no issues reported    Hypertension/CAD/A. Fib/HFrEF:  Jamal continues taking furosemide 40mg twice daily, KCl 20mEq twice daily, irbesartan 150mg daily, metoprolol succinate ER 100mg twice daily and warfarin as directed.  He denies side effects.  He did have a recent nose  bleed, but reports it stopped relatively quickly and has not recurred.  He has INR re-check scheduled next week.  He denies any other signs and symptoms of bruising/bleeding.  Potassium   Date Value Ref Range Status   11/15/2021 4.6 3.4 - 5.3 mmol/L Final   04/26/2021 4.3 3.4 - 5.3 mmol/L Final      BP Readings from Last 3 Encounters:   04/26/21 126/70   04/19/21 (!) 147/74   04/10/21 (!) 142/70      INR   Date Value Ref Range Status   12/14/2021 2.9 (H) 0.9 - 1.1 Final   07/02/2021 2.50 (H) 0.86 - 1.14 Final     Comment:     This test is intended for monitoring Coumadin therapy.  Results are not   accurate in patients with prolonged INR due to factor deficiency.         GFR Estimate   Date Value Ref Range Status   11/15/2021 50 (L) >60 mL/min/1.73m2 Final     Comment:     As of July 11, 2021, eGFR is calculated by the CKD-EPI creatinine equation, without race adjustment. eGFR can be influenced by muscle mass, exercise, and diet. The reported eGFR is an estimation only and is only applicable if the renal function is stable.   04/26/2021 60 (L) >60 mL/min/[1.73_m2] Final     Comment:     Non  GFR Calc  Starting 12/18/2018, serum creatinine based estimated GFR (eGFR) will be   calculated using the Chronic Kidney Disease Epidemiology Collaboration   (CKD-EPI) equation.       Type 2 Diabetes:  Currently taking Lantus 21 units daily. Patient is not experiencing side effects.  Blood sugar monitoring:  Generally testing once daily (AM fasting) - OneTouch Ultra 2   Date FBG (mg/dL)   12/23 90   12/24 97   12/25 -   12/26 88   12/27 90   12/28 98   12/29 86   12/30 -   12/31 85   1/1 89   1/2 107   1/3 94   1/4 101   1/5 86   1/6 95   Symptoms of low blood sugar? None  Symptoms of high blood sugar? None  Eye exam: due  Foot exam: due  Diet/Exercise: No changes  Aspirin: No longer taking per cardiology direction given warfarin use  Statin: No - patient declines and LDL is well controlled without  therapy  ACEi/ARB: Yes: irbesartan.  Urine Albumin:   Lab Results   Component Value Date    UMALCR 287.92 (H) 11/15/2021      Lab Results   Component Value Date    A1C 5.7 11/15/2021    A1C 6.1 07/30/2021    A1C 6.7 04/26/2021    A1C 7.0 09/22/2020    A1C 6.1 02/19/2020    A1C 6.2 11/14/2019    A1C 7.6 10/08/2019     Hyperlipidemia: Currently taking no medications.    The ASCVD Risk score ([place] VILLANUEVA Jr., et al., 2013) failed to calculate for the following reasons:    The valid HDL cholesterol range is 20 to 100 mg/dL    The valid total cholesterol range is 130 to 320 mg/dL     Recent Labs   Lab Test 11/15/21  1351 09/22/20  1252   CHOL 119 123   HDL 17* 28*   LDL 24 Cannot estimate LDL when triglyceride exceeds 400 mg/dL  50   TRIG 388* 560*        Gout: Currently taking Uloric 40mg daily. Pt is not experiencing any medication side effects. He also has prednisone available to use if needed for gout flares, no recent use.  He feels diet changes have been very helpful for preventing gout symptoms.  Uric Acid   Date Value Ref Range Status   02/21/2018 7.4 (H) 3.5 - 7.2 mg/dL Final         Insomnia:  Current medications include: melatonin 3mg at bedtime as needed. Pt states this works very well for his sleep and reports no side effects.    BPH:  He's taking finasteride 5mg daily along with tamsulosin 0.4mg daily.  He denies side effects of therapy.  Had TURP and reports symptoms are stable.      Supplements:  Patient continues taking multiple supplements - see med list for details.  He finds these effective and prefers to continue taking.  He denies side effects.   Vitamin D Deficiency Screening Results:  Lab Results   Component Value Date    VITDT 34 11/28/2018    VITDT 38 10/01/2014      Today's Vitals: There were no vitals taken for this visit.  ----------------    I spent 39 minutes with this patient today (an extra 15 minutes was spent creating the Medication Action Plan). A copy of the visit note was provided to  the patient's primary care provider.    The patient was mailed a summary of these recommendations in the form of a medication action plan.     Nathaly Hughes PharmD, [facility]  Medication Therapy Management Provider  Pager: [phone]     Telemedicine Visit Details  Type of service:  Telephone visit  Start Time: 11:31 AM  End Time: 12:10 PM  Originating Location (patient location): [place]  Distant Location (provider location):  [facility]     Medication Therapy Recommendations  No medication therapy recommendations to display

## 2022-01-06 NOTE — LETTER
_  Medication List        Prepared on: 1/11/2022     Bring your Medication List when you go to the doctor, hospital, or   emergency room. And, share it with your family or caregivers.     Note any changes to how you take your medications.  Cross out medications when you no longer use them.    Medication How I take it Why I use it Prescriber   blood glucose (NO BRAND SPECIFIED) lancets standard Use to test blood sugar 1 times daily or as directed.  Dispense per insurance covered brand Type 2 diabetes mellitus with diabetic neuropathy, with long-term current use of insulin (H) Digna Jones MD   blood glucose monitoring (NO BRAND SPECIFIED) meter device kit Use to test blood sugar 1 time daily or as directed. Preferred blood glucose meter OR supplies to accompany: Blood Glucose Monitor Brands: per insurance. Type 2 diabetes mellitus with diabetic neuropathy, with long-term current use of insulin (H) Digna Jones MD   Capsicum, Cayenne, (CAYENNE PEPPER PO) Take by mouth as needed General Health  Self   Coenzyme Q10 (COQ10) 100 MG CAPS Take 100 mg by mouth daily  Uncontrolled Diabetes Mellitus with Complications (H) Patient Reported   febuxostat (ULORIC) 40 MG TABS Take 40 mg by mouth daily Gout Rhianna Jones MD   finasteride (PROSCAR) 5 MG tablet TAKE 1 TABLET(5 MG) BY MOUTH DAILY Benign prostatic hyperplasia with urinary obstruction Digna Jones MD   furosemide (LASIX) 40 MG tablet Take 40 mg by mouth 2 times daily Heart Failure Digna Jones MD   insulin pen needle (BD GERMÁN U/F) 32G X 4 MM miscellaneous USE FOUR TIMES DAILY AS DIRECTED Type 2 diabetes mellitus with diabetic neuropathy, with long-term current use of insulin (H) Digna Jones MD   irbesartan (AVAPRO) 300 MG tablet Take 0.5 tablets (150 mg) by mouth At Bedtime Essential Hypertension Genaro Roy MD   Lactobacillus (PROBIOTIC ACIDOPHILUS PO) Take 1 capsule by mouth daily General Health  Self   Lancets (ONETOUCH  DELICA PLUS [id number]) MISC USE WITH LANCING DEVICE TO TEST BLOOD SUGAR AS DIRECTED Type 2 diabetes mellitus with diabetic neuropathy, with long-term current use of insulin (H) Digna Jones MD   LANTUS SOLOSTAR 100 UNIT/ML soln ADMINISTER 21 UNITS UNDER THE SKIN AT BEDTIME Type 2 diabetes mellitus with diabetic neuropathy, with long-term current use of insulin (H) Digna Jones MD   magnesium oxide 200 MG TABS Take 1-2 tablets by mouth daily  General Health  Self   melatonin 1 MG TABS tablet Take 3 mg by mouth nightly as needed for sleep  General Health  Self   metoprolol succinate ER (TOPROL-XL) 100 MG 24 hr tablet Take 100 mg by mouth 2 times daily Coronary Artery Disease Abiodunytennille Jones MD   Multiple Vitamins-Minerals (MULTIVITAMIN ADULT PO) Take 1 tablet by mouth daily General Health  Self   ONETOUCH ULTRA test strip USE TO TEST BLOOD SUGAR 1 TIME DAILY OR AS DIRECTED Type 2 diabetes mellitus with diabetic neuropathy, with long-term current use of insulin (H) Digna Jones MD   potassium chloride ER (KLOR-CON M) 10 MEQ CR tablet Take 20 mEq by mouth 2 times daily  Hypokalemia Adwoa Lawton MD   predniSONE (DELTASONE) 20 MG tablet TAKE 1 TABLET(20 MG) BY MOUTH DAILY AS NEEDED FOR GOUT FLARE Chronic gout due to renal impairment of multiple sites without tophus Digna Jones MD   tamsulosin (FLOMAX) 0.4 MG capsule Take 1 capsule (0.4 mg) by mouth every evening Benign prostatic hyperplasia with urinary obstruction Digna Jones MD   triamcinolone (KENALOG) 0.1 % external cream Apply topically 2 times daily as needed (rash) Rash Digna Jones MD   UNABLE TO FIND MEDICATION NAME: CarotoMax with lutein - takes sporadically General Health  Self   UNABLE TO FIND MEDICATION NAME: Super beet powder daily General Health  Self   vitamin B complex with vitamin C (VITAMIN  B COMPLEX) tablet Take 1 tablet by mouth daily General Health  Self   vitamin C (ASCORBIC ACID) 1000 MG TABS Take 1,000 mg by  mouth daily as needed  General Health  Self   Vitamin D, Cholecalciferol, 25 MCG (1000 UT) CAPS Take 3,000 Units by mouth daily General Health  Self   warfarin ANTICOAGULANT (COUMADIN) 5 MG tablet 7.5 mg every Sun, Thu; 5 mg all other days or as directed Paroxysmal Atrial Fibrillation (H); Long term current use of anticoagulants with INR goal of 2.0-3.0 Digna Jones MD   Zinc 15 MG CAPS Take 15 mg by mouth daily as needed  General Health  Self         Add new medications, over-the-counter drugs, herbals, vitamins, or  minerals in the blank rows below.    Medication How I take it Why I use it Prescriber                          Allergies:      no known allergies        Side effects I have had:              Other Information:             My notes and questions:

## 2022-01-27 ENCOUNTER — VIRTUAL VISIT (OUTPATIENT)
Dept: PHARMACY | Facility: CLINIC | Age: 77
End: 2022-01-27
Payer: COMMERCIAL

## 2022-01-27 ENCOUNTER — ANTICOAGULATION THERAPY VISIT (OUTPATIENT)
Dept: ANTICOAGULATION | Facility: CLINIC | Age: 77
End: 2022-01-27

## 2022-01-27 ENCOUNTER — LAB (OUTPATIENT)
Dept: LAB | Facility: CLINIC | Age: 77
End: 2022-01-27
Payer: COMMERCIAL

## 2022-01-27 DIAGNOSIS — I42.0 DILATED CARDIOMYOPATHY (H): ICD-10-CM

## 2022-01-27 DIAGNOSIS — I25.10 CORONARY ARTERY DISEASE INVOLVING NATIVE CORONARY ARTERY OF NATIVE HEART WITHOUT ANGINA PECTORIS: ICD-10-CM

## 2022-01-27 DIAGNOSIS — Z79.01 LONG TERM CURRENT USE OF ANTICOAGULANTS WITH INR GOAL OF 2.0-3.0: ICD-10-CM

## 2022-01-27 DIAGNOSIS — Z79.4 TYPE 2 DIABETES MELLITUS WITH DIABETIC NEUROPATHY, WITH LONG-TERM CURRENT USE OF INSULIN (H): ICD-10-CM

## 2022-01-27 DIAGNOSIS — I10 ESSENTIAL HYPERTENSION: Primary | ICD-10-CM

## 2022-01-27 DIAGNOSIS — I48.91 ATRIAL FIBRILLATION (H): Primary | ICD-10-CM

## 2022-01-27 DIAGNOSIS — E11.40 TYPE 2 DIABETES MELLITUS WITH DIABETIC NEUROPATHY, WITH LONG-TERM CURRENT USE OF INSULIN (H): ICD-10-CM

## 2022-01-27 DIAGNOSIS — I48.0 PAROXYSMAL ATRIAL FIBRILLATION (H): ICD-10-CM

## 2022-01-27 DIAGNOSIS — I48.91 ATRIAL FIBRILLATION, UNSPECIFIED TYPE (H): ICD-10-CM

## 2022-01-27 LAB — INR BLD: 2.9 (ref 0.9–1.1)

## 2022-01-27 PROCEDURE — 85610 PROTHROMBIN TIME: CPT

## 2022-01-27 PROCEDURE — 99606 MTMS BY PHARM EST 15 MIN: CPT | Performed by: PHARMACIST

## 2022-01-27 PROCEDURE — 36416 COLLJ CAPILLARY BLOOD SPEC: CPT

## 2022-01-27 NOTE — PROGRESS NOTES
Anticoagulation Management    Unable to reach Jamal today.    Today's INR result of 2.9 is therapeutic (goal INR of 2.0-3.0).  Result received from: Clinic Lab    Follow up required to assess for changes     Left message to continue current dose of warfarin 7.5 mg tonight. Request call back for assessment.      Anticoagulation clinic to follow up    Diann Bliss RN

## 2022-01-27 NOTE — PROGRESS NOTES
ANTICOAGULATION MANAGEMENT     Jt Montgomery 76 year old male is on warfarin with therapeutic INR result. (Goal INR 2.0-3.0)    Recent labs: (last 7 days)     01/27/22  1414   INR 2.9*       ASSESSMENT     Source(s): Chart Review and Patient/Caregiver Call       Warfarin doses taken: Warfarin taken as instructed    Diet: No new diet changes identified    New illness, injury, or hospitalization: No    Medication/supplement changes: None noted    Signs or symptoms of bleeding or clotting: No    Previous INR: Therapeutic last 2(+) visits    Additional findings: None     PLAN     Recommended plan for no diet, medication or health factor changes affecting INR     Dosing Instructions: Continue your current warfarin dose with next INR in 5 weeks       Summary  As of 1/27/2022    Full warfarin instructions:  7.5 mg every Sun, Thu; 5 mg all other days   Next INR check:  3/3/2022             Telephone call with Jt who verbalizes understanding and agrees to plan    Lab visit scheduled    Education provided: Please call back if any changes to your diet, medications or how you've been taking warfarin    Plan made per [facility] anticoagulation protocol    Diann Bliss RN  Anticoagulation Clinic  1/27/2022    _______________________________________________________________________     Anticoagulation Episode Summary     Current INR goal:  2.0-3.0   TTR:  82.3 % (1 y)   Target end date:  Indefinite   Send INR reminders to:  [facility]    Indications    Atrial fibrillation (AFIB) on Coumadin [I48.91]  Long term current use of anticoagulants with INR goal of 2.0-3.0 (Resolved) [Z79.01]  Paroxysmal atrial fibrillation (H) [I48.0]  Long term current use of anticoagulants with INR goal of 2.0-3.0 [Z79.01]           Comments:           Anticoagulation Care Providers     Provider Role Specialty Phone number    Genaro Roy MD Referring Cardiovascular Disease [phone]    Digna Jones MD Referring Internal  Medicine [phone]

## 2022-01-27 NOTE — PROGRESS NOTES
Medication Therapy Management (MTM) Encounter    ASSESSMENT:                            Medication Adherence/Access: No issues identified    Hypertension/CAD/A. Fib/HFrEF: Stable.    Type 2 Diabetes: Stable. Patient is meeting A1c goal of < 8%. Self monitoring of blood glucose is at goal of fasting  mg/dL and post prandial < 180 mg/dL but he has had some lower readings the last few weeks.  We will continue to monitor this - when he's had these in the past, blood sugars have returned back to baseline without medication adjustments.    PLAN:                            Continue current medication regimen    Follow-up: Return in about 2 weeks (around 2/10/2022) for Follow-up Medication Review.    SUBJECTIVE/OBJECTIVE:                          Jt Montgomery is a 76 year old male called for a follow-up visit.  Today's visit is a follow-up MTM visit from 1/6/2021.     Reason for visit: Routine follow-up.    Tobacco: He reports that he quit smoking about 50 years ago. His smoking use included cigarettes. He has a 10.50 pack-year smoking history. He has never used smokeless tobacco.  Alcohol: history of alcohol dependence    Medication Adherence/Access: no issues reported    Hypertension/CAD/A. Fib/HFrEF:  Jamal continues taking furosemide 40mg twice daily, KCl 20mEq twice daily, irbesartan 150mg daily, metoprolol succinate ER 100mg twice daily and warfarin as directed.  He has INR re-check scheduled for later today.  He denies any side effects including signs and symptoms of bruising/bleeding.  He reports weight continues to decline - he's been around 240lbs recently at home.  Wt Readings from Last 4 Encounters:   04/26/21 247 lb (112 kg)   04/19/21 276 lb (125.2 kg)   04/10/21 269 lb (122 kg)   04/10/21 269 lb (122 kg)      Potassium   Date Value Ref Range Status   11/15/2021 4.6 3.4 - 5.3 mmol/L Final   04/26/2021 4.3 3.4 - 5.3 mmol/L Final      BP Readings from Last 3 Encounters:   04/26/21 126/70   04/19/21 (!)  147/74   04/10/21 (!) 142/70      INR   Date Value Ref Range Status   12/14/2021 2.9 (H) 0.9 - 1.1 Final   07/02/2021 2.50 (H) 0.86 - 1.14 Final     Comment:     This test is intended for monitoring Coumadin therapy.  Results are not   accurate in patients with prolonged INR due to factor deficiency.         GFR Estimate   Date Value Ref Range Status   11/15/2021 50 (L) >60 mL/min/1.73m2 Final     Comment:     As of July 11, 2021, eGFR is calculated by the CKD-EPI creatinine equation, without race adjustment. eGFR can be influenced by muscle mass, exercise, and diet. The reported eGFR is an estimation only and is only applicable if the renal function is stable.   04/26/2021 60 (L) >60 mL/min/[1.73_m2] Final     Comment:     Non  GFR Calc  Starting 12/18/2018, serum creatinine based estimated GFR (eGFR) will be   calculated using the Chronic Kidney Disease Epidemiology Collaboration   (CKD-EPI) equation.       Type 2 Diabetes:  Currently taking Lantus 21 units daily. Patient is not experiencing side effects.  Blood sugar monitoring:  Generally testing once daily (AM fasting) - OneTouch Ultra 2   Date FBG (mg/dL)   1/7 -   1/8 81   1/9 91   1/10 94   1/11 89   1/12 74   1/13 99   1/14 87   1/15 107   1/16 66   1/17 124   1/18 85   1/19 77   1/20 72   1/21 77   1/22 72   1/23 90   1/24 90   1/25 76   1/26 85   1/27 77   Symptoms of low blood sugar? None, maybe occasional slight weakness prior to eating breakfast although this doesn't always correlate with a low blood sugar reading   Symptoms of high blood sugar? None  Eye exam: due  Foot exam: due  Diet/Exercise: He's been trying to do a bit more exercising  Aspirin: No longer taking per cardiology direction given warfarin use  Statin: No - patient declines and LDL is well controlled without therapy  ACEi/ARB: Yes: irbesartan.  Urine Albumin:   Lab Results   Component Value Date    UMALCR 287.92 (H) 11/15/2021      Lab Results   Component Value Date     A1C 5.7 11/15/2021    A1C 6.1 07/30/2021    A1C 6.7 04/26/2021    A1C 7.0 09/22/2020    A1C 6.1 02/19/2020    A1C 6.2 11/14/2019    A1C 7.6 10/08/2019     Today's Vitals: There were no vitals taken for this visit.  ----------------    I spent 25 minutes with this patient today. A copy of the visit note was provided to the patient's provider(s).    The patient declined a summary of these recommendations.     Nathaly Hughes, PharmD, [facility]  Medication Therapy Management Provider  Pager: [phone]     Telemedicine Visit Details  Type of service:  Telephone visit  Start Time: 11:30 AM  End Time: 11:55 AM  Originating Location (patient location): [place]  Distant Location (provider location):  [facility]     Medication Therapy Recommendations  No medication therapy recommendations to display

## 2022-01-27 NOTE — PATIENT INSTRUCTIONS
Recommendations from today's MTM visit:                                                    MTM (medication therapy management) is a service provided by a clinical pharmacist designed to help you get the most of out of your medicines.   Today we reviewed what your medicines are for, how to know if they are working, that your medicines are safe and how to make your medicine regimen as easy as possible.      Continue current medication regimen    Follow-up: Return in about 2 weeks (around 2/10/2022) for Follow-up Medication Review.    It was great to speak with you today.  I value your experience and would be very thankful for your time with providing feedback on our clinic survey. You may receive a survey via email or text message in the next few days.     To schedule another MTM appointment, please call the clinic directly or you may call the MTM scheduling line at [phone] or toll-free at [phone].     My Clinical Pharmacist's contact information:                                                      Please feel free to contact me with any questions or concerns you have.      Nathaly Hughes PharmD, [facility]  Medication Therapy Management Provider  Pager: [phone]     Erlinda Dewey PharmD  Medication Therapy Management Resident  Pager: [phone]

## 2022-02-10 ENCOUNTER — VIRTUAL VISIT (OUTPATIENT)
Dept: PHARMACY | Facility: CLINIC | Age: 77
End: 2022-02-10
Payer: COMMERCIAL

## 2022-02-10 DIAGNOSIS — I10 ESSENTIAL HYPERTENSION: Primary | ICD-10-CM

## 2022-02-10 DIAGNOSIS — E11.40 TYPE 2 DIABETES MELLITUS WITH DIABETIC NEUROPATHY, WITH LONG-TERM CURRENT USE OF INSULIN (H): ICD-10-CM

## 2022-02-10 DIAGNOSIS — Z79.4 TYPE 2 DIABETES MELLITUS WITH DIABETIC NEUROPATHY, WITH LONG-TERM CURRENT USE OF INSULIN (H): ICD-10-CM

## 2022-02-10 DIAGNOSIS — I42.0 DILATED CARDIOMYOPATHY (H): ICD-10-CM

## 2022-02-10 DIAGNOSIS — I25.10 CORONARY ARTERY DISEASE INVOLVING NATIVE CORONARY ARTERY OF NATIVE HEART WITHOUT ANGINA PECTORIS: ICD-10-CM

## 2022-02-10 DIAGNOSIS — I48.91 ATRIAL FIBRILLATION, UNSPECIFIED TYPE (H): ICD-10-CM

## 2022-02-10 PROCEDURE — 99606 MTMS BY PHARM EST 15 MIN: CPT | Performed by: PHARMACIST

## 2022-02-10 NOTE — PATIENT INSTRUCTIONS
Recommendations from today's MTM visit:                                                    MTM (medication therapy management) is a service provided by a clinical pharmacist designed to help you get the most of out of your medicines.   Today we reviewed what your medicines are for, how to know if they are working, that your medicines are safe and how to make your medicine regimen as easy as possible.      Continue current medication regimen    Follow-up: Return in 1 month (on 3/10/2022) for Follow-up Medication Review.    It was great to speak with you today.  I value your experience and would be very thankful for your time with providing feedback on our clinic survey. You may receive a survey via email or text message in the next few days.     To schedule another MTM appointment, please call the clinic directly or you may call the MTM scheduling line at [phone] or toll-free at [phone].     My Clinical Pharmacist's contact information:                                                      Please feel free to contact me with any questions or concerns you have.      Nathaly Hughes PharmD, [facility]  Medication Therapy Management Provider  Pager: [phone]     Erlinda Dewey, [place]  Medication Therapy Management Resident  Pager: [phone]

## 2022-02-10 NOTE — PROGRESS NOTES
Medication Therapy Management (MTM) Encounter    ASSESSMENT:                            Medication Adherence/Access: No issues identified    Hypertension/CAD/A. Fib/HFrEF: Stable.    Type 2 Diabetes: Stable. Patient is meeting A1c goal of < 8%. Self monitoring of blood glucose is at goal of fasting  mg/dL and post prandial < 180 mg/dL.  No changes needed.    PLAN:                            Continue current medication regimen    Follow-up: Return in 1 month (on 3/10/2022) for Follow-up Medication Review.    SUBJECTIVE/OBJECTIVE:                          Jt Montgomery is a 76 year old male called for a follow-up visit.  Today's visit is a follow-up MTM visit from 1/27/2022.     Reason for visit: Routine follow-up.    Tobacco: He reports that he quit smoking about 50 years ago. His smoking use included cigarettes. He has a 10.50 pack-year smoking history. He has never used smokeless tobacco.  Alcohol: history of alcohol dependence    Medication Adherence/Access: no issues reported    Hypertension/CAD/A. Fib/HFrEF:  Jamal continues taking furosemide 40mg twice daily, KCl 20mEq twice daily, irbesartan 150mg daily, metoprolol succinate ER 100mg twice daily and warfarin as directed.  He denies any side effects including signs and symptoms of bruising/bleeding.    Wt Readings from Last 4 Encounters:   04/26/21 247 lb (112 kg)   04/19/21 276 lb (125.2 kg)   04/10/21 269 lb (122 kg)   04/10/21 269 lb (122 kg)      Potassium   Date Value Ref Range Status   11/15/2021 4.6 3.4 - 5.3 mmol/L Final   04/26/2021 4.3 3.4 - 5.3 mmol/L Final      BP Readings from Last 3 Encounters:   04/26/21 126/70   04/19/21 (!) 147/74   04/10/21 (!) 142/70      INR   Date Value Ref Range Status   01/27/2022 2.9 (H) 0.9 - 1.1 Final   07/02/2021 2.50 (H) 0.86 - 1.14 Final     Comment:     This test is intended for monitoring Coumadin therapy.  Results are not   accurate in patients with prolonged INR due to factor deficiency.         GFR  Estimate   Date Value Ref Range Status   11/15/2021 50 (L) >60 mL/min/1.73m2 Final     Comment:     As of July 11, 2021, eGFR is calculated by the CKD-EPI creatinine equation, without race adjustment. eGFR can be influenced by muscle mass, exercise, and diet. The reported eGFR is an estimation only and is only applicable if the renal function is stable.   04/26/2021 60 (L) >60 mL/min/[1.73_m2] Final     Comment:     Non  GFR Calc  Starting 12/18/2018, serum creatinine based estimated GFR (eGFR) will be   calculated using the Chronic Kidney Disease Epidemiology Collaboration   (CKD-EPI) equation.       Type 2 Diabetes:  Currently taking Lantus 21 units daily. Patient is not experiencing side effects.  Blood sugar monitoring:  Generally testing once daily (AM fasting) - OneTouch Ultra 2   Date FBG (mg/dL)   1/28 116   1/29 127   1/30 90   1/31 95   2/1 98   2/2 88   2/3 84   2/4 83   2/5 94   2/6 94   2/7 84   2/8 86   2/9 92   2/10 70   Symptoms of low blood sugar? None  Symptoms of high blood sugar? None  Eye exam: due  Foot exam: due  Diet/Exercise: He's been trying to do a bit more exercising  Aspirin: No longer taking per cardiology direction given warfarin use  Statin: No - patient declines and LDL is well controlled without therapy  ACEi/ARB: Yes: irbesartan.  Urine Albumin:   Lab Results   Component Value Date    UMALCR 287.92 (H) 11/15/2021      Lab Results   Component Value Date    A1C 5.7 11/15/2021    A1C 6.1 07/30/2021    A1C 6.7 04/26/2021    A1C 7.0 09/22/2020    A1C 6.1 02/19/2020    A1C 6.2 11/14/2019    A1C 7.6 10/08/2019     Today's Vitals: There were no vitals taken for this visit.  ----------------    I spent 25 minutes with this patient today. A copy of the visit note was provided to the patient's provider(s).    The patient declined a summary of these recommendations.     Nathaly Hughes, PharmD, BCACP  Medication Therapy Management Provider  Pager: [phone]     Telemedicine  Visit Details  Type of service:  Telephone visit  Start Time: 11:30 AM  End Time: 11:55 AM  Originating Location (patient location): Home  Distant Location (provider location):  [facility] [place]     Medication Therapy Recommendations  No medication therapy recommendations to display

## 2022-03-01 ENCOUNTER — LAB (OUTPATIENT)
Dept: LAB | Facility: CLINIC | Age: 77
End: 2022-03-01
Payer: COMMERCIAL

## 2022-03-01 ENCOUNTER — ANTICOAGULATION THERAPY VISIT (OUTPATIENT)
Dept: ANTICOAGULATION | Facility: CLINIC | Age: 77
End: 2022-03-01

## 2022-03-01 DIAGNOSIS — I48.0 PAROXYSMAL ATRIAL FIBRILLATION (H): ICD-10-CM

## 2022-03-01 DIAGNOSIS — I10 ESSENTIAL HYPERTENSION: ICD-10-CM

## 2022-03-01 DIAGNOSIS — E11.69 MIXED HYPERLIPIDEMIA DUE TO TYPE 2 DIABETES MELLITUS (H): ICD-10-CM

## 2022-03-01 DIAGNOSIS — E11.40 TYPE 2 DIABETES MELLITUS WITH DIABETIC NEUROPATHY, WITH LONG-TERM CURRENT USE OF INSULIN (H): ICD-10-CM

## 2022-03-01 DIAGNOSIS — I25.10 CORONARY ARTERY DISEASE INVOLVING NATIVE CORONARY ARTERY OF NATIVE HEART WITHOUT ANGINA PECTORIS: ICD-10-CM

## 2022-03-01 DIAGNOSIS — Z79.01 LONG TERM CURRENT USE OF ANTICOAGULANTS WITH INR GOAL OF 2.0-3.0: ICD-10-CM

## 2022-03-01 DIAGNOSIS — Z79.4 TYPE 2 DIABETES MELLITUS WITH DIABETIC NEUROPATHY, WITH LONG-TERM CURRENT USE OF INSULIN (H): ICD-10-CM

## 2022-03-01 DIAGNOSIS — I48.91 ATRIAL FIBRILLATION (H): Primary | ICD-10-CM

## 2022-03-01 DIAGNOSIS — E78.2 MIXED HYPERLIPIDEMIA DUE TO TYPE 2 DIABETES MELLITUS (H): ICD-10-CM

## 2022-03-01 DIAGNOSIS — I10 ESSENTIAL HYPERTENSION: Primary | ICD-10-CM

## 2022-03-01 LAB
HBA1C MFR BLD: 5.6 % (ref 0–5.6)
INR BLD: 2.3 (ref 0.9–1.1)

## 2022-03-01 PROCEDURE — 85610 PROTHROMBIN TIME: CPT

## 2022-03-01 PROCEDURE — 80061 LIPID PANEL: CPT

## 2022-03-01 PROCEDURE — 82043 UR ALBUMIN QUANTITATIVE: CPT

## 2022-03-01 PROCEDURE — 83036 HEMOGLOBIN GLYCOSYLATED A1C: CPT

## 2022-03-01 PROCEDURE — 36416 COLLJ CAPILLARY BLOOD SPEC: CPT

## 2022-03-01 PROCEDURE — 80048 BASIC METABOLIC PNL TOTAL CA: CPT

## 2022-03-01 PROCEDURE — 36415 COLL VENOUS BLD VENIPUNCTURE: CPT

## 2022-03-01 NOTE — PROGRESS NOTES
ANTICOAGULATION MANAGEMENT     Jt Montgomery 76 year old male is on warfarin with therapeutic INR result. (Goal INR 2.0-3.0)    Recent labs: (last 7 days)     03/01/22  1134   INR 2.3*       ASSESSMENT       Source(s): Chart Review and Patient/Caregiver Call       Warfarin doses taken: Warfarin taken as instructed    Diet: No new diet changes identified  Improved diet and weight loss    New illness, injury, or hospitalization: No    Medication/supplement changes: None noted    Signs or symptoms of bleeding or clotting: No    Previous INR: Therapeutic last 2(+) visits    Additional findings: None       PLAN     Recommended plan for no diet, medication or health factor changes affecting INR     Dosing Instructions: Continue your current warfarin dose with next INR in 4 weeks       Summary  As of 3/1/2022    Full warfarin instructions:  7.5 mg every Sun, Thu; 5 mg all other days   Next INR check:  3/29/2022             Telephone call with Jt who verbalizes understanding and agrees to plan    Lab visit scheduled    Education provided: Please call back if any changes to your diet, medications or how you've been taking warfarin    Plan made per [facility] anticoagulation protocol    Indiana Burton RN  Anticoagulation Clinic  3/1/2022    _______________________________________________________________________     Anticoagulation Episode Summary     Current INR goal:  2.0-3.0   TTR:  82.3 % (1 y)   Target end date:  Indefinite   Send INR reminders to:  URVASHI [facility]    Indications    Atrial fibrillation (AFIB) on Coumadin [I48.91]  Long term current use of anticoagulants with INR goal of 2.0-3.0 (Resolved) [Z79.01]  Paroxysmal atrial fibrillation (H) [I48.0]  Long term current use of anticoagulants with INR goal of 2.0-3.0 [Z79.01]           Comments:           Anticoagulation Care Providers     Provider Role Specialty Phone number    Genaro Roy MD Referring Cardiovascular Disease [phone]    Digna Jones  MD [facility] Internal Medicine [phone]

## 2022-03-02 LAB
ANION GAP SERPL CALCULATED.3IONS-SCNC: 7 MMOL/L (ref 3–14)
BUN SERPL-MCNC: 30 MG/DL (ref 7–30)
CALCIUM SERPL-MCNC: 8.1 MG/DL (ref 8.5–10.1)
CHLORIDE BLD-SCNC: 108 MMOL/L (ref 94–109)
CHOLEST SERPL-MCNC: 105 MG/DL
CO2 SERPL-SCNC: 25 MMOL/L (ref 20–32)
CREAT SERPL-MCNC: 1.09 MG/DL (ref 0.66–1.25)
CREAT UR-MCNC: 130 MG/DL
FASTING STATUS PATIENT QL REPORTED: NO
GFR SERPL CREATININE-BSD FRML MDRD: 71 ML/MIN/1.73M2
GLUCOSE BLD-MCNC: 167 MG/DL (ref 70–99)
HDLC SERPL-MCNC: 25 MG/DL
LDLC SERPL CALC-MCNC: 41 MG/DL
MICROALBUMIN UR-MCNC: 154 MG/L
MICROALBUMIN/CREAT UR: 118.46 MG/G CR (ref 0–17)
NONHDLC SERPL-MCNC: 80 MG/DL
POTASSIUM BLD-SCNC: 4.3 MMOL/L (ref 3.4–5.3)
SODIUM SERPL-SCNC: 140 MMOL/L (ref 133–144)
TRIGL SERPL-MCNC: 195 MG/DL

## 2022-03-10 ENCOUNTER — VIRTUAL VISIT (OUTPATIENT)
Dept: PHARMACY | Facility: CLINIC | Age: 77
End: 2022-03-10
Payer: COMMERCIAL

## 2022-03-10 DIAGNOSIS — Z79.4 TYPE 2 DIABETES MELLITUS WITH DIABETIC NEUROPATHY, WITH LONG-TERM CURRENT USE OF INSULIN (H): ICD-10-CM

## 2022-03-10 DIAGNOSIS — I48.91 ATRIAL FIBRILLATION, UNSPECIFIED TYPE (H): ICD-10-CM

## 2022-03-10 DIAGNOSIS — I42.0 DILATED CARDIOMYOPATHY (H): ICD-10-CM

## 2022-03-10 DIAGNOSIS — I10 ESSENTIAL HYPERTENSION: Primary | ICD-10-CM

## 2022-03-10 DIAGNOSIS — E11.40 TYPE 2 DIABETES MELLITUS WITH DIABETIC NEUROPATHY, WITH LONG-TERM CURRENT USE OF INSULIN (H): ICD-10-CM

## 2022-03-10 DIAGNOSIS — I25.10 CORONARY ARTERY DISEASE INVOLVING NATIVE CORONARY ARTERY OF NATIVE HEART WITHOUT ANGINA PECTORIS: ICD-10-CM

## 2022-03-10 PROCEDURE — 99606 MTMS BY PHARM EST 15 MIN: CPT | Performed by: PHARMACIST

## 2022-03-10 NOTE — PROGRESS NOTES
Medication Therapy Management (MTM) Encounter    ASSESSMENT:                            Medication Adherence/Access: No issues identified    Hypertension/CAD/A. Fib/HFrEF: Stable.    Type 2 Diabetes: Stable. Patient is meeting A1c goal of < 8%. Self monitoring of blood glucose is at goal of fasting  mg/dL and post prandial < 180 mg/dL without episodes of hypoglycemia.  No changes needed.    PLAN:                            Continue current medication regimen    Follow-up: Return in 3 weeks (on 3/31/2022) for Follow-up Medication Review.    SUBJECTIVE/OBJECTIVE:                          Jt Montgomery is a 76 year old male called for a follow-up visit.  Today's visit is a follow-up MTM visit from 2/10/22.     Reason for visit: Routine follow-up.    Social history:  He's moving forward with his move to [place], he's aiming for April    Tobacco: He reports that he quit smoking about 50 years ago. His smoking use included cigarettes. He has a 10.50 pack-year smoking history. He has never used smokeless tobacco.  Alcohol: history of alcohol dependence    Medication Adherence/Access: no issues reported    Hypertension/CAD/A. Fib/HFrEF:  Jamal continues taking furosemide 40mg twice daily, KCl 20mEq twice daily, irbesartan 150mg daily, metoprolol succinate ER 100mg twice daily and warfarin as directed.  He denies any side effects including signs and symptoms of bruising/bleeding.    Wt Readings from Last 4 Encounters:   04/26/21 247 lb (112 kg)   04/19/21 276 lb (125.2 kg)   04/10/21 269 lb (122 kg)   04/10/21 269 lb (122 kg)      Potassium   Date Value Ref Range Status   03/01/2022 4.3 3.4 - 5.3 mmol/L Final   04/26/2021 4.3 3.4 - 5.3 mmol/L Final      BP Readings from Last 3 Encounters:   04/26/21 126/70   04/19/21 (!) 147/74   04/10/21 (!) 142/70      INR   Date Value Ref Range Status   03/01/2022 2.3 (H) 0.9 - 1.1 Final   07/02/2021 2.50 (H) 0.86 - 1.14 Final     Comment:     This test is intended for monitoring  Coumadin therapy.  Results are not   accurate in patients with prolonged INR due to factor deficiency.         GFR Estimate   Date Value Ref Range Status   03/01/2022 71 >60 mL/min/1.73m2 Final     Comment:     Effective December 21, 2021 eGFRcr in adults is calculated using the 2021 CKD-EPI creatinine equation which includes age and gender (Ernie gross al., [place], DOI: 10.1056/UDIVyo4353051)   04/26/2021 60 (L) >60 mL/min/[1.73_m2] Final     Comment:     Non  GFR Calc  Starting 12/18/2018, serum creatinine based estimated GFR (eGFR) will be   calculated using the Chronic Kidney Disease Epidemiology Collaboration   (CKD-EPI) equation.       Type 2 Diabetes:  Currently taking Lantus 21 units daily. Patient is not experiencing side effects.  Blood sugar monitoring:  Generally testing once daily (AM fasting) - OneTouch Ultra 2   Date FBG (mg/dL)   2/24 99   2/25 100   2/26 101   2/27 119   2/28 98   3/1 110   3/2 111   3/3 100   3/4 116   3/5 -   3/6 98   3/7 155 (took dose of prednisone the evening before)   3/8 112   3/9 -   3/10 106   Symptoms of low blood sugar? None  Symptoms of high blood sugar? None  Eye exam: due  Foot exam: due  Diet/Exercise: He admits to more nighttime snacking and feels this has led to slightly higher blood sugars  Aspirin: No longer taking per cardiology direction given warfarin use  Statin: No - patient declines and LDL is well controlled without therapy  ACEi/ARB: Yes: irbesartan.  Urine Albumin:   Lab Results   Component Value Date    UMALCR 118.46 (H) 03/01/2022      Lab Results   Component Value Date    A1C 5.6 03/01/2022    A1C 5.7 11/15/2021    A1C 6.1 07/30/2021    A1C 6.7 04/26/2021    A1C 7.0 09/22/2020    A1C 6.1 02/19/2020    A1C 6.2 11/14/2019    A1C 7.6 10/08/2019     Today's Vitals: There were no vitals taken for this visit.  ----------------    I spent 27 minutes with this patient today. A copy of the visit note was provided to the patient's provider(s).    The  patient declined a summary of these recommendations.     Mikaela StroudD, [facility]  Medication Therapy Management Provider  Pager: [phone]     Telemedicine Visit Details  Type of service:  Telephone visit  Start Time: 11:30 AM  End Time: 11:57 AM  Originating Location (patient location): [place]  Distant Location (provider location):  [facility]     Medication Therapy Recommendations  No medication therapy recommendations to display

## 2022-03-10 NOTE — PATIENT INSTRUCTIONS
Recommendations from today's MTM visit:                                                    MTM (medication therapy management) is a service provided by a clinical pharmacist designed to help you get the most of out of your medicines.   Today we reviewed what your medicines are for, how to know if they are working, that your medicines are safe and how to make your medicine regimen as easy as possible.      Continue current medication regimen    Follow-up: Return in 3 weeks (on 3/31/2022) for Follow-up Medication Review.    It was great to speak with you today.  I value your experience and would be very thankful for your time with providing feedback on our clinic survey. You may receive a survey via email or text message in the next few days.     To schedule another MTM appointment, please call the clinic directly or you may call the MTM scheduling line at [phone] or toll-free at [phone].     My Clinical Pharmacist's contact information:                                                      Please feel free to contact me with any questions or concerns you have.      Nathaly Hughes PharmD, [facility]  Medication Therapy Management Provider  Pager: [phone]     Erlinda Dewey, [place]  Medication Therapy Management Resident  Pager: [phone]

## 2022-03-24 DIAGNOSIS — R60.0 BILATERAL LEG EDEMA: ICD-10-CM

## 2022-03-25 RX ORDER — FUROSEMIDE 20 MG
TABLET ORAL
Qty: 180 TABLET | Refills: 3 | Status: SHIPPED | OUTPATIENT
Start: 2022-03-25 | End: 2022-03-31

## 2022-03-25 NOTE — TELEPHONE ENCOUNTER
Routing refill request to provider for review/approval because:  Dosing change by MTM and listed as historical    Saniya SILVA RN  EP Triage

## 2022-03-29 ENCOUNTER — ANTICOAGULATION THERAPY VISIT (OUTPATIENT)
Dept: ANTICOAGULATION | Facility: CLINIC | Age: 77
End: 2022-03-29

## 2022-03-29 ENCOUNTER — LAB (OUTPATIENT)
Dept: LAB | Facility: CLINIC | Age: 77
End: 2022-03-29
Payer: COMMERCIAL

## 2022-03-29 DIAGNOSIS — Z79.01 LONG TERM CURRENT USE OF ANTICOAGULANTS WITH INR GOAL OF 2.0-3.0: ICD-10-CM

## 2022-03-29 DIAGNOSIS — I48.0 PAROXYSMAL ATRIAL FIBRILLATION (H): ICD-10-CM

## 2022-03-29 DIAGNOSIS — I48.91 ATRIAL FIBRILLATION (H): Primary | ICD-10-CM

## 2022-03-29 LAB — INR BLD: 2 (ref 0.9–1.1)

## 2022-03-29 PROCEDURE — 85610 PROTHROMBIN TIME: CPT

## 2022-03-29 PROCEDURE — 36416 COLLJ CAPILLARY BLOOD SPEC: CPT

## 2022-03-29 NOTE — PROGRESS NOTES
Patient accidentally erased vm and called [facility] RN back. No changes or concerns, except he will be moving to [place] later in April so would like to check next INR in about 3 weeks. He will let us know when he is established with new [facility] team at the [facility] in [place].     Naomi GREEN RN  Anticoagulation Team

## 2022-03-29 NOTE — PROGRESS NOTES
ANTICOAGULATION MANAGEMENT     Jt Montgomery 76 year old male is on warfarin with therapeutic INR result. (Goal INR 2.0-3.0)    Recent labs: (last 7 days)     03/29/22  1141   INR 2.0*       ASSESSMENT       Source(s): Chart Review    Previous INR was Therapeutic last 2(+) visits    Medication, diet, health changes since last INR chart reviewed; none identified           PLAN     Recommended plan for no diet, medication or health factor changes affecting INR     Dosing Instructions: Continue your current warfarin dose with next INR in 6 weeks       Summary  As of 3/29/2022    Full warfarin instructions:  7.5 mg every Sun, Thu; 5 mg all other days   Next INR check:  4/21/2022             Detailed voice message left for Jamal with dosing instructions and follow up date.     Contact [phone]  to schedule and with any changes, questions or concerns.     Education provided: Please call back if any changes to your diet, medications or how you've been taking warfarin and Contact [phone]  with any changes, questions or concerns.     Plan made per [facility] anticoagulation protocol    Naomi Muñoz RN  Anticoagulation Clinic  3/29/2022    _______________________________________________________________________     Anticoagulation Episode Summary     Current INR goal:  2.0-3.0   TTR:  83.0 % (1 y)   Target end date:  Indefinite   Send INR reminders to:  URVASHI [facility]    Indications    Atrial fibrillation (AFIB) on Coumadin [I48.91]  Long term current use of anticoagulants with INR goal of 2.0-3.0 (Resolved) [Z79.01]  Paroxysmal atrial fibrillation (H) [I48.0]  Long term current use of anticoagulants with INR goal of 2.0-3.0 [Z79.01]           Comments:           Anticoagulation Care Providers     Provider Role Specialty Phone number    Genaro Roy MD Referring Cardiovascular Disease [phone]    Digna Jones MD Referring Internal Medicine [phone]

## 2022-03-31 ENCOUNTER — VIRTUAL VISIT (OUTPATIENT)
Dept: PHARMACY | Facility: CLINIC | Age: 77
End: 2022-03-31
Payer: COMMERCIAL

## 2022-03-31 DIAGNOSIS — I25.10 CORONARY ARTERY DISEASE INVOLVING NATIVE CORONARY ARTERY OF NATIVE HEART WITHOUT ANGINA PECTORIS: ICD-10-CM

## 2022-03-31 DIAGNOSIS — I10 ESSENTIAL HYPERTENSION: Primary | ICD-10-CM

## 2022-03-31 DIAGNOSIS — I42.0 DILATED CARDIOMYOPATHY (H): ICD-10-CM

## 2022-03-31 DIAGNOSIS — Z79.4 TYPE 2 DIABETES MELLITUS WITH DIABETIC NEUROPATHY, WITH LONG-TERM CURRENT USE OF INSULIN (H): ICD-10-CM

## 2022-03-31 DIAGNOSIS — I48.91 ATRIAL FIBRILLATION, UNSPECIFIED TYPE (H): ICD-10-CM

## 2022-03-31 DIAGNOSIS — E11.40 TYPE 2 DIABETES MELLITUS WITH DIABETIC NEUROPATHY, WITH LONG-TERM CURRENT USE OF INSULIN (H): ICD-10-CM

## 2022-03-31 PROCEDURE — 99606 MTMS BY PHARM EST 15 MIN: CPT | Performed by: PHARMACIST

## 2022-03-31 RX ORDER — OMEGA-3/DHA/EPA/FISH OIL 60 MG-90MG
3000 CAPSULE ORAL DAILY
COMMUNITY

## 2022-03-31 NOTE — PATIENT INSTRUCTIONS
Recommendations from today's MTM visit:                                                    MTM (medication therapy management) is a service provided by a clinical pharmacist designed to help you get the most of out of your medicines.   Today we reviewed what your medicines are for, how to know if they are working, that your medicines are safe and how to make your medicine regimen as easy as possible.      Continue current medication regimen    Follow-up: Return in 3 weeks (on 4/21/2022) for Follow-up Medication Review.    It was great to speak with you today.  I value your experience and would be very thankful for your time with providing feedback on our clinic survey. You may receive a survey via email or text message in the next few days.     To schedule another MTM appointment, please call the clinic directly or you may call the MTM scheduling line at [phone] or toll-free at [phone].     My Clinical Pharmacist's contact information:                                                      Please feel free to contact me with any questions or concerns you have.      Nathaly Hughes PharmD, [facility]  Medication Therapy Management Provider  Pager: [phone]     Erlinda Dewey, [place]  Medication Therapy Management Resident  Pager: [phone]

## 2022-03-31 NOTE — PROGRESS NOTES
Medication Therapy Management (MTM) Encounter    ASSESSMENT:                            Medication Adherence/Access: No issues identified    Hypertension/CAD/A. Fib/HFrEF: Stable.    Type 2 Diabetes: Stable. Patient is meeting A1c goal of < 8%. Self monitoring of blood glucose is at goal of fasting  mg/dL and post prandial < 180 mg/dL without episodes of hypoglycemia.  No changes needed.    PLAN:                            Continue current medication regimen    Follow-up: Return in 3 weeks (on 4/21/2022) for Follow-up Medication Review.    SUBJECTIVE/OBJECTIVE:                          Jt Montgomery is a 76 year old male called for a follow-up visit.  Today's visit is a follow-up MTM visit from 3/10/2022.     Reason for visit: Routine follow-up.    Tobacco: He reports that he quit smoking about 50 years ago. His smoking use included cigarettes. He has a 10.50 pack-year smoking history. He has never used smokeless tobacco.  Alcohol: history of alcohol dependence    Medication Adherence/Access: no issues reported    Hypertension/CAD/A. Fib/HFrEF:  Jamal continues taking furosemide 40mg twice daily, KCl 20mEq twice daily, irbesartan 150mg daily, metoprolol succinate ER 100mg twice daily and warfarin as directed.  He denies any side effects including signs and symptoms of bruising/bleeding or lightheadedness.  He reports the following blood pressure readings (mmHg): 111/68, 112/72, 103/77, 118/78, 121/77, 112/72, 116/76.  Wt Readings from Last 4 Encounters:   04/26/21 247 lb (112 kg)   04/19/21 276 lb (125.2 kg)   04/10/21 269 lb (122 kg)   04/10/21 269 lb (122 kg)      Potassium   Date Value Ref Range Status   03/01/2022 4.3 3.4 - 5.3 mmol/L Final   04/26/2021 4.3 3.4 - 5.3 mmol/L Final      BP Readings from Last 3 Encounters:   04/26/21 126/70   04/19/21 (!) 147/74   04/10/21 (!) 142/70      INR   Date Value Ref Range Status   03/29/2022 2.0 (H) 0.9 - 1.1 Final   07/02/2021 2.50 (H) 0.86 - 1.14 Final      Comment:     This test is intended for monitoring Coumadin therapy.  Results are not   accurate in patients with prolonged INR due to factor deficiency.         GFR Estimate   Date Value Ref Range Status   03/01/2022 71 >60 mL/min/1.73m2 Final     Comment:     Effective December 21, 2021 eGFRcr in adults is calculated using the 2021 CKD-EPI creatinine equation which includes age and gender (Ernie et al., NE, DOI: 10.1056/OZWNxe9112844)   04/26/2021 60 (L) >60 mL/min/[1.73_m2] Final     Comment:     Non  GFR Calc  Starting 12/18/2018, serum creatinine based estimated GFR (eGFR) will be   calculated using the Chronic Kidney Disease Epidemiology Collaboration   (CKD-EPI) equation.       Type 2 Diabetes:  Currently taking Lantus 21 units daily. Patient is not experiencing side effects.  Blood sugar monitoring:  Generally testing once daily (AM fasting) - OneTouch Ultra 2   Date FBG (mg/dL)   3/11 106   3/12 110   3/13 118   3/14 92   3/15 131   3/16 103   3/17 112   3/18 121   3/19 108   3/20 93   3/21 106   3/22 106   3/23 101   3/24 134   3/25 133   3/26 92   3/27 110   3/28 119   3/29 126   3/30 122   3/31 102   Symptoms of low blood sugar? None  Symptoms of high blood sugar? None  Eye exam: due  Foot exam: due  Diet/Exercise: No changes - sometimes has a snack around 2am and attributes higher blood sugar readings to those times  Aspirin: No longer taking per cardiology direction given warfarin use  Statin: No - patient declines and LDL is well controlled without therapy  ACEi/ARB: Yes: irbesartan.  Urine Albumin:   Lab Results   Component Value Date    UMALCR 118.46 (H) 03/01/2022      Lab Results   Component Value Date    A1C 5.6 03/01/2022    A1C 5.7 11/15/2021    A1C 6.1 07/30/2021    A1C 6.7 04/26/2021    A1C 7.0 09/22/2020    A1C 6.1 02/19/2020    A1C 6.2 11/14/2019    A1C 7.6 10/08/2019     Today's Vitals: There were no vitals taken for this visit.  ----------------    I spent 29 minutes with  this patient today. A copy of the visit note was provided to the patient's provider(s).    The patient declined a summary of these recommendations.     Nathaly Hughes PharmD, [facility]  Medication Therapy Management Provider  Pager: [phone]     Telemedicine Visit Details  Type of service:  Telephone visit  Start Time: 11:00 AM  End Time: 11:29 AM  Originating Location (patient location): [place]  Distant Location (provider location):  [facility]     Medication Therapy Recommendations  No medication therapy recommendations to display

## 2022-04-03 DIAGNOSIS — Z79.4 TYPE 2 DIABETES MELLITUS WITH DIABETIC NEUROPATHY, WITH LONG-TERM CURRENT USE OF INSULIN (H): ICD-10-CM

## 2022-04-03 DIAGNOSIS — E11.40 TYPE 2 DIABETES MELLITUS WITH DIABETIC NEUROPATHY, WITH LONG-TERM CURRENT USE OF INSULIN (H): ICD-10-CM

## 2022-04-05 RX ORDER — INSULIN GLARGINE 100 [IU]/ML
21 INJECTION, SOLUTION SUBCUTANEOUS DAILY
Qty: 15 ML | Refills: 3 | Status: SHIPPED | OUTPATIENT
Start: 2022-04-05 | End: 2023-01-25

## 2022-04-09 DIAGNOSIS — Z79.4 TYPE 2 DIABETES MELLITUS WITH DIABETIC NEUROPATHY, WITH LONG-TERM CURRENT USE OF INSULIN (H): Chronic | ICD-10-CM

## 2022-04-09 DIAGNOSIS — E11.40 TYPE 2 DIABETES MELLITUS WITH DIABETIC NEUROPATHY, WITH LONG-TERM CURRENT USE OF INSULIN (H): Chronic | ICD-10-CM

## 2022-04-11 RX ORDER — PEN NEEDLE, DIABETIC 32GX 5/32"
NEEDLE, DISPOSABLE MISCELLANEOUS
Qty: 400 EACH | Refills: 0 | Status: SHIPPED | OUTPATIENT
Start: 2022-04-11 | End: 2023-07-10

## 2022-04-11 NOTE — TELEPHONE ENCOUNTER
Last visit with PCP 4/26/21     Routing to [facility] to contact patient to inform due for appointment with PCP. Thank you.     Routing refill request to provider for review/approval because:  Has been almost 1 year since last visit with PCP       Appointments in Next Year    Apr 20, 2022  1:00 PM  Annual Visit with RD NURSE 2  [facility] ([facility]  ) [phone]   Apr 21, 2022 10:30 AM  Pharmacist Visit with Nathaly Hughes, PharmD  [facility] ([facility] ) [phone]   Apr 21, 2022 11:30 AM  INR LAB with CS LAB  [facility] ([facility] ) [phone]        Monika Escudero RN  [facility]

## 2022-04-21 ENCOUNTER — LAB (OUTPATIENT)
Dept: LAB | Facility: CLINIC | Age: 77
End: 2022-04-21

## 2022-04-21 ENCOUNTER — ANTICOAGULATION THERAPY VISIT (OUTPATIENT)
Dept: ANTICOAGULATION | Facility: CLINIC | Age: 77
End: 2022-04-21

## 2022-04-21 ENCOUNTER — VIRTUAL VISIT (OUTPATIENT)
Dept: PHARMACY | Facility: CLINIC | Age: 77
End: 2022-04-21
Payer: COMMERCIAL

## 2022-04-21 DIAGNOSIS — I42.0 DILATED CARDIOMYOPATHY (H): ICD-10-CM

## 2022-04-21 DIAGNOSIS — Z79.01 LONG TERM CURRENT USE OF ANTICOAGULANTS WITH INR GOAL OF 2.0-3.0: ICD-10-CM

## 2022-04-21 DIAGNOSIS — I48.0 PAROXYSMAL ATRIAL FIBRILLATION (H): ICD-10-CM

## 2022-04-21 DIAGNOSIS — I48.91 ATRIAL FIBRILLATION, UNSPECIFIED TYPE (H): ICD-10-CM

## 2022-04-21 DIAGNOSIS — I48.91 ATRIAL FIBRILLATION (H): Primary | ICD-10-CM

## 2022-04-21 DIAGNOSIS — I25.10 CORONARY ARTERY DISEASE INVOLVING NATIVE CORONARY ARTERY OF NATIVE HEART WITHOUT ANGINA PECTORIS: ICD-10-CM

## 2022-04-21 DIAGNOSIS — Z79.4 TYPE 2 DIABETES MELLITUS WITH DIABETIC NEUROPATHY, WITH LONG-TERM CURRENT USE OF INSULIN (H): ICD-10-CM

## 2022-04-21 DIAGNOSIS — I10 ESSENTIAL HYPERTENSION: Primary | ICD-10-CM

## 2022-04-21 DIAGNOSIS — E11.40 TYPE 2 DIABETES MELLITUS WITH DIABETIC NEUROPATHY, WITH LONG-TERM CURRENT USE OF INSULIN (H): ICD-10-CM

## 2022-04-21 LAB — INR BLD: 1.7 (ref 0.9–1.1)

## 2022-04-21 PROCEDURE — 99606 MTMS BY PHARM EST 15 MIN: CPT | Performed by: PHARMACIST

## 2022-04-21 PROCEDURE — 85610 PROTHROMBIN TIME: CPT

## 2022-04-21 PROCEDURE — 36416 COLLJ CAPILLARY BLOOD SPEC: CPT

## 2022-04-21 NOTE — PATIENT INSTRUCTIONS
"Recommendations from today's MTM visit:                                                    MTM (medication therapy management) is a service provided by a clinical pharmacist designed to help you get the most of out of your medicines.   Today we reviewed what your medicines are for, how to know if they are working, that your medicines are safe and how to make your medicine regimen as easy as possible.      Continue current medication regimen    Follow-up: Return in about 3 weeks (around 5/12/2022) for Follow-up Medication Review.    It was great speaking with you today.  I value your experience and would be very thankful for your time in providing feedback in our clinic survey. In the next few days, you may receive an email or text message from [employer] with a link to a survey related to your  clinical pharmacist.\"     To schedule another MTM appointment, please call the clinic directly or you may call the MTM scheduling line at [phone] or toll-free at [phone].     My Clinical Pharmacist's contact information:                                                      Please feel free to contact me with any questions or concerns you have.      Mikaela StroudD, [facility]  Medication Therapy Management Provider  Pager: [phone]     Erlinda Dewey, [place]  Medication Therapy Management Resident  Pager: [phone]    "

## 2022-04-21 NOTE — PROGRESS NOTES
Medication Therapy Management (MTM) Encounter    ASSESSMENT:                            Medication Adherence/Access: No issues identified    Hypertension/CAD/A. Fib/HFrEF: Stable.    Type 2 Diabetes: Stable. Patient is meeting A1c goal of < 8%. Self monitoring of blood glucose is at goal of fasting  mg/dL and post prandial < 180 mg/dL without episodes of hypoglycemia.  No changes needed.    PLAN:                            Continue current medication regimen    Follow-up: Return in about 3 weeks (around 5/12/2022) for Follow-up Medication Review., per his request    SUBJECTIVE/OBJECTIVE:                          Jt Montgomery is a 76 year old male called for a follow-up visit.  Today's visit is a follow-up MTM visit from 3/31/2022.     Reason for visit: Routine follow-up.    Tobacco: He reports that he quit smoking about 50 years ago. His smoking use included cigarettes. He has a 10.50 pack-year smoking history. He has never used smokeless tobacco.  Alcohol: history of alcohol dependence    Social history:  Patient continues making progress toward moving, no exact date set yet.  He has family coming to [facility] this weekend to help him pack, which he's looking forward to.    Medication Adherence/Access: no issues reported    Hypertension/CAD/A. Fib/HFrEF:  Jamal continues taking furosemide 40mg twice daily, KCl 20mEq twice daily, irbesartan 150mg daily, metoprolol succinate ER 100mg twice daily and warfarin as directed.  He denies any side effects including signs and symptoms of bruising/bleeding or lightheadedness.  He reports the following blood pressure readings (mmHg), pulse (BPM): 104/63, 58; 109/82, 76; 113/73, 62; 118/79, 67; 125/68, 56; 100/80, 59; 108/76, 68; 112/77, 57; 120/79, 65; 115/82, 63; 115/75, 61.  He has repeat INR check scheduled for later this AM.  Wt Readings from Last 4 Encounters:   04/26/21 247 lb (112 kg)   04/19/21 276 lb (125.2 kg)   04/10/21 269 lb (122 kg)   04/10/21 269 lb (122 kg)       Potassium   Date Value Ref Range Status   03/01/2022 4.3 3.4 - 5.3 mmol/L Final   04/26/2021 4.3 3.4 - 5.3 mmol/L Final      BP Readings from Last 3 Encounters:   04/26/21 126/70   04/19/21 (!) 147/74   04/10/21 (!) 142/70      INR   Date Value Ref Range Status   03/29/2022 2.0 (H) 0.9 - 1.1 Final   07/02/2021 2.50 (H) 0.86 - 1.14 Final     Comment:     This test is intended for monitoring Coumadin therapy.  Results are not   accurate in patients with prolonged INR due to factor deficiency.         GFR Estimate   Date Value Ref Range Status   03/01/2022 71 >60 mL/min/1.73m2 Final     Comment:     Effective December 21, 2021 eGFRcr in adults is calculated using the 2021 CKD-EPI creatinine equation which includes age and gender (Ernie et al., NE, DOI: 10.1056/OWXSav1262509)   04/26/2021 60 (L) >60 mL/min/[1.73_m2] Final     Comment:     Non  GFR Calc  Starting 12/18/2018, serum creatinine based estimated GFR (eGFR) will be   calculated using the Chronic Kidney Disease Epidemiology Collaboration   (CKD-EPI) equation.       Type 2 Diabetes:  Currently taking Lantus 21 units daily. Patient is not experiencing side effects.  Blood sugar monitoring:  Generally testing once daily (AM fasting) - OneTouch Ultra 2   Date FBG (mg/dL)   4/1 95   4/2 114   4/3 108   4/4 116   4/5 113   4/6 99   4/7 115   4/8 110   4/9 118   4/10 107   4/11 90   4/12 91   4/13 108   4/14 103   4/15 100   4/16 128   4/17 -   4/18 107   4/19 102   4/20 118   4/21 97   Symptoms of low blood sugar? None  Symptoms of high blood sugar? None  Eye exam: due  Foot exam: due  Diet/Exercise: No changes  Aspirin: No longer taking per cardiology direction given warfarin use  Statin: No - patient declines and LDL is well controlled without therapy  ACEi/ARB: Yes: irbesartan.  Urine Albumin:   Lab Results   Component Value Date    UMALCR 118.46 (H) 03/01/2022      Lab Results   Component Value Date    A1C 5.6 03/01/2022    A1C 5.7  11/15/2021    A1C 6.1 07/30/2021    A1C 6.7 04/26/2021    A1C 7.0 09/22/2020    A1C 6.1 02/19/2020    A1C 6.2 11/14/2019    A1C 7.6 10/08/2019     Today's Vitals: There were no vitals taken for this visit.  ----------------    I spent 20 minutes with this patient today. A copy of the visit note was provided to the patient's provider(s).    The patient declined a summary of these recommendations.     Mikaela StroudD, [facility]  Medication Therapy Management Provider  Pager: [phone]     Telemedicine Visit Details  Type of service:  Telephone visit  Start Time: 10:30 AM  End Time: 10:50 AM  Originating Location (patient location): [place]  Distant Location (provider location):  [facility]     Medication Therapy Recommendations  No medication therapy recommendations to display

## 2022-04-21 NOTE — PROGRESS NOTES
ANTICOAGULATION MANAGEMENT     Jt Montgomery 76 year old male is on warfarin with subtherapeutic INR result. (Goal INR 2.0-3.0)    Recent labs: (last 7 days)     04/21/22  1149   INR 1.7*       ASSESSMENT       Source(s): Chart Review and Patient/Caregiver Call       Warfarin doses taken: Missed dose(s) may be affecting INR. He took the missed 5 mg this morning when he noticed it.    Diet: Increased greens/vitamin K in diet; plans to resume previous intake. Had a green smoothie recently (not his norm).    New illness, injury, or hospitalization: No    Medication/supplement changes: None noted    Signs or symptoms of bleeding or clotting: No    Previous INR: Therapeutic last 2(+) visits    Additional findings: Moving to [place] in about 3 weeks, still has to establish with a new provider down there - probably with [place].       PLAN     Recommended plan for temporary change(s) affecting INR     Dosing Instructions: continue your current warfarin dose with next INR in 2 weeks       Summary  As of 4/21/2022    Full warfarin instructions:  4/21: 12.5 mg; Otherwise 7.5 mg every Sun, Thu; 5 mg all other days   Next INR check:  5/5/2022             Telephone call with Jamal who agrees to plan and repeated back plan correctly    Lab visit scheduled    Education provided: Please call back if any changes to your diet, medications or how you've been taking warfarin, Importance of consistent vitamin K intake, Impact of vitamin K foods on INR, Vitamin K content of foods and Contact [phone]  with any changes, questions or concerns.     Plan made per [facility] anticoagulation protocol    Naomi Muñoz RN  Anticoagulation Clinic  4/21/2022    _______________________________________________________________________     Anticoagulation Episode Summary     Current INR goal:  2.0-3.0   TTR:  76.7 % (1 y)   Target end date:  Indefinite   Send INR reminders to:  URVASHI PIMENTEL    Indications    Atrial fibrillation (AFIB) on  Coumadin [I48.91]  Long term current use of anticoagulants with INR goal of 2.0-3.0 (Resolved) [Z79.01]  Paroxysmal atrial fibrillation (H) [I48.0]  Long term current use of anticoagulants with INR goal of 2.0-3.0 [Z79.01]           Comments:           Anticoagulation Care Providers     Provider Role Specialty Phone number    Ip, Genaro Garrett MD Referring Cardiovascular Disease [phone]    Digna Jones MD Referring Internal Medicine [phone]

## 2022-04-26 ENCOUNTER — TELEPHONE (OUTPATIENT)
Dept: FAMILY MEDICINE | Facility: CLINIC | Age: 77
End: 2022-04-26
Payer: COMMERCIAL

## 2022-04-26 DIAGNOSIS — I48.0 PAROXYSMAL ATRIAL FIBRILLATION (H): ICD-10-CM

## 2022-04-26 DIAGNOSIS — Z79.01 LONG TERM CURRENT USE OF ANTICOAGULANTS WITH INR GOAL OF 2.0-3.0: Primary | ICD-10-CM

## 2022-04-26 NOTE — TELEPHONE ENCOUNTER
ANTICOAGULATION MANAGEMENT      Jt Montgomery due for annual renewal of referral to anticoagulation monitoring. Order pended for your review and signature.      ANTICOAGULATION SUMMARY      Warfarin indication(s)     Atrial fibrillation    Heart valve present?  NO       Current goal range   INR: 2.0-3.0     Goal appropriate for indication? Yes, INR 2-3 appropriate for hx of DVT, PE, hypercoagulable state, Afib, LVAD, or bileaflet AVR without risk factors     Current duration of therapy Indefinite/long term therapy   Time in Therapeutic Range (TTR)  (Goal > 60%) 76.6%       Office visit with referring provider's group within last year Last office visit with Dr. Jones on 4/26/21. Please ask team to help schedule an office visit (no PCP listed in Epic, but Dr. Jones was last office visit and signed INR Referral last year.       Naomi Muñoz RN

## 2022-05-05 ENCOUNTER — LAB (OUTPATIENT)
Dept: LAB | Facility: CLINIC | Age: 77
End: 2022-05-05
Payer: COMMERCIAL

## 2022-05-05 ENCOUNTER — ANTICOAGULATION THERAPY VISIT (OUTPATIENT)
Dept: ANTICOAGULATION | Facility: CLINIC | Age: 77
End: 2022-05-05

## 2022-05-05 DIAGNOSIS — Z79.01 LONG TERM CURRENT USE OF ANTICOAGULANTS WITH INR GOAL OF 2.0-3.0: ICD-10-CM

## 2022-05-05 DIAGNOSIS — I48.91 ATRIAL FIBRILLATION (H): Primary | ICD-10-CM

## 2022-05-05 DIAGNOSIS — I48.0 PAROXYSMAL ATRIAL FIBRILLATION (H): ICD-10-CM

## 2022-05-05 LAB — INR BLD: 2 (ref 0.9–1.1)

## 2022-05-05 PROCEDURE — 85610 PROTHROMBIN TIME: CPT

## 2022-05-05 PROCEDURE — 36416 COLLJ CAPILLARY BLOOD SPEC: CPT

## 2022-05-05 NOTE — PROGRESS NOTES
ANTICOAGULATION MANAGEMENT     Jt Montgomery 76 year old male is on warfarin with therapeutic INR result. (Goal INR 2.0-3.0)    Recent labs: (last 7 days)     05/05/22  1125   INR 2.0*       ASSESSMENT       Source(s): Chart Review and Patient/Caregiver Call       Warfarin doses taken: Warfarin taken as instructed    Diet: No new diet changes identified    New illness, injury, or hospitalization: No    Medication/supplement changes: None noted    Signs or symptoms of bleeding or clotting: No    Previous INR: Subtherapeutic    Additional findings: In the process of moving but has not moved yet. Thinks he will be moving the end of May, will plan to recheck one more time before he leaves.        PLAN     Recommended plan for no diet, medication or health factor changes affecting INR     Dosing Instructions: continue your current warfarin dose with next INR in 2 weeks       Summary  As of 5/5/2022    Full warfarin instructions:  7.5 mg every Sun, Thu; 5 mg all other days   Next INR check:  5/20/2022             Telephone call with Jamal who verbalizes understanding and agrees to plan    Lab visit scheduled    Education provided: Please call back if any changes to your diet, medications or how you've been taking warfarin    Plan made per [facility] anticoagulation protocol    Diann Bliss RN  Anticoagulation Clinic  5/5/2022    _______________________________________________________________________     Anticoagulation Episode Summary     Current INR goal:  2.0-3.0   TTR:  72.9 % (1 y)   Target end date:  Indefinite   Send INR reminders to:  [facility]    Indications    Paroxysmal atrial fibrillation (H) [I48.0]  Long term current use of anticoagulants with INR goal of 2.0-3.0 [Z79.01]           Comments:           Anticoagulation Care Providers     Provider Role Specialty Phone number    Ip, Genaro Garrett MD Referring Cardiovascular Disease [phone]    Digna Jones MD Referring Internal Medicine  [phone]

## 2022-05-10 ENCOUNTER — TELEPHONE (OUTPATIENT)
Dept: SLEEP MEDICINE | Facility: CLINIC | Age: 77
End: 2022-05-10
Payer: COMMERCIAL

## 2022-05-10 DIAGNOSIS — G47.33 OSA (OBSTRUCTIVE SLEEP APNEA): Primary | ICD-10-CM

## 2022-05-10 DIAGNOSIS — G47.31 CENTRAL SLEEP APNEA: ICD-10-CM

## 2022-05-20 ENCOUNTER — LAB (OUTPATIENT)
Dept: LAB | Facility: CLINIC | Age: 77
End: 2022-05-20
Payer: COMMERCIAL

## 2022-05-20 ENCOUNTER — ANTICOAGULATION THERAPY VISIT (OUTPATIENT)
Dept: ANTICOAGULATION | Facility: CLINIC | Age: 77
End: 2022-05-20

## 2022-05-20 DIAGNOSIS — Z79.01 LONG TERM CURRENT USE OF ANTICOAGULANTS WITH INR GOAL OF 2.0-3.0: ICD-10-CM

## 2022-05-20 DIAGNOSIS — I48.0 PAROXYSMAL ATRIAL FIBRILLATION (H): ICD-10-CM

## 2022-05-20 DIAGNOSIS — I48.0 PAROXYSMAL ATRIAL FIBRILLATION (H): Primary | ICD-10-CM

## 2022-05-20 LAB — INR BLD: 2 (ref 0.9–1.1)

## 2022-05-20 PROCEDURE — 85610 PROTHROMBIN TIME: CPT

## 2022-05-20 PROCEDURE — 36415 COLL VENOUS BLD VENIPUNCTURE: CPT

## 2022-05-20 NOTE — PROGRESS NOTES
ANTICOAGULATION MANAGEMENT     Jt Montgomery 76 year old male is on warfarin with therapeutic INR result. (Goal INR 2.0-3.0)    Recent labs: (last 7 days)     05/20/22  1559   INR 2.0*       ASSESSMENT       Source(s): Chart Review    Previous INR was Therapeutic last visit; previously outside of goal range    Medication, diet, health changes since last INR chart reviewed; none identified    Patient should be moving next week per my last notes, need to clarify this is last INR with him.        PLAN     Unable to reach Jamal today.    Left message to continue MD  this weekend. Request call back for assessment.    Follow up required to assess for changes     Diann Bliss RN  Anticoagulation Clinic  5/20/2022

## 2022-05-23 ENCOUNTER — TELEPHONE (OUTPATIENT)
Dept: FAMILY MEDICINE | Facility: CLINIC | Age: 77
End: 2022-05-23
Payer: COMMERCIAL

## 2022-05-23 NOTE — PROGRESS NOTES
ANTICOAGULATION MANAGEMENT     Jt Montgomery 76 year old male is on warfarin with therapeutic INR result. (Goal INR 2.0-3.0)    Recent labs: (last 7 days)     05/20/22  1559   INR 2.0*       ASSESSMENT       Source(s): Chart Review and Patient/Caregiver Call       Warfarin doses taken: Warfarin taken as instructed    Diet: No new diet changes identified, maybe less salads?    New illness, injury, or hospitalization: No    Medication/supplement changes: None noted    Signs or symptoms of bleeding or clotting: No    Previous INR: Therapeutic last visit; previously outside of goal range    Additional findings: Will be moving to [facility] June 6th. Plans to have visit with new provider set up by next (last) INR visit with us.       PLAN     Recommended plan for no diet, medication or health factor changes affecting INR     Dosing Instructions: continue your current warfarin dose with next INR in 2 weeks       Summary  As of 5/20/2022    Full warfarin instructions:  7.5 mg every Sun, Thu; 5 mg all other days   Next INR check:  6/2/2022             Telephone call with Jamal who verbalizes understanding and agrees to plan    Lab visit scheduled    Education provided: Please call back if any changes to your diet, medications or how you've been taking warfarin, Importance of consistent vitamin K intake and Contact [phone]  with any changes, questions or concerns.     Plan made per [facility] anticoagulation protocol    Naomi Muñoz RN  Anticoagulation Clinic  5/23/2022    _______________________________________________________________________     Anticoagulation Episode Summary     Current INR goal:  2.0-3.0   TTR:  72.7 % (1 y)   Target end date:  Indefinite   Send INR reminders to:  [facility]    Indications    Paroxysmal atrial fibrillation (H) [I48.0]  Long term current use of anticoagulants with INR goal of 2.0-3.0 [Z79.01]           Comments:           Anticoagulation Care Providers     Provider Role  Specialty Phone number    Ip, Genaro Garrett MD Referring Cardiovascular Disease [phone]    Digna Jones MD Referring Internal Medicine [phone]

## 2022-05-23 NOTE — TELEPHONE ENCOUNTER
Patient retuning INR nusr call.  No one available at number listed.  Please call him back at [phone].

## 2022-05-24 ENCOUNTER — OFFICE VISIT (OUTPATIENT)
Dept: SLEEP MEDICINE | Facility: CLINIC | Age: 77
End: 2022-05-24
Payer: COMMERCIAL

## 2022-05-24 VITALS
DIASTOLIC BLOOD PRESSURE: 64 MMHG | BODY MASS INDEX: 33.53 KG/M2 | OXYGEN SATURATION: 97 % | SYSTOLIC BLOOD PRESSURE: 119 MMHG | WEIGHT: 253 LBS | HEIGHT: 73 IN | HEART RATE: 75 BPM

## 2022-05-24 DIAGNOSIS — G47.31 CENTRAL SLEEP APNEA: ICD-10-CM

## 2022-05-24 DIAGNOSIS — G47.33 OSA (OBSTRUCTIVE SLEEP APNEA): Primary | ICD-10-CM

## 2022-05-24 PROCEDURE — 99215 OFFICE O/P EST HI 40 MIN: CPT | Performed by: INTERNAL MEDICINE

## 2022-05-24 NOTE — NURSING NOTE
"Chief Complaint   Patient presents with     CPAP Follow Up       Initial /64   Pulse 75   Ht 1.854 m (6' 1\")   Wt 114.8 kg (253 lb)   SpO2 97%   BMI 33.38 kg/m   Estimated body mass index is 33.38 kg/m  as calculated from the following:    Height as of this encounter: 1.854 m (6' 1\").    Weight as of this encounter: 114.8 kg (253 lb).    Medication Reconciliation: complete  ESS 3  Sigrid Minor CMA  "

## 2022-05-24 NOTE — PROGRESS NOTES
[facility]   Outpatient Sleep Medicine Follow-up Visit  May 24, 2022    Name: Jt Montgomery MRN# [id number]   Age: 76 year old YOB: 1945     Date of Consultation: May 24, 2022  Consultation is requested by: No referring provider defined for this encounter.  Primary care provider: No Ref-Primary, Physician           Assessment and Plan:     Sleep Diagnoses:     Severe TOMMY on CPAP    Doing well with CPAP auto-titrate 11-15 cm H2O, AHI 4.5,  Pressure 95th%ile 14.6 cm H2O.  Plan to continue CPAP on auto 11-15 cmH2O. Excellent compliance and therapy data. He is moving to [place] and will need to transfer care to a new sleep physician.    Sleep Initiation Insomnia  Advised that he takes melatonin 3 hours prior to desired bedtime and not at bedtime as he is currently doing to help with sleep initiation. Will hold off on additional sleeping aides.     Summary Recommendations:  No orders of the defined types were placed in this encounter.      Summary Counseling:         History of Present Illness:   I had the pleasure of seeing Jt Montgomery, who is a 76 year old male who presents for annual follow of severe sleep apnea on auto cpap. He was seen by my colleague- Dr. Goltz in 05/2020.  Pertinent PMHx of DM II, morbid obesity, HTN, pulmonary HTN, CAD, atrial fibrillation with non-ischemic HFrEF but with largely recovered LVEF (50-55% as 11/2016). He was initially diagnosed with mixed sleep apnea appearing to be predominantly obstructive in nature following improvement / normalization of LV function (prior LV dysfunction).  He is doing well with CPAP and his sleep related symptoms are well controlled. He has some nights when he has sleep initiation insomnia and bedtime is delayed for several hours. He takes his melatonin at bedtime.  Overall, the patient rates their experience with PAP as 10 (0 poor, 10 great). The mask is comfortable. The mask is not leaking, 0 nights per  week. They are not snoring with the mask on. They are not having gasp arousals.  They are not having significant oral/nasal dryness. The pressure settings are comfortable.   Patient uses nasal pillows.  Bedtime is typically 10 pm. Usually it takes about 15 minutes (few nights are several hours) to fall asleep with the mask on. Wake time is typically 7 am.  Patient is using PAP therapy 8 hours per night. The patient is usually getting 8 hours of sleep per night.  Patient does feel rested in the morning.  Hobart Sleepiness Scale: 3/24    ResMed- last 30 days  Auto-PAP 11-15 cmH2O download:  30 total days of use. 0  nonuse days. 30 days with >4 hours use.  Average use 8 hours  per day. Median Leak 0 L/min.  CPAP auto-titrate 11-15 cm H2O, AHI 4.5,   Pressure 95th%ile 14.6 cm H2O.      Prior Sleep Testin2010 Polysomnography at [facility] - AHI 64.5/hr; RDI 65.6/hr; PHUC 34/hr with Cheyne-Villanueva.    2011 Polysomnography at [facility] - Titration for Adaptive Servo-Ventilation - Ineffective.  3/23/2018 PSG at  - Repeat diagnostic - AHI 80.7, CAHI 11.7. didn't sleep well.  Noted that LVEF had improved to 50-55% by time of this study.       Most Recent SCALES:    EPWORTH SLEEPINESS SCALE WITHIN 1 YEAR WITHIN 10 DAYS   Sitting and reading Would never doze Would never doze   Watching TV Would never doze Would never doze   Sitting, inactive in a public place (theatre or mtg.) Would never doze  Would never doze    As a passenger in a car Would never doze Would never doze   Lying down to rest in the afternoon when circumstance permit High chance of dozing High chance of dozing   Sitting and talking to someone Would never doze Would never doze   Sitting quietly after lunch without alcohol Would never doze Would never doze   In a car, while stopped for a few minutes in traffic Would never doze Would never doze   TOTAL SCORE 3 3   Normal < 11         0--none    1--mild    2--moderate  3--severe      INSOMNIA SEVERITY INDEX WITHIN  1 YEAR   Difficulty falling asleep Moderate   Difficult staying asleep Moderate   Problems waking up to early Moderate   How SATISFIED/DISSATISFIED are you with your CURRENT sleep pattern? Dissatisfied   How NOTICEABLE to others do you think your sleep pattern is in terms of your quality of life? A Little   How WORRIED/DISTRESSED are you about your current sleep pattern? Somewhat   To what extent do you consider your sleep problem to INTERFERE with your daily fuctioning(e.g. daytime fatigue, mood, ability to function at work/daily chores, concentration, mood,etc.) CURRENTLY? Somewhat   INSOMNIA SEVERITY INDEX TOTAL SCORE 14    --absence of insomnia (0-7); sub-threshold insomnia (8-14); moderate insomnia (15-21); and severe insomnia (22-28)--    LUDWIN Total Score: 14           Medications:     Current Outpatient Medications   Medication Sig     BD PEN NEEDLE GERMÁN 2ND GEN 32G X 4 MM miscellaneous USE FOUR TIMES DAILY AS DIRECTED     blood glucose (NO BRAND SPECIFIED) lancets standard Use to test blood sugar 1 times daily or as directed.  Dispense per insurance covered brand     blood glucose monitoring (NO BRAND SPECIFIED) meter device kit Use to test blood sugar 1 time daily or as directed. Preferred blood glucose meter OR supplies to accompany: Blood Glucose Monitor Brands: per insurance.     Capsicum, Cayenne, (CAYENNE PEPPER PO) Take by mouth as needed     Coenzyme Q10 (COQ10) 100 MG CAPS Take 100 mg by mouth daily      febuxostat (ULORIC) 40 MG TABS tablet Take 40 mg by mouth daily     finasteride (PROSCAR) 5 MG tablet TAKE 1 TABLET(5 MG) BY MOUTH DAILY     fish oil-omega-3 fatty acids 500 MG capsule Take 1,500 mg by mouth daily     furosemide (LASIX) 40 MG tablet Take 40 mg by mouth 2 times daily     insulin glargine (LANTUS SOLOSTAR) 100 UNIT/ML pen Inject 21 Units Subcutaneous daily     irbesartan (AVAPRO) 300 MG tablet Take 0.5 tablets (150 mg) by mouth At Bedtime     Lactobacillus (PROBIOTIC ACIDOPHILUS PO)  Take 1 capsule by mouth daily     Lancets (ONETOUCH DELICA PLUS [id number]) MISC USE WITH LANCING DEVICE TO TEST BLOOD SUGAR AS DIRECTED     magnesium oxide 200 MG TABS Take 1-2 tablets by mouth daily      metoprolol succinate ER (TOPROL-XL) 100 MG 24 hr tablet Take 100 mg by mouth 2 times daily     ONETOUCH ULTRA test strip USE TO TEST BLOOD SUGAR 1 TIME DAILY OR AS DIRECTED     order for DME Equipment being ordered: Compression stockings     potassium chloride ER (KLOR-CON M) 10 MEQ CR tablet Take 20 mEq by mouth 2 times daily      predniSONE (DELTASONE) 20 MG tablet TAKE 1 TABLET(20 MG) BY MOUTH DAILY AS NEEDED FOR GOUT FLARE     STATIN NOT PRESCRIBED (INTENTIONAL) Statin not prescribed intentionally due to Other patient declines (This option does not exclude patient from measure)     tamsulosin (FLOMAX) 0.4 MG capsule Take 1 capsule (0.4 mg) by mouth every evening     UNABLE TO FIND MEDICATION NAME: Shaklee Dream Serene 1 capsule at bedtime as needed.  1 capsule = 2mg of melatonin and 250mg of proprietary blend of valerian root, lemon balm extract and l-theanine     UNABLE TO FIND MEDICATION NAME: Super beet powder daily     UNABLE TO FIND MEDICATION NAME: CarotoMax with lutein - takes sporadically     vitamin B complex with vitamin C (STRESS TAB) tablet Take 1 tablet by mouth daily     vitamin C (ASCORBIC ACID) 1000 MG TABS Take 1,000 mg by mouth daily as needed      Vitamin D, Cholecalciferol, 25 MCG (1000 UT) CAPS Take 3,000 Units by mouth daily     warfarin ANTICOAGULANT (COUMADIN) 5 MG tablet 7.5 mg every Sun, Thu; 5 mg all other days or as directed     Zinc 15 MG CAPS Take 15 mg by mouth daily as needed      ASPIRIN NOT PRESCRIBED (INTENTIONAL) Antiplatelet medication not prescribed intentionally due to Current anticoagulant therapy (warfarin/enoxaparin) (Patient not taking: No sig reported)     Multiple Vitamins-Minerals (MULTIVITAMIN ADULT PO) Take 1 tablet by mouth daily (Patient not taking: Reported on  5/24/2022)     triamcinolone (KENALOG) 0.1 % external cream Apply topically 2 times daily as needed (rash) (Patient not taking: Reported on 5/24/2022)     No current facility-administered medications for this visit.        Allergies   Allergen Reactions     No Known Allergies             Problem List:     Patient Active Problem List   Diagnosis     HTN (hypertension); goal <140/90     Alcohol dependence in remission (H)     TOMMY (obstructive sleep apnea)     Central Sleep Apnea with Cheyne Villanueva breathing     Dilated cardiomyopathy, nonischemic EF 30-40% in March 2013     S/p angioplasty with stent w/ RONEL in distal RCA     Anticoagulation management encounter     Gout     Type 2 diabetes mellitus with diabetic neuropathy; goal HgbA1c < 7%     CAD (coronary artery disease)     Atrial fibrillation (AFIB) on Coumadin     CKD (chronic kidney disease) stage 3, GFR 30-59 ml/min (H)     Morbid obesity (H)     Pulmonary hypertension (H)     Mixed hyperlipidemia due to type 2 diabetes mellitus (H)     Anemia, unspecified type     Facial cellulitis     Severe sepsis (H)     Acute pyelonephritis     Obstructive uropathy     Acute on chronic renal failure (H)     Indirect hyperbilirubinemia     Physical deconditioning     Hematuria     Benign prostatic hyperplasia with urinary obstruction     Paroxysmal atrial fibrillation (H)     Long term current use of anticoagulants with INR goal of 2.0-3.0     Acute on chronic right-sided congestive heart failure (H)            Past Medical History:     Does not need 02 supplement at night   Past Medical History:   Diagnosis Date     Atrial fibrillation (H)      CAD (coronary artery disease)     MI with RONEL to dRCA April 2011     Central Sleep Apnea with Pat Villanueva breathing 9/14/2010    Also TOMMY with CPAP     CKD (chronic kidney disease) stage 3, GFR 30-59 ml/min (H)      Dilated cardiomyopathy (H)     nonischemic (possibly related to h/o a.fib with RVR) EF 30-40% March 2013     DM2  "(diabetes mellitus, type 2) (H)     Goal HgbA1c < 7%     Gout     uric acid level correlates; April 2014 h/o +knee aspirate     Hernia, abdominal      Hypertension     Goal <140/90     Pulmonary hypertension (H)     mild per echo 3/2013     Spider veins              Past Surgical History:    No h/o  upper airway surgery  Past Surgical History:   Procedure Laterality Date     CORONARY ANGIOGRAPHY ADULT ORDER  2011    distal circ-RONEL     CYSTOSCOPY, TRANSURETHRAL RESECTION (TUR) PROSTATE, COMBINED N/A 5/15/2020    Procedure: CYSTOSCOPY, WITH TRANSURETHRAL RESECTION PROSTATE;  Surgeon: Tr Bowles MD;  Location: [facility] OR     HERNIA REPAIR  11/20/10    incarcinated     SUSPEND HYOID, GENIOGLOSSAL ADVANCEMENT                Physical Examination:   /64   Pulse 75   Ht 1.854 m (6' 1\")   Wt 114.8 kg (253 lb)   SpO2 97%   BMI 33.38 kg/m    Exam:  Constitutional: healthy, alert and no distress  Head: Normocephalic. No masses, lesions, tenderness or abnormalities  ENT: ENT exam normal, no neck nodes or sinus tenderness  Cardiovascular: negative, PMI normal. No lifts, heaves, or thrills. RRR. No murmurs, clicks gallops or rub  Respiratory: negative, Percussion normal. Good diaphragmatic excursion. Lungs clear  Gastrointestinal: Abdomen soft, non-tender. BS normal. No masses, organomegaly  : Deferred  Musculoskeletal: extremities normal- no gross deformities noted, gait normal and normal muscle tone  Skin: no suspicious lesions or rashes  Neurologic: Gait normal. Reflexes normal and symmetric. Sensation grossly WNL.  Psychiatric: mentation appears normal and affect normal/bright    Copy to: No Ref-Primary, Physician      Beverly Mckoy MD 5/24/2022   I spent a total of 40 minutes face to face with Jt Montgomery during today's office visit. Over 50% of this time was spent counseling the patient and/or coordinating care regarding their pulmonary disease.      "

## 2022-05-26 ENCOUNTER — VIRTUAL VISIT (OUTPATIENT)
Dept: PHARMACY | Facility: CLINIC | Age: 77
End: 2022-05-26
Payer: COMMERCIAL

## 2022-05-26 ENCOUNTER — TELEPHONE (OUTPATIENT)
Dept: SLEEP MEDICINE | Facility: CLINIC | Age: 77
End: 2022-05-26

## 2022-05-26 DIAGNOSIS — I10 ESSENTIAL HYPERTENSION: Primary | ICD-10-CM

## 2022-05-26 DIAGNOSIS — E11.40 TYPE 2 DIABETES MELLITUS WITH DIABETIC NEUROPATHY, WITH LONG-TERM CURRENT USE OF INSULIN (H): ICD-10-CM

## 2022-05-26 DIAGNOSIS — I25.10 CORONARY ARTERY DISEASE INVOLVING NATIVE CORONARY ARTERY OF NATIVE HEART WITHOUT ANGINA PECTORIS: ICD-10-CM

## 2022-05-26 DIAGNOSIS — Z79.4 TYPE 2 DIABETES MELLITUS WITH DIABETIC NEUROPATHY, WITH LONG-TERM CURRENT USE OF INSULIN (H): ICD-10-CM

## 2022-05-26 DIAGNOSIS — I42.0 DILATED CARDIOMYOPATHY (H): ICD-10-CM

## 2022-05-26 DIAGNOSIS — I48.91 ATRIAL FIBRILLATION, UNSPECIFIED TYPE (H): ICD-10-CM

## 2022-05-26 PROCEDURE — 99606 MTMS BY PHARM EST 15 MIN: CPT | Performed by: PHARMACIST

## 2022-05-26 NOTE — PATIENT INSTRUCTIONS
"Recommendations from today's MTM visit:                                                    MTM (medication therapy management) is a service provided by a clinical pharmacist designed to help you get the most of out of your medicines.   Today we reviewed what your medicines are for, how to know if they are working, that your medicines are safe and how to make your medicine regimen as easy as possible.      Continue current medication regimen    Follow-up: Return if symptoms worsen or fail to improve.    It was great speaking with you today.  I value your experience and would be very thankful for your time in providing feedback in our clinic survey. In the next few days, you may receive an email or text message from [employer] with a link to a survey related to your  clinical pharmacist.\"     To schedule another MTM appointment, please call the clinic directly or you may call the MTM scheduling line at [phone] or toll-free at [phone].     My Clinical Pharmacist's contact information:                                                      Please feel free to contact me with any questions or concerns you have.      Nathaly Hughes PharmD, [facility]  Medication Therapy Management Provider  Pager: [phone]     Erlinda Dewey, [place]  Medication Therapy Management Resident  Pager: [phone]    "

## 2022-05-26 NOTE — TELEPHONE ENCOUNTER
CALLED AND LVM FOR PT TELLING HIM WHAT ADDRESS WE DO HAVE ON FILE. LEFT OUR CALL BACK NUMBER IF HE WOULD LIKE TO CHANGE OR CONFIRM.

## 2022-05-26 NOTE — PROGRESS NOTES
Medication Therapy Management (MTM) Encounter    ASSESSMENT:                            Medication Adherence/Access: No issues identified    Hypertension/CAD/A. Fib/HFrEF: Stable.    Type 2 Diabetes: Stable. Patient is meeting A1c goal of < 8%. Self monitoring of blood glucose is at goal of fasting  mg/dL without episodes of hypoglycemia.  No changes needed.    PLAN:                            Continue current medication regimen    Follow-up: None - will not plan for follow-up given patient's upcoming move to [place]    SUBJECTIVE/OBJECTIVE:                          Jt Montgomery is a 76 year old male called for a follow-up visit.  Today's visit is a follow-up MTM visit from 4/21/2022.     Reason for visit: Routine follow-up.    Tobacco: He reports that he quit smoking about 50 years ago. His smoking use included cigarettes. He has a 10.50 pack-year smoking history. He has never used smokeless tobacco.  Alcohol: history of alcohol dependence    Social history:  Patient is planning to move to [place] on 6/6    Medication Adherence/Access: no issues reported    Hypertension/CAD/A. Fib/HFrEF:  Jamal continues taking furosemide 40mg twice daily, KCl 20mEq twice daily, irbesartan 150mg daily, metoprolol succinate ER 100mg twice daily and warfarin as directed.  He denies any side effects including signs and symptoms of bruising/bleeding or lightheadedness.  He reports the following blood pressure readings (mmHg): 117/78, 114/74, 111/77, 116/69, 118/64, 105/73, 113/72, 111/69, 109/68, 115/70, 115/77, 107/77, 119/68  Wt Readings from Last 4 Encounters:   05/24/22 253 lb (114.8 kg)   04/26/21 247 lb (112 kg)   04/19/21 276 lb (125.2 kg)   04/10/21 269 lb (122 kg)      Potassium   Date Value Ref Range Status   03/01/2022 4.3 3.4 - 5.3 mmol/L Final   04/26/2021 4.3 3.4 - 5.3 mmol/L Final      BP Readings from Last 3 Encounters:   05/24/22 119/64   04/26/21 126/70   04/19/21 (!) 147/74      INR   Date Value Ref Range Status    05/20/2022 2.0 (H) 0.9 - 1.1 Final   07/02/2021 2.50 (H) 0.86 - 1.14 Final     Comment:     This test is intended for monitoring Coumadin therapy.  Results are not   accurate in patients with prolonged INR due to factor deficiency.         GFR Estimate   Date Value Ref Range Status   03/01/2022 71 >60 mL/min/1.73m2 Final     Comment:     Effective December 21, 2021 eGFRcr in adults is calculated using the 2021 CKD-EPI creatinine equation which includes age and gender (Ernie et al., NEJ, DOI: 10.1056/UJYLny2042827)   04/26/2021 60 (L) >60 mL/min/[1.73_m2] Final     Comment:     Non  GFR Calc  Starting 12/18/2018, serum creatinine based estimated GFR (eGFR) will be   calculated using the Chronic Kidney Disease Epidemiology Collaboration   (CKD-EPI) equation.       Type 2 Diabetes:  Currently taking Lantus 21 units daily. Patient is not experiencing side effects.  Blood sugar monitoring:  Generally testing once daily (AM fasting) - OneTouch Ultra 2   Date FBG (mg/dL)   5/1 127   5/2 114   5/3 122   5/4 104   5/5 121   5/6 106   5/7 117   5/8 123   5/9 113   5/10 106   5/11 108   5/12 108   5/13 89   5/14 94   5/15 94   5/16 91   5/17 92   5/18 107   5/19 187 (took prednisone PM on the 18th)   5/20 115   5/21 98   5/22 108   5/23 89   5/24 91   5/25 96   5/26 95   Symptoms of low blood sugar? None  Symptoms of high blood sugar? None  Eye exam: due  Foot exam: due  Diet/Exercise: No changes  Aspirin: No longer taking per cardiology direction given warfarin use  Statin: No - patient declines and LDL is well controlled without therapy  ACEi/ARB: Yes: irbesartan.  Urine Albumin:   Lab Results   Component Value Date    UMALCR 118.46 (H) 03/01/2022      Lab Results   Component Value Date    A1C 5.6 03/01/2022    A1C 5.7 11/15/2021    A1C 6.1 07/30/2021    A1C 6.7 04/26/2021    A1C 7.0 09/22/2020    A1C 6.1 02/19/2020    A1C 6.2 11/14/2019    A1C 7.6 10/08/2019     Today's Vitals: There were no vitals taken  for this visit.  ----------------    I spent 15 minutes with this patient today. A copy of the visit note was provided to the patient's provider(s).    The patient declined a summary of these recommendations.     Mikaela StroudD, [facility]  Medication Therapy Management Provider  Pager: [phone]     Telemedicine Visit Details  Type of service:  Telephone visit  Start Time: 11:00 AM  End Time: 11:15 AM  Originating Location (patient location): [place]  Distant Location (provider location):  [facility]     Medication Therapy Recommendations  No medication therapy recommendations to display

## 2022-05-30 DIAGNOSIS — N40.1 BENIGN PROSTATIC HYPERPLASIA WITH URINARY OBSTRUCTION: ICD-10-CM

## 2022-05-30 DIAGNOSIS — N13.8 BENIGN PROSTATIC HYPERPLASIA WITH URINARY OBSTRUCTION: ICD-10-CM

## 2022-06-01 RX ORDER — FINASTERIDE 5 MG/1
TABLET, FILM COATED ORAL
Qty: 90 TABLET | Refills: 1 | Status: SHIPPED | OUTPATIENT
Start: 2022-06-01 | End: 2022-06-03

## 2022-06-01 NOTE — TELEPHONE ENCOUNTER
Routing refill request to provider for review/approval because:  Patient needs to be seen because it has been more than 1 year since last office visit.    Alton Shelton, RN  [facility] Triage Nurse

## 2022-06-03 ENCOUNTER — ANTICOAGULATION THERAPY VISIT (OUTPATIENT)
Dept: ANTICOAGULATION | Facility: CLINIC | Age: 77
End: 2022-06-03

## 2022-06-03 ENCOUNTER — LAB (OUTPATIENT)
Dept: LAB | Facility: CLINIC | Age: 77
End: 2022-06-03

## 2022-06-03 ENCOUNTER — VIRTUAL VISIT (OUTPATIENT)
Dept: FAMILY MEDICINE | Facility: CLINIC | Age: 77
End: 2022-06-03
Payer: COMMERCIAL

## 2022-06-03 DIAGNOSIS — E78.5 HYPERLIPIDEMIA LDL GOAL <100: ICD-10-CM

## 2022-06-03 DIAGNOSIS — Z79.4 TYPE 2 DIABETES MELLITUS WITH DIABETIC NEUROPATHY, WITH LONG-TERM CURRENT USE OF INSULIN (H): ICD-10-CM

## 2022-06-03 DIAGNOSIS — I48.0 PAROXYSMAL ATRIAL FIBRILLATION (H): Primary | ICD-10-CM

## 2022-06-03 DIAGNOSIS — G47.33 OSA (OBSTRUCTIVE SLEEP APNEA): Chronic | ICD-10-CM

## 2022-06-03 DIAGNOSIS — I10 ESSENTIAL HYPERTENSION: ICD-10-CM

## 2022-06-03 DIAGNOSIS — Z76.0 ENCOUNTER FOR MEDICATION REFILL: Primary | ICD-10-CM

## 2022-06-03 DIAGNOSIS — Z79.01 LONG TERM CURRENT USE OF ANTICOAGULANTS WITH INR GOAL OF 2.0-3.0: ICD-10-CM

## 2022-06-03 DIAGNOSIS — E11.40 TYPE 2 DIABETES MELLITUS WITH DIABETIC NEUROPATHY, WITH LONG-TERM CURRENT USE OF INSULIN (H): ICD-10-CM

## 2022-06-03 DIAGNOSIS — N13.8 BENIGN PROSTATIC HYPERPLASIA WITH URINARY OBSTRUCTION: ICD-10-CM

## 2022-06-03 DIAGNOSIS — N40.1 BENIGN PROSTATIC HYPERPLASIA WITH URINARY OBSTRUCTION: ICD-10-CM

## 2022-06-03 DIAGNOSIS — I48.0 PAROXYSMAL ATRIAL FIBRILLATION (H): ICD-10-CM

## 2022-06-03 LAB — INR BLD: 1.8 (ref 0.9–1.1)

## 2022-06-03 PROCEDURE — 85610 PROTHROMBIN TIME: CPT

## 2022-06-03 PROCEDURE — 99214 OFFICE O/P EST MOD 30 MIN: CPT | Mod: 95 | Performed by: INTERNAL MEDICINE

## 2022-06-03 PROCEDURE — 36416 COLLJ CAPILLARY BLOOD SPEC: CPT

## 2022-06-03 RX ORDER — TAMSULOSIN HYDROCHLORIDE 0.4 MG/1
0.4 CAPSULE ORAL EVERY EVENING
Qty: 90 CAPSULE | Refills: 3 | Status: SHIPPED | OUTPATIENT
Start: 2022-06-03

## 2022-06-03 RX ORDER — FINASTERIDE 5 MG/1
TABLET, FILM COATED ORAL
Qty: 90 TABLET | Refills: 3 | Status: SHIPPED | OUTPATIENT
Start: 2022-06-03

## 2022-06-03 NOTE — PROGRESS NOTES
ANTICOAGULATION MANAGEMENT     Jt Montgomery 76 year old male is on warfarin with subtherapeutic INR result. (Goal INR 2.0-3.0)    Recent labs: (last 7 days)     06/03/22  1344   INR 1.8*       ASSESSMENT       Source(s): Chart Review    Previous INR was Subtherapeutic.  May need to consider a maintenance dose adjustment since INR has been consistently on the lower end of the therapeutic range for a while and is sub therapeutic today.      Medication, diet, health changes since last INR chart reviewed; none identified     Moving to [place], [place] on 6/6/22           PLAN     Unable to reach Jamal today.    Left message to continue maintenance dose (5 mg Fri & Sat, and 7.5 mg Sun) this weekend. Request call back for assessment.     Would suggest increasing maintenance dose to 7.5 mg Sun, Tu, Th and 5 mg all other days (~6% increase).    Follow up required to confirm warfarin dose taken and assess for changes, discuss out of range result  and discuss dosing instructions and confirm understanding of instructions    Quiana Dumont RN  Anticoagulation Clinic  6/3/2022

## 2022-06-03 NOTE — PROGRESS NOTES
Columba Berry is a 76 year old who presents for the following health issues    HPI        Chief Complaint:       follow up on multiple concerns including medication refills, BPH, Type 2 Diabetes       HPI:   Patient Jt Montgomery is a very pleasant 76 year old male with history of CHF, Type 2 Diabetes, hypertension, hyperlipidemia today for telephone visit for follow up on multiple concerns including medication refills. His chronic BPH symptoms are well controlled with his current Flomax and finasteride medication therapy. Regarding his chronic Type 2 diabetes mellitus the patient is compliant with his diabetes medication therapy including long-term current use of insulin therapy. He denies any recent hypoglycemia events. No chest pain, headaches, fever or chills at this time.    Current Medications:     Current Outpatient Medications   Medication Sig Dispense Refill     ASPIRIN NOT PRESCRIBED (INTENTIONAL) Antiplatelet medication not prescribed intentionally due to Current anticoagulant therapy (warfarin/enoxaparin) (Patient not taking: No sig reported)       BD PEN NEEDLE GERMÁN 2ND GEN 32G X 4 MM miscellaneous USE FOUR TIMES DAILY AS DIRECTED 400 each 0     blood glucose (NO BRAND SPECIFIED) lancets standard Use to test blood sugar 1 times daily or as directed.  Dispense per insurance covered brand 100 each 0     blood glucose monitoring (NO BRAND SPECIFIED) meter device kit Use to test blood sugar 1 time daily or as directed. Preferred blood glucose meter OR supplies to accompany: Blood Glucose Monitor Brands: per insurance. 1 kit 0     Capsicum, Cayenne, (CAYENNE PEPPER PO) Take by mouth as needed       Coenzyme Q10 (COQ10) 100 MG CAPS Take 100 mg by mouth daily        febuxostat (ULORIC) 40 MG TABS tablet Take 40 mg by mouth daily       finasteride (PROSCAR) 5 MG tablet TAKE 1 TABLET(5 MG) BY MOUTH DAILY 90 tablet 1     fish oil-omega-3 fatty acids 500 MG capsule Take 1,500 mg by mouth daily        furosemide (LASIX) 40 MG tablet Take 40 mg by mouth 2 times daily       insulin glargine (LANTUS SOLOSTAR) 100 UNIT/ML pen Inject 21 Units Subcutaneous daily 15 mL 3     irbesartan (AVAPRO) 300 MG tablet Take 0.5 tablets (150 mg) by mouth At Bedtime 45 tablet 1     Lactobacillus (PROBIOTIC ACIDOPHILUS PO) Take 1 capsule by mouth daily       Lancets (ONETOUCH DELICA PLUS MINHXT75S) MISC USE WITH LANCING DEVICE TO TEST BLOOD SUGAR AS DIRECTED 300 each 11     magnesium oxide 200 MG TABS Take 1-2 tablets by mouth daily        metoprolol succinate ER (TOPROL-XL) 100 MG 24 hr tablet Take 100 mg by mouth 2 times daily       Multiple Vitamins-Minerals (MULTIVITAMIN ADULT PO) Take 1 tablet by mouth daily       ONETOUCH ULTRA test strip USE TO TEST BLOOD SUGAR 1 TIME DAILY OR AS DIRECTED 100 strip 6     order for DME Equipment being ordered: Compression stockings 1 each 3     potassium chloride ER (KLOR-CON M) 10 MEQ CR tablet Take 20 mEq by mouth 2 times daily        predniSONE (DELTASONE) 20 MG tablet TAKE 1 TABLET(20 MG) BY MOUTH DAILY AS NEEDED FOR GOUT FLARE (Patient not taking: Reported on 5/26/2022) 10 tablet 0     STATIN NOT PRESCRIBED (INTENTIONAL) Statin not prescribed intentionally due to Other patient declines (This option does not exclude patient from measure) (Patient not taking: Reported on 5/26/2022)       tamsulosin (FLOMAX) 0.4 MG capsule Take 1 capsule (0.4 mg) by mouth every evening 90 capsule 3     triamcinolone (KENALOG) 0.1 % external cream Apply topically 2 times daily as needed (rash) (Patient not taking: No sig reported) 30 g 3     UNABLE TO FIND MEDICATION NAME: Shaklee Dream Serene 1 capsule at bedtime as needed.  1 capsule = 2mg of melatonin and 250mg of proprietary blend of valerian root, lemon balm extract and l-theanine       UNABLE TO FIND MEDICATION NAME: Super beet powder daily       UNABLE TO FIND MEDICATION NAME: CarotoMax with lutein - takes sporadically       vitamin B complex with  vitamin C (STRESS TAB) tablet Take 1 tablet by mouth daily       vitamin C (ASCORBIC ACID) 1000 MG TABS Take 1,000 mg by mouth daily as needed        Vitamin D, Cholecalciferol, 25 MCG (1000 UT) CAPS Take 3,000 Units by mouth daily       warfarin ANTICOAGULANT (COUMADIN) 5 MG tablet 7.5 mg every Sun, Thu; 5 mg all other days or as directed 100 tablet 1     Zinc 15 MG CAPS Take 15 mg by mouth daily as needed            Allergies:      Allergies   Allergen Reactions     No Known Allergies             Past Medical History:     Past Medical History:   Diagnosis Date     Atrial fibrillation (H)      CAD (coronary artery disease)     MI with RONEL to dRCA April 2011     Central Sleep Apnea with Pat Villanueva breathing 9/14/2010    Also TOMMY with CPAP     CKD (chronic kidney disease) stage 3, GFR 30-59 ml/min (H)      Dilated cardiomyopathy (H)     nonischemic (possibly related to h/o a.fib with RVR) EF 30-40% March 2013     DM2 (diabetes mellitus, type 2) (H)     Goal HgbA1c < 7%     Gout     uric acid level correlates; April 2014 h/o +knee aspirate     Hernia, abdominal      Hypertension     Goal <140/90     Pulmonary hypertension (H)     mild per echo 3/2013     Spider veins          Past Surgical History:     Past Surgical History:   Procedure Laterality Date     CORONARY ANGIOGRAPHY ADULT ORDER  2011    distal circ-RONEL     CYSTOSCOPY, TRANSURETHRAL RESECTION (TUR) PROSTATE, COMBINED N/A 5/15/2020    Procedure: CYSTOSCOPY, WITH TRANSURETHRAL RESECTION PROSTATE;  Surgeon: Tr Bowles MD;  Location: [facility] OR     HERNIA REPAIR  11/20/10    incarcinated     SUSPEND HYOID, GENIOGLOSSAL ADVANCEMENT           Family Medical History:     Family History   Problem Relation Age of Onset     Hypertension Mother      Coronary Artery Disease Mother      Coronary Artery Disease Father      Hypertension Father      Diabetes Sister      Cerebrovascular Disease Sister          Social History:     Social History     Socioeconomic  History     Marital status:      Spouse name: Not on file     Number of children: Not on file     Years of education: Not on file     Highest education level: Not on file   Occupational History     Not on file   Tobacco Use     Smoking status: Former Smoker     Packs/day: 1.50     Years: 7.00     Pack years: 10.50     Types: Cigarettes     Quit date: 1972     Years since quittin.4     Smokeless tobacco: Never Used     Tobacco comment: quit in       Started at around age 18-19   Substance and Sexual Activity     Alcohol use: No     Alcohol/week: 0.0 standard drinks     Comment: 74   recovering     Drug use: No     Sexual activity: Not Currently     Partners: Female   Other Topics Concern      Service Not Asked     Blood Transfusions Not Asked     Caffeine Concern No     Occupational Exposure Not Asked     Hobby Hazards Not Asked     Sleep Concern Yes     Comment: sleep apnea, wears bi-pap     Stress Concern Not Asked     Weight Concern Not Asked     Special Diet No     Comment: low carb diet     Back Care Not Asked     Exercise No     Comment: walking     Bike Helmet Not Asked     Seat Belt Yes     Self-Exams Not Asked     Parent/sibling w/ CABG, MI or angioplasty before 65F 55M? Not Asked   Social History Narrative     Not on file     Social Determinants of Health     Financial Resource Strain: Not on file   Food Insecurity: Not on file   Transportation Needs: Not on file   Physical Activity: Not on file   Stress: Not on file   Social Connections: Not on file   Intimate Partner Violence: Not on file   Housing Stability: Not on file           Review of System:     Constitutional: Negative for fever or chills  Skin: positive for improvement in recent shingles rashes  Ears/Nose/Throat: Negative for nasal congestion, sore throat  Respiratory: positive for dyspnea on exertion  Cardiovascular: Negative for chest pain, positive for CHF  Gastrointestinal: Negative for nausea,  vomiting  Genitourinary: Negative for dysuria, hematuria, positive for chronic BPH with urinary retention  Musculoskeletal: Negative for myalgias  Neurologic: Negative for headaches  Psychiatric: Negative for depression, anxiety  Hematologic/Lymphatic/Immunologic: positive for chronic bilateral leg edema  Endocrine: Negative for recent hypoglycemia events  Behavioral: Negative for tobacco use, positive for chronic alcoholism in remission      Physical Exam:   There were no vitals taken for this visit.    RESP: no cough over the phone  NEURO: Alert & Oriented x 3.   PSYCH: mentation appears normal, affect normal        Diagnostic Test Results:     Diagnostic Test Results:      Last Comprehensive Metabolic Panel:  Sodium   Date Value Ref Range Status   03/01/2022 140 133 - 144 mmol/L Final   04/26/2021 139 133 - 144 mmol/L Final     Potassium   Date Value Ref Range Status   03/01/2022 4.3 3.4 - 5.3 mmol/L Final   04/26/2021 4.3 3.4 - 5.3 mmol/L Final     Chloride   Date Value Ref Range Status   03/01/2022 108 94 - 109 mmol/L Final   04/26/2021 105 94 - 109 mmol/L Final     Carbon Dioxide   Date Value Ref Range Status   04/26/2021 31 20 - 32 mmol/L Final     Carbon Dioxide (CO2)   Date Value Ref Range Status   03/01/2022 25 20 - 32 mmol/L Final     Anion Gap   Date Value Ref Range Status   03/01/2022 7 3 - 14 mmol/L Final   04/26/2021 3 3 - 14 mmol/L Final     Glucose   Date Value Ref Range Status   03/01/2022 167 (H) 70 - 99 mg/dL Final   04/26/2021 190 (H) 70 - 99 mg/dL Final     Urea Nitrogen   Date Value Ref Range Status   03/01/2022 30 7 - 30 mg/dL Final   04/26/2021 30 7 - 30 mg/dL Final     Creatinine   Date Value Ref Range Status   03/01/2022 1.09 0.66 - 1.25 mg/dL Final   04/26/2021 1.19 0.66 - 1.25 mg/dL Final     GFR Estimate   Date Value Ref Range Status   03/01/2022 71 >60 mL/min/1.73m2 Final     Comment:     Effective December 21, 2021 eGFRcr in adults is calculated using the 2021 CKD-EPI creatinine  equation which includes age and gender (Ernie gross al., NEJM, DOI: 10.1056/XPKJal2415237)   04/26/2021 60 (L) >60 mL/min/[1.73_m2] Final     Comment:     Non  GFR Calc  Starting 12/18/2018, serum creatinine based estimated GFR (eGFR) will be   calculated using the Chronic Kidney Disease Epidemiology Collaboration   (CKD-EPI) equation.       Calcium   Date Value Ref Range Status   03/01/2022 8.1 (L) 8.5 - 10.1 mg/dL Final   04/26/2021 8.8 8.5 - 10.1 mg/dL Final     Lab Results   Component Value Date    A1C 5.6 03/01/2022    A1C 5.7 11/15/2021    A1C 6.1 07/30/2021    A1C 6.7 04/26/2021    A1C 7.0 09/22/2020    A1C 6.1 02/19/2020    A1C 6.2 11/14/2019    A1C 7.6 10/08/2019         ASSESSMENT/PLAN:     Diagnoses and all orders for this visit:    Encounter for medication refill  Benign prostatic hyperplasia with urinary obstruction  -     finasteride (PROSCAR) 5 MG tablet; TAKE 1 TABLET(5 MG) BY MOUTH DAILY  -     tamsulosin (FLOMAX) 0.4 MG capsule; Take 1 capsule (0.4 mg) by mouth every evening    Essential hypertension  - stable, continue current therapy    Hyperlipidemia LDL goal <100  - stable, continue current therapy    Type 2 diabetes mellitus with diabetic neuropathy, with long-term current use of insulin (H)  - no recent hypoglycemia events  - continue current diabetes medication therapy    TOMMY (obstructive sleep apnea)  - stable, continue current CPAP therapy          Follow Up Plan:     Patient is instructed to return to Internal Medicine clinic for follow-up visit in 6 months.       Phone call duration: 30 minutes      Digna Jones MD  Internal Medicine  [facility]

## 2022-06-06 ENCOUNTER — TELEPHONE (OUTPATIENT)
Dept: FAMILY MEDICINE | Facility: CLINIC | Age: 77
End: 2022-06-06
Payer: COMMERCIAL

## 2022-06-06 NOTE — PROGRESS NOTES
PLAN     Unable to reach Jamal today.    LM to call [facility]    Follow up required to discuss out of range result     Diann Bliss RN  Anticoagulation Clinic  6/6/2022

## 2022-06-06 NOTE — PROGRESS NOTES
ANTICOAGULATION MANAGEMENT     Jt Montgomery 76 year old male is on warfarin with subtherapeutic INR result. (Goal INR 2.0-3.0)    Recent labs: (last 7 days)     06/03/22  1344   INR 1.8*       ASSESSMENT       Source(s): Chart Review and Patient/Caregiver Call       Warfarin doses taken: Warfarin taken as instructed and Flash is currently inpatient at [facility] in [place].    Diet: No new diet changes identified    New illness, injury, or hospitalization: Yes: Jamal states shortly after last INR visit he was driving and felt like he was going to pass out. At home later that night he had cold sweats and felt his heart racing so called ambulance. He states they plan to place a pacer/defibrilator.     Medication/supplement changes: Will need to follow up once he is discharged.    Signs or symptoms of bleeding or clotting: No    Previous INR: Therapeutic last 2(+) visits    Additional findings: He was supposed to move to the [facility] today, but all on hold now.        PLAN     Recommended plan for ongoing change(s) affecting INR     Dosing Instructions: hospital managing warfarin dosing through discharge. Then patient will call to schedule next INR if he doesn't get scheduled with new provider in [place], [place]. with next INR in 3-4 days after discharge.       Summary  As of 6/3/2022    Full warfarin instructions:  7.5 mg every Sun, Thu; 5 mg all other days   Next INR check:  6/17/2022             Telephone call with Jamal who verbalizes understanding and agrees to plan. Also advised Jamal to speak with a hospital care coordinator who may be able to help facilitate an appointment with his new provider in the [facility].    Contact [phone]  to schedule and with any changes, questions or concerns.     Education provided: Please call back if any changes to your diet, medications or how you've been taking warfarin and Contact [phone]  with any changes, questions or concerns.      Plan made per [facility] anticoagulation protocol    Naomi Muñoz, RN  Anticoagulation Clinic  6/6/2022    _______________________________________________________________________     Anticoagulation Episode Summary     Current INR goal:  2.0-3.0   TTR:  68.8 % (1 y)   Target end date:  Indefinite   Send INR reminders to:  [facility]    Indications    Paroxysmal atrial fibrillation (H) [I48.0]  Long term current use of anticoagulants with INR goal of 2.0-3.0 [Z79.01]           Comments:           Anticoagulation Care Providers     Provider Role Specialty Phone number    Ip, Genaro Garrett MD Referring Cardiovascular Disease [phone]    Digna Jones MD Referring Internal Medicine [phone]

## 2022-06-06 NOTE — TELEPHONE ENCOUNTER
Reason for Call:  Other call back    Detailed comments: calling back about INR    Phone Number Patient can be reached at: Home number on file [phone] (home)    Best Time: anytime    Can we leave a detailed message on this number? YES    Call taken on 6/6/2022 at 11:48 AM by Aura Polanco

## 2022-06-09 ENCOUNTER — PATIENT OUTREACH (OUTPATIENT)
Dept: NURSING | Facility: CLINIC | Age: 77
End: 2022-06-09
Payer: COMMERCIAL

## 2022-06-09 NOTE — PROGRESS NOTES
Clinic Care Coordination Contact    RN CC received call from patient, requesting an order for home care. Patient is currently at [facility] (in patient) being treated for arhythmia. Patient states a pacemaker was put a few days ago, and he is waiting to stabilize before discharge home. RN CC advised patient to speak with in patient care team and request home care be ordered upon discharge. Patient verbalized understanding. Patient does not currently have PCP assigned, may be following up with PCP outside [facility] system. No further CC outreaches planned at this time. RN CC will remain available as needed.     Merly Montoya RN Care Coordinator  [facility]  Email: [email or web address]  Phone: [phone]

## 2022-06-12 LAB — INR (EXTERNAL): 2.1 (ref 0.8–1.1)

## 2022-06-14 LAB — INR (EXTERNAL): 2.4 (ref 0.8–1.1)

## 2022-06-15 ENCOUNTER — TELEPHONE (OUTPATIENT)
Dept: PHARMACY | Facility: CLINIC | Age: 77
End: 2022-06-15
Payer: COMMERCIAL

## 2022-06-15 NOTE — TELEPHONE ENCOUNTER
"Received VM from patient.  I returned his call - he was admitted at [facility] on 6/3 for sustained ventricular tachycardia.  According to discharge summary -   \"Sustained ventricular tachycardia   Non ischemic Cardiomyopathy / CAD s/p PCI LCx  Chronic Atrial fibrillation   HTN / dyslipidemia   - converted to A fib after 2 shocks in ED  - cardiology consulted - Started IV amiodarone gtt then PO amiodarone.   -Continues warfarin for Afib.   -coronary angiogram showed severe L PDA disease  - Cardiac MRI on 6/6 showed severe left ventricular dilatation with EF of 25.5%. Severe diffuse global left ventricular hypokinesis.   -EP consulted. Patient underwent single chamber cardioverter-defibrillator implant for secondary prevention of sudden cardiac death from monomorphic VT in the absence of a readily reversible cause.   - continue Toprol  mg bid\"    Patient is currently in TCU ([facility]), thinks he'll be discharged in 7-10 days.  He is unable to drive for 3 months so he anticipates he'll be in town for another few months.  I've asked Jamal to call me once he discharges from TCU and we'll set up a follow-up visit.  He agrees.    Nathaly Hughes, PharmD, [facility]  Medication Therapy Management Provider  Pager: [phone]       "

## 2022-06-16 LAB — INR (EXTERNAL): 2.4 (ref 0.8–1.1)

## 2022-06-20 LAB — INR (EXTERNAL): 3.2 (ref 0.8–1.1)

## 2022-06-21 LAB — INR (EXTERNAL): 3.6 (ref 0.8–1.1)

## 2022-06-22 ENCOUNTER — TELEPHONE (OUTPATIENT)
Dept: ANTICOAGULATION | Facility: CLINIC | Age: 77
End: 2022-06-22

## 2022-06-22 NOTE — TELEPHONE ENCOUNTER
Received VM from [facility] with central scheduling stating that Jamal is calling from [facility] (plans to discharge tomorrow). Jamal has an appt tomorrow for an INR and is wondering if his INR will result in time to  his medication tomorrow. Not exactly sure what this means.    Routing to managing [facility] to call back patient.

## 2022-06-22 NOTE — TELEPHONE ENCOUNTER
INR appt is scheduled for 315 pm tomorrow and per chart review, pt typically fills rx at [facility] - there should not be an issue w/ turn around time. [facility] will discuss after receiving tomorrow's INR level. Iva Fair, MIREYAN, RN

## 2022-06-23 ENCOUNTER — LAB (OUTPATIENT)
Dept: LAB | Facility: CLINIC | Age: 77
End: 2022-06-23
Payer: COMMERCIAL

## 2022-06-23 ENCOUNTER — ANTICOAGULATION THERAPY VISIT (OUTPATIENT)
Dept: ANTICOAGULATION | Facility: CLINIC | Age: 77
End: 2022-06-23

## 2022-06-23 DIAGNOSIS — I48.0 PAROXYSMAL ATRIAL FIBRILLATION (H): ICD-10-CM

## 2022-06-23 DIAGNOSIS — Z79.01 LONG TERM CURRENT USE OF ANTICOAGULANTS WITH INR GOAL OF 2.0-3.0: ICD-10-CM

## 2022-06-23 DIAGNOSIS — I48.0 PAROXYSMAL ATRIAL FIBRILLATION (H): Primary | ICD-10-CM

## 2022-06-23 LAB — INR BLD: 2.9 (ref 0.9–1.1)

## 2022-06-23 PROCEDURE — 85610 PROTHROMBIN TIME: CPT

## 2022-06-23 PROCEDURE — 36416 COLLJ CAPILLARY BLOOD SPEC: CPT

## 2022-06-23 RX ORDER — AMIODARONE HYDROCHLORIDE 200 MG/1
200 TABLET ORAL
COMMUNITY
End: 2022-07-05

## 2022-06-23 NOTE — PROGRESS NOTES
ANTICOAGULATION MANAGEMENT     Jt Montgomery 76 year old male is on warfarin with therapeutic INR result. (Goal INR 2.0-3.0)    Recent labs: (last 7 days)     06/23/22  1510   INR 2.9*       ASSESSMENT       Source(s): Chart Review and Patient/Caregiver Call       Warfarin doses taken: Warfarin taken as instructed . Per notes from Geriatrics telephone encounter today, patient has been on a much lower weekly warfarin dose (28 mg over the last 7 days).     Diet: diet was poor in TCU, he did have some iceberg salads but plans to get back to more healthy, regular salads now at home.    New illness, injury, or hospitalization: Yes: Patient to hospital 6/3/22. 6/7/22 implantable cardioverter-defibrillator in situ (sustained ventricular tachycardia). Patient recovered in TCU and discharged today.    Medication/supplement changes: Patient's medication list probably needs updating. He was discharged on amiodarone taper (200 mg twice daily until 7/9/22, then 200 mg once daily), this medication is not listed on his EMR. Advised patient at next visit with Dr. Jones or his [facility] Pharmacist, he should bring in all medications to update medication list. He was in the car today so unable to do this.    Signs or symptoms of bleeding or clotting: No    Previous INR: Subtherapeutic    Additional findings: None       PLAN     Recommended plan for temporary change(s) and ongoing change(s) affecting INR     Dosing Instructions: Patient has 5 mg tablets available at home. Adjusted weekly dosing to match the last 7 days of dosing per Geriatrics note today. Updated dosing calendar with next INR in 1 week       Summary  As of 6/23/2022    Full warfarin instructions:  2.5 mg every Mon, Wed, Fri; 5 mg all other days   Next INR check:  7/14/2022             Telephone call with Jamal and his friend who drives him while he has a driving restriction. who agrees to plan and repeated back plan correctly    Lab visit scheduled    Education provided:  Please call back if any changes to your diet, medications or how you've been taking warfarin, Importance of consistent vitamin K intake, Impact of vitamin K foods on INR, Goal range and significance of current result, Importance of following up at instructed interval, Importance of taking warfarin as instructed, Potential interaction between warfarin and amiodarone, Monitoring for bleeding signs and symptoms, Monitoring for clotting signs and symptoms, When to seek medical attention/emergency care and Contact [phone]  with any changes, questions or concerns.     Plan made per [facility] anticoagulation protocol    Naomi Muñoz RN  Anticoagulation Clinic  6/23/2022    _______________________________________________________________________     Anticoagulation Episode Summary     Current INR goal:  2.0-3.0   TTR:  68.1 % (1 y)   Target end date:  Indefinite   Send INR reminders to:  [facility]    Indications    Paroxysmal atrial fibrillation (H) [I48.0]  Long term current use of anticoagulants with INR goal of 2.0-3.0 [Z79.01]           Comments:           Anticoagulation Care Providers     Provider Role Specialty Phone number    Manuela, Genaro Garrett MD Referring Cardiovascular Disease [phone]    Digna Jones MD Referring Internal Medicine [phone]

## 2022-06-23 NOTE — TELEPHONE ENCOUNTER
Per telephone encounter 6/15/22 with Pharm D: Patient is currently in TCU ([facility]), thinks he'll be discharged in 7-10 days.  He is unable to drive for 3 months so he anticipates he'll be in town for another few months.    Called [facility], [phone], to verify they are managing his INR/warfarin dosing. Spoke with MARCELLA Sol, she states his lab was drawn this morning and when the get the INR result their providers will give warfarin dosing instruction. No need to go to outside clinic for labs. They will explain all of this to Jamal.     Naomi GREEN RN  Anticoagulation Team

## 2022-06-23 NOTE — TELEPHONE ENCOUNTER
Patient returned call and states he is planning to discharge from TCU today and is worried his INR result won't come in, but he will wait for it since the nurse should come in with discharge instructions for all medications (including warfarin). He plans to stay in the [place] for at least a couple of months until he can drive again before moving to [place]. His ex-wife plans to come up and help with a few things later this week. If his INR is therapeutic and he has clear instructions on warfarin dosing for the next week or two, he will plan to call to make another INR lab appointment as advised by TCU staff.     Naomi GREEN RN  Anticoagulation Team

## 2022-06-24 ENCOUNTER — TELEPHONE (OUTPATIENT)
Dept: PHARMACY | Facility: CLINIC | Age: 77
End: 2022-06-24

## 2022-06-24 ENCOUNTER — VIRTUAL VISIT (OUTPATIENT)
Dept: PHARMACY | Facility: CLINIC | Age: 77
End: 2022-06-24
Payer: COMMERCIAL

## 2022-06-24 DIAGNOSIS — I47.20 SUSTAINED VENTRICULAR TACHYCARDIA (H): ICD-10-CM

## 2022-06-24 DIAGNOSIS — Z79.01 LONG TERM CURRENT USE OF ANTICOAGULANTS WITH INR GOAL OF 2.0-3.0: ICD-10-CM

## 2022-06-24 DIAGNOSIS — I48.91 ATRIAL FIBRILLATION, UNSPECIFIED TYPE (H): ICD-10-CM

## 2022-06-24 DIAGNOSIS — I42.0 DILATED CARDIOMYOPATHY (H): ICD-10-CM

## 2022-06-24 DIAGNOSIS — I48.0 PAROXYSMAL ATRIAL FIBRILLATION (H): ICD-10-CM

## 2022-06-24 DIAGNOSIS — I25.10 CORONARY ARTERY DISEASE INVOLVING NATIVE CORONARY ARTERY OF NATIVE HEART WITHOUT ANGINA PECTORIS: ICD-10-CM

## 2022-06-24 DIAGNOSIS — I10 ESSENTIAL HYPERTENSION: Primary | ICD-10-CM

## 2022-06-24 PROCEDURE — 99606 MTMS BY PHARM EST 15 MIN: CPT | Performed by: PHARMACIST

## 2022-06-24 RX ORDER — ACETAMINOPHEN 325 MG/1
325-650 TABLET ORAL EVERY 4 HOURS PRN
COMMUNITY
Start: 2022-06-08

## 2022-06-24 RX ORDER — IRBESARTAN 75 MG/1
75 TABLET ORAL DAILY
COMMUNITY
Start: 2022-06-22 | End: 2022-08-22

## 2022-06-24 RX ORDER — AMIODARONE HYDROCHLORIDE 200 MG/1
200 TABLET ORAL 2 TIMES DAILY
COMMUNITY
Start: 2022-06-22 | End: 2022-06-24

## 2022-06-24 NOTE — PROGRESS NOTES
Medication Therapy Management (MTM) Encounter    ASSESSMENT:                            Medication Adherence/Access: No issues identified    Hypertension/CAD/A. Fib/HFrEF/Susained Ventricular Tachycardia: Stable, plan in place.  He has follow-up scheduled with appropriate team members.      PLAN:                            Continue current medication regimen    Follow-up: Return in 6 days (on 6/30/2022).    SUBJECTIVE/OBJECTIVE:                          Jt Montgomery is a 76 year old male called for a follow-up visit.  Today's visit is a follow-up MTM visit from 5/26/2022.     Reason for visit: Hospital follow-up.    Social history:  He had to postpone his move - is thinking he'll move in early-mid August.    Tobacco: He reports that he quit smoking about 50 years ago. His smoking use included cigarettes. He has a 10.50 pack-year smoking history. He has never used smokeless tobacco.  Alcohol: history of alcohol dependence    Medication Adherence/Access: no issues reported    Hypertension/CAD/A. Fib/HFrEF/Susained Ventricular Tachycardia:  Jamal was hospitalized at [facility] 6/3/2022-6/10/2022 for sustained ventricular tachycardia.  He called EMS after experiencing dizziness, was found to be in ventricular tachycardia.  He was converted to A. Fib after 2 shocks in ED.  He was started on IV amiodarone, then transitioned to oral amiodarone.  He did undergo single chamber cardioverter-defibrillator implant for secondary prevention of sudden cardiac death from monomorphic VT in absence of readily reversible cause.  He was discharged to TCU for additional rehab.      The following medication changes were made:    Start:  - acetaminophen   - amiodarone 400mg twice daily x1 week, then 200mg twice daily x3 weeks (until 7/9/2022), then 200mg daily thereafter  - irbesartan changed to 75mg daily due to hypotension    Stop:  - Magnesium  - Melatonin    Other medications remain unchanged and were reviewed with Jamal today  for medication reconciliation purposes.    He did have an INR check yesterday with plan in place for ongoing management due to amiodarone use - current warfarin dosing is 2.5mg every Mon, Wed, and Fri; 5mg all other days with INR re-check on 7/14.  INR   Date Value Ref Range Status   06/23/2022 2.9 (H) 0.9 - 1.1 Final     INR (External)   Date Value Ref Range Status   06/21/2022 3.6 (A) 0.8 - 1.1 Final     Today's Vitals: There were no vitals taken for this visit.  ----------------  Post Discharge Medication Reconciliation Status: discharge medications reconciled and changed, per note/orders.    I spent 35 minutes with this patient today. A copy of the visit note was provided to the patient's provider(s).    The patient declined a summary of these recommendations.     Nathaly Hughes, PharmD, BCACP  Medication Therapy Management Provider  Pager: [phone]     Telemedicine Visit Details  Type of service:  Telephone visit  Start Time: 11:00 AM  End Time: 11:35 AM  Originating Location (patient location): [place]  Distant Location (provider location):  [facility]     Medication Therapy Recommendations  No medication therapy recommendations to display

## 2022-06-24 NOTE — PATIENT INSTRUCTIONS
"Recommendations from today's MTM visit:                                                    MTM (medication therapy management) is a service provided by a clinical pharmacist designed to help you get the most of out of your medicines.   Today we reviewed what your medicines are for, how to know if they are working, that your medicines are safe and how to make your medicine regimen as easy as possible.      Continue current medication regimen    Follow-up: Return in 6 days (on 6/30/2022).    It was great speaking with you today.  I value your experience and would be very thankful for your time in providing feedback in our clinic survey. In the next few days, you may receive an email or text message from [facility] [employer] with a link to a survey related to your  clinical pharmacist.\"     To schedule another MTM appointment, please call the clinic directly or you may call the MTM scheduling line at [phone] or toll-free at [phone].     My Clinical Pharmacist's contact information:                                                      Please feel free to contact me with any questions or concerns you have.      Nathaly Hughes PharmD, [facility]  Medication Therapy Management Provider  Pager: [phone]     Erlinda Dewey PharmD  Medication Therapy Management Resident  Pager: [phone]    "

## 2022-06-24 NOTE — TELEPHONE ENCOUNTER
Last INR 6-    Appointments in Next Year    Jun 24, 2022 11:00 AM  MTM New with Nathaly Hughes PharmD  [facility] ([facility] ) [phone]   Jun 30, 2022 10:00 AM  LAB with CS LAB  [facility] ([facility] ) [phone]   Jun 30, 2022  1:00 PM  Pharmacist Visit with Nathaly Hughes PharmD  [facility] ([facility] ) [phone]        Awilda Wade, RT (R)

## 2022-06-24 NOTE — TELEPHONE ENCOUNTER
ANTICOAGULATION MANAGEMENT:  Medication Refill    Anticoagulation Summary  As of 6/23/2022    Warfarin maintenance plan:  2.5 mg (5 mg x 0.5) every Mon, Wed, Fri; 5 mg (5 mg x 1) all other days   Next INR check:  7/14/2022   Target end date:  Indefinite    Indications    Paroxysmal atrial fibrillation (H) [I48.0]  Long term current use of anticoagulants with INR goal of 2.0-3.0 [Z79.01]             Anticoagulation Care Providers     Provider Role Specialty Phone number    Genaro Roy MD Referring Cardiovascular Disease [phone]    Digna Jones MD Referring Internal Medicine [phone]          Visit with referring provider/group within last year: Yes    [facility] referral signed within last year: Yes    Jt meets all criteria for refill (current [facility] referral, office visit with referring provider/group in last year, lab monitoring up to date or not exceeding 2 weeks overdue). Rx instructions and quantity supplied updated to match patient's current dosing plan. Warfarin 90 day supply with 1 refill granted per [facility] protocol.    Forwarding to PCP for review due to drug interactions.    Raegan Cevallos RN  Anticoagulation Clinic

## 2022-06-25 RX ORDER — WARFARIN SODIUM 5 MG/1
TABLET ORAL
Qty: 100 TABLET | Refills: 1 | Status: SHIPPED | OUTPATIENT
Start: 2022-06-25 | End: 2023-02-13

## 2022-06-30 ENCOUNTER — LAB (OUTPATIENT)
Dept: LAB | Facility: CLINIC | Age: 77
End: 2022-06-30
Payer: COMMERCIAL

## 2022-06-30 ENCOUNTER — VIRTUAL VISIT (OUTPATIENT)
Dept: PHARMACY | Facility: CLINIC | Age: 77
End: 2022-06-30
Payer: COMMERCIAL

## 2022-06-30 ENCOUNTER — ANTICOAGULATION THERAPY VISIT (OUTPATIENT)
Dept: ANTICOAGULATION | Facility: CLINIC | Age: 77
End: 2022-06-30

## 2022-06-30 DIAGNOSIS — Z79.01 LONG TERM CURRENT USE OF ANTICOAGULANTS WITH INR GOAL OF 2.0-3.0: ICD-10-CM

## 2022-06-30 DIAGNOSIS — I42.0 DILATED CARDIOMYOPATHY (H): ICD-10-CM

## 2022-06-30 DIAGNOSIS — K59.00 CONSTIPATION, UNSPECIFIED CONSTIPATION TYPE: Primary | ICD-10-CM

## 2022-06-30 DIAGNOSIS — I10 ESSENTIAL HYPERTENSION: ICD-10-CM

## 2022-06-30 DIAGNOSIS — I47.20 SUSTAINED VENTRICULAR TACHYCARDIA (H): ICD-10-CM

## 2022-06-30 DIAGNOSIS — I48.0 PAROXYSMAL ATRIAL FIBRILLATION (H): Primary | ICD-10-CM

## 2022-06-30 DIAGNOSIS — I48.0 PAROXYSMAL ATRIAL FIBRILLATION (H): ICD-10-CM

## 2022-06-30 DIAGNOSIS — I25.10 CORONARY ARTERY DISEASE INVOLVING NATIVE CORONARY ARTERY OF NATIVE HEART WITHOUT ANGINA PECTORIS: ICD-10-CM

## 2022-06-30 DIAGNOSIS — I48.91 ATRIAL FIBRILLATION, UNSPECIFIED TYPE (H): ICD-10-CM

## 2022-06-30 LAB — INR BLD: 2.1 (ref 0.9–1.1)

## 2022-06-30 PROCEDURE — 99607 MTMS BY PHARM ADDL 15 MIN: CPT | Performed by: PHARMACIST

## 2022-06-30 PROCEDURE — 99606 MTMS BY PHARM EST 15 MIN: CPT | Performed by: PHARMACIST

## 2022-06-30 PROCEDURE — 85610 PROTHROMBIN TIME: CPT

## 2022-06-30 PROCEDURE — 36416 COLLJ CAPILLARY BLOOD SPEC: CPT

## 2022-06-30 NOTE — PROGRESS NOTES
"Medication Therapy Management (MTM) Encounter    ASSESSMENT:                            Medication Adherence/Access: No issues identified    Constipation: Stable, may be secondary to amiodarone and/or from discontinuing magnesium supplementation.    Hypertension/CAD/A. Fib/HFrEF/Susained Ventricular Tachycardia: Blood pressure continues to run on the low end and he's feeling a bit \"off\" - may benefit from reaching out to cardiology.  Could consider reducing anti hypertensive's such as metoprolol as HR is also on the low end.    PLAN:                            1.  Encouraged patient to reach out to cardiology re: dizziness and low blood pressure.  May benefit from reducing metoprolol dose slightly.    Follow-up: Return in 3 weeks (on 7/21/2022) for Follow-up Medication Review.    SUBJECTIVE/OBJECTIVE:                          Jt Montgomery is a 76 year old male called for a follow-up visit.  Today's visit is a follow-up MTM visit from 6/24/2022.     Reason for visit: Routine follow-up.    Tobacco: He reports that he quit smoking about 50 years ago. His smoking use included cigarettes. He has a 10.50 pack-year smoking history. He has never used smokeless tobacco.  Alcohol: history of alcohol dependence    Medication Adherence/Access: no issues reported    Constipation:  Patient generally does not have difficulty with constipation, but has been experiencing more since discharge.  He's using Shacklee HerbLax 1/2 tablet as needed (senna), which is effective for him.  No side effects reported.    Hypertension/CAD/A. Fib/HFrEF/Susained Ventricular Tachycardia:  Current medication regimen includes amiodarone 200mg twice daily, furosemide 40mg twice daily (with corresponding KCl 20mEq twice daily), irbesartan 75mg daily, metoprolol succinate 100mg twice daily and warfarin as directed.  He's been feeling a bit more \"muddled\" recently - a bit more forgetful and a bit lightheaded.  He reports the following blood pressure " readings: 98/54, 111/73, 97/56, 105/77, 107/76, 113/75, 106/64, 112/66mmHg.  HR has been 50s-60s.  He had INR checked earlier today, no signs and symptoms of bruising/bleeding reported.  He does have virtual follow-up with cardiology on 7/14.  He is planning to start with cardiac rehab on 7/11.  INR   Date Value Ref Range Status   06/30/2022 2.1 (H) 0.9 - 1.1 Final     INR (External)   Date Value Ref Range Status   06/21/2022 3.6 (A) 0.8 - 1.1 Final       Today's Vitals: There were no vitals taken for this visit.  ----------------    I spent 30 minutes with this patient today. I offer these suggestions for consideration by Dr. Adwoa Lawton. A copy of the visit note was provided to the patient's provider(s).    The patient declined a summary of these recommendations.     Nathaly Hughes, PharmD, BCACP  Medication Therapy Management Provider  Pager: [phone]     Telemedicine Visit Details  Type of service:  Telephone visit  Start Time: 1:00 PM  End Time: 1:30 PM  Originating Location (patient location): [place]  Distant Location (provider location):  [facility]     Medication Therapy Recommendations  Essential hypertension    Current Medication: metoprolol succinate ER (TOPROL-XL) 100 MG 24 hr tablet   Rationale: Dose too high - Dosage too high - Safety   Recommendation: Decrease Dose   Status: Contact Provider - Awaiting Response

## 2022-06-30 NOTE — PROGRESS NOTES
ANTICOAGULATION MANAGEMENT     Jt Montgomery 76 year old male is on warfarin with therapeutic INR result. (Goal INR 2.0-3.0)    Recent labs: (last 7 days)     06/30/22  0959   INR 2.1*       ASSESSMENT       Source(s): Chart Review and Patient/Caregiver Call       Warfarin doses taken: Warfarin taken as instructed    Diet: Increased greens/vitamin K in diet; ongoing change since discharging from TCU.    New illness, injury, or hospitalization: No    Medication/supplement changes: None noted , expecting interaction with warfarin to start raising INR soon though, needs close monitoring.    Signs or symptoms of bleeding or clotting: No    Previous INR: Therapeutic last visit; previously outside of goal range    Additional findings: None       PLAN     Recommended plan for no diet, medication or health factor changes affecting INR     Dosing Instructions: continue your current warfarin dose with next INR in 1 week       Summary  As of 6/30/2022    Full warfarin instructions:  2.5 mg every Mon, Wed, Fri; 5 mg all other days   Next INR check:  7/7/2022             Telephone call with Jamal who agrees to plan and repeated back plan correctly    Lab visit scheduled    Education provided: Please call back if any changes to your diet, medications or how you've been taking warfarin, Potential interaction between warfarin and amiodarone and Contact [phone]  with any changes, questions or concerns.     Plan made per [facility] anticoagulation protocol    Naomi Muñoz RN  Anticoagulation Clinic  6/30/2022    _______________________________________________________________________     Anticoagulation Episode Summary     Current INR goal:  2.0-3.0   TTR:  66.4 % (1 y)   Target end date:  Indefinite   Send INR reminders to:  SHEAAG LOREN    Indications    Paroxysmal atrial fibrillation (H) [I48.0]  Long term current use of anticoagulants with INR goal of 2.0-3.0 [Z79.01]           Comments:           Anticoagulation Care  Providers     Provider Role Specialty Phone number    Ip, Genaro Garrett MD Referring Cardiovascular Disease [phone]    Digna Jones MD Referring Internal Medicine [phone]

## 2022-07-01 NOTE — PATIENT INSTRUCTIONS
"Recommendations from today's MTM visit:                                                    MTM (medication therapy management) is a service provided by a clinical pharmacist designed to help you get the most of out of your medicines.   Today we reviewed what your medicines are for, how to know if they are working, that your medicines are safe and how to make your medicine regimen as easy as possible.      Please call cardiology about your low blood pressure readings and feeling a bit \"off\" - we may need to reduce your blood pressure medications a bit more    Follow-up: Return in 3 weeks (on 7/21/2022) for Follow-up Medication Review.    It was great speaking with you today.  I value your experience and would be very thankful for your time in providing feedback in our clinic survey. In the next few days, you may receive an email or text message from [employer] with a link to a survey related to your  clinical pharmacist.\"     To schedule another MTM appointment, please call the clinic directly or you may call the MTM scheduling line at [phone] or toll-free at [phone].     My Clinical Pharmacist's contact information:                                                      Please feel free to contact me with any questions or concerns you have.      Nathaly Hughes, Rogelio, [facility]  Medication Therapy Management Provider  Pager: [phone]    "

## 2022-07-05 DIAGNOSIS — I48.0 PAROXYSMAL ATRIAL FIBRILLATION (H): Primary | ICD-10-CM

## 2022-07-05 NOTE — TELEPHONE ENCOUNTER
I'm covering while Mikaela StroudD is out of office. Patient called requesting amiodarone refills. He will be running out on Thursday. Verified pended order is correct dosing - 200mg twice daily until 7/9, then 200mg once daily thereafter. Routing to Dr. Jones as high priority.    Kerri Massey PharmD, [facility]  Medication Therapy Management Pharmacist  Pager: [phone]

## 2022-07-05 NOTE — TELEPHONE ENCOUNTER
Reason for Call:  Medication or medication refill:    Do you use a [facility]?  Name of the pharmacy and phone number for the current request:  [id number] - [facility] [address] AT [facility]      Name of the medication requested:   Amiodarone  200mg 2x per day  then7/9 down to 1x per day    Other request:     Can we leave a detailed message on this number? YES    Phone number patient can be reached at: Cell number on file:    Telephone Information:   Mobile [phone]       Best Time: any    Call taken on 7/5/2022 at 2:43 PM by Annabella Adan

## 2022-07-06 RX ORDER — AMIODARONE HYDROCHLORIDE 200 MG/1
TABLET ORAL
Qty: 90 TABLET | Refills: 0 | Status: SHIPPED | OUTPATIENT
Start: 2022-07-06 | End: 2022-10-04

## 2022-07-07 ENCOUNTER — ANTICOAGULATION THERAPY VISIT (OUTPATIENT)
Dept: ANTICOAGULATION | Facility: CLINIC | Age: 77
End: 2022-07-07

## 2022-07-07 ENCOUNTER — LAB (OUTPATIENT)
Dept: LAB | Facility: CLINIC | Age: 77
End: 2022-07-07
Payer: COMMERCIAL

## 2022-07-07 DIAGNOSIS — Z79.01 LONG TERM CURRENT USE OF ANTICOAGULANTS WITH INR GOAL OF 2.0-3.0: ICD-10-CM

## 2022-07-07 DIAGNOSIS — I48.0 PAROXYSMAL ATRIAL FIBRILLATION (H): Primary | ICD-10-CM

## 2022-07-07 DIAGNOSIS — I48.0 PAROXYSMAL ATRIAL FIBRILLATION (H): ICD-10-CM

## 2022-07-07 LAB — INR BLD: 2.3 (ref 0.9–1.1)

## 2022-07-07 PROCEDURE — 36416 COLLJ CAPILLARY BLOOD SPEC: CPT

## 2022-07-07 PROCEDURE — 85610 PROTHROMBIN TIME: CPT

## 2022-07-07 NOTE — PROGRESS NOTES
ANTICOAGULATION MANAGEMENT     Jt Montgomery 76 year old male is on warfarin with therapeutic INR result. (Goal INR 2.0-3.0)    Recent labs: (last 7 days)     07/07/22  1312   INR 2.3*       ASSESSMENT       Source(s): Chart Review and Patient/Caregiver Call       Warfarin doses taken: Warfarin taken as instructed    Diet: No new diet changes identified    New illness, injury, or hospitalization: No    Medication/supplement changes: None noted    Signs or symptoms of bleeding or clotting: No    Previous INR: Therapeutic last 2(+) visits    Additional findings: On 7/9 amiodarone will change from 200 mg BID to 200 mg daily       PLAN     Recommended plan for no diet, medication or health factor changes affecting INR     Dosing Instructions: continue your current warfarin dose with next INR in 1 week       Summary  As of 7/7/2022    Full warfarin instructions:  2.5 mg every Mon, Wed, Fri; 5 mg all other days   Next INR check:  7/14/2022             Telephone call with Jamal who verbalizes understanding and agrees to plan    Lab visit scheduled    Education provided: Please call back if any changes to your diet, medications or how you've been taking warfarin    Plan made per [facility] anticoagulation protocol    Diann Bliss RN  Anticoagulation Clinic  7/7/2022    _______________________________________________________________________     Anticoagulation Episode Summary     Current INR goal:  2.0-3.0   TTR:  66.4 % (1 y)   Target end date:  Indefinite   Send INR reminders to:  URVASHI PIMENTEL    Indications    Paroxysmal atrial fibrillation (H) [I48.0]  Long term current use of anticoagulants with INR goal of 2.0-3.0 [Z79.01]           Comments:           Anticoagulation Care Providers     Provider Role Specialty Phone number    Genaro Roy MD Referring Cardiovascular Disease [phone]    Digna Jones MD Referring Internal Medicine [phone]

## 2022-07-18 ENCOUNTER — ANTICOAGULATION THERAPY VISIT (OUTPATIENT)
Dept: ANTICOAGULATION | Facility: CLINIC | Age: 77
End: 2022-07-18

## 2022-07-18 ENCOUNTER — LAB (OUTPATIENT)
Dept: LAB | Facility: CLINIC | Age: 77
End: 2022-07-18
Payer: COMMERCIAL

## 2022-07-18 DIAGNOSIS — I48.0 PAROXYSMAL ATRIAL FIBRILLATION (H): Primary | ICD-10-CM

## 2022-07-18 DIAGNOSIS — I48.0 PAROXYSMAL ATRIAL FIBRILLATION (H): ICD-10-CM

## 2022-07-18 DIAGNOSIS — Z79.01 LONG TERM CURRENT USE OF ANTICOAGULANTS WITH INR GOAL OF 2.0-3.0: ICD-10-CM

## 2022-07-18 LAB — INR BLD: 2 (ref 0.9–1.1)

## 2022-07-18 PROCEDURE — 36416 COLLJ CAPILLARY BLOOD SPEC: CPT

## 2022-07-18 PROCEDURE — 85610 PROTHROMBIN TIME: CPT

## 2022-07-18 NOTE — PROGRESS NOTES
ANTICOAGULATION MANAGEMENT     Jt Montgomery 76 year old male is on warfarin with therapeutic INR result. (Goal INR 2.0-3.0)    Recent labs: (last 7 days)     07/18/22  1540   INR 2.0*       ASSESSMENT       Source(s): Chart Review and Patient/Caregiver Call       Warfarin doses taken: Warfarin taken as instructed    Diet: Increased greens/vitamin K in diet; plans to resume previous intake    New illness, injury, or hospitalization: No  Doing cardiac rehab at [facility]    Medication/supplement changes: None noted    Signs or symptoms of bleeding or clotting: No    Previous INR: Subtherapeutic    Additional findings: None       PLAN     Recommended plan for temporary change(s) affecting INR     Dosing Instructions: Keep same dosing and cut back alittle on greens    Summary  As of 7/18/2022    Full warfarin instructions:  2.5 mg every Mon, Wed, Fri; 5 mg all other days   Next INR check:  8/1/2022             Telephone call with Jamal who verbalizes understanding and agrees to plan    Lab visit scheduled    Education provided: Please call back if any changes to your diet, medications or how you've been taking warfarin    Plan made per [facility] anticoagulation protocol    Indiana Burton, RN  Anticoagulation Clinic  7/18/2022    _______________________________________________________________________     Anticoagulation Episode Summary     Current INR goal:  2.0-3.0   TTR:  66.4 % (1 y)   Target end date:  Indefinite   Send INR reminders to:  [facility]    Indications    Paroxysmal atrial fibrillation (H) [I48.0]  Long term current use of anticoagulants with INR goal of 2.0-3.0 [Z79.01]           Comments:           Anticoagulation Care Providers     Provider Role Specialty Phone number    Ip, Genaro Garrett MD Referring Cardiovascular Disease [phone]    Digna Jones MD Referring Internal Medicine [phone]

## 2022-07-21 ENCOUNTER — VIRTUAL VISIT (OUTPATIENT)
Dept: PHARMACY | Facility: CLINIC | Age: 77
End: 2022-07-21
Payer: COMMERCIAL

## 2022-07-21 DIAGNOSIS — I47.20 SUSTAINED VENTRICULAR TACHYCARDIA (H): ICD-10-CM

## 2022-07-21 DIAGNOSIS — I42.0 DILATED CARDIOMYOPATHY (H): ICD-10-CM

## 2022-07-21 DIAGNOSIS — Z79.4 TYPE 2 DIABETES MELLITUS WITH DIABETIC NEUROPATHY, WITH LONG-TERM CURRENT USE OF INSULIN (H): ICD-10-CM

## 2022-07-21 DIAGNOSIS — I10 ESSENTIAL HYPERTENSION: Primary | ICD-10-CM

## 2022-07-21 DIAGNOSIS — I25.10 CORONARY ARTERY DISEASE INVOLVING NATIVE CORONARY ARTERY OF NATIVE HEART WITHOUT ANGINA PECTORIS: ICD-10-CM

## 2022-07-21 DIAGNOSIS — I48.91 ATRIAL FIBRILLATION, UNSPECIFIED TYPE (H): ICD-10-CM

## 2022-07-21 DIAGNOSIS — E11.40 TYPE 2 DIABETES MELLITUS WITH DIABETIC NEUROPATHY, WITH LONG-TERM CURRENT USE OF INSULIN (H): ICD-10-CM

## 2022-07-21 PROCEDURE — 99606 MTMS BY PHARM EST 15 MIN: CPT | Performed by: PHARMACIST

## 2022-07-21 NOTE — PATIENT INSTRUCTIONS
"Recommendations from today's MTM visit:                                                    MTM (medication therapy management) is a service provided by a clinical pharmacist designed to help you get the most of out of your medicines.   Today we reviewed what your medicines are for, how to know if they are working, that your medicines are safe and how to make your medicine regimen as easy as possible.      Continue current medication regimen.    Encouraged patient to call to schedule follow-up with cardiology.    Follow-up: Return in 3 weeks (on 8/11/2022) for Follow-up Medication Review.    It was great speaking with you today.  I value your experience and would be very thankful for your time in providing feedback in our clinic survey. In the next few days, you may receive an email or text message from [employer] with a link to a survey related to your  clinical pharmacist.\"     To schedule another MTM appointment, please call the clinic directly or you may call the MTM scheduling line at [phone] or toll-free at [phone].     My Clinical Pharmacist's contact information:                                                      Please feel free to contact me with any questions or concerns you have.      Nathaly Hughes, Rog[place]io, [facility]  Medication Therapy Management Provider  Pager: [phone]    "

## 2022-07-21 NOTE — PROGRESS NOTES
"Medication Therapy Management (MTM) Encounter    ASSESSMENT:                            Medication Adherence/Access: No issues identified    Hypertension/CAD/A. Fib/HFrEF/Susained Ventricular Tachycardia: Stable.  Due for cardiology follow-up.    Type 2 Diabetes: Stable. Patient is meeting A1c goal of < 8%. Self monitoring of blood glucose is at goal of fasting  mg/dL the majority of the time, no changes needed.     PLAN:                            Continue current medication regimen.  Encouraged patient to call to schedule follow-up with cardiology.    Follow-up: Return in 3 weeks (on 8/11/2022) for Follow-up Medication Review.    SUBJECTIVE/OBJECTIVE:                          Jt Montgomery is a 76 year old male called for a follow-up visit.  Today's visit is a follow-up MTM visit from 6/30/2022.     Reason for visit: Routine follow-up.    Tobacco: He reports that he quit smoking about 50 years ago. His smoking use included cigarettes. He has a 10.50 pack-year smoking history. He has never used smokeless tobacco.  Alcohol: history of alcohol dependence    Medication Adherence/Access: no issues reported    Hypertension/CAD/A. Fib/HFrEF/Susained Ventricular Tachycardia:  Current medication regimen includes amiodarone 200mg twice daily, furosemide 40mg twice daily (with corresponding KCl 20mEq twice daily), irbesartan 75mg daily, metoprolol succinate 100mg twice daily and warfarin as directed.  He did not call cardiology as we discussed after our last visit - he reports feeling a bit better, less \"muddled\" but remains lightheaded from time to time.  He reports the following blood pressure readings: 98/68, 106/66, 102/58, 98/67, 102/74, 112/75, 99/79, 98/76, 103/77, 113/59mmHg.  HR has been 50s-60s.  He has been participating in cardiac rehab and is also doing some walking each day at home with his walker (aims for 16-20 minutes).  He does not have any follow-up scheduled with cardiology at this time.  INR "   Date Value Ref Range Status   07/18/2022 2.0 (H) 0.9 - 1.1 Final     INR (External)   Date Value Ref Range Status   06/21/2022 3.6 (A) 0.8 - 1.1 Final      Type 2 Diabetes:  Currently taking Lantus 21 units daily. Patient is not experiencing side effects.  Blood sugar monitoring:  Generally testing once daily (AM fasting) - OneTouch Ultra 2   Date FBG (mg/dL)   7/1 114   7/2 81   7/3 89   7/4 89   7/5 82   7/6 81   7/7 85   7/8 86   7/9 80   7/10 91   7/11 94   7/12 101   7/13 148, down to 109 a bit later   7/14 114   7/15 100   7/16 109   7/17 102   7/18 -   7/19 190   7/20 102   7/21 101   Symptoms of low blood sugar? None  Symptoms of high blood sugar? None  Eye exam: due  Foot exam: due  Diet/Exercise: No changes  Aspirin: No longer taking per cardiology direction given warfarin use  Statin: No - patient declines and LDL is well controlled without therapy  ACEi/ARB: Yes: irbesartan.  Urine Albumin:   Lab Results   Component Value Date    UMALCR 118.46 (H) 03/01/2022      Lab Results   Component Value Date    A1C 5.6 03/01/2022    A1C 5.7 11/15/2021    A1C 6.1 07/30/2021    A1C 6.7 04/26/2021    A1C 7.0 09/22/2020    A1C 6.1 02/19/2020    A1C 6.2 11/14/2019    A1C 7.6 10/08/2019     Today's Vitals: There were no vitals taken for this visit.  ----------------    I spent 26 minutes with this patient today. A copy of the visit note was provided to the patient's provider(s).    The patient declined a summary of these recommendations.     Nathaly Hughes PharmD, [facility]  Medication Therapy Management Provider  Pager: [phone]     Telemedicine Visit Details  Type of service:  Telephone visit  Start Time: 11:33 AM  End Time: 11:59 AM  Originating Location (patient location): Home  Distant Location (provider location):  [facility]     Medication Therapy Recommendations  Essential hypertension    Current Medication: metoprolol succinate ER (TOPROL-XL) 100 MG 24 hr tablet   Rationale: Dose too high -  Dosage too high - Safety   Recommendation: Decrease Dose   Status: No Longer Relevant

## 2022-07-29 DIAGNOSIS — G47.33 OBSTRUCTIVE SLEEP APNEA (ADULT) (PEDIATRIC): Primary | ICD-10-CM

## 2022-08-01 ENCOUNTER — ANTICOAGULATION THERAPY VISIT (OUTPATIENT)
Dept: ANTICOAGULATION | Facility: CLINIC | Age: 77
End: 2022-08-01

## 2022-08-01 ENCOUNTER — LAB (OUTPATIENT)
Dept: LAB | Facility: CLINIC | Age: 77
End: 2022-08-01
Payer: COMMERCIAL

## 2022-08-01 DIAGNOSIS — Z79.01 LONG TERM CURRENT USE OF ANTICOAGULANTS WITH INR GOAL OF 2.0-3.0: ICD-10-CM

## 2022-08-01 DIAGNOSIS — I48.0 PAROXYSMAL ATRIAL FIBRILLATION (H): ICD-10-CM

## 2022-08-01 DIAGNOSIS — I48.0 PAROXYSMAL ATRIAL FIBRILLATION (H): Primary | ICD-10-CM

## 2022-08-01 LAB — INR BLD: 2.5 (ref 0.9–1.1)

## 2022-08-01 PROCEDURE — 85610 PROTHROMBIN TIME: CPT

## 2022-08-01 PROCEDURE — 36415 COLL VENOUS BLD VENIPUNCTURE: CPT

## 2022-08-01 NOTE — PROGRESS NOTES
ANTICOAGULATION MANAGEMENT     Jt Montgomery 76 year old male is on warfarin with therapeutic INR result. (Goal INR 2.0-3.0)    Recent labs: (last 7 days)     08/01/22  1538   INR 2.5*       ASSESSMENT       Source(s): Chart Review and Patient/Caregiver Call       Warfarin doses taken: Warfarin taken as instructed    Diet: No new diet changes identified    New illness, injury, or hospitalization: No    Medication/supplement changes: None noted    Signs or symptoms of bleeding or clotting: No    Previous INR: Therapeutic last 2(+) visits    Additional findings: None       PLAN     Recommended plan for no diet, medication or health factor changes affecting INR     Dosing Instructions: Continue your current warfarin dose with next INR in 3 weeks       Summary  As of 8/1/2022    Full warfarin instructions:  2.5 mg every Mon, Wed, Fri; 5 mg all other days   Next INR check:  8/22/2022             Telephone call with Jamal who verbalizes understanding and agrees to plan    Lab visit scheduled    Education provided: Please call back if any changes to your diet, medications or how you've been taking warfarin and Contact [phone]  with any changes, questions or concerns.     Plan made per [facility] anticoagulation protocol    Naomi Muñoz RN  Anticoagulation Clinic  8/1/2022    _______________________________________________________________________     Anticoagulation Episode Summary     Current INR goal:  2.0-3.0   TTR:  66.4 % (1 y)   Target end date:  Indefinite   Send INR reminders to:  URVASHI PIMENTEL    Indications    Paroxysmal atrial fibrillation (H) [I48.0]  Long term current use of anticoagulants with INR goal of 2.0-3.0 [Z79.01]           Comments:           Anticoagulation Care Providers     Provider Role Specialty Phone number    Genaro Roy MD Referring Cardiovascular Disease [phone]    Digna Jones MD Referring Internal Medicine [phone]

## 2022-08-11 ENCOUNTER — VIRTUAL VISIT (OUTPATIENT)
Dept: PHARMACY | Facility: CLINIC | Age: 77
End: 2022-08-11
Payer: COMMERCIAL

## 2022-08-11 DIAGNOSIS — E11.40 TYPE 2 DIABETES MELLITUS WITH DIABETIC NEUROPATHY, WITH LONG-TERM CURRENT USE OF INSULIN (H): ICD-10-CM

## 2022-08-11 DIAGNOSIS — Z79.4 TYPE 2 DIABETES MELLITUS WITH DIABETIC NEUROPATHY, WITH LONG-TERM CURRENT USE OF INSULIN (H): ICD-10-CM

## 2022-08-11 DIAGNOSIS — I48.91 ATRIAL FIBRILLATION, UNSPECIFIED TYPE (H): ICD-10-CM

## 2022-08-11 DIAGNOSIS — I47.20 SUSTAINED VENTRICULAR TACHYCARDIA (H): ICD-10-CM

## 2022-08-11 DIAGNOSIS — I42.0 DILATED CARDIOMYOPATHY (H): Primary | ICD-10-CM

## 2022-08-11 DIAGNOSIS — I25.10 CORONARY ARTERY DISEASE INVOLVING NATIVE CORONARY ARTERY OF NATIVE HEART WITHOUT ANGINA PECTORIS: ICD-10-CM

## 2022-08-11 DIAGNOSIS — I10 ESSENTIAL HYPERTENSION: ICD-10-CM

## 2022-08-11 PROCEDURE — 99606 MTMS BY PHARM EST 15 MIN: CPT | Performed by: PHARMACIST

## 2022-08-11 NOTE — PATIENT INSTRUCTIONS
"Recommendations from today's MTM visit:                                                    MTM (medication therapy management) is a service provided by a clinical pharmacist designed to help you get the most of out of your medicines.   Today we reviewed what your medicines are for, how to know if they are working, that your medicines are safe and how to make your medicine regimen as easy as possible.      1.  Future labs ordered - A1c, CMP and CBC to be drawn along with next INR    Follow-up: Return in about 3 weeks (around 9/1/2022) for Follow-up Medication Review.    It was great speaking with you today.  I value your experience and would be very thankful for your time in providing feedback in our clinic survey. In the next few days, you may receive an email or text message from [employer] with a link to a survey related to your  clinical pharmacist.\"     To schedule another MTM appointment, please call the clinic directly or you may call the MTM scheduling line at [phone] or toll-free at [phone].     My Clinical Pharmacist's contact information:                                                      Please feel free to contact me with any questions or concerns you have.      Nathaly Hughes, Rogelio, BCACP  Medication Therapy Management Provider  Pager: [phone]    "

## 2022-08-11 NOTE — PROGRESS NOTES
Medication Therapy Management (MTM) Encounter    ASSESSMENT:                            Medication Adherence/Access: No issues identified    Hypertension/CAD/A. Fib/HFrEF/Susained Ventricular Tachycardia: Stable.     Type 2 Diabetes: Stable. Patient is meeting A1c goal of < 8%. Self monitoring of blood glucose is at goal of fasting  mg/dL the majority of the time, no changes needed.  Due for A1c, CMP and CBC.    PLAN:                            1.  Future labs ordered - A1c, CMP and CBC to be drawn along with next INR    Follow-up: Return in about 3 weeks (around 9/1/2022) for Follow-up Medication Review.    SUBJECTIVE/OBJECTIVE:                          Jt Montgomery is a 76 year old male called for a follow-up visit.  Today's visit is a follow-up MTM visit from 7/21/2022.     Reason for visit: Routine follow-up.    Tobacco: He reports that he quit smoking about 50 years ago. His smoking use included cigarettes. He has a 10.50 pack-year smoking history. He has never used smokeless tobacco.  Alcohol: history of alcohol dependence    Medication Adherence/Access: no issues reported    Hypertension/CAD/A. Fib/HFrEF/Susained Ventricular Tachycardia:  Current medication regimen includes amiodarone 200mg daily, furosemide 40mg twice daily (with corresponding KCl 20mEq twice daily), irbesartan 75mg daily, metoprolol succinate 100mg twice daily and warfarin as directed.  He continues working with cardiac rehab and reports this is going well.   INR   Date Value Ref Range Status   08/01/2022 2.5 (H) 0.9 - 1.1 Final     Type 2 Diabetes:  Currently taking Lantus 21 units daily. Patient is not experiencing side effects.  Blood sugar monitoring:  Generally testing once daily (AM fasting) - OneTouch Ultra 2   Date FBG (mg/dL)   7/22 96   7/23 104   7/24 93   7/25 145   7/26 86   7/27 139   7/28 119   7/29 88   7/30 105   7/31 100   8/1 96   8/2 105   8/3 95   8/4 119   8/5 98   8/6 114   8/7 87   8/8 95   8/9 105   8/10 116    8/11 -   Symptoms of low blood sugar? None  Symptoms of high blood sugar? None  Eye exam: due  Foot exam: due  Diet/Exercise: No changes  Aspirin: No longer taking per cardiology direction given warfarin use  Statin: No - patient declines and LDL is well controlled without therapy  ACEi/ARB: Yes: irbesartan.  Urine Albumin:   Lab Results   Component Value Date    UMALCR 118.46 (H) 03/01/2022      Lab Results   Component Value Date    A1C 5.6 03/01/2022    A1C 5.7 11/15/2021    A1C 6.1 07/30/2021    A1C 6.7 04/26/2021    A1C 7.0 09/22/2020    A1C 6.1 02/19/2020    A1C 6.2 11/14/2019    A1C 7.6 10/08/2019     Today's Vitals: There were no vitals taken for this visit.  ----------------    I spent 28 minutes with this patient today. All changes were made via collaborative practice agreement with Dr. Jones. A copy of the visit note was provided to the patient's provider(s).    The patient declined a summary of these recommendations.     Nathaly Hughes, PharmD, BCACP  Medication Therapy Management Provider  Pager: [phone]     Telemedicine Visit Details  Type of service:  Telephone visit  Start Time: 11:34 AM  End Time: 12:02 PM  Originating Location (patient location): [place]  Distant Location (provider location):  [facility]     Medication Therapy Recommendations  No medication therapy recommendations to display

## 2022-08-19 ENCOUNTER — TELEPHONE (OUTPATIENT)
Dept: FAMILY MEDICINE | Facility: CLINIC | Age: 77
End: 2022-08-19

## 2022-08-19 NOTE — TELEPHONE ENCOUNTER
Pt called requesting Prednisone refill for gout flare ups. He has some medication but wants to keep some in hand for when he needs it.     Triage advised he see UC if having symptoms or schedule appt with PCP to discuss appropriate tx if not having gout symptoms at this time.     Pt is also requesting Irbesartan refill. Medication is listed as historical. Triage advised he discuss refill during VV 08/2. Pt verbalized agreement with plan.

## 2022-08-22 ENCOUNTER — ANTICOAGULATION THERAPY VISIT (OUTPATIENT)
Dept: ANTICOAGULATION | Facility: CLINIC | Age: 77
End: 2022-08-22

## 2022-08-22 ENCOUNTER — VIRTUAL VISIT (OUTPATIENT)
Dept: FAMILY MEDICINE | Facility: CLINIC | Age: 77
End: 2022-08-22
Payer: COMMERCIAL

## 2022-08-22 ENCOUNTER — LAB (OUTPATIENT)
Dept: LAB | Facility: CLINIC | Age: 77
End: 2022-08-22

## 2022-08-22 DIAGNOSIS — I50.813 ACUTE ON CHRONIC RIGHT-SIDED CONGESTIVE HEART FAILURE (H): ICD-10-CM

## 2022-08-22 DIAGNOSIS — I48.0 PAROXYSMAL ATRIAL FIBRILLATION (H): Chronic | ICD-10-CM

## 2022-08-22 DIAGNOSIS — I10 ESSENTIAL HYPERTENSION: ICD-10-CM

## 2022-08-22 DIAGNOSIS — Z95.0 S/P PLACEMENT OF CARDIAC PACEMAKER: ICD-10-CM

## 2022-08-22 DIAGNOSIS — I42.0 DILATED CARDIOMYOPATHY (H): ICD-10-CM

## 2022-08-22 DIAGNOSIS — Z79.4 TYPE 2 DIABETES MELLITUS WITH DIABETIC NEUROPATHY, WITH LONG-TERM CURRENT USE OF INSULIN (H): ICD-10-CM

## 2022-08-22 DIAGNOSIS — E11.40 TYPE 2 DIABETES MELLITUS WITH DIABETIC NEUROPATHY, WITH LONG-TERM CURRENT USE OF INSULIN (H): ICD-10-CM

## 2022-08-22 DIAGNOSIS — Z79.01 LONG TERM CURRENT USE OF ANTICOAGULANTS WITH INR GOAL OF 2.0-3.0: ICD-10-CM

## 2022-08-22 DIAGNOSIS — Z76.0 ENCOUNTER FOR MEDICATION REFILL: Primary | ICD-10-CM

## 2022-08-22 DIAGNOSIS — I48.0 PAROXYSMAL ATRIAL FIBRILLATION (H): ICD-10-CM

## 2022-08-22 DIAGNOSIS — G47.33 OSA (OBSTRUCTIVE SLEEP APNEA): ICD-10-CM

## 2022-08-22 DIAGNOSIS — E78.5 HYPERLIPIDEMIA LDL GOAL <100: ICD-10-CM

## 2022-08-22 DIAGNOSIS — I48.0 PAROXYSMAL ATRIAL FIBRILLATION (H): Primary | ICD-10-CM

## 2022-08-22 LAB
ERYTHROCYTE [DISTWIDTH] IN BLOOD BY AUTOMATED COUNT: 12.9 % (ref 10–15)
HBA1C MFR BLD: 5.5 % (ref 0–5.6)
HCT VFR BLD AUTO: 43 % (ref 40–53)
HGB BLD-MCNC: 14.8 G/DL (ref 13.3–17.7)
INR BLD: 2 (ref 0.9–1.1)
MCH RBC QN AUTO: 36.4 PG (ref 26.5–33)
MCHC RBC AUTO-ENTMCNC: 34.4 G/DL (ref 31.5–36.5)
MCV RBC AUTO: 106 FL (ref 78–100)
PLATELET # BLD AUTO: 106 10E3/UL (ref 150–450)
RBC # BLD AUTO: 4.07 10E6/UL (ref 4.4–5.9)
WBC # BLD AUTO: 4.4 10E3/UL (ref 4–11)

## 2022-08-22 PROCEDURE — 99214 OFFICE O/P EST MOD 30 MIN: CPT | Mod: 95 | Performed by: INTERNAL MEDICINE

## 2022-08-22 PROCEDURE — 85027 COMPLETE CBC AUTOMATED: CPT

## 2022-08-22 PROCEDURE — 36415 COLL VENOUS BLD VENIPUNCTURE: CPT

## 2022-08-22 PROCEDURE — 36416 COLLJ CAPILLARY BLOOD SPEC: CPT

## 2022-08-22 PROCEDURE — 80053 COMPREHEN METABOLIC PANEL: CPT

## 2022-08-22 PROCEDURE — 85610 PROTHROMBIN TIME: CPT

## 2022-08-22 PROCEDURE — 83036 HEMOGLOBIN GLYCOSYLATED A1C: CPT

## 2022-08-22 RX ORDER — IRBESARTAN 75 MG/1
75 TABLET ORAL DAILY
Qty: 90 TABLET | Refills: 3 | Status: SHIPPED | OUTPATIENT
Start: 2022-08-22 | End: 2022-08-22

## 2022-08-22 RX ORDER — IRBESARTAN 75 MG/1
37.5 TABLET ORAL DAILY
Qty: 45 TABLET | Refills: 3
Start: 2022-08-22

## 2022-08-22 NOTE — PROGRESS NOTES
Jamal is a 76 year old who is being evaluated via a billable telephone visit.      What phone number would you like to be contacted at? [phone]  How would you like to obtain your AVS? Mail a copy      Subjective   Jamal is a 76 year old presenting for the following health issues:  Recheck Medication and Hypertension      HPI     Hypertension Follow-up      Do you check your blood pressure regularly outside of the clinic? Yes     Are you following a low salt diet? Yes    Are your blood pressures ever more than 140 on the top number (systolic) OR more   than 90 on the bottom number (diastolic), for example 140/90? No    Current Medications:     Current Outpatient Medications   Medication Sig Dispense Refill     acetaminophen (TYLENOL) 325 MG tablet Take 325-650 mg by mouth every 4 hours as needed For pain       amiodarone (PACERONE) 200 MG tablet Take 200 mg twice daily until 7/9/22, then take 200 mg once dailyTake 200 mg by mouth Take 200 mg twice daily until 7/9/22, then take 200 mg once daily 90 tablet 0     ASPIRIN NOT PRESCRIBED (INTENTIONAL) Antiplatelet medication not prescribed intentionally due to Current anticoagulant therapy (warfarin/enoxaparin)       BD PEN NEEDLE GERMÁN 2ND GEN 32G X 4 MM miscellaneous USE FOUR TIMES DAILY AS DIRECTED 400 each 0     blood glucose (NO BRAND SPECIFIED) lancets standard Use to test blood sugar 1 times daily or as directed.  Dispense per insurance covered brand 100 each 0     blood glucose monitoring (NO BRAND SPECIFIED) meter device kit Use to test blood sugar 1 time daily or as directed. Preferred blood glucose meter OR supplies to accompany: Blood Glucose Monitor Brands: per insurance. 1 kit 0     Capsicum, Cayenne, (CAYENNE PEPPER PO) Take by mouth as needed       Coenzyme Q10 (COQ10) 100 MG CAPS Take 100 mg by mouth daily        febuxostat (ULORIC) 40 MG TABS tablet Take 40 mg by mouth daily       finasteride (PROSCAR) 5 MG tablet TAKE 1 TABLET(5 MG) BY MOUTH DAILY 90  tablet 3     fish oil-omega-3 fatty acids 500 MG capsule Take 1,500 mg by mouth daily       furosemide (LASIX) 40 MG tablet Take 40 mg by mouth 2 times daily       insulin glargine (LANTUS SOLOSTAR) 100 UNIT/ML pen Inject 21 Units Subcutaneous daily 15 mL 3     irbesartan (AVAPRO) 75 MG tablet Take 0.5 tablets (37.5 mg) by mouth daily Take 75 mg by mouth daily 45 tablet 3     Lactobacillus (PROBIOTIC ACIDOPHILUS PO) Take 1 capsule by mouth daily       Lancets (ONETOUCH DELICA PLUS [id number]) MISC USE WITH LANCING DEVICE TO TEST BLOOD SUGAR AS DIRECTED 300 each 11     metoprolol succinate ER (TOPROL-XL) 100 MG 24 hr tablet Take 100 mg by mouth 2 times daily       Multiple Vitamins-Minerals (MULTIVITAMIN ADULT PO) Take 1 tablet by mouth daily       ONETOUCH ULTRA test strip USE TO TEST BLOOD SUGAR 1 TIME DAILY OR AS DIRECTED 100 strip 6     order for DME Equipment being ordered: Compression stockings 1 each 3     potassium chloride ER (KLOR-CON M) 10 MEQ CR tablet Take 20 mEq by mouth 2 times daily        STATIN NOT PRESCRIBED (INTENTIONAL) Statin not prescribed intentionally due to Other patient declines (This option does not exclude patient from measure)       tamsulosin (FLOMAX) 0.4 MG capsule Take 1 capsule (0.4 mg) by mouth every evening 90 capsule 3     triamcinolone (KENALOG) 0.1 % external cream Apply topically 2 times daily as needed (rash) 30 g 3     UNABLE TO FIND MEDICATION NAME: Shacklee HerbLax 1/2 tablet as needed       UNABLE TO FIND MEDICATION NAME: Shaklee Dream Serene 1 capsule at bedtime as needed.  1 capsule = 2mg of melatonin and 250mg of proprietary blend of valerian root, lemon balm extract and l-theanine       UNABLE TO FIND MEDICATION NAME: Super beet powder daily       UNABLE TO FIND MEDICATION NAME: CarotoMax with lutein - takes sporadically       vitamin B complex with vitamin C (STRESS TAB) tablet Take 1 tablet by mouth daily       vitamin C (ASCORBIC ACID) 1000 MG TABS Take 1,000 mg by  mouth daily as needed        Vitamin D, Cholecalciferol, 25 MCG (1000 UT) CAPS Take 3,000 Units by mouth daily       warfarin ANTICOAGULANT (COUMADIN) 5 MG tablet Take 2.5 mg every Monday, Wednesday, Friday; Take 5 mg all other days. Or as directed. 100 tablet 1     Zinc 15 MG CAPS Take 15 mg by mouth daily as needed            Allergies:      Allergies   Allergen Reactions     No Known Allergies             Past Medical History:     Past Medical History:   Diagnosis Date     Atrial fibrillation (H)      CAD (coronary artery disease)     MI with RONEL to dRCA April 2011     Central Sleep Apnea with Pat Villanueva breathing 9/14/2010    Also TOMMY with CPAP     CKD (chronic kidney disease) stage 3, GFR 30-59 ml/min (H)      Dilated cardiomyopathy (H)     nonischemic (possibly related to h/o a.fib with RVR) EF 30-40% March 2013     DM2 (diabetes mellitus, type 2) (H)     Goal HgbA1c < 7%     Gout     uric acid level correlates; April 2014 h/o +knee aspirate     Hernia, abdominal      Hypertension     Goal <140/90     Pulmonary hypertension (H)     mild per echo 3/2013     Spider veins          Past Surgical History:     Past Surgical History:   Procedure Laterality Date     CORONARY ANGIOGRAPHY ADULT ORDER  2011    distal circ-RONEL     CYSTOSCOPY, TRANSURETHRAL RESECTION (TUR) PROSTATE, COMBINED N/A 5/15/2020    Procedure: CYSTOSCOPY, WITH TRANSURETHRAL RESECTION PROSTATE;  Surgeon: Tr Bowles MD;  Location: [facility] OR     HERNIA REPAIR  11/20/10    incarcinated     SUSPEND HYOID, GENIOGLOSSAL ADVANCEMENT           Family Medical History:     Family History   Problem Relation Age of Onset     Hypertension Mother      Coronary Artery Disease Mother      Coronary Artery Disease Father      Hypertension Father      Diabetes Sister      Cerebrovascular Disease Sister          Social History:     Social History     Socioeconomic History     Marital status:      Spouse name: Not on file     Number of children: Not on  file     Years of education: Not on file     Highest education level: Not on file   Occupational History     Not on file   Tobacco Use     Smoking status: Former Smoker     Packs/day: 1.50     Years: 7.00     Pack years: 10.50     Types: Cigarettes     Quit date: 1972     Years since quittin.6     Smokeless tobacco: Never Used     Tobacco comment: quit in       Started at around age 18-19   Substance and Sexual Activity     Alcohol use: No     Alcohol/week: 0.0 standard drinks     Comment: 74   recovering     Drug use: No     Sexual activity: Not Currently     Partners: Female   Other Topics Concern      Service Not Asked     Blood Transfusions Not Asked     Caffeine Concern No     Occupational Exposure Not Asked     Hobby Hazards Not Asked     Sleep Concern Yes     Comment: sleep apnea, wears bi-pap     Stress Concern Not Asked     Weight Concern Not Asked     Special Diet No     Comment: low carb diet     Back Care Not Asked     Exercise No     Comment: walking     Bike Helmet Not Asked     Seat Belt Yes     Self-Exams Not Asked     Parent/sibling w/ CABG, MI or angioplasty before 65F 55M? Not Asked   Social History Narrative     Not on file     Social Determinants of Health     Financial Resource Strain: Not on file   Food Insecurity: Not on file   Transportation Needs: Not on file   Physical Activity: Not on file   Stress: Not on file   Social Connections: Not on file   Intimate Partner Violence: Not on file   Housing Stability: Not on file           Review of System:     Constitutional: Negative for fever or chills  Skin: positive for improvement in recent shingles rashes  Ears/Nose/Throat: Negative for nasal congestion, sore throat  Respiratory: positive for dyspnea on exertion  Cardiovascular: Negative for chest pain, positive for CHF  Gastrointestinal: Negative for nausea, vomiting  Genitourinary: Negative for dysuria, hematuria, positive for chronic BPH with urinary  retention  Musculoskeletal: Negative for myalgias  Neurologic: Negative for headaches  Psychiatric: Negative for depression, anxiety  Hematologic/Lymphatic/Immunologic: positive for chronic bilateral leg edema  Endocrine: Negative for recent hypoglycemia events  Behavioral: Negative for tobacco use, positive for chronic alcoholism in remission      Physical Exam:   There were no vitals taken for this visit.    RESP: no cough over the phone  NEURO: Alert & Oriented x 3.   PSYCH: mentation appears normal, affect normal        Diagnostic Test Results:     Diagnostic Test Results:      Last Comprehensive Metabolic Panel:  Sodium   Date Value Ref Range Status   08/22/2022 140 133 - 144 mmol/L Final   04/26/2021 139 133 - 144 mmol/L Final     Potassium   Date Value Ref Range Status   08/22/2022 4.4 3.4 - 5.3 mmol/L Final   04/26/2021 4.3 3.4 - 5.3 mmol/L Final     Chloride   Date Value Ref Range Status   08/22/2022 106 94 - 109 mmol/L Final   04/26/2021 105 94 - 109 mmol/L Final     Carbon Dioxide   Date Value Ref Range Status   04/26/2021 31 20 - 32 mmol/L Final     Carbon Dioxide (CO2)   Date Value Ref Range Status   08/22/2022 29 20 - 32 mmol/L Final     Anion Gap   Date Value Ref Range Status   08/22/2022 5 3 - 14 mmol/L Final   04/26/2021 3 3 - 14 mmol/L Final     Glucose   Date Value Ref Range Status   08/22/2022 145 (H) 70 - 99 mg/dL Final   04/26/2021 190 (H) 70 - 99 mg/dL Final     Urea Nitrogen   Date Value Ref Range Status   08/22/2022 29 7 - 30 mg/dL Final   04/26/2021 30 7 - 30 mg/dL Final     Creatinine   Date Value Ref Range Status   08/22/2022 1.14 0.66 - 1.25 mg/dL Final   04/26/2021 1.19 0.66 - 1.25 mg/dL Final     GFR Estimate   Date Value Ref Range Status   08/22/2022 67 >60 mL/min/1.73m2 Final     Comment:     Effective December 21, 2021 eGFRcr in adults is calculated using the 2021 CKD-EPI creatinine equation which includes age and gender (Ernie gross al., NEJM, DOI: 10.1056/CVFVko5421857)   04/26/2021  60 (L) >60 mL/min/[1.73_m2] Final     Comment:     Non  GFR Calc  Starting 12/18/2018, serum creatinine based estimated GFR (eGFR) will be   calculated using the Chronic Kidney Disease Epidemiology Collaboration   (CKD-EPI) equation.       Calcium   Date Value Ref Range Status   08/22/2022 8.6 8.5 - 10.1 mg/dL Final   04/26/2021 8.8 8.5 - 10.1 mg/dL Final     Lab Results   Component Value Date    A1C 5.6 03/01/2022    A1C 5.7 11/15/2021    A1C 6.1 07/30/2021    A1C 6.7 04/26/2021    A1C 7.0 09/22/2020    A1C 6.1 02/19/2020    A1C 6.2 11/14/2019    A1C 7.6 10/08/2019         ASSESSMENT/PLAN:     Diagnoses and all orders for this visit:      Hyperlipidemia LDL goal <100  - stable, continue current therapy    Type 2 diabetes mellitus with diabetic neuropathy, with long-term current use of insulin (H)  - no recent hypoglycemia events  - continue current diabetes medication therapy    TOMMY (obstructive sleep apnea)  - stable, continue current CPAP therapy      Acute on chronic right-sided congestive heart failure (H)  Paroxysmal atrial fibrillation (H)  S/P placement of cardiac pacemaker  - stable, continue current cardiology clinic follow up going forward      Essential hypertension  Encounter for medication refill  -     irbesartan (AVAPRO) 75 MG tablet; Take 0.5 tablets (37.5 mg) by mouth daily Take 75 mg by mouth daily        Follow Up Plan:     Patient is instructed to return to Internal Medicine clinic for follow-up visit in 6 months.       Phone call duration: 30 minutes      Digna Jones MD  Internal Medicine  [facility]

## 2022-08-22 NOTE — PROGRESS NOTES
ANTICOAGULATION MANAGEMENT     Jt Montgomery 76 year old male is on warfarin with therapeutic INR result. (Goal INR 2.0-3.0)    Recent labs: (last 7 days)     08/22/22  1517   INR 2.0*       ASSESSMENT       Source(s): Chart Review and Patient/Caregiver Call       Warfarin doses taken: Warfarin taken as instructed    Diet: No new diet changes identified    New illness, injury, or hospitalization: No    Medication/supplement changes: None noted    Signs or symptoms of bleeding or clotting: No    Previous INR: Therapeutic last 2(+) visits    Additional findings: None       PLAN     Recommended plan for no diet, medication or health factor changes affecting INR     Dosing Instructions: Continue your current warfarin dose with next INR in 4 weeks       Summary  As of 8/22/2022    Full warfarin instructions:  2.5 mg every Mon, Wed, Fri; 5 mg all other days   Next INR check:  9/19/2022             Telephone call with Jmaal who verbalizes understanding and agrees to plan    Lab visit scheduled    Education provided: Please call back if any changes to your diet, medications or how you've been taking warfarin    Plan made per [facility] anticoagulation protocol    Franci Corona RN  Anticoagulation Clinic  8/22/2022    _______________________________________________________________________     Anticoagulation Episode Summary     Current INR goal:  2.0-3.0   TTR:  66.4 % (1 y)   Target end date:  Indefinite   Send INR reminders to:  URVASHI PIMENTEL    Indications    Paroxysmal atrial fibrillation (H) [I48.0]  Long term current use of anticoagulants with INR goal of 2.0-3.0 [Z79.01]           Comments:           Anticoagulation Care Providers     Provider Role Specialty Phone number    Manuela, Genaro Garrett MD Referring Cardiovascular Disease [phone]    Digna Jones MD Referring Internal Medicine [phone]

## 2022-08-23 LAB
ALBUMIN SERPL-MCNC: 3.3 G/DL (ref 3.4–5)
ALP SERPL-CCNC: 101 U/L (ref 40–150)
ALT SERPL W P-5'-P-CCNC: 35 U/L (ref 0–70)
ANION GAP SERPL CALCULATED.3IONS-SCNC: 5 MMOL/L (ref 3–14)
AST SERPL W P-5'-P-CCNC: 44 U/L (ref 0–45)
BILIRUB SERPL-MCNC: 2.1 MG/DL (ref 0.2–1.3)
BUN SERPL-MCNC: 29 MG/DL (ref 7–30)
CALCIUM SERPL-MCNC: 8.6 MG/DL (ref 8.5–10.1)
CHLORIDE BLD-SCNC: 106 MMOL/L (ref 94–109)
CO2 SERPL-SCNC: 29 MMOL/L (ref 20–32)
CREAT SERPL-MCNC: 1.14 MG/DL (ref 0.66–1.25)
GFR SERPL CREATININE-BSD FRML MDRD: 67 ML/MIN/1.73M2
GLUCOSE BLD-MCNC: 145 MG/DL (ref 70–99)
POTASSIUM BLD-SCNC: 4.4 MMOL/L (ref 3.4–5.3)
PROT SERPL-MCNC: 5.8 G/DL (ref 6.8–8.8)
SODIUM SERPL-SCNC: 140 MMOL/L (ref 133–144)

## 2022-09-01 ENCOUNTER — VIRTUAL VISIT (OUTPATIENT)
Dept: PHARMACY | Facility: CLINIC | Age: 77
End: 2022-09-01
Payer: COMMERCIAL

## 2022-09-01 DIAGNOSIS — E11.40 TYPE 2 DIABETES MELLITUS WITH DIABETIC NEUROPATHY, WITH LONG-TERM CURRENT USE OF INSULIN (H): ICD-10-CM

## 2022-09-01 DIAGNOSIS — I48.91 ATRIAL FIBRILLATION, UNSPECIFIED TYPE (H): ICD-10-CM

## 2022-09-01 DIAGNOSIS — Z79.4 TYPE 2 DIABETES MELLITUS WITH DIABETIC NEUROPATHY, WITH LONG-TERM CURRENT USE OF INSULIN (H): ICD-10-CM

## 2022-09-01 DIAGNOSIS — I47.20 SUSTAINED VENTRICULAR TACHYCARDIA (H): ICD-10-CM

## 2022-09-01 DIAGNOSIS — I10 ESSENTIAL HYPERTENSION: Primary | ICD-10-CM

## 2022-09-01 DIAGNOSIS — I42.0 DILATED CARDIOMYOPATHY (H): ICD-10-CM

## 2022-09-01 DIAGNOSIS — I25.10 CORONARY ARTERY DISEASE INVOLVING NATIVE CORONARY ARTERY OF NATIVE HEART WITHOUT ANGINA PECTORIS: ICD-10-CM

## 2022-09-01 PROCEDURE — 99606 MTMS BY PHARM EST 15 MIN: CPT | Performed by: PHARMACIST

## 2022-09-01 NOTE — PROGRESS NOTES
Medication Therapy Management (MTM) Encounter    ASSESSMENT:                            Medication Adherence/Access: No issues identified    Hypertension/CAD/A. Fib/HFrEF/Susained Ventricular Tachycardia: Stable.  Blood pressure seems to have increased slightly since irbesartan dose was reduced, but remains well below goal.  No further changes needed.    Type 2 Diabetes: Stable. Patient is meeting A1c goal of < 8%. Self monitoring of blood glucose is at goal of fasting  mg/dL without hypoglycemia.     PLAN:                            Continue current medication regimen    Follow-up: Return in 3 weeks (on 9/22/2022) for Follow-up Medication Review.    SUBJECTIVE/OBJECTIVE:                          Jt Montgomery is a 76 year old male called for a follow-up visit.  Today's visit is a follow-up MTM visit from 8/11/2022.     Reason for visit: Routine follow-up.    Tobacco: He reports that he quit smoking about 50 years ago. His smoking use included cigarettes. He has a 10.50 pack-year smoking history. He has never used smokeless tobacco.  Alcohol: history of alcohol dependence    Social history:  He's planning on moving mid-October    Medication Adherence/Access: no issues reported    Hypertension/CAD/A. Fib/HFrEF/Susained Ventricular Tachycardia:  Current medication regimen includes amiodarone 200mg daily, furosemide 40mg twice daily (with corresponding KCl 20mEq twice daily), irbesartan 37.5mg daily (dose reduced by primary care physician at visit on 8/22), metoprolol succinate 100mg twice daily and warfarin as directed.  He continues working with cardiac rehab and reports this is going well.  He's feeling stronger, which he's pleased about.    He's been monitoring blood pressure since irbesartan dose was reduced and he reports the following (mmHg): 112/64, 97/64, 102/67, 92/67, 116/73, 116/68, 102/62.  HR has ranged in the 50-60bpm  INR   Date Value Ref Range Status   08/22/2022 2.0 (H) 0.9 - 1.1 Final     INR  (External)   Date Value Ref Range Status   06/21/2022 3.6 (A) 0.8 - 1.1 Final       Type 2 Diabetes:  Currently taking Lantus 21 units daily. Patient is not experiencing side effects.  Blood sugar monitoring:  Generally testing once daily (AM fasting) - OneTouch Ultra 2   Date FBG (mg/dL)   8/13 89   8/14 93   8/15 116   8/16 113   8/17 119   8/18 100   8/19 105   8/20 98   8/21 110   8/22 92   8/23 85   8/24 101   8/25 85   8/26 84   8/27 92   8/28 83   8/29 97   8/30 80   8/31 93   Symptoms of low blood sugar? None  Symptoms of high blood sugar? None  Eye exam: due  Foot exam: due  Diet/Exercise: No changes  Aspirin: No longer taking per cardiology direction given warfarin use  Statin: No - patient declines and LDL is well controlled without therapy  ACEi/ARB: Yes: irbesartan.  Urine Albumin:   Lab Results   Component Value Date    UMALCR 118.46 (H) 03/01/2022      Lab Results   Component Value Date    A1C 5.5 08/22/2022    A1C 5.6 03/01/2022    A1C 5.7 11/15/2021    A1C 6.1 07/30/2021    A1C 6.7 04/26/2021    A1C 7.0 09/22/2020    A1C 6.1 02/19/2020    A1C 6.2 11/14/2019    A1C 7.6 10/08/2019     Today's Vitals: There were no vitals taken for this visit.  ----------------    I spent 22 minutes with this patient today. A copy of the visit note was provided to the patient's provider(s).    The patient declined a summary of these recommendations.     Nathaly Hughes, PharmD, BCACP  Medication Therapy Management Provider  Pager: [phone]     Telemedicine Visit Details  Type of service:  Telephone visit  Start Time: 11:31 AM  End Time: 11:53 AM  Originating Location (patient location): [place]  Distant Location (provider location):  [facility]     Medication Therapy Recommendations  No medication therapy recommendations to display

## 2022-09-01 NOTE — PATIENT INSTRUCTIONS
"Recommendations from today's MTM visit:                                                    MTM (medication therapy management) is a service provided by a clinical pharmacist designed to help you get the most of out of your medicines.   Today we reviewed what your medicines are for, how to know if they are working, that your medicines are safe and how to make your medicine regimen as easy as possible.      Continue current medication regimen    Follow-up: Return in 3 weeks (on 9/22/2022) for Follow-up Medication Review.    It was great speaking with you today.  I value your experience and would be very thankful for your time in providing feedback in our clinic survey. In the next few days, you may receive an email or text message from [employer] with a link to a survey related to your  clinical pharmacist.\"     To schedule another MTM appointment, please call the clinic directly or you may call the MTM scheduling line at [phone] or toll-free at [phone].     My Clinical Pharmacist's contact information:                                                      Please feel free to contact me with any questions or concerns you have.      Nathaly Hughes, Rogelio, [facility]  Medication Therapy Management Provider  Pager: [phone]    "

## 2022-09-19 ENCOUNTER — ANTICOAGULATION THERAPY VISIT (OUTPATIENT)
Dept: ANTICOAGULATION | Facility: CLINIC | Age: 77
End: 2022-09-19

## 2022-09-19 ENCOUNTER — LAB (OUTPATIENT)
Dept: LAB | Facility: CLINIC | Age: 77
End: 2022-09-19
Payer: COMMERCIAL

## 2022-09-19 DIAGNOSIS — Z79.01 LONG TERM CURRENT USE OF ANTICOAGULANTS WITH INR GOAL OF 2.0-3.0: ICD-10-CM

## 2022-09-19 DIAGNOSIS — I48.0 PAROXYSMAL ATRIAL FIBRILLATION (H): Primary | ICD-10-CM

## 2022-09-19 DIAGNOSIS — I48.0 PAROXYSMAL ATRIAL FIBRILLATION (H): ICD-10-CM

## 2022-09-19 LAB — INR BLD: 2 (ref 0.9–1.1)

## 2022-09-19 PROCEDURE — 36416 COLLJ CAPILLARY BLOOD SPEC: CPT

## 2022-09-19 PROCEDURE — 85610 PROTHROMBIN TIME: CPT

## 2022-09-19 NOTE — PROGRESS NOTES
ANTICOAGULATION MANAGEMENT     Jt Montgomery 76 year old male is on warfarin with therapeutic INR result. (Goal INR 2.0-3.0)    Recent labs: (last 7 days)     09/19/22  1527   INR 2.0*       ASSESSMENT       Source(s): Chart Review and Patient/Caregiver Call       Warfarin doses taken: Warfarin taken as instructed    Diet: No new diet changes identified    New illness, injury, or hospitalization: No    Medication/supplement changes: None noted    Signs or symptoms of bleeding or clotting: No    Previous INR: Therapeutic last 2(+) visits    Additional findings: may finally be moving down to [place], [place] near the end of October.         PLAN     Recommended plan for no diet, medication or health factor changes affecting INR     Dosing Instructions: Continue your current warfarin dose with next INR in 4 weeks       Summary  As of 9/19/2022    Full warfarin instructions:  2.5 mg every Mon, Wed, Fri; 5 mg all other days   Next INR check:  10/17/2022             Telephone call with Jamal who verbalizes understanding and agrees to plan    Lab visit scheduled    Education provided: Please call back if any changes to your diet, medications or how you've been taking warfarin and Contact [phone]  with any changes, questions or concerns.     Plan made per [facility] anticoagulation protocol    Naomi Muñoz RN  Anticoagulation Clinic  9/19/2022    _______________________________________________________________________     Anticoagulation Episode Summary     Current INR goal:  2.0-3.0   TTR:  66.4 % (1 y)   Target end date:  Indefinite   Send INR reminders to:  [facility]    Indications    Paroxysmal atrial fibrillation (H) [I48.0]  Long term current use of anticoagulants with INR goal of 2.0-3.0 [Z79.01]           Comments:           Anticoagulation Care Providers     Provider Role Specialty Phone number    Genaro Roy MD Referring Cardiovascular Disease [phone]    Digna Jones MD Referring  Internal Medicine [phone]

## 2022-09-22 ENCOUNTER — VIRTUAL VISIT (OUTPATIENT)
Dept: PHARMACY | Facility: CLINIC | Age: 77
End: 2022-09-22
Payer: COMMERCIAL

## 2022-09-22 DIAGNOSIS — I47.20 SUSTAINED VENTRICULAR TACHYCARDIA (H): ICD-10-CM

## 2022-09-22 DIAGNOSIS — E11.40 TYPE 2 DIABETES MELLITUS WITH DIABETIC NEUROPATHY, WITH LONG-TERM CURRENT USE OF INSULIN (H): ICD-10-CM

## 2022-09-22 DIAGNOSIS — I25.10 CORONARY ARTERY DISEASE INVOLVING NATIVE CORONARY ARTERY OF NATIVE HEART WITHOUT ANGINA PECTORIS: ICD-10-CM

## 2022-09-22 DIAGNOSIS — I48.91 ATRIAL FIBRILLATION, UNSPECIFIED TYPE (H): ICD-10-CM

## 2022-09-22 DIAGNOSIS — I42.0 DILATED CARDIOMYOPATHY (H): ICD-10-CM

## 2022-09-22 DIAGNOSIS — I10 ESSENTIAL HYPERTENSION: Primary | ICD-10-CM

## 2022-09-22 DIAGNOSIS — Z79.4 TYPE 2 DIABETES MELLITUS WITH DIABETIC NEUROPATHY, WITH LONG-TERM CURRENT USE OF INSULIN (H): ICD-10-CM

## 2022-09-22 PROCEDURE — 99606 MTMS BY PHARM EST 15 MIN: CPT | Performed by: PHARMACIST

## 2022-09-22 NOTE — PATIENT INSTRUCTIONS
"Recommendations from today's MTM visit:                                                    MTM (medication therapy management) is a service provided by a clinical pharmacist designed to help you get the most of out of your medicines.   Today we reviewed what your medicines are for, how to know if they are working, that your medicines are safe and how to make your medicine regimen as easy as possible.      Continue current medication regimen    Follow-up: Return in about 3 weeks (around 10/13/2022) for Follow-up Medication Review.    It was great speaking with you today.  I value your experience and would be very thankful for your time in providing feedback in our clinic survey. In the next few days, you may receive an email or text message from [employer] with a link to a survey related to your  clinical pharmacist.\"     To schedule another MTM appointment, please call the clinic directly or you may call the MTM scheduling line at [phone] or toll-free at [phone].     My Clinical Pharmacist's contact information:                                                      Please feel free to contact me with any questions or concerns you have.      Nathaly Hughes, Rogelio, [facility]  Medication Therapy Management Provider  Pager: [phone]    "

## 2022-09-22 NOTE — PROGRESS NOTES
"Medication Therapy Management (MTM) Encounter    ASSESSMENT:                            Medication Adherence/Access: No issues identified    Hypertension/CAD/A. Fib/HFrEF/Susained Ventricular Tachycardia: Stable.  No changes needed.    Type 2 Diabetes: Stable. Patient is meeting A1c goal of < 8%. Self monitoring of blood glucose is at goal of fasting  mg/dL without hypoglycemia.  No changes needed.    PLAN:                            Continue current medication regimen.    Follow-up: Return in about 26 days (around 10/18/2022) for Follow-up Medication Review.    SUBJECTIVE/OBJECTIVE:                          Jt Montgomery is a 76 year old male called for a follow-up visit.  Today's visit is a follow-up MTM visit from 9/1/2022.     Reason for visit: Routine follow-up.    Tobacco: He reports that he quit smoking about 50 years ago. His smoking use included cigarettes. He has a 10.50 pack-year smoking history. He has never used smokeless tobacco.  Alcohol: history of alcohol dependence     Social history:  He says \"I think I'm going to move in late October/early November\"    Medication Adherence/Access: no issues reported    Hypertension/CAD/A. Fib/HFrEF/Susained Ventricular Tachycardia:  Current medication regimen includes amiodarone 200mg daily, furosemide 40mg twice daily (with corresponding KCl 20mEq twice daily), irbesartan 37.5mg daily, metoprolol succinate 100mg twice daily and warfarin as directed.  He continues working with cardiac rehab and reports this is going well.  He met with the pacemaker/ICD clinic yesterday and was cleared to drive again, so he's excited about this.    He's been monitoring blood pressure and reports the following (mmHg): 108/73, 106/60, 107/72, 109/72, 100/68.  HR has ranged in the 54-71bpm  INR   Date Value Ref Range Status   09/19/2022 2.0 (H) 0.9 - 1.1 Final     INR (External)   Date Value Ref Range Status   06/21/2022 3.6 (A) 0.8 - 1.1 Final       Type 2 Diabetes:  " Currently taking Lantus 21 units daily. Patient is not experiencing side effects.  Blood sugar monitoring:  Generally testing once daily (AM fasting) - OneTouch Ultra 2   Date FBG (mg/dL)   9/2 85   9/3 103   9/4 97   9/5 122   9/6 102   9/7 111   9/8 91   9/9 187   9/10 82   9/11 116   9/12 88   9/13 80   9/14 93   9/15 117   9/16 100   9/17 96   9/18 108   9/19 115   9/20 93   9/21 94   9/22 124   Symptoms of low blood sugar? None  Symptoms of high blood sugar? None  Eye exam: due  Foot exam: due  Diet/Exercise: No changes  Aspirin: No longer taking per cardiology direction given warfarin use  Statin: No - patient declines and LDL is well controlled without therapy  ACEi/ARB: Yes: irbesartan.  Urine Albumin:   Lab Results   Component Value Date    UMALCR 118.46 (H) 03/01/2022      Lab Results   Component Value Date    A1C 5.5 08/22/2022    A1C 5.6 03/01/2022    A1C 5.7 11/15/2021    A1C 6.1 07/30/2021    A1C 6.7 04/26/2021    A1C 7.0 09/22/2020    A1C 6.1 02/19/2020    A1C 6.2 11/14/2019    A1C 7.6 10/08/2019     Today's Vitals: There were no vitals taken for this visit.  ----------------    I spent 20 minutes with this patient today. A copy of the visit note was provided to the patient's provider(s).    The patient declined a summary of these recommendations.     Nathaly Hughes, PharmD, BCACP  Medication Therapy Management Provider  Pager: [phone]     Telemedicine Visit Details  Type of service:  Telephone visit  Start Time: 11:00 AM  End Time: 11:20 AM  Originating Location (patient location): Home  Distant Location (provider location):  [facility]     Medication Therapy Recommendations  No medication therapy recommendations to display

## 2022-10-02 DIAGNOSIS — I48.0 PAROXYSMAL ATRIAL FIBRILLATION (H): ICD-10-CM

## 2022-10-04 RX ORDER — AMIODARONE HYDROCHLORIDE 200 MG/1
TABLET ORAL
Qty: 90 TABLET | Refills: 0 | Status: SHIPPED | OUTPATIENT
Start: 2022-10-04 | End: 2023-01-24

## 2022-10-04 NOTE — TELEPHONE ENCOUNTER
Routing refill request to provider for review/approval because:  Drug not on the [facility] refill protocol     LOV: 8/22/22 VV with Dr. Karen Mcclellan, RN  [facility]

## 2022-10-05 ENCOUNTER — TRANSFERRED RECORDS (OUTPATIENT)
Dept: HEALTH INFORMATION MANAGEMENT | Facility: CLINIC | Age: 77
End: 2022-10-05

## 2022-10-06 ENCOUNTER — MEDICAL CORRESPONDENCE (OUTPATIENT)
Dept: HEALTH INFORMATION MANAGEMENT | Facility: CLINIC | Age: 77
End: 2022-10-06

## 2022-10-06 DIAGNOSIS — N18.30 CHRONIC KIDNEY DISEASE, STAGE III (MODERATE) (H): Primary | ICD-10-CM

## 2022-10-17 ENCOUNTER — APPOINTMENT (OUTPATIENT)
Dept: LAB | Facility: CLINIC | Age: 77
End: 2022-10-17
Payer: COMMERCIAL

## 2022-10-17 ENCOUNTER — ANTICOAGULATION THERAPY VISIT (OUTPATIENT)
Dept: ANTICOAGULATION | Facility: CLINIC | Age: 77
End: 2022-10-17

## 2022-10-17 ENCOUNTER — LAB (OUTPATIENT)
Dept: LAB | Facility: CLINIC | Age: 77
End: 2022-10-17
Payer: COMMERCIAL

## 2022-10-17 DIAGNOSIS — I48.0 PAROXYSMAL ATRIAL FIBRILLATION (H): Primary | ICD-10-CM

## 2022-10-17 DIAGNOSIS — Z79.01 LONG TERM CURRENT USE OF ANTICOAGULANTS WITH INR GOAL OF 2.0-3.0: ICD-10-CM

## 2022-10-17 DIAGNOSIS — N18.30 CHRONIC KIDNEY DISEASE, STAGE III (MODERATE) (H): ICD-10-CM

## 2022-10-17 DIAGNOSIS — I48.0 PAROXYSMAL ATRIAL FIBRILLATION (H): ICD-10-CM

## 2022-10-17 LAB — INR BLD: 1.6 (ref 0.9–1.1)

## 2022-10-17 PROCEDURE — 85610 PROTHROMBIN TIME: CPT

## 2022-10-17 PROCEDURE — 36415 COLL VENOUS BLD VENIPUNCTURE: CPT

## 2022-10-17 PROCEDURE — 82306 VITAMIN D 25 HYDROXY: CPT

## 2022-10-17 PROCEDURE — 80069 RENAL FUNCTION PANEL: CPT

## 2022-10-17 PROCEDURE — 36416 COLLJ CAPILLARY BLOOD SPEC: CPT

## 2022-10-17 NOTE — PROGRESS NOTES
ANTICOAGULATION MANAGEMENT     Jt Montgomery 76 year old male is on warfarin with subtherapeutic INR result. (Goal INR 2.0-3.0)    Recent labs: (last 7 days)     10/17/22  1521   INR 1.6*       ASSESSMENT       Source(s): Chart Review       Warfarin doses taken: Reviewed in chart    Diet: unable to assess, though other visits since last INR have been commenting that patient has been very successful on plant-based diet with 40 lb wt loss since April - will need further assessment if more vit K veggies added recently.    New illness, injury, or hospitalization: No    Medication/supplement changes: None noted    Signs or symptoms of bleeding or clotting: No    Previous INR: Therapeutic last 2(+) visits, though at 2.0 x2    Additional findings: Moving to [facility] before the end of the year.       PLAN     Unable to reach Jamal today.    Left message to take a booster dose of warfarin,  5 mg tonight. Request call back for assessment.    Follow up required to discuss out of range result     Naomi Muñoz RN  Anticoagulation Clinic  10/17/2022

## 2022-10-18 ENCOUNTER — VIRTUAL VISIT (OUTPATIENT)
Dept: PHARMACY | Facility: CLINIC | Age: 77
End: 2022-10-18
Payer: COMMERCIAL

## 2022-10-18 DIAGNOSIS — I25.10 CORONARY ARTERY DISEASE INVOLVING NATIVE CORONARY ARTERY OF NATIVE HEART WITHOUT ANGINA PECTORIS: ICD-10-CM

## 2022-10-18 DIAGNOSIS — Z79.4 TYPE 2 DIABETES MELLITUS WITH DIABETIC NEUROPATHY, WITH LONG-TERM CURRENT USE OF INSULIN (H): ICD-10-CM

## 2022-10-18 DIAGNOSIS — I10 ESSENTIAL HYPERTENSION: Primary | ICD-10-CM

## 2022-10-18 DIAGNOSIS — E11.40 TYPE 2 DIABETES MELLITUS WITH DIABETIC NEUROPATHY, WITH LONG-TERM CURRENT USE OF INSULIN (H): ICD-10-CM

## 2022-10-18 DIAGNOSIS — I42.0 DILATED CARDIOMYOPATHY (H): ICD-10-CM

## 2022-10-18 DIAGNOSIS — I48.91 ATRIAL FIBRILLATION, UNSPECIFIED TYPE (H): ICD-10-CM

## 2022-10-18 DIAGNOSIS — I47.20 SUSTAINED VENTRICULAR TACHYCARDIA (H): ICD-10-CM

## 2022-10-18 LAB
ALBUMIN SERPL-MCNC: 3.3 G/DL (ref 3.4–5)
ANION GAP SERPL CALCULATED.3IONS-SCNC: 3 MMOL/L (ref 3–14)
BUN SERPL-MCNC: 30 MG/DL (ref 7–30)
CALCIUM SERPL-MCNC: 8.6 MG/DL (ref 8.5–10.1)
CHLORIDE BLD-SCNC: 105 MMOL/L (ref 94–109)
CO2 SERPL-SCNC: 33 MMOL/L (ref 20–32)
CREAT SERPL-MCNC: 1.27 MG/DL (ref 0.66–1.25)
DEPRECATED CALCIDIOL+CALCIFEROL SERPL-MC: 35 UG/L (ref 20–75)
GFR SERPL CREATININE-BSD FRML MDRD: 59 ML/MIN/1.73M2
GLUCOSE BLD-MCNC: 162 MG/DL (ref 70–99)
PHOSPHATE SERPL-MCNC: 4.2 MG/DL (ref 2.5–4.5)
POTASSIUM BLD-SCNC: 4.3 MMOL/L (ref 3.4–5.3)
SODIUM SERPL-SCNC: 141 MMOL/L (ref 133–144)

## 2022-10-18 PROCEDURE — 99607 MTMS BY PHARM ADDL 15 MIN: CPT | Performed by: PHARMACIST

## 2022-10-18 PROCEDURE — 99606 MTMS BY PHARM EST 15 MIN: CPT | Performed by: PHARMACIST

## 2022-10-18 NOTE — PATIENT INSTRUCTIONS
"Recommendations from today's MTM visit:                                                    MTM (medication therapy management) is a service provided by a clinical pharmacist designed to help you get the most of out of your medicines.   Today we reviewed what your medicines are for, how to know if they are working, that your medicines are safe and how to make your medicine regimen as easy as possible.      Please connect with Dr. Mcleod prior to starting Jardiance.  If you do start Jardiance, we will reduce Lantus down to 16 units daily    Follow-up: Return in about 16 days (around 11/3/2022) for Follow-up Medication Review.    It was great speaking with you today.  I value your experience and would be very thankful for your time in providing feedback in our clinic survey. In the next few days, you may receive an email or text message from [employer] with a link to a survey related to your  clinical pharmacist.\"     To schedule another MTM appointment, please call the clinic directly or you may call the MTM scheduling line at [phone] or toll-free at [phone].     My Clinical Pharmacist's contact information:                                                      Please feel free to contact me with any questions or concerns you have.      Nathaly Hughes, Rogelio, [facility]  Medication Therapy Management Provider  Pager: [phone]    "

## 2022-10-18 NOTE — PROGRESS NOTES
Medication Therapy Management (MTM) Encounter    ASSESSMENT:                            Medication Adherence/Access: No issues identified    Hypertension/CAD/A. Fib/HFrEF/Susained Ventricular Tachycardia: I agree SLGT2i is a good option for him with close monitoring of renal function and electrolytes.  Loop diuretic dose was just increased, this may be able to go back down to furosemide 40mg twice daily if he starts Jardiance - will benefit from connecting with nephrology regarding this.  We'll also need to adjust his insulin regimen if he decides to proceed.  Finally, will need to monitor blood pressure closely as Jardiance can have blood pressure lowering effect and his blood pressure already runs on the low end.    Type 2 Diabetes: Stable. Patient is meeting A1c goal of < 8%.  As noted above, if Jardiance is started, he will need to reduce Lantus.    PLAN:                            1.  Patient will contact nephrology about possibly starting Jardiance and implications on diuretic dosing.  2.  Advised if he decides to start Jardiance, we would need to reduce Lantus down to 16 units daily as a starting point.    Follow-up: Return in about 16 days (around 11/3/2022) for Follow-up Medication Review.    SUBJECTIVE/OBJECTIVE:                          Jt Montgomery is a 76 year old male called for a follow-up visit.  Today's visit is a follow-up MTM visit from 9/22/2022     Reason for visit: Routine follow-up.    Social history: Patient is thinking he'll move the 1st or 2nd week of November    Tobacco: He reports that he quit smoking about 50 years ago. His smoking use included cigarettes. He has a 10.50 pack-year smoking history. He has never used smokeless tobacco.  Alcohol: history of alcohol dependence    Medication Adherence/Access: no issues reported    Hypertension/CAD/A. Fib/HFrEF/Susained Ventricular Tachycardia:  Current medication regimen includes amiodarone 200mg daily, furosemide 60mg every morning and  40mg every afternoon (dose increased by nephrology, continues with corresponding KCl 20mEq twice daily), irbesartan 37.5mg daily, metoprolol succinate 100mg twice daily and warfarin as directed.  He's no longer doing cardiac rehab - didn't formally complete the program but has stopped.  Patient has had several visits with cardiology and nephrology recently - updated ECHO showed EF stable at 25%.  Cardiology recommended starting Jardiance - he wonders more about this.  This would cost him $125/month.      INR   Date Value Ref Range Status   10/17/2022 1.6 (H) 0.9 - 1.1 Final     INR (External)   Date Value Ref Range Status   06/21/2022 3.6 (A) 0.8 - 1.1 Final     Creatinine   Date Value Ref Range Status   10/17/2022 1.27 (H) 0.66 - 1.25 mg/dL Final   04/26/2021 1.19 0.66 - 1.25 mg/dL Final      GFR Estimate   Date Value Ref Range Status   10/17/2022 59 (L) >60 mL/min/1.73m2 Final     Comment:     Effective December 21, 2021 eGFRcr in adults is calculated using the 2021 CKD-EPI creatinine equation which includes age and gender (Ernie et al., NEJM, DOI: 10.1056/ZLZPss3177021)   04/26/2021 60 (L) >60 mL/min/[1.73_m2] Final     Comment:     Non  GFR Calc  Starting 12/18/2018, serum creatinine based estimated GFR (eGFR) will be   calculated using the Chronic Kidney Disease Epidemiology Collaboration   (CKD-EPI) equation.         Potassium   Date Value Ref Range Status   10/17/2022 4.3 3.4 - 5.3 mmol/L Final   04/26/2021 4.3 3.4 - 5.3 mmol/L Final      Type 2 Diabetes:  Currently taking Lantus 20-21 units daily. Patient is not experiencing side effects.  Blood sugar monitoring:  Generally testing once daily (AM fasting) - OneTouch Ultra 2   Date FBG (mg/dL)   9/23 87   9/24 129   9/25 86   9/26 116   9/27 111   9/28 93   9/29 118   9/30 -   10/1 -   10/2 112   10/3 102   10/4 108   10/5 100   10/6 129   10/7 106   10/8 122   10/9 113   10/10 101   10/11 99   10/12 84   10/13 107   10/14 91   10/15 115    10/16 97   10/17 94   10/18 117   Symptoms of low blood sugar? None  Symptoms of high blood sugar? None  Eye exam: due  Foot exam: due  Diet/Exercise: He's been eating a bit more granola and feels blood sugar is slightly higher because of this  Aspirin: No longer taking per cardiology direction given warfarin use  Statin: No - patient declines and LDL is well controlled without therapy  ACEi/ARB: Yes: irbesartan.  Urine Albumin:   Lab Results   Component Value Date    UMALCR 118.46 (H) 03/01/2022      Lab Results   Component Value Date    A1C 5.5 08/22/2022    A1C 5.6 03/01/2022    A1C 5.7 11/15/2021    A1C 6.1 07/30/2021    A1C 6.7 04/26/2021    A1C 7.0 09/22/2020    A1C 6.1 02/19/2020    A1C 6.2 11/14/2019    A1C 7.6 10/08/2019     Today's Vitals: There were no vitals taken for this visit.  ----------------     I spent 25 minutes with this patient today. All changes were made via collaborative practice agreement with Dr. Jones. A copy of the visit note was provided to the patient's provider(s).    The patient declined a summary of these recommendations.     Nathaly Hughes, PharmD, [facility]  Medication Therapy Management Provider  Pager: [phone]     Telemedicine Visit Details  Type of service:  Telephone visit  Start Time: 10:32 AM  End Time: 10:57 AM  Originating Location (pt. Location): Home      Distant Location (provider location):  On-site  Provider has received verbal consent for a visit from the patient? Yes     Medication Therapy Recommendations  Dilated cardiomyopathy (H)    Rationale: Does not understand instructions - Adherence - Adherence   Recommendation: Provide Education - Jardiance 10 MG Tabs   Status: Patient Agreed - Adherence/Education

## 2022-10-18 NOTE — PROGRESS NOTES
ANTICOAGULATION MANAGEMENT     Jt Montgomery 76 year old male is on warfarin with subtherapeutic INR result. (Goal INR 2.0-3.0)    Recent labs: (last 7 days)     10/17/22  1521   INR 1.6*       ASSESSMENT       Source(s): Chart Review and Patient/Caregiver Call       Warfarin doses taken: Warfarin taken as instructed. He did take the booster dose last night.    Diet: Increased greens/vitamin K in diet; ongoing change. He has added an avocado on most days of the week. Eating mostly a plant-based diet and has lost 40 lbs since April. Cardiologist and Nephrologist notes praise him on this.    New illness, injury, or hospitalization: No    Medication/supplement changes: Recently increased dose of Lasix and prescribed Jardiance, though waiting for pharmacy to fill. Neither medication has a noted interaction with warfarin.    Signs or symptoms of bleeding or clotting: No    Previous INR: Therapeutic last 2(+) visits, though both at 2.0    Additional findings: None       PLAN     Recommended plan for ongoing change(s) affecting INR     Dosing Instructions: booster dose then Increase your warfarin dose (9% change) with next INR in 1-2 weeks       Summary  As of 10/17/2022    Full warfarin instructions:  10/17: 5 mg; Otherwise 2.5 mg every Mon, Fri; 5 mg all other days   Next INR check:  10/27/2022             Telephone call with Jamal who agrees to plan and repeated back plan correctly    Lab visit scheduled    Education provided: Please call back if any changes to your diet, medications or how you've been taking warfarin, Importance of consistent vitamin K intake, Impact of vitamin K foods on INR, Vitamin K content of foods, Goal range and significance of current result, No interaction anticipated between warfarin and Lasix dose increase or adding Jardiance and Contact [phone]  with any changes, questions or concerns.     Plan made per [facility] anticoagulation protocol    Naomi Muñoz, MARCELLA  Anticoagulation  Clinic  10/18/2022    _______________________________________________________________________     Anticoagulation Episode Summary     Current INR goal:  2.0-3.0   TTR:  61.2 % (1 y)   Target end date:  Indefinite   Send INR reminders to:  [facility]    Indications    Paroxysmal atrial fibrillation (H) [I48.0]  Long term current use of anticoagulants with INR goal of 2.0-3.0 [Z79.01]           Comments:           Anticoagulation Care Providers     Provider Role Specialty Phone number    Ip, Genaro Garrett MD Referring Cardiovascular Disease [phone]    Digna Jones MD Referring Internal Medicine [phone]

## 2022-11-01 ENCOUNTER — LAB (OUTPATIENT)
Dept: LAB | Facility: CLINIC | Age: 77
End: 2022-11-01
Payer: COMMERCIAL

## 2022-11-01 ENCOUNTER — ANTICOAGULATION THERAPY VISIT (OUTPATIENT)
Dept: ANTICOAGULATION | Facility: CLINIC | Age: 77
End: 2022-11-01

## 2022-11-01 DIAGNOSIS — I48.0 PAROXYSMAL ATRIAL FIBRILLATION (H): ICD-10-CM

## 2022-11-01 DIAGNOSIS — Z79.01 LONG TERM CURRENT USE OF ANTICOAGULANTS WITH INR GOAL OF 2.0-3.0: ICD-10-CM

## 2022-11-01 DIAGNOSIS — I48.0 PAROXYSMAL ATRIAL FIBRILLATION (H): Primary | ICD-10-CM

## 2022-11-01 LAB — INR BLD: 2.1 (ref 0.9–1.1)

## 2022-11-01 PROCEDURE — 36416 COLLJ CAPILLARY BLOOD SPEC: CPT

## 2022-11-01 PROCEDURE — 85610 PROTHROMBIN TIME: CPT

## 2022-11-01 NOTE — PROGRESS NOTES
ANTICOAGULATION MANAGEMENT     Jt Montgomery 76 year old male is on warfarin with therapeutic INR result. (Goal INR 2.0-3.0)    Recent labs: (last 7 days)     11/01/22  1107   INR 2.1*       ASSESSMENT       Source(s): Chart Review and Patient/Caregiver Call       Warfarin doses taken: Warfarin taken as instructed    Diet: No new diet changes identified    New illness, injury, or hospitalization: No    Medication/supplement changes: None noted    Signs or symptoms of bleeding or clotting: No    Previous INR: Subtherapeutic    Additional findings: None  Will be moving to [place] soon and then be transferring to [facility]       PLAN     Recommended plan for no diet, medication or health factor changes affecting INR     Dosing Instructions: Continue your current warfarin dose with next INR in 3 weeks       Summary  As of 11/1/2022    Full warfarin instructions:  2.5 mg every Mon, Fri; 5 mg all other days; Starting 11/1/2022   Next INR check:  11/22/2022             Telephone call with Jamal who verbalizes understanding and agrees to plan    Lab visit scheduled    Education provided:     Please call back if any changes to your diet, medications or how you've been taking warfarin    Plan made per [facility] anticoagulation protocol    Indiana Burton RN  Anticoagulation Clinic  11/1/2022    _______________________________________________________________________     Anticoagulation Episode Summary     Current INR goal:  2.0-3.0   TTR:  61.9 % (1 y)   Target end date:  Indefinite   Send INR reminders to:  URVASHI PIMENTEL    Indications    Paroxysmal atrial fibrillation (H) [I48.0]  Long term current use of anticoagulants with INR goal of 2.0-3.0 [Z79.01]           Comments:           Anticoagulation Care Providers     Provider Role Specialty Phone number    Genaro Roy MD Referring Cardiovascular Disease [phone]    Digna Jones MD Referring Internal Medicine [phone]

## 2022-11-03 ENCOUNTER — VIRTUAL VISIT (OUTPATIENT)
Dept: PHARMACY | Facility: CLINIC | Age: 77
End: 2022-11-03
Payer: COMMERCIAL

## 2022-11-03 DIAGNOSIS — I42.0 DILATED CARDIOMYOPATHY (H): ICD-10-CM

## 2022-11-03 DIAGNOSIS — I48.91 ATRIAL FIBRILLATION, UNSPECIFIED TYPE (H): ICD-10-CM

## 2022-11-03 DIAGNOSIS — Z79.4 TYPE 2 DIABETES MELLITUS WITH DIABETIC NEUROPATHY, WITH LONG-TERM CURRENT USE OF INSULIN (H): ICD-10-CM

## 2022-11-03 DIAGNOSIS — E11.40 TYPE 2 DIABETES MELLITUS WITH DIABETIC NEUROPATHY, WITH LONG-TERM CURRENT USE OF INSULIN (H): ICD-10-CM

## 2022-11-03 DIAGNOSIS — I10 ESSENTIAL HYPERTENSION: Primary | ICD-10-CM

## 2022-11-03 DIAGNOSIS — I47.20 SUSTAINED VENTRICULAR TACHYCARDIA (H): ICD-10-CM

## 2022-11-03 DIAGNOSIS — I25.10 CORONARY ARTERY DISEASE INVOLVING NATIVE CORONARY ARTERY OF NATIVE HEART WITHOUT ANGINA PECTORIS: ICD-10-CM

## 2022-11-03 PROCEDURE — 99606 MTMS BY PHARM EST 15 MIN: CPT | Performed by: PHARMACIST
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
## 2022-11-03 NOTE — PATIENT INSTRUCTIONS
"Recommendations from today's MTM visit:                                                    MTM (medication therapy management) is a service provided by a clinical pharmacist designed to help you get the most of out of your medicines.   Today we reviewed what your medicines are for, how to know if they are working, that your medicines are safe and how to make your medicine regimen as easy as possible.      Continue current medication regimen.  Reviewed risks vs benefits of Jardiance and spironolactone - he wishes to think about this more.    Follow-up: Return in 2 weeks (on 11/17/2022) for Follow-up Medication Review.    It was great speaking with you today.  I value your experience and would be very thankful for your time in providing feedback in our clinic survey. In the next few days, you may receive an email or text message from [employer] with a link to a survey related to your  clinical pharmacist.\"     To schedule another MTM appointment, please call the clinic directly or you may call the MTM scheduling line at [phone] or toll-free at [phone].     My Clinical Pharmacist's contact information:                                                      Please feel free to contact me with any questions or concerns you have.      Nathaly Hughes, Rogelio, BCACP  Medication Therapy Management Provider  Pager: [phone]    "

## 2022-11-03 NOTE — PROGRESS NOTES
Medication Therapy Management (MTM) Encounter    ASSESSMENT:                            Medication Adherence/Access: No issues identified    Hypertension/CAD/A. Fib/HFrEF/Susained Ventricular Tachycardia:  Agree with optimizing HF treatment with guideline directed medical therapy including SGLTi and MRA.  He's not ready to make any changes at this time, will continue to discuss.    Type 2 Diabetes: Stable. Patient is meeting A1c goal of < 8%. If Jardiance is started, will need to reduce insulin doses.    PLAN:                            Continue current medication regimen.  Reviewed risks vs benefits of Jardiance and spironolactone - he wishes to think about this more.    Follow-up: Return in 2 weeks (on 11/17/2022) for Follow-up Medication Review.    SUBJECTIVE/OBJECTIVE:                          Jt Montgomery is a 76 year old male called for a follow-up visit.  Today's visit is a follow-up MTM visit from 10/18/2022.     Reason for visit: Routine follow-up.    Tobacco: He reports that he quit smoking about 50 years ago. His smoking use included cigarettes. He has a 10.50 pack-year smoking history. He has never used smokeless tobacco.  Alcohol: history of alcohol dependence - sober many years    Medication Adherence/Access: no issues reported    Hypertension/CAD/A. Fib/HFrEF/Susained Ventricular Tachycardia:  Current medication regimen includes amiodarone 200mg daily, furosemide 60mg every morning and 40mg every afternoon (continues with corresponding KCl 20mEq twice daily), irbesartan 37.5mg daily, metoprolol succinate 100mg twice daily and warfarin as directed.  Updated ECHO showed EF stable at 25%.  There's been discussion about getting him on SGLT2i and MRA therapy in addition to diuretics, ACE/ARB/ARBNI and beta blocker.  He previously was on spironolactone but had a rash, although he's not 100% certain this was from spironolactone therapy so would be willing to try again.  Home blood pressure continues to be  100-110s/60-70s.      He did talk with cardiology about diuretic dosing if Jardiance was started and recommendation was to reduce furosemide to 40mg every morning and 20mg every afternoon.  INR   Date Value Ref Range Status   11/01/2022 2.1 (H) 0.9 - 1.1 Final   07/02/2021 2.50 (H) 0.86 - 1.14 Final     Comment:     This test is intended for monitoring Coumadin therapy.  Results are not   accurate in patients with prolonged INR due to factor deficiency.       Creatinine   Date Value Ref Range Status   10/17/2022 1.27 (H) 0.66 - 1.25 mg/dL Final   04/26/2021 1.19 0.66 - 1.25 mg/dL Final      GFR Estimate   Date Value Ref Range Status   10/17/2022 59 (L) >60 mL/min/1.73m2 Final     Comment:     Effective December 21, 2021 eGFRcr in adults is calculated using the 2021 CKD-EPI creatinine equation which includes age and gender (Ernie et al., NE, DOI: 10.1056/XFQKyk5448888)   04/26/2021 60 (L) >60 mL/min/[1.73_m2] Final     Comment:     Non  GFR Calc  Starting 12/18/2018, serum creatinine based estimated GFR (eGFR) will be   calculated using the Chronic Kidney Disease Epidemiology Collaboration   (CKD-EPI) equation.       Potassium   Date Value Ref Range Status   10/17/2022 4.3 3.4 - 5.3 mmol/L Final   04/26/2021 4.3 3.4 - 5.3 mmol/L Final       Type 2 Diabetes:  Currently taking Lantus 20-21 units daily. Patient is not experiencing side effects.  Blood sugar monitoring:  Generally testing once daily (AM fasting) - OneTouch Ultra 2   Date FBG (mg/dL)   10/19 -   10/20 104   10/21 99   10/22 100   10/23 109   10/24 113   10/25 93   10/26 103   10/27 109   10/28 106   10/29 104   10/30 139 (post-prandial)   10/31 -   11/1 97   11/2 110   11/3 114   Symptoms of low blood sugar? None  Symptoms of high blood sugar? None  Eye exam: due  Foot exam: due  Diet/Exercise: No changes  Aspirin: No longer taking per cardiology direction given warfarin use  Statin: No - patient declines and LDL is well controlled  without therapy  ACEi/ARB: Yes: irbesartan.  Urine Albumin:   Lab Results   Component Value Date    UMALCR 118.46 (H) 03/01/2022      Lab Results   Component Value Date    A1C 5.5 08/22/2022    A1C 5.6 03/01/2022    A1C 5.7 11/15/2021    A1C 6.1 07/30/2021    A1C 6.7 04/26/2021    A1C 7.0 09/22/2020    A1C 6.1 02/19/2020    A1C 6.2 11/14/2019    A1C 7.6 10/08/2019     Today's Vitals: There were no vitals taken for this visit.  ----------------    I spent 27 minutes with this patient today. A copy of the visit note was provided to the patient's provider(s).    The patient declined a summary of these recommendations.     Nathaly Hughes, PharmD, BCACP  Medication Therapy Management Provider  Pager: [phone]     Telemedicine Visit Details  Type of service:  Telephone visit  Start Time: 11:00 AM  End Time: 11:27 AM  Originating Location (pt. Location): Home      Distant Location (provider location):  Off-site  Provider has received verbal consent for a visit from the patient? Yes     Medication Therapy Recommendations  No medication therapy recommendations to display

## 2022-11-22 ENCOUNTER — LAB (OUTPATIENT)
Dept: LAB | Facility: CLINIC | Age: 77
End: 2022-11-22
Payer: COMMERCIAL

## 2022-11-22 ENCOUNTER — ANTICOAGULATION THERAPY VISIT (OUTPATIENT)
Dept: ANTICOAGULATION | Facility: CLINIC | Age: 77
End: 2022-11-22

## 2022-11-22 DIAGNOSIS — Z79.01 LONG TERM CURRENT USE OF ANTICOAGULANTS WITH INR GOAL OF 2.0-3.0: ICD-10-CM

## 2022-11-22 DIAGNOSIS — I48.0 PAROXYSMAL ATRIAL FIBRILLATION (H): Primary | ICD-10-CM

## 2022-11-22 DIAGNOSIS — I48.0 PAROXYSMAL ATRIAL FIBRILLATION (H): ICD-10-CM

## 2022-11-22 LAB — INR BLD: 1.6 (ref 0.9–1.1)

## 2022-11-22 PROCEDURE — 36416 COLLJ CAPILLARY BLOOD SPEC: CPT

## 2022-11-22 PROCEDURE — 85610 PROTHROMBIN TIME: CPT

## 2022-11-22 NOTE — PROGRESS NOTES
ANTICOAGULATION MANAGEMENT     Jt Montgomery 77 year old male is on warfarin with subtherapeutic INR result. (Goal INR 2.0-3.0)    Recent labs: (last 7 days)     11/22/22  1054   INR 1.6*       ASSESSMENT       Source(s): Chart Review and Patient/Caregiver Call       Warfarin doses taken: Warfarin taken as instructed    Diet: He says he does eat a lot of greens.     New illness, injury, or hospitalization: No    Medication/supplement changes: Started on Jardiance  No affect on warfarin noted    Signs or symptoms of bleeding or clotting: No    Previous INR: Therapeutic last visit; previously outside of goal range    Additional findings: None  He is planning on moving to [place], [place] but is still packing.       PLAN     Recommended plan for no diet, medication or health factor changes affecting INR     Dosing Instructions: Increase your warfarin dose (8.3% change) with next INR in 2 weeks   Plus a boost dose today    Summary  As of 11/22/2022    Full warfarin instructions:  11/22: 7.5 mg; Otherwise 2.5 mg every Mon; 5 mg all other days; Starting 11/22/2022   Next INR check:  12/6/2022             Telephone call with Jamal who verbalizes understanding and agrees to plan    Lab visit scheduled    Education provided:     Please call back if any changes to your diet, medications or how you've been taking warfarin    Plan made per [facility] anticoagulation protocol    Indiana Burton RN  Anticoagulation Clinic  11/22/2022    _______________________________________________________________________     Anticoagulation Episode Summary     Current INR goal:  2.0-3.0   TTR:  63.0 % (1 y)   Target end date:  Indefinite   Send INR reminders to:  [facility]    Indications    Paroxysmal atrial fibrillation (H) [I48.0]  Long term current use of anticoagulants with INR goal of 2.0-3.0 [Z79.01]           Comments:           Anticoagulation Care Providers     Provider Role Specialty Phone number    Manuela, Genaro Garrett MD  Referring Cardiovascular Disease [phone]    Digna Jones MD Referring Internal Medicine [phone]

## 2022-12-01 ENCOUNTER — VIRTUAL VISIT (OUTPATIENT)
Dept: PHARMACY | Facility: CLINIC | Age: 77
End: 2022-12-01
Payer: COMMERCIAL

## 2022-12-01 DIAGNOSIS — I42.0 DILATED CARDIOMYOPATHY (H): Primary | ICD-10-CM

## 2022-12-01 DIAGNOSIS — Z79.4 TYPE 2 DIABETES MELLITUS WITH DIABETIC NEUROPATHY, WITH LONG-TERM CURRENT USE OF INSULIN (H): ICD-10-CM

## 2022-12-01 DIAGNOSIS — I25.10 CORONARY ARTERY DISEASE INVOLVING NATIVE CORONARY ARTERY OF NATIVE HEART WITHOUT ANGINA PECTORIS: ICD-10-CM

## 2022-12-01 DIAGNOSIS — I47.20 SUSTAINED VENTRICULAR TACHYCARDIA (H): ICD-10-CM

## 2022-12-01 DIAGNOSIS — I48.91 ATRIAL FIBRILLATION, UNSPECIFIED TYPE (H): ICD-10-CM

## 2022-12-01 DIAGNOSIS — E11.40 TYPE 2 DIABETES MELLITUS WITH DIABETIC NEUROPATHY, WITH LONG-TERM CURRENT USE OF INSULIN (H): ICD-10-CM

## 2022-12-01 DIAGNOSIS — I10 ESSENTIAL HYPERTENSION: ICD-10-CM

## 2022-12-01 PROCEDURE — 99606 MTMS BY PHARM EST 15 MIN: CPT | Performed by: PHARMACIST

## 2022-12-01 PROCEDURE — 99607 MTMS BY PHARM ADDL 15 MIN: CPT | Performed by: PHARMACIST

## 2022-12-01 RX ORDER — EMPAGLIFLOZIN 10 MG/1
10 TABLET, FILM COATED ORAL DAILY
COMMUNITY
Start: 2022-11-16

## 2022-12-01 NOTE — PROGRESS NOTES
Medication Therapy Management (MTM) Encounter    ASSESSMENT:                            Medication Adherence/Access: No issues identified    Hypertension/CAD/A. Fib/HFrEF/Susained Ventricular Tachycardia:  Due for BMP since Jardiance was initiated.    Type 2 Diabetes: Stable. Patient is meeting A1c goal of < 8%, due for re-check.  blood sugar has remained stable with Jardiance initiated, will continue to monitor closely.    PLAN:                            A1c and BMP to be checked along with INR next week.    Follow-up: Return in about 2 weeks (around 12/15/2022) for Follow-up Medication Review.    SUBJECTIVE/OBJECTIVE:                          Jt Montgomery is a 77 year old male called for a follow-up visit.  Today's visit is a follow-up MTM visit from 11/3/2022     Reason for visit: Routine follow-up.    Tobacco: He reports that he quit smoking about 50 years ago. His smoking use included cigarettes. He has a 10.50 pack-year smoking history. He has never used smokeless tobacco.  Alcohol: history of alcohol dependence - sober long term    Medication Adherence/Access: no issues reported    Hypertension/CAD/A. Fib/HFrEF/Susained Ventricular Tachycardia:  Current medication regimen includes amiodarone 200mg daily, furosemide 40mg every morning and 20mg every afternoon (dose reduced upon initiation of Jadiance, continues with corresponding KCl 20mEq twice daily), Jardiance 10mg daily (started 11/25), irbesartan 37.5mg daily, metoprolol succinate 100mg twice daily and warfarin as directed.  EF remains stable at 25%.  He previously was on spironolactone but had a rash, although he's not 100% certain this was from spironolactone therapy so would be willing to try again.  Home blood pressure continues to be 100-120s/60-70s, no significant change in readings since starting Jardiance.     INR   Date Value Ref Range Status   11/22/2022 1.6 (H) 0.9 - 1.1 Final   07/02/2021 2.50 (H) 0.86 - 1.14 Final     Comment:     This  test is intended for monitoring Coumadin therapy.  Results are not   accurate in patients with prolonged INR due to factor deficiency.        Creatinine   Date Value Ref Range Status   10/17/2022 1.27 (H) 0.66 - 1.25 mg/dL Final   04/26/2021 1.19 0.66 - 1.25 mg/dL Final      INR   Date Value Ref Range Status   11/01/2022 2.1 (H) 0.9 - 1.1 Final   07/02/2021 2.50 (H) 0.86 - 1.14 Final     Comment:     This test is intended for monitoring Coumadin therapy.  Results are not   accurate in patients with prolonged INR due to factor deficiency.       GFR Estimate   Date Value Ref Range Status   10/17/2022 59 (L) >60 mL/min/1.73m2 Final     Comment:     Effective December 21, 2021 eGFRcr in adults is calculated using the 2021 CKD-EPI creatinine equation which includes age and gender (Ernie et al., NE, DOI: 10.1056/KWJGcq1795523)   04/26/2021 60 (L) >60 mL/min/[1.73_m2] Final     Comment:     Non  GFR Calc  Starting 12/18/2018, serum creatinine based estimated GFR (eGFR) will be   calculated using the Chronic Kidney Disease Epidemiology Collaboration   (CKD-EPI) equation.       Potassium   Date Value Ref Range Status   10/17/2022 4.3 3.4 - 5.3 mmol/L Final   04/26/2021 4.3 3.4 - 5.3 mmol/L Final        Type 2 Diabetes:  Currently taking Lantus 20-21 units daily and Jardiance 10mg daily (as above). Patient is not experiencing side effects.  Blood sugar monitoring:  Generally testing once daily (AM fasting) - OneTouch Ultra 2   Date FBG (mg/dL)   11/16 89   11/17 101   11/18 101   11/19 100   11/20 95   11/21 96   11/22 89   11/23 94   11/24 119   11/25 - (started Jardiance)   11/26 94   11/27 102   11/28 107   11/29 107   11/30 95   12/1 108   Symptoms of low blood sugar? None  Symptoms of high blood sugar? None  Eye exam: due  Foot exam: due  Diet/Exercise: No changes  Aspirin: No longer taking per cardiology direction given warfarin use  Statin: No - patient declines and LDL is well controlled without  therapy  ACEi/ARB: Yes: irbesartan.  Urine Albumin:   Lab Results   Component Value Date    UMALCR 118.46 (H) 03/01/2022      Lab Results   Component Value Date    A1C 5.5 08/22/2022    A1C 5.6 03/01/2022    A1C 5.7 11/15/2021    A1C 6.1 07/30/2021    A1C 6.7 04/26/2021    A1C 7.0 09/22/2020    A1C 6.1 02/19/2020    A1C 6.2 11/14/2019    A1C 7.6 10/08/2019     Today's Vitals: There were no vitals taken for this visit.  ----------------    I spent 28 minutes with this patient today. All changes were made via collaborative practice agreement with Dr. Jones. A copy of the visit note was provided to the patient's provider(s).    A summary of these recommendations was declined by the patient.    Nathaly Hughes, PharmD, BCACP  Medication Therapy Management Provider  Pager: [phone]     Telemedicine Visit Details  Type of service:  Telephone visit  Start Time: 11:34 AM  End Time: 12:02 PM  Originating Location (pt. Location): Home      Distant Location (provider location):  Off-site  Provider has received verbal consent for a visit from the patient? Yes     Medication Therapy Recommendations  Dilated cardiomyopathy (H)    Current Medication: JARDIANCE 10 MG TABS tablet   Rationale: Medication requires monitoring - Needs additional monitoring - Safety   Recommendation: Order Lab   Status: Accepted per CPA

## 2022-12-01 NOTE — PATIENT INSTRUCTIONS
"Recommendations from today's MTM visit:                                                    MTM (medication therapy management) is a service provided by a clinical pharmacist designed to help you get the most of out of your medicines.   Today we reviewed what your medicines are for, how to know if they are working, that your medicines are safe and how to make your medicine regimen as easy as possible.      A1c and BMP to be checked along with INR next week.    Follow-up: Return in about 2 weeks (around 12/15/2022) for Follow-up Medication Review.    It was great speaking with you today.  I value your experience and would be very thankful for your time in providing feedback in our clinic survey. In the next few days, you may receive an email or text message from [employer] with a link to a survey related to your  clinical pharmacist.\"     To schedule another MTM appointment, please call the clinic directly or you may call the MTM scheduling line at [phone] or toll-free at [phone].     My Clinical Pharmacist's contact information:                                                      Please feel free to contact me with any questions or concerns you have.      Nathaly Hughes, Rogelio, [facility]  Medication Therapy Management Provider  Pager: [phone]   "

## 2022-12-08 ENCOUNTER — LAB (OUTPATIENT)
Dept: LAB | Facility: CLINIC | Age: 77
End: 2022-12-08
Payer: COMMERCIAL

## 2022-12-08 ENCOUNTER — ANTICOAGULATION THERAPY VISIT (OUTPATIENT)
Dept: ANTICOAGULATION | Facility: CLINIC | Age: 77
End: 2022-12-08

## 2022-12-08 DIAGNOSIS — I48.0 PAROXYSMAL ATRIAL FIBRILLATION (H): Primary | ICD-10-CM

## 2022-12-08 DIAGNOSIS — Z79.4 TYPE 2 DIABETES MELLITUS WITH DIABETIC NEUROPATHY, WITH LONG-TERM CURRENT USE OF INSULIN (H): ICD-10-CM

## 2022-12-08 DIAGNOSIS — I42.0 DILATED CARDIOMYOPATHY (H): ICD-10-CM

## 2022-12-08 DIAGNOSIS — E11.40 TYPE 2 DIABETES MELLITUS WITH DIABETIC NEUROPATHY, WITH LONG-TERM CURRENT USE OF INSULIN (H): ICD-10-CM

## 2022-12-08 DIAGNOSIS — Z79.01 LONG TERM CURRENT USE OF ANTICOAGULANTS WITH INR GOAL OF 2.0-3.0: ICD-10-CM

## 2022-12-08 DIAGNOSIS — I48.0 PAROXYSMAL ATRIAL FIBRILLATION (H): ICD-10-CM

## 2022-12-08 LAB
HBA1C MFR BLD: 5.5 % (ref 0–5.6)
INR BLD: 2.4 (ref 0.9–1.1)

## 2022-12-08 PROCEDURE — 85610 PROTHROMBIN TIME: CPT

## 2022-12-08 PROCEDURE — 36416 COLLJ CAPILLARY BLOOD SPEC: CPT

## 2022-12-08 PROCEDURE — 83036 HEMOGLOBIN GLYCOSYLATED A1C: CPT

## 2022-12-08 PROCEDURE — 36415 COLL VENOUS BLD VENIPUNCTURE: CPT

## 2022-12-08 PROCEDURE — 80048 BASIC METABOLIC PNL TOTAL CA: CPT

## 2022-12-08 NOTE — PROGRESS NOTES
ANTICOAGULATION MANAGEMENT     Jt Montgomery 77 year old male is on warfarin with therapeutic INR result. (Goal INR 2.0-3.0)    Recent labs: (last 7 days)     12/08/22  1131   INR 2.4*       ASSESSMENT       Source(s): Chart Review and Patient/Caregiver Call       Warfarin doses taken: Warfarin taken as instructed    Diet: No new diet changes identified    New illness, injury, or hospitalization: No    Medication/supplement changes: None noted    Signs or symptoms of bleeding or clotting: No    Previous INR: Subtherapeutic    Additional findings: None       PLAN     Recommended plan for no diet, medication or health factor changes affecting INR     Dosing Instructions: Continue your current warfarin dose with next INR in 3 weeks       Summary  As of 12/8/2022    Full warfarin instructions:  2.5 mg every Mon; 5 mg all other days; Starting 12/8/2022   Next INR check:  12/29/2022             Telephone call with Jamal who verbalizes understanding and agrees to plan    Lab visit scheduled    Education provided:     Please call back if any changes to your diet, medications or how you've been taking warfarin    Contact [phone]  with any changes, questions or concerns.     Plan made per [facility] anticoagulation protocol    Naomi Muñoz RN  Anticoagulation Clinic  12/8/2022    _______________________________________________________________________     Anticoagulation Episode Summary     Current INR goal:  2.0-3.0   TTR:  65.3 % (1 y)   Target end date:  Indefinite   Send INR reminders to:  URVASHI PIMENTEL    Indications    Paroxysmal atrial fibrillation (H) [I48.0]  Long term current use of anticoagulants with INR goal of 2.0-3.0 [Z79.01]           Comments:           Anticoagulation Care Providers     Provider Role Specialty Phone number    Genaro Roy MD Referring Cardiovascular Disease [phone]    Digna Jones MD Referring Internal Medicine [phone]

## 2022-12-09 DIAGNOSIS — E11.40 TYPE 2 DIABETES MELLITUS WITH DIABETIC NEUROPATHY, WITH LONG-TERM CURRENT USE OF INSULIN (H): ICD-10-CM

## 2022-12-09 DIAGNOSIS — Z79.4 TYPE 2 DIABETES MELLITUS WITH DIABETIC NEUROPATHY, WITH LONG-TERM CURRENT USE OF INSULIN (H): ICD-10-CM

## 2022-12-09 DIAGNOSIS — I10 ESSENTIAL HYPERTENSION: Primary | ICD-10-CM

## 2022-12-09 LAB
ANION GAP SERPL CALCULATED.3IONS-SCNC: 11 MMOL/L (ref 7–15)
BUN SERPL-MCNC: 31.8 MG/DL (ref 8–23)
CALCIUM SERPL-MCNC: 8.2 MG/DL (ref 8.8–10.2)
CHLORIDE SERPL-SCNC: 102 MMOL/L (ref 98–107)
CREAT SERPL-MCNC: 1.56 MG/DL (ref 0.67–1.17)
DEPRECATED HCO3 PLAS-SCNC: 27 MMOL/L (ref 22–29)
GFR SERPL CREATININE-BSD FRML MDRD: 46 ML/MIN/1.73M2
GLUCOSE SERPL-MCNC: 114 MG/DL (ref 70–99)
POTASSIUM SERPL-SCNC: 4.9 MMOL/L (ref 3.4–5.3)
SODIUM SERPL-SCNC: 140 MMOL/L (ref 136–145)

## 2022-12-15 ENCOUNTER — TRANSFERRED RECORDS (OUTPATIENT)
Dept: MULTI SPECIALTY CLINIC | Facility: CLINIC | Age: 77
End: 2022-12-15

## 2022-12-15 LAB — RETINOPATHY: NORMAL

## 2022-12-29 ENCOUNTER — ANTICOAGULATION THERAPY VISIT (OUTPATIENT)
Dept: ANTICOAGULATION | Facility: CLINIC | Age: 77
End: 2022-12-29

## 2022-12-29 ENCOUNTER — LAB (OUTPATIENT)
Dept: LAB | Facility: CLINIC | Age: 77
End: 2022-12-29
Payer: COMMERCIAL

## 2022-12-29 DIAGNOSIS — Z79.4 TYPE 2 DIABETES MELLITUS WITH DIABETIC NEUROPATHY, WITH LONG-TERM CURRENT USE OF INSULIN (H): ICD-10-CM

## 2022-12-29 DIAGNOSIS — I48.0 PAROXYSMAL ATRIAL FIBRILLATION (H): Primary | ICD-10-CM

## 2022-12-29 DIAGNOSIS — E11.40 TYPE 2 DIABETES MELLITUS WITH DIABETIC NEUROPATHY, WITH LONG-TERM CURRENT USE OF INSULIN (H): ICD-10-CM

## 2022-12-29 DIAGNOSIS — Z79.01 LONG TERM CURRENT USE OF ANTICOAGULANTS WITH INR GOAL OF 2.0-3.0: ICD-10-CM

## 2022-12-29 DIAGNOSIS — I10 ESSENTIAL HYPERTENSION: ICD-10-CM

## 2022-12-29 DIAGNOSIS — I48.0 PAROXYSMAL ATRIAL FIBRILLATION (H): ICD-10-CM

## 2022-12-29 LAB
ANION GAP SERPL CALCULATED.3IONS-SCNC: 12 MMOL/L (ref 7–15)
BUN SERPL-MCNC: 28.5 MG/DL (ref 8–23)
CALCIUM SERPL-MCNC: 8.8 MG/DL (ref 8.8–10.2)
CHLORIDE SERPL-SCNC: 103 MMOL/L (ref 98–107)
CREAT SERPL-MCNC: 1.5 MG/DL (ref 0.67–1.17)
DEPRECATED HCO3 PLAS-SCNC: 26 MMOL/L (ref 22–29)
GFR SERPL CREATININE-BSD FRML MDRD: 48 ML/MIN/1.73M2
GLUCOSE SERPL-MCNC: 115 MG/DL (ref 70–99)
INR BLD: 1.7 (ref 0.9–1.1)
POTASSIUM SERPL-SCNC: 4.6 MMOL/L (ref 3.4–5.3)
SODIUM SERPL-SCNC: 141 MMOL/L (ref 136–145)

## 2022-12-29 PROCEDURE — 36416 COLLJ CAPILLARY BLOOD SPEC: CPT

## 2022-12-29 PROCEDURE — 85610 PROTHROMBIN TIME: CPT

## 2022-12-29 PROCEDURE — 36415 COLL VENOUS BLD VENIPUNCTURE: CPT

## 2022-12-29 PROCEDURE — 80048 BASIC METABOLIC PNL TOTAL CA: CPT

## 2022-12-29 NOTE — PROGRESS NOTES
ANTICOAGULATION MANAGEMENT     Jt Montgomery 77 year old male is on warfarin with subtherapeutic INR result. (Goal INR 2.0-3.0)    Recent labs: (last 7 days)     12/29/22  1127   INR 1.7*       ASSESSMENT       Source(s): Chart Review and Patient/Caregiver Call       Warfarin doses taken: Warfarin taken as instructed    Diet: No new diet changes identified    New illness, injury, or hospitalization: No    Medication/supplement changes: None noted    Signs or symptoms of bleeding or clotting: No    Previous INR: Therapeutic last visit; previously outside of goal range    Additional findings: None  Will be having cataract surgery       PLAN     Recommended plan for no diet, medication or health factor changes affecting INR     Dosing Instructions: Continue your current warfarin dose with next INR in 2 weeks       Summary  As of 12/29/2022    Full warfarin instructions:  12/29: 7.5 mg; Otherwise 2.5 mg every Mon; 5 mg all other days   Next INR check:  1/12/2023             Telephone call with Jamal who verbalizes understanding and agrees to plan    Lab visit scheduled    Education provided:     Please call back if any changes to your diet, medications or how you've been taking warfarin    Plan made per [facility] anticoagulation protocol    Indiana Burton RN  Anticoagulation Clinic  12/29/2022    _______________________________________________________________________     Anticoagulation Episode Summary     Current INR goal:  2.0-3.0   TTR:  63.3 % (1 y)   Target end date:  Indefinite   Send INR reminders to:  URVASHI PIMENTEL    Indications    Paroxysmal atrial fibrillation (H) [I48.0]  Long term current use of anticoagulants with INR goal of 2.0-3.0 [Z79.01]           Comments:           Anticoagulation Care Providers     Provider Role Specialty Phone number    Manuela, Genaro Garrett MD Referring Cardiovascular Disease [phone]    Digna Jones MD Referring Internal Medicine [phone]

## 2023-01-03 ENCOUNTER — VIRTUAL VISIT (OUTPATIENT)
Dept: PHARMACY | Facility: CLINIC | Age: 78
End: 2023-01-03
Payer: COMMERCIAL

## 2023-01-03 DIAGNOSIS — N13.8 BENIGN PROSTATIC HYPERPLASIA WITH URINARY OBSTRUCTION: ICD-10-CM

## 2023-01-03 DIAGNOSIS — I48.91 ATRIAL FIBRILLATION, UNSPECIFIED TYPE (H): ICD-10-CM

## 2023-01-03 DIAGNOSIS — M1A.39X0 CHRONIC GOUT DUE TO RENAL IMPAIRMENT OF MULTIPLE SITES WITHOUT TOPHUS: ICD-10-CM

## 2023-01-03 DIAGNOSIS — E78.2 MIXED HYPERLIPIDEMIA DUE TO TYPE 2 DIABETES MELLITUS (H): ICD-10-CM

## 2023-01-03 DIAGNOSIS — I10 ESSENTIAL HYPERTENSION: Primary | ICD-10-CM

## 2023-01-03 DIAGNOSIS — E11.69 MIXED HYPERLIPIDEMIA DUE TO TYPE 2 DIABETES MELLITUS (H): ICD-10-CM

## 2023-01-03 DIAGNOSIS — Z79.4 TYPE 2 DIABETES MELLITUS WITH DIABETIC NEUROPATHY, WITH LONG-TERM CURRENT USE OF INSULIN (H): ICD-10-CM

## 2023-01-03 DIAGNOSIS — I42.0 DILATED CARDIOMYOPATHY (H): ICD-10-CM

## 2023-01-03 DIAGNOSIS — I25.10 CORONARY ARTERY DISEASE INVOLVING NATIVE CORONARY ARTERY OF NATIVE HEART WITHOUT ANGINA PECTORIS: ICD-10-CM

## 2023-01-03 DIAGNOSIS — N40.1 BENIGN PROSTATIC HYPERPLASIA WITH URINARY OBSTRUCTION: ICD-10-CM

## 2023-01-03 DIAGNOSIS — I47.20 SUSTAINED VENTRICULAR TACHYCARDIA (H): ICD-10-CM

## 2023-01-03 DIAGNOSIS — Z78.9 TAKES DIETARY SUPPLEMENTS: ICD-10-CM

## 2023-01-03 DIAGNOSIS — E11.40 TYPE 2 DIABETES MELLITUS WITH DIABETIC NEUROPATHY, WITH LONG-TERM CURRENT USE OF INSULIN (H): ICD-10-CM

## 2023-01-03 PROCEDURE — 99607 MTMS BY PHARM ADDL 15 MIN: CPT | Performed by: PHARMACIST

## 2023-01-03 PROCEDURE — 99605 MTMS BY PHARM NP 15 MIN: CPT | Performed by: PHARMACIST

## 2023-01-03 NOTE — PATIENT INSTRUCTIONS
"Recommendations from today's MTM visit:                                                    MTM (medication therapy management) is a service provided by a clinical pharmacist designed to help you get the most of out of your medicines.   Today we reviewed what your medicines are for, how to know if they are working, that your medicines are safe and how to make your medicine regimen as easy as possible.      Please follow-up with cardiology regarding lab results.  Furosemide dose may need to be reduced slightly.    Follow-up: Return for Follow-up Medication Review.    It was great speaking with you today.  I value your experience and would be very thankful for your time in providing feedback in our clinic survey. In the next few days, you may receive an email or text message from [employer] with a link to a survey related to your  clinical pharmacist.\"     To schedule another MTM appointment, please call the clinic directly or you may call the MTM scheduling line at [phone] or toll-free at [phone].     My Clinical Pharmacist's contact information:                                                      Please feel free to contact me with any questions or concerns you have.      Nathaly Hughes, Rogelio, [facility]  Medication Therapy Management Provider  Pager: [phone]    "

## 2023-01-03 NOTE — LETTER
_  Medication List        Prepared on: Jonathan 3, 2023     Bring your Medication List when you go to the doctor, hospital, or   emergency room. And, share it with your family or caregivers.     Note any changes to how you take your medications.  Cross out medications when you no longer use them.    Medication How I take it Why I use it Prescriber   acetaminophen (TYLENOL) 325 MG tablet Take 325-650 mg by mouth every 4 hours as needed For pain Pain Patient Reported   amiodarone (PACERONE) 200 MG tablet TAKE 200 MG TWICE DAILY UNTIL 7/9/22, THEN TAKE 200 MG ONCE DAILY BY MOUTH. Paroxysmal Atrial Fibrillation (H) Digna Jones MD   BD PEN NEEDLE GERMÁN 2ND GEN 32G X 4 MM miscellaneous USE FOUR TIMES DAILY AS DIRECTED Type 2 diabetes mellitus with diabetic neuropathy, with long-term current use of insulin (H) Digna Jones MD   blood glucose (NO BRAND SPECIFIED) lancets standard Use to test blood sugar 1 times daily or as directed.  Dispense per insurance covered brand Type 2 diabetes mellitus with diabetic neuropathy, with long-term current use of insulin (H) Digna Jones MD   blood glucose monitoring (NO BRAND SPECIFIED) meter device kit Use to test blood sugar 1 time daily or as directed. Preferred blood glucose meter OR supplies to accompany: Blood Glucose Monitor Brands: per insurance. Type 2 diabetes mellitus with diabetic neuropathy, with long-term current use of insulin (H) Digna Jones MD   Capsicum, Cayenne, (CAYENNE PEPPER PO) Take by mouth as needed General Health  Self   Coenzyme Q10 (COQ10) 100 MG CAPS Take 100 mg by mouth daily  Uncontrolled Diabetes Mellitus with Complications (H) Patient Reported   febuxostat (ULORIC) 40 MG TABS tablet Take 40 mg by mouth daily Gout Digna Jones MD   finasteride (PROSCAR) 5 MG tablet TAKE 1 TABLET(5 MG) BY MOUTH DAILY Benign prostatic hyperplasia with urinary obstruction Digna Jones MD   fish oil-omega-3 fatty acids 500 MG capsule Take 1,500  mg by mouth daily General Health  Self   furosemide (LASIX) 40 MG tablet Take 40 mg by mouth every morning And 20mg every afternoon General Health  Self   insulin glargine (LANTUS SOLOSTAR) 100 UNIT/ML pen Inject 21 Units Subcutaneous daily Type 2 diabetes mellitus with diabetic neuropathy, with long-term current use of insulin (H) Digna Jones MD   irbesartan (AVAPRO) 75 MG tablet Take 0.5 tablets (37.5 mg) by mouth daily Take 75 mg by mouth daily Encounter for medication refill Digna Jones MD   JARDIANCE 10 MG TABS tablet Take 10 mg by mouth daily Cardiomyopathy/diabetes Dr. Lawton   Lactobacillus (PROBIOTIC ACIDOPHILUS PO) Take 1 capsule by mouth daily General Health  Self   Lancets (ONETOUCH DELICA PLUS [id number]) MISC USE WITH LANCING DEVICE TO TEST BLOOD SUGAR AS DIRECTED Type 2 diabetes mellitus with diabetic neuropathy, with long-term current use of insulin (H) Digna Jones MD   metoprolol succinate ER (TOPROL-XL) 100 MG 24 hr tablet Take 100 mg by mouth 2 times daily Cardiomyopathy Dr. Lawton   Multiple Vitamins-Minerals (MULTIVITAMIN ADULT PO) Take 1 tablet by mouth daily General Health  Self   ONETOUCH ULTRA test strip USE TO TEST BLOOD SUGAR 1 TIME DAILY OR AS DIRECTED Type 2 diabetes mellitus with diabetic neuropathy, with long-term current use of insulin (H) Digna Jones MD   order for DME Equipment being ordered: Compression stockings Edema of Both Legs Digna Jones MD   potassium chloride ER (KLOR-CON M) 10 MEQ CR tablet Take 20 mEq by mouth 2 times daily  Cardiomyopathy Adwoa Lawton MD   tamsulosin (FLOMAX) 0.4 MG capsule Take 1 capsule (0.4 mg) by mouth every evening Benign prostatic hyperplasia with urinary obstruction Digna Jones MD   triamcinolone (KENALOG) 0.1 % external cream Apply topically 2 times daily as needed (rash) Rash Digna Jones MD   UNABLE TO FIND MEDICATION NAME: CarotoMax with lutein - takes sporadically General Health  Self   UNABLE TO FIND  MEDICATION NAME: Super beet powder daily General Health  Self   UNABLE TO FIND MEDICATION NAME: Nancy Lovell Serene 1 capsule at bedtime as needed.  1 capsule = 2mg of melatonin and 250mg of proprietary blend of valerian root, lemon balm extract and l-theanine General Health  Self   UNABLE TO FIND MEDICATION NAME: Dariela HerbLax 1/2 tablet as needed General Health  Self   vitamin B complex with vitamin C (STRESS TAB) tablet Take 1 tablet by mouth daily General Health  Self   vitamin C (ASCORBIC ACID) 1000 MG TABS Take 1,000 mg by mouth daily as needed  General Health  Self   Vitamin D, Cholecalciferol, 25 MCG (1000 UT) CAPS Take 3,000 Units by mouth daily General Health  Self   warfarin ANTICOAGULANT (COUMADIN) 5 MG tablet Take 2.5 mg every Monday, Wednesday, Friday; Take 5 mg all other days. Or as directed. Paroxysmal Atrial Fibrillation (H); Long term current use of anticoagulants with INR goal of 2.0-3.0 Digna Jones MD   Zinc 15 MG CAPS Take 15 mg by mouth daily Also contains 120mg of calcium General Health  Self         Add new medications, over-the-counter drugs, herbals, vitamins, or  minerals in the blank rows below.    Medication How I take it Why I use it Prescriber                          Allergies:      no known allergies        Side effects I have had:               Other Information:              My notes and questions:

## 2023-01-03 NOTE — LETTER
"Recommended To-Do List      Prepared on: Jonathan 3, 2023       You can get the best results from your medications by completing the items on this \"To-Do List.\"      Bring your To-Do List when you go to your doctor. And, share it with your family or caregivers.    My To-Do List:  What we talked about: What I should do:   Your medication dosage being too high    Please talk with cardiology about your recent lab results and possibly reducing the dose of furosemide          What we talked about: What I should do:                       "

## 2023-01-03 NOTE — PROGRESS NOTES
Medication Therapy Management (MTM) Encounter    ASSESSMENT:                            Medication Adherence/Access: No issues identified    Hypertension/CAD/A. Fib/HFrEF/Susained Ventricular Tachycardia:  Since starting Jardiance, BMP has shown elevated SrCr and BUN.  May benefit from connecting with cardiology - furosemide dose may need to be reduced slightly.    Type 2 Diabetes: Stable. Patient is meeting A1c goal of < 8%, SMBG is at goal.  No changes needed.    Hyperlipidemia: Stable.  LDL is at goal without statin therapy.    Gout: Stable.     BPH:  Stable.    Supplements:  It's questionable how much benefit Jamal is getting from his supplements, but he finds them helpful and prefers to continue taking.  There are likely not safety concerns given available data on these current supplements.    PLAN:                            1.  Patient to follow-up with cardiology regarding lab results.  Furosemide dose may need to be reduced slightly.    Follow-up: Return in about 23 days (around 1/26/2023) for Follow-up Medication Review.    SUBJECTIVE/OBJECTIVE:                          Jt Montgomery is a 77 year old male called for a follow-up visit.  Today's visit is a follow-up MTM visit from 12/1/2022.     Reason for visit: Routine follow-up.    Tobacco: He reports that he quit smoking about 51 years ago. His smoking use included cigarettes. He has a 10.50 pack-year smoking history. He has never used smokeless tobacco.  Alcohol: history of alcohol dependence    Social history:  He isn't planning to move until late February/early March at the earliest    Medication Adherence/Access: no issues reported    Hypertension/CAD/A. Fib/HFrEF/Susained Ventricular Tachycardia:  Current medication regimen includes amiodarone 200mg daily, furosemide 40mg every morning and 20mg every afternoon (dose reduced upon initiation of Jadiance, continues with corresponding KCl 20mEq twice daily), Jardiance 10mg daily (started 11/25),  irbesartan 37.5mg daily, metoprolol succinate 100mg twice daily and warfarin as directed.  EF remains stable at 25%.  Blood pressure remains 100-110/60s.  He realized he had missed a dose of warfarin prior to last INR check - plan in place to check again on 1/12.    INR   Date Value Ref Range Status   12/29/2022 1.7 (H) 0.9 - 1.1 Final     Creatinine   Date Value Ref Range Status   12/29/2022 1.50 (H) 0.67 - 1.17 mg/dL Final   04/26/2021 1.19 0.66 - 1.25 mg/dL Final      GFR Estimate   Date Value Ref Range Status   12/29/2022 48 (L) >60 mL/min/1.73m2 Final     Comment:     Effective December 21, 2021 eGFRcr in adults is calculated using the 2021 CKD-EPI creatinine equation which includes age and gender (Ernie gross al., NEJ, DOI: 10.1056/PNDUhf1310309)   04/26/2021 60 (L) >60 mL/min/[1.73_m2] Final     Comment:     Non  GFR Calc  Starting 12/18/2018, serum creatinine based estimated GFR (eGFR) will be   calculated using the Chronic Kidney Disease Epidemiology Collaboration   (CKD-EPI) equation.       Potassium   Date Value Ref Range Status   12/29/2022 4.6 3.4 - 5.3 mmol/L Final   10/17/2022 4.3 3.4 - 5.3 mmol/L Final   04/26/2021 4.3 3.4 - 5.3 mmol/L Final      Type 2 Diabetes:  Currently taking Lantus 20-21 units daily and Jardiance 10mg daily (as above). Patient is not experiencing side effects.  Blood sugar monitoring:  Generally testing once daily (AM fasting) - OneTouch Ultra 2   Date FBG (mg/dL)   12/19 115   12/20 105   12/21 92   12/22 107   12/23 96   12/24 84   12/25 109   12/26 95   12/27 -   12/28 109   12/29 104   12/30 98   12/31 107   1/1 94   1/2 96   1/3 99   Symptoms of low blood sugar? None  Symptoms of high blood sugar? None  Eye exam: due  Foot exam: due  Diet/Exercise: No changes  Aspirin: No longer taking per cardiology direction given warfarin use  Statin: No - patient declines and LDL is well controlled without therapy  ACEi/ARB: Yes: irbesartan.  Urine Albumin:   Lab Results    Component Value Date    UMALCR 118.46 (H) 03/01/2022      Lab Results   Component Value Date    A1C 5.5 12/08/2022    A1C 5.5 08/22/2022    A1C 5.6 03/01/2022    A1C 5.7 11/15/2021    A1C 6.1 07/30/2021    A1C 6.7 04/26/2021    A1C 7.0 09/22/2020    A1C 6.1 02/19/2020    A1C 6.2 11/14/2019    A1C 7.6 10/08/2019     Hyperlipidemia: Currently taking no medications.    The ASCVD Risk score (Zurdo ORANTES, et al., 2019) failed to calculate for the following reasons:    The valid total cholesterol range is 130 to 320 mg/dL   Recent Labs   Lab Test 03/01/22  1134 11/15/21  1351   CHOL 105 119   HDL 25* 17*   LDL 41 24   TRIG 195* 388*        Gout: Currently taking Uloric 40mg daily. Pt is not experiencing any medication side effects. He also has acetaminophen available for use to take as needed, which is effective.  He feels diet changes have been very helpful for preventing gout symptoms.         BPH:  He's taking finasteride 5mg daily along with tamsulosin 0.4mg daily.  He denies side effects of therapy.  Had TURP and reports symptoms are stable.      Supplements:  Patient continues taking multiple supplements - see med list for details.  He finds these effective and prefers to continue taking.  He denies side effects.   Vitamin D Deficiency Screening Results:  Lab Results   Component Value Date    VITDT 35 10/17/2022    VITDT 34 11/28/2018    VITDT 38 10/01/2014      Today's Vitals: There were no vitals taken for this visit.  ----------------    I spent 24 minutes with this patient today.  A copy of the visit note was provided to the patient's provider(s).    A summary of these recommendations was mailed to the patient as a medication action plan.    Nathaly Hughes, PharmD, [facility]  Medication Therapy Management Provider  Pager: [phone]     Telemedicine Visit Details  Type of service:  Telephone visit  Start Time: 11:33 AM  End Time: 11:57 AM     Medication Therapy Recommendations  Dilated cardiomyopathy (H)    Current  Medication: furosemide (LASIX) 40 MG tablet   Rationale: Dose too high - Dosage too high - Safety   Recommendation: Decrease Dose   Status: Contact Provider - Awaiting Response

## 2023-01-03 NOTE — LETTER
January 3, 2023  Jt PINO Valentina  [address]  [place] [id number]    Dear DANA Basilio [facility]     Thank you for talking with me on Jonathan 3, 2023 about your health and medications. As a follow-up to our conversation, I have included two documents:      1. Your Recommended To-Do List has steps you should take to get the best results from your medications.  2. Your Medication List will help you keep track of your medications and how to take them.    If you want to talk about these documents, please call Nathaly Hughes PharmD at phone: [phone], Monday-Friday 8-4:30pm.    I look forward to working with you and your doctors to make sure your medications work well for you.    Sincerely,  Nathaly Hughes, Rogelio  [facility] Pharmacist, [facility]

## 2023-01-10 ENCOUNTER — OFFICE VISIT (OUTPATIENT)
Dept: FAMILY MEDICINE | Facility: CLINIC | Age: 78
End: 2023-01-10
Payer: COMMERCIAL

## 2023-01-10 VITALS
DIASTOLIC BLOOD PRESSURE: 61 MMHG | HEART RATE: 56 BPM | BODY MASS INDEX: 34.33 KG/M2 | OXYGEN SATURATION: 97 % | TEMPERATURE: 96.9 F | HEIGHT: 73 IN | WEIGHT: 259 LBS | RESPIRATION RATE: 20 BRPM | SYSTOLIC BLOOD PRESSURE: 122 MMHG

## 2023-01-10 DIAGNOSIS — H25.9 AGE-RELATED CATARACT OF BOTH EYES, UNSPECIFIED AGE-RELATED CATARACT TYPE: Primary | ICD-10-CM

## 2023-01-10 DIAGNOSIS — N18.30 STAGE 3 CHRONIC KIDNEY DISEASE, UNSPECIFIED WHETHER STAGE 3A OR 3B CKD (H): Chronic | ICD-10-CM

## 2023-01-10 DIAGNOSIS — G47.31 CENTRAL SLEEP APNEA: Chronic | ICD-10-CM

## 2023-01-10 DIAGNOSIS — Z79.4 TYPE 2 DIABETES MELLITUS WITH DIABETIC NEUROPATHY, WITH LONG-TERM CURRENT USE OF INSULIN (H): Chronic | ICD-10-CM

## 2023-01-10 DIAGNOSIS — E11.40 TYPE 2 DIABETES MELLITUS WITH DIABETIC NEUROPATHY, WITH LONG-TERM CURRENT USE OF INSULIN (H): Chronic | ICD-10-CM

## 2023-01-10 DIAGNOSIS — Z01.818 PREOP GENERAL PHYSICAL EXAM: ICD-10-CM

## 2023-01-10 DIAGNOSIS — Z23 NEED FOR PROPHYLACTIC VACCINATION AND INOCULATION AGAINST INFLUENZA: ICD-10-CM

## 2023-01-10 DIAGNOSIS — I48.0 PAROXYSMAL ATRIAL FIBRILLATION (H): ICD-10-CM

## 2023-01-10 PROCEDURE — 90662 IIV NO PRSV INCREASED AG IM: CPT | Performed by: PHYSICIAN ASSISTANT

## 2023-01-10 PROCEDURE — G0008 ADMIN INFLUENZA VIRUS VAC: HCPCS | Performed by: PHYSICIAN ASSISTANT

## 2023-01-10 PROCEDURE — 99214 OFFICE O/P EST MOD 30 MIN: CPT | Mod: 25 | Performed by: PHYSICIAN ASSISTANT

## 2023-01-10 ASSESSMENT — PAIN SCALES - GENERAL: PAINLEVEL: NO PAIN (0)

## 2023-01-10 NOTE — Clinical Note
Please abstract the following data from this visit with this patient into the appropriate field in Epic:  Tests that can be patient reported without a hard copy:  Eye exam with ophthalmology on this date: 12/15/2022 at [facility]

## 2023-01-10 NOTE — PROGRESS NOTES
[address], [address]  [facility] [address]  Phone: [phone]  Primary Provider: Digna Jones  Pre-op Performing Provider: ZOYA MCDONALD PA-C      PREOPERATIVE EVALUATION:  Today's date: 1/10/2023    Jt Montgomery is a 77 year old male who presents for a preoperative evaluation.    Surgical Information:  Surgery/Procedure: Cataract Removal Left Eye 1/18/2023 and Right Eye 02/01/2023  Surgery Location: [facility]  Surgeon: Hemant Lincoln  Surgery Date: 01/18/2023 and 02/01/2023  Time of Surgery: TBD  Where patient plans to recover: At home with family  Fax number for surgical facility: [phone]    Type of Anesthesia Anticipated: Topical    Assessment & Plan     The proposed surgical procedure is considered LOW risk.    Age-related cataract of both eyes, unspecified age-related cataract type  Pt to take his usual medications the days of surgery with a sip of water    Preop general physical exam      Paroxysmal atrial fibrillation (H)  Pt is on coumadin and INR scheduled for 1/12/23    Stage 3 chronic kidney disease, unspecified whether stage 3a or 3b CKD (H)  Stable    Type 2 diabetes mellitus with diabetic neuropathy, with long-term current use of insulin (H)  Well controlled.   Hemoglobin A1c 5.5% on 12/8/23    Central Sleep Apnea with Cheyne Villanueva breathing  Uses CPAP         Implanted Device:   - Type of device: single chamber ICD Patient advised to bring device information on day of surgery.      Risks and Recommendations:  The patient has the following additional risks and recommendations for perioperative complications:   - No identified additional risk factors other than previously addressed    Medication Instructions:  Patient is to take all scheduled medications on the day of surgery    RECOMMENDATION:  APPROVAL GIVEN to proceed with proposed procedure, without further diagnostic evaluation.    Subjective     HPI related to upcoming  procedure: cataracts      Preop Questions 1/10/2023   1. Have you ever had a heart attack or stroke? No   2. Have you ever had surgery on your heart or blood vessels, such as a stent placement, a coronary artery bypass, or surgery on an artery in your head, neck, heart, or legs? YES -RONEL 2011   3. Do you have chest pain with activity? No   4. Do you have a history of  heart failure? YES    5. Do you currently have a cold, bronchitis or symptoms of other infection? No   6. Do you have a cough, shortness of breath, or wheezing? No   7. Do you or anyone in your family have previous history of blood clots? No   8. Do you or does anyone in your family have a serious bleeding problem such as prolonged bleeding following surgeries or cuts? No   9. Have you ever had problems with anemia or been told to take iron pills? No   10. Have you had any abnormal blood loss such as black, tarry or bloody stools? No   11. Have you ever had a blood transfusion? No   12. Are you willing to have a blood transfusion if it is medically needed before, during, or after your surgery? Yes   13. Have you or any of your relatives ever had problems with anesthesia? No   14. Do you have sleep apnea, excessive snoring or daytime drowsiness? YES    14a. Do you have a CPAP machine? Yes   15. Do you have any artifical heart valves or other implanted medical devices like a pacemaker, defibrillator, or continuous glucose monitor? YES - ICD   15a. What type of device do you have? Unknown   15b. Name of the clinic that manages your device:  [facility]   16. Do you have artificial joints? No   17. Are you allergic to latex? No     Review of Systems  CONSTITUTIONAL: NEGATIVE for fever, chills, change in weight  ENT/MOUTH: NEGATIVE for ear, mouth and throat problems  RESP: NEGATIVE for significant cough or SOB  CV: NEGATIVE for chest pain, palpitations or peripheral edema    Patient Active Problem List    Diagnosis Date Noted     Acute on  chronic right-sided congestive heart failure (H) 04/10/2021     Priority: Medium     Paroxysmal atrial fibrillation (H) 05/29/2020     Priority: Medium     Long term current use of anticoagulants with INR goal of 2.0-3.0 05/29/2020     Priority: Medium     Benign prostatic hyperplasia with urinary obstruction 05/20/2020     Priority: Medium     Hematuria 05/09/2020     Priority: Medium     Physical deconditioning 12/23/2019     Priority: Medium     Severe sepsis (H) 12/17/2019     Priority: Medium     Acute pyelonephritis 12/17/2019     Priority: Medium     Obstructive uropathy 12/17/2019     Priority: Medium     Acute on chronic renal failure (H) 12/17/2019     Priority: Medium     Indirect hyperbilirubinemia 12/17/2019     Priority: Medium     Facial cellulitis 12/02/2018     Priority: Medium     Anemia, unspecified type 01/12/2017     Priority: Medium     Mixed hyperlipidemia due to type 2 diabetes mellitus (H) 12/07/2015     Priority: Medium     Pulmonary hypertension (H)      Priority: Medium     mild per echo 3/2013       Morbid obesity (H) 10/25/2015     Priority: Medium     CKD (chronic kidney disease) stage 3, GFR 30-59 ml/min (H)      Priority: Medium     Atrial fibrillation (AFIB) on Coumadin 09/21/2014     Priority: Medium     CAD (coronary artery disease)      Priority: Medium     04/15/2011: RONEL to CFX       Gout 08/19/2014     Priority: Medium     Problem list name updated by automated process. Provider to review       Type 2 diabetes mellitus with diabetic neuropathy; goal HgbA1c < 7% 08/19/2014     Priority: Medium     Anticoagulation management encounter 09/10/2012     Priority: Medium     Pt given [employer] forms w explanation.  Pt indicates intent to review and complete at home.  9-10-12.       S/p angioplasty with stent w/ RONEL in distal RCA 05/07/2011     Priority: Medium     Dilated cardiomyopathy, nonischemic EF 30-40% in March 2013      Priority: Medium     TOMMY (obstructive sleep  apnea) 09/14/2010     Priority: Medium     Central Sleep Apnea with Cheyne Villanueva breathing 09/14/2010     Priority: Medium     Recovery of EF to 50-55%  3/23/2018 [facility] Study (277.0 lbs) - AHI 80.7, RDI 84.0, Supine AHI 92.2, REM AHI -, Low O2 83.4%, Time Spent ?88% 15.0 minutes  Testing does not show Cheyne-Villanueva anymore.       HTN (hypertension); goal <140/90 08/05/2010     Priority: Medium     Alcohol dependence in remission (H) 08/05/2010     Priority: Medium     (Problem list name updated by automated process. Provider to review and confirm.)        Past Medical History:   Diagnosis Date     Atrial fibrillation (H)      CAD (coronary artery disease)     MI with RONEL to dRCA April 2011     Central Sleep Apnea with Pat Villanueva breathing 9/14/2010    Also TOMMY with CPAP     CKD (chronic kidney disease) stage 3, GFR 30-59 ml/min (H)      Dilated cardiomyopathy (H)     nonischemic (possibly related to h/o a.fib with RVR) EF 30-40% March 2013     DM2 (diabetes mellitus, type 2) (H)     Goal HgbA1c < 7%     Gout     uric acid level correlates; April 2014 h/o +knee aspirate     Hernia, abdominal      Hypertension     Goal <140/90     Pulmonary hypertension (H)     mild per echo 3/2013     Spider veins      Past Surgical History:   Procedure Laterality Date     CORONARY ANGIOGRAPHY ADULT ORDER  2011    distal circ-RONEL     CYSTOSCOPY, TRANSURETHRAL RESECTION (TUR) PROSTATE, COMBINED N/A 5/15/2020    Procedure: CYSTOSCOPY, WITH TRANSURETHRAL RESECTION PROSTATE;  Surgeon: Tr Bowles MD;  Location: [facility] OR     HERNIA REPAIR  11/20/10    incarcinated     SUSPEND HYOID, GENIOGLOSSAL ADVANCEMENT       Current Outpatient Medications   Medication Sig Dispense Refill     acetaminophen (TYLENOL) 325 MG tablet Take 325-650 mg by mouth every 4 hours as needed For pain       amiodarone (PACERONE) 200 MG tablet TAKE 200 MG TWICE DAILY UNTIL 7/9/22, THEN TAKE 200 MG ONCE DAILY BY MOUTH. 90 tablet 0     BD  PEN NEEDLE GERMÁN 2ND GEN 32G X 4 MM miscellaneous USE FOUR TIMES DAILY AS DIRECTED 400 each 0     blood glucose (NO BRAND SPECIFIED) lancets standard Use to test blood sugar 1 times daily or as directed.  Dispense per insurance covered brand 100 each 0     blood glucose monitoring (NO BRAND SPECIFIED) meter device kit Use to test blood sugar 1 time daily or as directed. Preferred blood glucose meter OR supplies to accompany: Blood Glucose Monitor Brands: per insurance. 1 kit 0     Coenzyme Q10 (COQ10) 100 MG CAPS Take 200 mg by mouth daily       febuxostat (ULORIC) 40 MG TABS tablet Take 40 mg by mouth daily       finasteride (PROSCAR) 5 MG tablet TAKE 1 TABLET(5 MG) BY MOUTH DAILY 90 tablet 3     fish oil-omega-3 fatty acids 500 MG capsule Take 1,500 mg by mouth daily       furosemide (LASIX) 40 MG tablet Take 40 mg by mouth every morning And 20mg every afternoon       insulin glargine (LANTUS SOLOSTAR) 100 UNIT/ML pen Inject 21 Units Subcutaneous daily 15 mL 3     irbesartan (AVAPRO) 75 MG tablet Take 0.5 tablets (37.5 mg) by mouth daily Take 75 mg by mouth daily 45 tablet 3     JARDIANCE 10 MG TABS tablet Take 10 mg by mouth daily       Lactobacillus (PROBIOTIC ACIDOPHILUS PO) Take 1 capsule by mouth daily       Lancets (ONETOUCH DELICA PLUS [id number]) MISC USE WITH LANCING DEVICE TO TEST BLOOD SUGAR AS DIRECTED 300 each 11     metoprolol succinate ER (TOPROL-XL) 100 MG 24 hr tablet Take 100 mg by mouth 2 times daily       Multiple Vitamins-Minerals (MULTIVITAMIN ADULT PO) Take 1 tablet by mouth daily       ONETOUCH ULTRA test strip USE TO TEST BLOOD SUGAR 1 TIME DAILY OR AS DIRECTED 100 strip 6     order for DME Equipment being ordered: Compression stockings 1 each 3     potassium chloride ER (KLOR-CON M) 10 MEQ CR tablet Take 20 mEq by mouth 2 times daily        tamsulosin (FLOMAX) 0.4 MG capsule Take 1 capsule (0.4 mg) by mouth every evening 90 capsule 3     triamcinolone (KENALOG) 0.1 % external cream Apply  topically 2 times daily as needed (rash) 30 g 3     UNABLE TO FIND MEDICATION NAME: Dariela HerbLax 1/2 tablet as needed       UNABLE TO FIND MEDICATION NAME: Nancy Lovell Serene 1 capsule at bedtime as needed.  1 capsule = 2mg of melatonin and 250mg of proprietary blend of valerian root, lemon balm extract and l-theanine       UNABLE TO FIND MEDICATION NAME: Super beet powder daily       UNABLE TO FIND MEDICATION NAME: CarotoMax with lutein - takes sporadically       vitamin B complex with vitamin C (STRESS TAB) tablet Take 1 tablet by mouth daily       vitamin C (ASCORBIC ACID) 1000 MG TABS Take 1,000 mg by mouth daily as needed        Vitamin D, Cholecalciferol, 25 MCG (1000 UT) CAPS Take 3,000 Units by mouth daily       warfarin ANTICOAGULANT (COUMADIN) 5 MG tablet Take 2.5 mg every Monday, Wednesday, Friday; Take 5 mg all other days. Or as directed. 100 tablet 1     Zinc 15 MG CAPS Take 15 mg by mouth daily Also contains 120mg of calcium       ASPIRIN NOT PRESCRIBED (INTENTIONAL) Antiplatelet medication not prescribed intentionally due to Current anticoagulant therapy (warfarin/enoxaparin) (Patient not taking: Reported on 1/10/2023)       STATIN NOT PRESCRIBED (INTENTIONAL) Statin not prescribed intentionally due to Other patient declines (This option does not exclude patient from measure) (Patient not taking: Reported on 1/10/2023)         Allergies   Allergen Reactions     No Known Allergies         Social History     Tobacco Use     Smoking status: Former     Packs/day: 1.50     Years: 7.00     Pack years: 10.50     Types: Cigarettes     Quit date: 1972     Years since quittin.0     Smokeless tobacco: Never     Tobacco comments:     quit in       Started at around age 18-20   Substance Use Topics     Alcohol use: No     Alcohol/week: 0.0 standard drinks     Comment: 74   recovering     Family History   Problem Relation Age of Onset     Hypertension Mother      Coronary Artery Disease  "Mother      Coronary Artery Disease Father      Hypertension Father      Diabetes Sister      Cerebrovascular Disease Sister      History   Drug Use No         Objective     /61 (BP Location: Right arm, Patient Position: Sitting, Cuff Size: Adult Large)   Pulse 56   Temp 96.9  F (36.1  C) (Temporal)   Resp 20   Ht 1.854 m (6' 1\")   Wt 117.5 kg (259 lb)   SpO2 97%   BMI 34.17 kg/m      Physical Exam  GENERAL APPEARANCE: healthy, alert and no distress  HENT: ear canals and TM's normal and nose and mouth without ulcers or lesions  RESP: lungs clear to auscultation - no rales, rhonchi or wheezes  CV: pacer L chest wall. regular rate and irreg rhythm, normal S1 S2, no S3 or S4 and no murmur, click or rub   ABDOMEN: soft, nontender, no HSM or masses and bowel sounds normal  LE: bilat pitting edema due to lymphedema/ chronic  NEURO: Normal strength and tone, sensory exam grossly normal, mentation intact and speech normal    Recent Labs   Lab Test 12/29/22  1127 12/08/22  1131 09/19/22  1527 08/22/22  1517 04/26/21  0914 04/10/21  1213   HGB  --   --   --  14.8  --  13.4   PLT  --   --   --  106*  --  128*   INR 1.7* 2.4*   < > 2.0*   < > 2.36*    140   < > 140   < > 139   POTASSIUM 4.6 4.9   < > 4.4   < > 4.2   CR 1.50* 1.56*   < > 1.14   < > 1.30*   A1C  --  5.5  --  5.5   < >  --     < > = values in this interval not displayed.        Diagnostics:     No EKG required for low risk surgery (cataract, skin procedure, breast biopsy, etc).    Revised Cardiac Risk Index (RCRI):  The patient has the following serious cardiovascular risks for perioperative complications:   - Coronary Artery Disease (MI, positive stress test, angina, Qs on EKG) = 1 point     RCRI Interpretation: 1 point: Class II (low risk - 0.9% complication rate)           Signed Electronically by: Argelia Gray PA-C  Copy of this evaluation report is provided to requesting physician.      "

## 2023-01-12 ENCOUNTER — LAB (OUTPATIENT)
Dept: LAB | Facility: CLINIC | Age: 78
End: 2023-01-12
Payer: COMMERCIAL

## 2023-01-12 ENCOUNTER — ANTICOAGULATION THERAPY VISIT (OUTPATIENT)
Dept: ANTICOAGULATION | Facility: CLINIC | Age: 78
End: 2023-01-12

## 2023-01-12 DIAGNOSIS — I48.0 PAROXYSMAL ATRIAL FIBRILLATION (H): Primary | ICD-10-CM

## 2023-01-12 DIAGNOSIS — Z79.01 LONG TERM CURRENT USE OF ANTICOAGULANTS WITH INR GOAL OF 2.0-3.0: ICD-10-CM

## 2023-01-12 DIAGNOSIS — I48.0 PAROXYSMAL ATRIAL FIBRILLATION (H): ICD-10-CM

## 2023-01-12 LAB — INR BLD: 2.7 (ref 0.9–1.1)

## 2023-01-12 PROCEDURE — 36416 COLLJ CAPILLARY BLOOD SPEC: CPT

## 2023-01-12 PROCEDURE — 85610 PROTHROMBIN TIME: CPT

## 2023-01-12 NOTE — PROGRESS NOTES
ANTICOAGULATION MANAGEMENT     Jt Montgomery 77 year old male is on warfarin with therapeutic INR result. (Goal INR 2.0-3.0)    Recent labs: (last 7 days)     01/12/23  1245   INR 2.7*       ASSESSMENT       Source(s): Chart Review and Patient/Caregiver Call       Warfarin doses taken: Warfarin taken as instructed - prior to last check patient confirmed missing a dose around 1/27/23 or 1/28/23 - calendar is locked - writer will addendum last visit and update    Diet: No new diet changes identified    New illness, injury, or hospitalization: No    Medication/supplement changes: None noted    Signs or symptoms of bleeding or clotting: No    Previous INR: Subtherapeutic    Additional findings: Upcoming surgery/procedure: Left cataract scheduled for 1/18/23 and then right cataract on 2/1/23    Moving to [place] end of Feb/early March - patient plans to establish with [facility] at that time       PLAN     Recommended plan for temporary change(s) affecting INR     Dosing Instructions: Continue your current warfarin dose with next INR in 3 weeks       Summary  As of 1/12/2023    Full warfarin instructions:  2.5 mg every Mon; 5 mg all other days   Next INR check:  2/2/2023             Telephone call with Jamal who verbalizes understanding and agrees to plan    Lab visit scheduled    Education provided:     Please call back if any changes to your diet, medications or how you've been taking warfarin    Symptom monitoring: monitoring for bleeding signs and symptoms and monitoring for clotting signs and symptoms    Plan made per [facility] anticoagulation protocol    Iva Fair RN  Anticoagulation Clinic  1/12/2023    _______________________________________________________________________     Anticoagulation Episode Summary     Current INR goal:  2.0-3.0   TTR:  62.1 % (1 y)   Target end date:  Indefinite   Send INR reminders to:  URVASHI PIMENTEL    Indications    Paroxysmal atrial fibrillation (H) [I48.0]  Long term  current use of anticoagulants with INR goal of 2.0-3.0 [Z79.01]           Comments:           Anticoagulation Care Providers     Provider Role Specialty Phone number    Ip, Genaro Garrett MD Referring Cardiovascular Disease [phone]    Digna Jones MD Referring Internal Medicine [phone]

## 2023-01-12 NOTE — PROGRESS NOTES
Patient confirmed on 1/12/23 that he did miss a dose prior to this visit on 12/29/22. Calendar updated. Iva Fair, MIREYAN, RN

## 2023-01-21 DIAGNOSIS — E11.40 TYPE 2 DIABETES MELLITUS WITH DIABETIC NEUROPATHY, WITH LONG-TERM CURRENT USE OF INSULIN (H): ICD-10-CM

## 2023-01-21 DIAGNOSIS — Z79.4 TYPE 2 DIABETES MELLITUS WITH DIABETIC NEUROPATHY, WITH LONG-TERM CURRENT USE OF INSULIN (H): ICD-10-CM

## 2023-01-24 DIAGNOSIS — I48.0 PAROXYSMAL ATRIAL FIBRILLATION (H): ICD-10-CM

## 2023-01-24 RX ORDER — AMIODARONE HYDROCHLORIDE 200 MG/1
TABLET ORAL
Qty: 90 TABLET | Refills: 0 | Status: SHIPPED | OUTPATIENT
Start: 2023-01-24

## 2023-01-25 RX ORDER — INSULIN GLARGINE 100 [IU]/ML
INJECTION, SOLUTION SUBCUTANEOUS
Qty: 15 ML | Refills: 3 | Status: SHIPPED | OUTPATIENT
Start: 2023-01-25

## 2023-01-26 ENCOUNTER — VIRTUAL VISIT (OUTPATIENT)
Dept: PHARMACY | Facility: CLINIC | Age: 78
End: 2023-01-26
Payer: COMMERCIAL

## 2023-01-26 DIAGNOSIS — I47.20 SUSTAINED VENTRICULAR TACHYCARDIA (H): ICD-10-CM

## 2023-01-26 DIAGNOSIS — E11.40 TYPE 2 DIABETES MELLITUS WITH DIABETIC NEUROPATHY, WITH LONG-TERM CURRENT USE OF INSULIN (H): ICD-10-CM

## 2023-01-26 DIAGNOSIS — I42.0 DILATED CARDIOMYOPATHY (H): ICD-10-CM

## 2023-01-26 DIAGNOSIS — I10 ESSENTIAL HYPERTENSION: Primary | ICD-10-CM

## 2023-01-26 DIAGNOSIS — I48.91 ATRIAL FIBRILLATION, UNSPECIFIED TYPE (H): ICD-10-CM

## 2023-01-26 DIAGNOSIS — I25.10 CORONARY ARTERY DISEASE INVOLVING NATIVE CORONARY ARTERY OF NATIVE HEART WITHOUT ANGINA PECTORIS: ICD-10-CM

## 2023-01-26 DIAGNOSIS — Z79.4 TYPE 2 DIABETES MELLITUS WITH DIABETIC NEUROPATHY, WITH LONG-TERM CURRENT USE OF INSULIN (H): ICD-10-CM

## 2023-01-26 PROCEDURE — 99606 MTMS BY PHARM EST 15 MIN: CPT | Performed by: PHARMACIST

## 2023-01-26 PROCEDURE — 99607 MTMS BY PHARM ADDL 15 MIN: CPT | Performed by: PHARMACIST

## 2023-01-26 NOTE — PATIENT INSTRUCTIONS
"Recommendations from today's MTM visit:                                                    MTM (medication therapy management) is a service provided by a clinical pharmacist designed to help you get the most of out of your medicines.   Today we reviewed what your medicines are for, how to know if they are working, that your medicines are safe and how to make your medicine regimen as easy as possible.      Future order placed for kidney function/electrolytes to be checked with INR next week    Follow-up: Return in about 4 weeks (around 2/23/2023) for Follow-up Medication Review.    It was great speaking with you today.  I value your experience and would be very thankful for your time in providing feedback in our clinic survey. In the next few days, you may receive an email or text message from [email or web address] with a link to a survey related to your  clinical pharmacist.\"     To schedule another MTM appointment, please call the clinic directly or you may call the MTM scheduling line at [phone] or toll-free at [phone].     My Clinical Pharmacist's contact information:                                                      Please feel free to contact me with any questions or concerns you have.      Nathaly Hughes, PharmD, [facility]  Medication Therapy Management Provider  Pager: [phone]    "

## 2023-01-26 NOTE — PROGRESS NOTES
Medication Therapy Management (MTM) Encounter    ASSESSMENT:                            Medication Adherence/Access: No issues identified    Hypertension/CAD/A. Fib/HFrEF/Susained Ventricular Tachycardia:  Since starting Jardiance, BMP has shown elevated SrCr and BUN, has been discussed with cardiology.  May benefit from re-checking BMP with next INR.    Type 2 Diabetes: Stable. Patient is meeting A1c goal of < 8%, SMBG is at goal.  No changes needed.    PLAN:                            1.  Order placed for BMP to be re-checked with next INR.    Follow-up: Return in about 4 weeks (around 2/23/2023) for Follow-up Medication Review.    SUBJECTIVE/OBJECTIVE:                          Jt Montgomery is a 77 year old male called for a follow-up visit.  Today's visit is a follow-up MTM visit from 1/3/2023.     Reason for visit: Routine follow-up.    Tobacco: He reports that he quit smoking about 51 years ago. His smoking use included cigarettes. He has a 10.50 pack-year smoking history. He has never used smokeless tobacco.  Alcohol: history of alcohol dependence    Cataract surgery has been re-scheduled to March    Medication Adherence/Access: no issues reported    Hypertension/CAD/A. Fib/HFrEF/Susained Ventricular Tachycardia:  Current medication regimen includes amiodarone 200mg daily, furosemide 40mg every morning and 20mg every afternoon (dose reduced upon initiation of Jadiance, continues with corresponding KCl 20mEq twice daily), Jardiance 10mg daily (started 11/25), irbesartan 37.5mg daily, metoprolol succinate 100mg twice daily and warfarin as directed.  EF remains stable at 25%.  Blood pressure remains 100-120/60-70s.  He denies side effects including signs and symptoms of bruising/bleeding.    He did discuss jump in SrCr with cardiology - plan to accept and continue with current regimen for now.    INR   Date Value Ref Range Status   01/12/2023 2.7 (H) 0.9 - 1.1 Final      Creatinine   Date Value Ref Range Status    12/29/2022 1.50 (H) 0.67 - 1.17 mg/dL Final   04/26/2021 1.19 0.66 - 1.25 mg/dL Final     GFR Estimate   Date Value Ref Range Status   12/29/2022 48 (L) >60 mL/min/1.73m2 Final     Comment:     Effective December 21, 2021 eGFRcr in adults is calculated using the 2021 CKD-EPI creatinine equation which includes age and gender (Ernie et al., NE, DOI: 10.1056/AGLItk7174947)   04/26/2021 60 (L) >60 mL/min/[1.73_m2] Final     Comment:     Non  GFR Calc  Starting 12/18/2018, serum creatinine based estimated GFR (eGFR) will be   calculated using the Chronic Kidney Disease Epidemiology Collaboration   (CKD-EPI) equation.       Potassium   Date Value Ref Range Status   12/29/2022 4.6 3.4 - 5.3 mmol/L Final   10/17/2022 4.3 3.4 - 5.3 mmol/L Final   04/26/2021 4.3 3.4 - 5.3 mmol/L Final       Type 2 Diabetes:  Currently taking Lantus 20-21 units daily and Jardiance 10mg daily (as above). Patient is not experiencing side effects.  Blood sugar monitoring:  Generally testing once daily (AM fasting) - OneTouch Ultra 2   Date FBG (mg/dL)   1/12 86   1/13 100   1/14 -   1/15 98   1/16 106   1/17 89   1/18 88   1/19 -   1/20 97   1/21 111   1/22 108   1/23 110   1/24 104   1/25 -   1/26 111   Symptoms of low blood sugar? None  Symptoms of high blood sugar? None  Eye exam: due  Foot exam: due  Diet/Exercise: No changes  Aspirin: No longer taking per cardiology direction given warfarin use  Statin: No - patient declines and LDL is well controlled without therapy  ACEi/ARB: Yes: irbesartan.  Urine Albumin:   Lab Results   Component Value Date    UMALCR 118.46 (H) 03/01/2022      Lab Results   Component Value Date    A1C 5.5 12/08/2022    A1C 5.5 08/22/2022    A1C 5.6 03/01/2022    A1C 5.7 11/15/2021    A1C 6.1 07/30/2021    A1C 6.7 04/26/2021    A1C 7.0 09/22/2020    A1C 6.1 02/19/2020    A1C 6.2 11/14/2019    A1C 7.6 10/08/2019     Today's Vitals: There were no vitals taken for this visit.  ----------------    I  spent 26 minutes with this patient today. All changes were made via collaborative practice agreement with Dr. Jones. A copy of the visit note was provided to the patient's provider(s).    A summary of these recommendations was declined by the patient.    Mikaela StroudD, [facility]  Medication Therapy Management Provider  Pager: [phone]     Telemedicine Visit Details  Type of service:  Telephone visit  Start Time: 11:35 AM  End Time: 12:01 PM  Originating Location (pt. Location): Home      Distant Location (provider location):  On-site  Provider has received verbal consent for a visit from the patient? Yes     Medication Therapy Recommendations  Dilated cardiomyopathy (H)    Current Medication: furosemide (LASIX) 40 MG tablet   Rationale: Dose too high - Dosage too high - Safety   Recommendation: Decrease Dose   Status: Declined per Provider          Current Medication: furosemide (LASIX) 40 MG tablet   Rationale: Medication requires monitoring - Needs additional monitoring - Safety   Recommendation: Order Lab   Status: Accepted per CPA

## 2023-02-06 ENCOUNTER — ANTICOAGULATION THERAPY VISIT (OUTPATIENT)
Dept: ANTICOAGULATION | Facility: CLINIC | Age: 78
End: 2023-02-06

## 2023-02-06 ENCOUNTER — LAB (OUTPATIENT)
Dept: LAB | Facility: CLINIC | Age: 78
End: 2023-02-06
Payer: COMMERCIAL

## 2023-02-06 DIAGNOSIS — I48.0 PAROXYSMAL ATRIAL FIBRILLATION (H): Primary | ICD-10-CM

## 2023-02-06 DIAGNOSIS — I42.0 DILATED CARDIOMYOPATHY (H): ICD-10-CM

## 2023-02-06 DIAGNOSIS — I48.0 PAROXYSMAL ATRIAL FIBRILLATION (H): ICD-10-CM

## 2023-02-06 DIAGNOSIS — Z79.01 LONG TERM CURRENT USE OF ANTICOAGULANTS WITH INR GOAL OF 2.0-3.0: ICD-10-CM

## 2023-02-06 DIAGNOSIS — I10 ESSENTIAL HYPERTENSION: ICD-10-CM

## 2023-02-06 LAB
ANION GAP SERPL CALCULATED.3IONS-SCNC: 11 MMOL/L (ref 7–15)
BUN SERPL-MCNC: 28.2 MG/DL (ref 8–23)
CALCIUM SERPL-MCNC: 8.3 MG/DL (ref 8.8–10.2)
CHLORIDE SERPL-SCNC: 102 MMOL/L (ref 98–107)
CREAT SERPL-MCNC: 1.52 MG/DL (ref 0.67–1.17)
DEPRECATED HCO3 PLAS-SCNC: 27 MMOL/L (ref 22–29)
GFR SERPL CREATININE-BSD FRML MDRD: 47 ML/MIN/1.73M2
GLUCOSE SERPL-MCNC: 112 MG/DL (ref 70–99)
INR BLD: 2.7 (ref 0.9–1.1)
POTASSIUM SERPL-SCNC: 4.8 MMOL/L (ref 3.4–5.3)
SODIUM SERPL-SCNC: 140 MMOL/L (ref 136–145)

## 2023-02-06 PROCEDURE — 80048 BASIC METABOLIC PNL TOTAL CA: CPT

## 2023-02-06 PROCEDURE — 36416 COLLJ CAPILLARY BLOOD SPEC: CPT

## 2023-02-06 PROCEDURE — 36415 COLL VENOUS BLD VENIPUNCTURE: CPT

## 2023-02-06 PROCEDURE — 85610 PROTHROMBIN TIME: CPT

## 2023-02-06 NOTE — PROGRESS NOTES
ANTICOAGULATION MANAGEMENT     Jt Montgomery 77 year old male is on warfarin with therapeutic INR result. (Goal INR 2.0-3.0)    Recent labs: (last 7 days)     02/06/23  1346   INR 2.7*       ASSESSMENT       Source(s): Chart Review and Patient/Caregiver Call       Warfarin doses taken: Warfarin taken as instructed    Diet: No new diet changes identified    New illness, injury, or hospitalization: No    Medication/supplement changes: None noted    Signs or symptoms of bleeding or clotting: No    Previous INR: Therapeutic last visit; previously outside of goal range    Additional findings: None       PLAN     Recommended plan for no diet, medication or health factor changes affecting INR     Dosing Instructions: Continue your current warfarin dose with next INR in 4 weeks       Summary  As of 2/6/2023    Full warfarin instructions:  2.5 mg every Mon; 5 mg all other days   Next INR check:  3/6/2023             Telephone call with Jamal who verbalizes understanding and agrees to plan    Lab visit scheduled    Education provided:     Please call back if any changes to your diet, medications or how you've been taking warfarin    Plan made per [facility] anticoagulation protocol    Indiana Burton RN  Anticoagulation Clinic  2/6/2023    _______________________________________________________________________     Anticoagulation Episode Summary     Current INR goal:  2.0-3.0   TTR:  62.1 % (1 y)   Target end date:  Indefinite   Send INR reminders to:  URVASHI PIMENTEL    Indications    Paroxysmal atrial fibrillation (H) [I48.0]  Long term current use of anticoagulants with INR goal of 2.0-3.0 [Z79.01]           Comments:           Anticoagulation Care Providers     Provider Role Specialty Phone number    Manuela, Genaro Garrett MD Referring Cardiovascular Disease [phone]    Digna Jones MD Referring Internal Medicine [phone]

## 2023-02-08 DIAGNOSIS — I48.0 PAROXYSMAL ATRIAL FIBRILLATION (H): Primary | ICD-10-CM

## 2023-02-11 DIAGNOSIS — Z79.01 LONG TERM CURRENT USE OF ANTICOAGULANTS WITH INR GOAL OF 2.0-3.0: ICD-10-CM

## 2023-02-11 DIAGNOSIS — I48.0 PAROXYSMAL ATRIAL FIBRILLATION (H): ICD-10-CM

## 2023-02-13 RX ORDER — WARFARIN SODIUM 5 MG/1
TABLET ORAL
Qty: 90 TABLET | Refills: 1 | Status: SHIPPED | OUTPATIENT
Start: 2023-02-13

## 2023-02-13 NOTE — TELEPHONE ENCOUNTER
ANTICOAGULATION MANAGEMENT:  Medication Refill    Anticoagulation Summary  As of 2/6/2023    Warfarin maintenance plan:  2.5 mg (5 mg x 0.5) every Mon; 5 mg (5 mg x 1) all other days   Next INR check:  3/6/2023   Target end date:  Indefinite    Indications    Paroxysmal atrial fibrillation (H) [I48.0]  Long term current use of anticoagulants with INR goal of 2.0-3.0 [Z79.01]             Anticoagulation Care Providers     Provider Role Specialty Phone number    Manuela, Genaro Garrett MD Referring Cardiovascular Disease [phone]    Digna Jones MD Referring Internal Medicine [phone]          Visit with referring provider/group within last year: Yes    [facility] referral signed within last year: Yes    Jt meets all criteria for refill (current [facility] referral, office visit with referring provider/group in last year, lab monitoring up to date or not exceeding 2 weeks overdue). Rx instructions and quantity supplied updated to match patient's current dosing plan. Warfarin 90 day supply with 1 refill granted per [facility] protocol     Frantz Sun RN  Anticoagulation Clinic

## 2023-02-23 ENCOUNTER — VIRTUAL VISIT (OUTPATIENT)
Dept: PHARMACY | Facility: CLINIC | Age: 78
End: 2023-02-23
Payer: COMMERCIAL

## 2023-02-23 DIAGNOSIS — I25.10 CORONARY ARTERY DISEASE INVOLVING NATIVE CORONARY ARTERY OF NATIVE HEART WITHOUT ANGINA PECTORIS: ICD-10-CM

## 2023-02-23 DIAGNOSIS — I42.0 DILATED CARDIOMYOPATHY (H): ICD-10-CM

## 2023-02-23 DIAGNOSIS — I10 ESSENTIAL HYPERTENSION: Primary | ICD-10-CM

## 2023-02-23 DIAGNOSIS — Z79.4 TYPE 2 DIABETES MELLITUS WITH DIABETIC NEUROPATHY, WITH LONG-TERM CURRENT USE OF INSULIN (H): ICD-10-CM

## 2023-02-23 DIAGNOSIS — E11.40 TYPE 2 DIABETES MELLITUS WITH DIABETIC NEUROPATHY, WITH LONG-TERM CURRENT USE OF INSULIN (H): ICD-10-CM

## 2023-02-23 DIAGNOSIS — I48.91 ATRIAL FIBRILLATION, UNSPECIFIED TYPE (H): ICD-10-CM

## 2023-02-23 DIAGNOSIS — I47.20 SUSTAINED VENTRICULAR TACHYCARDIA (H): ICD-10-CM

## 2023-02-23 PROCEDURE — 99606 MTMS BY PHARM EST 15 MIN: CPT | Performed by: PHARMACIST

## 2023-02-23 NOTE — PROGRESS NOTES
Medication Therapy Management (MTM) Encounter    ASSESSMENT:                            Medication Adherence/Access: No issues identified    Hypertension/CAD/A. Fib/HFrEF/Susained Ventricular Tachycardia:  Plan in place with cardiology.    Type 2 Diabetes: Stable. Patient is meeting A1c goal of < 8%, SMBG is at goal.  No changes needed.    PLAN:                            Continue current medication regimen    Follow-up: Return in 2 weeks (on 3/9/2023) for Follow-up Medication Review.     SUBJECTIVE/OBJECTIVE:                          Jt Montgomery is a 77 year old male called for a follow-up visit.  Today's visit is a follow-up MTM visit from 1/26/2023     Reason for visit: Routine follow-up.    Tobacco: He reports that he quit smoking about 51 years ago. His smoking use included cigarettes. He has a 10.50 pack-year smoking history. He has never used smokeless tobacco.  Alcohol: history of alcohol dependence    Medication Adherence/Access: no issues reported    Hypertension/CAD/A. Fib/HFrEF/Susained Ventricular Tachycardia:  Current medication regimen includes amiodarone 200mg daily, furosemide 40mg twice daily (dose increased at cardiology visit 2/8, continues with corresponding KCl 20mEq twice daily), Jardiance 10mg daily, irbesartan 37.5mg daily, metoprolol succinate 100mg twice daily and warfarin as directed.  EF remains stable at 25% with plan to re-check in April.  Blood pressure remains 100-120s/60-80s, HR 60-70s.  He denies side effects including signs and symptoms of bruising/bleeding.    Cardiology did recommend he follow-up with nephrology given rise in SrCr, he plans to schedule this.  INR   Date Value Ref Range Status   02/06/2023 2.7 (H) 0.9 - 1.1 Final     Creatinine   Date Value Ref Range Status   02/06/2023 1.52 (H) 0.67 - 1.17 mg/dL Final   04/26/2021 1.19 0.66 - 1.25 mg/dL Final      GFR Estimate   Date Value Ref Range Status   02/06/2023 47 (L) >60 mL/min/1.73m2 Final     Comment:     eGFR  calculated using 2021 CKD-EPI equation.   04/26/2021 60 (L) >60 mL/min/[1.73_m2] Final     Comment:     Non  GFR Calc  Starting 12/18/2018, serum creatinine based estimated GFR (eGFR) will be   calculated using the Chronic Kidney Disease Epidemiology Collaboration   (CKD-EPI) equation.       Potassium   Date Value Ref Range Status   02/06/2023 4.8 3.4 - 5.3 mmol/L Final   10/17/2022 4.3 3.4 - 5.3 mmol/L Final   04/26/2021 4.3 3.4 - 5.3 mmol/L Final        Type 2 Diabetes:  Currently taking Lantus 20-21 units daily and Jardiance 10mg daily (as above). Patient is not experiencing side effects.  Blood sugar monitoring:  Generally testing once daily (AM fasting) - OneTouch Ultra 2   Date FBG (mg/dL)   2/9 85   2/10 91   2/11 112   2/12 92   2/13 82   2/14 99   2/15 75   2/16 88   2/17 90   2/18 98   2/19 94   2/20 92   2/21 -   2/22 96   2/23 71   Symptoms of low blood sugar? Felt a little shaky in the middle of the night last night, tested blood sugar and it was 70mg/dL  Symptoms of high blood sugar? None  Eye exam: due  Foot exam: due  Diet/Exercise: No changes  Aspirin: No longer taking per cardiology direction given warfarin use  Statin: No - patient declines and LDL is well controlled without therapy  ACEi/ARB: Yes: irbesartan.  Lab Results   Component Value Date    UMALCR 118.46 (H) 03/01/2022      Lab Results   Component Value Date    A1C 5.5 12/08/2022    A1C 5.5 08/22/2022    A1C 5.6 03/01/2022    A1C 5.7 11/15/2021    A1C 6.1 07/30/2021    A1C 6.7 04/26/2021    A1C 7.0 09/22/2020    A1C 6.1 02/19/2020    A1C 6.2 11/14/2019    A1C 7.6 10/08/2019     Today's Vitals: There were no vitals taken for this visit.  ----------------    I spent 29 minutes with this patient today. A copy of the visit note was provided to the patient's provider(s).    A summary of these recommendations was declined by the patient.    Nathaly Hughes PharmD, [facility]  Medication Therapy Management Provider  Pager: [phone]      Telemedicine Visit Details  Type of service:  Telephone visit  Start Time: 11:01 AM  End Time: 11:30 AM     Medication Therapy Recommendations  No medication therapy recommendations to display

## 2023-02-25 NOTE — PATIENT INSTRUCTIONS
"Recommendations from today's MTM visit:                                                    MTM (medication therapy management) is a service provided by a clinical pharmacist designed to help you get the most of out of your medicines.   Today we reviewed what your medicines are for, how to know if they are working, that your medicines are safe and how to make your medicine regimen as easy as possible.      Continue current medication regimen    Follow-up: Return in 2 weeks (on 3/9/2023) for Follow-up Medication Review.    It was great speaking with you today.  I value your experience and would be very thankful for your time in providing feedback in our clinic survey. In the next few days, you may receive an email or text message from [email or web address] with a link to a survey related to your  clinical pharmacist.\"     To schedule another MTM appointment, please call the clinic directly or you may call the MTM scheduling line at [phone] or toll-free at [phone].     My Clinical Pharmacist's contact information:                                                      Please feel free to contact me with any questions or concerns you have.      Nathaly Hughes, Rogelio, [facility]  Medication Therapy Management Provider  Pager: [phone] i   "

## 2023-03-01 ENCOUNTER — TELEPHONE (OUTPATIENT)
Dept: SLEEP MEDICINE | Facility: CLINIC | Age: 78
End: 2023-03-01
Payer: COMMERCIAL

## 2023-03-01 DIAGNOSIS — G47.33 OSA (OBSTRUCTIVE SLEEP APNEA): Primary | ICD-10-CM

## 2023-03-01 PROBLEM — I47.20 SUSTAINED VENTRICULAR TACHYCARDIA (H): Status: ACTIVE | Noted: 2022-06-04

## 2023-03-01 PROBLEM — E11.9 TYPE 2 DIABETES MELLITUS WITHOUT COMPLICATION, WITH LONG-TERM CURRENT USE OF INSULIN (H): Status: ACTIVE | Noted: 2021-05-23

## 2023-03-01 PROBLEM — Z95.810 IMPLANTABLE CARDIOVERTER-DEFIBRILLATOR (ICD) IN SITU: Status: ACTIVE | Noted: 2022-06-07

## 2023-03-01 PROBLEM — Z79.4 TYPE 2 DIABETES MELLITUS WITHOUT COMPLICATION, WITH LONG-TERM CURRENT USE OF INSULIN (H): Status: ACTIVE | Noted: 2021-05-23

## 2023-03-01 NOTE — TELEPHONE ENCOUNTER
Reason for Call:  Other prescription    Detailed comments: patient called and states called [facility] on cpcp machine not working.    They sent machine in for repair.    Too high of cost on repair.    Machine is about 5 years old.    Patient called Medicare and states they will pay for a new machine.    Will need a new [facility] from [facility]    Please contact patient.  Thank you.    Phone Number Patient can be reached at: Cell number on file:    Telephone Information:   Mobile [phone]       Best Time: any    Can we leave a detailed message on this number? YES    Call taken on 3/1/2023 at 2:49 PM by Theresa Courtney

## 2023-03-01 NOTE — PROGRESS NOTES
[address], [address]  [facility] [address]  Phone: [phone]  Primary Provider: German Jones  Pre-op Performing Provider: GERMAN JONES      PREOPERATIVE EVALUATION:  Today's date: 3/2/2023    Jt Montgomery is a 77 year old male who presents for a preoperative evaluation.    Surgical Information:  Surgery/Procedure: Cataract Surgery  Surgery Location: [facility]  Surgeon: Dr.Peter Lincoln  Surgery Date: 3/8/23 & 3/15/23  Time of Surgery: TBD  Where patient plans to recover: At home with family  Fax number for surgical facility: [phone]    Type of Anesthesia Anticipated: to be determined    Assessment & Plan     The proposed surgical procedure is considered LOW risk.    Pre-operative cardiovascular examination  Age-related cataract of both eyes, unspecified age-related cataract type      Risks and Recommendations:  The patient has the following additional risks and recommendations for perioperative complications:   - No identified additional risk factors other than previously addressed    Medication Instructions:  Patient is to take all scheduled medications on the day of surgery    RECOMMENDATION:  APPROVAL GIVEN to proceed with proposed procedure, without further diagnostic evaluation.        Subjective     HPI related to upcoming procedure: patient Jt Montgomery is a very pleasant 77 year old male with bilateral eye cataracts who presents to internal medicine clinic for a pre op cardiac evaluation for upcoming bilateral eye cataracts. No chest pain, headaches, fever or chills.       Preop Questions 1/10/2023   1. Have you ever had a heart attack or stroke? No   2. Have you ever had surgery on your heart or blood vessels, such as a stent placement, a coronary artery bypass, or surgery on an artery in your head, neck, heart, or legs? YES   3. Do you have chest pain with activity? No   4. Do you have a history of  heart  failure? YES    5. Do you currently have a cold, bronchitis or symptoms of other infection? No   6. Do you have a cough, shortness of breath, or wheezing? No   7. Do you or anyone in your family have previous history of blood clots? No   8. Do you or does anyone in your family have a serious bleeding problem such as prolonged bleeding following surgeries or cuts? No   9. Have you ever had problems with anemia or been told to take iron pills? No   10. Have you had any abnormal blood loss such as black, tarry or bloody stools? No   11. Have you ever had a blood transfusion? No   12. Are you willing to have a blood transfusion if it is medically needed before, during, or after your surgery? Yes   13. Have you or any of your relatives ever had problems with anesthesia? No   14. Do you have sleep apnea, excessive snoring or daytime drowsiness? YES    14a. Do you have a CPAP machine? Yes   15. Do you have any artifical heart valves or other implanted medical devices like a pacemaker, defibrillator, or continuous glucose monitor? YES - AICD/pacemaker   15a. What type of device do you have? Unknown   15b. Name of the clinic that manages your device:  [facility]   16. Do you have artificial joints? No   17. Are you allergic to latex? No     Health Care Directive:  Patient does not have a Health Care Directive or Living Will: Patient states has Advance Directive and will bring in a copy to clinic.      Review of Systems  Constitutional, neuro, ENT, endocrine, pulmonary, cardiac, gastrointestinal, genitourinary, musculoskeletal, integument and psychiatric systems are negative, except as otherwise noted.    Patient Active Problem List    Diagnosis Date Noted     Implantable cardioverter-defibrillator (ICD) in situ 06/07/2022     Priority: Medium     Formatting of this note might be different from the original.  MEDTR[employer]IC SINGLE LEAD ICD - MRI CONDITIONAL       Sustained ventricular tachycardia 06/04/2022      Priority: Medium     Type 2 diabetes mellitus without complication, with long-term current use of insulin (H) 05/23/2021     Priority: Medium     Acute on chronic right-sided congestive heart failure (H) 04/10/2021     Priority: Medium     Paroxysmal atrial fibrillation (H) 05/29/2020     Priority: Medium     Long term current use of anticoagulants with INR goal of 2.0-3.0 05/29/2020     Priority: Medium     Benign prostatic hyperplasia with urinary obstruction 05/20/2020     Priority: Medium     Hematuria 05/09/2020     Priority: Medium     Physical deconditioning 12/23/2019     Priority: Medium     Severe sepsis (H) 12/17/2019     Priority: Medium     Acute pyelonephritis 12/17/2019     Priority: Medium     Obstructive uropathy 12/17/2019     Priority: Medium     Acute on chronic renal failure (H) 12/17/2019     Priority: Medium     Indirect hyperbilirubinemia 12/17/2019     Priority: Medium     Facial cellulitis 12/02/2018     Priority: Medium     Anemia, unspecified type 01/12/2017     Priority: Medium     Mixed hyperlipidemia due to type 2 diabetes mellitus (H) 12/07/2015     Priority: Medium     Pulmonary hypertension (H)      Priority: Medium     mild per echo 3/2013       Morbid obesity (H) 10/25/2015     Priority: Medium     Metabolic disorder 12/04/2014     Priority: Medium     CKD (chronic kidney disease) stage 3, GFR 30-59 ml/min (H)      Priority: Medium     Atrial fibrillation (AFIB) on Coumadin 09/21/2014     Priority: Medium     CAD (coronary artery disease)      Priority: Medium     04/15/2011: RONEL to CFX       Gout 08/19/2014     Priority: Medium     Problem list name updated by automated process. Provider to review       Type 2 diabetes mellitus with diabetic neuropathy; goal HgbA1c < 7% 08/19/2014     Priority: Medium     Gouty arthritis 04/17/2014     Priority: Medium     Formatting of this note might be different from the original.  Gouty arthritis, right knee, S/p knee tap, steroid injection.   Positve monosodium urate crystals       Dyslipidemia 04/17/2014     Priority: Medium     Aortic valve regurgitation 04/17/2014     Priority: Medium     Formatting of this note might be different from the original.  Aortic valve regurgitation and pulmonary regurg       Anticoagulation management encounter 09/10/2012     Priority: Medium     Pt given [employer] forms w explanation.  Pt indicates intent to review and complete at home.  9-10-12.       S/p angioplasty with stent w/ RONEL in distal RCA 05/07/2011     Priority: Medium     Dilated cardiomyopathy, nonischemic EF 30-40% in March 2013      Priority: Medium     TOMMY (obstructive sleep apnea) 09/14/2010     Priority: Medium     Central Sleep Apnea with Cheyne Villanueva breathing 09/14/2010     Priority: Medium     Recovery of EF to 50-55%  3/23/2018 [place] [facility] Sleep Study (277.0 lbs) - AHI 80.7, RDI 84.0, Supine AHI 92.2, REM AHI -, Low O2 83.4%, Time Spent ?88% 15.0 minutes  Testing does not show Cheyne-Villanueva anymore.       HTN (hypertension); goal <140/90 08/05/2010     Priority: Medium     Alcohol dependence in remission (H) 08/05/2010     Priority: Medium     (Problem list name updated by automated process. Provider to review and confirm.)        Past Medical History:   Diagnosis Date     Atrial fibrillation (H)      CAD (coronary artery disease)     MI with RONEL to dRCA April 2011     Central Sleep Apnea with Pat Villanueva breathing 9/14/2010    Also TOMMY with CPAP     CKD (chronic kidney disease) stage 3, GFR 30-59 ml/min (H)      Dilated cardiomyopathy (H)     nonischemic (possibly related to h/o a.fib with RVR) EF 30-40% March 2013     DM2 (diabetes mellitus, type 2) (H)     Goal HgbA1c < 7%     Gout     uric acid level correlates; April 2014 h/o +knee aspirate     Hernia, abdominal      Hypertension     Goal <140/90     Pulmonary hypertension (H)     mild per echo 3/2013     Spider veins      Past Surgical History:   Procedure Laterality Date      CORONARY ANGIOGRAPHY ADULT ORDER  2011    distal circ-RONEL     CYSTOSCOPY, TRANSURETHRAL RESECTION (TUR) PROSTATE, COMBINED N/A 5/15/2020    Procedure: CYSTOSCOPY, WITH TRANSURETHRAL RESECTION PROSTATE;  Surgeon: Tr Bowles MD;  Location: [facility] OR     HERNIA REPAIR  11/20/10    incarcinated     SUSPEND HYOID, GENIOGLOSSAL ADVANCEMENT       Current Outpatient Medications   Medication Sig Dispense Refill     acetaminophen (TYLENOL) 325 MG tablet Take 325-650 mg by mouth every 4 hours as needed For pain       amiodarone (PACERONE) 200 MG tablet TAKE 1 TABLET BY MOUTH TWICE DAILY UNTIL 7/9/22, THEN TAKE 1 TABLET ONCE DAILY 90 tablet 0     ASPIRIN NOT PRESCRIBED (INTENTIONAL) Antiplatelet medication not prescribed intentionally due to Current anticoagulant therapy (warfarin/enoxaparin) (Patient not taking: Reported on 1/10/2023)       BD PEN NEEDLE GERMÁN 2ND GEN 32G X 4 MM miscellaneous USE FOUR TIMES DAILY AS DIRECTED 400 each 0     blood glucose (NO BRAND SPECIFIED) lancets standard Use to test blood sugar 1 times daily or as directed.  Dispense per insurance covered brand 100 each 0     blood glucose monitoring (NO BRAND SPECIFIED) meter device kit Use to test blood sugar 1 time daily or as directed. Preferred blood glucose meter OR supplies to accompany: Blood Glucose Monitor Brands: per insurance. 1 kit 0     Coenzyme Q10 (COQ10) 100 MG CAPS Take 200 mg by mouth daily       febuxostat (ULORIC) 40 MG TABS tablet Take 40 mg by mouth daily       finasteride (PROSCAR) 5 MG tablet TAKE 1 TABLET(5 MG) BY MOUTH DAILY 90 tablet 3     fish oil-omega-3 fatty acids 500 MG capsule Take 1,500 mg by mouth daily       furosemide (LASIX) 40 MG tablet Take 40 mg by mouth 2 times daily       irbesartan (AVAPRO) 75 MG tablet Take 0.5 tablets (37.5 mg) by mouth daily Take 75 mg by mouth daily 45 tablet 3     JARDIANCE 10 MG TABS tablet Take 10 mg by mouth daily       Lactobacillus (PROBIOTIC ACIDOPHILUS PO) Take 1 capsule by mouth  daily       Lancets (ONETOUCH DELICA PLUS XIXULC23S) MISC USE WITH LANCING DEVICE TO TEST BLOOD SUGAR AS DIRECTED 300 each 11     LANTUS SOLOSTAR 100 UNIT/ML soln ADMINISTER 21 UNITS UNDER THE SKIN DAILY 15 mL 3     metoprolol succinate ER (TOPROL-XL) 100 MG 24 hr tablet Take 100 mg by mouth 2 times daily       Multiple Vitamins-Minerals (MULTIVITAMIN ADULT PO) Take 1 tablet by mouth daily       ONETOUCH ULTRA test strip USE TO TEST BLOOD SUGAR ONCE DAILY OR AS DIRECTED 100 strip 0     order for DME Equipment being ordered: Compression stockings 1 each 3     potassium chloride ER (KLOR-CON M) 10 MEQ CR tablet Take 20 mEq by mouth 2 times daily        STATIN NOT PRESCRIBED (INTENTIONAL) Statin not prescribed intentionally due to Other patient declines (This option does not exclude patient from measure) (Patient not taking: Reported on 1/10/2023)       tamsulosin (FLOMAX) 0.4 MG capsule Take 1 capsule (0.4 mg) by mouth every evening 90 capsule 3     triamcinolone (KENALOG) 0.1 % external cream Apply topically 2 times daily as needed (rash) 30 g 3     UNABLE TO FIND MEDICATION NAME: Shacklee HerbLax 1/2 tablet as needed       UNABLE TO FIND MEDICATION NAME: Shaklee Dream Serene 1 capsule at bedtime as needed.  1 capsule = 2mg of melatonin and 250mg of proprietary blend of valerian root, lemon balm extract and l-theanine       UNABLE TO FIND MEDICATION NAME: Super beet powder daily       UNABLE TO FIND MEDICATION NAME: CarotoMax with lutein - takes sporadically       vitamin B complex with vitamin C (STRESS TAB) tablet Take 1 tablet by mouth daily       vitamin C (ASCORBIC ACID) 1000 MG TABS Take 1,000 mg by mouth daily as needed        Vitamin D, Cholecalciferol, 25 MCG (1000 UT) CAPS Take 3,000 Units by mouth daily       warfarin ANTICOAGULANT (COUMADIN) 5 MG tablet TAKE 2.5 MG EVERY Monday THEN TAKE 5 MG ALL OTHER DAYS OR AS DIRECTED 90 tablet 1     Zinc 15 MG CAPS Take 15 mg by mouth daily Also contains 120mg of  "calcium         Allergies   Allergen Reactions     No Known Allergies         Social History     Tobacco Use     Smoking status: Former     Packs/day: 1.50     Years: 7.00     Pack years: 10.50     Types: Cigarettes     Quit date: 1972     Years since quittin.1     Smokeless tobacco: Never     Tobacco comments:     quit in       Started at around age 18-20   Substance Use Topics     Alcohol use: No     Alcohol/week: 0.0 standard drinks     Comment: 74   recovering     Family History   Problem Relation Age of Onset     Hypertension Mother      Coronary Artery Disease Mother      Coronary Artery Disease Father      Hypertension Father      Diabetes Sister      Cerebrovascular Disease Sister      History   Drug Use No         Objective     /71 (BP Location: Right arm, Patient Position: Sitting, Cuff Size: Adult Large)   Pulse 68   Temp 97.8  F (36.6  C) (Oral)   Resp 16   Ht 1.854 m (6' 1\")   Wt 116.7 kg (257 lb 3.2 oz)   SpO2 98%   BMI 33.93 kg/m      Physical Exam    GENERAL APPEARANCE: alert and no distress     EYES: EOMI,  Bilateral eye cataracts present     HENT: ear canals and TM's normal and nose and mouth without ulcers or lesions     NECK: no adenopathy, no asymmetry, masses, or scars and thyroid normal to palpation     RESP: lungs clear to auscultation     CV: cardiac pacemaker present     ABDOMEN:  soft, nontender, no HSM or masses and bowel sounds normal     MS: extremities normal- no gross deformities noted, no evidence of inflammation in joints, FROM in all extremities.     SKIN: no suspicious lesions or rashes     NEURO: Normal strength and tone, sensory exam grossly normal, mentation intact and speech normal     PSYCH: mentation appears normal. and affect normal/bright     LYMPHATICS: No cervical adenopathy    Recent Labs   Lab Test 23  1346 23  1245 22  1127 22  1131 22  1527 22  1517 21  0914 04/10/21  1213   HGB  --   --   --   " --   --  14.8  --  13.4   PLT  --   --   --   --   --  106*  --  128*   INR 2.7* 2.7* 1.7* 2.4*   < > 2.0*   < > 2.36*     --  141 140   < > 140   < > 139   POTASSIUM 4.8  --  4.6 4.9   < > 4.4   < > 4.2   CR 1.52*  --  1.50* 1.56*   < > 1.14   < > 1.30*   A1C  --   --   --  5.5  --  5.5   < >  --     < > = values in this interval not displayed.           Signed Electronically by: Digna Jones MD  Copy of this evaluation report is provided to requesting physician.

## 2023-03-02 ENCOUNTER — TELEPHONE (OUTPATIENT)
Dept: SLEEP MEDICINE | Facility: CLINIC | Age: 78
End: 2023-03-02

## 2023-03-02 ENCOUNTER — OFFICE VISIT (OUTPATIENT)
Dept: FAMILY MEDICINE | Facility: CLINIC | Age: 78
End: 2023-03-02
Payer: COMMERCIAL

## 2023-03-02 ENCOUNTER — ANTICOAGULATION THERAPY VISIT (OUTPATIENT)
Dept: ANTICOAGULATION | Facility: CLINIC | Age: 78
End: 2023-03-02

## 2023-03-02 ENCOUNTER — LAB (OUTPATIENT)
Dept: LAB | Facility: CLINIC | Age: 78
End: 2023-03-02
Payer: COMMERCIAL

## 2023-03-02 VITALS
WEIGHT: 257.2 LBS | BODY MASS INDEX: 34.09 KG/M2 | TEMPERATURE: 97.8 F | DIASTOLIC BLOOD PRESSURE: 71 MMHG | HEIGHT: 73 IN | OXYGEN SATURATION: 98 % | SYSTOLIC BLOOD PRESSURE: 119 MMHG | HEART RATE: 68 BPM | RESPIRATION RATE: 16 BRPM

## 2023-03-02 DIAGNOSIS — Z01.810 PRE-OPERATIVE CARDIOVASCULAR EXAMINATION: Primary | ICD-10-CM

## 2023-03-02 DIAGNOSIS — G47.31 CENTRAL SLEEP APNEA: Chronic | ICD-10-CM

## 2023-03-02 DIAGNOSIS — H25.9 AGE-RELATED CATARACT OF BOTH EYES, UNSPECIFIED AGE-RELATED CATARACT TYPE: ICD-10-CM

## 2023-03-02 DIAGNOSIS — I48.0 PAROXYSMAL ATRIAL FIBRILLATION (H): ICD-10-CM

## 2023-03-02 DIAGNOSIS — Z79.01 LONG TERM CURRENT USE OF ANTICOAGULANTS WITH INR GOAL OF 2.0-3.0: ICD-10-CM

## 2023-03-02 DIAGNOSIS — I48.0 PAROXYSMAL ATRIAL FIBRILLATION (H): Primary | ICD-10-CM

## 2023-03-02 DIAGNOSIS — G47.33 OSA (OBSTRUCTIVE SLEEP APNEA): Primary | Chronic | ICD-10-CM

## 2023-03-02 LAB
ALT SERPL W P-5'-P-CCNC: 25 U/L (ref 10–50)
ANION GAP SERPL CALCULATED.3IONS-SCNC: 10 MMOL/L (ref 7–15)
BUN SERPL-MCNC: 32.8 MG/DL (ref 8–23)
CALCIUM SERPL-MCNC: 8.7 MG/DL (ref 8.8–10.2)
CHLORIDE SERPL-SCNC: 102 MMOL/L (ref 98–107)
CREAT SERPL-MCNC: 1.44 MG/DL (ref 0.67–1.17)
DEPRECATED HCO3 PLAS-SCNC: 27 MMOL/L (ref 22–29)
GFR SERPL CREATININE-BSD FRML MDRD: 50 ML/MIN/1.73M2
GLUCOSE SERPL-MCNC: 122 MG/DL (ref 70–99)
INR BLD: 2.2 (ref 0.9–1.1)
POTASSIUM SERPL-SCNC: 4.6 MMOL/L (ref 3.4–5.3)
SODIUM SERPL-SCNC: 139 MMOL/L (ref 136–145)
TSH SERPL DL<=0.005 MIU/L-ACNC: 4.33 UIU/ML (ref 0.3–4.2)

## 2023-03-02 PROCEDURE — 99214 OFFICE O/P EST MOD 30 MIN: CPT | Performed by: INTERNAL MEDICINE

## 2023-03-02 PROCEDURE — 36415 COLL VENOUS BLD VENIPUNCTURE: CPT

## 2023-03-02 PROCEDURE — 85610 PROTHROMBIN TIME: CPT

## 2023-03-02 PROCEDURE — 84460 ALANINE AMINO (ALT) (SGPT): CPT

## 2023-03-02 PROCEDURE — 36416 COLLJ CAPILLARY BLOOD SPEC: CPT

## 2023-03-02 PROCEDURE — 80048 BASIC METABOLIC PNL TOTAL CA: CPT

## 2023-03-02 PROCEDURE — 84443 ASSAY THYROID STIM HORMONE: CPT

## 2023-03-02 ASSESSMENT — PAIN SCALES - GENERAL: PAINLEVEL: NO PAIN (0)

## 2023-03-02 NOTE — TELEPHONE ENCOUNTER
LVM FOR PT TO CALL BACK WHEN HE GETS NEW RX FOR NEW CPAP MACHINE LOOKS LIKE CHIP LVM FOR PT AS WELL ON 3/1.    Corbin Kulkarni, Respiratory DME Coordinator

## 2023-03-02 NOTE — TELEPHONE ENCOUNTER
CALLED PT BACK AFTER SPEAKING WITH ANMOL REESE AND CHIP TOLD HIM HE CAN EITHER RETURN LOANER OR PAY $50 A MONTH OOP HE SAID HE IS ALREADY AT THE [facility] AND WILL SPEAK WITH THEM TODAY.    Corbin Kulkarni, Respiratory DME Coordinator

## 2023-03-02 NOTE — PROGRESS NOTES
ANTICOAGULATION MANAGEMENT     Jt Montgomery 77 year old male is on warfarin with therapeutic INR result. (Goal INR 2.0-3.0)    Recent labs: (last 7 days)     03/02/23  1108   INR 2.2*       ASSESSMENT       Source(s): Chart Review and Patient/Caregiver Call       Warfarin doses taken: Warfarin taken as instructed    Diet: No new diet changes identified    New illness, injury, or hospitalization: No    Medication/supplement changes: None noted    Signs or symptoms of bleeding or clotting: No    Previous INR: Therapeutic last 2(+) visits    Additional findings: None  Will be having cataract surgery 3/8 and 3/15         PLAN     Recommended plan for no diet, medication or health factor changes affecting INR     Dosing Instructions: Continue your current warfarin dose with next INR in 4 weeks       Summary  As of 3/2/2023    Full warfarin instructions:  2.5 mg every Mon; 5 mg all other days   Next INR check:  3/30/2023             Telephone call with Jamal who verbalizes understanding and agrees to plan    Lab visit scheduled    Education provided:     Please call back if any changes to your diet, medications or how you've been taking warfarin    Plan made per [facility] anticoagulation protocol    Indiana Burton RN  Anticoagulation Clinic  3/2/2023    _______________________________________________________________________     Anticoagulation Episode Summary     Current INR goal:  2.0-3.0   TTR:  62.1 % (1 y)   Target end date:  Indefinite   Send INR reminders to:  [facility]    Indications    Paroxysmal atrial fibrillation (H) [I48.0]  Long term current use of anticoagulants with INR goal of 2.0-3.0 [Z79.01]           Comments:           Anticoagulation Care Providers     Provider Role Specialty Phone number    Ip, Genaro Garrett MD Referring Cardiovascular Disease [phone]    Digna Jones MD Referring Internal Medicine [phone]

## 2023-03-03 NOTE — TELEPHONE ENCOUNTER
Patient requesting order for replacement machine. The cost is too high to fix his current machine.     Order pended for provider consideration.    Last ov: 5/4/22  Next ov: none    Naomi BUNCH RN  [facility]

## 2023-03-07 ENCOUNTER — TELEPHONE (OUTPATIENT)
Dept: PHARMACY | Facility: CLINIC | Age: 78
End: 2023-03-07
Payer: COMMERCIAL

## 2023-03-07 NOTE — TELEPHONE ENCOUNTER
Order and last office visit faxed to [facility].     Naomi BUNCH RN  [facility]

## 2023-03-07 NOTE — TELEPHONE ENCOUNTER
Received phone call from Jamal - he's having cataract surgery tomorrow and had a few clarifying questions.  We reviewed, no further questions.    Mikaela StroudD, [facility]  Medication Therapy Management Provider  Pager: [phone]

## 2023-03-10 ENCOUNTER — TELEPHONE (OUTPATIENT)
Facility: CLINIC | Age: 78
End: 2023-03-10
Payer: COMMERCIAL

## 2023-03-10 DIAGNOSIS — G47.31 CENTRAL SLEEP APNEA: Chronic | ICD-10-CM

## 2023-03-10 DIAGNOSIS — G47.33 OSA (OBSTRUCTIVE SLEEP APNEA): Primary | Chronic | ICD-10-CM

## 2023-03-10 NOTE — TELEPHONE ENCOUNTER
Received request from [facility] via fax. They need a copy of patient's last face-to-face with sleep provider and a copy of patient's insurance.  [facility] faxed back last patient sleep medicine office notes and face sheet that has patient's insurance to [facility] at [phone].   Patti Hyde [facility]

## 2023-03-29 ENCOUNTER — TELEPHONE (OUTPATIENT)
Dept: PHARMACY | Facility: CLINIC | Age: 78
End: 2023-03-29
Payer: COMMERCIAL

## 2023-03-29 DIAGNOSIS — I25.10 CORONARY ARTERY DISEASE INVOLVING NATIVE CORONARY ARTERY OF NATIVE HEART WITHOUT ANGINA PECTORIS: ICD-10-CM

## 2023-03-29 DIAGNOSIS — Z79.4 TYPE 2 DIABETES MELLITUS WITH DIABETIC NEUROPATHY, WITH LONG-TERM CURRENT USE OF INSULIN (H): Primary | ICD-10-CM

## 2023-03-29 DIAGNOSIS — E11.40 TYPE 2 DIABETES MELLITUS WITH DIABETIC NEUROPATHY, WITH LONG-TERM CURRENT USE OF INSULIN (H): Primary | ICD-10-CM

## 2023-03-29 NOTE — TELEPHONE ENCOUNTER
Received VM from Jamal indicating he has an INR scheduled tomorrow.  He'd like to get his A1c and lipids re-checked tomorrow at the same time.  Patient is also due for microalbumin.  Orders placed.    Mikaela StroudD, [facility]  Medication Therapy Management Provider  Pager: [phone]

## 2023-03-30 ENCOUNTER — VIRTUAL VISIT (OUTPATIENT)
Dept: PHARMACY | Facility: CLINIC | Age: 78
End: 2023-03-30
Payer: COMMERCIAL

## 2023-03-30 ENCOUNTER — LAB (OUTPATIENT)
Dept: LAB | Facility: CLINIC | Age: 78
End: 2023-03-30
Payer: COMMERCIAL

## 2023-03-30 ENCOUNTER — ANTICOAGULATION THERAPY VISIT (OUTPATIENT)
Dept: ANTICOAGULATION | Facility: CLINIC | Age: 78
End: 2023-03-30

## 2023-03-30 DIAGNOSIS — Z79.4 TYPE 2 DIABETES MELLITUS WITH DIABETIC NEUROPATHY, WITH LONG-TERM CURRENT USE OF INSULIN (H): ICD-10-CM

## 2023-03-30 DIAGNOSIS — H26.9 CATARACT OF BOTH EYES, UNSPECIFIED CATARACT TYPE: Primary | ICD-10-CM

## 2023-03-30 DIAGNOSIS — Z79.01 LONG TERM CURRENT USE OF ANTICOAGULANTS WITH INR GOAL OF 2.0-3.0: ICD-10-CM

## 2023-03-30 DIAGNOSIS — I10 ESSENTIAL HYPERTENSION: ICD-10-CM

## 2023-03-30 DIAGNOSIS — I42.0 DILATED CARDIOMYOPATHY (H): ICD-10-CM

## 2023-03-30 DIAGNOSIS — I47.20 SUSTAINED VENTRICULAR TACHYCARDIA (H): ICD-10-CM

## 2023-03-30 DIAGNOSIS — I48.0 PAROXYSMAL ATRIAL FIBRILLATION (H): Primary | ICD-10-CM

## 2023-03-30 DIAGNOSIS — I48.91 ATRIAL FIBRILLATION, UNSPECIFIED TYPE (H): ICD-10-CM

## 2023-03-30 DIAGNOSIS — E11.40 TYPE 2 DIABETES MELLITUS WITH DIABETIC NEUROPATHY, WITH LONG-TERM CURRENT USE OF INSULIN (H): ICD-10-CM

## 2023-03-30 DIAGNOSIS — I25.10 CORONARY ARTERY DISEASE INVOLVING NATIVE CORONARY ARTERY OF NATIVE HEART WITHOUT ANGINA PECTORIS: ICD-10-CM

## 2023-03-30 DIAGNOSIS — I48.0 PAROXYSMAL ATRIAL FIBRILLATION (H): ICD-10-CM

## 2023-03-30 LAB
CHOLEST SERPL-MCNC: 127 MG/DL
CREAT UR-MCNC: 34.7 MG/DL
HBA1C MFR BLD: 5.8 % (ref 0–5.6)
HDLC SERPL-MCNC: 26 MG/DL
INR BLD: 2.3 (ref 0.9–1.1)
LDLC SERPL CALC-MCNC: 46 MG/DL
MICROALBUMIN UR-MCNC: <12 MG/L
MICROALBUMIN/CREAT UR: NORMAL MG/G{CREAT}
NONHDLC SERPL-MCNC: 101 MG/DL
TRIGL SERPL-MCNC: 276 MG/DL

## 2023-03-30 PROCEDURE — 82043 UR ALBUMIN QUANTITATIVE: CPT

## 2023-03-30 PROCEDURE — 36415 COLL VENOUS BLD VENIPUNCTURE: CPT

## 2023-03-30 PROCEDURE — 99606 MTMS BY PHARM EST 15 MIN: CPT | Performed by: PHARMACIST

## 2023-03-30 PROCEDURE — 83036 HEMOGLOBIN GLYCOSYLATED A1C: CPT

## 2023-03-30 PROCEDURE — 80061 LIPID PANEL: CPT

## 2023-03-30 PROCEDURE — 36416 COLLJ CAPILLARY BLOOD SPEC: CPT

## 2023-03-30 PROCEDURE — 85610 PROTHROMBIN TIME: CPT

## 2023-03-30 PROCEDURE — 82570 ASSAY OF URINE CREATININE: CPT

## 2023-03-30 NOTE — PROGRESS NOTES
Medication Therapy Management (MTM) Encounter    ASSESSMENT:                            Medication Adherence/Access: No issues identified    Cataracts: Stable.    Hypertension/CAD/A. Fib/HFrEF/Susained Ventricular Tachycardia:  Plan in place with cardiology.    PLAN:                            Continue current medication regimen    Follow-up: Return in 25 days (on 4/24/2023) for Follow-up Medication Review.    SUBJECTIVE/OBJECTIVE:                          Jt Montgomery is a 77 year old male called for a follow-up visit.  Today's visit is a follow-up MTM visit from 2/23/2023.     Reason for visit: Routine follow-up.    Tobacco: He reports that he quit smoking about 51 years ago. His smoking use included cigarettes. He has a 10.50 pack-year smoking history. He has never used smokeless tobacco.  Alcohol: history of alcohol dependence    Medication Adherence/Access: no issues reported     Cataracts:  He has completed bilateral cataract removal and reports this went well.  He has follow-up scheduled with eye clinic to re-check next week.    Hypertension/CAD/A. Fib/HFrEF/Susained Ventricular Tachycardia:  Current medication regimen includes amiodarone 200mg daily, furosemide 40mg twice daily (dose increased at cardiology visit 2/8, continues with corresponding KCl 20mEq twice daily), Jardiance 10mg daily, irbesartan 37.5mg daily, metoprolol succinate 100mg twice daily and warfarin as directed.  EF remains stable at 25%, just recently re-checked.  He has cardiology follow-up scheduled to discuss this further.  Blood pressure remains 100-120s/60-80s, HR 60-70s.  He denies side effects including signs and symptoms of bruising/bleeding.  INR   Date Value Ref Range Status   03/02/2023 2.2 (H) 0.9 - 1.1 Final     Lab Results   Component Value Date    CR 1.44 (H) 03/02/2023    CR 1.52 (H) 02/06/2023    CR 1.50 (H) 12/29/2022    CR 1.56 (H) 12/08/2022       GFR Estimate   Date Value Ref Range Status   03/02/2023 50 (L) >60  mL/min/1.73m2 Final     Comment:     eGFR calculated using 2021 CKD-EPI equation.     Potassium   Date Value Ref Range Status   03/02/2023 4.6 3.4 - 5.3 mmol/L Final   10/17/2022 4.3 3.4 - 5.3 mmol/L Final   04/26/2021 4.3 3.4 - 5.3 mmol/L Final         Today's Vitals: There were no vitals taken for this visit.  ----------------    I spent 19 minutes with this patient today. A copy of the visit note was provided to the patient's provider(s).    A summary of these recommendations was declined by the patient.    Nathaly Hughes, PharmD, [facility]  Medication Therapy Management Provider  Pager: [phone]     Telemedicine Visit Details  Type of service:  Telephone visit  Start Time: 11:01 AM  End Time: 11:20 AM     Medication Therapy Recommendations  No medication therapy recommendations to display

## 2023-03-30 NOTE — PROGRESS NOTES
ANTICOAGULATION MANAGEMENT     Jt Montgomery 77 year old male is on warfarin with therapeutic INR result. (Goal INR 2.0-3.0)    Recent labs: (last 7 days)     03/30/23  1154   INR 2.3*       ASSESSMENT       Source(s): Chart Review and Patient/Caregiver Call       Warfarin doses taken: Warfarin taken as instructed    Diet: No new diet changes identified    New illness, injury, or hospitalization: cataract surgery 3/6 & 3/15.  Procedures went well and patient has noticed that his vision has greatly improved.    Medication/supplement changes: Doubled his dose of fish oil about 2 days ago as recommended by his chiropractor. This can cause an increased risk of bruising/bleeding problems.    Signs or symptoms of bleeding or clotting: No    Previous INR: Therapeutic last 2(+) visits    Additional findings: None         PLAN     Recommended plan for ongoing change(s) affecting INR     Dosing Instructions: Continue your current warfarin dose with next INR in 3 weeks (patient preference due to changing dose of fish oil)      Summary  As of 3/30/2023    Full warfarin instructions:  2.5 mg every Mon; 5 mg all other days   Next INR check:  4/20/2023             Telephone call with Jamal who agrees to plan and repeated back plan correctly    Lab visit scheduled    Education provided:     Please call back if any changes to your diet, medications or how you've been taking warfarin    Interaction IS anticipated between warfarin and fish oil    Symptom monitoring: monitoring for bleeding signs and symptoms    Plan made per [facility] anticoagulation protocol    Quiana Dumont RN  Anticoagulation Clinic  3/30/2023    _______________________________________________________________________     Anticoagulation Episode Summary     Current INR goal:  2.0-3.0   TTR:  62.2 % (1 y)   Target end date:  Indefinite   Send INR reminders to:  [facility]    Indications    Paroxysmal atrial fibrillation (H) [I48.0]  Long term current use of  anticoagulants with INR goal of 2.0-3.0 [Z79.01]           Comments:           Anticoagulation Care Providers     Provider Role Specialty Phone number    Ip, Genaro Garrett MD Referring Cardiovascular Disease [phone]    Digna Jones MD Referring Internal Medicine [phone]

## 2023-03-30 NOTE — PATIENT INSTRUCTIONS
"Recommendations from today's MTM visit:                                                    MTM (medication therapy management) is a service provided by a clinical pharmacist designed to help you get the most of out of your medicines.   Today we reviewed what your medicines are for, how to know if they are working, that your medicines are safe and how to make your medicine regimen as easy as possible.      Continue current medication regimen    Follow-up: Return in 25 days (on 4/24/2023) for Follow-up Medication Review.    It was great speaking with you today.  I value your experience and would be very thankful for your time in providing feedback in our clinic survey. In the next few days, you may receive an email or text message from [employer] with a link to a survey related to your  clinical pharmacist.\"     To schedule another MTM appointment, please call the clinic directly or you may call the MTM scheduling line at [phone] or toll-free at [phone].     My Clinical Pharmacist's contact information:                                                      Please feel free to contact me with any questions or concerns you have.      Nathaly Hughes, Rogelio, [facility]  Medication Therapy Management Provider  Pager: [phone]    "

## 2023-04-11 ENCOUNTER — DOCUMENTATION ONLY (OUTPATIENT)
Dept: LAB | Facility: CLINIC | Age: 78
End: 2023-04-11
Payer: COMMERCIAL

## 2023-04-11 DIAGNOSIS — E11.40 TYPE 2 DIABETES MELLITUS WITH DIABETIC NEUROPATHY, WITH LONG-TERM CURRENT USE OF INSULIN (H): Primary | ICD-10-CM

## 2023-04-11 DIAGNOSIS — Z79.4 TYPE 2 DIABETES MELLITUS WITH DIABETIC NEUROPATHY, WITH LONG-TERM CURRENT USE OF INSULIN (H): Primary | ICD-10-CM

## 2023-04-20 ENCOUNTER — ANTICOAGULATION THERAPY VISIT (OUTPATIENT)
Dept: ANTICOAGULATION | Facility: CLINIC | Age: 78
End: 2023-04-20

## 2023-04-20 ENCOUNTER — DOCUMENTATION ONLY (OUTPATIENT)
Dept: FAMILY MEDICINE | Facility: CLINIC | Age: 78
End: 2023-04-20

## 2023-04-20 ENCOUNTER — LAB (OUTPATIENT)
Dept: LAB | Facility: CLINIC | Age: 78
End: 2023-04-20
Payer: COMMERCIAL

## 2023-04-20 DIAGNOSIS — E11.40 TYPE 2 DIABETES MELLITUS WITH DIABETIC NEUROPATHY, WITH LONG-TERM CURRENT USE OF INSULIN (H): ICD-10-CM

## 2023-04-20 DIAGNOSIS — Z79.01 LONG TERM CURRENT USE OF ANTICOAGULANTS WITH INR GOAL OF 2.0-3.0: ICD-10-CM

## 2023-04-20 DIAGNOSIS — Z79.01 LONG TERM CURRENT USE OF ANTICOAGULANTS WITH INR GOAL OF 2.0-3.0: Primary | ICD-10-CM

## 2023-04-20 DIAGNOSIS — I48.0 PAROXYSMAL ATRIAL FIBRILLATION (H): ICD-10-CM

## 2023-04-20 DIAGNOSIS — Z79.4 TYPE 2 DIABETES MELLITUS WITH DIABETIC NEUROPATHY, WITH LONG-TERM CURRENT USE OF INSULIN (H): ICD-10-CM

## 2023-04-20 DIAGNOSIS — I48.0 PAROXYSMAL ATRIAL FIBRILLATION (H): Primary | ICD-10-CM

## 2023-04-20 LAB
CHOLEST SERPL-MCNC: 150 MG/DL
CREAT UR-MCNC: 48.1 MG/DL
HBA1C MFR BLD: 5.6 % (ref 0–5.6)
HDLC SERPL-MCNC: 27 MG/DL
INR BLD: 2 (ref 0.9–1.1)
LDLC SERPL CALC-MCNC: 64 MG/DL
MICROALBUMIN UR-MCNC: <12 MG/L
MICROALBUMIN/CREAT UR: NORMAL MG/G{CREAT}
NONHDLC SERPL-MCNC: 123 MG/DL
TRIGL SERPL-MCNC: 293 MG/DL

## 2023-04-20 PROCEDURE — 36416 COLLJ CAPILLARY BLOOD SPEC: CPT

## 2023-04-20 PROCEDURE — 82043 UR ALBUMIN QUANTITATIVE: CPT

## 2023-04-20 PROCEDURE — 80061 LIPID PANEL: CPT

## 2023-04-20 PROCEDURE — 83036 HEMOGLOBIN GLYCOSYLATED A1C: CPT

## 2023-04-20 PROCEDURE — 85610 PROTHROMBIN TIME: CPT

## 2023-04-20 PROCEDURE — 82570 ASSAY OF URINE CREATININE: CPT

## 2023-04-20 PROCEDURE — 36415 COLL VENOUS BLD VENIPUNCTURE: CPT

## 2023-04-20 NOTE — PROGRESS NOTES
ANTICOAGULATION CLINIC REFERRAL RENEWAL REQUEST       An annual renewal order is required for all patients referred to [facility].?  Please review and sign the pended referral order for Jt Montgomery.       ANTICOAGULATION SUMMARY      Warfarin indication(s)   Atrial Fibrillation    Mechanical heart valve present?  NO       Current goal range   INR: 2.0-3.0     Goal appropriate for indication? Goal INR 2-3, standard for indication(s) above     Time in Therapeutic Range (TTR)  (Goal > 60%) 62.2%       Office visit with referring provider's group within last year yes on 3/2/23       Naomi Muñoz RN  [facility]

## 2023-04-20 NOTE — PROGRESS NOTES
ANTICOAGULATION MANAGEMENT     Jt Montgomery 77 year old male is on warfarin with therapeutic INR result. (Goal INR 2.0-3.0)    Recent labs: (last 7 days)     04/20/23  1121   INR 2.0*       ASSESSMENT       Source(s): Chart Review and Patient/Caregiver Call       Warfarin doses taken: Warfarin taken as instructed    Diet: No new diet changes identified    Medication/supplement changes: None noted    New illness, injury, or hospitalization: No    Signs or symptoms of bleeding or clotting: No    Previous INR: Therapeutic last 2(+) visits    Additional findings:Notes per recent Cardiology visit at [facility] on 4/13/23:    -Your thyroid lab (TSH) is mildly abnormal. This is likely from the amiodarone, however we can't safely stop the amiodarone as this helps prevent dangerous rhythm problems. Please check with your primary care clinic regarding thyroid labs to see if this result warrants thyroid medications.    -Your recent echocardiogram shows your ejection fraction remains severely low 25% and shows that your mitral valve remains severely leaky (mitral regurgitation). We are recommending transesophageal echocardiogram (HERBER) to better assess mitral valve function, then if HERBER confirms severe leaking, a visit with a valve specialist to discuss mitral valve clipping. Please address mitral valve function with your [place] cardiology team.      PLAN     Recommended plan for no diet, medication or health factor changes affecting INR     Dosing Instructions: Continue your current warfarin dose with next INR in 4 weeks       Summary  As of 4/20/2023    Full warfarin instructions:  2.5 mg every Mon; 5 mg all other days   Next INR check:  5/18/2023             Telephone call with Jamal who verbalizes understanding and agrees to plan    Lab visit scheduled    Education provided:     Please call back if any changes to your diet, medications or how you've been taking warfarin    Contact [phone]  with any changes,  questions or concerns.     Plan made per [facility] anticoagulation protocol    Naomi Muñoz, RN  Anticoagulation Clinic  4/20/2023    _______________________________________________________________________     Anticoagulation Episode Summary     Current INR goal:  2.0-3.0   TTR:  68.0 % (1 y)   Target end date:  Indefinite   Send INR reminders to:  [facility]    Indications    Paroxysmal atrial fibrillation (H) [I48.0]  Long term current use of anticoagulants with INR goal of 2.0-3.0 [Z79.01]           Comments:           Anticoagulation Care Providers     Provider Role Specialty Phone number    Ip, Genaro Garrett MD Referring Cardiovascular Disease [phone]    Digna Jones MD Referring Internal Medicine [phone]

## 2023-04-24 ENCOUNTER — VIRTUAL VISIT (OUTPATIENT)
Dept: PHARMACY | Facility: CLINIC | Age: 78
End: 2023-04-24
Payer: COMMERCIAL

## 2023-04-24 ENCOUNTER — LAB (OUTPATIENT)
Dept: LAB | Facility: CLINIC | Age: 78
End: 2023-04-24
Payer: COMMERCIAL

## 2023-04-24 DIAGNOSIS — E11.40 TYPE 2 DIABETES MELLITUS WITH DIABETIC NEUROPATHY, WITH LONG-TERM CURRENT USE OF INSULIN (H): ICD-10-CM

## 2023-04-24 DIAGNOSIS — I48.91 ATRIAL FIBRILLATION, UNSPECIFIED TYPE (H): ICD-10-CM

## 2023-04-24 DIAGNOSIS — I47.20 SUSTAINED VENTRICULAR TACHYCARDIA (H): ICD-10-CM

## 2023-04-24 DIAGNOSIS — I42.0 DILATED CARDIOMYOPATHY (H): ICD-10-CM

## 2023-04-24 DIAGNOSIS — I48.91 ATRIAL FIBRILLATION, UNSPECIFIED TYPE (H): Primary | ICD-10-CM

## 2023-04-24 DIAGNOSIS — I10 ESSENTIAL HYPERTENSION: ICD-10-CM

## 2023-04-24 DIAGNOSIS — I25.10 CORONARY ARTERY DISEASE INVOLVING NATIVE CORONARY ARTERY OF NATIVE HEART WITHOUT ANGINA PECTORIS: ICD-10-CM

## 2023-04-24 DIAGNOSIS — Z79.4 TYPE 2 DIABETES MELLITUS WITH DIABETIC NEUROPATHY, WITH LONG-TERM CURRENT USE OF INSULIN (H): ICD-10-CM

## 2023-04-24 DIAGNOSIS — R79.89 ELEVATED TSH: ICD-10-CM

## 2023-04-24 PROCEDURE — 36415 COLL VENOUS BLD VENIPUNCTURE: CPT

## 2023-04-24 PROCEDURE — 99607 MTMS BY PHARM ADDL 15 MIN: CPT | Performed by: PHARMACIST

## 2023-04-24 PROCEDURE — 84443 ASSAY THYROID STIM HORMONE: CPT

## 2023-04-24 PROCEDURE — 99606 MTMS BY PHARM EST 15 MIN: CPT | Performed by: PHARMACIST

## 2023-04-24 NOTE — PATIENT INSTRUCTIONS
"Recommendations from today's MTM visit:                                                    MTM (medication therapy management) is a service provided by a clinical pharmacist designed to help you get the most of out of your medicines.   Today we reviewed what your medicines are for, how to know if they are working, that your medicines are safe and how to make your medicine regimen as easy as possible.      Please come in tomorrow at 2:15pm to have your thyroid levels re-checked    Follow-up: Return in about 24 days (around 5/18/2023) for Follow-up Medication Review.    It was great speaking with you today.  I value your experience and would be very thankful for your time in providing feedback in our clinic survey. In the next few days, you may receive an email or text message from [email or web address] with a link to a survey related to your  clinical pharmacist.\"     To schedule another MTM appointment, please call the clinic directly or you may call the MTM scheduling line at [phone] or toll-free at [phone].     My Clinical Pharmacist's contact information:                                                      Please feel free to contact me with any questions or concerns you have.      Nathaly Hughes, Rogelio, BCACP  Medication Therapy Management Provider  Pager: [phone]    "

## 2023-04-24 NOTE — PROGRESS NOTES
Medication Therapy Management (MTM) Encounter    ASSESSMENT:                            Medication Adherence/Access: No issues identified    Hypertension/CAD/A. Fib/HFrEF/Susained Ventricular Tachycardia:  Plan in place with cardiology.    Type 2 Diabetes: Stable. Patient is meeting A1c goal of < 8%, SMBG is at goal.  No changes needed.    Elevated TSH: Last check was about 6 weeks ago, may benefit from re-checking TSH with reflex T4.    PLAN:                            TSH ordered - lab appt scheduled for tomorrow, 4/25, to re-check    Follow-up: Return in about 24 days (around 5/18/2023) for Follow-up Medication Review.    SUBJECTIVE/OBJECTIVE:                          Jt Montgomery is a 77 year old male called for a follow-up visit.  Today's visit is a follow-up MTM visit from 3/30/2023.     Reason for visit: Routine follow-up.    Tobacco: He reports that he quit smoking about 51 years ago. His smoking use included cigarettes. He has a 10.50 pack-year smoking history. He has never used smokeless tobacco.  Alcohol: history of alcohol dependence    Social history:  Patient is planning to move to [place] in May.    Medication Adherence/Access: no issues reported    Hypertension/CAD/A. Fib/HFrEF/Susained Ventricular Tachycardia:  Current medication regimen includes amiodarone 200mg daily, furosemide 40mg twice daily (dose increased at cardiology visit 2/8, continues with corresponding KCl 20mEq twice daily), Jardiance 10mg daily, irbesartan 37.5mg daily, metoprolol succinate 100mg twice daily and warfarin as directed.  EF remains stable at 25%, just recently re-checked.  He's been working closely with cardiology.  Blood pressure remains 100-120s/60-80s, HR 60-70s.  He denies side effects including signs and symptoms of bruising/bleeding.  INR   Date Value Ref Range Status   04/20/2023 2.0 (H) 0.9 - 1.1 Final     Creatinine   Date Value Ref Range Status   03/02/2023 1.44 (H) 0.67 - 1.17 mg/dL Final     GFR Estimate   Date  Value Ref Range Status   03/02/2023 50 (L) >60 mL/min/1.73m2 Final     Comment:     eGFR calculated using 2021 CKD-EPI equation.     Potassium   Date Value Ref Range Status   03/02/2023 4.6 3.4 - 5.3 mmol/L Final   10/17/2022 4.3 3.4 - 5.3 mmol/L Final   04/26/2021 4.3 3.4 - 5.3 mmol/L Final       Type 2 Diabetes:  Currently taking Lantus 20-21 units daily and Jardiance 10mg daily (as above). Patient is not experiencing side effects.  Blood sugar monitoring:  Generally testing once daily (AM fasting) - OneTouch Ultra 2   Date FBG (mg/dL)   4/3 115   4/4 106   4/5 105   4/6 93   4/7 101   4/8 91   4/9 122   4/10 -   4/11 116   4/12 103   4/13 96   4/14 130 (forgot insulin PM before)   4/15 126   4/16 87   4/17 85   4/18 116   4/19 107   4/20 92   4/21 116   4/22 85   4/23 102   4/24 103   Symptoms of low blood sugar? Felt a little shaky in the middle of the night last night, tested blood sugar and it was 70mg/dL  Symptoms of high blood sugar? None  Eye exam: due  Foot exam: due  Diet/Exercise: No changes  Aspirin: No longer taking per cardiology direction given warfarin use  Statin: No - patient declines and LDL is well controlled without therapy  ACEi/ARB: Yes: irbesartan.  Lab Results   Component Value Date    UMALCR  04/20/2023      Comment:      Unable to calculate, urine albumin and/or urine creatinine is outside detectable limits.  Microalbuminuria is defined as an albumin:creatinine ratio of 17 to 299 for males and 25 to 299 for females. A ratio of albumin:creatinine of 300 or higher is indicative of overt proteinuria.  Due to biologic variability, positive results should be confirmed by a second, first-morning random or 24-hour timed urine specimen. If there is discrepancy, a third specimen is recommended. When 2 out of 3 results are in the microalbuminuria range, this is evidence for incipient nephropathy and warrants increased efforts at glucose control, blood pressure control, and institution of therapy  with an angiotensin-converting-enzyme (ACE) inhibitor (if the patient can tolerate it).        Lab Results   Component Value Date    A1C 5.6 04/20/2023    A1C 5.8 03/30/2023    A1C 5.5 12/08/2022    A1C 5.5 08/22/2022    A1C 5.6 03/01/2022    A1C 6.7 04/26/2021    A1C 7.0 09/22/2020    A1C 6.1 02/19/2020    A1C 6.2 11/14/2019    A1C 7.6 10/08/2019     Elevated TSH: Cardiology team pointed out that patient's last TSH was slightly elevated, he wonders if this needs to be treated.  It was felt by cardiology that this was likely from amiodarone.  TSH   Date Value Ref Range Status   03/02/2023 4.33 (H) 0.30 - 4.20 uIU/mL Final   10/08/2019 1.94 0.40 - 4.00 mU/L Final      Today's Vitals: There were no vitals taken for this visit.  ----------------    I spent 29 minutes with this patient today. All changes were made via collaborative practice agreement with Dr. Jones. A copy of the visit note was provided to the patient's provider(s).    A summary of these recommendations was declined by the patient.    Nathaly Hughes, PharmD, [facility]  Medication Therapy Management Provider  Pager: [phone]     Telemedicine Visit Details  Type of service:  Telephone visit  Start Time: 11:35 AM  End Time: 12:04 PM     Medication Therapy Recommendations  Atrial fibrillation (H)    Current Medication: amiodarone (PACERONE) 200 MG tablet   Rationale: Medication requires monitoring - Needs additional monitoring - Safety   Recommendation: Order Lab   Status: Accepted per CPA

## 2023-04-25 LAB — TSH SERPL DL<=0.005 MIU/L-ACNC: 3.1 UIU/ML (ref 0.3–4.2)

## 2023-05-18 ENCOUNTER — VIRTUAL VISIT (OUTPATIENT)
Dept: PHARMACY | Facility: CLINIC | Age: 78
End: 2023-05-18
Payer: COMMERCIAL

## 2023-05-18 DIAGNOSIS — I48.91 ATRIAL FIBRILLATION, UNSPECIFIED TYPE (H): Primary | ICD-10-CM

## 2023-05-18 DIAGNOSIS — I47.20 SUSTAINED VENTRICULAR TACHYCARDIA (H): ICD-10-CM

## 2023-05-18 DIAGNOSIS — E11.40 TYPE 2 DIABETES MELLITUS WITH DIABETIC NEUROPATHY, WITH LONG-TERM CURRENT USE OF INSULIN (H): ICD-10-CM

## 2023-05-18 DIAGNOSIS — I10 ESSENTIAL HYPERTENSION: ICD-10-CM

## 2023-05-18 DIAGNOSIS — R79.89 ELEVATED TSH: ICD-10-CM

## 2023-05-18 DIAGNOSIS — I42.0 DILATED CARDIOMYOPATHY (H): ICD-10-CM

## 2023-05-18 DIAGNOSIS — Z79.4 TYPE 2 DIABETES MELLITUS WITH DIABETIC NEUROPATHY, WITH LONG-TERM CURRENT USE OF INSULIN (H): ICD-10-CM

## 2023-05-18 DIAGNOSIS — I25.10 CORONARY ARTERY DISEASE INVOLVING NATIVE CORONARY ARTERY OF NATIVE HEART WITHOUT ANGINA PECTORIS: ICD-10-CM

## 2023-05-18 PROCEDURE — 99606 MTMS BY PHARM EST 15 MIN: CPT | Performed by: PHARMACIST

## 2023-05-18 NOTE — PROGRESS NOTES
Medication Therapy Management (MTM) Encounter    ASSESSMENT:                            Medication Adherence/Access: No issues identified    Hypertension/CAD/A. Fib/HFrEF/Susained Ventricular Tachycardia:  Plan in place with cardiology.  Plan in place for patient to establish care in [place].    Type 2 Diabetes: Stable. Patient is meeting A1c goal of < 8%, SMBG is at goal.  No changes needed.  Plan in place for patient to establish care in [place].    Elevated TSH: last TSH was improved, will need to be monitored moving forward.    PLAN:                            Continue current medication regimen    Follow-up: Return if needed - pt is moving to [place].    SUBJECTIVE/OBJECTIVE:                          Jamal Montgomery is a 77 year old male called for a follow-up visit.  Today's visit is a follow-up MTM visit from 4/24/2023.     Reason for visit: Routine follow-up.    Tobacco: He reports that he quit smoking about 51 years ago. His smoking use included cigarettes. He has a 10.50 pack-year smoking history. He has never used smokeless tobacco.  Alcohol: history of alcohol dependence    Social history:  Patient is set to move on 5/28    Medication Adherence/Access: no issues reported    Hypertension/CAD/A. Fib/HFrEF/Susained Ventricular Tachycardia:  Current medication regimen includes amiodarone 200mg daily, furosemide 40mg twice daily (dose increased at cardiology visit 2/8, continues with corresponding KCl 20mEq twice daily), Jardiance 10mg daily, irbesartan 37.5mg daily, metoprolol succinate 100mg twice daily and warfarin as directed.  EF remains stable at 25%, just recently re-checked.  He's been working closely with cardiology.  Blood pressure remains 100-120s/60-80s, HR 60-70s.  He denies side effects including signs and symptoms of bruising/bleeding.  INR   Date Value Ref Range Status   04/20/2023 2.0 (H) 0.9 - 1.1 Final   07/02/2021 2.50 (H) 0.86 - 1.14 Final     Comment:     This test is intended for monitoring Coumadin  therapy.  Results are not   accurate in patients with prolonged INR due to factor deficiency.        Creatinine   Date Value Ref Range Status   03/02/2023 1.44 (H) 0.67 - 1.17 mg/dL Final   04/26/2021 1.19 0.66 - 1.25 mg/dL Final      GFR Estimate   Date Value Ref Range Status   03/02/2023 50 (L) >60 mL/min/1.73m2 Final     Comment:     eGFR calculated using 2021 CKD-EPI equation.   04/26/2021 60 (L) >60 mL/min/[1.73_m2] Final     Comment:     Non  GFR Calc  Starting 12/18/2018, serum creatinine based estimated GFR (eGFR) will be   calculated using the Chronic Kidney Disease Epidemiology Collaboration   (CKD-EPI) equation.       Potassium   Date Value Ref Range Status   03/02/2023 4.6 3.4 - 5.3 mmol/L Final   10/17/2022 4.3 3.4 - 5.3 mmol/L Final   04/26/2021 4.3 3.4 - 5.3 mmol/L Final        Type 2 Diabetes:  Currently taking Lantus 20-21 units daily and Jardiance 10mg daily (as above). Patient is not experiencing side effects.  Blood sugar monitoring:  Generally testing once daily (AM fasting) - OneTouch Ultra 2   He's been doing some post-prandial blood sugar testing and reports these have been in the 120-130mg/dL range  Symptoms of low blood sugar? None  Symptoms of high blood sugar? None  Eye exam: due  Foot exam: due  Diet/Exercise: No changes  Aspirin: No longer taking per cardiology direction given warfarin use  Statin: No - patient declines and LDL is well controlled without therapy  ACEi/ARB: Yes: irbesartan.  Lab Results   Component Value Date    UMALCR  04/20/2023      Comment:      Unable to calculate, urine albumin and/or urine creatinine is outside detectable limits.  Microalbuminuria is defined as an albumin:creatinine ratio of 17 to 299 for males and 25 to 299 for females. A ratio of albumin:creatinine of 300 or higher is indicative of overt proteinuria.  Due to biologic variability, positive results should be confirmed by a second, first-morning random or 24-hour timed urine  specimen. If there is discrepancy, a third specimen is recommended. When 2 out of 3 results are in the microalbuminuria range, this is evidence for incipient nephropathy and warrants increased efforts at glucose control, blood pressure control, and institution of therapy with an angiotensin-converting-enzyme (ACE) inhibitor (if the patient can tolerate it).        Lab Results   Component Value Date    A1C 5.6 04/20/2023    A1C 5.8 03/30/2023    A1C 5.5 12/08/2022    A1C 5.5 08/22/2022    A1C 5.6 03/01/2022    A1C 6.7 04/26/2021    A1C 7.0 09/22/2020    A1C 6.1 02/19/2020    A1C 6.2 11/14/2019    A1C 7.6 10/08/2019     Elevated TSH: Cardiology team pointed out that patient's last TSH was slightly elevated, he wonders if this needs to be treated.  It was felt by cardiology that this was likely from amiodarone.   Lab Results   Component Value Date    TSH 3.10 04/24/2023    TSH 4.33 (H) 03/02/2023    TSH 1.94 10/08/2019    TSH 2.35 04/29/2016      Today's Vitals: There were no vitals taken for this visit.  ----------------    I spent 18 minutes with this patient today. A copy of the visit note was provided to the patient's provider(s).    A summary of these recommendations was declined by the patient.    Nathaly Hughes, Rogelio, [facility]  Medication Therapy Management Provider  Pager: [phone]     Telemedicine Visit Details  Type of service:  Telephone visit  Start Time: 10:33 AM  End Time: 10:51 AM     Medication Therapy Recommendations  No medication therapy recommendations to display

## 2023-05-18 NOTE — PATIENT INSTRUCTIONS
"Recommendations from today's MTM visit:                                                    MTM (medication therapy management) is a service provided by a clinical pharmacist designed to help you get the most of out of your medicines.   Today we reviewed what your medicines are for, how to know if they are working, that your medicines are safe and how to make your medicine regimen as easy as possible.      Continue current medication regimen    Follow-up: Return if needed - pt is moving to [place].    It was great speaking with you today.  I value your experience and would be very thankful for your time in providing feedback in our clinic survey. In the next few days, you may receive an email or text message from [employer] with a link to a survey related to your  clinical pharmacist.\"     To schedule another MTM appointment, please call the clinic directly or you may call the MTM scheduling line at [phone] or toll-free at [phone].     My Clinical Pharmacist's contact information:                                                      Please feel free to contact me with any questions or concerns you have.      Nathaly Hughes, PharmD, BCACP  Medication Therapy Management Provider  Pager: [phone]    "

## 2023-05-19 ENCOUNTER — ANTICOAGULATION THERAPY VISIT (OUTPATIENT)
Dept: ANTICOAGULATION | Facility: CLINIC | Age: 78
End: 2023-05-19

## 2023-05-19 ENCOUNTER — TELEPHONE (OUTPATIENT)
Dept: ANTICOAGULATION | Facility: CLINIC | Age: 78
End: 2023-05-19

## 2023-05-19 ENCOUNTER — LAB (OUTPATIENT)
Dept: LAB | Facility: CLINIC | Age: 78
End: 2023-05-19
Payer: COMMERCIAL

## 2023-05-19 DIAGNOSIS — Z79.01 LONG TERM CURRENT USE OF ANTICOAGULANTS WITH INR GOAL OF 2.0-3.0: ICD-10-CM

## 2023-05-19 DIAGNOSIS — I48.0 PAROXYSMAL ATRIAL FIBRILLATION (H): ICD-10-CM

## 2023-05-19 DIAGNOSIS — I48.0 PAROXYSMAL ATRIAL FIBRILLATION (H): Primary | ICD-10-CM

## 2023-05-19 LAB — INR BLD: 2.9 (ref 0.9–1.1)

## 2023-05-19 PROCEDURE — 36416 COLLJ CAPILLARY BLOOD SPEC: CPT

## 2023-05-19 PROCEDURE — 85610 PROTHROMBIN TIME: CPT

## 2023-05-19 NOTE — PROGRESS NOTES
ANTICOAGULATION MANAGEMENT     Jt Montgomery 77 year old male is on warfarin with therapeutic INR result. (Goal INR 2.0-3.0)    Recent labs: (last 7 days)     05/19/23  1246   INR 2.9*       ASSESSMENT       Source(s): Chart Review and Patient/Caregiver Call       Warfarin doses taken: Warfarin taken as instructed    Diet: Decreased greens/vitamin K in diet; plans to resume previous intake    Medication/supplement changes: None noted    New illness, injury, or hospitalization: No    Signs or symptoms of bleeding or clotting: No    Previous result: Therapeutic last 2(+) visits    Additional findings: None         PLAN       Jt Montgomery is being discharged from the [facility] ([facility]).    Reason for discharge: care has been transferred to [facility]    Anticoagulation episode resolved, [facility] referral closed and Standing order discontinued    If patient needs warfarin management in the future, please send a new referral    Diann Bliss RN

## 2023-05-19 NOTE — TELEPHONE ENCOUNTER
ANTICOAGULATION     Jt Montgomery is overdue for INR check.      Spoke with Jamal and scheduled lab appointment on 5/19/23    Iva Fair RN

## 2023-07-10 DIAGNOSIS — E11.40 TYPE 2 DIABETES MELLITUS WITH DIABETIC NEUROPATHY, WITH LONG-TERM CURRENT USE OF INSULIN (H): Chronic | ICD-10-CM

## 2023-07-10 DIAGNOSIS — Z79.4 TYPE 2 DIABETES MELLITUS WITH DIABETIC NEUROPATHY, WITH LONG-TERM CURRENT USE OF INSULIN (H): Chronic | ICD-10-CM

## 2023-07-10 RX ORDER — PEN NEEDLE, DIABETIC 32GX 5/32"
NEEDLE, DISPOSABLE MISCELLANEOUS
Qty: 200 EACH | Refills: 0 | Status: SHIPPED | OUTPATIENT
Start: 2023-07-10

## 2023-08-07 ENCOUNTER — TELEPHONE (OUTPATIENT)
Dept: SLEEP MEDICINE | Facility: CLINIC | Age: 78
End: 2023-08-07
Payer: COMMERCIAL

## 2023-08-07 DIAGNOSIS — G47.33 OSA (OBSTRUCTIVE SLEEP APNEA): Primary | Chronic | ICD-10-CM

## 2023-08-07 DIAGNOSIS — G47.31 CENTRAL SLEEP APNEA: Chronic | ICD-10-CM

## 2023-08-07 NOTE — TELEPHONE ENCOUNTER
Attempted to call patient, but there was no answer. LM on VM stating that Annual CPAP and New CPAP Compliance Visit is needed before 8/10/2023. Needed per DME message. Dr. Mckoy pt., but pt. needed to be seen sooner for insurance so we had to fit him in with Dr. Higuera. Left [facility] direct phone number if patient has any questions or concerns.  Patti Hyde, [facility]

## 2023-08-07 NOTE — TELEPHONE ENCOUNTER
----- Message from Kierra Longo sent at 8/4/2023  8:02 AM CDT -----  Regarding: PLEASE SCHEDULE ASAP  Contact: [phone]  Hello,     Please call and schedule a follow-up appointment with patient for their CPAP compliance requirement. They were set up on 5/10/23 and must attend a follow-up to discuss their use of the machine by 8/10/23.     I've made several attempts to have patient call and schedule, but have not seen a scheduled appointment.     Thanks,     Kierra

## 2023-12-20 NOTE — PROGRESS NOTES
Clinic Care Coordination Contact  [facility]/Voicemail    Referral Source: IP Report  Clinical Data: Care Coordinator Outreach  Outreach attempted x 1.  Left message on voicemail with call back information and requested return call.  Plan: Care Coordinator will try to reach patient again in 1-2 business days.    Patient was at [facility] from 12/02 to 12/04/18 for management of worsening swelling of his face. He was admitted for management of facial cellulitis with antibiotics.   Per chart review, patient was anxious about process of discharge and left the hospital AMA.  Patient has a medical history of diabetes type 2, hypertension, atrial fibrillation with anticoagulant therapy, CHF, obstructive sleep apnea on CPAP and CKD.    Thalia Vidal RN   Care Coordinator  [facility]  Phone:  [phone]  Email: [email or web address]         [Negative] : Heme/Lymph

## (undated) DEVICE — SOL GLYCINE 1.5% 3000ML BAG 2B7317

## (undated) DEVICE — LINEN TOWEL PACK X5 5464

## (undated) DEVICE — PAD CHUX UNDERPAD 23X24" 7136

## (undated) DEVICE — BLADE KNIFE SURG 15 371115

## (undated) DEVICE — BAG URINARY DRAIN 4000ML LF 153509

## (undated) DEVICE — DECANTER BAG 2002S

## (undated) DEVICE — SYR 50ML CATH TIP W/O NDL 309620

## (undated) DEVICE — PACK TUR CUSTOM SBA15RUFSE

## (undated) DEVICE — SOL WATER IRRIG 1000ML BOTTLE 2F7114

## (undated) DEVICE — ESU GROUND PAD UNIVERSAL W/O CORD

## (undated) DEVICE — BAG CYSTO TABLE DRAIN

## (undated) DEVICE — CATH HOLDER STRAP 36600

## (undated) DEVICE — ESU ELEC ROLLERBALL 27FR 5MM 27040K

## (undated) DEVICE — EVACUATOR BLADDER UROVAC LATEX M0067301250

## (undated) DEVICE — CABLE HIGH FREQEUCY STERILE 8MM PLUG 300CM 277KB-D/10

## (undated) DEVICE — CATH FOLEY 3WAY 24FR 30ML LATEX 0167SI24

## (undated) DEVICE — ESU ELEC LOOP 27FR 27050F

## (undated) DEVICE — GLOVE PROTEXIS W/NEU-THERA 7.5  2D73TE75

## (undated) DEVICE — CATH FOLEY 3WAY 22FR 30ML LATEX 0167SI22

## (undated) RX ORDER — FENTANYL CITRATE 50 UG/ML
INJECTION, SOLUTION INTRAMUSCULAR; INTRAVENOUS
Status: DISPENSED
Start: 2020-05-15

## (undated) RX ORDER — NEOSTIGMINE METHYLSULFATE 1 MG/ML
VIAL (ML) INJECTION
Status: DISPENSED
Start: 2020-05-15

## (undated) RX ORDER — PROPOFOL 10 MG/ML
INJECTION, EMULSION INTRAVENOUS
Status: DISPENSED
Start: 2020-05-15

## (undated) RX ORDER — REGADENOSON 0.08 MG/ML
INJECTION, SOLUTION INTRAVENOUS
Status: DISPENSED
Start: 2018-05-22

## (undated) RX ORDER — GLYCOPYRROLATE 0.2 MG/ML
INJECTION, SOLUTION INTRAMUSCULAR; INTRAVENOUS
Status: DISPENSED
Start: 2020-05-15

## (undated) RX ORDER — CEFAZOLIN SODIUM 2 G/100ML
INJECTION, SOLUTION INTRAVENOUS
Status: DISPENSED
Start: 2020-05-15

## (undated) RX ORDER — DEXAMETHASONE SODIUM PHOSPHATE 4 MG/ML
INJECTION, SOLUTION INTRA-ARTICULAR; INTRALESIONAL; INTRAMUSCULAR; INTRAVENOUS; SOFT TISSUE
Status: DISPENSED
Start: 2020-05-15

## (undated) RX ORDER — LIDOCAINE HYDROCHLORIDE 20 MG/ML
INJECTION, SOLUTION EPIDURAL; INFILTRATION; INTRACAUDAL; PERINEURAL
Status: DISPENSED
Start: 2020-05-15

## (undated) RX ORDER — ONDANSETRON 2 MG/ML
INJECTION INTRAMUSCULAR; INTRAVENOUS
Status: DISPENSED
Start: 2020-05-15

## (undated) RX ORDER — LIDOCAINE HYDROCHLORIDE 20 MG/ML
JELLY TOPICAL
Status: DISPENSED
Start: 2020-05-15

## (undated) RX ORDER — ATROPA BELLADONNA AND OPIUM 16.2; 3 MG/1; MG/1
SUPPOSITORY RECTAL
Status: DISPENSED
Start: 2020-05-15